# Patient Record
Sex: FEMALE | Race: WHITE | NOT HISPANIC OR LATINO | URBAN - METROPOLITAN AREA
[De-identification: names, ages, dates, MRNs, and addresses within clinical notes are randomized per-mention and may not be internally consistent; named-entity substitution may affect disease eponyms.]

---

## 2018-01-14 ENCOUNTER — INPATIENT HOSPITAL (OUTPATIENT)
Dept: URBAN - METROPOLITAN AREA HOSPITAL 84 | Facility: HOSPITAL | Age: 73
End: 2018-01-14
Payer: MEDICARE

## 2018-01-14 DIAGNOSIS — K44.9 DIAPHRAGMATIC HERNIA WITHOUT OBSTRUCTION OR GANGRENE: ICD-10-CM

## 2018-01-14 DIAGNOSIS — K26.4 CHRONIC OR UNSPECIFIED DUODENAL ULCER WITH HEMORRHAGE: ICD-10-CM

## 2018-01-14 DIAGNOSIS — K92.1 MELENA: ICD-10-CM

## 2018-01-14 PROCEDURE — 43255 EGD CONTROL BLEEDING ANY: CPT | Performed by: INTERNAL MEDICINE

## 2018-01-15 ENCOUNTER — INPATIENT HOSPITAL (OUTPATIENT)
Dept: URBAN - METROPOLITAN AREA HOSPITAL 84 | Facility: HOSPITAL | Age: 73
End: 2018-01-15
Payer: MEDICARE

## 2018-01-15 DIAGNOSIS — R10.11 RIGHT UPPER QUADRANT PAIN: ICD-10-CM

## 2018-01-15 DIAGNOSIS — R63.4 ABNORMAL WEIGHT LOSS: ICD-10-CM

## 2018-01-15 DIAGNOSIS — D50.0 IRON DEFICIENCY ANEMIA SECONDARY TO BLOOD LOSS (CHRONIC): ICD-10-CM

## 2018-01-15 PROCEDURE — 99231 SBSQ HOSP IP/OBS SF/LOW 25: CPT | Performed by: NURSE PRACTITIONER

## 2018-01-18 ENCOUNTER — INPATIENT HOSPITAL (OUTPATIENT)
Dept: URBAN - METROPOLITAN AREA HOSPITAL 84 | Facility: HOSPITAL | Age: 73
End: 2018-01-18
Payer: MEDICARE

## 2018-01-18 DIAGNOSIS — R63.4 ABNORMAL WEIGHT LOSS: ICD-10-CM

## 2018-01-18 DIAGNOSIS — I48.91 UNSPECIFIED ATRIAL FIBRILLATION: ICD-10-CM

## 2018-01-18 DIAGNOSIS — D50.0 IRON DEFICIENCY ANEMIA SECONDARY TO BLOOD LOSS (CHRONIC): ICD-10-CM

## 2018-01-18 PROCEDURE — 99231 SBSQ HOSP IP/OBS SF/LOW 25: CPT | Performed by: NURSE PRACTITIONER

## 2018-02-28 ENCOUNTER — HOSPITAL ENCOUNTER (OUTPATIENT)
Dept: PHYSICAL THERAPY | Facility: HOSPITAL | Age: 73
Setting detail: RECURRING SERIES
Discharge: HOME | End: 2018-04-12
Attending: NURSE PRACTITIONER | Admitting: NURSE PRACTITIONER

## 2018-03-16 ENCOUNTER — HOSPITAL ENCOUNTER (OUTPATIENT)
Dept: HOME HEALTH SERVICES | Facility: HOME HEALTHCARE | Age: 73
Setting detail: SPECIMEN
Discharge: HOME | End: 2018-03-16
Attending: FAMILY MEDICINE | Admitting: FAMILY MEDICINE

## 2018-03-16 LAB
ANION GAP SERPL CALC-SCNC: 10.7 MMOL/L (ref 10–20)
BUN SERPL-MCNC: 24 MG/DL (ref 8–20)
BUN/CREAT SERPL: 16 (ref 5.4–26.2)
CALCIUM SERPL-MCNC: 8.5 MG/DL (ref 8.9–10.3)
CHLORIDE SERPL-SCNC: 114 MMOL/L (ref 101–111)
CONV CO2: 21 MMOL/L (ref 22–32)
CREAT UR-MCNC: 1.5 MG/DL (ref 0.4–1)
GLUCOSE SERPL-MCNC: 77 MG/DL (ref 65–99)
POTASSIUM SERPL-SCNC: 3.7 MMOL/L (ref 3.6–5.1)
SODIUM SERPL-SCNC: 142 MMOL/L (ref 136–144)

## 2018-05-18 ENCOUNTER — HOSPITAL ENCOUNTER (OUTPATIENT)
Dept: FAMILY MEDICINE CLINIC | Facility: CLINIC | Age: 73
Setting detail: SPECIMEN
Discharge: HOME | End: 2018-05-18
Attending: PREVENTIVE MEDICINE | Admitting: PREVENTIVE MEDICINE

## 2018-05-18 LAB
ALBUMIN SERPL-MCNC: 3.2 G/DL (ref 3.5–4.8)
ALBUMIN/GLOB SERPL: 0.9 {RATIO} (ref 1–1.7)
ALP SERPL-CCNC: 111 IU/L (ref 32–91)
ALT SERPL-CCNC: 17 IU/L (ref 14–54)
ANION GAP SERPL CALC-SCNC: 12.3 MMOL/L (ref 10–20)
AST SERPL-CCNC: 31 IU/L (ref 15–41)
BACTERIA SPEC AEROBE CULT: NORMAL
BASOPHILS # BLD AUTO: 0 10*3/UL (ref 0–0.2)
BASOPHILS NFR BLD AUTO: 0 % (ref 0–2)
BILIRUB SERPL-MCNC: 0.9 MG/DL (ref 0.3–1.2)
BUN SERPL-MCNC: 37 MG/DL (ref 8–20)
BUN/CREAT SERPL: 19.5 (ref 5.4–26.2)
CALCIUM SERPL-MCNC: 9.4 MG/DL (ref 8.9–10.3)
CHLORIDE SERPL-SCNC: 110 MMOL/L (ref 101–111)
CHOLEST SERPL-MCNC: 171 MG/DL
CHOLEST/HDLC SERPL: 2.3 {RATIO}
CONV CO2: 23 MMOL/L (ref 22–32)
CONV LDL CHOLESTEROL DIRECT: 73 MG/DL (ref 0–100)
CONV TOTAL PROTEIN: 6.7 G/DL (ref 6.1–7.9)
CREAT UR-MCNC: 1.9 MG/DL (ref 0.4–1)
CRP SERPL-MCNC: 1.16 MG/DL (ref 0–0.7)
DIFFERENTIAL METHOD BLD: (no result)
EOSINOPHIL # BLD AUTO: 0 % (ref 0–3)
EOSINOPHIL # BLD AUTO: 0 10*3/UL (ref 0–0.3)
ERYTHROCYTE [DISTWIDTH] IN BLOOD BY AUTOMATED COUNT: 18.4 % (ref 11.5–14.5)
ERYTHROCYTE [SEDIMENTATION RATE] IN BLOOD BY WESTERGREN METHOD: 36 MM/HR (ref 0–30)
GLOBULIN UR ELPH-MCNC: 3.5 G/DL (ref 2.5–3.8)
GLUCOSE SERPL-MCNC: 110 MG/DL (ref 65–99)
HCT VFR BLD AUTO: 31.1 % (ref 35–49)
HDLC SERPL-MCNC: 76 MG/DL
HGB BLD-MCNC: 9.7 G/DL (ref 12–15)
LDLC/HDLC SERPL: 1 {RATIO}
LIPID INTERPRETATION: ABNORMAL
LYMPHOCYTES # BLD AUTO: 0.5 10*3/UL (ref 0.8–4.8)
LYMPHOCYTES NFR BLD AUTO: 6 % (ref 18–42)
Lab: NORMAL
MCH RBC QN AUTO: 29.3 PG (ref 26–32)
MCHC RBC AUTO-ENTMCNC: 31 G/DL (ref 32–36)
MCV RBC AUTO: 94.3 FL (ref 80–94)
MICRO REPORT STATUS: NORMAL
MONOCYTES # BLD AUTO: 0.3 10*3/UL (ref 0.1–1.3)
MONOCYTES NFR BLD AUTO: 3 % (ref 2–11)
NEUTROPHILS # BLD AUTO: 8.1 10*3/UL (ref 2.3–8.6)
NEUTROPHILS NFR BLD AUTO: 91 % (ref 50–75)
NRBC BLD AUTO-RTO: 0 /100{WBCS}
NRBC/RBC NFR BLD MANUAL: 0 10*3/UL
PLATELET # BLD AUTO: 256 10*3/UL (ref 150–450)
PMV BLD AUTO: 7.8 FL (ref 7.4–10.4)
POTASSIUM SERPL-SCNC: 4.3 MMOL/L (ref 3.6–5.1)
RBC # BLD AUTO: 3.3 10*6/UL (ref 4–5.4)
SODIUM SERPL-SCNC: 141 MMOL/L (ref 136–144)
SPECIMEN SOURCE: NORMAL
TRIGL SERPL-MCNC: 82 MG/DL
URATE SERPL-MCNC: 6.2 MG/DL (ref 2.6–8)
VLDLC SERPL CALC-MCNC: 22 MG/DL
WBC # BLD AUTO: 9 10*3/UL (ref 4.5–11.5)

## 2018-05-24 ENCOUNTER — HOSPITAL ENCOUNTER (OUTPATIENT)
Dept: GENERAL RADIOLOGY | Facility: HOSPITAL | Age: 73
Discharge: HOME | End: 2018-05-24
Attending: PREVENTIVE MEDICINE | Admitting: PREVENTIVE MEDICINE

## 2018-05-31 ENCOUNTER — HOSPITAL ENCOUNTER (OUTPATIENT)
Dept: FAMILY MEDICINE CLINIC | Facility: CLINIC | Age: 73
Setting detail: SPECIMEN
Discharge: HOME | End: 2018-05-31
Attending: PREVENTIVE MEDICINE | Admitting: PREVENTIVE MEDICINE

## 2018-06-04 ENCOUNTER — HOSPITAL ENCOUNTER (OUTPATIENT)
Dept: FAMILY MEDICINE CLINIC | Facility: CLINIC | Age: 73
Setting detail: SPECIMEN
Discharge: HOME | End: 2018-06-04
Attending: PREVENTIVE MEDICINE | Admitting: PREVENTIVE MEDICINE

## 2018-06-04 LAB
AMPICILLIN SUSC ISLT: ABNORMAL
AZTREONAM SUSC ISLT: ABNORMAL
BACTERIA ISLT: ABNORMAL
BACTERIA SPEC AEROBE CULT: ABNORMAL
CEFAZOLIN SUSC ISLT: ABNORMAL
CEFEPIME SUSC ISLT: ABNORMAL
CEFTRIAXONE SUSC ISLT: ABNORMAL
COLONY COUNT: ABNORMAL
LEVOFLOXACIN SUSC ISLT: ABNORMAL
Lab: ABNORMAL
MEROPENEM SUSC ISLT: ABNORMAL
MICRO REPORT STATUS: ABNORMAL
PIP+TAZO SUSC ISLT: ABNORMAL
SPECIMEN SOURCE: ABNORMAL
SUSC METH SPEC: ABNORMAL
TOBRAMYCIN SUSC ISLT: ABNORMAL
TRIMETHOPRIM/SULFA: ABNORMAL

## 2018-06-21 ENCOUNTER — HOSPITAL ENCOUNTER (OUTPATIENT)
Dept: FAMILY MEDICINE CLINIC | Facility: CLINIC | Age: 73
Setting detail: SPECIMEN
Discharge: HOME | End: 2018-06-21
Attending: PREVENTIVE MEDICINE | Admitting: PREVENTIVE MEDICINE

## 2018-06-24 LAB
AMPICILLIN SUSC ISLT: ABNORMAL
AMPICILLIN+SULBAC SUSC ISLT: ABNORMAL
AZTREONAM SUSC ISLT: ABNORMAL
AZTREONAM SUSC ISLT: ABNORMAL
BACTERIA ISLT: ABNORMAL
BACTERIA ISLT: ABNORMAL
BACTERIA SPEC AEROBE CULT: ABNORMAL
CEFAZOLIN SUSC ISLT: ABNORMAL
CEFAZOLIN SUSC ISLT: ABNORMAL
CEFEPIME SUSC ISLT: ABNORMAL
CEFEPIME SUSC ISLT: ABNORMAL
CEFTRIAXONE SUSC ISLT: ABNORMAL
CEFTRIAXONE SUSC ISLT: ABNORMAL
CIPROFLOXACIN SUSC ISLT: ABNORMAL
CIPROFLOXACIN SUSC ISLT: ABNORMAL
COLONY COUNT: ABNORMAL
LEVOFLOXACIN SUSC ISLT: ABNORMAL
LEVOFLOXACIN SUSC ISLT: ABNORMAL
Lab: ABNORMAL
MEROPENEM SUSC ISLT: ABNORMAL
MEROPENEM SUSC ISLT: ABNORMAL
MICRO REPORT STATUS: ABNORMAL
NITROFURANTOIN SUSC ISLT: ABNORMAL
NITROFURANTOIN SUSC ISLT: ABNORMAL
PIP+TAZO SUSC ISLT: ABNORMAL
PIP+TAZO SUSC ISLT: ABNORMAL
SPECIMEN SOURCE: ABNORMAL
SUSC METH SPEC: ABNORMAL
SUSC METH SPEC: ABNORMAL
TETRACYCLINE SUSC ISLT: ABNORMAL
TETRACYCLINE SUSC ISLT: ABNORMAL
TOBRAMYCIN SUSC ISLT: ABNORMAL
TOBRAMYCIN SUSC ISLT: ABNORMAL
TRIMETHOPRIM/SULFA: ABNORMAL
TRIMETHOPRIM/SULFA: ABNORMAL

## 2018-07-05 ENCOUNTER — HOSPITAL ENCOUNTER (OUTPATIENT)
Dept: FAMILY MEDICINE CLINIC | Facility: CLINIC | Age: 73
Setting detail: SPECIMEN
Discharge: HOME | End: 2018-07-05
Attending: PREVENTIVE MEDICINE | Admitting: PREVENTIVE MEDICINE

## 2018-07-06 LAB
AMPICILLIN SUSC ISLT: ABNORMAL
AZTREONAM SUSC ISLT: ABNORMAL
AZTREONAM SUSC ISLT: ABNORMAL
BACTERIA ISLT: ABNORMAL
BACTERIA ISLT: ABNORMAL
BACTERIA SPEC AEROBE CULT: ABNORMAL
CEFAZOLIN SUSC ISLT: ABNORMAL
CEFAZOLIN SUSC ISLT: ABNORMAL
CEFEPIME SUSC ISLT: ABNORMAL
CEFEPIME SUSC ISLT: ABNORMAL
CEFTRIAXONE SUSC ISLT: ABNORMAL
CEFTRIAXONE SUSC ISLT: ABNORMAL
CIPROFLOXACIN SUSC ISLT: ABNORMAL
CIPROFLOXACIN SUSC ISLT: ABNORMAL
COLONY COUNT: ABNORMAL
LEVOFLOXACIN SUSC ISLT: ABNORMAL
LEVOFLOXACIN SUSC ISLT: ABNORMAL
Lab: ABNORMAL
MEROPENEM SUSC ISLT: ABNORMAL
MEROPENEM SUSC ISLT: ABNORMAL
MICRO REPORT STATUS: ABNORMAL
NITROFURANTOIN SUSC ISLT: ABNORMAL
NITROFURANTOIN SUSC ISLT: ABNORMAL
PIP+TAZO SUSC ISLT: ABNORMAL
PIP+TAZO SUSC ISLT: ABNORMAL
SPECIMEN SOURCE: ABNORMAL
SUSC METH SPEC: ABNORMAL
SUSC METH SPEC: ABNORMAL
TETRACYCLINE SUSC ISLT: ABNORMAL
TETRACYCLINE SUSC ISLT: ABNORMAL
TOBRAMYCIN SUSC ISLT: ABNORMAL
TOBRAMYCIN SUSC ISLT: ABNORMAL
TRIMETHOPRIM/SULFA: ABNORMAL
TRIMETHOPRIM/SULFA: ABNORMAL

## 2018-07-30 ENCOUNTER — HOSPITAL ENCOUNTER (OUTPATIENT)
Dept: HOME HEALTH SERVICES | Facility: HOME HEALTHCARE | Age: 73
Setting detail: SPECIMEN
Discharge: HOME | End: 2018-07-30
Attending: PREVENTIVE MEDICINE | Admitting: PREVENTIVE MEDICINE

## 2018-07-30 LAB
AMPICILLIN SUSC ISLT: ABNORMAL
AMPICILLIN SUSC ISLT: ABNORMAL
AZTREONAM SUSC ISLT: ABNORMAL
AZTREONAM SUSC ISLT: ABNORMAL
BACTERIA ISLT: ABNORMAL
BACTERIA ISLT: ABNORMAL
BACTERIA SPEC AEROBE CULT: ABNORMAL
BILIRUB UR QL STRIP: NEGATIVE MG/DL
CASTS URNS QL MICRO: ABNORMAL /[LPF]
CEFAZOLIN SUSC ISLT: ABNORMAL
CEFAZOLIN SUSC ISLT: ABNORMAL
CEFEPIME SUSC ISLT: ABNORMAL
CEFEPIME SUSC ISLT: ABNORMAL
CEFTRIAXONE SUSC ISLT: ABNORMAL
CEFTRIAXONE SUSC ISLT: ABNORMAL
CIPROFLOXACIN SUSC ISLT: ABNORMAL
CIPROFLOXACIN SUSC ISLT: ABNORMAL
COLONY COUNT: ABNORMAL
COLOR UR: YELLOW
CONV BACTERIA IN URINE MICRO: NEGATIVE
CONV CLARITY OF URINE: ABNORMAL
CONV HYALINE CASTS IN URINE MICRO: 1 /[LPF] (ref 0–5)
CONV PROTEIN IN URINE BY AUTOMATED TEST STRIP: NEGATIVE MG/DL
CONV SMALL ROUND CELLS: ABNORMAL /[HPF]
CONV UROBILINOGEN IN URINE BY AUTOMATED TEST STRIP: 0.2 MG/DL
CULTURE INDICATED?: ABNORMAL
GLUCOSE UR QL: NEGATIVE MG/DL
HGB UR QL STRIP: ABNORMAL
KETONES UR QL STRIP: NEGATIVE MG/DL
LEUKOCYTE ESTERASE UR QL STRIP: ABNORMAL
LEVOFLOXACIN SUSC ISLT: ABNORMAL
LEVOFLOXACIN SUSC ISLT: ABNORMAL
Lab: ABNORMAL
MEROPENEM SUSC ISLT: ABNORMAL
MEROPENEM SUSC ISLT: ABNORMAL
MICRO REPORT STATUS: ABNORMAL
NITRITE UR QL STRIP: NEGATIVE
NITROFURANTOIN SUSC ISLT: ABNORMAL
PH UR STRIP.AUTO: 5.5 [PH] (ref 4.5–8)
PIP+TAZO SUSC ISLT: ABNORMAL
PIP+TAZO SUSC ISLT: ABNORMAL
RBC #/AREA URNS HPF: 2 /[HPF] (ref 0–3)
SP GR UR: 1.01 (ref 1–1.03)
SPECIMEN SOURCE: ABNORMAL
SPERM URNS QL MICRO: ABNORMAL /[HPF]
SQUAMOUS SPT QL MICRO: 0 /[HPF] (ref 0–5)
SUSC METH SPEC: ABNORMAL
SUSC METH SPEC: ABNORMAL
TETRACYCLINE SUSC ISLT: ABNORMAL
TOBRAMYCIN SUSC ISLT: ABNORMAL
TOBRAMYCIN SUSC ISLT: ABNORMAL
TRIMETHOPRIM/SULFA: ABNORMAL
TRIMETHOPRIM/SULFA: ABNORMAL
UNIDENT CRYS URNS QL MICRO: ABNORMAL /[HPF]
WBC #/AREA URNS HPF: ABNORMAL /[HPF] (ref 0–5)
YEAST SPEC QL WET PREP: ABNORMAL /[HPF]

## 2018-08-06 ENCOUNTER — HOSPITAL ENCOUNTER (OUTPATIENT)
Dept: HOME HEALTH SERVICES | Facility: HOME HEALTHCARE | Age: 73
Setting detail: SPECIMEN
Discharge: HOME | End: 2018-08-06
Attending: PREVENTIVE MEDICINE | Admitting: PREVENTIVE MEDICINE

## 2018-08-06 LAB
BACTERIA SPEC AEROBE CULT: NORMAL
BILIRUB UR QL STRIP: NEGATIVE MG/DL
CASTS URNS QL MICRO: ABNORMAL /[LPF]
COLOR UR: YELLOW
CONV BACTERIA IN URINE MICRO: NEGATIVE
CONV CLARITY OF URINE: CLEAR
CONV HYALINE CASTS IN URINE MICRO: ABNORMAL /[LPF] (ref 0–5)
CONV PROTEIN IN URINE BY AUTOMATED TEST STRIP: 30 MG/DL
CONV SMALL ROUND CELLS: ABNORMAL /[HPF]
CONV UROBILINOGEN IN URINE BY AUTOMATED TEST STRIP: 0.2 MG/DL
CULTURE INDICATED?: ABNORMAL
GLUCOSE UR QL: NEGATIVE MG/DL
HGB UR QL STRIP: ABNORMAL
KETONES UR QL STRIP: NEGATIVE MG/DL
LEUKOCYTE ESTERASE UR QL STRIP: ABNORMAL
Lab: NORMAL
MICRO REPORT STATUS: NORMAL
NITRITE UR QL STRIP: NEGATIVE
PH UR STRIP.AUTO: 5.5 [PH] (ref 4.5–8)
RBC #/AREA URNS HPF: 4 /[HPF] (ref 0–3)
SP GR UR: 1.02 (ref 1–1.03)
SPECIMEN SOURCE: NORMAL
SPERM URNS QL MICRO: ABNORMAL /[HPF]
SQUAMOUS SPT QL MICRO: 1 /[HPF] (ref 0–5)
UNIDENT CRYS URNS QL MICRO: ABNORMAL /[HPF]
WBC #/AREA URNS HPF: ABNORMAL /[HPF] (ref 0–5)
YEAST SPEC QL WET PREP: ABNORMAL /[HPF]

## 2018-08-10 ENCOUNTER — HOSPITAL ENCOUNTER (OUTPATIENT)
Dept: HOME HEALTH SERVICES | Facility: HOME HEALTHCARE | Age: 73
Setting detail: SPECIMEN
Discharge: HOME | End: 2018-08-10
Attending: PREVENTIVE MEDICINE | Admitting: PREVENTIVE MEDICINE

## 2018-08-10 LAB
BACTERIA SPEC AEROBE CULT: NORMAL
BILIRUB UR QL STRIP: NEGATIVE MG/DL
CASTS URNS QL MICRO: ABNORMAL /[LPF]
COLOR UR: YELLOW
CONV BACTERIA IN URINE MICRO: NEGATIVE
CONV CLARITY OF URINE: ABNORMAL
CONV HYALINE CASTS IN URINE MICRO: ABNORMAL /[LPF] (ref 0–5)
CONV PROTEIN IN URINE BY AUTOMATED TEST STRIP: 30 MG/DL
CONV SMALL ROUND CELLS: ABNORMAL /[HPF]
CONV UROBILINOGEN IN URINE BY AUTOMATED TEST STRIP: 0.2 MG/DL
CULTURE INDICATED?: ABNORMAL
GLUCOSE UR QL: NEGATIVE MG/DL
HGB UR QL STRIP: ABNORMAL
KETONES UR QL STRIP: NEGATIVE MG/DL
LEUKOCYTE ESTERASE UR QL STRIP: ABNORMAL
Lab: NORMAL
MICRO REPORT STATUS: NORMAL
NITRITE UR QL STRIP: NEGATIVE
PH UR STRIP.AUTO: 5.5 [PH] (ref 4.5–8)
RBC #/AREA URNS HPF: 4 /[HPF] (ref 0–3)
SP GR UR: 1.02 (ref 1–1.03)
SPECIMEN SOURCE: ABNORMAL
SPECIMEN SOURCE: NORMAL
SPERM URNS QL MICRO: ABNORMAL /[HPF]
SQUAMOUS SPT QL MICRO: 1 /[HPF] (ref 0–5)
UNIDENT CRYS URNS QL MICRO: ABNORMAL /[HPF]
WBC #/AREA URNS HPF: ABNORMAL /[HPF] (ref 0–5)
YEAST SPEC QL WET PREP: ABNORMAL /[HPF]

## 2018-08-28 ENCOUNTER — HOSPITAL ENCOUNTER (OUTPATIENT)
Dept: HOME HEALTH SERVICES | Facility: HOME HEALTHCARE | Age: 73
Setting detail: SPECIMEN
Discharge: HOME | End: 2018-08-28
Attending: PREVENTIVE MEDICINE | Admitting: PREVENTIVE MEDICINE

## 2018-08-28 LAB
ALBUMIN SERPL-MCNC: 3.1 G/DL (ref 3.5–4.8)
ALBUMIN/GLOB SERPL: 1.1 {RATIO} (ref 1–1.7)
ALP SERPL-CCNC: 104 IU/L (ref 32–91)
ALT SERPL-CCNC: 14 IU/L (ref 14–54)
ANION GAP SERPL CALC-SCNC: 10.6 MMOL/L (ref 10–20)
AST SERPL-CCNC: 29 IU/L (ref 15–41)
BASOPHILS # BLD AUTO: 0.1 10*3/UL (ref 0–0.2)
BASOPHILS NFR BLD AUTO: 1 % (ref 0–2)
BILIRUB SERPL-MCNC: 1.1 MG/DL (ref 0.3–1.2)
BUN SERPL-MCNC: 29 MG/DL (ref 8–20)
BUN/CREAT SERPL: 17.1 (ref 5.4–26.2)
CALCIUM SERPL-MCNC: 8.9 MG/DL (ref 8.9–10.3)
CHLORIDE SERPL-SCNC: 113 MMOL/L (ref 101–111)
CHOLEST SERPL-MCNC: 169 MG/DL
CHOLEST/HDLC SERPL: 2.1 {RATIO}
CONV CO2: 21 MMOL/L (ref 22–32)
CONV LDL CHOLESTEROL DIRECT: 78 MG/DL (ref 0–100)
CONV TOTAL PROTEIN: 5.8 G/DL (ref 6.1–7.9)
CREAT UR-MCNC: 1.7 MG/DL (ref 0.4–1)
DIFFERENTIAL METHOD BLD: (no result)
EOSINOPHIL # BLD AUTO: 0.2 10*3/UL (ref 0–0.3)
EOSINOPHIL # BLD AUTO: 4 % (ref 0–3)
ERYTHROCYTE [DISTWIDTH] IN BLOOD BY AUTOMATED COUNT: 16.5 % (ref 11.5–14.5)
GLOBULIN UR ELPH-MCNC: 2.7 G/DL (ref 2.5–3.8)
GLUCOSE SERPL-MCNC: 83 MG/DL (ref 65–99)
HCT VFR BLD AUTO: 33 % (ref 35–49)
HDLC SERPL-MCNC: 80 MG/DL
HGB BLD-MCNC: 10.6 G/DL (ref 12–15)
LDLC/HDLC SERPL: 1 {RATIO}
LIPID INTERPRETATION: NORMAL
LYMPHOCYTES # BLD AUTO: 1.1 10*3/UL (ref 0.8–4.8)
LYMPHOCYTES NFR BLD AUTO: 17 % (ref 18–42)
MAGNESIUM SERPL-MCNC: 1.7 MG/DL (ref 1.8–2.5)
MCH RBC QN AUTO: 29.4 PG (ref 26–32)
MCHC RBC AUTO-ENTMCNC: 32.2 G/DL (ref 32–36)
MCV RBC AUTO: 91.3 FL (ref 80–94)
MONOCYTES # BLD AUTO: 0.5 10*3/UL (ref 0.1–1.3)
MONOCYTES NFR BLD AUTO: 8 % (ref 2–11)
NEUTROPHILS # BLD AUTO: 4.7 10*3/UL (ref 2.3–8.6)
NEUTROPHILS NFR BLD AUTO: 70 % (ref 50–75)
NRBC BLD AUTO-RTO: 0 /100{WBCS}
NRBC/RBC NFR BLD MANUAL: 0 10*3/UL
PLATELET # BLD AUTO: 192 10*3/UL (ref 150–450)
PMV BLD AUTO: 8.1 FL (ref 7.4–10.4)
POTASSIUM SERPL-SCNC: 4.6 MMOL/L (ref 3.6–5.1)
RBC # BLD AUTO: 3.62 10*6/UL (ref 4–5.4)
SODIUM SERPL-SCNC: 140 MMOL/L (ref 136–144)
TRIGL SERPL-MCNC: 72 MG/DL
URATE SERPL-MCNC: 9.2 MG/DL (ref 2.6–8)
VLDLC SERPL CALC-MCNC: 10.2 MG/DL
WBC # BLD AUTO: 6.7 10*3/UL (ref 4.5–11.5)

## 2018-10-19 ENCOUNTER — HOSPITAL ENCOUNTER (OUTPATIENT)
Dept: HOME HEALTH SERVICES | Facility: HOME HEALTHCARE | Age: 73
Setting detail: SPECIMEN
Discharge: HOME | End: 2018-10-19
Attending: PREVENTIVE MEDICINE | Admitting: PREVENTIVE MEDICINE

## 2018-10-19 LAB
ALBUMIN SERPL-MCNC: 2.3 G/DL (ref 3.5–4.8)
ALBUMIN/GLOB SERPL: 0.7 {RATIO} (ref 1–1.7)
ALP SERPL-CCNC: 112 IU/L (ref 32–91)
ALT SERPL-CCNC: 12 IU/L (ref 14–54)
ANION GAP SERPL CALC-SCNC: 13.9 MMOL/L (ref 10–20)
AST SERPL-CCNC: 21 IU/L (ref 15–41)
BASOPHILS # BLD AUTO: 0.1 10*3/UL (ref 0–0.2)
BASOPHILS NFR BLD AUTO: 1 % (ref 0–2)
BILIRUB SERPL-MCNC: 1.4 MG/DL (ref 0.3–1.2)
BUN SERPL-MCNC: 44 MG/DL (ref 8–20)
BUN/CREAT SERPL: 21 (ref 5.4–26.2)
CALCIUM SERPL-MCNC: 8 MG/DL (ref 8.9–10.3)
CHLORIDE SERPL-SCNC: 105 MMOL/L (ref 101–111)
CONV CO2: 22 MMOL/L (ref 22–32)
CONV TOTAL PROTEIN: 5.6 G/DL (ref 6.1–7.9)
CREAT UR-MCNC: 2.1 MG/DL (ref 0.4–1)
DIFFERENTIAL METHOD BLD: (no result)
EOSINOPHIL # BLD AUTO: 0 % (ref 0–3)
EOSINOPHIL # BLD AUTO: 0 10*3/UL (ref 0–0.3)
ERYTHROCYTE [DISTWIDTH] IN BLOOD BY AUTOMATED COUNT: 17.2 % (ref 11.5–14.5)
GLOBULIN UR ELPH-MCNC: 3.3 G/DL (ref 2.5–3.8)
GLUCOSE SERPL-MCNC: 122 MG/DL (ref 65–99)
HCT VFR BLD AUTO: 30.7 % (ref 35–49)
HGB BLD-MCNC: 9.7 G/DL (ref 12–15)
LYMPHOCYTES # BLD AUTO: 0.9 10*3/UL (ref 0.8–4.8)
LYMPHOCYTES NFR BLD AUTO: 5 % (ref 18–42)
MCH RBC QN AUTO: 28.5 PG (ref 26–32)
MCHC RBC AUTO-ENTMCNC: 31.8 G/DL (ref 32–36)
MCV RBC AUTO: 89.7 FL (ref 80–94)
MONOCYTES # BLD AUTO: 0.8 10*3/UL (ref 0.1–1.3)
MONOCYTES NFR BLD AUTO: 5 % (ref 2–11)
NEUTROPHILS # BLD AUTO: 14.1 10*3/UL (ref 2.3–8.6)
NEUTROPHILS NFR BLD AUTO: 89 % (ref 50–75)
NRBC BLD AUTO-RTO: 0 /100{WBCS}
NRBC/RBC NFR BLD MANUAL: 0 10*3/UL
PLATELET # BLD AUTO: 179 10*3/UL (ref 150–450)
PMV BLD AUTO: 8.1 FL (ref 7.4–10.4)
POTASSIUM SERPL-SCNC: 4.9 MMOL/L (ref 3.6–5.1)
RBC # BLD AUTO: 3.42 10*6/UL (ref 4–5.4)
SODIUM SERPL-SCNC: 136 MMOL/L (ref 136–144)
WBC # BLD AUTO: 15.9 10*3/UL (ref 4.5–11.5)

## 2018-11-20 ENCOUNTER — HOSPITAL ENCOUNTER (OUTPATIENT)
Dept: ONCOLOGY | Facility: CLINIC | Age: 73
Discharge: HOME | End: 2018-11-20
Attending: INTERNAL MEDICINE | Admitting: INTERNAL MEDICINE

## 2018-11-26 ENCOUNTER — HOSPITAL ENCOUNTER (OUTPATIENT)
Dept: FAMILY MEDICINE CLINIC | Facility: CLINIC | Age: 73
Setting detail: SPECIMEN
Discharge: HOME | End: 2018-11-26
Attending: PREVENTIVE MEDICINE | Admitting: PREVENTIVE MEDICINE

## 2018-11-26 LAB
ALBUMIN SERPL-MCNC: 2.5 G/DL (ref 3.5–4.8)
ALBUMIN/GLOB SERPL: 0.6 {RATIO} (ref 1–1.7)
ALP SERPL-CCNC: 117 IU/L (ref 32–91)
ALT SERPL-CCNC: 11 IU/L (ref 14–54)
ANION GAP SERPL CALC-SCNC: 12.3 MMOL/L (ref 10–20)
AST SERPL-CCNC: 23 IU/L (ref 15–41)
BASOPHILS # BLD AUTO: 0.1 10*3/UL (ref 0–0.2)
BASOPHILS NFR BLD AUTO: 1 % (ref 0–2)
BILIRUB SERPL-MCNC: 0.8 MG/DL (ref 0.3–1.2)
BUN SERPL-MCNC: 59 MG/DL (ref 8–20)
BUN/CREAT SERPL: 21.9 (ref 5.4–26.2)
CALCIUM SERPL-MCNC: 8.6 MG/DL (ref 8.9–10.3)
CHLORIDE SERPL-SCNC: 105 MMOL/L (ref 101–111)
CHOLEST SERPL-MCNC: 157 MG/DL
CHOLEST/HDLC SERPL: 2 {RATIO}
CONV CO2: 21 MMOL/L (ref 22–32)
CONV LDL CHOLESTEROL DIRECT: 66 MG/DL (ref 0–100)
CONV TOTAL PROTEIN: 6.5 G/DL (ref 6.1–7.9)
CREAT UR-MCNC: 2.7 MG/DL (ref 0.4–1)
DIFFERENTIAL METHOD BLD: (no result)
EOSINOPHIL # BLD AUTO: 0.2 10*3/UL (ref 0–0.3)
EOSINOPHIL # BLD AUTO: 2 % (ref 0–3)
ERYTHROCYTE [DISTWIDTH] IN BLOOD BY AUTOMATED COUNT: 19.8 % (ref 11.5–14.5)
GLOBULIN UR ELPH-MCNC: 4 G/DL (ref 2.5–3.8)
GLUCOSE SERPL-MCNC: 79 MG/DL (ref 65–99)
HCT VFR BLD AUTO: 32.7 % (ref 35–49)
HDLC SERPL-MCNC: 79 MG/DL
HGB BLD-MCNC: 10.3 G/DL (ref 12–15)
LDLC/HDLC SERPL: 0.8 {RATIO}
LIPID INTERPRETATION: NORMAL
LYMPHOCYTES # BLD AUTO: 1.1 10*3/UL (ref 0.8–4.8)
LYMPHOCYTES NFR BLD AUTO: 13 % (ref 18–42)
MCH RBC QN AUTO: 29.5 PG (ref 26–32)
MCHC RBC AUTO-ENTMCNC: 31.4 G/DL (ref 32–36)
MCV RBC AUTO: 94.1 FL (ref 80–94)
MONOCYTES # BLD AUTO: 0.6 10*3/UL (ref 0.1–1.3)
MONOCYTES NFR BLD AUTO: 8 % (ref 2–11)
NEUTROPHILS # BLD AUTO: 6.4 10*3/UL (ref 2.3–8.6)
NEUTROPHILS NFR BLD AUTO: 76 % (ref 50–75)
NRBC BLD AUTO-RTO: 0 /100{WBCS}
NRBC/RBC NFR BLD MANUAL: 0 10*3/UL
PLATELET # BLD AUTO: 203 10*3/UL (ref 150–450)
PMV BLD AUTO: 7.4 FL (ref 7.4–10.4)
POTASSIUM SERPL-SCNC: 4.3 MMOL/L (ref 3.6–5.1)
RBC # BLD AUTO: 3.48 10*6/UL (ref 4–5.4)
SODIUM SERPL-SCNC: 134 MMOL/L (ref 136–144)
TRIGL SERPL-MCNC: 53 MG/DL
URATE SERPL-MCNC: 4.2 MG/DL (ref 2.6–8)
VLDLC SERPL CALC-MCNC: 12.3 MG/DL
WBC # BLD AUTO: 8.4 10*3/UL (ref 4.5–11.5)

## 2019-01-17 ENCOUNTER — HOSPITAL ENCOUNTER (OUTPATIENT)
Dept: WOUND CARE | Facility: HOSPITAL | Age: 74
Discharge: HOME | End: 2019-01-17
Attending: SURGERY | Admitting: SURGERY

## 2019-01-28 ENCOUNTER — HOSPITAL ENCOUNTER (OUTPATIENT)
Dept: CARDIOLOGY | Facility: HOSPITAL | Age: 74
Discharge: HOME | End: 2019-01-28
Attending: INTERNAL MEDICINE | Admitting: INTERNAL MEDICINE

## 2019-01-31 ENCOUNTER — HOSPITAL ENCOUNTER (OUTPATIENT)
Dept: WOUND CARE | Facility: HOSPITAL | Age: 74
Discharge: HOME | End: 2019-01-31
Attending: INTERNAL MEDICINE | Admitting: INTERNAL MEDICINE

## 2019-02-07 ENCOUNTER — HOSPITAL ENCOUNTER (OUTPATIENT)
Dept: WOUND CARE | Facility: HOSPITAL | Age: 74
Discharge: HOME | End: 2019-02-07
Attending: INTERNAL MEDICINE | Admitting: INTERNAL MEDICINE

## 2019-02-14 ENCOUNTER — HOSPITAL ENCOUNTER (OUTPATIENT)
Dept: WOUND CARE | Facility: HOSPITAL | Age: 74
Discharge: HOME | End: 2019-02-14
Attending: NURSE PRACTITIONER | Admitting: NURSE PRACTITIONER

## 2019-02-28 ENCOUNTER — HOSPITAL ENCOUNTER (OUTPATIENT)
Dept: WOUND CARE | Facility: HOSPITAL | Age: 74
Discharge: HOME | End: 2019-02-28
Attending: INTERNAL MEDICINE | Admitting: INTERNAL MEDICINE

## 2019-03-07 ENCOUNTER — HOSPITAL ENCOUNTER (OUTPATIENT)
Dept: FAMILY MEDICINE CLINIC | Facility: CLINIC | Age: 74
Setting detail: SPECIMEN
Discharge: HOME | End: 2019-03-07
Attending: PREVENTIVE MEDICINE | Admitting: PREVENTIVE MEDICINE

## 2019-03-07 LAB
BASOPHILS # BLD AUTO: 0.1 10*3/UL (ref 0–0.2)
BASOPHILS NFR BLD AUTO: 1 % (ref 0–2)
DIFFERENTIAL METHOD BLD: (no result)
EOSINOPHIL # BLD AUTO: 0.2 10*3/UL (ref 0–0.3)
EOSINOPHIL # BLD AUTO: 2 % (ref 0–3)
ERYTHROCYTE [DISTWIDTH] IN BLOOD BY AUTOMATED COUNT: 16.4 % (ref 11.5–14.5)
HCT VFR BLD AUTO: 35.1 % (ref 35–49)
HGB BLD-MCNC: 11.3 G/DL (ref 12–15)
LYMPHOCYTES # BLD AUTO: 0.9 10*3/UL (ref 0.8–4.8)
LYMPHOCYTES NFR BLD AUTO: 10 % (ref 18–42)
MAGNESIUM SERPL-MCNC: 1.7 MG/DL (ref 1.8–2.5)
MCH RBC QN AUTO: 29.9 PG (ref 26–32)
MCHC RBC AUTO-ENTMCNC: 32.2 G/DL (ref 32–36)
MCV RBC AUTO: 93 FL (ref 80–94)
MONOCYTES # BLD AUTO: 0.5 10*3/UL (ref 0.1–1.3)
MONOCYTES NFR BLD AUTO: 5 % (ref 2–11)
NEUTROPHILS # BLD AUTO: 7.4 10*3/UL (ref 2.3–8.6)
NEUTROPHILS NFR BLD AUTO: 82 % (ref 50–75)
NRBC BLD AUTO-RTO: 0 /100{WBCS}
NRBC/RBC NFR BLD MANUAL: 0 10*3/UL
PLATELET # BLD AUTO: 198 10*3/UL (ref 150–450)
PMV BLD AUTO: 7.4 FL (ref 7.4–10.4)
RBC # BLD AUTO: 3.77 10*6/UL (ref 4–5.4)
WBC # BLD AUTO: 9.1 10*3/UL (ref 4.5–11.5)

## 2019-03-14 ENCOUNTER — HOSPITAL ENCOUNTER (OUTPATIENT)
Dept: WOUND CARE | Facility: HOSPITAL | Age: 74
Discharge: HOME | End: 2019-03-14
Attending: INTERNAL MEDICINE | Admitting: INTERNAL MEDICINE

## 2019-03-28 ENCOUNTER — HOSPITAL ENCOUNTER (OUTPATIENT)
Dept: WOUND CARE | Facility: HOSPITAL | Age: 74
Discharge: HOME | End: 2019-03-28
Attending: INTERNAL MEDICINE | Admitting: INTERNAL MEDICINE

## 2019-04-11 ENCOUNTER — HOSPITAL ENCOUNTER (OUTPATIENT)
Dept: WOUND CARE | Facility: HOSPITAL | Age: 74
Discharge: HOME | End: 2019-04-11
Attending: INTERNAL MEDICINE | Admitting: INTERNAL MEDICINE

## 2019-04-29 ENCOUNTER — HOSPITAL ENCOUNTER (OUTPATIENT)
Dept: PHYSICAL THERAPY | Facility: HOSPITAL | Age: 74
Setting detail: RECURRING SERIES
Discharge: ANOTHER HOSPITAL | End: 2019-05-22
Attending: NURSE PRACTITIONER | Admitting: NURSE PRACTITIONER

## 2019-07-26 ENCOUNTER — APPOINTMENT (OUTPATIENT)
Dept: WOUND CARE | Facility: HOSPITAL | Age: 74
End: 2019-07-26

## 2019-07-26 ENCOUNTER — OFFICE VISIT (OUTPATIENT)
Dept: WOUND CARE | Facility: HOSPITAL | Age: 74
End: 2019-07-26

## 2019-07-26 ENCOUNTER — LAB REQUISITION (OUTPATIENT)
Dept: LAB | Facility: HOSPITAL | Age: 74
End: 2019-07-26

## 2019-07-26 DIAGNOSIS — L97.829 NON-PRESSURE CHRONIC ULCER OF OTHER PART OF LEFT LOWER LEG WITH UNSPECIFIED SEVERITY (HCC): ICD-10-CM

## 2019-07-26 PROCEDURE — G0463 HOSPITAL OUTPT CLINIC VISIT: HCPCS

## 2019-07-26 PROCEDURE — 87205 SMEAR GRAM STAIN: CPT | Performed by: NURSE PRACTITIONER

## 2019-07-26 PROCEDURE — 87186 SC STD MICRODIL/AGAR DIL: CPT | Performed by: NURSE PRACTITIONER

## 2019-07-26 PROCEDURE — 87077 CULTURE AEROBIC IDENTIFY: CPT | Performed by: NURSE PRACTITIONER

## 2019-07-26 PROCEDURE — 87070 CULTURE OTHR SPECIMN AEROBIC: CPT | Performed by: NURSE PRACTITIONER

## 2019-07-26 PROCEDURE — 87181 SC STD AGAR DILUTION PER AGT: CPT | Performed by: NURSE PRACTITIONER

## 2019-07-30 LAB
BACTERIA SPEC AEROBE CULT: ABNORMAL
GRAM STN SPEC: ABNORMAL
GRAM STN SPEC: ABNORMAL

## 2019-07-31 ENCOUNTER — TRANSCRIBE ORDERS (OUTPATIENT)
Dept: ADMINISTRATIVE | Facility: HOSPITAL | Age: 74
End: 2019-07-31

## 2019-07-31 ENCOUNTER — OFFICE VISIT (OUTPATIENT)
Dept: WOUND CARE | Facility: HOSPITAL | Age: 74
End: 2019-07-31

## 2019-07-31 DIAGNOSIS — I87.333 IDIOPATHIC CHRONIC VENOUS HYPERTENSION OF BOTH LOWER EXTREMITIES WITH ULCER AND INFLAMMATION (HCC): Primary | ICD-10-CM

## 2019-07-31 DIAGNOSIS — L97.919 IDIOPATHIC CHRONIC VENOUS HYPERTENSION OF BOTH LOWER EXTREMITIES WITH ULCER AND INFLAMMATION (HCC): Primary | ICD-10-CM

## 2019-07-31 DIAGNOSIS — L97.929 IDIOPATHIC CHRONIC VENOUS HYPERTENSION OF BOTH LOWER EXTREMITIES WITH ULCER AND INFLAMMATION (HCC): Primary | ICD-10-CM

## 2019-07-31 DIAGNOSIS — L97.811 ULCER OF RIGHT PRETIBIAL REGION, LIMITED TO BREAKDOWN OF SKIN (HCC): Primary | ICD-10-CM

## 2019-08-07 ENCOUNTER — APPOINTMENT (OUTPATIENT)
Dept: WOUND CARE | Facility: HOSPITAL | Age: 74
End: 2019-08-07

## 2019-08-08 ENCOUNTER — HOSPITAL ENCOUNTER (OUTPATIENT)
Dept: CARDIOLOGY | Facility: HOSPITAL | Age: 74
Discharge: HOME | End: 2019-08-08
Admitting: SURGERY

## 2019-08-08 ENCOUNTER — OFFICE VISIT (OUTPATIENT)
Dept: WOUND CARE | Facility: HOSPITAL | Age: 74
End: 2019-08-08

## 2019-08-08 DIAGNOSIS — L97.929 IDIOPATHIC CHRONIC VENOUS HYPERTENSION OF BOTH LOWER EXTREMITIES WITH ULCER AND INFLAMMATION (HCC): ICD-10-CM

## 2019-08-08 DIAGNOSIS — I87.333 IDIOPATHIC CHRONIC VENOUS HYPERTENSION OF BOTH LOWER EXTREMITIES WITH ULCER AND INFLAMMATION (HCC): ICD-10-CM

## 2019-08-08 DIAGNOSIS — L97.919 IDIOPATHIC CHRONIC VENOUS HYPERTENSION OF BOTH LOWER EXTREMITIES WITH ULCER AND INFLAMMATION (HCC): ICD-10-CM

## 2019-08-08 LAB
BH CV LEFT LOWER VAS COMMON FEMORAL TRANSVERSE DIAMETER: 1.12 CM
BH CV LEFT LOWER VAS GSV KNEE TRANSVERSE DIAMETER: 0.4 CM
BH CV LEFT LOWER VAS GSV MID TRANSVERSE DIAMETER: 0.36 CM
BH CV LEFT LOWER VAS GSV PROX REFLUX TIME: 1023 MSEC
BH CV LEFT LOWER VAS GSV PROX TRANSVERSE DIAMETER: 0.54 CM
BH CV LEFT LOWER VAS GSVBELOW KNEE TRANSVERSE DIAMETER: 0.5 CM
BH CV LEFT LOWER VAS MID FEMORAL TRANSVERSE DIAMETER: 0.71 CM
BH CV LEFT LOWER VAS POPLITEAL TRANSVERSE DIAMETER: 0.69 CM
BH CV LEFT LOWER VAS SSV PROX CALF TRANS DIAMETER: 0.07 CM
BH CV RIGHT LOWER VAS COMMON FEMORAL REFLUX COLOR FLOW TIME: 1947 MSEC
BH CV RIGHT LOWER VAS COMMON FEMORAL TRANSVERSE DIAMETER: 1.07 CM
BH CV RIGHT LOWER VAS GSV KNEE TRANS DIAMETER: 0.34 CM
BH CV RIGHT LOWER VAS GSV MID THIGH TRANS DIAMETER: 0.3 CM
BH CV RIGHT LOWER VAS GSV PROX CALF REFLUX TIME: 7358 MSEC
BH CV RIGHT LOWER VAS GSV PROX CALF TRANS DIAMETER: 0.45 CM
BH CV RIGHT LOWER VAS GSV PROX THIGH TRANS DIAMETER: 0.75 CM
BH CV RIGHT LOWER VAS MID FEMORAL REFLUX TIME: 2887 MSEC
BH CV RIGHT LOWER VAS MID FEMORAL TRANSVERSE DIAMETER: 0.87 CM
BH CV RIGHT LOWER VAS POPLITEAL TRANSVERSE DIAMETER: 0.79 CM
BH CV RIGHT LOWER VAS SSV REFLUX TIME: 4372 MSEC
BH CV RIGHT LOWER VAS SSV TRANSVERSE DIAMETER: 0.27 CM
BH CV VAS LEFT COMMON FEMORAL VEIN HIDDEN LRR COMPRESSIBILTY: NORMAL
BH CV VAS LEFT GSV ABOVE KNEE HIDDEN LRR COMPRESSIBILTY: NORMAL
BH CV VAS LEFT GSV BELOW KNEE HIDDEN LRR COMPRESSIBILTY: NORMAL
BH CV VAS LEFT GSV MID HIDDEN LRR COMPRESSIBILTY: NORMAL
BH CV VAS LEFT GSV PROXIMAL HIDDEN LRR COLOR FLOW REVERSAL: NORMAL
BH CV VAS LEFT MID FEMORAL VEIN HIDDEN LRR COMPRESSIBILTY: NORMAL
BH CV VAS LEFT POPLITEAL VEIN HIDDEN LRR COMPRESSIBILTY: NORMAL
BH CV VAS LEFT SAPHENOFEMORAL JUNCTION HIDDEN LRR COMPRESSIBILTY: NORMAL
BH CV VAS LEFT SSV HIDDEN LRR COMPRESSIBILTY: NORMAL
BH CV VAS RIGHT COMMON FEMORAL VEIN HIDDEN LRR COLOR FLOW REVERSAL: NORMAL
BH CV VAS RIGHT COMMON FEMORAL VEIN HIDDEN LRR COMPRESSIBILTY: NORMAL
BH CV VAS RIGHT GSV ABOVE KNEE HIDDEN LRR COMPRESSIBILTY: NORMAL
BH CV VAS RIGHT GSV BELOW KNEE HIDDEN LRR COLOR FLOW REVERSAL: NORMAL
BH CV VAS RIGHT GSV BELOW KNEE HIDDEN LRR COMPRESSIBILTY: NORMAL
BH CV VAS RIGHT GSV MID HIDDEN LRR COMPRESSIBILTY: NORMAL
BH CV VAS RIGHT GSV PROXIMAL HIDDEN LRR COMPRESSIBILTY: NORMAL
BH CV VAS RIGHT MID FEMORAL VEIN HIDDEN LRR COLOR FLOW REVERSAL: NORMAL
BH CV VAS RIGHT MID FEMORAL VEIN HIDDEN LRR COMPRESSIBILTY: NORMAL
BH CV VAS RIGHT POPLITEAL VEIN HIDDEN LRR COMPRESSIBILTY: NORMAL
BH CV VAS RIGHT SAPHENOFEMORAL JUNCTION HIDDEN LRR COMPRESSIBILTY: NORMAL
BH CV VAS RIGHT SSV HIDDEN LRR COLOR FLOW REVERSAL: NORMAL
BH CV VAS RIGHT SSV HIDDEN LRR COMPRESSIBILTY: NORMAL

## 2019-08-08 PROCEDURE — 93970 EXTREMITY STUDY: CPT

## 2019-08-12 PROBLEM — G43.909 HEADACHE, MIGRAINE: Status: ACTIVE | Noted: 2019-08-12

## 2019-08-12 PROBLEM — L97.509 CHRONIC FOOT ULCER (HCC): Status: ACTIVE | Noted: 2019-01-10

## 2019-08-12 PROBLEM — I89.0 LYMPHEDEMA: Status: ACTIVE | Noted: 2018-05-07

## 2019-08-12 PROBLEM — E78.5 HYPERLIPIDEMIA: Status: ACTIVE | Noted: 2018-11-26

## 2019-08-12 PROBLEM — D64.9 ANEMIA: Status: ACTIVE | Noted: 2018-05-21

## 2019-08-12 PROBLEM — M54.50 CHRONIC LOW BACK PAIN: Status: ACTIVE | Noted: 2018-05-21

## 2019-08-12 PROBLEM — R63.4 WEIGHT LOSS: Status: ACTIVE | Noted: 2018-11-26

## 2019-08-12 PROBLEM — G89.29 CHRONIC LOW BACK PAIN: Status: ACTIVE | Noted: 2018-05-21

## 2019-08-12 PROBLEM — M72.2 PLANTAR FASCIITIS, BILATERAL: Status: ACTIVE | Noted: 2018-05-07

## 2019-08-12 PROBLEM — M19.90 ARTHRITIS: Status: ACTIVE | Noted: 2019-08-12

## 2019-08-12 PROBLEM — L97.228 NON-PRESSURE CHRONIC ULCER OF LEFT CALF WITH OTHER SPECIFIED SEVERITY (HCC): Status: ACTIVE | Noted: 2018-05-30

## 2019-08-12 PROBLEM — Z78.0 POSTMENOPAUSAL STATUS: Status: ACTIVE | Noted: 2018-05-18

## 2019-08-12 PROBLEM — E53.8 B12 DEFICIENCY: Status: ACTIVE | Noted: 2018-11-26

## 2019-08-12 PROBLEM — Z12.39 ENCOUNTER FOR SCREENING FOR MALIGNANT NEOPLASM OF BREAST: Status: ACTIVE | Noted: 2019-02-07

## 2019-08-12 PROBLEM — M81.0 OSTEOPOROSIS: Status: ACTIVE | Noted: 2018-05-31

## 2019-08-12 PROBLEM — L29.9 PRURITUS: Status: ACTIVE | Noted: 2018-11-26

## 2019-08-12 PROBLEM — N39.0 URINARY TRACT INFECTION: Status: ACTIVE | Noted: 2018-05-31

## 2019-08-12 PROBLEM — M25.551 PAIN OF RIGHT HIP JOINT: Status: ACTIVE | Noted: 2018-05-31

## 2019-08-12 PROBLEM — E55.9 VITAMIN D DEFICIENCY: Status: ACTIVE | Noted: 2018-08-27

## 2019-08-12 PROBLEM — L02.619 ABSCESS OF FOOT: Status: ACTIVE | Noted: 2018-12-27

## 2019-08-12 PROBLEM — K21.9 GASTROESOPHAGEAL REFLUX DISEASE: Status: ACTIVE | Noted: 2018-08-27

## 2019-08-12 PROBLEM — R21 RASH AND OTHER NONSPECIFIC SKIN ERUPTION: Status: ACTIVE | Noted: 2019-03-07

## 2019-08-12 PROBLEM — M25.511 PAIN IN JOINT OF RIGHT SHOULDER: Status: ACTIVE | Noted: 2018-05-31

## 2019-08-12 PROBLEM — I87.2 VENOUS STASIS DERMATITIS: Status: ACTIVE | Noted: 2018-12-27

## 2019-08-14 ENCOUNTER — OFFICE VISIT (OUTPATIENT)
Dept: WOUND CARE | Facility: HOSPITAL | Age: 74
End: 2019-08-14

## 2019-09-04 ENCOUNTER — APPOINTMENT (OUTPATIENT)
Dept: WOUND CARE | Facility: HOSPITAL | Age: 74
End: 2019-09-04

## 2019-09-04 ENCOUNTER — OFFICE VISIT (OUTPATIENT)
Dept: WOUND CARE | Facility: HOSPITAL | Age: 74
End: 2019-09-04

## 2019-09-19 ENCOUNTER — OFFICE VISIT (OUTPATIENT)
Dept: WOUND CARE | Facility: HOSPITAL | Age: 74
End: 2019-09-19

## 2019-09-19 ENCOUNTER — APPOINTMENT (OUTPATIENT)
Dept: WOUND CARE | Facility: HOSPITAL | Age: 74
End: 2019-09-19

## 2019-10-31 ENCOUNTER — OFFICE VISIT (OUTPATIENT)
Dept: WOUND CARE | Facility: HOSPITAL | Age: 74
End: 2019-10-31

## 2019-11-14 ENCOUNTER — APPOINTMENT (OUTPATIENT)
Dept: WOUND CARE | Facility: HOSPITAL | Age: 74
End: 2019-11-14

## 2019-12-23 ENCOUNTER — TELEPHONE (OUTPATIENT)
Dept: CARDIOLOGY | Facility: CLINIC | Age: 74
End: 2019-12-23

## 2019-12-23 DIAGNOSIS — I50.9 CONGESTIVE HEART FAILURE, UNSPECIFIED HF CHRONICITY, UNSPECIFIED HEART FAILURE TYPE (HCC): Primary | ICD-10-CM

## 2019-12-23 DIAGNOSIS — Z79.01 LONG TERM (CURRENT) USE OF ANTICOAGULANTS: ICD-10-CM

## 2019-12-23 DIAGNOSIS — I48.91 ATRIAL FIBRILLATION, UNSPECIFIED TYPE (HCC): ICD-10-CM

## 2019-12-23 NOTE — TELEPHONE ENCOUNTER
Spoke with  Pt. She is not on warfarin-intolerant.  had not been on it for over a year. Previous order canceled for pt /inr check.

## 2020-01-28 ENCOUNTER — HOSPITAL ENCOUNTER (INPATIENT)
Facility: HOSPITAL | Age: 75
LOS: 9 days | Discharge: NURSING, REHAB OR LONG-TERM CARE | End: 2020-02-06
Attending: EMERGENCY MEDICINE | Admitting: INTERNAL MEDICINE

## 2020-01-28 ENCOUNTER — APPOINTMENT (OUTPATIENT)
Dept: CT IMAGING | Facility: HOSPITAL | Age: 75
End: 2020-01-28

## 2020-01-28 ENCOUNTER — APPOINTMENT (OUTPATIENT)
Dept: GENERAL RADIOLOGY | Facility: HOSPITAL | Age: 75
End: 2020-01-28

## 2020-01-28 DIAGNOSIS — N39.0 ACUTE UTI: ICD-10-CM

## 2020-01-28 DIAGNOSIS — S09.90XA INJURY OF HEAD, INITIAL ENCOUNTER: ICD-10-CM

## 2020-01-28 DIAGNOSIS — Z95.0 PRESENCE OF CARDIAC PACEMAKER: ICD-10-CM

## 2020-01-28 DIAGNOSIS — R55 SYNCOPE AND COLLAPSE: ICD-10-CM

## 2020-01-28 DIAGNOSIS — L97.311 NON-PRESSURE CHRONIC ULCER RIGHT ANKLE, LIMITED TO BREAKDOWN SKIN (HCC): ICD-10-CM

## 2020-01-28 DIAGNOSIS — R44.1 VISUAL HALLUCINATIONS: ICD-10-CM

## 2020-01-28 DIAGNOSIS — Z95.810 PRESENCE OF BIVENTRICULAR IMPLANTABLE CARDIOVERTER-DEFIBRILLATOR (ICD): Primary | ICD-10-CM

## 2020-01-28 PROBLEM — L29.9 PRURITUS: Status: RESOLVED | Noted: 2018-11-26 | Resolved: 2020-01-28

## 2020-01-28 PROBLEM — R21 RASH AND OTHER NONSPECIFIC SKIN ERUPTION: Status: RESOLVED | Noted: 2019-03-07 | Resolved: 2020-01-28

## 2020-01-28 PROBLEM — M19.90 ARTHRITIS: Status: RESOLVED | Noted: 2019-08-12 | Resolved: 2020-01-28

## 2020-01-28 PROBLEM — L02.619 ABSCESS OF FOOT: Status: RESOLVED | Noted: 2018-12-27 | Resolved: 2020-01-28

## 2020-01-28 PROBLEM — E53.8 B12 DEFICIENCY: Status: RESOLVED | Noted: 2018-11-26 | Resolved: 2020-01-28

## 2020-01-28 PROBLEM — E55.9 VITAMIN D DEFICIENCY: Status: RESOLVED | Noted: 2018-08-27 | Resolved: 2020-01-28

## 2020-01-28 PROBLEM — K21.9 GASTROESOPHAGEAL REFLUX DISEASE: Status: RESOLVED | Noted: 2018-08-27 | Resolved: 2020-01-28

## 2020-01-28 PROBLEM — E78.5 HYPERLIPIDEMIA: Status: RESOLVED | Noted: 2018-11-26 | Resolved: 2020-01-28

## 2020-01-28 PROBLEM — I89.0 LYMPHEDEMA OF BOTH LOWER EXTREMITIES: Chronic | Status: ACTIVE | Noted: 2020-01-28

## 2020-01-28 PROBLEM — Z12.39 ENCOUNTER FOR SCREENING FOR MALIGNANT NEOPLASM OF BREAST: Status: RESOLVED | Noted: 2019-02-07 | Resolved: 2020-01-28

## 2020-01-28 PROBLEM — L97.228 NON-PRESSURE CHRONIC ULCER OF LEFT CALF WITH OTHER SPECIFIED SEVERITY (HCC): Status: RESOLVED | Noted: 2018-05-30 | Resolved: 2020-01-28

## 2020-01-28 PROBLEM — D64.9 ANEMIA: Status: RESOLVED | Noted: 2018-05-21 | Resolved: 2020-01-28

## 2020-01-28 PROBLEM — M72.2 PLANTAR FASCIITIS, BILATERAL: Status: RESOLVED | Noted: 2018-05-07 | Resolved: 2020-01-28

## 2020-01-28 PROBLEM — G43.909 HEADACHE, MIGRAINE: Status: RESOLVED | Noted: 2019-08-12 | Resolved: 2020-01-28

## 2020-01-28 PROBLEM — Z78.0 POSTMENOPAUSAL STATUS: Status: RESOLVED | Noted: 2018-05-18 | Resolved: 2020-01-28

## 2020-01-28 PROBLEM — L97.921 NON-PRESSURE CHRONIC ULCER LEFT LOWER LEG, LIMITED TO BREAKDOWN SKIN (HCC): Status: ACTIVE | Noted: 2020-01-28

## 2020-01-28 PROBLEM — R63.4 WEIGHT LOSS: Status: RESOLVED | Noted: 2018-11-26 | Resolved: 2020-01-28

## 2020-01-28 PROBLEM — M25.511 PAIN IN JOINT OF RIGHT SHOULDER: Status: RESOLVED | Noted: 2018-05-31 | Resolved: 2020-01-28

## 2020-01-28 PROBLEM — I87.2 VENOUS STASIS DERMATITIS: Status: RESOLVED | Noted: 2018-12-27 | Resolved: 2020-01-28

## 2020-01-28 PROBLEM — M81.0 OSTEOPOROSIS: Status: RESOLVED | Noted: 2018-05-31 | Resolved: 2020-01-28

## 2020-01-28 PROBLEM — M25.551 PAIN OF RIGHT HIP JOINT: Status: RESOLVED | Noted: 2018-05-31 | Resolved: 2020-01-28

## 2020-01-28 PROBLEM — R41.0 DELIRIUM: Status: ACTIVE | Noted: 2020-01-28

## 2020-01-28 PROBLEM — I89.0 LYMPHEDEMA: Status: RESOLVED | Noted: 2018-05-07 | Resolved: 2020-01-28

## 2020-01-28 PROBLEM — L97.509 CHRONIC FOOT ULCER (HCC): Status: RESOLVED | Noted: 2019-01-10 | Resolved: 2020-01-28

## 2020-01-28 LAB
ALBUMIN SERPL-MCNC: 3.1 G/DL (ref 3.5–5.2)
ALBUMIN/GLOB SERPL: 0.8 G/DL
ALP SERPL-CCNC: 142 U/L (ref 39–117)
ALT SERPL W P-5'-P-CCNC: 14 U/L (ref 1–33)
AMMONIA BLD-SCNC: 49 UMOL/L (ref 11–51)
ANION GAP SERPL CALCULATED.3IONS-SCNC: 11 MMOL/L (ref 5–15)
ANION GAP SERPL CALCULATED.3IONS-SCNC: 12 MMOL/L (ref 5–15)
ANISOCYTOSIS BLD QL: NORMAL
APTT PPP: 22.3 SECONDS (ref 24–31)
AST SERPL-CCNC: 37 U/L (ref 1–32)
BACTERIA UR QL AUTO: ABNORMAL /HPF
BASOPHILS # BLD AUTO: 0.1 10*3/MM3 (ref 0–0.2)
BASOPHILS NFR BLD AUTO: 1.8 % (ref 0–1.5)
BILIRUB SERPL-MCNC: 0.7 MG/DL (ref 0.2–1.2)
BILIRUB UR QL STRIP: NEGATIVE
BUN BLD-MCNC: 59 MG/DL (ref 8–23)
BUN BLD-MCNC: 67 MG/DL (ref 8–23)
BUN/CREAT SERPL: 25.5 (ref 7–25)
BUN/CREAT SERPL: 30 (ref 7–25)
CALCIUM SPEC-SCNC: 8.3 MG/DL (ref 8.6–10.5)
CALCIUM SPEC-SCNC: 9.1 MG/DL (ref 8.6–10.5)
CHLORIDE SERPL-SCNC: 106 MMOL/L (ref 98–107)
CHLORIDE SERPL-SCNC: 109 MMOL/L (ref 98–107)
CHOLEST SERPL-MCNC: 159 MG/DL (ref 0–200)
CLARITY UR: ABNORMAL
CO2 SERPL-SCNC: 17 MMOL/L (ref 22–29)
CO2 SERPL-SCNC: 18 MMOL/L (ref 22–29)
COLOR UR: YELLOW
CREAT BLD-MCNC: 2.23 MG/DL (ref 0.57–1)
CREAT BLD-MCNC: 2.31 MG/DL (ref 0.57–1)
DEPRECATED RDW RBC AUTO: 58.6 FL (ref 37–54)
DIGOXIN SERPL-MCNC: 0.8 NG/ML (ref 0.6–1.2)
EOSINOPHIL # BLD AUTO: 0.2 10*3/MM3 (ref 0–0.4)
EOSINOPHIL NFR BLD AUTO: 3.2 % (ref 0.3–6.2)
ERYTHROCYTE [DISTWIDTH] IN BLOOD BY AUTOMATED COUNT: 17.3 % (ref 12.3–15.4)
GFR SERPL CREATININE-BSD FRML MDRD: 21 ML/MIN/1.73
GFR SERPL CREATININE-BSD FRML MDRD: 21 ML/MIN/1.73
GLOBULIN UR ELPH-MCNC: 4 GM/DL
GLUCOSE BLD-MCNC: 82 MG/DL (ref 65–99)
GLUCOSE BLD-MCNC: 82 MG/DL (ref 65–99)
GLUCOSE UR STRIP-MCNC: NEGATIVE MG/DL
HBA1C MFR BLD: 4.3 % (ref 3.5–5.6)
HCT VFR BLD AUTO: 31.9 % (ref 34–46.6)
HDLC SERPL-MCNC: 81 MG/DL (ref 40–60)
HGB BLD-MCNC: 10.7 G/DL (ref 12–15.9)
HGB UR QL STRIP.AUTO: NEGATIVE
HYALINE CASTS UR QL AUTO: ABNORMAL /LPF
INR PPP: 1.07 (ref 0.9–1.1)
KETONES UR QL STRIP: NEGATIVE
LDLC SERPL CALC-MCNC: 66 MG/DL (ref 0–100)
LDLC/HDLC SERPL: 0.81 {RATIO}
LEUKOCYTE ESTERASE UR QL STRIP.AUTO: ABNORMAL
LYMPHOCYTES # BLD AUTO: 0.6 10*3/MM3 (ref 0.7–3.1)
LYMPHOCYTES NFR BLD AUTO: 9.7 % (ref 19.6–45.3)
MCH RBC QN AUTO: 31.9 PG (ref 26.6–33)
MCHC RBC AUTO-ENTMCNC: 33.6 G/DL (ref 31.5–35.7)
MCV RBC AUTO: 95.2 FL (ref 79–97)
MONOCYTES # BLD AUTO: 0.6 10*3/MM3 (ref 0.1–0.9)
MONOCYTES NFR BLD AUTO: 8.7 % (ref 5–12)
NEUTROPHILS # BLD AUTO: 5.1 10*3/MM3 (ref 1.7–7)
NEUTROPHILS NFR BLD AUTO: 76.6 % (ref 42.7–76)
NITRITE UR QL STRIP: NEGATIVE
NRBC BLD AUTO-RTO: 0 /100 WBC (ref 0–0.2)
NT-PROBNP SERPL-MCNC: ABNORMAL PG/ML (ref 5–1800)
PH UR STRIP.AUTO: 5.5 [PH] (ref 5–8)
PLAT MORPH BLD: NORMAL
PLATELET # BLD AUTO: 622 10*3/MM3 (ref 140–450)
PMV BLD AUTO: 8.2 FL (ref 6–12)
POTASSIUM BLD-SCNC: 4.7 MMOL/L (ref 3.5–5.2)
POTASSIUM BLD-SCNC: 5 MMOL/L (ref 3.5–5.2)
PROT SERPL-MCNC: 7.1 G/DL (ref 6–8.5)
PROT UR QL STRIP: NEGATIVE
PROTHROMBIN TIME: 11.1 SECONDS (ref 9.6–11.7)
RBC # BLD AUTO: 3.35 10*6/MM3 (ref 3.77–5.28)
RBC # UR: ABNORMAL /HPF
REF LAB TEST METHOD: ABNORMAL
SODIUM BLD-SCNC: 136 MMOL/L (ref 136–145)
SODIUM BLD-SCNC: 137 MMOL/L (ref 136–145)
SP GR UR STRIP: 1.02 (ref 1–1.03)
SQUAMOUS #/AREA URNS HPF: ABNORMAL /HPF
TRANS CELLS #/AREA URNS HPF: ABNORMAL /HPF
TRIGL SERPL-MCNC: 62 MG/DL (ref 0–150)
TROPONIN T SERPL-MCNC: 0.02 NG/ML (ref 0–0.03)
TROPONIN T SERPL-MCNC: 0.02 NG/ML (ref 0–0.03)
TROPONIN T SERPL-MCNC: 0.03 NG/ML (ref 0–0.03)
TSH SERPL DL<=0.05 MIU/L-ACNC: 2.66 UIU/ML (ref 0.27–4.2)
UROBILINOGEN UR QL STRIP: ABNORMAL
VLDLC SERPL-MCNC: 12.4 MG/DL
WBC MORPH BLD: NORMAL
WBC NRBC COR # BLD: 6.7 10*3/MM3 (ref 3.4–10.8)
WBC UR QL AUTO: ABNORMAL /HPF

## 2020-01-28 PROCEDURE — 84484 ASSAY OF TROPONIN QUANT: CPT | Performed by: NURSE PRACTITIONER

## 2020-01-28 PROCEDURE — 80053 COMPREHEN METABOLIC PANEL: CPT | Performed by: EMERGENCY MEDICINE

## 2020-01-28 PROCEDURE — 70450 CT HEAD/BRAIN W/O DYE: CPT

## 2020-01-28 PROCEDURE — 87086 URINE CULTURE/COLONY COUNT: CPT | Performed by: EMERGENCY MEDICINE

## 2020-01-28 PROCEDURE — 85610 PROTHROMBIN TIME: CPT | Performed by: EMERGENCY MEDICINE

## 2020-01-28 PROCEDURE — 83036 HEMOGLOBIN GLYCOSYLATED A1C: CPT | Performed by: NURSE PRACTITIONER

## 2020-01-28 PROCEDURE — 71045 X-RAY EXAM CHEST 1 VIEW: CPT

## 2020-01-28 PROCEDURE — 85007 BL SMEAR W/DIFF WBC COUNT: CPT | Performed by: EMERGENCY MEDICINE

## 2020-01-28 PROCEDURE — 85730 THROMBOPLASTIN TIME PARTIAL: CPT | Performed by: EMERGENCY MEDICINE

## 2020-01-28 PROCEDURE — 99284 EMERGENCY DEPT VISIT MOD MDM: CPT

## 2020-01-28 PROCEDURE — 80061 LIPID PANEL: CPT | Performed by: NURSE PRACTITIONER

## 2020-01-28 PROCEDURE — 25010000002 ENOXAPARIN PER 10 MG: Performed by: NURSE PRACTITIONER

## 2020-01-28 PROCEDURE — 82140 ASSAY OF AMMONIA: CPT | Performed by: NURSE PRACTITIONER

## 2020-01-28 PROCEDURE — 83880 ASSAY OF NATRIURETIC PEPTIDE: CPT | Performed by: EMERGENCY MEDICINE

## 2020-01-28 PROCEDURE — 80162 ASSAY OF DIGOXIN TOTAL: CPT | Performed by: EMERGENCY MEDICINE

## 2020-01-28 PROCEDURE — 85025 COMPLETE CBC W/AUTO DIFF WBC: CPT | Performed by: EMERGENCY MEDICINE

## 2020-01-28 PROCEDURE — 25010000002 ENOXAPARIN PER 10 MG: Performed by: INTERNAL MEDICINE

## 2020-01-28 PROCEDURE — 84443 ASSAY THYROID STIM HORMONE: CPT | Performed by: NURSE PRACTITIONER

## 2020-01-28 PROCEDURE — G0378 HOSPITAL OBSERVATION PER HR: HCPCS

## 2020-01-28 PROCEDURE — 81001 URINALYSIS AUTO W/SCOPE: CPT | Performed by: EMERGENCY MEDICINE

## 2020-01-28 PROCEDURE — 99213 OFFICE O/P EST LOW 20 MIN: CPT | Performed by: NURSE PRACTITIONER

## 2020-01-28 PROCEDURE — P9612 CATHETERIZE FOR URINE SPEC: HCPCS

## 2020-01-28 PROCEDURE — 93005 ELECTROCARDIOGRAM TRACING: CPT | Performed by: EMERGENCY MEDICINE

## 2020-01-28 PROCEDURE — 99223 1ST HOSP IP/OBS HIGH 75: CPT | Performed by: HOSPITALIST

## 2020-01-28 PROCEDURE — 84484 ASSAY OF TROPONIN QUANT: CPT | Performed by: EMERGENCY MEDICINE

## 2020-01-28 PROCEDURE — 99222 1ST HOSP IP/OBS MODERATE 55: CPT | Performed by: INTERNAL MEDICINE

## 2020-01-28 PROCEDURE — 25010000002 CEFTRIAXONE PER 250 MG: Performed by: EMERGENCY MEDICINE

## 2020-01-28 RX ORDER — ONDANSETRON 4 MG/1
4 TABLET, FILM COATED ORAL EVERY 6 HOURS PRN
Status: DISCONTINUED | OUTPATIENT
Start: 2020-01-28 | End: 2020-02-06 | Stop reason: HOSPADM

## 2020-01-28 RX ORDER — ONDANSETRON 2 MG/ML
4 INJECTION INTRAMUSCULAR; INTRAVENOUS EVERY 6 HOURS PRN
Status: DISCONTINUED | OUTPATIENT
Start: 2020-01-28 | End: 2020-02-06 | Stop reason: HOSPADM

## 2020-01-28 RX ORDER — GABAPENTIN 100 MG/1
100 CAPSULE ORAL 3 TIMES DAILY
Status: DISCONTINUED | OUTPATIENT
Start: 2020-01-28 | End: 2020-02-06 | Stop reason: HOSPADM

## 2020-01-28 RX ORDER — ASPIRIN 81 MG/1
81 TABLET, CHEWABLE ORAL DAILY
COMMUNITY

## 2020-01-28 RX ORDER — GABAPENTIN 100 MG/1
100 CAPSULE ORAL 3 TIMES DAILY
COMMUNITY
End: 2021-04-29 | Stop reason: HOSPADM

## 2020-01-28 RX ORDER — BUMETANIDE 1 MG/1
1 TABLET ORAL
Status: DISCONTINUED | OUTPATIENT
Start: 2020-01-28 | End: 2020-01-28

## 2020-01-28 RX ORDER — ACETAMINOPHEN 160 MG/5ML
650 SOLUTION ORAL EVERY 4 HOURS PRN
Status: DISCONTINUED | OUTPATIENT
Start: 2020-01-28 | End: 2020-02-06 | Stop reason: HOSPADM

## 2020-01-28 RX ORDER — TRAMADOL HYDROCHLORIDE 50 MG/1
50 TABLET ORAL EVERY 8 HOURS PRN
Status: DISCONTINUED | OUTPATIENT
Start: 2020-01-28 | End: 2020-01-28

## 2020-01-28 RX ORDER — ACETAMINOPHEN 650 MG/1
650 SUPPOSITORY RECTAL EVERY 4 HOURS PRN
Status: DISCONTINUED | OUTPATIENT
Start: 2020-01-28 | End: 2020-02-06 | Stop reason: HOSPADM

## 2020-01-28 RX ORDER — DIGOXIN 125 MCG
125 TABLET ORAL
Status: DISCONTINUED | OUTPATIENT
Start: 2020-01-28 | End: 2020-02-06 | Stop reason: HOSPADM

## 2020-01-28 RX ORDER — COLCHICINE 0.6 MG/1
0.6 TABLET ORAL DAILY
Status: DISCONTINUED | OUTPATIENT
Start: 2020-01-28 | End: 2020-02-06 | Stop reason: HOSPADM

## 2020-01-28 RX ORDER — CEPHALEXIN 500 MG/1
500 CAPSULE ORAL 4 TIMES DAILY
COMMUNITY
End: 2020-02-06 | Stop reason: HOSPADM

## 2020-01-28 RX ORDER — FEBUXOSTAT 40 MG/1
40 TABLET, FILM COATED ORAL 2 TIMES DAILY
Status: DISCONTINUED | OUTPATIENT
Start: 2020-01-28 | End: 2020-02-06 | Stop reason: HOSPADM

## 2020-01-28 RX ORDER — BISACODYL 5 MG/1
5 TABLET, DELAYED RELEASE ORAL DAILY PRN
Status: DISCONTINUED | OUTPATIENT
Start: 2020-01-28 | End: 2020-02-06 | Stop reason: HOSPADM

## 2020-01-28 RX ORDER — SODIUM CHLORIDE 0.9 % (FLUSH) 0.9 %
10 SYRINGE (ML) INJECTION AS NEEDED
Status: DISCONTINUED | OUTPATIENT
Start: 2020-01-28 | End: 2020-02-06 | Stop reason: HOSPADM

## 2020-01-28 RX ORDER — CHOLECALCIFEROL (VITAMIN D3) 125 MCG
5 CAPSULE ORAL NIGHTLY PRN
Status: DISCONTINUED | OUTPATIENT
Start: 2020-01-28 | End: 2020-02-06 | Stop reason: HOSPADM

## 2020-01-28 RX ORDER — ASPIRIN 81 MG/1
81 TABLET, CHEWABLE ORAL DAILY
Status: DISCONTINUED | OUTPATIENT
Start: 2020-01-28 | End: 2020-02-06 | Stop reason: HOSPADM

## 2020-01-28 RX ORDER — ACETAMINOPHEN 325 MG/1
650 TABLET ORAL EVERY 4 HOURS PRN
Status: DISCONTINUED | OUTPATIENT
Start: 2020-01-28 | End: 2020-02-06 | Stop reason: HOSPADM

## 2020-01-28 RX ORDER — ALUMINA, MAGNESIA, AND SIMETHICONE 2400; 2400; 240 MG/30ML; MG/30ML; MG/30ML
15 SUSPENSION ORAL EVERY 6 HOURS PRN
Status: DISCONTINUED | OUTPATIENT
Start: 2020-01-28 | End: 2020-02-06 | Stop reason: HOSPADM

## 2020-01-28 RX ORDER — SODIUM CHLORIDE 9 MG/ML
75 INJECTION, SOLUTION INTRAVENOUS CONTINUOUS
Status: DISPENSED | OUTPATIENT
Start: 2020-01-28 | End: 2020-01-29

## 2020-01-28 RX ORDER — SODIUM CHLORIDE 0.9 % (FLUSH) 0.9 %
10 SYRINGE (ML) INJECTION EVERY 12 HOURS SCHEDULED
Status: DISCONTINUED | OUTPATIENT
Start: 2020-01-28 | End: 2020-02-06 | Stop reason: HOSPADM

## 2020-01-28 RX ADMIN — GABAPENTIN 100 MG: 100 CAPSULE ORAL at 18:02

## 2020-01-28 RX ADMIN — ENOXAPARIN SODIUM 30 MG: 30 INJECTION SUBCUTANEOUS at 18:02

## 2020-01-28 RX ADMIN — GABAPENTIN 100 MG: 100 CAPSULE ORAL at 20:36

## 2020-01-28 RX ADMIN — GABAPENTIN 100 MG: 100 CAPSULE ORAL at 10:26

## 2020-01-28 RX ADMIN — FEBUXOSTAT 40 MG: 40 TABLET ORAL at 10:25

## 2020-01-28 RX ADMIN — COLCHICINE 0.6 MG: 0.6 TABLET, FILM COATED ORAL at 10:25

## 2020-01-28 RX ADMIN — DIGOXIN 125 MCG: 0.12 TABLET ORAL at 14:14

## 2020-01-28 RX ADMIN — SODIUM CHLORIDE 75 ML/HR: 900 INJECTION, SOLUTION INTRAVENOUS at 14:16

## 2020-01-28 RX ADMIN — CEFTRIAXONE SODIUM 1 G: 10 INJECTION, POWDER, FOR SOLUTION INTRAVENOUS at 03:01

## 2020-01-28 RX ADMIN — FEBUXOSTAT 40 MG: 40 TABLET ORAL at 20:36

## 2020-01-28 RX ADMIN — ASPIRIN 81 MG 81 MG: 81 TABLET ORAL at 10:25

## 2020-01-28 RX ADMIN — Medication 10 ML: at 20:37

## 2020-01-28 RX ADMIN — Medication 10 ML: at 10:26

## 2020-01-29 LAB
ANION GAP SERPL CALCULATED.3IONS-SCNC: 11 MMOL/L (ref 5–15)
BACTERIA SPEC AEROBE CULT: NO GROWTH
BASOPHILS # BLD AUTO: 0.1 10*3/MM3 (ref 0–0.2)
BASOPHILS NFR BLD AUTO: 1.4 % (ref 0–1.5)
BUN BLD-MCNC: 69 MG/DL (ref 8–23)
BUN/CREAT SERPL: 34.7 (ref 7–25)
CALCIUM SPEC-SCNC: 7.9 MG/DL (ref 8.6–10.5)
CHLORIDE SERPL-SCNC: 110 MMOL/L (ref 98–107)
CO2 SERPL-SCNC: 15 MMOL/L (ref 22–29)
CREAT BLD-MCNC: 1.99 MG/DL (ref 0.57–1)
DEPRECATED RDW RBC AUTO: 59.7 FL (ref 37–54)
DIGOXIN SERPL-MCNC: 0.6 NG/ML (ref 0.6–1.2)
EOSINOPHIL # BLD AUTO: 0.1 10*3/MM3 (ref 0–0.4)
EOSINOPHIL NFR BLD AUTO: 2 % (ref 0.3–6.2)
ERYTHROCYTE [DISTWIDTH] IN BLOOD BY AUTOMATED COUNT: 18.1 % (ref 12.3–15.4)
GFR SERPL CREATININE-BSD FRML MDRD: 24 ML/MIN/1.73
GLUCOSE BLD-MCNC: 93 MG/DL (ref 65–99)
HCT VFR BLD AUTO: 28.2 % (ref 34–46.6)
HGB BLD-MCNC: 9.5 G/DL (ref 12–15.9)
LYMPHOCYTES # BLD AUTO: 0.6 10*3/MM3 (ref 0.7–3.1)
LYMPHOCYTES NFR BLD AUTO: 10 % (ref 19.6–45.3)
MCH RBC QN AUTO: 32.6 PG (ref 26.6–33)
MCHC RBC AUTO-ENTMCNC: 33.6 G/DL (ref 31.5–35.7)
MCV RBC AUTO: 97.1 FL (ref 79–97)
MONOCYTES # BLD AUTO: 0.6 10*3/MM3 (ref 0.1–0.9)
MONOCYTES NFR BLD AUTO: 9.4 % (ref 5–12)
NEUTROPHILS # BLD AUTO: 4.8 10*3/MM3 (ref 1.7–7)
NEUTROPHILS NFR BLD AUTO: 77.2 % (ref 42.7–76)
NRBC BLD AUTO-RTO: 0 /100 WBC (ref 0–0.2)
PLATELET # BLD AUTO: 103 10*3/MM3 (ref 140–450)
PMV BLD AUTO: 8.2 FL (ref 6–12)
POTASSIUM BLD-SCNC: 4.8 MMOL/L (ref 3.5–5.2)
RBC # BLD AUTO: 2.91 10*6/MM3 (ref 3.77–5.28)
SODIUM BLD-SCNC: 136 MMOL/L (ref 136–145)
WBC NRBC COR # BLD: 6.2 10*3/MM3 (ref 3.4–10.8)

## 2020-01-29 PROCEDURE — 25010000002 CEFTRIAXONE PER 250 MG: Performed by: NURSE PRACTITIONER

## 2020-01-29 PROCEDURE — 99232 SBSQ HOSP IP/OBS MODERATE 35: CPT | Performed by: HOSPITALIST

## 2020-01-29 PROCEDURE — 85025 COMPLETE CBC W/AUTO DIFF WBC: CPT | Performed by: HOSPITALIST

## 2020-01-29 PROCEDURE — G0378 HOSPITAL OBSERVATION PER HR: HCPCS

## 2020-01-29 PROCEDURE — 99232 SBSQ HOSP IP/OBS MODERATE 35: CPT | Performed by: INTERNAL MEDICINE

## 2020-01-29 PROCEDURE — 80162 ASSAY OF DIGOXIN TOTAL: CPT | Performed by: HOSPITALIST

## 2020-01-29 PROCEDURE — 97162 PT EVAL MOD COMPLEX 30 MIN: CPT

## 2020-01-29 PROCEDURE — 25010000002 ENOXAPARIN PER 10 MG: Performed by: INTERNAL MEDICINE

## 2020-01-29 PROCEDURE — 25010000002 ENOXAPARIN PER 10 MG: Performed by: NURSE PRACTITIONER

## 2020-01-29 PROCEDURE — 97530 THERAPEUTIC ACTIVITIES: CPT

## 2020-01-29 PROCEDURE — 80048 BASIC METABOLIC PNL TOTAL CA: CPT | Performed by: HOSPITALIST

## 2020-01-29 RX ADMIN — FEBUXOSTAT 40 MG: 40 TABLET ORAL at 10:30

## 2020-01-29 RX ADMIN — DIGOXIN 125 MCG: 0.12 TABLET ORAL at 13:42

## 2020-01-29 RX ADMIN — MELATONIN TAB 5 MG 5 MG: 5 TAB at 20:58

## 2020-01-29 RX ADMIN — FEBUXOSTAT 40 MG: 40 TABLET ORAL at 20:58

## 2020-01-29 RX ADMIN — ASPIRIN 81 MG 81 MG: 81 TABLET ORAL at 10:30

## 2020-01-29 RX ADMIN — CEFTRIAXONE SODIUM 1 G: 1 INJECTION, POWDER, FOR SOLUTION INTRAMUSCULAR; INTRAVENOUS at 03:44

## 2020-01-29 RX ADMIN — SODIUM CHLORIDE 75 ML/HR: 900 INJECTION, SOLUTION INTRAVENOUS at 06:45

## 2020-01-29 RX ADMIN — ENOXAPARIN SODIUM 30 MG: 30 INJECTION SUBCUTANEOUS at 17:26

## 2020-01-29 RX ADMIN — GABAPENTIN 100 MG: 100 CAPSULE ORAL at 17:26

## 2020-01-29 RX ADMIN — Medication 10 ML: at 10:30

## 2020-01-29 RX ADMIN — Medication 10 ML: at 20:59

## 2020-01-29 RX ADMIN — COLCHICINE 0.6 MG: 0.6 TABLET, FILM COATED ORAL at 10:29

## 2020-01-29 RX ADMIN — GABAPENTIN 100 MG: 100 CAPSULE ORAL at 20:58

## 2020-01-29 RX ADMIN — GABAPENTIN 100 MG: 100 CAPSULE ORAL at 10:29

## 2020-01-30 PROBLEM — Z95.810 PRESENCE OF BIVENTRICULAR IMPLANTABLE CARDIOVERTER-DEFIBRILLATOR (ICD): Status: ACTIVE | Noted: 2020-01-28

## 2020-01-30 LAB
ANION GAP SERPL CALCULATED.3IONS-SCNC: 9 MMOL/L (ref 5–15)
ANISOCYTOSIS BLD QL: NORMAL
BASOPHILS # BLD AUTO: 0.1 10*3/MM3 (ref 0–0.2)
BASOPHILS NFR BLD AUTO: 0.9 % (ref 0–1.5)
BUN BLD-MCNC: 63 MG/DL (ref 8–23)
BUN/CREAT SERPL: 29.2 (ref 7–25)
CALCIUM SPEC-SCNC: 8.2 MG/DL (ref 8.6–10.5)
CHLORIDE SERPL-SCNC: 110 MMOL/L (ref 98–107)
CLUMPED PLATELETS: PRESENT
CO2 SERPL-SCNC: 18 MMOL/L (ref 22–29)
CREAT BLD-MCNC: 2.16 MG/DL (ref 0.57–1)
DEPRECATED RDW RBC AUTO: 58.2 FL (ref 37–54)
EOSINOPHIL # BLD AUTO: 0.1 10*3/MM3 (ref 0–0.4)
EOSINOPHIL NFR BLD AUTO: 2.1 % (ref 0.3–6.2)
ERYTHROCYTE [DISTWIDTH] IN BLOOD BY AUTOMATED COUNT: 17.2 % (ref 12.3–15.4)
GFR SERPL CREATININE-BSD FRML MDRD: 22 ML/MIN/1.73
GLUCOSE BLD-MCNC: 82 MG/DL (ref 65–99)
HCT VFR BLD AUTO: 26.8 % (ref 34–46.6)
HGB BLD-MCNC: 9 G/DL (ref 12–15.9)
LARGE PLATELETS: NORMAL
LYMPHOCYTES # BLD AUTO: 0.6 10*3/MM3 (ref 0.7–3.1)
LYMPHOCYTES NFR BLD AUTO: 10.8 % (ref 19.6–45.3)
MCH RBC QN AUTO: 31.9 PG (ref 26.6–33)
MCHC RBC AUTO-ENTMCNC: 33.5 G/DL (ref 31.5–35.7)
MCV RBC AUTO: 95.3 FL (ref 79–97)
MONOCYTES # BLD AUTO: 0.6 10*3/MM3 (ref 0.1–0.9)
MONOCYTES NFR BLD AUTO: 10 % (ref 5–12)
NEUTROPHILS # BLD AUTO: 4.3 10*3/MM3 (ref 1.7–7)
NEUTROPHILS NFR BLD AUTO: 76.2 % (ref 42.7–76)
NRBC BLD AUTO-RTO: 0 /100 WBC (ref 0–0.2)
PLATELET # BLD AUTO: 136 10*3/MM3 (ref 140–450)
PMV BLD AUTO: 7.8 FL (ref 6–12)
POTASSIUM BLD-SCNC: 5.4 MMOL/L (ref 3.5–5.2)
RBC # BLD AUTO: 2.81 10*6/MM3 (ref 3.77–5.28)
SODIUM BLD-SCNC: 137 MMOL/L (ref 136–145)
WBC MORPH BLD: NORMAL
WBC NRBC COR # BLD: 5.6 10*3/MM3 (ref 3.4–10.8)

## 2020-01-30 PROCEDURE — 97165 OT EVAL LOW COMPLEX 30 MIN: CPT

## 2020-01-30 PROCEDURE — 25010000002 CEFTRIAXONE PER 250 MG: Performed by: NURSE PRACTITIONER

## 2020-01-30 PROCEDURE — 0JH608Z INSERTION OF DEFIBRILLATOR GENERATOR INTO CHEST SUBCUTANEOUS TISSUE AND FASCIA, OPEN APPROACH: ICD-10-PCS | Performed by: INTERNAL MEDICINE

## 2020-01-30 PROCEDURE — 80048 BASIC METABOLIC PNL TOTAL CA: CPT | Performed by: HOSPITALIST

## 2020-01-30 PROCEDURE — 85025 COMPLETE CBC W/AUTO DIFF WBC: CPT | Performed by: HOSPITALIST

## 2020-01-30 PROCEDURE — 25010000002 ENOXAPARIN PER 10 MG: Performed by: INTERNAL MEDICINE

## 2020-01-30 PROCEDURE — 99232 SBSQ HOSP IP/OBS MODERATE 35: CPT | Performed by: HOSPITALIST

## 2020-01-30 PROCEDURE — 0JPT0PZ REMOVAL OF CARDIAC RHYTHM RELATED DEVICE FROM TRUNK SUBCUTANEOUS TISSUE AND FASCIA, OPEN APPROACH: ICD-10-PCS | Performed by: INTERNAL MEDICINE

## 2020-01-30 PROCEDURE — 99232 SBSQ HOSP IP/OBS MODERATE 35: CPT | Performed by: INTERNAL MEDICINE

## 2020-01-30 PROCEDURE — 85007 BL SMEAR W/DIFF WBC COUNT: CPT | Performed by: HOSPITALIST

## 2020-01-30 PROCEDURE — 25010000002 ENOXAPARIN PER 10 MG: Performed by: NURSE PRACTITIONER

## 2020-01-30 PROCEDURE — G0378 HOSPITAL OBSERVATION PER HR: HCPCS

## 2020-01-30 RX ORDER — TRAMADOL HYDROCHLORIDE 50 MG/1
50 TABLET ORAL ONCE
Status: COMPLETED | OUTPATIENT
Start: 2020-01-31 | End: 2020-01-30

## 2020-01-30 RX ORDER — CEFDINIR 300 MG/1
300 CAPSULE ORAL EVERY 12 HOURS SCHEDULED
Status: DISCONTINUED | OUTPATIENT
Start: 2020-01-31 | End: 2020-01-30

## 2020-01-30 RX ORDER — CEFDINIR 300 MG/1
300 CAPSULE ORAL
Status: COMPLETED | OUTPATIENT
Start: 2020-01-31 | End: 2020-02-03

## 2020-01-30 RX ADMIN — TRAMADOL HYDROCHLORIDE 50 MG: 50 TABLET, FILM COATED ORAL at 23:12

## 2020-01-30 RX ADMIN — ASPIRIN 81 MG 81 MG: 81 TABLET ORAL at 08:43

## 2020-01-30 RX ADMIN — GABAPENTIN 100 MG: 100 CAPSULE ORAL at 20:38

## 2020-01-30 RX ADMIN — ENOXAPARIN SODIUM 30 MG: 30 INJECTION SUBCUTANEOUS at 18:11

## 2020-01-30 RX ADMIN — ACETAMINOPHEN 650 MG: 325 TABLET ORAL at 13:24

## 2020-01-30 RX ADMIN — GABAPENTIN 100 MG: 100 CAPSULE ORAL at 18:11

## 2020-01-30 RX ADMIN — GABAPENTIN 100 MG: 100 CAPSULE ORAL at 08:43

## 2020-01-30 RX ADMIN — CEFTRIAXONE SODIUM 1 G: 1 INJECTION, POWDER, FOR SOLUTION INTRAMUSCULAR; INTRAVENOUS at 04:00

## 2020-01-30 RX ADMIN — COLCHICINE 0.6 MG: 0.6 TABLET, FILM COATED ORAL at 08:43

## 2020-01-30 RX ADMIN — FEBUXOSTAT 40 MG: 40 TABLET ORAL at 08:43

## 2020-01-30 RX ADMIN — Medication 10 ML: at 08:44

## 2020-01-30 RX ADMIN — DIGOXIN 125 MCG: 0.12 TABLET ORAL at 13:18

## 2020-01-30 RX ADMIN — FEBUXOSTAT 40 MG: 40 TABLET ORAL at 20:38

## 2020-01-30 RX ADMIN — ACETAMINOPHEN 650 MG: 325 TABLET ORAL at 20:38

## 2020-01-30 RX ADMIN — Medication 10 ML: at 20:39

## 2020-01-31 LAB
ANION GAP SERPL CALCULATED.3IONS-SCNC: 10 MMOL/L (ref 5–15)
ANISOCYTOSIS BLD QL: NORMAL
BASOPHILS # BLD AUTO: 0.1 10*3/MM3 (ref 0–0.2)
BASOPHILS NFR BLD AUTO: 1.1 % (ref 0–1.5)
BUN BLD-MCNC: 59 MG/DL (ref 8–23)
BUN/CREAT SERPL: 28.8 (ref 7–25)
CALCIUM SPEC-SCNC: 8.1 MG/DL (ref 8.6–10.5)
CHLORIDE SERPL-SCNC: 109 MMOL/L (ref 98–107)
CLUMPED PLATELETS: PRESENT
CO2 SERPL-SCNC: 17 MMOL/L (ref 22–29)
CREAT BLD-MCNC: 2.05 MG/DL (ref 0.57–1)
DEPRECATED RDW RBC AUTO: 56.9 FL (ref 37–54)
EOSINOPHIL # BLD AUTO: 0.2 10*3/MM3 (ref 0–0.4)
EOSINOPHIL NFR BLD AUTO: 3.3 % (ref 0.3–6.2)
ERYTHROCYTE [DISTWIDTH] IN BLOOD BY AUTOMATED COUNT: 17.3 % (ref 12.3–15.4)
GFR SERPL CREATININE-BSD FRML MDRD: 24 ML/MIN/1.73
GLUCOSE BLD-MCNC: 90 MG/DL (ref 65–99)
HCT VFR BLD AUTO: 28.3 % (ref 34–46.6)
HGB BLD-MCNC: 9.3 G/DL (ref 12–15.9)
LYMPHOCYTES # BLD AUTO: 0.6 10*3/MM3 (ref 0.7–3.1)
LYMPHOCYTES NFR BLD AUTO: 9.8 % (ref 19.6–45.3)
MCH RBC QN AUTO: 30.6 PG (ref 26.6–33)
MCHC RBC AUTO-ENTMCNC: 32.7 G/DL (ref 31.5–35.7)
MCV RBC AUTO: 93.6 FL (ref 79–97)
MONOCYTES # BLD AUTO: 0.5 10*3/MM3 (ref 0.1–0.9)
MONOCYTES NFR BLD AUTO: 9.3 % (ref 5–12)
NEUTROPHILS # BLD AUTO: 4.4 10*3/MM3 (ref 1.7–7)
NEUTROPHILS NFR BLD AUTO: 76.5 % (ref 42.7–76)
NRBC BLD AUTO-RTO: 0.2 /100 WBC (ref 0–0.2)
PLATELET # BLD AUTO: 110 10*3/MM3 (ref 140–450)
PMV BLD AUTO: 9.2 FL (ref 6–12)
POIKILOCYTOSIS BLD QL SMEAR: NORMAL
POLYCHROMASIA BLD QL SMEAR: NORMAL
POTASSIUM BLD-SCNC: 5.4 MMOL/L (ref 3.5–5.2)
RBC # BLD AUTO: 3.03 10*6/MM3 (ref 3.77–5.28)
SODIUM BLD-SCNC: 136 MMOL/L (ref 136–145)
WBC MORPH BLD: NORMAL
WBC NRBC COR # BLD: 5.8 10*3/MM3 (ref 3.4–10.8)

## 2020-01-31 PROCEDURE — 80048 BASIC METABOLIC PNL TOTAL CA: CPT | Performed by: HOSPITALIST

## 2020-01-31 PROCEDURE — 99232 SBSQ HOSP IP/OBS MODERATE 35: CPT | Performed by: HOSPITALIST

## 2020-01-31 PROCEDURE — 99024 POSTOP FOLLOW-UP VISIT: CPT | Performed by: INTERNAL MEDICINE

## 2020-01-31 PROCEDURE — 99152 MOD SED SAME PHYS/QHP 5/>YRS: CPT | Performed by: INTERNAL MEDICINE

## 2020-01-31 PROCEDURE — C1722 AICD, SINGLE CHAMBER: HCPCS | Performed by: INTERNAL MEDICINE

## 2020-01-31 PROCEDURE — 99153 MOD SED SAME PHYS/QHP EA: CPT | Performed by: INTERNAL MEDICINE

## 2020-01-31 PROCEDURE — 25010000002 VANCOMYCIN 1 G RECONSTITUTED SOLUTION 1 EACH VIAL: Performed by: INTERNAL MEDICINE

## 2020-01-31 PROCEDURE — C1894 INTRO/SHEATH, NON-LASER: HCPCS | Performed by: INTERNAL MEDICINE

## 2020-01-31 PROCEDURE — 25010000002 MIDAZOLAM PER 1 MG: Performed by: INTERNAL MEDICINE

## 2020-01-31 PROCEDURE — 25010000002 FENTANYL CITRATE (PF) 100 MCG/2ML SOLUTION: Performed by: INTERNAL MEDICINE

## 2020-01-31 PROCEDURE — 33262 RMVL& REPLC PULSE GEN 1 LEAD: CPT | Performed by: INTERNAL MEDICINE

## 2020-01-31 PROCEDURE — 93005 ELECTROCARDIOGRAM TRACING: CPT | Performed by: INTERNAL MEDICINE

## 2020-01-31 PROCEDURE — 85007 BL SMEAR W/DIFF WBC COUNT: CPT | Performed by: HOSPITALIST

## 2020-01-31 PROCEDURE — 85025 COMPLETE CBC W/AUTO DIFF WBC: CPT | Performed by: HOSPITALIST

## 2020-01-31 DEVICE — ICD VISIA AF VR SURESCAN XT US DF4 DVFB1D4: Type: IMPLANTABLE DEVICE | Status: FUNCTIONAL

## 2020-01-31 RX ORDER — LIDOCAINE HYDROCHLORIDE AND EPINEPHRINE BITARTRATE 20; .01 MG/ML; MG/ML
INJECTION, SOLUTION SUBCUTANEOUS AS NEEDED
Status: DISCONTINUED | OUTPATIENT
Start: 2020-01-31 | End: 2020-01-31 | Stop reason: HOSPADM

## 2020-01-31 RX ORDER — SODIUM CHLORIDE 0.9 % (FLUSH) 0.9 %
10 SYRINGE (ML) INJECTION AS NEEDED
Status: DISCONTINUED | OUTPATIENT
Start: 2020-01-31 | End: 2020-02-06 | Stop reason: HOSPADM

## 2020-01-31 RX ORDER — FENTANYL CITRATE 50 UG/ML
INJECTION, SOLUTION INTRAMUSCULAR; INTRAVENOUS AS NEEDED
Status: DISCONTINUED | OUTPATIENT
Start: 2020-01-31 | End: 2020-01-31 | Stop reason: HOSPADM

## 2020-01-31 RX ORDER — SODIUM POLYSTYRENE SULFONATE 15 G/60ML
15 SUSPENSION ORAL; RECTAL ONCE
Status: COMPLETED | OUTPATIENT
Start: 2020-01-31 | End: 2020-01-31

## 2020-01-31 RX ORDER — MIDAZOLAM HYDROCHLORIDE 1 MG/ML
INJECTION INTRAMUSCULAR; INTRAVENOUS AS NEEDED
Status: DISCONTINUED | OUTPATIENT
Start: 2020-01-31 | End: 2020-01-31 | Stop reason: HOSPADM

## 2020-01-31 RX ORDER — SODIUM CHLORIDE 9 MG/ML
30 INJECTION, SOLUTION INTRAVENOUS CONTINUOUS
Status: DISCONTINUED | OUTPATIENT
Start: 2020-01-31 | End: 2020-01-31

## 2020-01-31 RX ORDER — LIDOCAINE HYDROCHLORIDE 20 MG/ML
INJECTION, SOLUTION INFILTRATION; PERINEURAL AS NEEDED
Status: DISCONTINUED | OUTPATIENT
Start: 2020-01-31 | End: 2020-01-31 | Stop reason: HOSPADM

## 2020-01-31 RX ORDER — SODIUM CHLORIDE 0.9 % (FLUSH) 0.9 %
3 SYRINGE (ML) INJECTION EVERY 12 HOURS SCHEDULED
Status: DISCONTINUED | OUTPATIENT
Start: 2020-01-31 | End: 2020-02-06 | Stop reason: HOSPADM

## 2020-01-31 RX ADMIN — Medication 3 ML: at 20:30

## 2020-01-31 RX ADMIN — Medication 10 ML: at 20:30

## 2020-01-31 RX ADMIN — DIGOXIN 125 MCG: 0.12 TABLET ORAL at 12:28

## 2020-01-31 RX ADMIN — ACETAMINOPHEN 650 MG: 325 TABLET ORAL at 03:22

## 2020-01-31 RX ADMIN — SODIUM POLYSTYRENE SULFONATE 15 G: 15 SUSPENSION ORAL; RECTAL at 09:39

## 2020-01-31 RX ADMIN — CEFDINIR 300 MG: 300 CAPSULE ORAL at 08:22

## 2020-01-31 RX ADMIN — FEBUXOSTAT 40 MG: 40 TABLET ORAL at 20:30

## 2020-01-31 RX ADMIN — SODIUM CHLORIDE 1000 MG: 900 INJECTION, SOLUTION INTRAVENOUS at 12:28

## 2020-01-31 RX ADMIN — ASPIRIN 81 MG 81 MG: 81 TABLET ORAL at 08:22

## 2020-01-31 RX ADMIN — Medication 10 ML: at 08:22

## 2020-01-31 RX ADMIN — ACETAMINOPHEN 650 MG: 325 TABLET ORAL at 08:25

## 2020-01-31 RX ADMIN — SODIUM CHLORIDE 30 ML/HR: 900 INJECTION, SOLUTION INTRAVENOUS at 12:30

## 2020-01-31 RX ADMIN — COLCHICINE 0.6 MG: 0.6 TABLET, FILM COATED ORAL at 08:22

## 2020-01-31 RX ADMIN — FEBUXOSTAT 40 MG: 40 TABLET ORAL at 08:22

## 2020-01-31 RX ADMIN — GABAPENTIN 100 MG: 100 CAPSULE ORAL at 17:09

## 2020-01-31 RX ADMIN — ACETAMINOPHEN 650 MG: 325 TABLET ORAL at 18:42

## 2020-01-31 RX ADMIN — GABAPENTIN 100 MG: 100 CAPSULE ORAL at 20:30

## 2020-01-31 RX ADMIN — GABAPENTIN 100 MG: 100 CAPSULE ORAL at 08:22

## 2020-02-01 LAB
ANION GAP SERPL CALCULATED.3IONS-SCNC: 11 MMOL/L (ref 5–15)
BASOPHILS # BLD AUTO: 0.1 10*3/MM3 (ref 0–0.2)
BASOPHILS NFR BLD AUTO: 1.2 % (ref 0–1.5)
BUN BLD-MCNC: 56 MG/DL (ref 8–23)
BUN/CREAT SERPL: 28.9 (ref 7–25)
CALCIUM SPEC-SCNC: 8.3 MG/DL (ref 8.6–10.5)
CHLORIDE SERPL-SCNC: 108 MMOL/L (ref 98–107)
CO2 SERPL-SCNC: 17 MMOL/L (ref 22–29)
CREAT BLD-MCNC: 1.94 MG/DL (ref 0.57–1)
DEPRECATED RDW RBC AUTO: 62.1 FL (ref 37–54)
EOSINOPHIL # BLD AUTO: 0.2 10*3/MM3 (ref 0–0.4)
EOSINOPHIL NFR BLD AUTO: 3.2 % (ref 0.3–6.2)
ERYTHROCYTE [DISTWIDTH] IN BLOOD BY AUTOMATED COUNT: 17.8 % (ref 12.3–15.4)
GFR SERPL CREATININE-BSD FRML MDRD: 25 ML/MIN/1.73
GLUCOSE BLD-MCNC: 86 MG/DL (ref 65–99)
HCT VFR BLD AUTO: 28.9 % (ref 34–46.6)
HGB BLD-MCNC: 9.6 G/DL (ref 12–15.9)
LYMPHOCYTES # BLD AUTO: 0.5 10*3/MM3 (ref 0.7–3.1)
LYMPHOCYTES NFR BLD AUTO: 8.3 % (ref 19.6–45.3)
MCH RBC QN AUTO: 32.3 PG (ref 26.6–33)
MCHC RBC AUTO-ENTMCNC: 33.3 G/DL (ref 31.5–35.7)
MCV RBC AUTO: 96.8 FL (ref 79–97)
MONOCYTES # BLD AUTO: 0.5 10*3/MM3 (ref 0.1–0.9)
MONOCYTES NFR BLD AUTO: 7.9 % (ref 5–12)
NEUTROPHILS # BLD AUTO: 4.6 10*3/MM3 (ref 1.7–7)
NEUTROPHILS NFR BLD AUTO: 79.4 % (ref 42.7–76)
NRBC BLD AUTO-RTO: 0 /100 WBC (ref 0–0.2)
PLATELET # BLD AUTO: 92 10*3/MM3 (ref 140–450)
PMV BLD AUTO: 8.5 FL (ref 6–12)
POTASSIUM BLD-SCNC: 5.1 MMOL/L (ref 3.5–5.2)
RBC # BLD AUTO: 2.99 10*6/MM3 (ref 3.77–5.28)
SODIUM BLD-SCNC: 136 MMOL/L (ref 136–145)
VANCOMYCIN SERPL-MCNC: 8.9 MCG/ML (ref 5–40)
WBC NRBC COR # BLD: 5.8 10*3/MM3 (ref 3.4–10.8)

## 2020-02-01 PROCEDURE — 99232 SBSQ HOSP IP/OBS MODERATE 35: CPT | Performed by: INTERNAL MEDICINE

## 2020-02-01 PROCEDURE — 85025 COMPLETE CBC W/AUTO DIFF WBC: CPT | Performed by: INTERNAL MEDICINE

## 2020-02-01 PROCEDURE — 93010 ELECTROCARDIOGRAM REPORT: CPT | Performed by: INTERNAL MEDICINE

## 2020-02-01 PROCEDURE — 80048 BASIC METABOLIC PNL TOTAL CA: CPT | Performed by: INTERNAL MEDICINE

## 2020-02-01 PROCEDURE — 99232 SBSQ HOSP IP/OBS MODERATE 35: CPT | Performed by: HOSPITALIST

## 2020-02-01 PROCEDURE — 25010000002 ENOXAPARIN PER 10 MG: Performed by: INTERNAL MEDICINE

## 2020-02-01 PROCEDURE — 80202 ASSAY OF VANCOMYCIN: CPT | Performed by: INTERNAL MEDICINE

## 2020-02-01 PROCEDURE — 25010000002 VANCOMYCIN 1 G RECONSTITUTED SOLUTION 1 EACH VIAL: Performed by: INTERNAL MEDICINE

## 2020-02-01 RX ORDER — ZINC OXIDE 20 %
OINTMENT (GRAM) TOPICAL EVERY 12 HOURS SCHEDULED
Status: DISCONTINUED | OUTPATIENT
Start: 2020-02-01 | End: 2020-02-06 | Stop reason: HOSPADM

## 2020-02-01 RX ORDER — ZINC OXIDE 20 %
OINTMENT (GRAM) TOPICAL EVERY 12 HOURS SCHEDULED
Qty: 1 EACH | Refills: 1 | Status: SHIPPED | OUTPATIENT
Start: 2020-02-01 | End: 2020-02-08

## 2020-02-01 RX ORDER — LISINOPRIL 2.5 MG/1
2.5 TABLET ORAL DAILY
Qty: 30 TABLET | Refills: 0 | Status: SHIPPED | OUTPATIENT
Start: 2020-02-01 | End: 2020-09-01

## 2020-02-01 RX ORDER — CEFDINIR 300 MG/1
300 CAPSULE ORAL
Qty: 2 CAPSULE | Refills: 0 | Status: SHIPPED | OUTPATIENT
Start: 2020-02-02 | End: 2020-02-04

## 2020-02-01 RX ADMIN — Medication 10 ML: at 21:51

## 2020-02-01 RX ADMIN — COLCHICINE 0.6 MG: 0.6 TABLET, FILM COATED ORAL at 09:02

## 2020-02-01 RX ADMIN — GABAPENTIN 100 MG: 100 CAPSULE ORAL at 09:02

## 2020-02-01 RX ADMIN — Medication 3 ML: at 20:50

## 2020-02-01 RX ADMIN — ASPIRIN 81 MG 81 MG: 81 TABLET ORAL at 09:02

## 2020-02-01 RX ADMIN — FEBUXOSTAT 40 MG: 40 TABLET ORAL at 09:02

## 2020-02-01 RX ADMIN — Medication 3 ML: at 09:04

## 2020-02-01 RX ADMIN — DIGOXIN 125 MCG: 0.12 TABLET ORAL at 17:32

## 2020-02-01 RX ADMIN — Medication 10 ML: at 09:04

## 2020-02-01 RX ADMIN — FEBUXOSTAT 40 MG: 40 TABLET ORAL at 20:41

## 2020-02-01 RX ADMIN — SODIUM CHLORIDE 1000 MG: 900 INJECTION, SOLUTION INTRAVENOUS at 09:03

## 2020-02-01 RX ADMIN — CEFDINIR 300 MG: 300 CAPSULE ORAL at 09:02

## 2020-02-01 RX ADMIN — ACETAMINOPHEN 650 MG: 325 TABLET ORAL at 18:30

## 2020-02-01 RX ADMIN — ENOXAPARIN SODIUM 30 MG: 30 INJECTION SUBCUTANEOUS at 17:33

## 2020-02-01 RX ADMIN — GABAPENTIN 100 MG: 100 CAPSULE ORAL at 17:33

## 2020-02-01 RX ADMIN — GABAPENTIN 100 MG: 100 CAPSULE ORAL at 20:42

## 2020-02-01 RX ADMIN — ZINC OXIDE: 200 OINTMENT TOPICAL at 17:32

## 2020-02-01 NOTE — PROGRESS NOTES
Case Management/Social Work    Patient Name:  Emperatriz Childs  YOB: 1945  MRN: [id number]  Admit Date:  1/28/2020        MSW verified with Pt's daughter and registration that Pt's insurance on file is accurate. Pt's daughter reported that Pt is having trouble keeping up with bills and her bank account has over $1000 of overdraft fees. She stated she believes Pt is giving money to a scam. MSW advised Pt's daughter to report her concerns to Adult Protective services.     MSW confirmed Pt's demographics.    Electronically signed by:  Sahhana Bateman  02/01/20 6:56 PM

## 2020-02-01 NOTE — PLAN OF CARE
Problem: Patient Care Overview  Goal: Plan of Care Review  Outcome: Ongoing (interventions implemented as appropriate)  Flowsheets  Taken 2/1/2020 0516 by Cami Rivas, RN  Progress: improving  Outcome Summary: Pt rested on and off thoughout the night with some complaints of leg pain. Pt was able to get up to [facility] with 1 to 2 assist. Will continue to monitor  Taken 1/31/2020 0737 by Linnea Henderson, RN  Plan of Care Reviewed With: patient

## 2020-02-01 NOTE — PROGRESS NOTES
Infectious Diseases Progress Note      LOS: 1 day   Patient Care Team:  Rosa M Chavez APRN as PCP - General (Nurse Practitioner)        Subjective       The patient has been afebrile for the last 24 hours.  The patient is on room air, hemodynamically stable, and is tolerating antimicrobial therapy.      Review of Systems:   Review of Systems   Constitutional: Negative.    HENT: Negative.    Respiratory: Negative.    Cardiovascular: Positive for leg swelling.   Gastrointestinal: Negative.    Genitourinary: Positive for dysuria.   Musculoskeletal: Positive for arthralgias.   Skin: Positive for rash and wound.   Neurological: Negative.    Psychiatric/Behavioral: Negative.    All other systems reviewed and are negative.       Objective     Vital Signs  Temp:  [97.9 °F (36.6 °C)-98.3 °F (36.8 °C)] 98.3 °F (36.8 °C)  Heart Rate:  [59-61] 60  Resp:  [16-18] 16  BP: (149-166)/() 153/70    Physical Exam:  Physical Exam   Constitutional: She is oriented to person, place, and time. She appears well-developed and well-nourished.   HENT:   Head: Normocephalic and atraumatic.   Eyes: Pupils are equal, round, and reactive to light. Conjunctivae and EOM are normal.   Neck: Neck supple.   Cardiovascular: Normal rate, regular rhythm and normal heart sounds.   Pulmonary/Chest: Effort normal and breath sounds normal.   Abdominal: Soft. Bowel sounds are normal.   Musculoskeletal: She exhibits edema.   Neurological: She is alert and oriented to person, place, and time.   Skin: Skin is warm and dry.   patient has chronic staining of bilateral lower legs from below the knee to the feet.  Patient has chronic changes of the skin due to chronic swelling.  There are several areas of macerated and denuded skin and the patient states that she has fairly constant weeping of both lower extremities.  Patient has palpable pulses but they are very difficult due to the edema and skin changes.     Psychiatric: She has a normal mood and  affect.   Vitals reviewed.       Results Review:    I have reviewed all clinical data, test, lab, and imaging results.     Radiology  No Radiology Exams Resulted Within Past 24 Hours    Cardiology    Laboratory  Results from last 7 days   Lab Units 02/01/20  0502   WBC 10*3/mm3 5.80   HEMOGLOBIN g/dL 9.6*   HEMATOCRIT % 28.9*   PLATELETS 10*3/mm3 92*     Results from last 7 days   Lab Units 02/01/20  0244   SODIUM mmol/L 136   POTASSIUM mmol/L 5.1   CHLORIDE mmol/L 108*   CO2 mmol/L 17.0*   BUN mg/dL 56*   CREATININE mg/dL 1.94*   GLUCOSE mg/dL 86   CALCIUM mg/dL 8.3*     Results from last 7 days   Lab Units 02/01/20  0244   SODIUM mmol/L 136   POTASSIUM mmol/L 5.1   CHLORIDE mmol/L 108*   CO2 mmol/L 17.0*   BUN mg/dL 56*   CREATININE mg/dL 1.94*   GLUCOSE mg/dL 86   CALCIUM mg/dL 8.3*                 Microbiology   Microbiology Results (last 10 days)     Procedure Component Value - Date/Time    Urine Culture - Urine, Urine, Catheter [[id number]]  (Normal) Collected:  01/28/20 0122    Lab Status:  Final result Specimen:  Urine, Catheter Updated:  01/29/20 1912     Urine Culture No growth          Medication Review:       Schedule Meds    aspirin 81 mg Oral Daily   cefdinir 300 mg Oral Q24H   colchicine 0.6 mg Oral Daily   digoxin 125 mcg Oral Daily   enoxaparin 30 mg Subcutaneous Q24H   febuxostat 40 mg Oral BID   gabapentin 100 mg Oral TID   sodium chloride 10 mL Intravenous Q12H   sodium chloride 3 mL Intravenous Q12H   zinc oxide  Topical Q12H       Infusion Meds       PRN Meds  •  acetaminophen **OR** acetaminophen **OR** acetaminophen  •  aluminum-magnesium hydroxide-simethicone  •  bisacodyl  •  magnesium hydroxide  •  melatonin  •  ondansetron **OR** ondansetron  •  sodium chloride  •  sodium chloride        Assessment/Plan       Antimicrobial Therapy   1.  P.o. Omnicef    Day 2  2.      Day  3.      Day  4.      Day  5.      Day      Assessment     Chronic bilateral lower leg lymphedema with frequent  weeping  -Chronic staining below the knee of both extremities with chronic skin changes due to constant swelling and weeping  -Some areas of macerated skin and denuded skin but no overt signs of infection     Urinary tract infection  -Urinalysis showed +2 bacteria with 21-30 white blood cells but the culture was negative  -However the patient was on p.o. Keflex before admission  -Patient says she has frequent urinary tract infections     History of pacemaker placement  -Pacemaker was interrogated and uses a battery change     Acute kidney injury on chronic kidney disease     Congestive heart failure, A. fib     Plan     Continue p.o. Omnicef 300mg qd for 3 more days-for 7 days total treatment  Wound care is already seen the patient and suggested certain dressing changes and compression dressings  Patient may benefit from going to the lymphedema clinic  Continue supportive care  Okay to discharge from infectious disease point    Kurt Diehl MD  02/01/20  4:11 PM     Note is dictated utilizing voice recognition software/Dragon

## 2020-02-01 NOTE — PLAN OF CARE
Continue care and monitoring pt. Bilateral leg wraps  Problem: Patient Care Overview  Goal: Plan of Care Review  Outcome: Ongoing (interventions implemented as appropriate)  Flowsheets  Taken 2/1/2020 0516 by Cami Rivas, RN  Progress: improving  Outcome Summary: Pt rested on and off thoughout the night with some complaints of leg pain. Pt was able to get up to [facility] with 1 to 2 assist. Will continue to monitor  Taken 2/1/2020 0723 by Nisha Ceron RN  Plan of Care Reviewed With: patient  Goal: Individualization and Mutuality  Outcome: Ongoing (interventions implemented as appropriate)  Goal: Discharge Needs Assessment  Outcome: Ongoing (interventions implemented as appropriate)  Flowsheets (Taken 1/28/2020 1221 by Manisha Calderon RN)  Discharge Coordination/Progress: From Home alone with [facility] - PT pending  Equipment Currently Used at Home: walker, rolling;wheelchair  Anticipated Changes Related to Illness: none  Transportation Anticipated: family or friend will provide  Readmission Within the Last 30 Days: no previous admission in last 30 days  Patient/Family Anticipated Services at Transition: none  Patient's Choice of Community Agency(s): Current with [facility]  Patient/Family Anticipates Transition to: home  Goal: Interprofessional Rounds/Family Conf  Outcome: Ongoing (interventions implemented as appropriate)  Flowsheets (Taken 1/28/2020 1645 by Lina Blood, RN)  Participants: ;nursing;physical therapy;patient;physician     Problem: Fall Risk (Adult)  Goal: Identify Related Risk Factors and Signs and Symptoms  Outcome: Ongoing (interventions implemented as appropriate)  Flowsheets (Taken 1/28/2020 1645 by Lina Blood, RN)  Related Risk Factors (Fall Risk): age-related changes;history of falls  Signs and Symptoms (Fall Risk): presence of risk factors  Goal: Absence of Fall  Outcome: Ongoing (interventions implemented as appropriate)  Flowsheets  (Taken 1/30/2020 1443 by Lina Blood, RN)  Absence of Fall: making progress toward outcome     Problem: Urinary Tract Infection (Adult)  Goal: Signs and Symptoms of Listed Potential Problems Will be Absent, Minimized or Managed (Urinary Tract Infection)  Outcome: Ongoing (interventions implemented as appropriate)  Flowsheets (Taken 1/30/2020 1443 by Lina Blood, RN)  Problems Assessed (Urinary Tract Infection): all  Problems Present (UTI): none     Problem: Skin Injury Risk (Adult)  Goal: Identify Related Risk Factors and Signs and Symptoms  Outcome: Ongoing (interventions implemented as appropriate)  Flowsheets (Taken 1/30/2020 1443 by Lina Blood, RN)  Related Risk Factors (Skin Injury Risk): advanced age;infection;nutritional deficiencies  Goal: Skin Health and Integrity  Outcome: Ongoing (interventions implemented as appropriate)  Flowsheets (Taken 1/30/2020 1443 by Lina Blood, RN)  Skin Health and Integrity: making progress toward outcome     Problem: Syncope (Adult)  Goal: Identify Related Risk Factors and Signs and Symptoms  Outcome: Ongoing (interventions implemented as appropriate)  Flowsheets (Taken 2/1/2020 0923)  Related Risk Factors (Syncope): other (see comments) (pacemaker replacement, UTI)  Goal: Physical Safety/Health Maintenance  Outcome: Ongoing (interventions implemented as appropriate)  Flowsheets (Taken 1/30/2020 1443 by Lina Blood, RN)  Physical Safety/Health Maintenance: making progress toward outcome  Goal: Optimal Emotional/Functional [place]  Outcome: Ongoing (interventions implemented as appropriate)  Flowsheets (Taken 1/30/2020 1443 by Lina Blood, RN)  Optimal Emotional/Functional [place]: making progress toward outcome

## 2020-02-01 NOTE — PROGRESS NOTES
"      [facility] Medicine Services Daily Progress Note      Hospitalist Team  LOS 1 days      Patient Care Team:  Rosa M Chavez APRN as PCP - General (Nurse Practitioner)    Patient Location: 242/1      Subjective   Subjective   Leg covered with dressing, pt in no distress at all. Denies for any new complaint, no nausea or vomiting.     Chief Complaint / Subjective  Chief Complaint   Patient presents with   • Hallucinations         Brief Synopsis of Hospital Course/HPI    Ms. Childs is a 74 y.o. occasion female with a history of CHF, bilateral lymphedema and cellulitis, diabetes type 2, gout, presented to the [facility] ED on 1/28/2020 with a complaint of visual hallucinations.  Patient states she has been seeing people that are dead such as her  and  little children.  Patient states she knows that they are a hallucination.  Patient is alert and oriented x4.  Patient also has bilateral lymphedema and cellulitis to her lower bilateral lower extremities.  Patient was a patient of the [facility] wound care center, but is now current with home health.      In the ED, CT head negative for acute disease.  UA: 2+ leukocytes, WBC 21-30, 2+ bacteria, BUN 59, CR 2.381, alkaline phosphatase 142, WBC 6.7, Hgb 10.7, HCT 31.9, platelets 622.  Patient was given 1 g Rocephin in the ED patient to be admitted for further work-up and evaluation.            Date::          ROS .      Objective   Objective      Vital Signs  Temp:  [97.9 °F (36.6 °C)-98.3 °F (36.8 °C)] 98.3 °F (36.8 °C)  Heart Rate:  [59-61] 60  Resp:  [16-18] 16  BP: (149-166)/(65-70) 153/70  Oxygen Therapy  SpO2: 98 %  Pulse Oximetry Type: Intermittent  Device (Oxygen Therapy): room air  Flowsheet Rows      First Filed Value   Admission Height  157.5 cm (62\") Documented at 01/28/2020 0037   Admission Weight  64.4 kg (142 lb) Documented at 01/28/2020 0037        Intake & Output (last 3 days)       01/29 0701 - 01/30 0700 " 01/30 0701 - 01/31 0700 01/31 0701 - 02/01 0700 02/01 0701 - 02/02 0700    P.O. 830 600 360 360    I.V. (mL/kg)        IV Piggyback 100       Total Intake(mL/kg) 930 (11.1) 600 (7.1) 360 (4.3) 360 (4.3)    Urine (mL/kg/hr) 1650 (0.8) 800 (0.4) 400 (0.2) 600 (0.7)    Stool 0 0 0     Total Output 1650 800 400 600    Net -720 -200 -40 -240            Urine Unmeasured Occurrence  1 x      Stool Unmeasured Occurrence 2 x 1 x          Lines, Drains & Airways    Active LDAs     Name:   Placement date:   Placement time:   Site:   Days:    Peripheral IV 01/28/20 1958 Anterior;Distal;Right Wrist   01/28/20 1958    Wrist   less than 1                  Physical Exam:    Physical Exam   Constitutional: She is oriented to person, place, and time. She appears well-developed and well-nourished. No distress.   HENT:   Head: Normocephalic and atraumatic.   Right Ear: External ear normal.   Left Ear: External ear normal.   Nose: Nose normal.   Mouth/Throat: Oropharynx is clear and moist. No oropharyngeal exudate.   Eyes: Pupils are equal, round, and reactive to light. Conjunctivae and EOM are normal. Right eye exhibits no discharge. Left eye exhibits no discharge. No scleral icterus.   Neck: Normal range of motion. No JVD present. No tracheal deviation present. No thyromegaly present.   Cardiovascular: Normal rate, regular rhythm, normal heart sounds and intact distal pulses. Exam reveals no gallop and no friction rub.   No murmur heard.  Pulmonary/Chest: Effort normal and breath sounds normal. No stridor. No respiratory distress. She has no wheezes. She has no rales. She exhibits no tenderness.   Abdominal: Soft. Bowel sounds are normal. She exhibits no distension and no mass. There is no tenderness. There is no rebound and no guarding. No hernia.   Musculoskeletal: Normal range of motion. She exhibits no edema, tenderness or deformity.   Lymphadenopathy:     She has no cervical adenopathy.   Neurological: She is alert and  oriented to person, place, and time. No cranial nerve deficit or sensory deficit. She exhibits normal muscle tone. Coordination normal.   Skin: Skin is warm and dry. No rash noted. She is not diaphoretic. No erythema.   Psychiatric: She has a normal mood and affect. Her behavior is normal.   Nursing note and vitals reviewed.           Wounds (last 24 hours)      LDA Wound     Row Name 01/29/20 1100 01/29/20 0900 01/29/20 0730       Wound 01/28/20 0055 Left arm Skin Tear    Wound - Properties Group Date first assessed: 01/28/20  -NB Time first assessed: 0055  -NB Side: Left  -NB Location: arm  -NB Primary Wound Type: Skin tear  -NB    Dressing Appearance  dry;intact;no drainage  -HB  dry;intact;no drainage  -HB  dry;intact;no drainage  -HB    Closure  Adhesive bandage;Other (Comment) Mepilex   -HB  Adhesive bandage;Other (Comment) Mepilex   -HB  Adhesive bandage;Other (Comment) Mepilex   -HB    Base  dry;clean;scab  -HB  dry;clean;scab  -HB  dry;clean;scab  -HB    Periwound  dry;intact  -HB  dry;intact  -HB  dry;intact  -HB    Periwound Temperature  warm  -HB  warm  -HB  warm  -HB    Periwound Skin Turgor  soft  -HB  soft  -HB  soft  -HB    Drainage Amount  none  -HB  none  -HB  none  -HB    Care, Wound  --  --  other (see comments) Mepilex applied   -HB    Dressing Care, Wound  --  --  dressing applied  -HB       Wound 01/28/20 0458 Right anterior foot Other (comment)    Wound - Properties Group Date first assessed: 01/28/20  -KK Time first assessed: 0458 -KK Present on Hospital Admission: Y  -KK Side: Right  -KK Orientation: anterior  -KK Location: foot  -KK Primary Wound Type: Other  -KK, Lymphedema      Dressing Appearance  moist drainage  -HB  moist drainage  -HB  moist drainage  -HB    Closure  NELY  -HB  NELY  -HB  NELY  -HB    Base  red;dry;scab  -HB  red;dry;scab  -HB  red;dry;scab  -HB    Periwound  swelling;warm;other (see comments) dry and flaky   -HB  swelling;warm;other (see comments) dry and flaky   -HB   swelling;warm;other (see comments) dry and flaky   -HB    Periwound Temperature  warm  -HB  warm  -HB  warm  -HB    Periwound Skin Turgor  firm  -HB  firm  -HB  firm  -HB    Drainage Characteristics/Odor  serosanguineous  -HB  serosanguineous  -HB  serosanguineous  -HB    Drainage Amount  small  -HB  small  -HB  small  -HB    Care, Wound  --  --  cleansed with;antimicrobial agent applied;soap and water;wound cleanser;other (see comments) skin repair cream   -HB    Dressing Care, Wound  --  --  other (see comments) maxorb/abdomenal pad   -HB    Periwound Care, Wound  --  --  topical treatment applied  -HB       Wound 01/28/20 0500 Left anterior foot Other (comment)    Wound - Properties Group Date first assessed: 01/28/20  -KK Time first assessed: 0500  -KK Present on Hospital Admission: Y  -KK Side: Left  -KK Orientation: anterior  -KK Location: foot  -KK Primary Wound Type: Other  -KK, Lymphedema     Dressing Appearance  dry;moist drainage  -HB  dry;moist drainage  -HB  dry;moist drainage  -HB    Closure  NELY  -HB  NELY  -HB  NELY  -HB    Base  red;moist;dry;scab  -HB  red;moist;dry;scab  -HB  red;moist;dry;scab  -HB    Periwound  redness;dry;edematous  -HB  redness;dry;edematous  -HB  redness;dry;edematous  -HB    Periwound Temperature  warm  -HB  warm  -HB  warm  -HB    Periwound Skin Turgor  firm  -HB  firm  -HB  firm  -HB    Drainage Characteristics/Odor  serosanguineous  -HB  serosanguineous  -HB  serosanguineous  -HB    Drainage Amount  small  -HB  small  -HB  small  -HB    Care, Wound  --  --  cleansed with;antimicrobial agent applied;barrier applied  -HB    Dressing Care, Wound  --  --  dressing applied;abdominal pad;other (see comments) kerlix   -HB    Row Name 01/28/20 2306 01/28/20 1905          Wound 01/28/20 0055 Left arm Skin Tear    Wound - Properties Group Date first assessed: 01/28/20  -NB Time first assessed: 0055  -NB Side: Left  -NB Location: arm  -NB Primary Wound Type: Skin tear  -NB    Dressing  Appearance  dry;intact;no drainage  -AH  dry;intact;no drainage  -AH     Closure  None  -AH  None  -AH     Base  dry;clean;scab;black eschar  -AH  dry;clean;scab;black eschar  -AH     Periwound  dry;intact  -AH  dry;intact  -AH     Periwound Temperature  warm  -AH  warm  -AH     Periwound Skin Turgor  soft  -AH  soft  -AH     Drainage Amount  none  -AH  none  -AH        Wound 01/28/20 0458 Right anterior foot Other (comment)    Wound - Properties Group Date first assessed: 01/28/20 -KK Time first assessed: 0458  -KK Present on Hospital Admission: Y  -KK Side: Right  -KK Orientation: anterior  -KK Location: foot  -KK Primary Wound Type: Other  -KK, Lymphedema      Dressing Appearance  intact;no drainage  -AH  intact;no drainage  -AH     Closure  NELY  -AH  NELY  -AH     Drainage Characteristics/Odor  yellow;clear  -AH  yellow;clear  -AH     Drainage Amount  small  -AH  small  -AH     Dressing Care, Wound  --  dressing reinforced;abdominal pad;gauze, dry maxorb  -AH     Periwound Care, Wound  --  moisturizer applied  -AH        Wound 01/28/20 0500 Left anterior foot Other (comment)    Wound - Properties Group Date first assessed: 01/28/20 -KK Time first assessed: 0500  -KK Present on Hospital Admission: Y  -KK Side: Left  -KK Orientation: anterior  -KK Location: foot  -KK Primary Wound Type: Other  -KK, Lymphedema     Dressing Appearance  intact;no drainage  -AH  intact;no drainage  -AH     Closure  NELY  -AH  NELY  -AH     Drainage Characteristics/Odor  yellow;clear  -AH  yellow;clear  -AH     Drainage Amount  none  -AH  none  -AH     Dressing Care, Wound  --  dressing reinforced;gauze;abdominal pad maxorb  -AH     Periwound Care, Wound  --  moisturizer applied  -AH       User Key  (r) = Recorded By, (t) = Taken By, (c) = Cosigned By    Initials Name Provider Type    Lina Collins, RN Registered Nurse    Adrienne Ash RN Registered Nurse    Vandana Lazaro RN Registered Nurse    Bess Keller LPN  Licensed Nurse          Procedures:              Results Review:     I reviewed the patient's new clinical results.      Lab Results (last 24 hours)     Procedure Component Value Units Date/Time    CBC & Differential [[id number]] Collected:  02/01/20 0502    Specimen:  Blood Updated:  02/01/20 0514    Narrative:       The following orders were created for panel order CBC & Differential.  Procedure                               Abnormality         Status                     ---------                               -----------         ------                     CBC Auto Differential[[id number]]        Abnormal            Final result                 Please view results for these tests on the individual orders.    CBC Auto Differential [[id number]]  (Abnormal) Collected:  02/01/20 0502    Specimen:  Blood Updated:  02/01/20 0514     WBC 5.80 10*3/mm3      RBC 2.99 10*6/mm3      Hemoglobin 9.6 g/dL      Hematocrit 28.9 %      MCV 96.8 fL      MCH 32.3 pg      MCHC 33.3 g/dL      RDW 17.8 %      RDW-SD 62.1 fl      MPV 8.5 fL      Platelets 92 10*3/mm3      Neutrophil % 79.4 %      Lymphocyte % 8.3 %      Monocyte % 7.9 %      Eosinophil % 3.2 %      Basophil % 1.2 %      Neutrophils, Absolute 4.60 10*3/mm3      Lymphocytes, Absolute 0.50 10*3/mm3      Monocytes, Absolute 0.50 10*3/mm3      Eosinophils, Absolute 0.20 10*3/mm3      Basophils, Absolute 0.10 10*3/mm3      nRBC 0.0 /100 WBC     Basic Metabolic Panel [[id number]]  (Abnormal) Collected:  02/01/20 0244    Specimen:  Blood Updated:  02/01/20 0505     Glucose 86 mg/dL      BUN 56 mg/dL      Creatinine 1.94 mg/dL      Sodium 136 mmol/L      Potassium 5.1 mmol/L      Chloride 108 mmol/L      CO2 17.0 mmol/L      Calcium 8.3 mg/dL      eGFR Non African Amer 25 mL/min/1.73      BUN/Creatinine Ratio 28.9     Anion Gap 11.0 mmol/L     Narrative:       GFR Normal >60  Chronic Kidney Disease <60  Kidney Failure <15      Vancomycin, Random [[id number]]  (Normal) Collected:   02/01/20 0244    Specimen:  Blood Updated:  02/01/20 0502     Vancomycin Random 8.90 mcg/mL         No results found for: HGBA1C  Results from last 7 days   Lab Units 01/28/20  0110   INR  1.07           Lab Results   Component Value Date    LIPASE 42 01/13/2018     Lab Results   Component Value Date    CHOL 159 01/28/2020    TRIG 62 01/28/2020    HDL 81 (H) 01/28/2020    LDL 66 01/28/2020       No results found for: INTRAOP, PREDX, FINALDX, COMDX    Microbiology Results (last 10 days)     Procedure Component Value - Date/Time    Urine Culture - Urine, Urine, Catheter [[id number]]  (Normal) Collected:  01/28/20 0122    Lab Status:  Final result Specimen:  Urine, Catheter Updated:  01/29/20 1912     Urine Culture No growth          ECG/EMG Results (most recent)     Procedure Component Value Units Date/Time    ECG 12 Lead [[id number]] Collected:  01/28/20 0257     Updated:  01/28/20 0604    Narrative:       HEART RATE= 61  bpm  RR Interval= 991  ms  NH Interval=   ms  P Horizontal Axis= 183  deg  P Front Axis=   deg  QRSD Interval= 184  ms  QT Interval= 484  ms  QRS Axis= -68  deg  T Wave Axis= 113  deg  - ABNORMAL ECG -  Afib/flutter and ventricular-paced rhythm  When compared with ECG of 16-Apr-2019 22:53:12,  No significant change  Electronically Signed By: Kostas Dominguez (Pop) 28-Jan-2020 06:04:33  Date and Time of Study: 2020-01-28 02:57:18    EP/CRM Study [[id number]] Resulted:  01/31/20 1517     Updated:  01/31/20 1522    Narrative:       Date of procedure 1/31/2020    Procedure performed  Temporary pacemaker through right groin (femoral vein).  ICD pulse generator removal and replacement (single-chamber Medtronic).  Invasive interrogation of single-chamber ICD  Conscious sedation.  Fluoroscopy    Indications  Patient had ICD single-chamber placement in 2012.  Device has reached JOHN.  Patient is pacemaker dependent.      Procedure  Temporary pacemaker insertion.  Under usual sterile precautions and 1% Xylocaine  infiltration right   femoral vein was percutaneously punctured and a 7 Italian vascular sheath   was placed in the right femoral vein.  A 5 Italian balloontipped temporary   pacemaker was advanced and was placed at the right ventricular apex and   satisfactory pacing was established.    Under usual sterile precautions and 1% Xylocaine infiltration and incision   was made over the previous scar over the ICD pulse generator and the   pocket was opened up carefully avoiding any trauma to the leads.  The skin   is extremely thin.  The dilator was removed from the pocket and    from the lead with temporary pacemaker backup.  The pocket was revised   superiorly to place the new pulse generator.  The new pulse generator   Medtronic model number D VF BI D4 serial number [id number] was connected   to the lead and was placed in the previously revised subcutaneous pocket.    Pocket was irrigated with bacitracin solution.  Arixtra was placed in the   pocket.  The skin was closed in a subcuticular fashion using 3-0 and 4-0   Vicryl sutures.  Steri-Strips and pressure bandage were placed.  Please   note the skin is extremely thin and difficult to put it together.  Patient   tolerated the procedure well.  No complications were noted.    ECG 12 Lead [[id number]] Collected:  01/31/20 1600     Updated:  02/01/20 1008    Narrative:       HEART RATE= 60  bpm  RR Interval= 1001  ms  DC Interval=   ms  P Horizontal Axis= 122  deg  P Front Axis=   deg  QRSD Interval= 167  ms  QT Interval= 456  ms  QRS Axis= -70  deg  T Wave Axis= 111  deg  - ABNORMAL ECG -  Afib/flutter and ventricular-paced rhythm  When compared with ECG of 28-Jan-2020 2:57:18,  No significant change  Electronically Signed By: Dionna Laird (SANDEEP) 01-Feb-2020 10:08:35  Date and Time of Study: 2020-01-31 16:00:38          Results for orders placed during the hospital encounter of 08/08/19   Venous w Reflux Lower Extremity - Bilateral CAR    Narrative · The  patient is in a reverse Trendelenburg position.  · There is evidence of significant reflux of the right common femoral   vein. There is evidence of significant reflux of the right mid femoral   vein. There is evidence of severe reflux of the right greater saphenous   vein below the knee. There is evidence of severe reflux of the right small   saphenous vein.  · There is evidence of mild reflux of the left greater saphenous vein at   the distal thigh.               Ct Head Without Contrast    Result Date: 1/28/2020  1. No fracture or intracranial hemorrhage. 2. Mild chronic age-related intracranial findings as above.  This examination was interpreted by Marcus Linares M.D. Electronically signed by:  Marcus Linares M.D.  1/28/2020 12:08 AM    Xr Chest 1 View    Result Date: 1/28/2020   1. Increasing density in the left lung base felt to represent atelectasis and/or early pneumonia, follow-up in a couple days is recommended 2. Left-sided pacemaker defibrillator appears unchanged 3. No evidence of pleural effusion 4. Mild interval worsening from 12/14/2018  Electronically Signed By-Austin Nunez Jr. On:1/28/2020 7:14 AM This report was finalized on [id number] by  Austin Nunez Jr., .          Xrays, labs reviewed personally by physician.    Medication Review:   I have reviewed the patient's current medication list      Scheduled Meds    aspirin 81 mg Oral Daily   cefdinir 300 mg Oral Q24H   colchicine 0.6 mg Oral Daily   digoxin 125 mcg Oral Daily   enoxaparin 30 mg Subcutaneous Q24H   febuxostat 40 mg Oral BID   gabapentin 100 mg Oral TID   sodium chloride 10 mL Intravenous Q12H   sodium chloride 3 mL Intravenous Q12H   zinc oxide  Topical Q12H       Meds Infusions       Meds PRN  •  acetaminophen **OR** acetaminophen **OR** acetaminophen  •  aluminum-magnesium hydroxide-simethicone  •  bisacodyl  •  magnesium hydroxide  •  melatonin  •  ondansetron **OR** ondansetron  •  sodium chloride  •  sodium  chloride      Assessment/Plan   Assessment/Plan     Active Hospital Problems    Diagnosis  POA   • **Urinary tract infection [N39.0]  Yes   • Syncope and collapse [R55]  Yes   • Lymphedema of both lower extremities [I89.0]  Yes   • Presence of cardiac pacemaker [Z95.0]  Yes   • Delirium [R41.0]  Yes   • Non-pressure chronic ulcer right ankle, limited to breakdown skin (CMS/HCC) [L97.311]  Yes   • Non-pressure chronic ulcer left lower leg, limited to breakdown skin (CMS/HCC) [L97.921]  Unknown   • Presence of biventricular implantable cardioverter-defibrillator (ICD) [Z95.810]  Unknown   • Chronic low back pain [M54.5, G89.29]  Yes   • Gout [M10.9]  Yes   • Hypertension, benign [I10]  Yes   • Congestive heart failure (CMS/HCC) [I50.9]  Yes   • Atrial fibrillation (CMS/HCC) [I48.91]  Yes      Resolved Hospital Problems   No resolved problems to display.       MEDICAL DECISION MAKING COMPLEXITY BY PROBLEM:     Urinary tract infection  -Continue Rocephin  - follow urine culture  - ID on board .... Will follow there recommendation for antibiotics.      Altered mental status / metabolic encephalopathy improved now.   -Most likely due to UTI     -Syncope l  -Multiple episodes of waking up on the floor  -To be secondary to UTI and AMS.  -Pacemaker interrogated by ED  -Pacemaker needs battery to be replaced in the next 2 to 3 days  -Cardiology on board, Plan for ICD generator change today.      Congestive heart failure stable.  -BNP 13,384, CXR cardiomegaly.  Likely due to CONG  -Patient is on p.o. Bumex 1 mg twice daily  -Elevated creatinine 2.31     Elevated troponin  -Initial troponin 0.029, most likely due to CNOG  -Serial troponins     CONG on CKD stage 3 improved.   -BUN 59 creatinine 2.31  -Baseline creatinine appears to be 1.8-2.0  - d/c iv fluids in order to avoid fluid overload.      Chronic lymphedema and cellulitis bilateral lower extremities  -Patient has been current with home health. .  -Pt recently finished  Keflex  -Wound nurse to see  - Discussed with Dr. Negrete today, advised to involve the podiatry service for further care.      Atrial fibrillation  -Continue digoxin and Lopressor     Gout  -Continue colchicine and Uloric     Chronic pain  Continue Neurontin (INSPECT reviewed)                 VTE Prophylaxis - Lovenox 30 mg SC daily.         VTE Prophylaxis - SCDs.      Code Status -   Code Status and Medical Interventions:   Ordered at: 01/28/20 0547     Limited Support to NOT Include:    Intubation     Level Of Support Discussed With:    Patient     Code Status:    No CPR     Medical Interventions (Level of Support Prior to Arrest):    Limited       Discharge Planning          Destination      Coordination has not been started for this encounter.      Durable Medical Equipment      Coordination has not been started for this encounter.      Dialysis/Infusion      Coordination has not been started for this encounter.      Home Medical Care - Selection Complete      Service Provider Request Status Selected Services Address Phone Number Fax Number    [facility] Selected Home Health Services [address] [facility] [place] [id number] [phone]       Manisha Calderno RN 1/28/2020 1229    Oservation Admission                 Therapy      Coordination has not been started for this encounter.      Community Resources      Coordination has not been started for this encounter.            Electronically signed by Matias Puri MD, 02/01/20, 4:42 PM.  [facility]ist Team       74

## 2020-02-01 NOTE — PROGRESS NOTES
Cardiology Progress Note      Admiting Physician:  Matias Puri MD   LOS: 1 day       Reason For Followup:  ICD generator change      Subjective:    Interval History:  Seen and examined.  Chart and labs reviewed.  Patient denies chest pain but does report incisional discomfort at the site of her ICD.  Patient is very concerned about bilateral lower extremity wounds.  She reports having heel ulcers.    Review of Systems:  A complete review of systems was undertaken with the pertinent cardiovascular findings listed in history of present illness and all other systems being negative.     Assessment & Plan    Impressions:  Cardiomyopathy  Congestive heart failure chronic systolic  AICD in situ status post pulse generator change this admission  Bilateral heel ulceration/wound  Chronic lymphedema with stasis changes    Recommendations:  Heel ulceration/wounds are concerning given presence of ICD system.  Recommend podiatry evaluation.  Wound care  Antimicrobials as per infectious disease        Objective:    Medication Review:   Scheduled Meds:  aspirin 81 mg Oral Daily   cefdinir 300 mg Oral Q24H   colchicine 0.6 mg Oral Daily   digoxin 125 mcg Oral Daily   enoxaparin 30 mg Subcutaneous Q24H   febuxostat 40 mg Oral BID   gabapentin 100 mg Oral TID   sodium chloride 10 mL Intravenous Q12H   sodium chloride 3 mL Intravenous Q12H   zinc oxide  Topical Q12H     Continuous Infusions:   PRN Meds:.•  acetaminophen **OR** acetaminophen **OR** acetaminophen  •  aluminum-magnesium hydroxide-simethicone  •  bisacodyl  •  magnesium hydroxide  •  melatonin  •  ondansetron **OR** ondansetron  •  sodium chloride  •  sodium chloride    Patient Active Problem List   Diagnosis   • Congestive heart failure (CMS/HCC)   • Chronic low back pain   • Atrial fibrillation (CMS/HCC)   • Hypertension, benign   • Gout   • Urinary tract infection   • Syncope and collapse   • Lymphedema of both lower extremities   • Presence of cardiac  pacemaker   • Delirium   • Non-pressure chronic ulcer right ankle, limited to breakdown skin (CMS/HCC)   • Non-pressure chronic ulcer left lower leg, limited to breakdown skin (CMS/HCC)   • Presence of biventricular implantable cardioverter-defibrillator (ICD)         Physical Exam:    General: Alert, cooperative, no distress, appears stated age  Head:  Normocephalic, atraumatic, mucous membranes moist  Eyes:  Conjunctiva/corneas clear, EOM's intact     Neck:  Supple,  no bruit  Lungs: Clear to auscultation bilaterally, no wheezes rhonchi rales are noted  Chest wall: No tenderness  Heart::  Regular rate and rhythm, S1 and S2 normal, 2/6 systolic ejection murmur.  No rub or gallop  Abdomen: Soft, non-tender, nondistended bowel sounds active  Extremities: No cyanosis, clubbing, 2+ edema  Pulses: Pulses difficult to palpate distally in lower extremities due to bandages  Skin:  Bandages to bilateral lower extremities with reported bilateral heel ulcerations stasis changes to skin noted  Neuro/psych: A&O x3. CN II through XII are grossly intact with appropriate affect    Vital Signs:  Vitals:    01/31/20 1630 01/31/20 1908 02/01/20 0409 02/01/20 1100   BP: 156/56 149/65 166/70 153/70   BP Location:  Right arm Right arm Right arm   Patient Position:  Lying Lying Lying   Pulse: 60 61 59 60   Resp:  17 18 16   Temp:  98.2 °F (36.8 °C) 97.9 °F (36.6 °C) 98.3 °F (36.8 °C)   TempSrc:  Oral Oral Oral   SpO2: 99% 100% 98% 98%   Weight:       Height:         Wt Readings from Last 1 Encounters:   01/30/20 84.1 kg (185 lb 6.5 oz)       Intake/Output Summary (Last 24 hours) at 2/1/2020 1442  Last data filed at 2/1/2020 0910  Gross per 24 hour   Intake 360 ml   Output 500 ml   Net -140 ml         Results Review:     CBC    Results from last 7 days   Lab Units 02/01/20  0502 01/31/20  0251 01/30/20  0544 01/29/20  0817 01/28/20  0110   WBC 10*3/mm3 5.80 5.80 5.60 6.20 6.70   HEMOGLOBIN g/dL 9.6* 9.3* 9.0* 9.5* 10.7*   PLATELETS  10*3/mm3 92* 110* 136* 103* 622*     Cr Clearance Estimated Creatinine Clearance: 25.1 mL/min (A) (by C-G formula based on SCr of 1.94 mg/dL (H)).  Coag   Results from last 7 days   Lab Units 01/28/20  0110   INR  1.07   APTT seconds 22.3*     HbA1C   Lab Results   Component Value Date    HGBA1C 4.3 01/28/2020     Blood Glucose No results found for: POCGLU  Infection Results from last 7 days   Lab Units 01/28/20  0122   URINECX  No growth     CMP Results from last 7 days   Lab Units 02/01/20  0244 01/31/20  0251 01/30/20  0543 01/29/20  0357 01/28/20  0618 01/28/20  0401 01/28/20  0110   SODIUM mmol/L 136 136 137 136  --  137 136   POTASSIUM mmol/L 5.1 5.4* 5.4* 4.8  --  4.7 5.0   CHLORIDE mmol/L 108* 109* 110* 110*  --  109* 106   CO2 mmol/L 17.0* 17.0* 18.0* 15.0*  --  17.0* 18.0*   BUN mg/dL 56* 59* 63* 69*  --  67* 59*   CREATININE mg/dL 1.94* 2.05* 2.16* 1.99*  --  2.23* 2.31*   GLUCOSE mg/dL 86 90 82 93  --  82 82   ALBUMIN g/dL  --   --   --   --   --   --  3.10*   BILIRUBIN mg/dL  --   --   --   --   --   --  0.7   ALK PHOS U/L  --   --   --   --   --   --  142*   AST (SGOT) U/L  --   --   --   --   --   --  37*   ALT (SGPT) U/L  --   --   --   --   --   --  14   AMMONIA umol/L  --   --   --   --  49  --   --      ABG      UA  Results from last 7 days   Lab Units 01/28/20  0122   NITRITE UA  Negative   WBC UA /HPF 21-30*   BACTERIA UA /HPF 2+*   SQUAM EPITHEL UA /HPF 3-6*   URINECX  No growth     AYLEEN  No results found for: POCMETH, POCAMPHET, POCBARBITUR, POCBENZO, POCCOCAINE, POCOPIATES, POCOXYCODO, POCPHENCYC, POCPROPOXY, POCTHC, POCTRICYC  Lysis Labs Results from last 7 days   Lab Units 02/01/20  0502 02/01/20  0244 01/31/20  0251 01/30/20  0544 01/30/20  0543 01/29/20  0817 01/29/20  0357 01/28/20  0401 01/28/20  0110   INR   --   --   --   --   --   --   --   --  1.07   APTT seconds  --   --   --   --   --   --   --   --  22.3*   HEMOGLOBIN g/dL 9.6*  --  9.3* 9.0*  --  9.5*  --   --  10.7*   PLATELETS  10*3/mm3 92*  --  110* 136*  --  103*  --   --  622*   CREATININE mg/dL  --  1.94* 2.05*  --  2.16*  --  1.99* 2.23* 2.31*     Radiology(recent) No radiology results for the last day      Results from last 7 days   Lab Units 01/28/20  0806   TROPONIN T ng/mL 0.022       Imaging Results (Last 24 Hours)     ** No results found for the last 24 hours. **          Cardiac Studies:  Echo-    Stress Myoview-  Cath-        Bert Negrete DO  02/01/20  2:42 PM

## 2020-02-02 LAB
ANION GAP SERPL CALCULATED.3IONS-SCNC: 8 MMOL/L (ref 5–15)
BASOPHILS # BLD AUTO: 0.1 10*3/MM3 (ref 0–0.2)
BASOPHILS NFR BLD AUTO: 0.9 % (ref 0–1.5)
BUN BLD-MCNC: 55 MG/DL (ref 8–23)
BUN/CREAT SERPL: 28.9 (ref 7–25)
CALCIUM SPEC-SCNC: 8.4 MG/DL (ref 8.6–10.5)
CHLORIDE SERPL-SCNC: 111 MMOL/L (ref 98–107)
CO2 SERPL-SCNC: 20 MMOL/L (ref 22–29)
CREAT BLD-MCNC: 1.9 MG/DL (ref 0.57–1)
DEPRECATED RDW RBC AUTO: 59.9 FL (ref 37–54)
EOSINOPHIL # BLD AUTO: 0.2 10*3/MM3 (ref 0–0.4)
EOSINOPHIL NFR BLD AUTO: 3.4 % (ref 0.3–6.2)
ERYTHROCYTE [DISTWIDTH] IN BLOOD BY AUTOMATED COUNT: 17.6 % (ref 12.3–15.4)
GFR SERPL CREATININE-BSD FRML MDRD: 26 ML/MIN/1.73
GLUCOSE BLD-MCNC: 91 MG/DL (ref 65–99)
HCT VFR BLD AUTO: 28.6 % (ref 34–46.6)
HGB BLD-MCNC: 9.2 G/DL (ref 12–15.9)
LYMPHOCYTES # BLD AUTO: 0.6 10*3/MM3 (ref 0.7–3.1)
LYMPHOCYTES NFR BLD AUTO: 11 % (ref 19.6–45.3)
MCH RBC QN AUTO: 30.8 PG (ref 26.6–33)
MCHC RBC AUTO-ENTMCNC: 32.1 G/DL (ref 31.5–35.7)
MCV RBC AUTO: 96.1 FL (ref 79–97)
MONOCYTES # BLD AUTO: 0.7 10*3/MM3 (ref 0.1–0.9)
MONOCYTES NFR BLD AUTO: 12 % (ref 5–12)
NEUTROPHILS # BLD AUTO: 4.2 10*3/MM3 (ref 1.7–7)
NEUTROPHILS NFR BLD AUTO: 72.7 % (ref 42.7–76)
NRBC BLD AUTO-RTO: 0.1 /100 WBC (ref 0–0.2)
PLATELET # BLD AUTO: 61 10*3/MM3 (ref 140–450)
PMV BLD AUTO: 7.4 FL (ref 6–12)
POTASSIUM BLD-SCNC: 5.4 MMOL/L (ref 3.5–5.2)
RBC # BLD AUTO: 2.97 10*6/MM3 (ref 3.77–5.28)
SODIUM BLD-SCNC: 139 MMOL/L (ref 136–145)
WBC NRBC COR # BLD: 5.8 10*3/MM3 (ref 3.4–10.8)

## 2020-02-02 PROCEDURE — 80048 BASIC METABOLIC PNL TOTAL CA: CPT | Performed by: INTERNAL MEDICINE

## 2020-02-02 PROCEDURE — 99232 SBSQ HOSP IP/OBS MODERATE 35: CPT | Performed by: HOSPITALIST

## 2020-02-02 PROCEDURE — 85025 COMPLETE CBC W/AUTO DIFF WBC: CPT | Performed by: INTERNAL MEDICINE

## 2020-02-02 PROCEDURE — 99221 1ST HOSP IP/OBS SF/LOW 40: CPT | Performed by: PODIATRIST

## 2020-02-02 PROCEDURE — 99232 SBSQ HOSP IP/OBS MODERATE 35: CPT | Performed by: INTERNAL MEDICINE

## 2020-02-02 PROCEDURE — 25010000002 ENOXAPARIN PER 10 MG: Performed by: INTERNAL MEDICINE

## 2020-02-02 RX ADMIN — ASPIRIN 81 MG 81 MG: 81 TABLET ORAL at 09:00

## 2020-02-02 RX ADMIN — Medication 10 ML: at 08:58

## 2020-02-02 RX ADMIN — COLCHICINE 0.6 MG: 0.6 TABLET, FILM COATED ORAL at 08:58

## 2020-02-02 RX ADMIN — ZINC OXIDE: 200 OINTMENT TOPICAL at 08:59

## 2020-02-02 RX ADMIN — CEFDINIR 300 MG: 300 CAPSULE ORAL at 08:58

## 2020-02-02 RX ADMIN — DIGOXIN 125 MCG: 0.12 TABLET ORAL at 14:15

## 2020-02-02 RX ADMIN — FEBUXOSTAT 40 MG: 40 TABLET ORAL at 08:58

## 2020-02-02 RX ADMIN — GABAPENTIN 100 MG: 100 CAPSULE ORAL at 17:49

## 2020-02-02 RX ADMIN — ACETAMINOPHEN 650 MG: 325 TABLET ORAL at 22:00

## 2020-02-02 RX ADMIN — FEBUXOSTAT 40 MG: 40 TABLET ORAL at 21:51

## 2020-02-02 RX ADMIN — GABAPENTIN 100 MG: 100 CAPSULE ORAL at 08:58

## 2020-02-02 RX ADMIN — Medication 10 ML: at 21:51

## 2020-02-02 RX ADMIN — GABAPENTIN 100 MG: 100 CAPSULE ORAL at 21:51

## 2020-02-02 RX ADMIN — Medication 3 ML: at 09:01

## 2020-02-02 RX ADMIN — ENOXAPARIN SODIUM 30 MG: 30 INJECTION SUBCUTANEOUS at 17:49

## 2020-02-02 NOTE — PROGRESS NOTES
Discharge Planning Assessment  [facility]     Patient Name: Emperatriz Childs  MRN: [id number]  Today's Date: 2/2/2020    Admit Date: 1/28/2020      Discharge Plan     Row Name 02/02/20 1616       Plan    Plan  [facility] will [facility] a bed Monday. Angely verifying if Pt can flr to Snf. PASRR approved. No precert needed if flr to snf.    Plan Comments  Pt is recently . Her last changed from [place] to [facility]. MSW updated PASRR. PASRR now approved. MSW informed Liaison. [facility]. [facility] is verifying if Pt can transfer Monday (if medically stable) with [place] floor to snf.  MSW left a message for Pt's daughter-Eliana informing of above information.        Destination      Service Provider Request Status Selected Services Address Phone Number Fax Number    [facility] Pending - Request Sent N/A [address] [place] [address] [phone]    [facility] Pending - Request Sent N/A [address] [place] [address] [phone]       Shahana Bateman  [facility]  Weekend   Care Management Dept  Cell [phone]  Weekday Department [phone]

## 2020-02-02 NOTE — PROGRESS NOTES
"      [facility] Medicine Services Daily Progress Note      Hospitalist Team  LOS 2 days      Patient Care Team:  Rosa M Chavez APRN as PCP - General (Nurse Practitioner)    Patient Location: 242/1      Subjective   Subjective   Patient noted to have legs covered with dressing, denies for any other active complaint, no nausea or vomiting.     Chief Complaint / Subjective  Chief Complaint   Patient presents with   • Hallucinations         Brief Synopsis of Hospital Course/HPI    Ms. Childs is a 74 y.o. occasion female with a history of CHF, bilateral lymphedema and cellulitis, diabetes type 2, gout, presented to the [facility] ED on 1/28/2020 with a complaint of visual hallucinations.  Patient states she has been seeing people that are dead such as her  and  little children.  Patient states she knows that they are a hallucination.  Patient is alert and oriented x4.  Patient also has bilateral lymphedema and cellulitis to her lower bilateral lower extremities.  Patient was a patient of the [facility] wound care center, but is now current with home health.      In the ED, CT head negative for acute disease.  UA: 2+ leukocytes, WBC 21-30, 2+ bacteria, BUN 59, CR 2.381, alkaline phosphatase 142, WBC 6.7, Hgb 10.7, HCT 31.9, platelets 622.  Patient was given 1 g Rocephin in the ED patient to be admitted for further work-up and evaluation.            Date::          ROS .      Objective   Objective      Vital Signs  Temp:  [97.7 °F (36.5 °C)-98.1 °F (36.7 °C)] 98.1 °F (36.7 °C)  Heart Rate:  [59-60] 60  Resp:  [16-18] 16  BP: (152-168)/(71-76) 161/76  Oxygen Therapy  SpO2: 100 %  Pulse Oximetry Type: Intermittent  Device (Oxygen Therapy): room air  Flowsheet Rows      First Filed Value   Admission Height  157.5 cm (62\") Documented at 01/28/2020 0037   Admission Weight  64.4 kg (142 lb) Documented at 01/28/2020 0037        Intake & Output (last 3 days)       01/30 0701 - 01/31 " 0700 01/31 0701 - 02/01 0700 02/01 0701 - 02/02 0700 02/02 0701 - 02/03 0700    P.O. 600 360 600 924    IV Piggyback        Total Intake(mL/kg) 600 (7.1) 360 (4.3) 600 (7.2) 924 (11)    Urine (mL/kg/hr) 800 (0.4) 400 (0.2) 900 (0.4) 200 (0.3)    Stool 0 0  1    Total Output 800 400 900 201    Net -200 -40 -300 +723            Urine Unmeasured Occurrence 1 x       Stool Unmeasured Occurrence 1 x  1 x         Lines, Drains & Airways    Active LDAs     Name:   Placement date:   Placement time:   Site:   Days:    Peripheral IV 01/28/20 1958 Anterior;Distal;Right Wrist   01/28/20 1958    Wrist   less than 1                  Physical Exam:    Physical Exam   Constitutional: She is oriented to person, place, and time. She appears well-developed and well-nourished. No distress.   HENT:   Head: Normocephalic and atraumatic.   Right Ear: External ear normal.   Left Ear: External ear normal.   Nose: Nose normal.   Mouth/Throat: Oropharynx is clear and moist. No oropharyngeal exudate.   Eyes: Pupils are equal, round, and reactive to light. Conjunctivae and EOM are normal. Right eye exhibits no discharge. Left eye exhibits no discharge. No scleral icterus.   Neck: Normal range of motion. No JVD present. No tracheal deviation present. No thyromegaly present.   Cardiovascular: Normal rate, regular rhythm, normal heart sounds and intact distal pulses. Exam reveals no gallop and no friction rub.   No murmur heard.  Pulmonary/Chest: Effort normal and breath sounds normal. No stridor. No respiratory distress. She has no wheezes. She has no rales. She exhibits no tenderness.   Abdominal: Soft. Bowel sounds are normal. She exhibits no distension and no mass. There is no tenderness. There is no rebound and no guarding. No hernia.   Musculoskeletal: Normal range of motion. She exhibits no edema, tenderness or deformity.   Lymphadenopathy:     She has no cervical adenopathy.   Neurological: She is alert and oriented to person, place,  and time. No cranial nerve deficit or sensory deficit. She exhibits normal muscle tone. Coordination normal.   Skin: Skin is warm and dry. No rash noted. She is not diaphoretic. No erythema.   Psychiatric: She has a normal mood and affect. Her behavior is normal.   Nursing note and vitals reviewed.           Wounds (last 24 hours)      LDA Wound     Row Name 01/29/20 1100 01/29/20 0900 01/29/20 0730       Wound 01/28/20 0055 Left arm Skin Tear    Wound - Properties Group Date first assessed: 01/28/20  -NB Time first assessed: 0055  -NB Side: Left  -NB Location: arm  -NB Primary Wound Type: Skin tear  -NB    Dressing Appearance  dry;intact;no drainage  -HB  dry;intact;no drainage  -HB  dry;intact;no drainage  -HB    Closure  Adhesive bandage;Other (Comment) Mepilex   -HB  Adhesive bandage;Other (Comment) Mepilex   -HB  Adhesive bandage;Other (Comment) Mepilex   -HB    Base  dry;clean;scab  -HB  dry;clean;scab  -HB  dry;clean;scab  -HB    Periwound  dry;intact  -HB  dry;intact  -HB  dry;intact  -HB    Periwound Temperature  warm  -HB  warm  -HB  warm  -HB    Periwound Skin Turgor  soft  -HB  soft  -HB  soft  -HB    Drainage Amount  none  -HB  none  -HB  none  -HB    Care, Wound  --  --  other (see comments) Mepilex applied   -HB    Dressing Care, Wound  --  --  dressing applied  -HB       Wound 01/28/20 0458 Right anterior foot Other (comment)    Wound - Properties Group Date first assessed: 01/28/20  -KK Time first assessed: 0458 -KK Present on Hospital Admission: Y  -KK Side: Right  -KK Orientation: anterior  -KK Location: foot  -KK Primary Wound Type: Other  -KK, Lymphedema      Dressing Appearance  moist drainage  -HB  moist drainage  -HB  moist drainage  -HB    Closure  NELY  -HB  NELY  -HB  NELY  -HB    Base  red;dry;scab  -HB  red;dry;scab  -HB  red;dry;scab  -HB    Periwound  swelling;warm;other (see comments) dry and flaky   -HB  swelling;warm;other (see comments) dry and flaky   -HB  swelling;warm;other (see  comments) dry and flaky   -HB    Periwound Temperature  warm  -HB  warm  -HB  warm  -HB    Periwound Skin Turgor  firm  -HB  firm  -HB  firm  -HB    Drainage Characteristics/Odor  serosanguineous  -HB  serosanguineous  -HB  serosanguineous  -HB    Drainage Amount  small  -HB  small  -HB  small  -HB    Care, Wound  --  --  cleansed with;antimicrobial agent applied;soap and water;wound cleanser;other (see comments) skin repair cream   -HB    Dressing Care, Wound  --  --  other (see comments) maxorb/abdomenal pad   -HB    Periwound Care, Wound  --  --  topical treatment applied  -HB       Wound 01/28/20 0500 Left anterior foot Other (comment)    Wound - Properties Group Date first assessed: 01/28/20  -KK Time first assessed: 0500  -KK Present on Hospital Admission: Y  -KK Side: Left  -KK Orientation: anterior  -KK Location: foot  -KK Primary Wound Type: Other  -KK, Lymphedema     Dressing Appearance  dry;moist drainage  -HB  dry;moist drainage  -HB  dry;moist drainage  -HB    Closure  NELY  -HB  NELY  -HB  NELY  -HB    Base  red;moist;dry;scab  -HB  red;moist;dry;scab  -HB  red;moist;dry;scab  -HB    Periwound  redness;dry;edematous  -HB  redness;dry;edematous  -HB  redness;dry;edematous  -HB    Periwound Temperature  warm  -HB  warm  -HB  warm  -HB    Periwound Skin Turgor  firm  -HB  firm  -HB  firm  -HB    Drainage Characteristics/Odor  serosanguineous  -HB  serosanguineous  -HB  serosanguineous  -HB    Drainage Amount  small  -HB  small  -HB  small  -HB    Care, Wound  --  --  cleansed with;antimicrobial agent applied;barrier applied  -HB    Dressing Care, Wound  --  --  dressing applied;abdominal pad;other (see comments) kerlix   -HB    Row Name 01/28/20 2306 01/28/20 1905          Wound 01/28/20 0055 Left arm Skin Tear    Wound - Properties Group Date first assessed: 01/28/20  -NB Time first assessed: 0055 -NB Side: Left  -NB Location: arm  -NB Primary Wound Type: Skin tear  -NB    Dressing Appearance   dry;intact;no drainage  -AH  dry;intact;no drainage  -AH     Closure  None  -AH  None  -AH     Base  dry;clean;scab;black eschar  -AH  dry;clean;scab;black eschar  -AH     Periwound  dry;intact  -AH  dry;intact  -AH     Periwound Temperature  warm  -AH  warm  -AH     Periwound Skin Turgor  soft  -AH  soft  -AH     Drainage Amount  none  -AH  none  -AH        Wound 01/28/20 0458 Right anterior foot Other (comment)    Wound - Properties Group Date first assessed: 01/28/20 -KK Time first assessed: 0458  -KK Present on Hospital Admission: Y  -KK Side: Right  -KK Orientation: anterior  -KK Location: foot  -KK Primary Wound Type: Other  -KK, Lymphedema      Dressing Appearance  intact;no drainage  -AH  intact;no drainage  -AH     Closure  NELY  -AH  NELY  -AH     Drainage Characteristics/Odor  yellow;clear  -AH  yellow;clear  -AH     Drainage Amount  small  -AH  small  -AH     Dressing Care, Wound  --  dressing reinforced;abdominal pad;gauze, dry maxorb  -AH     Periwound Care, Wound  --  moisturizer applied  -AH        Wound 01/28/20 0500 Left anterior foot Other (comment)    Wound - Properties Group Date first assessed: 01/28/20 -KK Time first assessed: 0500  -KK Present on Hospital Admission: Y  -KK Side: Left  -KK Orientation: anterior  -KK Location: foot  -KK Primary Wound Type: Other  -KK, Lymphedema     Dressing Appearance  intact;no drainage  -AH  intact;no drainage  -AH     Closure  NELY  -AH  NELY  -AH     Drainage Characteristics/Odor  yellow;clear  -AH  yellow;clear  -AH     Drainage Amount  none  -AH  none  -AH     Dressing Care, Wound  --  dressing reinforced;gauze;abdominal pad maxorb  -AH     Periwound Care, Wound  --  moisturizer applied  -AH       User Key  (r) = Recorded By, (t) = Taken By, (c) = Cosigned By    Initials Name Provider Type    Lina Collins, RN Registered Nurse    Adrienne Ash RN Registered Nurse    Vandana Lazaro RN Registered Nurse    Bess Keller LPN Licensed Nurse           Procedures:              Results Review:     I reviewed the patient's new clinical results.      Lab Results (last 24 hours)     Procedure Component Value Units Date/Time    Basic Metabolic Panel [[id number]]  (Abnormal) Collected:  02/02/20 0343    Specimen:  Blood Updated:  02/02/20 0416     Glucose 91 mg/dL      BUN 55 mg/dL      Creatinine 1.90 mg/dL      Sodium 139 mmol/L      Potassium 5.4 mmol/L      Chloride 111 mmol/L      CO2 20.0 mmol/L      Calcium 8.4 mg/dL      eGFR Non African Amer 26 mL/min/1.73      BUN/Creatinine Ratio 28.9     Anion Gap 8.0 mmol/L     Narrative:       GFR Normal >60  Chronic Kidney Disease <60  Kidney Failure <15      CBC & Differential [[id number]] Collected:  02/02/20 0343    Specimen:  Blood Updated:  02/02/20 0402    Narrative:       The following orders were created for panel order CBC & Differential.  Procedure                               Abnormality         Status                     ---------                               -----------         ------                     CBC Auto Differential[[id number]]        Abnormal            Final result                 Please view results for these tests on the individual orders.    CBC Auto Differential [[id number]]  (Abnormal) Collected:  02/02/20 0343    Specimen:  Blood Updated:  02/02/20 0402     WBC 5.80 10*3/mm3      RBC 2.97 10*6/mm3      Hemoglobin 9.2 g/dL      Hematocrit 28.6 %      MCV 96.1 fL      MCH 30.8 pg      MCHC 32.1 g/dL      RDW 17.6 %      RDW-SD 59.9 fl      MPV 7.4 fL      Platelets 61 10*3/mm3      Neutrophil % 72.7 %      Lymphocyte % 11.0 %      Monocyte % 12.0 %      Eosinophil % 3.4 %      Basophil % 0.9 %      Neutrophils, Absolute 4.20 10*3/mm3      Lymphocytes, Absolute 0.60 10*3/mm3      Monocytes, Absolute 0.70 10*3/mm3      Eosinophils, Absolute 0.20 10*3/mm3      Basophils, Absolute 0.10 10*3/mm3      nRBC 0.1 /100 WBC         No results found for: HGBA1C  Results from last 7 days   Lab Units  01/28/20  0110   INR  1.07           Lab Results   Component Value Date    LIPASE 42 01/13/2018     Lab Results   Component Value Date    CHOL 159 01/28/2020    TRIG 62 01/28/2020    HDL 81 (H) 01/28/2020    LDL 66 01/28/2020       No results found for: INTRAOP, PREDX, FINALDX, COMDX    Microbiology Results (last 10 days)     Procedure Component Value - Date/Time    Urine Culture - Urine, Urine, Catheter [[id number]]  (Normal) Collected:  01/28/20 0122    Lab Status:  Final result Specimen:  Urine, Catheter Updated:  01/29/20 1912     Urine Culture No growth          ECG/EMG Results (most recent)     Procedure Component Value Units Date/Time    ECG 12 Lead [[id number]] Collected:  01/28/20 0257     Updated:  01/28/20 0604    Narrative:       HEART RATE= 61  bpm  RR Interval= 991  ms  MN Interval=   ms  P Horizontal Axis= 183  deg  P Front Axis=   deg  QRSD Interval= 184  ms  QT Interval= 484  ms  QRS Axis= -68  deg  T Wave Axis= 113  deg  - ABNORMAL ECG -  Afib/flutter and ventricular-paced rhythm  When compared with ECG of 16-Apr-2019 22:53:12,  No significant change  Electronically Signed By: Kostas Dominguez (Pop) 28-Jan-2020 06:04:33  Date and Time of Study: 2020-01-28 02:57:18    EP/CRM Study [[id number]] Resulted:  01/31/20 1517     Updated:  01/31/20 1522    Narrative:       Date of procedure 1/31/2020    Procedure performed  Temporary pacemaker through right groin (femoral vein).  ICD pulse generator removal and replacement (single-chamber Medtronic).  Invasive interrogation of single-chamber ICD  Conscious sedation.  Fluoroscopy    Indications  Patient had ICD single-chamber placement in 2012.  Device has reached JOHN.  Patient is pacemaker dependent.      Procedure  Temporary pacemaker insertion.  Under usual sterile precautions and 1% Xylocaine infiltration right   femoral vein was percutaneously punctured and a 7 Ethiopian vascular sheath   was placed in the right femoral vein.  A 5 Ethiopian balloontipped temporary    pacemaker was advanced and was placed at the right ventricular apex and   satisfactory pacing was established.    Under usual sterile precautions and 1% Xylocaine infiltration and incision   was made over the previous scar over the ICD pulse generator and the   pocket was opened up carefully avoiding any trauma to the leads.  The skin   is extremely thin.  The dilator was removed from the pocket and    from the lead with temporary pacemaker backup.  The pocket was revised   superiorly to place the new pulse generator.  The new pulse generator   Medtronic model number D VF BI D4 serial number [id number] was connected   to the lead and was placed in the previously revised subcutaneous pocket.    Pocket was irrigated with bacitracin solution.  Arixtra was placed in the   pocket.  The skin was closed in a subcuticular fashion using 3-0 and 4-0   Vicryl sutures.  Steri-Strips and pressure bandage were placed.  Please   note the skin is extremely thin and difficult to put it together.  Patient   tolerated the procedure well.  No complications were noted.    ECG 12 Lead [[id number]] Collected:  01/31/20 1600     Updated:  02/01/20 1008    Narrative:       HEART RATE= 60  bpm  RR Interval= 1001  ms  MD Interval=   ms  P Horizontal Axis= 122  deg  P Front Axis=   deg  QRSD Interval= 167  ms  QT Interval= 456  ms  QRS Axis= -70  deg  T Wave Axis= 111  deg  - ABNORMAL ECG -  Afib/flutter and ventricular-paced rhythm  When compared with ECG of 28-Jan-2020 2:57:18,  No significant change  Electronically Signed By: Dionna Laird (SANDEEP) 01-Feb-2020 10:08:35  Date and Time of Study: 2020-01-31 16:00:38          Results for orders placed during the hospital encounter of 08/08/19   Venous w Reflux Lower Extremity - Bilateral CAR    Narrative · The patient is in a reverse Trendelenburg position.  · There is evidence of significant reflux of the right common femoral   vein. There is evidence of significant reflux of the  right mid femoral   vein. There is evidence of severe reflux of the right greater saphenous   vein below the knee. There is evidence of severe reflux of the right small   saphenous vein.  · There is evidence of mild reflux of the left greater saphenous vein at   the distal thigh.               Ct Head Without Contrast    Result Date: 1/28/2020  1. No fracture or intracranial hemorrhage. 2. Mild chronic age-related intracranial findings as above.  This examination was interpreted by Marcus Linares M.D. Electronically signed by:  Marcus Linares M.D.  1/28/2020 12:08 AM    Xr Chest 1 View    Result Date: 1/28/2020   1. Increasing density in the left lung base felt to represent atelectasis and/or early pneumonia, follow-up in a couple days is recommended 2. Left-sided pacemaker defibrillator appears unchanged 3. No evidence of pleural effusion 4. Mild interval worsening from 12/14/2018  Electronically Signed By-Austin Nunez Jr. On:1/28/2020 7:14 AM This report was finalized on [id number] by  Austin Nunez Jr., .          Xrays, labs reviewed personally by physician.    Medication Review:   I have reviewed the patient's current medication list      Scheduled Meds    aspirin 81 mg Oral Daily   cefdinir 300 mg Oral Q24H   colchicine 0.6 mg Oral Daily   digoxin 125 mcg Oral Daily   enoxaparin 30 mg Subcutaneous Q24H   febuxostat 40 mg Oral BID   gabapentin 100 mg Oral TID   sodium chloride 10 mL Intravenous Q12H   sodium chloride 3 mL Intravenous Q12H   zinc oxide  Topical Q12H       Meds Infusions       Meds PRN  •  acetaminophen **OR** acetaminophen **OR** acetaminophen  •  aluminum-magnesium hydroxide-simethicone  •  bisacodyl  •  magnesium hydroxide  •  melatonin  •  ondansetron **OR** ondansetron  •  sodium chloride  •  sodium chloride      Assessment/Plan   Assessment/Plan     Active Hospital Problems    Diagnosis  POA   • **Urinary tract infection [N39.0]  Yes   • Syncope and collapse [R55]  Yes   • Lymphedema  of both lower extremities [I89.0]  Yes   • Presence of cardiac pacemaker [Z95.0]  Yes   • Delirium [R41.0]  Yes   • Non-pressure chronic ulcer right ankle, limited to breakdown skin (CMS/HCC) [L97.311]  Yes   • Non-pressure chronic ulcer left lower leg, limited to breakdown skin (CMS/HCC) [L97.921]  Unknown   • Presence of biventricular implantable cardioverter-defibrillator (ICD) [Z95.810]  Unknown   • Chronic low back pain [M54.5, G89.29]  Yes   • Gout [M10.9]  Yes   • Hypertension, benign [I10]  Yes   • Congestive heart failure (CMS/HCC) [I50.9]  Yes   • Atrial fibrillation (CMS/HCC) [I48.91]  Yes      Resolved Hospital Problems   No resolved problems to display.       MEDICAL DECISION MAKING COMPLEXITY BY PROBLEM:     Urinary tract infection  - off rocephin, on oral omnicef now  - ID on board .... Will follow there recommendation for antibiotics.      Altered mental status / metabolic encephalopathy improved now.   -Most likely due to UTI     -Syncope l  -Multiple episodes of waking up on the floor  -To be secondary to UTI and AMS.  -Pacemaker interrogated by ED  - battery changed by cardiology service on this admission  - Discharge after cleared by cardiology service, cardiology service advised podiatry evaluation.    Congestive heart failure stable.  -BNP 13,384, CXR cardiomegaly.  Likely due to CONG  -Patient is on p.o. Bumex 1 mg twice daily  -Elevated creatinine 2.31     Elevated troponin  -Initial troponin 0.029, most likely due to CONG  -Serial troponins     CONG on CKD stage 3 improved.   -BUN 59 creatinine 2.31  -Baseline creatinine appears to be 1.8-2.0  - d/c iv fluids in order to avoid fluid overload.      Chronic lymphedema and cellulitis bilateral lower extremities  -Patient has been current with home health. .  -Pt recently finished Keflex  -Wound nurse to see  -  Dr. Negrete yesterday advised podiatry evaluation ,   Atrial fibrillation  -Continue digoxin and Lopressor     Gout  -Continue colchicine  and Uloric     Chronic pain  Continue Neurontin (INSPECT reviewed)                 VTE Prophylaxis - Lovenox 30 mg SC daily.         VTE Prophylaxis - SCDs.      Code Status -   Code Status and Medical Interventions:   Ordered at: 01/28/20 0547     Limited Support to NOT Include:    Intubation     Level Of Support Discussed With:    Patient     Code Status:    No CPR     Medical Interventions (Level of Support Prior to Arrest):    Limited       Discharge Planning          Destination      Coordination has not been started for this encounter.      Durable Medical Equipment      Coordination has not been started for this encounter.      Dialysis/Infusion      Coordination has not been started for this encounter.      Home Medical Care - Selection Complete      Service Provider Request Status Selected Services Address Phone Number Fax Number    [facility] [address] [facility] [place] [id number] [phone]       Manisha Calderon RN 1/28/2020 1229    Oservation Admission                 Therapy      Coordination has not been started for this encounter.      Community Resources      Coordination has not been started for this encounter.            Electronically signed by Matias Puri MD, 02/02/20, 3:07 PM.  [facility] Hospitalist Team

## 2020-02-02 NOTE — PROGRESS NOTES
Infectious Diseases Progress Note      LOS: 2 days   Patient Care Team:  Rosa M Chavez APRN as PCP - General (Nurse Practitioner)        Subjective       The patient has been afebrile for the last 24 hours.  The patient is on room air, hemodynamically stable, and is tolerating antimicrobial therapy.      Review of Systems:   Review of Systems   Constitutional: Negative.    HENT: Negative.    Respiratory: Negative.    Cardiovascular: Positive for leg swelling.   Gastrointestinal: Negative.    Genitourinary: Positive for dysuria.   Musculoskeletal: Positive for arthralgias.   Skin: Positive for rash and wound.   Neurological: Negative.    Psychiatric/Behavioral: Negative.    All other systems reviewed and are negative.       Objective     Vital Signs  Temp:  [97.7 °F (36.5 °C)-98.1 °F (36.7 °C)] 98.1 °F (36.7 °C)  Heart Rate:  [59-60] 60  Resp:  [16-18] 16  BP: (152-168)/(71-76) 161/76    Physical Exam:  Physical Exam   Constitutional: She is oriented to person, place, and time. She appears well-developed and well-nourished.   HENT:   Head: Normocephalic and atraumatic.   Eyes: Pupils are equal, round, and reactive to light. Conjunctivae and EOM are normal.   Neck: Neck supple.   Cardiovascular: Normal rate, regular rhythm and normal heart sounds.   Pulmonary/Chest: Effort normal and breath sounds normal.   Abdominal: Soft. Bowel sounds are normal.   Musculoskeletal: She exhibits edema.   Neurological: She is alert and oriented to person, place, and time.   Skin: Skin is warm and dry.   patient has chronic staining of bilateral lower legs from below the knee to the feet.  Patient has chronic changes of the skin due to chronic swelling.  There are several areas of macerated and denuded skin and the patient states that she has fairly constant weeping of both lower extremities.  Patient has palpable pulses but they are very difficult due to the edema and skin changes.     Psychiatric: She has a normal mood and  affect.   Vitals reviewed.       Results Review:    I have reviewed all clinical data, test, lab, and imaging results.     Radiology  No Radiology Exams Resulted Within Past 24 Hours    Cardiology    Laboratory  Results from last 7 days   Lab Units 02/02/20  0343   WBC 10*3/mm3 5.80   HEMOGLOBIN g/dL 9.2*   HEMATOCRIT % 28.6*   PLATELETS 10*3/mm3 61*     Results from last 7 days   Lab Units 02/02/20  0343   SODIUM mmol/L 139   POTASSIUM mmol/L 5.4*   CHLORIDE mmol/L 111*   CO2 mmol/L 20.0*   BUN mg/dL 55*   CREATININE mg/dL 1.90*   GLUCOSE mg/dL 91   CALCIUM mg/dL 8.4*     Results from last 7 days   Lab Units 02/02/20  0343   SODIUM mmol/L 139   POTASSIUM mmol/L 5.4*   CHLORIDE mmol/L 111*   CO2 mmol/L 20.0*   BUN mg/dL 55*   CREATININE mg/dL 1.90*   GLUCOSE mg/dL 91   CALCIUM mg/dL 8.4*                 Microbiology   Microbiology Results (last 10 days)     Procedure Component Value - Date/Time    Urine Culture - Urine, Urine, Catheter [[id number]]  (Normal) Collected:  01/28/20 0122    Lab Status:  Final result Specimen:  Urine, Catheter Updated:  01/29/20 1912     Urine Culture No growth          Medication Review:       Schedule Meds    aspirin 81 mg Oral Daily   cefdinir 300 mg Oral Q24H   colchicine 0.6 mg Oral Daily   digoxin 125 mcg Oral Daily   enoxaparin 30 mg Subcutaneous Q24H   febuxostat 40 mg Oral BID   gabapentin 100 mg Oral TID   sodium chloride 10 mL Intravenous Q12H   sodium chloride 3 mL Intravenous Q12H   zinc oxide  Topical Q12H       Infusion Meds       PRN Meds  •  acetaminophen **OR** acetaminophen **OR** acetaminophen  •  aluminum-magnesium hydroxide-simethicone  •  bisacodyl  •  magnesium hydroxide  •  melatonin  •  ondansetron **OR** ondansetron  •  sodium chloride  •  sodium chloride        Assessment/Plan       Antimicrobial Therapy   1.  P.o. Omnicef    Day 2  2.      Day  3.      Day  4.      Day  5.      Day      Assessment     Chronic bilateral lower leg lymphedema with frequent  weeping  -Chronic staining below the knee of both extremities with chronic skin changes due to constant swelling and weeping  -Some areas of macerated skin and denuded skin but no overt signs of infection     Urinary tract infection  -Urinalysis showed +2 bacteria with 21-30 white blood cells but the culture was negative  -However the patient was on p.o. Keflex before admission  -Patient says she has frequent urinary tract infections     History of pacemaker placement  -Pacemaker was interrogated and uses a battery change     Acute kidney injury on chronic kidney disease     Congestive heart failure, A. fib     Plan     Continue p.o. Omnicef 300mg qd for 2 more days-for 7 days total treatment  Wound care is already seen the patient and suggested certain dressing changes and compression dressings  Patient may benefit from going to the lymphedema clinic  Continue supportive care  Okay to discharge from infectious disease point    Kurt Diehl MD  02/02/20  4:08 PM     Note is dictated utilizing voice recognition software/Dragon

## 2020-02-02 NOTE — PROGRESS NOTES
Cardiology Progress Note      Admiting Physician:  Matias Puri MD   LOS: 2 days       Reason For Followup:  ICD generator change      Subjective:    Interval History:  Seen and examined.  Chart and labs reviewed.  Patient denies chest pain but does report incisional discomfort at the site of her ICD.  Patient is very concerned about bilateral lower extremity wounds.  Patient is tearful today in speaking with counselor.  Case discussed with infectious disease.  Podiatry to evaluate patient.    Review of Systems:  A complete review of systems was undertaken with the pertinent cardiovascular findings listed in history of present illness and all other systems being negative.     Assessment & Plan    Impressions:  Cardiomyopathy  Congestive heart failure chronic systolic  AICD in situ status post pulse generator change this admission  Bilateral heel ulceration/wound  Chronic lymphedema with stasis changes    Recommendations:  Heel ulceration/wounds are concerning given presence of ICD system.  Recommend podiatry evaluation.  Wound care  Antimicrobials as per infectious disease  Patient to go to rehab at discharge        Objective:    Medication Review:   Scheduled Meds:    aspirin 81 mg Oral Daily   cefdinir 300 mg Oral Q24H   colchicine 0.6 mg Oral Daily   digoxin 125 mcg Oral Daily   enoxaparin 30 mg Subcutaneous Q24H   febuxostat 40 mg Oral BID   gabapentin 100 mg Oral TID   sodium chloride 10 mL Intravenous Q12H   sodium chloride 3 mL Intravenous Q12H   zinc oxide  Topical Q12H     Continuous Infusions:   PRN Meds:.•  acetaminophen **OR** acetaminophen **OR** acetaminophen  •  aluminum-magnesium hydroxide-simethicone  •  bisacodyl  •  magnesium hydroxide  •  melatonin  •  ondansetron **OR** ondansetron  •  sodium chloride  •  sodium chloride    Patient Active Problem List   Diagnosis   • Congestive heart failure (CMS/HCC)   • Chronic low back pain   • Atrial fibrillation (CMS/HCC)   • Hypertension, benign    • Gout   • Urinary tract infection   • Syncope and collapse   • Lymphedema of both lower extremities   • Presence of cardiac pacemaker   • Delirium   • Non-pressure chronic ulcer right ankle, limited to breakdown skin (CMS/HCC)   • Non-pressure chronic ulcer left lower leg, limited to breakdown skin (CMS/HCC)   • Presence of biventricular implantable cardioverter-defibrillator (ICD)         Physical Exam:    General: Alert, cooperative, no distress, appears stated age  Head:  Normocephalic, atraumatic, mucous membranes moist  Eyes:  Conjunctiva/corneas clear, EOM's intact     Neck:  Supple,  no bruit  Lungs: Clear to auscultation bilaterally, no wheezes rhonchi rales are noted  Chest wall: No tenderness  Heart::  Regular rate and rhythm, S1 and S2 normal, 2/6 systolic ejection murmur.  No rub or gallop  Abdomen: Soft, non-tender, nondistended bowel sounds active  Extremities: No cyanosis, clubbing, 2+ edema  Pulses: Pulses difficult to palpate distally in lower extremities due to bandages  Skin:  Bandages to bilateral lower extremities with reported bilateral heel ulcerations stasis changes to skin noted  Neuro/psych: A&O x3. CN II through XII are grossly intact with appropriate affect    Vital Signs:  Vitals:    02/01/20 1100 02/01/20 1937 02/02/20 0407 02/02/20 0905   BP: 153/70 152/72 168/71 167/73   BP Location: Right arm Right arm Right arm Right arm   Patient Position: Lying Lying Lying Lying   Pulse: 60 59 59 60   Resp: 16 18 18    Temp: 98.3 °F (36.8 °C) 98 °F (36.7 °C) 97.7 °F (36.5 °C)    TempSrc: Oral Oral Oral    SpO2: 98% 96% 98% 99%   Weight:   83.7 kg (184 lb 8.4 oz)    Height:         Wt Readings from Last 1 Encounters:   02/02/20 83.7 kg (184 lb 8.4 oz)       Intake/Output Summary (Last 24 hours) at 2/2/2020 1224  Last data filed at 2/2/2020 0946  Gross per 24 hour   Intake 804 ml   Output 501 ml   Net 303 ml         Results Review:     CBC    Results from last 7 days   Lab Units 02/02/20  9977  02/01/20  0502 01/31/20  0251 01/30/20  0544 01/29/20  0817 01/28/20  0110   WBC 10*3/mm3 5.80 5.80 5.80 5.60 6.20 6.70   HEMOGLOBIN g/dL 9.2* 9.6* 9.3* 9.0* 9.5* 10.7*   PLATELETS 10*3/mm3 61* 92* 110* 136* 103* 622*     Cr Clearance Estimated Creatinine Clearance: 25.6 mL/min (A) (by C-G formula based on SCr of 1.9 mg/dL (H)).  Coag   Results from last 7 days   Lab Units 01/28/20  0110   INR  1.07   APTT seconds 22.3*     HbA1C   Lab Results   Component Value Date    HGBA1C 4.3 01/28/2020     Blood Glucose No results found for: POCGLU  Infection   Results from last 7 days   Lab Units 01/28/20  0122   URINECX  No growth     CMP   Results from last 7 days   Lab Units 02/02/20  0343 02/01/20  0244 01/31/20  0251 01/30/20  0543 01/29/20  0357 01/28/20  0618 01/28/20  0401 01/28/20  0110   SODIUM mmol/L 139 136 136 137 136  --  137 136   POTASSIUM mmol/L 5.4* 5.1 5.4* 5.4* 4.8  --  4.7 5.0   CHLORIDE mmol/L 111* 108* 109* 110* 110*  --  109* 106   CO2 mmol/L 20.0* 17.0* 17.0* 18.0* 15.0*  --  17.0* 18.0*   BUN mg/dL 55* 56* 59* 63* 69*  --  67* 59*   CREATININE mg/dL 1.90* 1.94* 2.05* 2.16* 1.99*  --  2.23* 2.31*   GLUCOSE mg/dL 91 86 90 82 93  --  82 82   ALBUMIN g/dL  --   --   --   --   --   --   --  3.10*   BILIRUBIN mg/dL  --   --   --   --   --   --   --  0.7   ALK PHOS U/L  --   --   --   --   --   --   --  142*   AST (SGOT) U/L  --   --   --   --   --   --   --  37*   ALT (SGPT) U/L  --   --   --   --   --   --   --  14   AMMONIA umol/L  --   --   --   --   --  49  --   --      ABG      UA    Results from last 7 days   Lab Units 01/28/20  0122   NITRITE UA  Negative   WBC UA /HPF 21-30*   BACTERIA UA /HPF 2+*   SQUAM EPITHEL UA /HPF 3-6*   URINECX  No growth     AYLEEN  No results found for: POCMETH, POCAMPHET, POCBARBITUR, POCBENZO, POCCOCAINE, POCOPIATES, POCOXYCODO, POCPHENCYC, POCPROPOXY, POCTHC, POCTRICYC  Lysis Labs   Results from last 7 days   Lab Units 02/02/20  0343 02/01/20  0502 02/01/20  0244  01/31/20  0251 01/30/20  0544 01/30/20  0543 01/29/20  0817 01/29/20  0357 01/28/20  0401 01/28/20  0110   INR   --   --   --   --   --   --   --   --   --  1.07   APTT seconds  --   --   --   --   --   --   --   --   --  22.3*   HEMOGLOBIN g/dL 9.2* 9.6*  --  9.3* 9.0*  --  9.5*  --   --  10.7*   PLATELETS 10*3/mm3 61* 92*  --  110* 136*  --  103*  --   --  622*   CREATININE mg/dL 1.90*  --  1.94* 2.05*  --  2.16*  --  1.99* 2.23* 2.31*     Radiology(recent) No radiology results for the last day      Results from last 7 days   Lab Units 01/28/20  0806   TROPONIN T ng/mL 0.022       Imaging Results (Last 24 Hours)     ** No results found for the last 24 hours. **          Cardiac Studies:  Echo-    Stress Myoview-  Cath-        Bert Negrete DO  02/02/20  12:24 PM

## 2020-02-02 NOTE — SIGNIFICANT NOTE
02/02/20 1500   Coping/Psychosocial   Observed Emotional State cooperative;tearful/crying;sad   Verbalized Emotional State sadness;loneliness   Plan of Care Reviewed With patient   Additional Documentation Pastoral/Spiritual Care (Group)   Psychosocial Support   Trust Relationship/Rapport care explained;thoughts/feelings acknowledged;empathic listening provided;emotional support provided;choices provided   Involvement in Care   Family/Support System, Persons family   Involvement in Care not present at bedside   Pastoral/Spiritual Care   Pastoral Care Visit Type initial   Pastoral Care Source patient request (describe)  (On-call Page)   Receptivity to Spiritual Care visit welcomed   Pastoral Care Request spiritual/moral support;scripture support;mood/anxiety support;decision-making support   Pastoral Care Interventions theological discussion provided;supportive conversation provided;scripture assistance provided;decision-making facilitated   Pastoral Care Response visit helpful;thanks expressed;receptive of support;engaged in conversation;emotion expressed   Use of Spiritual Resources spirituality for coping, indicated strong use of;non-Voodoo use of spiritual care   Pastoral Care Follow-Up follow-up planned regularly for general support   Coping/Psychosocial Interventions   Supportive Measures active listening utilized;counseling provided;self-reflection promoted     PT tearful due to conflict with a personal relationship. PT life reviewed about the loss of her first  and the affect it had on her life. PT is concerned about having to leave her home due to health conditions. PT does not want to leave due to the home being where she built her life with her first . She is reminded of him when she is there. PT believes that if she is not able to live in her current home desire/motivation to live would be difficult. PT reflected on the importance of commitment and companionship. PT is lonely and asked  questions about a current relationship that has been emotional hurtful. PT is Latter day discussed various teachings and scripture in the Bible. Will attempt to follow up with PT tomorrow.

## 2020-02-02 NOTE — PLAN OF CARE
Continue to monitor patient and apply med to bilateral legs  Problem: Patient Care Overview  Goal: Plan of Care Review  Outcome: Ongoing (interventions implemented as appropriate)  Flowsheets  Taken 2/1/2020 0516 by Cami Rivas RN  Progress: improving  Taken 2/2/2020 0748 by Nisha Ceron RN  Plan of Care Reviewed With: patient  Taken 2/2/2020 0411 by Cami Rivas RN  Outcome Summary: Pt rested well through the night with some complaints of leg/foot pain. Pt up to [facility] with 1 person assist. Will continue to monitor.  Goal: Individualization and Mutuality  Outcome: Ongoing (interventions implemented as appropriate)  Goal: Discharge Needs Assessment  Outcome: Ongoing (interventions implemented as appropriate)  Flowsheets (Taken 1/28/2020 1221 by Manisha Calderon RN)  Discharge Coordination/Progress: From Home alone with [facility] - PT pending  Equipment Currently Used at Home: walker, rolling;wheelchair  Anticipated Changes Related to Illness: none  Transportation Anticipated: family or friend will provide  Readmission Within the Last 30 Days: no previous admission in last 30 days  Patient/Family Anticipated Services at Transition: none  Patient's Choice of Community Agency(s): Current with [facility]  Patient/Family Anticipates Transition to: home  Goal: Interprofessional Rounds/Family Conf  Outcome: Ongoing (interventions implemented as appropriate)  Flowsheets (Taken 1/28/2020 1645 by Lina Blood, RN)  Participants: ;nursing;physical therapy;patient;physician     Problem: Fall Risk (Adult)  Goal: Identify Related Risk Factors and Signs and Symptoms  Outcome: Ongoing (interventions implemented as appropriate)  Flowsheets (Taken 1/28/2020 1645 by Lina Blood RN)  Related Risk Factors (Fall Risk): age-related changes;history of falls  Signs and Symptoms (Fall Risk): presence of risk factors  Goal: Absence of Fall  Outcome: Ongoing (interventions  implemented as appropriate)  Flowsheets (Taken 1/30/2020 1443 by Lina Blood, RN)  Absence of Fall: making progress toward outcome     Problem: Urinary Tract Infection (Adult)  Goal: Signs and Symptoms of Listed Potential Problems Will be Absent, Minimized or Managed (Urinary Tract Infection)  Outcome: Ongoing (interventions implemented as appropriate)  Flowsheets (Taken 1/30/2020 1443 by Lina Blood, RN)  Problems Assessed (Urinary Tract Infection): all  Problems Present (UTI): none     Problem: Skin Injury Risk (Adult)  Goal: Identify Related Risk Factors and Signs and Symptoms  Outcome: Ongoing (interventions implemented as appropriate)  Flowsheets (Taken 1/30/2020 1443 by Lina Blood, RN)  Related Risk Factors (Skin Injury Risk): advanced age;infection;nutritional deficiencies  Goal: Skin Health and Integrity  Outcome: Ongoing (interventions implemented as appropriate)  Flowsheets (Taken 1/30/2020 1443 by Lina Blood, RN)  Skin Health and Integrity: making progress toward outcome     Problem: Syncope (Adult)  Goal: Identify Related Risk Factors and Signs and Symptoms  Outcome: Ongoing (interventions implemented as appropriate)  Flowsheets (Taken 2/1/2020 0923)  Related Risk Factors (Syncope): other (see comments) (pacemaker replacement, UTI)  Goal: Physical Safety/Health Maintenance  Outcome: Ongoing (interventions implemented as appropriate)  Flowsheets (Taken 1/30/2020 1443 by Lina Blood, RN)  Physical Safety/Health Maintenance: making progress toward outcome  Goal: Optimal Emotional/Functional [place]  Outcome: Ongoing (interventions implemented as appropriate)  Flowsheets (Taken 1/30/2020 1443 by Lina Blood, RN)  Optimal Emotional/Functional [place]: making progress toward outcome

## 2020-02-02 NOTE — CONSULTS
01/28/2020  Foot and Ankle Surgery - Consult  Provider: VERÓNICA SeniorM  Location: [facility]    Subjective:  Emperatriz Childs is a 74 y.o. female.     Chief Complaint   Patient presents with   • Hallucinations       Consulting Physician: Primary service    Reason for Consult: Bilateral lower leg edema and open wounds    HPI: Patient is a 74-year-old female with CHF and longstanding lymphedema to bilateral lower extremities with recent open wounds.  She has been admitted over the last few days for concerns of visual hallucinations.  Today, she appears appropriate.  She states that she has been treated at home with home health for continued compression therapy.  She was seen in the wound care center but has since discontinued treatment there as she did not feel that she was receiving any true care.  I have seen her in the past.  Does have a very difficult situation given that she is unable to adequately manage the swelling on her own.  She does continue to have bilateral lower extremity pain.  Patient has already been evaluated by infectious disease as well as wound care nurse practitioner.    Allergies   Allergen Reactions   • Allopurinol Unknown - High Severity   • Clindamycin Hcl Unknown - High Severity   • Codeine Unknown - High Severity   • Furosemide Unknown - High Severity   • Hydrochlorothiazide Unknown - High Severity   • Naproxen Unknown - High Severity   • Sulfa Antibiotics Unknown - High Severity       Past Medical History:   Diagnosis Date   • CHF (congestive heart failure) (CMS/HCC)    • History of transfusion    • Hypertension    • Lymphedema of leg    • Myocardial infarction (CMS/HCC)        Past Surgical History:   Procedure Laterality Date   • EYE SURGERY     • PACEMAKER IMPLANTATION         Family History   Family history unknown: Yes       Social History     Socioeconomic History   • Marital status:      Spouse name: Not on file   • Number of children: Not on file   • Years  of education: Not on file   • Highest education level: Not on file   Tobacco Use   • Smoking status: Never Smoker   • Smokeless tobacco: Never Used   Substance and Sexual Activity   • Alcohol use: Never     Frequency: Never   • Drug use: Never   • Sexual activity: Defer          Current Facility-Administered Medications:   •  acetaminophen (TYLENOL) tablet 650 mg, 650 mg, Oral, Q4H PRN, 650 mg at 02/01/20 1830 **OR** acetaminophen (TYLENOL) 160 MG/5ML solution 650 mg, 650 mg, Oral, Q4H PRN **OR** acetaminophen (TYLENOL) suppository 650 mg, 650 mg, Rectal, Q4H PRN, Dionna Laird MD  •  aluminum-magnesium hydroxide-simethicone (MAALOX MAX) 400-400-40 MG/5ML suspension 15 mL, 15 mL, Oral, Q6H PRN, Dionna Laird MD  •  aspirin chewable tablet 81 mg, 81 mg, Oral, Daily, Dionna Laird MD, 81 mg at 02/02/20 0900  •  bisacodyl (DULCOLAX) EC tablet 5 mg, 5 mg, Oral, Daily PRN, Dionna Laird MD  •  cefdinir (OMNICEF) capsule 300 mg, 300 mg, Oral, Q24H, Dionna Laird MD, 300 mg at 02/02/20 0858  •  colchicine tablet 0.6 mg, 0.6 mg, Oral, Daily, Dionna Laird MD, 0.6 mg at 02/02/20 0858  •  digoxin (LANOXIN) tablet 125 mcg, 125 mcg, Oral, Daily, Dionna Laird MD, 125 mcg at 02/02/20 1415  •  enoxaparin (LOVENOX) syringe 30 mg, 30 mg, Subcutaneous, Q24H, Dionna Laird MD, 30 mg at 02/01/20 1733  •  febuxostat (ULORIC) tablet 40 mg, 40 mg, Oral, BID, Dionna Laird MD, 40 mg at 02/02/20 0858  •  gabapentin (NEURONTIN) capsule 100 mg, 100 mg, Oral, TID, Dionna Laird MD, 100 mg at 02/02/20 0858  •  magnesium hydroxide (MILK OF MAGNESIA) suspension 2400 mg/10mL 10 mL, 10 mL, Oral, Daily PRN, Dionna Laird MD  •  melatonin tablet 5 mg, 5 mg, Oral, Nightly PRN, Dionna Laird MD, 5 mg at 01/29/20 2058  •  ondansetron (ZOFRAN) tablet 4 mg, 4 mg, Oral, Q6H PRN **OR** ondansetron (ZOFRAN) injection 4 mg, 4 mg, Intravenous, Q6H PRN, Dionna Laird MD  •  sodium chloride 0.9 % flush 10 mL,  "10 mL, Intravenous, Q12H, Dionna Laird MD, 10 mL at 02/02/20 0858  •  sodium chloride 0.9 % flush 10 mL, 10 mL, Intravenous, PRN, Dionna Laird MD  •  sodium chloride 0.9 % flush 10 mL, 10 mL, Intravenous, PRN, Dionna Laird MD  •  sodium chloride 0.9 % flush 3 mL, 3 mL, Intravenous, Q12H, Dionna Laird MD, 3 mL at 02/02/20 0901  •  zinc oxide 20 % ointment, , Topical, Q12H, Matias Puri MD    Review of Systems:  General: Denies fever, chills, fatigue, and weakness.  Eyes: Denies vision loss, blurry vision, and excessive redness.  ENT: Denies hearing issues and difficulty swallowing.  Cardiovascular: Denies palpitations, chest pain, or syncopal episodes.  Respiratory: Denies shortness of breath, wheezing, and coughing.  GI: Denies abdominal pain, nausea, and vomiting.   : Denies frequency, hematuria, and urgency.  Musculoskeletal: Denies muscle cramps, joint pains, and stiffness.  Derm: + Bilateral lower leg wounds secondary to lymphedema  Neuro: Denies headaches, numbness, loss of coordination, and tremors.  Psych: Denies anxiety and depression.  Endocrine: Denies temperature intolerance and changes in appetite.  Heme: Denies bleeding disorders or abnormal bruising.     Objective   /76   Pulse 60   Temp 98.1 °F (36.7 °C) (Oral)   Resp 16   Ht 157 cm (61.81\")   Wt 83.7 kg (184 lb 8.4 oz)   SpO2 100%   BMI 33.96 kg/m²     Foot/Ankle Exam:       General:   Appearance: elderly    Orientation: AAOx3    Affect: appropriate      VASCULAR      Right Foot Vascularity   Dorsalis pedis:  2+  Posterior tibial:  2+  Skin Temperature: warm    Edema Grading:  3+ and pitting  CFT:  < 3 seconds  Pedal Hair Growth:  Absent     Left Foot Vascularity   Dorsalis pedis:  2+  Posterior tibial:  2+  Skin Temperature: warm    Edema Grading:  3+ and pitting  CFT:  < 3 seconds  Pedal Hair Growth:  Absent      NEUROLOGIC     Right Foot Neurologic   Light touch sensation:  Normal  Hot/Cold sensation: " normal    Protective Sensation using [place]-Victorino Monofilament:  Diminished     Left Foot Neurologic   Light touch sensation:  Normal  Hot/cold sensation: normal    Protective Sensation using Burton-Victorino Monofilament:  Diminished     MUSCULOSKELETAL      Right Foot Musculoskeletal    Amputation   Right toes amputated: No    Ecchymosis:  None  Tenderness comment:  Diffuse discomfort to the lower extremity     Left Foot Musculoskeletal    Amputation   Left toes amputated: No    Ecchymosis:  None  Tenderness comment:  Diffuse discomfort to the lower extremity     MUSCLE STRENGTH     Right Foot Muscle Strength   Normal strength    Foot dorsiflexion:  5  Foot plantar flexion:  5  Foot inversion:  5  Foot eversion:  5     Left Foot Muscle Strength   Normal strength    Foot dorsiflexion:  5  Foot plantar flexion:  5  Foot inversion:  5  Foot eversion:  5     DERMATOLOGIC     Right Foot Dermatologic   Skin: dry skin, erythema, fissure, skin changes and atrophic       Left Foot Dermatologic   Skin: dry skin, erythema, fissure, skin changes and atrophic       Image:             Results from last 7 days   Lab Units 02/02/20  0343   WBC 10*3/mm3 5.80   HEMOGLOBIN g/dL 9.2*   HEMATOCRIT % 28.6*   PLATELETS 10*3/mm3 61*       Assessment/Plan   Patient Active Problem List   Diagnosis   • Congestive heart failure (CMS/HCC)   • Chronic low back pain   • Atrial fibrillation (CMS/HCC)   • Hypertension, benign   • Gout   • Urinary tract infection   • Syncope and collapse   • Lymphedema of both lower extremities   • Presence of cardiac pacemaker   • Delirium   • Non-pressure chronic ulcer right ankle, limited to breakdown skin (CMS/HCC)   • Non-pressure chronic ulcer left lower leg, limited to breakdown skin (CMS/HCC)   • Presence of biventricular implantable cardioverter-defibrillator (ICD)     Patient was evaluated today.  She does continue to have issues with lymphedema and swelling.  She has small fissures and superficial  wounds, but no obvious signs of deep infection.  I do not feel that any operative intervention is appropriate.  I do agree with the wound care nurse that continued compression therapy would be the most appropriate option; however, high-level compression should be started after CHF is properly compensated.  I have recommended that we proceed with light compression therapy with Ace wrap at this time.  I do feel that she would benefit from follow-up with the wound care center and/or lymphedema clinic for lymphedema pumps.  No further plans from my standpoint.  Will sign off at this time.    Thank you for the consultation and allowing me to participate in this patient's care. Please call with any additional questions or concerns.     Note is dictated utilizing voice recognition software. Unfortunately this leads to occasional typographical errors. I apologize in advance if the situation occurs. If questions occur please do not hesitate to call our office.

## 2020-02-02 NOTE — PLAN OF CARE
Problem: Patient Care Overview  Goal: Plan of Care Review  Outcome: Ongoing (interventions implemented as appropriate)  Flowsheets  Taken 2/1/2020 0516  Progress: improving  Taken 2/2/2020 0411  Plan of Care Reviewed With: patient  Outcome Summary: Pt rested well through the night with some complaints of leg/foot pain. Pt up to BSC with 1 person assist. Will continue to monitor.

## 2020-02-03 LAB
ANION GAP SERPL CALCULATED.3IONS-SCNC: 7 MMOL/L (ref 5–15)
ANISOCYTOSIS BLD QL: NORMAL
BASOPHILS # BLD AUTO: 0 10*3/MM3 (ref 0–0.2)
BASOPHILS NFR BLD AUTO: 1 % (ref 0–1.5)
BUN BLD-MCNC: 57 MG/DL (ref 8–23)
BUN/CREAT SERPL: 27.9 (ref 7–25)
BURR CELLS BLD QL SMEAR: NORMAL
CALCIUM SPEC-SCNC: 8.2 MG/DL (ref 8.6–10.5)
CHLORIDE SERPL-SCNC: 110 MMOL/L (ref 98–107)
CLUMPED PLATELETS: PRESENT
CO2 SERPL-SCNC: 20 MMOL/L (ref 22–29)
CREAT BLD-MCNC: 2.04 MG/DL (ref 0.57–1)
DEPRECATED RDW RBC AUTO: 59.5 FL (ref 37–54)
EOSINOPHIL # BLD AUTO: 0.2 10*3/MM3 (ref 0–0.4)
EOSINOPHIL NFR BLD AUTO: 4.7 % (ref 0.3–6.2)
ERYTHROCYTE [DISTWIDTH] IN BLOOD BY AUTOMATED COUNT: 17.4 % (ref 12.3–15.4)
GFR SERPL CREATININE-BSD FRML MDRD: 24 ML/MIN/1.73
GLUCOSE BLD-MCNC: 92 MG/DL (ref 65–99)
HCT VFR BLD AUTO: 26.3 % (ref 34–46.6)
HGB BLD-MCNC: 8.3 G/DL (ref 12–15.9)
LYMPHOCYTES # BLD AUTO: 0.7 10*3/MM3 (ref 0.7–3.1)
LYMPHOCYTES NFR BLD AUTO: 13.7 % (ref 19.6–45.3)
MCH RBC QN AUTO: 30.5 PG (ref 26.6–33)
MCHC RBC AUTO-ENTMCNC: 31.6 G/DL (ref 31.5–35.7)
MCV RBC AUTO: 96.7 FL (ref 79–97)
MONOCYTES # BLD AUTO: 0.5 10*3/MM3 (ref 0.1–0.9)
MONOCYTES NFR BLD AUTO: 10.1 % (ref 5–12)
NEUTROPHILS # BLD AUTO: 3.4 10*3/MM3 (ref 1.7–7)
NEUTROPHILS NFR BLD AUTO: 70.5 % (ref 42.7–76)
NRBC BLD AUTO-RTO: 0.1 /100 WBC (ref 0–0.2)
PLATELET # BLD AUTO: 53 10*3/MM3 (ref 140–450)
PMV BLD AUTO: 7.2 FL (ref 6–12)
POIKILOCYTOSIS BLD QL SMEAR: NORMAL
POTASSIUM BLD-SCNC: 5 MMOL/L (ref 3.5–5.2)
RBC # BLD AUTO: 2.72 10*6/MM3 (ref 3.77–5.28)
SODIUM BLD-SCNC: 137 MMOL/L (ref 136–145)
WBC MORPH BLD: NORMAL
WBC NRBC COR # BLD: 4.9 10*3/MM3 (ref 3.4–10.8)

## 2020-02-03 PROCEDURE — 97112 NEUROMUSCULAR REEDUCATION: CPT

## 2020-02-03 PROCEDURE — 99232 SBSQ HOSP IP/OBS MODERATE 35: CPT | Performed by: INTERNAL MEDICINE

## 2020-02-03 PROCEDURE — 99024 POSTOP FOLLOW-UP VISIT: CPT | Performed by: INTERNAL MEDICINE

## 2020-02-03 PROCEDURE — 85025 COMPLETE CBC W/AUTO DIFF WBC: CPT | Performed by: INTERNAL MEDICINE

## 2020-02-03 PROCEDURE — 80048 BASIC METABOLIC PNL TOTAL CA: CPT | Performed by: INTERNAL MEDICINE

## 2020-02-03 PROCEDURE — 97110 THERAPEUTIC EXERCISES: CPT

## 2020-02-03 PROCEDURE — 85007 BL SMEAR W/DIFF WBC COUNT: CPT | Performed by: INTERNAL MEDICINE

## 2020-02-03 PROCEDURE — 25010000002 ENOXAPARIN PER 10 MG: Performed by: INTERNAL MEDICINE

## 2020-02-03 PROCEDURE — 97116 GAIT TRAINING THERAPY: CPT

## 2020-02-03 RX ORDER — HYDROXYZINE HYDROCHLORIDE 25 MG/1
25 TABLET, FILM COATED ORAL 3 TIMES DAILY PRN
Status: DISCONTINUED | OUTPATIENT
Start: 2020-02-03 | End: 2020-02-06 | Stop reason: HOSPADM

## 2020-02-03 RX ADMIN — Medication 3 ML: at 20:39

## 2020-02-03 RX ADMIN — ZINC OXIDE: 200 OINTMENT TOPICAL at 08:41

## 2020-02-03 RX ADMIN — FEBUXOSTAT 40 MG: 40 TABLET ORAL at 08:40

## 2020-02-03 RX ADMIN — GABAPENTIN 100 MG: 100 CAPSULE ORAL at 16:03

## 2020-02-03 RX ADMIN — GABAPENTIN 100 MG: 100 CAPSULE ORAL at 08:40

## 2020-02-03 RX ADMIN — ENOXAPARIN SODIUM 30 MG: 30 INJECTION SUBCUTANEOUS at 16:03

## 2020-02-03 RX ADMIN — CEFDINIR 300 MG: 300 CAPSULE ORAL at 08:40

## 2020-02-03 RX ADMIN — Medication 10 ML: at 08:40

## 2020-02-03 RX ADMIN — ACETAMINOPHEN 650 MG: 325 TABLET ORAL at 22:18

## 2020-02-03 RX ADMIN — HYDROXYZINE HYDROCHLORIDE 25 MG: 25 TABLET, FILM COATED ORAL at 23:57

## 2020-02-03 RX ADMIN — ASPIRIN 81 MG 81 MG: 81 TABLET ORAL at 08:40

## 2020-02-03 RX ADMIN — COLCHICINE 0.6 MG: 0.6 TABLET, FILM COATED ORAL at 08:40

## 2020-02-03 RX ADMIN — FEBUXOSTAT 40 MG: 40 TABLET ORAL at 20:38

## 2020-02-03 RX ADMIN — Medication 3 ML: at 08:42

## 2020-02-03 RX ADMIN — GABAPENTIN 100 MG: 100 CAPSULE ORAL at 20:38

## 2020-02-03 NOTE — PROGRESS NOTES
Continued Stay Note  [facility]     Patient Name: Emperatriz Childs  MRN: [id number]  Today's Date: 2/3/2020    Admit Date: 1/28/2020    Discharge Plan     Row Name 02/03/20 1525       Plan    Plan  DC Plan: [facility] accepted-pending precert. PASRR approved. Precert started 2/3.     Plan Comments  Pt is not eligible for [facility]. Score is 19-too high.     Row Name 02/03/20 1355       Plan         Discharge Codes    No documentation.       Expected Discharge Date and Time     Expected Discharge Date Expected Discharge Time    Feb 3, 2020           LUKE Carrasquillo    Phone # [phone]  Cell #[phone]  Fax#[phone]  [email or web address]    LUKE Carrasquillo

## 2020-02-03 NOTE — PROGRESS NOTES
"Referring Provider: Hospitalist    Reason for follow-up: Follow-up pacemaker     Patient Care Team:  Rosa M Chavez APRN as PCP - General (Nurse Practitioner)    Subjective .  No chest pain or shortness of breath    Objective  Lying in bed comfortably     Review of Systems   Constitution: Negative for fever and malaise/fatigue.   Cardiovascular: Positive for leg swelling. Negative for chest pain, dyspnea on exertion and palpitations.   Respiratory: Negative for cough and shortness of breath.    Skin: Negative for rash.   Gastrointestinal: Negative for abdominal pain, nausea and vomiting.   Neurological: Negative for focal weakness and headaches.   All other systems reviewed and are negative.      Allopurinol; Clindamycin hcl; Codeine; Furosemide; Hydrochlorothiazide; Naproxen; and Sulfa antibiotics    Scheduled Meds:    aspirin 81 mg Oral Daily   colchicine 0.6 mg Oral Daily   digoxin 125 mcg Oral Daily   enoxaparin 30 mg Subcutaneous Q24H   febuxostat 40 mg Oral BID   gabapentin 100 mg Oral TID   sodium chloride 10 mL Intravenous Q12H   sodium chloride 3 mL Intravenous Q12H   zinc oxide  Topical Q12H     Continuous Infusions:   PRN Meds:.•  acetaminophen **OR** acetaminophen **OR** acetaminophen  •  aluminum-magnesium hydroxide-simethicone  •  bisacodyl  •  magnesium hydroxide  •  melatonin  •  ondansetron **OR** ondansetron  •  sodium chloride  •  sodium chloride        VITAL SIGNS  Vitals:    02/02/20 1414 02/02/20 1415 02/02/20 2016 02/03/20 0358   BP: 161/76 161/76 158/58 134/78   BP Location: Left arm  Right arm Right arm   Patient Position: Sitting  Sitting Lying   Pulse: 60 60 60 69   Resp:   16 18   Temp:   98.4 °F (36.9 °C) 98 °F (36.7 °C)   TempSrc:   Oral Oral   SpO2: 100%  100% 99%   Weight:    87.2 kg (192 lb 3.9 oz)   Height:           Flowsheet Rows      First Filed Value   Admission Height  157.5 cm (62\") Documented at 01/28/2020 0037   Admission Weight  64.4 kg (142 lb) Documented at " 01/28/2020 0037           TELEMETRY: Ventricular pacemaker rhythm    Physical Exam:  Physical Exam   Constitutional: She appears well-developed and well-nourished.   HENT:   Head: Normocephalic and atraumatic.   Eyes: Conjunctivae are normal. No scleral icterus.   Neck: Normal range of motion. Neck supple. No JVD present. Carotid bruit is not present.   Cardiovascular: Normal rate, regular rhythm, S1 normal, S2 normal and intact distal pulses. PMI is not displaced.   Murmur heard.  Pulmonary/Chest: Effort normal and breath sounds normal. She has no wheezes. She has no rales.   Abdominal: Soft. Bowel sounds are normal.   Neurological: She is alert. She has normal strength.   Skin: Skin is warm and dry. No rash noted.        Results Review:   I reviewed the patient's new clinical results.  Lab Results (last 24 hours)     Procedure Component Value Units Date/Time    CBC & Differential [[id number]] Collected:  02/03/20 0326    Specimen:  Blood Updated:  02/03/20 0605    Narrative:       The following orders were created for panel order CBC & Differential.  Procedure                               Abnormality         Status                     ---------                               -----------         ------                     CBC Auto Differential[[id number]]        Abnormal            Final result                 Please view results for these tests on the individual orders.    CBC Auto Differential [[id number]]  (Abnormal) Collected:  02/03/20 0326    Specimen:  Blood Updated:  02/03/20 0605     WBC 4.90 10*3/mm3      RBC 2.72 10*6/mm3      Hemoglobin 8.3 g/dL      Hematocrit 26.3 %      MCV 96.7 fL      MCH 30.5 pg      MCHC 31.6 g/dL      RDW 17.4 %      RDW-SD 59.5 fl      MPV 7.2 fL      Platelets 53 10*3/mm3      Neutrophil % 70.5 %      Lymphocyte % 13.7 %      Monocyte % 10.1 %      Eosinophil % 4.7 %      Basophil % 1.0 %      Neutrophils, Absolute 3.40 10*3/mm3      Lymphocytes, Absolute 0.70 10*3/mm3       Monocytes, Absolute 0.50 10*3/mm3      Eosinophils, Absolute 0.20 10*3/mm3      Basophils, Absolute 0.00 10*3/mm3      nRBC 0.1 /100 WBC     Scan Slide [[id number]] Collected:  02/03/20 0326    Specimen:  Blood Updated:  02/03/20 0605     Anisocytosis Slight/1+     Crenated RBC's Slight/1+     Poikilocytes Slight/1+     WBC Morphology Normal     Clumped Platelets Present    Narrative:       Slide Reviewed      Basic Metabolic Panel [[id number]]  (Abnormal) Collected:  02/03/20 0326    Specimen:  Blood Updated:  02/03/20 0436     Glucose 92 mg/dL      BUN 57 mg/dL      Creatinine 2.04 mg/dL      Sodium 137 mmol/L      Potassium 5.0 mmol/L      Chloride 110 mmol/L      CO2 20.0 mmol/L      Calcium 8.2 mg/dL      eGFR Non African Amer 24 mL/min/1.73      BUN/Creatinine Ratio 27.9     Anion Gap 7.0 mmol/L     Narrative:       GFR Normal >60  Chronic Kidney Disease <60  Kidney Failure <15            Imaging Results (Last 24 Hours)     ** No results found for the last 24 hours. **          EKG      I personally viewed and interpreted the patient's EKG/Telemetry data:    ECHOCARDIOGRAM:    STRESS MYOVIEW:    CARDIAC CATHETERIZATION:    OTHER:         Assessment/Plan     1 urinary tract infection  2.Altered mental status changes  3.  Syncope  4.  History of atrial fibrillation  5.  Status post pacemaker placement  6.  Congestive heart failure  7.  Hypertension  8.  Nonhealing pressure ulcers  9. acute renal failure with history of chronic renal sufficiency  10.  Chronic lymphedema and cellulitis of the lower extremities     Presented with multiple episodes of falling down which could be syncopal episodes  Patient's pacemaker was checked and is at [facility] and hence will do the battery change  Patient has renal insufficiency and has acute renal failure now and is followed by nephrologist  Patient's troponin was borderline elevated which could be secondary to acute renal failure  Patient also has history of congestive heart failure  we will get an echocardiogram for LV function well abnormalities  Patient had mild mental status changes and is probably secondary to urinary tract infection is being treated with antibiotics  Patient had the generator replacement done without any complications.  Patient is currently stable and is ready to be transferred to the rehab placement  I discussed the patients findings and my recommendations with patient and nurse    Vinod Root MD  02/03/20  11:02 AM

## 2020-02-03 NOTE — PROGRESS NOTES
Continued Stay Note  [facility]     Patient Name: Emperatriz Childs  MRN: [id number]  Today's Date: 2/3/2020    Admit Date: 1/28/2020    Discharge Plan     Row Name 02/03/20 1355       Plan    Plan  Anticipate inpatient rehab - [facility] pending. [facility] accepted, PASRR approved. Angely, liaison, verifying if patient can go [id number] (is no, no Precert needed).     Patient/Family in Agreement with Plan  yes    Plan Comments  Spoke with patient, updated her that [facility] acceptance is pending, [facility] has accepted but we are awaiting insurance approval.        Expected Discharge Date and Time     Expected Discharge Date Expected Discharge Time    Feb 4, 2020           Yee Link  [phone]

## 2020-02-03 NOTE — THERAPY TREATMENT NOTE
Patient Name: Emperatriz Childs  : 1945    MRN: [id number]                              Today's Date: 2/3/2020       Admit Date: 2020    Visit Dx:     ICD-10-CM ICD-9-CM   1. Presence of biventricular implantable cardioverter-defibrillator (ICD) Z95.810 V45.02   2. Syncope and collapse R55 780.2   3. Injury of head, initial encounter S09.90XA 959.01   4. Acute UTI N39.0 599.0   5. Visual hallucinations R44.1 368.16   6. 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)   • Non-pressure chronic ulcer left lower leg, limited to breakdown skin (CMS/HCC)   • Presence of biventricular implantable cardioverter-defibrillator (ICD)     Past Medical History:   Diagnosis Date   • CHF (congestive heart failure) (CMS/HCC)    • History of transfusion    • Hypertension    • Lymphedema of leg    • Myocardial infarction (CMS/HCC)      Past Surgical History:   Procedure Laterality Date   • CARDIAC ELECTROPHYSIOLOGY PROCEDURE N/A 2020    Procedure: ICD battery change;  Surgeon: Dionna Laird MD;  Location: [facility] CATH INVASIVE LOCATION;  Service: Cardiovascular   • CARDIAC ELECTROPHYSIOLOGY PROCEDURE N/A 2020    Procedure: Temporary Pacemaker;  Surgeon: Dionna Laird MD;  Location: [facility] CATH INVASIVE LOCATION;  Service: Cardiovascular   • CARDIAC ELECTROPHYSIOLOGY PROCEDURE N/A 2020    Procedure: Pocket Revision;  Surgeon: Dionna Laird MD;  Location: [facility] CATH INVASIVE LOCATION;  Service: Cardiovascular   • EYE SURGERY     • PACEMAKER IMPLANTATION       General Information     Row Name 20 1426           PT Evaluation Time/Intention    Document Type  therapy note (daily note)  -     Mode of Treatment  individual therapy  -     Row Name 02/03/20 1423          General Information    Patient Profile Reviewed?  yes  -       User Key  (r) = Recorded By, (t) = Taken By, (c) = Cosigned By    Initials Name Provider Type    [facility] Winifred Conway, ANSON Physical Therapist        Mobility     Row Name 02/03/20 1444          Bed Mobility Assessment/Treatment    Bed Mobility Assessment/Treatment  bed mobility (all) activities;rolling left;rolling right;scooting/bridging;supine-sit-supine  -     [place] Level (Bed Mobility)  minimum assist (75% patient effort)  -     Rolling Left [place] (Bed Mobility)  conditional independence  -     Rolling Right [place] (Bed Mobility)  conditional independence  -WC     Scooting/Bridging [place] (Bed Mobility)  set up  -     Supine-Sit [place] (Bed Mobility)  minimum assist (75% patient effort)  -     Sit-Supine [place] (Bed Mobility)  minimum assist (75% patient effort)  -     Supine-Sit-Supine [place] (Bed Mobility)  minimum assist (75% patient effort)  -     Assistive Device (Bed Mobility)  bed rails  -     Row Name 02/03/20 1444          Bed-Chair Transfer    Bed-Chair [place] (Transfers)  stand by assist  -     Row Name 02/03/20 1444          Sit-Stand Transfer    Sit-Stand [place] (Transfers)  stand by assist  -     Assistive Device (Sit-Stand Transfers)  walker, front-wheeled  -     Row Name 02/03/20 1444          Gait/Stairs Assessment/Training    Gait/Stairs Assessment/Training  gait/ambulation assistive device  -     [place] Level (Gait)  contact guard  -     Assistive Device (Gait)  walker, front-wheeled  -     Distance in Feet (Gait)  40 feet CGA. Pt reports at times she falls and does not know why.  -WC     Pattern (Gait)  step-to  -WC     Deviations/Abnormal Patterns (Gait)  base of support, wide;gait  speed decreased  -     Stairs, Safety Issues  balance decreased during turns  -       User Key  (r) = Recorded By, (t) = Taken By, (c) = Cosigned By    Initials Name Provider Type    Winifred Lund PT Physical Therapist        Obj/Interventions     Row Name 02/03/20 1454          General ROM    GENERAL ROM COMMENTS  WFL's  -[facility] Name 02/03/20 1454          MMT (Manual Muscle Testing)    General MMT Comments  4-/5  -     Row Name 02/03/20 1454          Therapeutic Exercise    Lower Extremity Range of Motion (Therapeutic Exercise)  hip flexion/extension, left;knee flexion/extension, right;ankle dorsiflexion/plantar flexion, right  -     Exercise Type (Therapeutic Exercise)  AROM (active range of motion)  -     Position (Therapeutic Exercise)  seated  -     Sets/Reps (Therapeutic Exercise)  2 sets 10.   -[facility] Name 02/03/20 1454          Static Sitting Balance    Level of [place] (Unsupported Sitting, Static Balance)  conditional independence  -[facility] Name 02/03/20 1454          Dynamic Sitting Balance    Level of [place], Reaches Outside Midline (Sitting, Dynamic Balance)  supervision  -[facility] Name 02/03/20 1454          Static Standing Balance    Level of [place] (Supported Standing, Static Balance)  standby assist  -     Time Able to Maintain Position (Supported Standing, Static Balance)  2 to 3 minutes  -     Assistive Device Utilized (Supported Standing, Static Balance)  walker, rolling  -[facility] Name 02/03/20 1454          Dynamic Standing Balance    Assistive Device Utilized (Supported Standing, Dynamic Balance)  walker, rolling  -       User Key  (r) = Recorded By, (t) = Taken By, (c) = Cosigned By    Initials Name Provider Type    Winifred Lund PT Physical Therapist        Goals/Plan    No documentation.       Clinical Impression     Row Name 02/03/20 1456          Pain Scale: Numbers Pre/Post-Treatment    Pain Scale: Numbers, Pretreatment  5/10   -WC     Pain Scale: Numbers, Post-Treatment  8/10  -WC     Pain Location - Side  Bilateral  -WC     Pain Location - Orientation  lower  -WC     Pain Location  foot  -     Pain Intervention(s)  Relaxation technique  -     Row Name 02/03/20 1456          Plan of Care Review    Plan of Care Reviewed With  patient  -WC       User Key  (r) = Recorded By, (t) = Taken By, (c) = Cosigned By    Initials Name Provider Type     Winifred Conway PT Physical Therapist        Outcome Measures     Row Name 02/03/20 1441          How much help from another person do you currently need...    Turning from your back to your side while in flat bed without using bedrails?  4  -WC     Moving from lying on back to sitting on the side of a flat bed without bedrails?  3  -WC     Moving to and from a bed to a chair (including a wheelchair)?  3  -WC     Standing up from a chair using your arms (e.g., wheelchair, bedside chair)?  4  -WC     Climbing 3-5 steps with a railing?  2  -WC     To walk in hospital room?  3  -WC     AM-PAC 6 Clicks Score (PT)  19  -WC       User Key  (r) = Recorded By, (t) = Taken By, (c) = Cosigned By    Initials Name Provider Type     Winifred Conway PT Physical Therapist          PT Recommendation and Plan     Plan of Care Reviewed With: patient     Time Calculation:   PT Charges     Row Name 02/03/20 1504             Time Calculation    Start Time  1425  -WC      Stop Time  1449  -WC      Time Calculation (min)  24 min  -WC      PT Received On  02/03/20  -WC      PT - Next Appointment  02/05/20  -WC         Time Calculation- PT    TCU Minutes- PT  24 min  -WC        User Key  (r) = Recorded By, (t) = Taken By, (c) = Cosigned By    Initials Name Provider Type     Winifred Conway PT Physical Therapist        Therapy Charges for Today     Code Description Service Date Service Provider Modifiers Qty    40791538164  PT NEUROMUSC RE EDUCATION EA 15 MIN 2/3/2020 Winifred Conway PT GP 1    [id number]  PT THER  PROC EA 15 MIN 2/3/2020 Winifred Conway, PT GP 1    61875990708 HC GAIT TRAINING EA 15 MIN 2/3/2020 Winifred Conway, PT GP 1          PT G-Codes  Outcome Measure Options: AM-PAC 6 Clicks Basic Mobility (PT)  AM-PAC 6 Clicks Score (PT): 19    Winifred Conway, PT  2/3/2020

## 2020-02-03 NOTE — PLAN OF CARE
Problem: Patient Care Overview  Goal: Plan of Care Review  Outcome: Ongoing (interventions implemented as appropriate)  Flowsheets  Taken 2/3/2020 7722  Progress: improving  Outcome Summary: Alyce slept well through the night. Has had complaints of BLE pain as well as right shoulder pain. Tylenol administered per patient request. No other concerns at this time.  Taken 2/2/2020 1901  Plan of Care Reviewed With: patient

## 2020-02-03 NOTE — PROGRESS NOTES
"      [facility] Medicine Services Daily Progress Note      Hospitalist Team  LOS 3 days      Patient Care Team:  Rosa M Chavez APRN as PCP - General (Nurse Practitioner)    Patient Location: 242/1      Subjective   Subjective   Patient noted to have legs covered with dressing, denies for any other active complaint, no nausea or vomiting.     Chief Complaint / Subjective  Chief Complaint   Patient presents with   • Hallucinations         Brief Synopsis of Hospital Course/HPI    Ms. Childs is a 74 y.o. occasion female with a history of CHF, bilateral lymphedema and cellulitis, diabetes type 2, gout, presented to the [facility] ED on 1/28/2020 with a complaint of visual hallucinations.  Patient states she has been seeing people that are dead such as her  and  little children.  Patient states she knows that they are a hallucination.  Patient is alert and oriented x4.  Patient also has bilateral lymphedema and cellulitis to her lower bilateral lower extremities.  Patient was a patient of the [facility] wound care center, but is now current with home health.      In the ED, CT head negative for acute disease.  UA: 2+ leukocytes, WBC 21-30, 2+ bacteria, BUN 59, CR 2.381, alkaline phosphatase 142, WBC 6.7, Hgb 10.7, HCT 31.9, platelets 622.  Patient was given 1 g Rocephin in the ED patient to be admitted for further work-up and evaluation.            Date:: 2/3/2020 patient seen and examined and patient seemed to doing fairly well in clinic complaints.        Review of Systems   All other systems reviewed and are negative.   .      Objective   Objective      Vital Signs  Temp:  [97.7 °F (36.5 °C)-98.4 °F (36.9 °C)] 97.7 °F (36.5 °C)  Heart Rate:  [57-69] 57  Resp:  [16-18] 17  BP: (134-159)/(58-78) 159/72  Oxygen Therapy  SpO2: 100 %  Pulse Oximetry Type: Intermittent  Device (Oxygen Therapy): room air  Flowsheet Rows      First Filed Value   Admission Height  157.5 cm (62\") " Documented at 01/28/2020 0037   Admission Weight  64.4 kg (142 lb) Documented at 01/28/2020 0037        Intake & Output (last 3 days)       01/31 0701 - 02/01 0700 02/01 0701 - 02/02 0700 02/02 0701 - 02/03 0700 02/03 0701 - 02/04 0700    P.O.  420    Total Intake(mL/kg) 360 (4.3) 600 (7.2) 1764 (20.2) 420 (4.8)    Urine (mL/kg/hr) 400 (0.2) 900 (0.4) 900 (0.4) 600 (0.7)    Stool 0  1 0    Total Output 400 900 901 600    Net -40 -300 +863 -180            Urine Unmeasured Occurrence    1 x    Stool Unmeasured Occurrence  1 x 1 x 2 x        Lines, Drains & Airways    Active LDAs     Name:   Placement date:   Placement time:   Site:   Days:    Peripheral IV 01/28/20 1958 Anterior;Distal;Right Wrist   01/28/20 1958    Wrist   less than 1                  Physical Exam:    Physical Exam   Constitutional: She is oriented to person, place, and time. She appears well-developed and well-nourished. No distress.   HENT:   Head: Normocephalic and atraumatic.   Right Ear: External ear normal.   Left Ear: External ear normal.   Nose: Nose normal.   Mouth/Throat: Oropharynx is clear and moist. No oropharyngeal exudate.   Eyes: Pupils are equal, round, and reactive to light. Conjunctivae and EOM are normal. Right eye exhibits no discharge. Left eye exhibits no discharge. No scleral icterus.   Neck: Normal range of motion. No JVD present. No tracheal deviation present. No thyromegaly present.   Cardiovascular: Normal rate, regular rhythm, normal heart sounds and intact distal pulses. Exam reveals no gallop and no friction rub.   No murmur heard.  Pulmonary/Chest: Effort normal and breath sounds normal. No stridor. No respiratory distress. She has no wheezes. She has no rales. She exhibits no tenderness.   Abdominal: Soft. Bowel sounds are normal. She exhibits no distension and no mass. There is no tenderness. There is no rebound and no guarding. No hernia.   Musculoskeletal: Normal range of motion. She exhibits no  edema, tenderness or deformity.   Lymphadenopathy:     She has no cervical adenopathy.   Neurological: She is alert and oriented to person, place, and time. No cranial nerve deficit or sensory deficit. She exhibits normal muscle tone. Coordination normal.   Skin: Skin is warm and dry. No rash noted. She is not diaphoretic. No erythema.   Psychiatric: She has a normal mood and affect. Her behavior is normal.   Nursing note and vitals reviewed.           Wounds (last 24 hours)      LDA Wound     Row Name 01/29/20 1100 01/29/20 0900 01/29/20 0730       Wound 01/28/20 0055 Left arm Skin Tear    Wound - Properties Group Date first assessed: 01/28/20  -NB Time first assessed: 0055  -NB Side: Left  -NB Location: arm  -NB Primary Wound Type: Skin tear  -NB    Dressing Appearance  dry;intact;no drainage  -HB  dry;intact;no drainage  -HB  dry;intact;no drainage  -HB    Closure  Adhesive bandage;Other (Comment) Mepilex   -HB  Adhesive bandage;Other (Comment) Mepilex   -HB  Adhesive bandage;Other (Comment) Mepilex   -HB    Base  dry;clean;scab  -HB  dry;clean;scab  -HB  dry;clean;scab  -HB    Periwound  dry;intact  -HB  dry;intact  -HB  dry;intact  -HB    Periwound Temperature  warm  -HB  warm  -HB  warm  -HB    Periwound Skin Turgor  soft  -HB  soft  -HB  soft  -HB    Drainage Amount  none  -HB  none  -HB  none  -HB    Care, Wound  --  --  other (see comments) Mepilex applied   -HB    Dressing Care, Wound  --  --  dressing applied  -HB       Wound 01/28/20 0458 Right anterior foot Other (comment)    Wound - Properties Group Date first assessed: 01/28/20  -KK Time first assessed: 0458 -KK Present on Hospital Admission: Y  -KK Side: Right  -KK Orientation: anterior  -KK Location: foot  -KK Primary Wound Type: Other  -KK, Lymphedema      Dressing Appearance  moist drainage  -HB  moist drainage  -HB  moist drainage  -HB    Closure  NELY  -HB  NELY  -HB  NELY  -HB    Base  red;dry;scab  -HB  red;dry;scab  -HB  red;dry;scab  -HB     Periwound  swelling;warm;other (see comments) dry and flaky   -HB  swelling;warm;other (see comments) dry and flaky   -HB  swelling;warm;other (see comments) dry and flaky   -HB    Periwound Temperature  warm  -HB  warm  -HB  warm  -HB    Periwound Skin Turgor  firm  -HB  firm  -HB  firm  -HB    Drainage Characteristics/Odor  serosanguineous  -HB  serosanguineous  -HB  serosanguineous  -HB    Drainage Amount  small  -HB  small  -HB  small  -HB    Care, Wound  --  --  cleansed with;antimicrobial agent applied;soap and water;wound cleanser;other (see comments) skin repair cream   -HB    Dressing Care, Wound  --  --  other (see comments) maxorb/abdomenal pad   -HB    Periwound Care, Wound  --  --  topical treatment applied  -HB       Wound 01/28/20 0500 Left anterior foot Other (comment)    Wound - Properties Group Date first assessed: 01/28/20  -KK Time first assessed: 0500  -KK Present on Hospital Admission: Y  -KK Side: Left  -KK Orientation: anterior  -KK Location: foot  -KK Primary Wound Type: Other  -KK, Lymphedema     Dressing Appearance  dry;moist drainage  -HB  dry;moist drainage  -HB  dry;moist drainage  -HB    Closure  NELY  -HB  NELY  -HB  NEYL  -HB    Base  red;moist;dry;scab  -HB  red;moist;dry;scab  -HB  red;moist;dry;scab  -HB    Periwound  redness;dry;edematous  -HB  redness;dry;edematous  -HB  redness;dry;edematous  -HB    Periwound Temperature  warm  -HB  warm  -HB  warm  -HB    Periwound Skin Turgor  firm  -HB  firm  -HB  firm  -HB    Drainage Characteristics/Odor  serosanguineous  -HB  serosanguineous  -HB  serosanguineous  -HB    Drainage Amount  small  -HB  small  -HB  small  -HB    Care, Wound  --  --  cleansed with;antimicrobial agent applied;barrier applied  -HB    Dressing Care, Wound  --  --  dressing applied;abdominal pad;other (see comments) kerlix   -HB    Row Name 01/28/20 2306 01/28/20 1905          Wound 01/28/20 0055 Left arm Skin Tear    Wound - Properties Group Date first assessed:  01/28/20  -NB Time first assessed: 0055  -NB Side: Left  -NB Location: arm  -NB Primary Wound Type: Skin tear  -NB    Dressing Appearance  dry;intact;no drainage  -AH  dry;intact;no drainage  -AH     Closure  None  -AH  None  -AH     Base  dry;clean;scab;black eschar  -AH  dry;clean;scab;black eschar  -AH     Periwound  dry;intact  -AH  dry;intact  -AH     Periwound Temperature  warm  -AH  warm  -AH     Periwound Skin Turgor  soft  -AH  soft  -AH     Drainage Amount  none  -AH  none  -AH        Wound 01/28/20 0458 Right anterior foot Other (comment)    Wound - Properties Group Date first assessed: 01/28/20 -KK Time first assessed: 0458  -KK Present on Hospital Admission: Y  -KK Side: Right  -KK Orientation: anterior  -KK Location: foot  -KK Primary Wound Type: Other  -KK, Lymphedema      Dressing Appearance  intact;no drainage  -AH  intact;no drainage  -AH     Closure  NELY  -AH  NELY  -AH     Drainage Characteristics/Odor  yellow;clear  -AH  yellow;clear  -AH     Drainage Amount  small  -AH  small  -AH     Dressing Care, Wound  --  dressing reinforced;abdominal pad;gauze, dry maxorb  -AH     Periwound Care, Wound  --  moisturizer applied  -AH        Wound 01/28/20 0500 Left anterior foot Other (comment)    Wound - Properties Group Date first assessed: 01/28/20 -KK Time first assessed: 0500  -KK Present on Hospital Admission: Y  -KK Side: Left  -KK Orientation: anterior  -KK Location: foot  -KK Primary Wound Type: Other  -KK, Lymphedema     Dressing Appearance  intact;no drainage  -AH  intact;no drainage  -AH     Closure  NELY  -AH  NELY  -AH     Drainage Characteristics/Odor  yellow;clear  -AH  yellow;clear  -AH     Drainage Amount  none  -AH  none  -AH     Dressing Care, Wound  --  dressing reinforced;gauze;abdominal pad maxorb  -AH     Periwound Care, Wound  --  moisturizer applied  -AH       User Key  (r) = Recorded By, (t) = Taken By, (c) = Cosigned By    Initials Name Provider Type    Lina Collins RN  Registered Nurse    Adrienne Ash, RN Registered Nurse    Vandana Lazaro RN Registered Nurse    Bess Keller LPN Licensed Nurse          Procedures:              Results Review:     I reviewed the patient's new clinical results.      Lab Results (last 24 hours)     Procedure Component Value Units Date/Time    CBC & Differential [[id number]] Collected:  02/03/20 0326    Specimen:  Blood Updated:  02/03/20 0605    Narrative:       The following orders were created for panel order CBC & Differential.  Procedure                               Abnormality         Status                     ---------                               -----------         ------                     CBC Auto Differential[[id number]]        Abnormal            Final result                 Please view results for these tests on the individual orders.    CBC Auto Differential [[id number]]  (Abnormal) Collected:  02/03/20 0326    Specimen:  Blood Updated:  02/03/20 0605     WBC 4.90 10*3/mm3      RBC 2.72 10*6/mm3      Hemoglobin 8.3 g/dL      Hematocrit 26.3 %      MCV 96.7 fL      MCH 30.5 pg      MCHC 31.6 g/dL      RDW 17.4 %      RDW-SD 59.5 fl      MPV 7.2 fL      Platelets 53 10*3/mm3      Neutrophil % 70.5 %      Lymphocyte % 13.7 %      Monocyte % 10.1 %      Eosinophil % 4.7 %      Basophil % 1.0 %      Neutrophils, Absolute 3.40 10*3/mm3      Lymphocytes, Absolute 0.70 10*3/mm3      Monocytes, Absolute 0.50 10*3/mm3      Eosinophils, Absolute 0.20 10*3/mm3      Basophils, Absolute 0.00 10*3/mm3      nRBC 0.1 /100 WBC     Scan Slide [[id number]] Collected:  02/03/20 0326    Specimen:  Blood Updated:  02/03/20 0605     Anisocytosis Slight/1+     Crenated RBC's Slight/1+     Poikilocytes Slight/1+     WBC Morphology Normal     Clumped Platelets Present    Narrative:       Slide Reviewed      Basic Metabolic Panel [[id number]]  (Abnormal) Collected:  02/03/20 0326    Specimen:  Blood Updated:  02/03/20 0436     Glucose 92 mg/dL       BUN 57 mg/dL      Creatinine 2.04 mg/dL      Sodium 137 mmol/L      Potassium 5.0 mmol/L      Chloride 110 mmol/L      CO2 20.0 mmol/L      Calcium 8.2 mg/dL      eGFR Non African Amer 24 mL/min/1.73      BUN/Creatinine Ratio 27.9     Anion Gap 7.0 mmol/L     Narrative:       GFR Normal >60  Chronic Kidney Disease <60  Kidney Failure <15          No results found for: HGBA1C  Results from last 7 days   Lab Units 01/28/20  0110   INR  1.07           Lab Results   Component Value Date    LIPASE 42 01/13/2018     Lab Results   Component Value Date    CHOL 159 01/28/2020    TRIG 62 01/28/2020    HDL 81 (H) 01/28/2020    LDL 66 01/28/2020       No results found for: INTRAOP, PREDX, FINALDX, COMDX    Microbiology Results (last 10 days)     Procedure Component Value - Date/Time    Urine Culture - Urine, Urine, Catheter [[id number]]  (Normal) Collected:  01/28/20 0122    Lab Status:  Final result Specimen:  Urine, Catheter Updated:  01/29/20 1912     Urine Culture No growth          ECG/EMG Results (most recent)     Procedure Component Value Units Date/Time    ECG 12 Lead [[id number]] Collected:  01/28/20 0257     Updated:  01/28/20 0604    Narrative:       HEART RATE= 61  bpm  RR Interval= 991  ms  CO Interval=   ms  P Horizontal Axis= 183  deg  P Front Axis=   deg  QRSD Interval= 184  ms  QT Interval= 484  ms  QRS Axis= -68  deg  T Wave Axis= 113  deg  - ABNORMAL ECG -  Afib/flutter and ventricular-paced rhythm  When compared with ECG of 16-Apr-2019 22:53:12,  No significant change  Electronically Signed By: Kostas Dominguez (Pop) 28-Jan-2020 06:04:33  Date and Time of Study: 2020-01-28 02:57:18    EP/CRM Study [[id number]] Resulted:  01/31/20 1517     Updated:  01/31/20 1522    Narrative:       Date of procedure 1/31/2020    Procedure performed  Temporary pacemaker through right groin (femoral vein).  ICD pulse generator removal and replacement (single-chamber Medtronic).  Invasive interrogation of single-chamber  ICD  Conscious sedation.  Fluoroscopy    Indications  Patient had ICD single-chamber placement in 2012.  Device has reached JOHN.  Patient is pacemaker dependent.      Procedure  Temporary pacemaker insertion.  Under usual sterile precautions and 1% Xylocaine infiltration right   femoral vein was percutaneously punctured and a 7 Sami vascular sheath   was placed in the right femoral vein.  A 5 Sami balloontipped temporary   pacemaker was advanced and was placed at the right ventricular apex and   satisfactory pacing was established.    Under usual sterile precautions and 1% Xylocaine infiltration and incision   was made over the previous scar over the ICD pulse generator and the   pocket was opened up carefully avoiding any trauma to the leads.  The skin   is extremely thin.  The dilator was removed from the pocket and    from the lead with temporary pacemaker backup.  The pocket was revised   superiorly to place the new pulse generator.  The new pulse generator   [employer] model number D VF BI D4 serial number [id number] was connected   to the lead and was placed in the previously revised subcutaneous pocket.    Pocket was irrigated with bacitracin solution.  Arixtra was placed in the   pocket.  The skin was closed in a subcuticular fashion using 3-0 and 4-0   Vicryl sutures.  Steri-Strips and pressure bandage were placed.  Please   note the skin is extremely thin and difficult to put it together.  Patient   tolerated the procedure well.  No complications were noted.    ECG 12 Lead [[id number]] Collected:  01/31/20 1600     Updated:  02/01/20 1008    Narrative:       HEART RATE= 60  bpm  RR Interval= 1001  ms  SD Interval=   ms  P Horizontal Axis= 122  deg  P Front Axis=   deg  QRSD Interval= 167  ms  QT Interval= 456  ms  QRS Axis= -70  deg  T Wave Axis= 111  deg  - ABNORMAL ECG -  Afib/flutter and ventricular-paced rhythm  When compared with ECG of 28-Jan-2020 2:57:18,  No significant  change  Electronically Signed By: Dionna Laird (SANDEEP) 01-Feb-2020 10:08:35  Date and Time of Study: 2020-01-31 16:00:38          Results for orders placed during the hospital encounter of 08/08/19   Venous w Reflux Lower Extremity - Bilateral CAR    Narrative · The patient is in a reverse Trendelenburg position.  · There is evidence of significant reflux of the right common femoral   vein. There is evidence of significant reflux of the right mid femoral   vein. There is evidence of severe reflux of the right greater saphenous   vein below the knee. There is evidence of severe reflux of the right small   saphenous vein.  · There is evidence of mild reflux of the left greater saphenous vein at   the distal thigh.               Ct Head Without Contrast    Result Date: 1/28/2020  1. No fracture or intracranial hemorrhage. 2. Mild chronic age-related intracranial findings as above.  This examination was interpreted by Marcus Linares M.D. Electronically signed by:  Marcus Linares M.D.  1/28/2020 12:08 AM    Xr Chest 1 View    Result Date: 1/28/2020   1. Increasing density in the left lung base felt to represent atelectasis and/or early pneumonia, follow-up in a couple days is recommended 2. Left-sided pacemaker defibrillator appears unchanged 3. No evidence of pleural effusion 4. Mild interval worsening from 12/14/2018  Electronically Signed By-Austin Nunez Jr. On:1/28/2020 7:14 AM This report was finalized on [id number] by  Austin Nunez Jr., .          Xrays, labs reviewed personally by physician.    Medication Review:   I have reviewed the patient's current medication list      Scheduled Meds    aspirin 81 mg Oral Daily   colchicine 0.6 mg Oral Daily   digoxin 125 mcg Oral Daily   enoxaparin 30 mg Subcutaneous Q24H   febuxostat 40 mg Oral BID   gabapentin 100 mg Oral TID   sodium chloride 10 mL Intravenous Q12H   sodium chloride 3 mL Intravenous Q12H   zinc oxide  Topical Q12H       Meds Infusions       Meds  PRN  •  acetaminophen **OR** acetaminophen **OR** acetaminophen  •  aluminum-magnesium hydroxide-simethicone  •  bisacodyl  •  magnesium hydroxide  •  melatonin  •  ondansetron **OR** ondansetron  •  sodium chloride  •  sodium chloride      Assessment/Plan   Assessment/Plan     Active Hospital Problems    Diagnosis  POA   • **Urinary tract infection [N39.0]  Yes   • Syncope and collapse [R55]  Yes   • Lymphedema of both lower extremities [I89.0]  Yes   • Presence of cardiac pacemaker [Z95.0]  Yes   • Delirium [R41.0]  Yes   • Non-pressure chronic ulcer right ankle, limited to breakdown skin (CMS/HCC) [L97.311]  Yes   • Non-pressure chronic ulcer left lower leg, limited to breakdown skin (CMS/HCC) [L97.921]  Unknown   • Presence of biventricular implantable cardioverter-defibrillator (ICD) [Z95.810]  Unknown   • Chronic low back pain [M54.5, G89.29]  Yes   • Gout [M10.9]  Yes   • Hypertension, benign [I10]  Yes   • Congestive heart failure (CMS/HCC) [I50.9]  Yes   • Atrial fibrillation (CMS/HCC) [I48.91]  Yes      Resolved Hospital Problems   No resolved problems to display.       MEDICAL DECISION MAKING COMPLEXITY BY PROBLEM:     Urinary tract infection  - off rocephin, on oral omnicef now  - ID on board .... Will follow there recommendation for antibiotics.      Altered mental status / metabolic encephalopathy improved now.   -Most likely due to UTI     -Syncope l  -Multiple episodes of waking up on the floor  -To be secondary to UTI and AMS.  -Pacemaker interrogated by ED  - battery changed by cardiology service on this admission  - Discharge after cleared by cardiology service, cardiology service advised podiatry evaluation.    Congestive heart failure stable.  -BNP 13,384, CXR cardiomegaly.  Likely due to CONG  -Patient is on p.o. Bumex 1 mg twice daily  -Elevated creatinine 2.31     Elevated troponin  -Initial troponin 0.029, most likely due to CONG  -Serial troponins     CONG on CKD stage 3 improved.   -BUN 59  creatinine 2.31  -Baseline creatinine appears to be 1.8-2.0  - d/c iv fluids in order to avoid fluid overload.      Chronic lymphedema and cellulitis bilateral lower extremities  -Patient has been current with home health. .  -Pt recently finished Keflex  -Wound nurse to see  -  Dr. Negrete yesterday advised podiatry evaluation ,   Atrial fibrillation  -Continue digoxin and Lopressor     Gout  -Continue colchicine and Uloric     Chronic pain  Continue Neurontin (INSPECT reviewed)        Patient clinically improving waiting for placement.        VTE Prophylaxis - Lovenox 30 mg SC daily.         VTE Prophylaxis - SCDs.      Code Status -   Code Status and Medical Interventions:   Ordered at: 01/28/20 0547     Limited Support to NOT Include:    Intubation     Level Of Support Discussed With:    Patient     Code Status:    No CPR     Medical Interventions (Level of Support Prior to Arrest):    Limited       Discharge Planning          Destination      Coordination has not been started for this encounter.      Durable Medical Equipment      Coordination has not been started for this encounter.      Dialysis/Infusion      Coordination has not been started for this encounter.      Home Medical Care - Selection Complete      Service Provider Request Status Selected Services Address Phone Number Fax Number    [facility] [address] [facility] [place] [id number] [phone]       Manisha Calderon RN 1/28/2020 1229    Oservation Admission                 Therapy      Coordination has not been started for this encounter.      Community Resources      Coordination has not been started for this encounter.            Electronically signed by Apolinar Alvarez MD, 02/03/20, 5:05 PM.  [facility] Hospitalist Team

## 2020-02-03 NOTE — PLAN OF CARE
Problem: Patient Care Overview  Goal: Plan of Care Review  2/3/2020 1502 by Winifred Conway, PT  Outcome: Ongoing (interventions implemented as appropriate)  Flowsheets (Taken 2/3/2020 1502)  Plan of Care Reviewed With: patient  Note:   Pt alert  and oriented x 3, Pt trained in bed mobility rolling conditional independent, sit to supine to sit MIN A, sit to stand SBA (per pt report she falls a lot and does not know why), CGA gait with FWW 40 feet before fatigue CGA. Pt trained in seated LE exercises.   2/3/2020 1458 by Winifred Conway, PT  Outcome: Ongoing (interventions implemented as appropriate)  Flowsheets (Taken 2/3/2020 1450)  Plan of Care Reviewed With: patient  Note:   Pt trained bed mobility rolling conditional independence, supine to sit to supine MIN A. Sit to stand SBA and UE support due to hx falls per pt report, gait 40 feet CGA and FWW. Pt trained in seated LE exercise with VC, hip flex, LAQ.

## 2020-02-03 NOTE — PROGRESS NOTES
Referring Provider: Apolinar Alvarez MD    Reason for follow-up:  Status post ICD.  ICD has reached JOHN status.  Status post ICD pulse generator replacement.     Patient Care Team:  Rosa M Chavez APRN as PCP - General (Nurse Practitioner)    Subjective .  Feeling okay     ROS    Since I have last seen her yesterday, the patient has been without any chest discomfort ,shortness of breath, palpitations, dizziness or syncope.  Denies having any headache ,abdominal pain ,nausea, vomiting , diarrhea constipation, loss of weight or loss of appetite.  Denies having any excessive bruising ,hematuria or blood in the stool.    Review of all systems negative except as indicated    History  Past Medical History:   Diagnosis Date   • CHF (congestive heart failure) (CMS/HCC)    • History of transfusion    • Hypertension    • Lymphedema of leg    • Myocardial infarction (CMS/HCC)        Past Surgical History:   Procedure Laterality Date   • CARDIAC ELECTROPHYSIOLOGY PROCEDURE N/A 1/31/2020    Procedure: ICD battery change;  Surgeon: Dionna Laird MD;  Location: [facility] CATH INVASIVE LOCATION;  Service: Cardiovascular   • CARDIAC ELECTROPHYSIOLOGY PROCEDURE N/A 1/31/2020    Procedure: Temporary Pacemaker;  Surgeon: Dionna Laird MD;  Location: [facility] CATH INVASIVE LOCATION;  Service: Cardiovascular   • CARDIAC ELECTROPHYSIOLOGY PROCEDURE N/A 1/31/2020    Procedure: Pocket Revision;  Surgeon: Dionna Laird MD;  Location: [facility] CATH INVASIVE LOCATION;  Service: Cardiovascular   • EYE SURGERY     • PACEMAKER IMPLANTATION         Family History   Family history unknown: Yes       Social History     Tobacco Use   • Smoking status: Never Smoker   • Smokeless tobacco: Never Used   Substance Use Topics   • Alcohol use: Never     Frequency: Never   • Drug use: Never        Medications Prior to Admission   Medication Sig Dispense Refill Last Dose   • aspirin 81 MG chewable tablet Chew 81 mg Daily.      • bumetanide (BUMEX) 1  "MG tablet Take 1 mg by mouth 2 (Two) Times a Day. Morning and noon  1    • cephalexin (KEFLEX) 500 MG capsule Take 500 mg by mouth 4 (Four) Times a Day.      • colchicine 0.6 MG tablet Take 0.6 mg by mouth Daily.      • digoxin (LANOXIN) 125 MCG tablet Take 125 mcg by mouth Daily.      • ezetimibe (ZETIA) 10 MG tablet Take 10 mg by mouth Daily.      • febuxostat (ULORIC) 40 MG tablet Take 40 mg by mouth 2 (Two) Times a Day.      • gabapentin (NEURONTIN) 100 MG capsule Take 100 mg by mouth 3 (Three) Times a Day.      • metoprolol tartrate (LOPRESSOR) 50 MG tablet Take 50 mg by mouth 2 (Two) Times a Day.      • traMADol (ULTRAM) 50 MG tablet Take 50 mg by mouth Every 6 (Six) Hours As Needed.          Allergies  Allopurinol; Clindamycin hcl; Codeine; Furosemide; Hydrochlorothiazide; Naproxen; and Sulfa antibiotics    Scheduled Meds:    aspirin 81 mg Oral Daily   cefdinir 300 mg Oral Q24H   colchicine 0.6 mg Oral Daily   digoxin 125 mcg Oral Daily   enoxaparin 30 mg Subcutaneous Q24H   febuxostat 40 mg Oral BID   gabapentin 100 mg Oral TID   sodium chloride 10 mL Intravenous Q12H   sodium chloride 3 mL Intravenous Q12H   zinc oxide  Topical Q12H     Continuous Infusions:   PRN Meds:.•  acetaminophen **OR** acetaminophen **OR** acetaminophen  •  aluminum-magnesium hydroxide-simethicone  •  bisacodyl  •  magnesium hydroxide  •  melatonin  •  ondansetron **OR** ondansetron  •  sodium chloride  •  sodium chloride    Objective     VITAL SIGNS  Vitals:    02/02/20 1414 02/02/20 1415 02/02/20 2016 02/03/20 0358   BP: 161/76 161/76 158/58 134/78   BP Location: Left arm  Right arm Right arm   Patient Position: Sitting  Sitting Lying   Pulse: 60 60 60 69   Resp:   16 18   Temp:   98.4 °F (36.9 °C) 98 °F (36.7 °C)   TempSrc:   Oral Oral   SpO2: 100%  100% 99%   Weight:    87.2 kg (192 lb 3.9 oz)   Height:           Flowsheet Rows      First Filed Value   Admission Height  157.5 cm (62\") Documented at 01/28/2020 0037   Admission " Weight  64.4 kg (142 lb) Documented at 01/28/2020 0037            Intake/Output Summary (Last 24 hours) at 2/3/2020 0865  Last data filed at 2/3/2020 0738  Gross per 24 hour   Intake 1320 ml   Output 1301 ml   Net 19 ml        TELEMETRY: Pacemaker rhythm.    Physical Exam:  The patient is alert, oriented and in no distress.  Vital signs as noted above.  Head and neck revealed no carotid bruits or jugular venous distention.  No thyromegaly or lymphadenopathy is present  Lungs clear.  No wheezing.  Breath sounds are normal bilaterally.  Heart normal first and second heart sounds.  No murmur. No precordial rub is present.  No gallop is present.  Abdomen soft and nontender.  No organomegaly is present.  Extremities with good peripheral pulses.  Significant lower extremity lymphedema.  Skin warm and dry.  ICD site looks normal.  Musculoskeletal system is grossly normal  CNS grossly normal      Results Review:   I reviewed the patient's new clinical results.  Lab Results (last 24 hours)     Procedure Component Value Units Date/Time    CBC & Differential [[id number]] Collected:  02/03/20 0326    Specimen:  Blood Updated:  02/03/20 0605    Narrative:       The following orders were created for panel order CBC & Differential.  Procedure                               Abnormality         Status                     ---------                               -----------         ------                     CBC Auto Differential[[id number]]        Abnormal            Final result                 Please view results for these tests on the individual orders.    CBC Auto Differential [[id number]]  (Abnormal) Collected:  02/03/20 0326    Specimen:  Blood Updated:  02/03/20 0605     WBC 4.90 10*3/mm3      RBC 2.72 10*6/mm3      Hemoglobin 8.3 g/dL      Hematocrit 26.3 %      MCV 96.7 fL      MCH 30.5 pg      MCHC 31.6 g/dL      RDW 17.4 %      RDW-SD 59.5 fl      MPV 7.2 fL      Platelets 53 10*3/mm3      Neutrophil % 70.5 %      Lymphocyte %  13.7 %      Monocyte % 10.1 %      Eosinophil % 4.7 %      Basophil % 1.0 %      Neutrophils, Absolute 3.40 10*3/mm3      Lymphocytes, Absolute 0.70 10*3/mm3      Monocytes, Absolute 0.50 10*3/mm3      Eosinophils, Absolute 0.20 10*3/mm3      Basophils, Absolute 0.00 10*3/mm3      nRBC 0.1 /100 WBC     Scan Slide [[id number]] Collected:  02/03/20 0326    Specimen:  Blood Updated:  02/03/20 0605     Anisocytosis Slight/1+     Crenated RBC's Slight/1+     Poikilocytes Slight/1+     WBC Morphology Normal     Clumped Platelets Present    Narrative:       Slide Reviewed      Basic Metabolic Panel [[id number]]  (Abnormal) Collected:  02/03/20 0326    Specimen:  Blood Updated:  02/03/20 0436     Glucose 92 mg/dL      BUN 57 mg/dL      Creatinine 2.04 mg/dL      Sodium 137 mmol/L      Potassium 5.0 mmol/L      Chloride 110 mmol/L      CO2 20.0 mmol/L      Calcium 8.2 mg/dL      eGFR Non African Amer 24 mL/min/1.73      BUN/Creatinine Ratio 27.9     Anion Gap 7.0 mmol/L     Narrative:       GFR Normal >60  Chronic Kidney Disease <60  Kidney Failure <15            Imaging Results (Last 24 Hours)     ** No results found for the last 24 hours. **      LAB RESULTS (LAST 7 DAYS)    CBC  Results from last 7 days   Lab Units 02/03/20  0326 02/02/20  0343 02/01/20  0502 01/31/20  0251 01/30/20  0544 01/29/20  0817 01/28/20  0110   WBC 10*3/mm3 4.90 5.80 5.80 5.80 5.60 6.20 6.70   RBC 10*6/mm3 2.72* 2.97* 2.99* 3.03* 2.81* 2.91* 3.35*   HEMOGLOBIN g/dL 8.3* 9.2* 9.6* 9.3* 9.0* 9.5* 10.7*   HEMATOCRIT % 26.3* 28.6* 28.9* 28.3* 26.8* 28.2* 31.9*   MCV fL 96.7 96.1 96.8 93.6 95.3 97.1* 95.2   PLATELETS 10*3/mm3 53* 61* 92* 110* 136* 103* 622*       BMP  Results from last 7 days   Lab Units 02/03/20  0326 02/02/20  0343 02/01/20  0244 01/31/20  0251 01/30/20  0543 01/29/20  0357 01/28/20  0401   SODIUM mmol/L 137 139 136 136 137 136 137   POTASSIUM mmol/L 5.0 5.4* 5.1 5.4* 5.4* 4.8 4.7   CHLORIDE mmol/L 110* 111* 108* 109* 110* 110* 109*      CO2 mmol/L 20.0* 20.0* 17.0* 17.0* 18.0* 15.0* 17.0*   BUN mg/dL 57* 55* 56* 59* 63* 69* 67*   CREATININE mg/dL 2.04* 1.90* 1.94* 2.05* 2.16* 1.99* 2.23*   GLUCOSE mg/dL 92 91 86 90 82 93 82       CMP   Results from last 7 days   Lab Units 02/03/20  0326 02/02/20  0343 02/01/20  0244 01/31/20  0251 01/30/20  0543 01/29/20  0357 01/28/20  0618 01/28/20  0401 01/28/20  0110   SODIUM mmol/L 137 139 136 136 137 136  --  137 136   POTASSIUM mmol/L 5.0 5.4* 5.1 5.4* 5.4* 4.8  --  4.7 5.0   CHLORIDE mmol/L 110* 111* 108* 109* 110* 110*  --  109* 106   CO2 mmol/L 20.0* 20.0* 17.0* 17.0* 18.0* 15.0*  --  17.0* 18.0*   BUN mg/dL 57* 55* 56* 59* 63* 69*  --  67* 59*   CREATININE mg/dL 2.04* 1.90* 1.94* 2.05* 2.16* 1.99*  --  2.23* 2.31*   GLUCOSE mg/dL 92 91 86 90 82 93  --  82 82   ALBUMIN g/dL  --   --   --   --   --   --   --   --  3.10*   BILIRUBIN mg/dL  --   --   --   --   --   --   --   --  0.7   ALK PHOS U/L  --   --   --   --   --   --   --   --  142*   AST (SGOT) U/L  --   --   --   --   --   --   --   --  37*   ALT (SGPT) U/L  --   --   --   --   --   --   --   --  14   AMMONIA umol/L  --   --   --   --   --   --  49  --   --          BNP        TROPONIN  Results from last 7 days   Lab Units 01/28/20  0806   TROPONIN T ng/mL 0.022       CoAg  Results from last 7 days   Lab Units 01/28/20  0110   INR  1.07   APTT seconds 22.3*       Creatinine Clearance  Estimated Creatinine Clearance: 24.3 mL/min (A) (by C-G formula based on SCr of 2.04 mg/dL (H)).    ABG        Radiology  No radiology results for the last day            EKG          I personally viewed and interpreted the patient's EKG/Telemetry data:    ECHOCARDIOGRAM:             STRESS MYOVIEW:    Cardiolite (Tc-99m Sestamibi) stress test    CARDIAC CATHETERIZATION:            OTHER:         Assessment/Plan     Principal Problem:    Urinary tract infection  Active Problems:    Congestive heart failure (CMS/HCC)    Chronic low back pain    Atrial fibrillation  (CMS/HCC)    Hypertension, benign    Gout    Syncope and collapse    Lymphedema of both lower extremities    Presence of cardiac pacemaker    Delirium    Non-pressure chronic ulcer right ankle, limited to breakdown skin (CMS/HCC)    Non-pressure chronic ulcer left lower leg, limited to breakdown skin (CMS/HCC)          1 urinary tract infection  2.Altered mental status changes  3.  Syncope  4.  History of atrial fibrillation  5.  Status post pacemaker placement  6.  Congestive heart failure  7.  Hypertension  8.  Nonhealing pressure ulcers  9. acute renal failure with history of chronic renal sufficiency  10.  Chronic lymphedema and cellulitis of the lower extremities    1/31/2020  Temporary pacemaker in the right groin  Removal and replacement of single-chamber ICD pulse generator.  ([employer])  Revision of pocket.  Patient tolerated the procedure well.  No complications were noted.    Plan  Patient has Medtronic single-chamber ICD that was placed in 2012.  Pulse generator has reached JOHN.  Status post single-chamber ICD pulse generator replacement 1/31/2020.  Patient had temporary pacemaker because patient was pacemaker dependent.  ICD site and function is normal.  Okay with discharge plans from ICD standpoint  Infection in the near future is a concern since patient has multiple sources of infection.  Patient was cleared for generator replacement by infectious diseases.  Further plan will depend on patient's progress.  ]]]]]]]]]]]]]]]]             Dionna Laird MD  02/03/20  8:25 AM

## 2020-02-03 NOTE — PROGRESS NOTES
Infectious Diseases Progress Note      LOS: 3 days   Patient Care Team:  Rosa M Chavez APRN as PCP - General (Nurse Practitioner)        Subjective       The patient has been afebrile for the last 24 hours.  The patient is on room air, hemodynamically stable, and is tolerating antimicrobial therapy.  She has no new complaints.       Review of Systems:   Review of Systems   Constitutional: Negative.    HENT: Negative.    Respiratory: Negative.    Cardiovascular: Positive for leg swelling.   Gastrointestinal: Negative.    Genitourinary: Negative.    Musculoskeletal: Positive for arthralgias.   Skin: Positive for rash and wound.   Neurological: Negative.    Psychiatric/Behavioral: Negative.    All other systems reviewed and are negative.       Objective     Vital Signs  Temp:  [97.7 °F (36.5 °C)-98.4 °F (36.9 °C)] 97.7 °F (36.5 °C)  Heart Rate:  [57-69] 57  Resp:  [16-18] 17  BP: (134-159)/(58-78) 159/72    Physical Exam:  Physical Exam   Constitutional: She is oriented to person, place, and time. She appears well-developed and well-nourished.   HENT:   Head: Normocephalic and atraumatic.   Eyes: Pupils are equal, round, and reactive to light. Conjunctivae and EOM are normal.   Neck: Neck supple.   Cardiovascular: Normal rate, regular rhythm and normal heart sounds.   Pulmonary/Chest: Effort normal and breath sounds normal.   Abdominal: Soft. Bowel sounds are normal.   Musculoskeletal: She exhibits edema.   Neurological: She is alert and oriented to person, place, and time.   Skin: Skin is warm and dry.   patient has chronic staining of bilateral lower legs from below the knee to the feet.  Patient has chronic changes of the skin due to chronic swelling.  There are several areas of macerated and denuded skin and the patient states that she has fairly constant weeping of both lower extremities.  Patient has palpable pulses but they are very difficult due to the edema and skin changes.     Psychiatric: She has a  normal mood and affect.   Vitals reviewed.       Results Review:    I have reviewed all clinical data, test, lab, and imaging results.     Radiology  No Radiology Exams Resulted Within Past 24 Hours    Cardiology    Laboratory  Results from last 7 days   Lab Units 02/03/20  0326   WBC 10*3/mm3 4.90   HEMOGLOBIN g/dL 8.3*   HEMATOCRIT % 26.3*   PLATELETS 10*3/mm3 53*     Results from last 7 days   Lab Units 02/03/20  0326   SODIUM mmol/L 137   POTASSIUM mmol/L 5.0   CHLORIDE mmol/L 110*   CO2 mmol/L 20.0*   BUN mg/dL 57*   CREATININE mg/dL 2.04*   GLUCOSE mg/dL 92   CALCIUM mg/dL 8.2*     Results from last 7 days   Lab Units 02/03/20  0326   SODIUM mmol/L 137   POTASSIUM mmol/L 5.0   CHLORIDE mmol/L 110*   CO2 mmol/L 20.0*   BUN mg/dL 57*   CREATININE mg/dL 2.04*   GLUCOSE mg/dL 92   CALCIUM mg/dL 8.2*                 Microbiology   Microbiology Results (last 10 days)     Procedure Component Value - Date/Time    Urine Culture - Urine, Urine, Catheter [[id number]]  (Normal) Collected:  01/28/20 0122    Lab Status:  Final result Specimen:  Urine, Catheter Updated:  01/29/20 1912     Urine Culture No growth          Medication Review:       Schedule Meds    aspirin 81 mg Oral Daily   colchicine 0.6 mg Oral Daily   digoxin 125 mcg Oral Daily   enoxaparin 30 mg Subcutaneous Q24H   febuxostat 40 mg Oral BID   gabapentin 100 mg Oral TID   sodium chloride 10 mL Intravenous Q12H   sodium chloride 3 mL Intravenous Q12H   zinc oxide  Topical Q12H       Infusion Meds       PRN Meds  •  acetaminophen **OR** acetaminophen **OR** acetaminophen  •  aluminum-magnesium hydroxide-simethicone  •  bisacodyl  •  magnesium hydroxide  •  melatonin  •  ondansetron **OR** ondansetron  •  sodium chloride  •  sodium chloride        Assessment/Plan       Antimicrobial Therapy   1.  P.o. Omnicef    Day 5  2.      Day  3.      Day  4.      Day  5.      Day      Assessment     Chronic bilateral lower leg lymphedema with frequent weeping  -Chronic  staining below the knee of both extremities with chronic skin changes due to constant swelling and weeping  -Some areas of macerated skin and denuded skin but no overt signs of infection     Urinary tract infection  -Urinalysis showed +2 bacteria with 21-30 white blood cells but the culture was negative  -However the patient was on p.o. Keflex before admission  -Patient says she has frequent urinary tract infections     History of pacemaker placement  -Pacemaker was interrogated and uses a battery change     Acute kidney injury on chronic kidney disease     Congestive heart failure, A. fib     Plan     Continue p.o. Omnicef 300mg qd for 2 more days-for 7 days total treatment  Wound care is already seen the patient and suggested certain dressing changes and compression dressings  Patient may benefit from going to the lymphedema clinic  Continue supportive care  Okay to discharge from infectious disease point    Harper Darling, CHRISTIANO  02/03/20  3:06 PM     Note is dictated utilizing voice recognition software/Dragon

## 2020-02-04 PROBLEM — R41.0 DELIRIUM: Status: RESOLVED | Noted: 2020-01-28 | Resolved: 2020-02-04

## 2020-02-04 LAB
ANION GAP SERPL CALCULATED.3IONS-SCNC: 11 MMOL/L (ref 5–15)
ANISOCYTOSIS BLD QL: NORMAL
BASOPHILS # BLD AUTO: 0.1 10*3/MM3 (ref 0–0.2)
BASOPHILS NFR BLD AUTO: 0.9 % (ref 0–1.5)
BUN BLD-MCNC: 56 MG/DL (ref 8–23)
BUN/CREAT SERPL: 31.6 (ref 7–25)
BURR CELLS BLD QL SMEAR: NORMAL
CALCIUM SPEC-SCNC: 8.1 MG/DL (ref 8.6–10.5)
CHLORIDE SERPL-SCNC: 106 MMOL/L (ref 98–107)
CLUMPED PLATELETS: PRESENT
CO2 SERPL-SCNC: 16 MMOL/L (ref 22–29)
CREAT BLD-MCNC: 1.77 MG/DL (ref 0.57–1)
DEPRECATED RDW RBC AUTO: 66.5 FL (ref 37–54)
EOSINOPHIL # BLD AUTO: 0.2 10*3/MM3 (ref 0–0.4)
EOSINOPHIL NFR BLD AUTO: 3.7 % (ref 0.3–6.2)
ERYTHROCYTE [DISTWIDTH] IN BLOOD BY AUTOMATED COUNT: 18.3 % (ref 12.3–15.4)
GFR SERPL CREATININE-BSD FRML MDRD: 28 ML/MIN/1.73
GLUCOSE BLD-MCNC: 91 MG/DL (ref 65–99)
HCT VFR BLD AUTO: 26.4 % (ref 34–46.6)
HGB BLD-MCNC: 8.5 G/DL (ref 12–15.9)
LYMPHOCYTES # BLD AUTO: 0.6 10*3/MM3 (ref 0.7–3.1)
LYMPHOCYTES NFR BLD AUTO: 8.5 % (ref 19.6–45.3)
MCH RBC QN AUTO: 32 PG (ref 26.6–33)
MCHC RBC AUTO-ENTMCNC: 32.1 G/DL (ref 31.5–35.7)
MCV RBC AUTO: 99.6 FL (ref 79–97)
MONOCYTES # BLD AUTO: 0.7 10*3/MM3 (ref 0.1–0.9)
MONOCYTES NFR BLD AUTO: 11 % (ref 5–12)
NEUTROPHILS # BLD AUTO: 5 10*3/MM3 (ref 1.7–7)
NEUTROPHILS NFR BLD AUTO: 75.9 % (ref 42.7–76)
NRBC BLD AUTO-RTO: 0.1 /100 WBC (ref 0–0.2)
PLATELET # BLD AUTO: 63 10*3/MM3 (ref 140–450)
PMV BLD AUTO: 8.7 FL (ref 6–12)
POIKILOCYTOSIS BLD QL SMEAR: NORMAL
POTASSIUM BLD-SCNC: 4.8 MMOL/L (ref 3.5–5.2)
RBC # BLD AUTO: 2.65 10*6/MM3 (ref 3.77–5.28)
SODIUM BLD-SCNC: 133 MMOL/L (ref 136–145)
WBC MORPH BLD: NORMAL
WBC NRBC COR # BLD: 6.6 10*3/MM3 (ref 3.4–10.8)

## 2020-02-04 PROCEDURE — 99024 POSTOP FOLLOW-UP VISIT: CPT | Performed by: INTERNAL MEDICINE

## 2020-02-04 PROCEDURE — 85025 COMPLETE CBC W/AUTO DIFF WBC: CPT | Performed by: INTERNAL MEDICINE

## 2020-02-04 PROCEDURE — 80048 BASIC METABOLIC PNL TOTAL CA: CPT | Performed by: INTERNAL MEDICINE

## 2020-02-04 PROCEDURE — 99232 SBSQ HOSP IP/OBS MODERATE 35: CPT | Performed by: INTERNAL MEDICINE

## 2020-02-04 PROCEDURE — 85007 BL SMEAR W/DIFF WBC COUNT: CPT | Performed by: INTERNAL MEDICINE

## 2020-02-04 PROCEDURE — 25010000002 ENOXAPARIN PER 10 MG: Performed by: INTERNAL MEDICINE

## 2020-02-04 RX ADMIN — Medication 3 ML: at 09:33

## 2020-02-04 RX ADMIN — GABAPENTIN 100 MG: 100 CAPSULE ORAL at 09:32

## 2020-02-04 RX ADMIN — FEBUXOSTAT 40 MG: 40 TABLET ORAL at 09:33

## 2020-02-04 RX ADMIN — DIGOXIN 125 MCG: 0.12 TABLET ORAL at 12:33

## 2020-02-04 RX ADMIN — GABAPENTIN 100 MG: 100 CAPSULE ORAL at 16:11

## 2020-02-04 RX ADMIN — COLCHICINE 0.6 MG: 0.6 TABLET, FILM COATED ORAL at 09:33

## 2020-02-04 RX ADMIN — HYDROXYZINE HYDROCHLORIDE 25 MG: 25 TABLET, FILM COATED ORAL at 16:18

## 2020-02-04 RX ADMIN — GABAPENTIN 100 MG: 100 CAPSULE ORAL at 21:54

## 2020-02-04 RX ADMIN — ZINC OXIDE: 200 OINTMENT TOPICAL at 09:34

## 2020-02-04 RX ADMIN — FEBUXOSTAT 40 MG: 40 TABLET ORAL at 21:54

## 2020-02-04 RX ADMIN — ACETAMINOPHEN 650 MG: 325 TABLET ORAL at 16:17

## 2020-02-04 RX ADMIN — ASPIRIN 81 MG 81 MG: 81 TABLET ORAL at 09:33

## 2020-02-04 RX ADMIN — ENOXAPARIN SODIUM 30 MG: 30 INJECTION SUBCUTANEOUS at 16:11

## 2020-02-04 RX ADMIN — Medication 3 ML: at 21:53

## 2020-02-04 RX ADMIN — Medication 10 ML: at 21:54

## 2020-02-04 RX ADMIN — HYDROXYZINE HYDROCHLORIDE 25 MG: 25 TABLET, FILM COATED ORAL at 09:33

## 2020-02-04 NOTE — PLAN OF CARE
Patient had some pain in the feet tonight.  Patient stated that they itched a lot.  We got a anti itch med.  Will continue to monitor

## 2020-02-04 NOTE — PROGRESS NOTES
"      [facility] Medicine Services Daily Progress Note      Hospitalist Team  LOS 4 days      Patient Care Team:  Rosa M Chavez APRN as PCP - General (Nurse Practitioner)    Patient Location: 242/1      Subjective   Subjective   Patient noted to have legs covered with dressing, denies for any other active complaint, no nausea or vomiting.     Chief Complaint / Subjective  Chief Complaint   Patient presents with   • Hallucinations         Brief Synopsis of Hospital Course/HPI    Ms. Childs is a 74 y.o. occasion female with a history of CHF, bilateral lymphedema and cellulitis, diabetes type 2, gout, presented to the [facility] ED on 1/28/2020 with a complaint of visual hallucinations.  Patient states she has been seeing people that are dead such as her  and  little children.  Patient states she knows that they are a hallucination.  Patient is alert and oriented x4.  Patient also has bilateral lymphedema and cellulitis to her lower bilateral lower extremities.  Patient was a patient of the [facility] wound care center, but is now current with home health.      In the ED, CT head negative for acute disease.  UA: 2+ leukocytes, WBC 21-30, 2+ bacteria, BUN 59, CR 2.381, alkaline phosphatase 142, WBC 6.7, Hgb 10.7, HCT 31.9, platelets 622.  Patient was given 1 g Rocephin in the ED patient to be admitted for further work-up and evaluation.            Date:: 2/3/2020 patient seen and examined and patient seemed to doing fairly well in clinic complaints.        Review of Systems   All other systems reviewed and are negative.   .      Objective   Objective      Vital Signs  Temp:  [97.8 °F (36.6 °C)-98.8 °F (37.1 °C)] 97.8 °F (36.6 °C)  Heart Rate:  [59-60] 59  Resp:  [17-20] 20  BP: (157-171)/(67-79) 157/67  Oxygen Therapy  SpO2: 99 %  Pulse Oximetry Type: Intermittent  Device (Oxygen Therapy): room air  Flowsheet Rows      First Filed Value   Admission Height  157.5 cm (62\") " Documented at 01/28/2020 0037   Admission Weight  64.4 kg (142 lb) Documented at 01/28/2020 0037        Intake & Output (last 3 days)       02/01 0701 - 02/02 0700 02/02 0701 - 02/03 0700 02/03 0701 - 02/04 0700 02/04 0701 - 02/05 0700    P.O. 600 1764 1160 462    Total Intake(mL/kg) 600 (7.2) 1764 (20.2) 1160 (13.3) 462 (5.3)    Urine (mL/kg/hr) 900 (0.4) 900 (0.4) 1625 (0.8) 400 (0.4)    Stool  1 0 0    Total Output  400    Net -300 +863 -465 +62            Urine Unmeasured Occurrence   1 x     Stool Unmeasured Occurrence 1 x 1 x 2 x 2 x        Lines, Drains & Airways    Active LDAs     Name:   Placement date:   Placement time:   Site:   Days:    Peripheral IV 01/28/20 1958 Anterior;Distal;Right Wrist   01/28/20 1958    Wrist   less than 1                  Physical Exam:    Physical Exam   Constitutional: She is oriented to person, place, and time. She appears well-developed and well-nourished. No distress.   HENT:   Head: Normocephalic and atraumatic.   Right Ear: External ear normal.   Left Ear: External ear normal.   Nose: Nose normal.   Mouth/Throat: Oropharynx is clear and moist. No oropharyngeal exudate.   Eyes: Pupils are equal, round, and reactive to light. Conjunctivae and EOM are normal. Right eye exhibits no discharge. Left eye exhibits no discharge. No scleral icterus.   Neck: Normal range of motion. No JVD present. No tracheal deviation present. No thyromegaly present.   Cardiovascular: Normal rate, regular rhythm, normal heart sounds and intact distal pulses. Exam reveals no gallop and no friction rub.   No murmur heard.  Pulmonary/Chest: Effort normal and breath sounds normal. No stridor. No respiratory distress. She has no wheezes. She has no rales. She exhibits no tenderness.   Abdominal: Soft. Bowel sounds are normal. She exhibits no distension and no mass. There is no tenderness. There is no rebound and no guarding. No hernia.   Musculoskeletal: Normal range of motion. She  exhibits no edema, tenderness or deformity.   Lymphadenopathy:     She has no cervical adenopathy.   Neurological: She is alert and oriented to person, place, and time. No cranial nerve deficit or sensory deficit. She exhibits normal muscle tone. Coordination normal.   Skin: Skin is warm and dry. No rash noted. She is not diaphoretic. No erythema.   Psychiatric: She has a normal mood and affect. Her behavior is normal.   Nursing note and vitals reviewed.           Wounds (last 24 hours)      LDA Wound     Row Name 01/29/20 1100 01/29/20 0900 01/29/20 0730       Wound 01/28/20 0055 Left arm Skin Tear    Wound - Properties Group Date first assessed: 01/28/20  -NB Time first assessed: 0055  -NB Side: Left  -NB Location: arm  -NB Primary Wound Type: Skin tear  -NB    Dressing Appearance  dry;intact;no drainage  -HB  dry;intact;no drainage  -HB  dry;intact;no drainage  -HB    Closure  Adhesive bandage;Other (Comment) Mepilex   -HB  Adhesive bandage;Other (Comment) Mepilex   -HB  Adhesive bandage;Other (Comment) Mepilex   -HB    Base  dry;clean;scab  -HB  dry;clean;scab  -HB  dry;clean;scab  -HB    Periwound  dry;intact  -HB  dry;intact  -HB  dry;intact  -HB    Periwound Temperature  warm  -HB  warm  -HB  warm  -HB    Periwound Skin Turgor  soft  -HB  soft  -HB  soft  -HB    Drainage Amount  none  -HB  none  -HB  none  -HB    Care, Wound  --  --  other (see comments) Mepilex applied   -HB    Dressing Care, Wound  --  --  dressing applied  -HB       Wound 01/28/20 0458 Right anterior foot Other (comment)    Wound - Properties Group Date first assessed: 01/28/20  -KK Time first assessed: 0458 -KK Present on Hospital Admission: Y  -KK Side: Right  -KK Orientation: anterior  -KK Location: foot  -KK Primary Wound Type: Other  -KK, Lymphedema      Dressing Appearance  moist drainage  -HB  moist drainage  -HB  moist drainage  -HB    Closure  NELY  -HB  NELY  -HB  NELY  -HB    Base  red;dry;scab  -HB  red;dry;scab  -HB   red;dry;scab  -HB    Periwound  swelling;warm;other (see comments) dry and flaky   -HB  swelling;warm;other (see comments) dry and flaky   -HB  swelling;warm;other (see comments) dry and flaky   -HB    Periwound Temperature  warm  -HB  warm  -HB  warm  -HB    Periwound Skin Turgor  firm  -HB  firm  -HB  firm  -HB    Drainage Characteristics/Odor  serosanguineous  -HB  serosanguineous  -HB  serosanguineous  -HB    Drainage Amount  small  -HB  small  -HB  small  -HB    Care, Wound  --  --  cleansed with;antimicrobial agent applied;soap and water;wound cleanser;other (see comments) skin repair cream   -HB    Dressing Care, Wound  --  --  other (see comments) maxorb/abdomenal pad   -HB    Periwound Care, Wound  --  --  topical treatment applied  -HB       Wound 01/28/20 0500 Left anterior foot Other (comment)    Wound - Properties Group Date first assessed: 01/28/20 -KK Time first assessed: 0500  -KK Present on Hospital Admission: Y  -KK Side: Left  -KK Orientation: anterior  -KK Location: foot  -KK Primary Wound Type: Other  -KK, Lymphedema     Dressing Appearance  dry;moist drainage  -HB  dry;moist drainage  -HB  dry;moist drainage  -HB    Closure  NELY  -HB  NELY  -HB  NELY  -HB    Base  red;moist;dry;scab  -HB  red;moist;dry;scab  -HB  red;moist;dry;scab  -HB    Periwound  redness;dry;edematous  -HB  redness;dry;edematous  -HB  redness;dry;edematous  -HB    Periwound Temperature  warm  -HB  warm  -HB  warm  -HB    Periwound Skin Turgor  firm  -HB  firm  -HB  firm  -HB    Drainage Characteristics/Odor  serosanguineous  -HB  serosanguineous  -HB  serosanguineous  -HB    Drainage Amount  small  -HB  small  -HB  small  -HB    Care, Wound  --  --  cleansed with;antimicrobial agent applied;barrier applied  -HB    Dressing Care, Wound  --  --  dressing applied;abdominal pad;other (see comments) kerlix   -HB    Row Name 01/28/20 2306 01/28/20 1905          Wound 01/28/20 0055 Left arm Skin Tear    Wound - Properties Group  Date first assessed: 01/28/20  -NB Time first assessed: 0055  -NB Side: Left  -NB Location: arm  -NB Primary Wound Type: Skin tear  -NB    Dressing Appearance  dry;intact;no drainage  -AH  dry;intact;no drainage  -AH     Closure  None  -AH  None  -AH     Base  dry;clean;scab;black eschar  -AH  dry;clean;scab;black eschar  -AH     Periwound  dry;intact  -AH  dry;intact  -AH     Periwound Temperature  warm  -AH  warm  -AH     Periwound Skin Turgor  soft  -AH  soft  -AH     Drainage Amount  none  -AH  none  -AH        Wound 01/28/20 0458 Right anterior foot Other (comment)    Wound - Properties Group Date first assessed: 01/28/20  -KK Time first assessed: 0458  -KK Present on Hospital Admission: Y  -KK Side: Right  -KK Orientation: anterior  -KK Location: foot  -KK Primary Wound Type: Other  -KK, Lymphedema      Dressing Appearance  intact;no drainage  -AH  intact;no drainage  -AH     Closure  NELY  -AH  NELY  -AH     Drainage Characteristics/Odor  yellow;clear  -AH  yellow;clear  -AH     Drainage Amount  small  -AH  small  -AH     Dressing Care, Wound  --  dressing reinforced;abdominal pad;gauze, dry maxorb  -AH     Periwound Care, Wound  --  moisturizer applied  -AH        Wound 01/28/20 0500 Left anterior foot Other (comment)    Wound - Properties Group Date first assessed: 01/28/20  -KK Time first assessed: 0500  -KK Present on Hospital Admission: Y  -KK Side: Left  -KK Orientation: anterior  -KK Location: foot  -KK Primary Wound Type: Other  -KK, Lymphedema     Dressing Appearance  intact;no drainage  -AH  intact;no drainage  -AH     Closure  NELY  -AH  NELY  -AH     Drainage Characteristics/Odor  yellow;clear  -AH  yellow;clear  -AH     Drainage Amount  none  -AH  none  -AH     Dressing Care, Wound  --  dressing reinforced;gauze;abdominal pad maxorb  -AH     Periwound Care, Wound  --  moisturizer applied  -AH       User Key  (r) = Recorded By, (t) = Taken By, (c) = Cosigned By    Initials Name Provider Type      Lina Blood, RN Registered Nurse    Adrienne Ash RN Registered Nurse    Vandana Lazaro RN Registered Nurse    Bess Keller LPN Licensed Nurse          Procedures:              Results Review:     I reviewed the patient's new clinical results.      Lab Results (last 24 hours)     Procedure Component Value Units Date/Time    CBC & Differential [[id number]] Collected:  02/04/20 0525    Specimen:  Blood Updated:  02/04/20 0715    Narrative:       The following orders were created for panel order CBC & Differential.  Procedure                               Abnormality         Status                     ---------                               -----------         ------                     CBC Auto Differential[[id number]]        Abnormal            Final result                 Please view results for these tests on the individual orders.    CBC Auto Differential [[id number]]  (Abnormal) Collected:  02/04/20 0525    Specimen:  Blood Updated:  02/04/20 0715     WBC 6.60 10*3/mm3      RBC 2.65 10*6/mm3      Hemoglobin 8.5 g/dL      Hematocrit 26.4 %      MCV 99.6 fL      MCH 32.0 pg      MCHC 32.1 g/dL      RDW 18.3 %      RDW-SD 66.5 fl      MPV 8.7 fL      Platelets 63 10*3/mm3      Neutrophil % 75.9 %      Lymphocyte % 8.5 %      Monocyte % 11.0 %      Eosinophil % 3.7 %      Basophil % 0.9 %      Neutrophils, Absolute 5.00 10*3/mm3      Lymphocytes, Absolute 0.60 10*3/mm3      Monocytes, Absolute 0.70 10*3/mm3      Eosinophils, Absolute 0.20 10*3/mm3      Basophils, Absolute 0.10 10*3/mm3      nRBC 0.1 /100 WBC     Narrative:       Appended report. These results have been appended to a previously verified report.    Scan Slide [[id number]] Collected:  02/04/20 0525    Specimen:  Blood Updated:  02/04/20 0715     Anisocytosis Slight/1+     Crenated RBC's Slight/1+     Poikilocytes Slight/1+     WBC Morphology Normal     Clumped Platelets Present    Narrative:       Slide Reviewed      Basic Metabolic  Panel [[id number]]  (Abnormal) Collected:  02/04/20 0526    Specimen:  Blood Updated:  02/04/20 0645     Glucose 91 mg/dL      BUN 56 mg/dL      Creatinine 1.77 mg/dL      Sodium 133 mmol/L      Potassium 4.8 mmol/L      Chloride 106 mmol/L      CO2 16.0 mmol/L      Comment: Result checked         Calcium 8.1 mg/dL      eGFR Non African Amer 28 mL/min/1.73      BUN/Creatinine Ratio 31.6     Anion Gap 11.0 mmol/L     Narrative:       GFR Normal >60  Chronic Kidney Disease <60  Kidney Failure <15          No results found for: HGBA1C            Lab Results   Component Value Date    LIPASE 42 01/13/2018     Lab Results   Component Value Date    CHOL 159 01/28/2020    TRIG 62 01/28/2020    HDL 81 (H) 01/28/2020    LDL 66 01/28/2020       No results found for: INTRAOP, PREDX, FINALDX, COMDX    Microbiology Results (last 10 days)     Procedure Component Value - Date/Time    Urine Culture - Urine, Urine, Catheter [[id number]]  (Normal) Collected:  01/28/20 0122    Lab Status:  Final result Specimen:  Urine, Catheter Updated:  01/29/20 1912     Urine Culture No growth          ECG/EMG Results (most recent)     Procedure Component Value Units Date/Time    ECG 12 Lead [[id number]] Collected:  01/28/20 0257     Updated:  01/28/20 0604    Narrative:       HEART RATE= 61  bpm  RR Interval= 991  ms  VT Interval=   ms  P Horizontal Axis= 183  deg  P Front Axis=   deg  QRSD Interval= 184  ms  QT Interval= 484  ms  QRS Axis= -68  deg  T Wave Axis= 113  deg  - ABNORMAL ECG -  Afib/flutter and ventricular-paced rhythm  When compared with ECG of 16-Apr-2019 22:53:12,  No significant change  Electronically Signed By: Kostas Dominguez (Pop) 28-Jan-2020 06:04:33  Date and Time of Study: 2020-01-28 02:57:18    EP/CRM Study [[id number]] Resulted:  01/31/20 1517     Updated:  01/31/20 1522    Narrative:       Date of procedure 1/31/2020    Procedure performed  Temporary pacemaker through right groin (femoral vein).  ICD pulse generator removal and  replacement (single-chamber Medtronic).  Invasive interrogation of single-chamber ICD  Conscious sedation.  Fluoroscopy    Indications  Patient had ICD single-chamber placement in 2012.  Device has reached JOHN.  Patient is pacemaker dependent.      Procedure  Temporary pacemaker insertion.  Under usual sterile precautions and 1% Xylocaine infiltration right   femoral vein was percutaneously punctured and a 7 Spanish vascular sheath   was placed in the right femoral vein.  A 5 Spanish balloontipped temporary   pacemaker was advanced and was placed at the right ventricular apex and   satisfactory pacing was established.    Under usual sterile precautions and 1% Xylocaine infiltration and incision   was made over the previous scar over the ICD pulse generator and the   pocket was opened up carefully avoiding any trauma to the leads.  The skin   is extremely thin.  The dilator was removed from the pocket and    from the lead with temporary pacemaker backup.  The pocket was revised   superiorly to place the new pulse generator.  The new pulse generator   Medtronic model number D VF BI D4 serial number [id number] was connected   to the lead and was placed in the previously revised subcutaneous pocket.    Pocket was irrigated with bacitracin solution.  Arixtra was placed in the   pocket.  The skin was closed in a subcuticular fashion using 3-0 and 4-0   Vicryl sutures.  Steri-Strips and pressure bandage were placed.  Please   note the skin is extremely thin and difficult to put it together.  Patient   tolerated the procedure well.  No complications were noted.    ECG 12 Lead [[id number]] Collected:  01/31/20 1600     Updated:  02/01/20 1008    Narrative:       HEART RATE= 60  bpm  RR Interval= 1001  ms  MT Interval=   ms  P Horizontal Axis= 122  deg  P Front Axis=   deg  QRSD Interval= 167  ms  QT Interval= 456  ms  QRS Axis= -70  deg  T Wave Axis= 111  deg  - ABNORMAL ECG -  Afib/flutter and ventricular-paced  rhythm  When compared with ECG of 28-Jan-2020 2:57:18,  No significant change  Electronically Signed By: Dionna Laird (SANDEEP) 01-Feb-2020 10:08:35  Date and Time of Study: 2020-01-31 16:00:38          Results for orders placed during the hospital encounter of 08/08/19   Venous w Reflux Lower Extremity - Bilateral CAR    Narrative · The patient is in a reverse Trendelenburg position.  · There is evidence of significant reflux of the right common femoral   vein. There is evidence of significant reflux of the right mid femoral   vein. There is evidence of severe reflux of the right greater saphenous   vein below the knee. There is evidence of severe reflux of the right small   saphenous vein.  · There is evidence of mild reflux of the left greater saphenous vein at   the distal thigh.               Ct Head Without Contrast    Result Date: 1/28/2020  1. No fracture or intracranial hemorrhage. 2. Mild chronic age-related intracranial findings as above.  This examination was interpreted by Marcus Linares M.D. Electronically signed by:  Marcus Linares M.D.  1/28/2020 12:08 AM    Xr Chest 1 View    Result Date: 1/28/2020   1. Increasing density in the left lung base felt to represent atelectasis and/or early pneumonia, follow-up in a couple days is recommended 2. Left-sided pacemaker defibrillator appears unchanged 3. No evidence of pleural effusion 4. Mild interval worsening from 12/14/2018  Electronically Signed By-Austin Nunez Jr. On:1/28/2020 7:14 AM This report was finalized on [id number] by  Austin Nunez Jr., .          Xrays, labs reviewed personally by physician.    Medication Review:   I have reviewed the patient's current medication list      Scheduled Meds    aspirin 81 mg Oral Daily   colchicine 0.6 mg Oral Daily   digoxin 125 mcg Oral Daily   enoxaparin 30 mg Subcutaneous Q24H   febuxostat 40 mg Oral BID   gabapentin 100 mg Oral TID   sodium chloride 10 mL Intravenous Q12H   sodium chloride 3 mL  Intravenous Q12H   zinc oxide  Topical Q12H       Meds Infusions       Meds PRN  •  acetaminophen **OR** acetaminophen **OR** acetaminophen  •  aluminum-magnesium hydroxide-simethicone  •  bisacodyl  •  hydrOXYzine  •  magnesium hydroxide  •  melatonin  •  ondansetron **OR** ondansetron  •  sodium chloride  •  sodium chloride      Assessment/Plan   Assessment/Plan     Active Hospital Problems    Diagnosis  POA   • **Urinary tract infection [N39.0]  Yes     Priority: High   • Syncope and collapse [R55]  Yes     Priority: Medium   • Lymphedema of both lower extremities [I89.0]  Yes     Priority: Medium   • Presence of cardiac pacemaker [Z95.0]  Yes     Priority: Medium   • Non-pressure chronic ulcer left lower leg, limited to breakdown skin (CMS/HCC) [L97.921]  Yes     Priority: Medium   • Chronic low back pain [M54.5, G89.29]  Yes     Priority: Medium   • Hypertension, benign [I10]  Yes     Priority: Medium   • Congestive heart failure (CMS/HCC) [I50.9]  Yes     Priority: Medium   • Atrial fibrillation (CMS/HCC) [I48.91]  Yes     Priority: Medium   • Presence of biventricular implantable cardioverter-defibrillator (ICD) [Z95.810]  Yes     Priority: Low   • Gout [M10.9]  Yes     Priority: Low   • Non-pressure chronic ulcer right ankle, limited to breakdown skin (CMS/HCC) [L97.311]  Yes      Resolved Hospital Problems    Diagnosis Date Resolved POA   • Delirium [R41.0] 02/04/2020 Yes     Priority: Medium       MEDICAL DECISION MAKING COMPLEXITY BY PROBLEM:     Urinary tract infection  -Finished antibiotics     Altered mental status / metabolic encephalopathy improved now.   -Most likely due to UTI     -Syncope l  -Multiple episodes of waking up on the floor  -To be secondary to UTI and AMS.  -Pacemaker interrogated by ED  - battery changed by cardiology service on this admission      Chronic systolic congestive heart failure stable.  -BNP 13,384, CXR cardiomegaly.  Likely due to CONG  -Patient is on p.o. Bumex 1 mg  twice daily  -Elevated creatinine 2.31     Elevated troponin  -Initial troponin 0.029, most likely due to CONG  -Stable     CONG on CKD stage 3 improved.   -BUN 59 creatinine 2.31  -Baseline creatinine appears to be 1.8-2.0  - d/c iv fluids in order to avoid fluid overload.     Anemia of chronic kidney disease microcytic and monitor hemoglobin     Chronic lymphedema and cellulitis bilateral lower extremities  -Patient has been current with home health. .  -Pt recently finished Keflex  -Wound nurse to see    Atrial fibrillation  -Continue digoxin and Lopressor     Gout  -Continue colchicine and Uloric     Chronic pain  Continue Neurontin (INSPECT reviewed)        Patient clinically improving waiting for placement.        Mechanical Order History:      Ordered        01/28/20 0337  Place Sequential Compression Device  Once         01/28/20 0337  Maintain Sequential Compression Device  Continuous                 Pharmalogical Order History:     Ordered     Dose Route Frequency Stop    01/28/20 0438  enoxaparin (LOVENOX) syringe 30 mg      30 mg SC Every 24 Hours --                   Code Status -   Code Status and Medical Interventions:   Ordered at: 01/28/20 0561     Limited Support to NOT Include:    Intubation     Level Of Support Discussed With:    Patient     Code Status:    No CPR     Medical Interventions (Level of Support Prior to Arrest):    Limited       Discharge Planning          Destination      Coordination has not been started for this encounter.      Durable Medical Equipment      Coordination has not been started for this encounter.      Dialysis/Infusion      Coordination has not been started for this encounter.      Home Medical Care - Selection Complete      Service Provider Request Status Selected Services Address Phone Number Fax Number    [facility] Selected Home Health Services [address] [facility] [place] [address] [phone]       Manisha Calderon RN 1/28/2020 5526     Oservation Admission                 Therapy      Coordination has not been started for this encounter.      Community Resources      Coordination has not been started for this encounter.            Electronically signed by Apolinar Alvarez MD, 02/04/20, 5:45 PM.  [facility] Hospitalist Team

## 2020-02-04 NOTE — PLAN OF CARE
Problem: Patient Care Overview  Goal: Plan of Care Review  Outcome: Ongoing (interventions implemented as appropriate)  Flowsheets (Taken 2/4/2020 1831)  Progress: improving  Plan of Care Reviewed With: patient  Outcome Summary: Patient had dressings changed today. Gave tylenol for pain. Patient did rest some today and atarax as well as lotion did seem to help with the itching. Patient still waiting for precert to go through to be accepted to [facility].  Goal: Individualization and Mutuality  Outcome: Ongoing (interventions implemented as appropriate)  Goal: Discharge Needs Assessment  Outcome: Ongoing (interventions implemented as appropriate)  Goal: Interprofessional Rounds/Family Conf  Outcome: Ongoing (interventions implemented as appropriate)     Problem: Fall Risk (Adult)  Goal: Identify Related Risk Factors and Signs and Symptoms  Outcome: Ongoing (interventions implemented as appropriate)  Goal: Absence of Fall  Outcome: Ongoing (interventions implemented as appropriate)     Problem: Urinary Tract Infection (Adult)  Goal: Signs and Symptoms of Listed Potential Problems Will be Absent, Minimized or Managed (Urinary Tract Infection)  Outcome: Ongoing (interventions implemented as appropriate)     Problem: Skin Injury Risk (Adult)  Goal: Identify Related Risk Factors and Signs and Symptoms  Outcome: Ongoing (interventions implemented as appropriate)  Goal: Skin Health and Integrity  Outcome: Ongoing (interventions implemented as appropriate)     Problem: Syncope (Adult)  Goal: Identify Related Risk Factors and Signs and Symptoms  Outcome: Ongoing (interventions implemented as appropriate)  Goal: Physical Safety/Health Maintenance  Outcome: Ongoing (interventions implemented as appropriate)  Goal: Optimal Emotional/Functional [place]  Outcome: Ongoing (interventions implemented as appropriate)

## 2020-02-04 NOTE — PROGRESS NOTES
Infectious Diseases Progress Note      LOS: 4 days   Patient Care Team:  Rosa M Chavez APRN as PCP - General (Nurse Practitioner)        Subjective       The patient has been afebrile for the last 24 hours.  The patient is on room air, hemodynamically stable, and is tolerating antimicrobial therapy.  She has no new complaints.       Review of Systems:   Review of Systems   Constitutional: Negative.    HENT: Negative.    Respiratory: Negative.    Cardiovascular: Positive for leg swelling.   Gastrointestinal: Negative.    Genitourinary: Negative.    Musculoskeletal: Positive for arthralgias.   Skin: Positive for rash and wound.   Neurological: Negative.    Psychiatric/Behavioral: Negative.    All other systems reviewed and are negative.       Objective     Vital Signs  Temp:  [97.8 °F (36.6 °C)-98.8 °F (37.1 °C)] 97.8 °F (36.6 °C)  Heart Rate:  [59-60] 59  Resp:  [17-20] 20  BP: (157-171)/(67-79) 157/67    Physical Exam:  Physical Exam   Constitutional: She is oriented to person, place, and time. She appears well-developed and well-nourished.   HENT:   Head: Normocephalic and atraumatic.   Eyes: Pupils are equal, round, and reactive to light. Conjunctivae and EOM are normal.   Neck: Neck supple.   Cardiovascular: Normal rate, regular rhythm and normal heart sounds.   Pulmonary/Chest: Effort normal and breath sounds normal.   Abdominal: Soft. Bowel sounds are normal.   Musculoskeletal: She exhibits edema.   Neurological: She is alert and oriented to person, place, and time.   Skin: Skin is warm and dry.   Bilateral legs have dressings and compression wraps on   Psychiatric: She has a normal mood and affect.   Vitals reviewed.       Results Review:    I have reviewed all clinical data, test, lab, and imaging results.     Radiology  No Radiology Exams Resulted Within Past 24 Hours    Cardiology    Laboratory  Results from last 7 days   Lab Units 02/04/20  0525   WBC 10*3/mm3 6.60   HEMOGLOBIN g/dL 8.5*    HEMATOCRIT % 26.4*   PLATELETS 10*3/mm3 63*     Results from last 7 days   Lab Units 02/04/20  0526   SODIUM mmol/L 133*   POTASSIUM mmol/L 4.8   CHLORIDE mmol/L 106   CO2 mmol/L 16.0*   BUN mg/dL 56*   CREATININE mg/dL 1.77*   GLUCOSE mg/dL 91   CALCIUM mg/dL 8.1*     Results from last 7 days   Lab Units 02/04/20  0526   SODIUM mmol/L 133*   POTASSIUM mmol/L 4.8   CHLORIDE mmol/L 106   CO2 mmol/L 16.0*   BUN mg/dL 56*   CREATININE mg/dL 1.77*   GLUCOSE mg/dL 91   CALCIUM mg/dL 8.1*                 Microbiology   Microbiology Results (last 10 days)     Procedure Component Value - Date/Time    Urine Culture - Urine, Urine, Catheter [[id number]]  (Normal) Collected:  01/28/20 0122    Lab Status:  Final result Specimen:  Urine, Catheter Updated:  01/29/20 1912     Urine Culture No growth          Medication Review:       Schedule Meds    aspirin 81 mg Oral Daily   colchicine 0.6 mg Oral Daily   digoxin 125 mcg Oral Daily   enoxaparin 30 mg Subcutaneous Q24H   febuxostat 40 mg Oral BID   gabapentin 100 mg Oral TID   sodium chloride 10 mL Intravenous Q12H   sodium chloride 3 mL Intravenous Q12H   zinc oxide  Topical Q12H       Infusion Meds       PRN Meds  •  acetaminophen **OR** acetaminophen **OR** acetaminophen  •  aluminum-magnesium hydroxide-simethicone  •  bisacodyl  •  hydrOXYzine  •  magnesium hydroxide  •  melatonin  •  ondansetron **OR** ondansetron  •  sodium chloride  •  sodium chloride        Assessment/Plan       Antimicrobial Therapy   1.  P.o. Omnicef    Day 6  2.      Day  3.      Day  4.      Day  5.      Day      Assessment     Chronic bilateral lower leg lymphedema with frequent weeping  -Chronic staining below the knee of both extremities with chronic skin changes due to constant swelling and weeping  -Some areas of macerated skin and denuded skin but no overt signs of infection     Urinary tract infection  -Urinalysis showed +2 bacteria with 21-30 white blood cells but the culture was  negative  -However the patient was on p.o. Keflex before admission  -Patient says she has frequent urinary tract infections     History of pacemaker placement  -Pacemaker was interrogated and uses a battery change     Acute kidney injury on chronic kidney disease     Congestive heart failure, A. fib     Plan     Continue p.o. Omnicef 300mg qd for 1 more day-for 7 days total treatment  Wound care is already seen the patient and suggested certain dressing changes and compression dressings  Patient may benefit from going to the lymphedema clinic  Continue supportive care  Not much more to add from infectious disease standpoint-we will sign off at this time-please call with any questions    Harper Darling, APRN  02/04/20  3:46 PM     Note is dictated utilizing voice recognition software/Dragon

## 2020-02-04 NOTE — PROGRESS NOTES
"Referring Provider: Hospitalist    Reason for follow-up: Follow-up pacemaker     Patient Care Team:  Rosa M Chavez APRN as PCP - General (Nurse Practitioner)    Subjective .  No chest pain or shortness of breath    Objective  Lying in bed comfortably     Review of Systems   Constitution: Negative for fever and malaise/fatigue.   Cardiovascular: Positive for leg swelling. Negative for chest pain, dyspnea on exertion and palpitations.   Respiratory: Negative for cough and shortness of breath.    Skin: Negative for rash.   Gastrointestinal: Negative for abdominal pain, nausea and vomiting.   Neurological: Negative for focal weakness and headaches.   All other systems reviewed and are negative.      Allopurinol; Clindamycin hcl; Codeine; Furosemide; Hydrochlorothiazide; Naproxen; and Sulfa antibiotics    Scheduled Meds:    aspirin 81 mg Oral Daily   colchicine 0.6 mg Oral Daily   digoxin 125 mcg Oral Daily   enoxaparin 30 mg Subcutaneous Q24H   febuxostat 40 mg Oral BID   gabapentin 100 mg Oral TID   sodium chloride 10 mL Intravenous Q12H   sodium chloride 3 mL Intravenous Q12H   zinc oxide  Topical Q12H     Continuous Infusions:   PRN Meds:.•  acetaminophen **OR** acetaminophen **OR** acetaminophen  •  aluminum-magnesium hydroxide-simethicone  •  bisacodyl  •  hydrOXYzine  •  magnesium hydroxide  •  melatonin  •  ondansetron **OR** ondansetron  •  sodium chloride  •  sodium chloride        VITAL SIGNS  Vitals:    02/03/20 1214 02/03/20 1928 02/04/20 0344 02/04/20 1233   BP:  164/79 171/68 157/67   BP Location:  Left arm Left arm Left arm   Patient Position:  Sitting Lying Lying   Pulse: 57 60 59 59   Resp:  17 19 20   Temp:  98.8 °F (37.1 °C) 98.6 °F (37 °C) 97.8 °F (36.6 °C)   TempSrc:  Oral Oral Oral   SpO2:  100% 97% 99%   Weight:   87.1 kg (192 lb 0.3 oz)    Height:           Flowsheet Rows      First Filed Value   Admission Height  157.5 cm (62\") Documented at 01/28/2020 0037   Admission Weight  64.4 kg " (142 lb) Documented at 01/28/2020 0037           TELEMETRY: Ventricular pacemaker rhythm    Physical Exam:  Physical Exam   Constitutional: She appears well-developed and well-nourished.   HENT:   Head: Normocephalic and atraumatic.   Eyes: Conjunctivae are normal. No scleral icterus.   Neck: Normal range of motion. Neck supple. No JVD present. Carotid bruit is not present.   Cardiovascular: Normal rate, regular rhythm, S1 normal, S2 normal and intact distal pulses. PMI is not displaced.   Murmur heard.  Pulmonary/Chest: Effort normal and breath sounds normal. She has no wheezes. She has no rales.   Abdominal: Soft. Bowel sounds are normal.   Neurological: She is alert. She has normal strength.   Skin: Skin is warm and dry. No rash noted.        Results Review:   I reviewed the patient's new clinical results.  Lab Results (last 24 hours)     Procedure Component Value Units Date/Time    CBC & Differential [[id number]] Collected:  02/04/20 0525    Specimen:  Blood Updated:  02/04/20 0715    Narrative:       The following orders were created for panel order CBC & Differential.  Procedure                               Abnormality         Status                     ---------                               -----------         ------                     CBC Auto Differential[[id number]]        Abnormal            Final result                 Please view results for these tests on the individual orders.    CBC Auto Differential [[id number]]  (Abnormal) Collected:  02/04/20 0525    Specimen:  Blood Updated:  02/04/20 0715     WBC 6.60 10*3/mm3      RBC 2.65 10*6/mm3      Hemoglobin 8.5 g/dL      Hematocrit 26.4 %      MCV 99.6 fL      MCH 32.0 pg      MCHC 32.1 g/dL      RDW 18.3 %      RDW-SD 66.5 fl      MPV 8.7 fL      Platelets 63 10*3/mm3      Neutrophil % 75.9 %      Lymphocyte % 8.5 %      Monocyte % 11.0 %      Eosinophil % 3.7 %      Basophil % 0.9 %      Neutrophils, Absolute 5.00 10*3/mm3      Lymphocytes, Absolute  0.60 10*3/mm3      Monocytes, Absolute 0.70 10*3/mm3      Eosinophils, Absolute 0.20 10*3/mm3      Basophils, Absolute 0.10 10*3/mm3      nRBC 0.1 /100 WBC     Narrative:       Appended report. These results have been appended to a previously verified report.    Scan Slide [[id number]] Collected:  02/04/20 0525    Specimen:  Blood Updated:  02/04/20 0715     Anisocytosis Slight/1+     Crenated RBC's Slight/1+     Poikilocytes Slight/1+     WBC Morphology Normal     Clumped Platelets Present    Narrative:       Slide Reviewed      Basic Metabolic Panel [[id number]]  (Abnormal) Collected:  02/04/20 0526    Specimen:  Blood Updated:  02/04/20 0645     Glucose 91 mg/dL      BUN 56 mg/dL      Creatinine 1.77 mg/dL      Sodium 133 mmol/L      Potassium 4.8 mmol/L      Chloride 106 mmol/L      CO2 16.0 mmol/L      Comment: Result checked         Calcium 8.1 mg/dL      eGFR Non African Amer 28 mL/min/1.73      BUN/Creatinine Ratio 31.6     Anion Gap 11.0 mmol/L     Narrative:       GFR Normal >60  Chronic Kidney Disease <60  Kidney Failure <15            Imaging Results (Last 24 Hours)     ** No results found for the last 24 hours. **          EKG      I personally viewed and interpreted the patient's EKG/Telemetry data:    ECHOCARDIOGRAM:    STRESS MYOVIEW:    CARDIAC CATHETERIZATION:    OTHER:         Assessment/Plan     1 urinary tract infection  2.Altered mental status changes  3.  Syncope  4.  History of atrial fibrillation  5.  Status post pacemaker placement  6.  Congestive heart failure  7.  Hypertension  8.  Nonhealing pressure ulcers  9. acute renal failure with history of chronic renal sufficiency  10.  Chronic lymphedema and cellulitis of the lower extremities     Presented with multiple episodes of falling down which could be syncopal episodes  Patient's pacemaker was checked and is at [facility] and hence will do the battery change  Patient has renal insufficiency and has acute renal failure now and is followed by  nephrologist  Patient's troponin was borderline elevated which could be secondary to acute renal failure  Patient also has history of congestive heart failure we will get an echocardiogram for LV function well abnormalities  Patient had mild mental status changes and is probably secondary to urinary tract infection is being treated with antibiotics  Patient had the generator replacement done without any complications.  Patient is currently stable and is ready to be transferred to the rehab placement  I discussed the patients findings and my recommendations with patient and nurse    Vinod Root MD  02/04/20  12:41 PM

## 2020-02-05 ENCOUNTER — APPOINTMENT (OUTPATIENT)
Dept: ULTRASOUND IMAGING | Facility: HOSPITAL | Age: 75
End: 2020-02-05

## 2020-02-05 VITALS
RESPIRATION RATE: 16 BRPM | WEIGHT: 192.02 LBS | HEIGHT: 62 IN | SYSTOLIC BLOOD PRESSURE: 156 MMHG | HEART RATE: 59 BPM | BODY MASS INDEX: 35.34 KG/M2 | TEMPERATURE: 98.3 F | DIASTOLIC BLOOD PRESSURE: 72 MMHG | OXYGEN SATURATION: 99 %

## 2020-02-05 LAB
ALBUMIN SERPL-MCNC: 2.4 G/DL (ref 3.5–5.2)
ALBUMIN/GLOB SERPL: 0.7 G/DL
ALP SERPL-CCNC: 134 U/L (ref 39–117)
ALT SERPL W P-5'-P-CCNC: 13 U/L (ref 1–33)
ANION GAP SERPL CALCULATED.3IONS-SCNC: 10 MMOL/L (ref 5–15)
ANISOCYTOSIS BLD QL: ABNORMAL
AST SERPL-CCNC: 24 U/L (ref 1–32)
BACTERIA UR QL AUTO: ABNORMAL /HPF
BASOPHILS # BLD MANUAL: 0.06 10*3/MM3 (ref 0–0.2)
BASOPHILS NFR BLD AUTO: 1 % (ref 0–1.5)
BILIRUB SERPL-MCNC: 0.4 MG/DL (ref 0.2–1.2)
BILIRUB UR QL STRIP: NEGATIVE
BUN BLD-MCNC: 59 MG/DL (ref 8–23)
BUN/CREAT SERPL: 26.5 (ref 7–25)
BURR CELLS BLD QL SMEAR: ABNORMAL
CALCIUM SPEC-SCNC: 8.4 MG/DL (ref 8.6–10.5)
CHLORIDE SERPL-SCNC: 107 MMOL/L (ref 98–107)
CHOLEST SERPL-MCNC: 126 MG/DL (ref 0–200)
CK SERPL-CCNC: 26 U/L (ref 20–180)
CLARITY UR: ABNORMAL
CLUMPED PLATELETS: ABNORMAL
CO2 SERPL-SCNC: 19 MMOL/L (ref 22–29)
COLOR UR: YELLOW
CREAT BLD-MCNC: 2.23 MG/DL (ref 0.57–1)
CREAT UR-MCNC: 43.7 MG/DL
CRP SERPL-MCNC: 6.47 MG/DL (ref 0–0.5)
DEPRECATED RDW RBC AUTO: 59.5 FL (ref 37–54)
DEPRECATED RDW RBC AUTO: 62.1 FL (ref 37–54)
EOSINOPHIL # BLD MANUAL: 0.06 10*3/MM3 (ref 0–0.4)
EOSINOPHIL NFR BLD MANUAL: 1 % (ref 0.3–6.2)
ERYTHROCYTE [DISTWIDTH] IN BLOOD BY AUTOMATED COUNT: 17.4 % (ref 12.3–15.4)
ERYTHROCYTE [DISTWIDTH] IN BLOOD BY AUTOMATED COUNT: 18.1 % (ref 12.3–15.4)
ERYTHROCYTE [SEDIMENTATION RATE] IN BLOOD: 74 MM/HR (ref 0–30)
FERRITIN SERPL-MCNC: 237.4 NG/ML (ref 13–150)
GFR SERPL CREATININE-BSD FRML MDRD: 21 ML/MIN/1.73
GLOBULIN UR ELPH-MCNC: 3.5 GM/DL
GLUCOSE BLD-MCNC: 85 MG/DL (ref 65–99)
GLUCOSE UR STRIP-MCNC: NEGATIVE MG/DL
HBA1C MFR BLD: 4.3 % (ref 3.5–5.6)
HCT VFR BLD AUTO: 26.3 % (ref 34–46.6)
HCT VFR BLD AUTO: 27.3 % (ref 34–46.6)
HDLC SERPL-MCNC: 66 MG/DL (ref 40–60)
HGB BLD-MCNC: 8.4 G/DL (ref 12–15.9)
HGB BLD-MCNC: 9.1 G/DL (ref 12–15.9)
HGB UR QL STRIP.AUTO: NEGATIVE
HYALINE CASTS UR QL AUTO: ABNORMAL /LPF
KETONES UR QL STRIP: NEGATIVE
LDLC SERPL CALC-MCNC: 50 MG/DL (ref 0–100)
LDLC/HDLC SERPL: 0.75 {RATIO}
LEUKOCYTE ESTERASE UR QL STRIP.AUTO: ABNORMAL
LYMPHOCYTES # BLD MANUAL: 0.9 10*3/MM3 (ref 0.7–3.1)
LYMPHOCYTES NFR BLD MANUAL: 15 % (ref 19.6–45.3)
LYMPHOCYTES NFR BLD MANUAL: 7 % (ref 5–12)
MAGNESIUM SERPL-MCNC: 1.7 MG/DL (ref 1.6–2.4)
MCH RBC QN AUTO: 30.9 PG (ref 26.6–33)
MCH RBC QN AUTO: 32 PG (ref 26.6–33)
MCHC RBC AUTO-ENTMCNC: 31.9 G/DL (ref 31.5–35.7)
MCHC RBC AUTO-ENTMCNC: 33.4 G/DL (ref 31.5–35.7)
MCV RBC AUTO: 95.7 FL (ref 79–97)
MCV RBC AUTO: 96.9 FL (ref 79–97)
METAMYELOCYTES NFR BLD MANUAL: 6 % (ref 0–0)
MONOCYTES # BLD AUTO: 0.42 10*3/MM3 (ref 0.1–0.9)
MYELOCYTES NFR BLD MANUAL: 1 % (ref 0–0)
NEUTROPHILS # BLD AUTO: 4.14 10*3/MM3 (ref 1.7–7)
NEUTROPHILS NFR BLD MANUAL: 60 % (ref 42.7–76)
NEUTS BAND NFR BLD MANUAL: 9 % (ref 0–5)
NEUTS VAC BLD QL SMEAR: ABNORMAL
NITRITE UR QL STRIP: NEGATIVE
PH UR STRIP.AUTO: 5.5 [PH] (ref 5–8)
PHOSPHATE SERPL-MCNC: 3.4 MG/DL (ref 2.5–4.5)
PLATELET # BLD AUTO: 120 10*3/MM3 (ref 140–450)
PLATELET # BLD AUTO: 82 10*3/MM3 (ref 140–450)
PMV BLD AUTO: 6.9 FL (ref 6–12)
PMV BLD AUTO: 8.8 FL (ref 6–12)
POTASSIUM BLD-SCNC: 5.6 MMOL/L (ref 3.5–5.2)
PROT SERPL-MCNC: 5.9 G/DL (ref 6–8.5)
PROT UR QL STRIP: NEGATIVE
PROT UR-MCNC: 10 MG/DL
RBC # BLD AUTO: 2.71 10*6/MM3 (ref 3.77–5.28)
RBC # BLD AUTO: 2.86 10*6/MM3 (ref 3.77–5.28)
RBC # UR: ABNORMAL /HPF
REF LAB TEST METHOD: ABNORMAL
SCAN SLIDE: NORMAL
SMALL PLATELETS BLD QL SMEAR: ADEQUATE
SODIUM BLD-SCNC: 136 MMOL/L (ref 136–145)
SODIUM UR-SCNC: 42 MMOL/L
SP GR UR STRIP: 1.01 (ref 1–1.03)
SQUAMOUS #/AREA URNS HPF: ABNORMAL /HPF
TRIGL SERPL-MCNC: 51 MG/DL (ref 0–150)
TROPONIN T SERPL-MCNC: 0.02 NG/ML (ref 0–0.03)
TSH SERPL DL<=0.05 MIU/L-ACNC: 2.8 UIU/ML (ref 0.27–4.2)
URATE SERPL-MCNC: 2.1 MG/DL (ref 2.4–5.7)
UROBILINOGEN UR QL STRIP: ABNORMAL
VIT B12 BLD-MCNC: 456 PG/ML (ref 211–946)
VLDLC SERPL-MCNC: 10.2 MG/DL
WBC NRBC COR # BLD: 5.9 10*3/MM3 (ref 3.4–10.8)
WBC NRBC COR # BLD: 6 10*3/MM3 (ref 3.4–10.8)
WBC UR QL AUTO: ABNORMAL /HPF

## 2020-02-05 PROCEDURE — 83735 ASSAY OF MAGNESIUM: CPT | Performed by: INTERNAL MEDICINE

## 2020-02-05 PROCEDURE — 80053 COMPREHEN METABOLIC PANEL: CPT | Performed by: INTERNAL MEDICINE

## 2020-02-05 PROCEDURE — 82088 ASSAY OF ALDOSTERONE: CPT | Performed by: INTERNAL MEDICINE

## 2020-02-05 PROCEDURE — 82570 ASSAY OF URINE CREATININE: CPT | Performed by: INTERNAL MEDICINE

## 2020-02-05 PROCEDURE — 84156 ASSAY OF PROTEIN URINE: CPT | Performed by: INTERNAL MEDICINE

## 2020-02-05 PROCEDURE — 97530 THERAPEUTIC ACTIVITIES: CPT

## 2020-02-05 PROCEDURE — 82550 ASSAY OF CK (CPK): CPT | Performed by: INTERNAL MEDICINE

## 2020-02-05 PROCEDURE — 84484 ASSAY OF TROPONIN QUANT: CPT | Performed by: INTERNAL MEDICINE

## 2020-02-05 PROCEDURE — 85027 COMPLETE CBC AUTOMATED: CPT | Performed by: INTERNAL MEDICINE

## 2020-02-05 PROCEDURE — 84100 ASSAY OF PHOSPHORUS: CPT | Performed by: INTERNAL MEDICINE

## 2020-02-05 PROCEDURE — 82728 ASSAY OF FERRITIN: CPT | Performed by: INTERNAL MEDICINE

## 2020-02-05 PROCEDURE — 84300 ASSAY OF URINE SODIUM: CPT | Performed by: INTERNAL MEDICINE

## 2020-02-05 PROCEDURE — 80061 LIPID PANEL: CPT | Performed by: INTERNAL MEDICINE

## 2020-02-05 PROCEDURE — 84443 ASSAY THYROID STIM HORMONE: CPT | Performed by: INTERNAL MEDICINE

## 2020-02-05 PROCEDURE — 82607 VITAMIN B-12: CPT | Performed by: INTERNAL MEDICINE

## 2020-02-05 PROCEDURE — 85025 COMPLETE CBC W/AUTO DIFF WBC: CPT | Performed by: INTERNAL MEDICINE

## 2020-02-05 PROCEDURE — 81001 URINALYSIS AUTO W/SCOPE: CPT | Performed by: INTERNAL MEDICINE

## 2020-02-05 PROCEDURE — 83036 HEMOGLOBIN GLYCOSYLATED A1C: CPT | Performed by: INTERNAL MEDICINE

## 2020-02-05 PROCEDURE — 99239 HOSP IP/OBS DSCHRG MGMT >30: CPT | Performed by: INTERNAL MEDICINE

## 2020-02-05 PROCEDURE — 25010000002 ENOXAPARIN PER 10 MG: Performed by: INTERNAL MEDICINE

## 2020-02-05 PROCEDURE — 86140 C-REACTIVE PROTEIN: CPT | Performed by: INTERNAL MEDICINE

## 2020-02-05 PROCEDURE — 76775 US EXAM ABDO BACK WALL LIM: CPT

## 2020-02-05 PROCEDURE — 85652 RBC SED RATE AUTOMATED: CPT | Performed by: INTERNAL MEDICINE

## 2020-02-05 PROCEDURE — 84550 ASSAY OF BLOOD/URIC ACID: CPT | Performed by: INTERNAL MEDICINE

## 2020-02-05 PROCEDURE — 84244 ASSAY OF RENIN: CPT | Performed by: INTERNAL MEDICINE

## 2020-02-05 PROCEDURE — 99024 POSTOP FOLLOW-UP VISIT: CPT | Performed by: INTERNAL MEDICINE

## 2020-02-05 PROCEDURE — 85007 BL SMEAR W/DIFF WBC COUNT: CPT | Performed by: INTERNAL MEDICINE

## 2020-02-05 RX ORDER — SODIUM BICARBONATE 650 MG/1
650 TABLET ORAL 2 TIMES DAILY
Start: 2020-02-06 | End: 2020-09-01

## 2020-02-05 RX ORDER — SODIUM BICARBONATE 650 MG/1
650 TABLET ORAL 2 TIMES DAILY
Status: DISCONTINUED | OUTPATIENT
Start: 2020-02-05 | End: 2020-02-06 | Stop reason: HOSPADM

## 2020-02-05 RX ADMIN — GABAPENTIN 100 MG: 100 CAPSULE ORAL at 17:31

## 2020-02-05 RX ADMIN — Medication 10 ML: at 09:11

## 2020-02-05 RX ADMIN — FEBUXOSTAT 40 MG: 40 TABLET ORAL at 09:11

## 2020-02-05 RX ADMIN — COLCHICINE 0.6 MG: 0.6 TABLET, FILM COATED ORAL at 09:11

## 2020-02-05 RX ADMIN — GABAPENTIN 100 MG: 100 CAPSULE ORAL at 09:11

## 2020-02-05 RX ADMIN — Medication 3 ML: at 20:09

## 2020-02-05 RX ADMIN — DIGOXIN 125 MCG: 0.12 TABLET ORAL at 13:26

## 2020-02-05 RX ADMIN — FEBUXOSTAT 40 MG: 40 TABLET ORAL at 20:09

## 2020-02-05 RX ADMIN — ENOXAPARIN SODIUM 30 MG: 30 INJECTION SUBCUTANEOUS at 17:31

## 2020-02-05 RX ADMIN — ACETAMINOPHEN 650 MG: 325 TABLET ORAL at 23:57

## 2020-02-05 RX ADMIN — GABAPENTIN 100 MG: 100 CAPSULE ORAL at 20:09

## 2020-02-05 RX ADMIN — SODIUM BICARBONATE 650 MG TABLET 650 MG: at 20:09

## 2020-02-05 RX ADMIN — ASPIRIN 81 MG 81 MG: 81 TABLET ORAL at 09:11

## 2020-02-05 RX ADMIN — ZINC OXIDE: 200 OINTMENT TOPICAL at 09:11

## 2020-02-05 NOTE — PLAN OF CARE
Problem: Patient Care Overview  Goal: Plan of Care Review  Outcome: Ongoing (interventions implemented as appropriate)  Flowsheets  Taken 2/5/2020 1528  Progress: improving  Outcome Summary: Pt. demonstrates improved functional mobility this date w/ ADL transfer CGA utilizing rolling walker support for bathroom distances. Pt. progress limited secondary to decreased dynamic standing balance w/ increased risk for falls. Recommend IP rehab to address aforementioned deficits.  Taken 2/5/2020 1500  Plan of Care Reviewed With: patient

## 2020-02-05 NOTE — PROGRESS NOTES
"Referring Provider: Hospitalist    Reason for follow-up: Follow-up pacemaker     Patient Care Team:  Rosa M Chavez APRN as PCP - General (Nurse Practitioner)    Subjective .  No chest pain or shortness of breath    Objective  Lying in bed comfortably     Review of Systems   Constitution: Negative for fever and malaise/fatigue.   Cardiovascular: Positive for leg swelling. Negative for chest pain, dyspnea on exertion and palpitations.   Respiratory: Negative for cough and shortness of breath.    Skin: Negative for rash.   Gastrointestinal: Negative for abdominal pain, nausea and vomiting.   Neurological: Negative for focal weakness and headaches.   All other systems reviewed and are negative.      Allopurinol; Clindamycin hcl; Codeine; Furosemide; Hydrochlorothiazide; Naproxen; and Sulfa antibiotics    Scheduled Meds:    aspirin 81 mg Oral Daily   colchicine 0.6 mg Oral Daily   digoxin 125 mcg Oral Daily   enoxaparin 30 mg Subcutaneous Q24H   febuxostat 40 mg Oral BID   gabapentin 100 mg Oral TID   sodium chloride 10 mL Intravenous Q12H   sodium chloride 3 mL Intravenous Q12H   zinc oxide  Topical Q12H     Continuous Infusions:   PRN Meds:.•  acetaminophen **OR** acetaminophen **OR** acetaminophen  •  aluminum-magnesium hydroxide-simethicone  •  bisacodyl  •  hydrOXYzine  •  magnesium hydroxide  •  melatonin  •  ondansetron **OR** ondansetron  •  sodium chloride  •  sodium chloride        VITAL SIGNS  Vitals:    02/04/20 1913 02/05/20 0321 02/05/20 0558 02/05/20 1248   BP: 139/68 166/73 157/71 168/69   BP Location: Left arm Left arm Right arm    Patient Position: Lying Lying Lying    Pulse: 60 60 60 62   Resp: 18 18  15   Temp: 98.1 °F (36.7 °C) 97.9 °F (36.6 °C)     TempSrc: Oral Oral     SpO2: 97% 99%  99%   Weight:       Height:           Flowsheet Rows      First Filed Value   Admission Height  157.5 cm (62\") Documented at 01/28/2020 0037   Admission Weight  64.4 kg (142 lb) Documented at 01/28/2020 0037    "        TELEMETRY: Ventricular pacemaker rhythm    Physical Exam:  Physical Exam   Constitutional: She appears well-developed and well-nourished.   HENT:   Head: Normocephalic and atraumatic.   Eyes: Conjunctivae are normal. No scleral icterus.   Neck: Normal range of motion. Neck supple. No JVD present. Carotid bruit is not present.   Cardiovascular: Normal rate, regular rhythm, S1 normal, S2 normal and intact distal pulses. PMI is not displaced.   Murmur heard.  Pulmonary/Chest: Effort normal and breath sounds normal. She has no wheezes. She has no rales.   Abdominal: Soft. Bowel sounds are normal.   Neurological: She is alert. She has normal strength.   Skin: Skin is warm and dry. No rash noted.        Results Review:   I reviewed the patient's new clinical results.  Lab Results (last 24 hours)     Procedure Component Value Units Date/Time    Aldosterone / Renin Ratio [[id number]] Collected:  02/05/20 1139    Specimen:  Blood Updated:  02/05/20 1239    Hemoglobin A1c [[id number]]  (Normal) Collected:  02/05/20 0258    Specimen:  Blood Updated:  02/05/20 1157     Hemoglobin A1C 4.3 %     Narrative:       Hemoglobin A1C Reference Range:    <5.7 %        Normal  5.7-6.4 %     Increased risk for diabetes  > 6.4 %        Diabetes       These guidelines have been recommended by the American Diabetic Association for Hgb A1c.      The following 2010 guidelines have been recommended by the American Diabetes Association for Hemoglobin A1c.    HBA1c 5.7-6.4% Increased risk for future diabetes (pre-diabetes)  HBA1c     >6.4% Diabetes      Vitamin B12 [[id number]]  (Normal) Collected:  02/05/20 0258    Specimen:  Blood Updated:  02/05/20 1139     Vitamin B-12 456 pg/mL     Narrative:       Results may be falsely increased if patient taking Biotin.      Sedimentation Rate [[id number]]  (Abnormal) Collected:  02/05/20 0258    Specimen:  Blood Updated:  02/05/20 0729     Sed Rate 74 mm/hr      Comment: Corrected result. Previous  result was 76 mm/hr on 2/5/2020 at 0649 EST.       CBC & Differential [[id number]] Collected:  02/05/20 0258    Specimen:  Blood Updated:  02/05/20 0649    Narrative:       The following orders were created for panel order CBC & Differential.  Procedure                               Abnormality         Status                     ---------                               -----------         ------                     CBC Auto Differential[[id number]]        Abnormal            Final result                 Please view results for these tests on the individual orders.    CBC Auto Differential [[id number]]  (Abnormal) Collected:  02/05/20 0258    Specimen:  Blood Updated:  02/05/20 0649     WBC 6.00 10*3/mm3      RBC 2.71 10*6/mm3      Hemoglobin 8.4 g/dL      Hematocrit 26.3 %      MCV 96.9 fL      MCH 30.9 pg      MCHC 31.9 g/dL      RDW 18.1 %      RDW-SD 62.1 fl      MPV 6.9 fL      Platelets 120 10*3/mm3      Comment: Platelet count performed on sodium citrate tube due to EDTA clumping.   Modified report. Previous result was 24 10*3/mm3 on 2/5/2020 at 0453 EST.       Narrative:       Platelet count performed on sodium citrate tube due to EDTA clumping.      Scan Slide [[id number]] Collected:  02/05/20 0258    Specimen:  Blood Updated:  02/05/20 0649     Scan Slide --     Comment: See Manual Differential Results       Manual Differential [[id number]]  (Abnormal) Collected:  02/05/20 0258    Specimen:  Blood Updated:  02/05/20 0649     Neutrophil % 60.0 %      Lymphocyte % 15.0 %      Monocyte % 7.0 %      Eosinophil % 1.0 %      Basophil % 1.0 %      Bands %  9.0 %      Metamyelocyte % 6.0 %      Myelocyte % 1.0 %      Neutrophils Absolute 4.14 10*3/mm3      Lymphocytes Absolute 0.90 10*3/mm3      Monocytes Absolute 0.42 10*3/mm3      Eosinophils Absolute 0.06 10*3/mm3      Basophils Absolute 0.06 10*3/mm3      Anisocytosis Slight/1+     Crenated RBC's Slight/1+     Vacuolated Neutrophils Slight/1+     Platelet Estimate  Adequate     Clumped Platelets Present and due to EDTA    Narrative:       Platelet count performed on sodium citrate tube due to EDTA clumping.      CBC (No Diff) [[id number]]  (Abnormal) Collected:  02/05/20 0517    Specimen:  Blood Updated:  02/05/20 0533     WBC 5.90 10*3/mm3      RBC 2.86 10*6/mm3      Hemoglobin 9.1 g/dL      Hematocrit 27.3 %      MCV 95.7 fL      MCH 32.0 pg      MCHC 33.4 g/dL      RDW 17.4 %      RDW-SD 59.5 fl      MPV 8.8 fL      Platelets 82 10*3/mm3     Ferritin [[id number]]  (Abnormal) Collected:  02/05/20 0259    Specimen:  Blood Updated:  02/05/20 0455     Ferritin 237.40 ng/mL     Narrative:       Results may be falsely decreased if patient taking Biotin.      TSH [[id number]]  (Normal) Collected:  02/05/20 0259    Specimen:  Blood Updated:  02/05/20 0455     TSH 2.800 uIU/mL     Troponin [[id number]]  (Normal) Collected:  02/05/20 0259    Specimen:  Blood Updated:  02/05/20 0455     Troponin T 0.019 ng/mL     Narrative:       Troponin T Reference Range:  <= 0.03 ng/mL-   Negative for AMI  >0.03 ng/mL-     Abnormal for myocardial necrosis.  Clinicians would have to utilize clinical acumen, EKG, Troponin and serial changes to determine if it is an Acute Myocardial Infarction or myocardial injury due to an underlying chronic condition.       Results may be falsely decreased if patient taking Biotin.      Comprehensive Metabolic Panel [[id number]]  (Abnormal) Collected:  02/05/20 0259    Specimen:  Blood Updated:  02/05/20 0455     Glucose 85 mg/dL      BUN 59 mg/dL      Creatinine 2.23 mg/dL      Sodium 136 mmol/L      Potassium 5.6 mmol/L      Chloride 107 mmol/L      CO2 19.0 mmol/L      Calcium 8.4 mg/dL      Total Protein 5.9 g/dL      Albumin 2.40 g/dL      ALT (SGPT) 13 U/L      AST (SGOT) 24 U/L      Alkaline Phosphatase 134 U/L      Total Bilirubin 0.4 mg/dL      eGFR Non African Amer 21 mL/min/1.73      Globulin 3.5 gm/dL      A/G Ratio 0.7 g/dL      BUN/Creatinine Ratio 26.5      Anion Gap 10.0 mmol/L     Narrative:       GFR Normal >60  Chronic Kidney Disease <60  Kidney Failure <15      CK [[id number]]  (Normal) Collected:  02/05/20 0259    Specimen:  Blood Updated:  02/05/20 0454     Creatine Kinase 26 U/L     Uric Acid [[id number]]  (Abnormal) Collected:  02/05/20 0259    Specimen:  Blood Updated:  02/05/20 0454     Uric Acid 2.1 mg/dL     Lipid Panel [[id number]]  (Abnormal) Collected:  02/05/20 0259    Specimen:  Blood Updated:  02/05/20 0454     Total Cholesterol 126 mg/dL      Triglycerides 51 mg/dL      HDL Cholesterol 66 mg/dL      LDL Cholesterol  50 mg/dL      VLDL Cholesterol 10.2 mg/dL      LDL/HDL Ratio 0.75    Narrative:       Cholesterol Reference Ranges  (U.S. Department of Health and Human Services ATP III Classifications)    Desirable          <200 mg/dL  Borderline High    200-239 mg/dL  High Risk          >240 mg/dL      Triglyceride Reference Ranges  (U.S. Department of Health and Human Services ATP III Classifications)    Normal           <150 mg/dL  Borderline High  150-199 mg/dL  High             200-499 mg/dL  Very High        >500 mg/dL    HDL Reference Ranges  (U.S. Department of Health and Human Services ATP III Classifcations)    Low     <40 mg/dl (major risk factor for CHD)  High    >60 mg/dl ('negative' risk factor for CHD)        LDL Reference Ranges  (U.S. Department of Health and Human Services ATP III Classifcations)    Optimal          <100 mg/dL  Near Optimal     100-129 mg/dL  Borderline High  130-159 mg/dL  High             160-189 mg/dL  Very High        >189 mg/dL    C-reactive Protein [[id number]]  (Abnormal) Collected:  02/05/20 0259    Specimen:  Blood Updated:  02/05/20 0454     C-Reactive Protein 6.47 mg/dL     Phosphorus [[id number]]  (Normal) Collected:  02/05/20 0259    Specimen:  Blood Updated:  02/05/20 0454     Phosphorus 3.4 mg/dL     Magnesium [[id number]]  (Normal) Collected:  02/05/20 0259    Specimen:  Blood Updated:  02/05/20 0454      Magnesium 1.7 mg/dL           Imaging Results (Last 24 Hours)     ** No results found for the last 24 hours. **          EKG      I personally viewed and interpreted the patient's EKG/Telemetry data:    ECHOCARDIOGRAM:    STRESS MYOVIEW:    CARDIAC CATHETERIZATION:    OTHER:         Assessment/Plan     1 urinary tract infection  2.Altered mental status changes  3.  Syncope  4.  History of atrial fibrillation  5.  Status post pacemaker placement  6.  Congestive heart failure  7.  Hypertension  8.  Nonhealing pressure ulcers  9. acute renal failure with history of chronic renal sufficiency  10.  Chronic lymphedema and cellulitis of the lower extremities     Presented with multiple episodes of falling down which could be syncopal episodes  Patient's pacemaker was checked and is at [facility] and hence will do the battery change  Patient has renal insufficiency and has acute renal failure now and is followed by nephrologist  Patient's troponin was borderline elevated which could be secondary to acute renal failure  Patient also has history of congestive heart failure we will get an echocardiogram for LV function well abnormalities  Patient had mild mental status changes and is probably secondary to urinary tract infection is being treated with antibiotics  Patient had the generator replacement done without any complications.  Patient is currently stable and is ready to be transferred to the rehab placement  I discussed the patients findings and my recommendations with patient and nurse    Vinod Root MD  02/05/20  3:23 PM

## 2020-02-05 NOTE — CONSULTS
Nutrition Services    Patient Name:  Emperatriz Childs  YOB: 1945  MRN: [id number]  Admit Date:  1/28/2020    Progress note:    LOS review (8 days): BMI 35.34, no weight loss trend. Eating 100% of meals. Wound care note reviewed-no pressure injuries noted. + BM.     Will follow prn or rescreen at LOS.     Electronically signed by:  Lakia Silverio RD  02/05/20 3:38 PM

## 2020-02-05 NOTE — NURSING NOTE
Patient seen today for follow up of Le wounds.  bilat LE with venous stasis/ lymphedema.  Left dorsal foot and ankle completely denuded / open measuring approximately 8x14x0.1 cm circumfertially around ankle    RLE dorsal foot with scattered small open areas. First , second, third dorsal toes denuded open also weeping over posterior ankles. Open approxmately 3x4x0.1cm.      The patients feet are extremely edematous.  Discussed with bedside RN that patient needs a recliner to elevate feet.     Bilat LE cleaned, dried ( calazime  Placed to intact skin over LE, hydrofiber with silver applied to open areas over feet/ankles. Dry fluffed gauze weaved between toes. Kerlix/ ace wrap from base of toes to below knee.     Recommend dressing be changed daily. And LE elevated at all times ( as patient can tolerate)    Recommend pat follow up with outpatient wound center.

## 2020-02-05 NOTE — THERAPY TREATMENT NOTE
Acute Care - Occupational Therapy Treatment Note  [facility]     Patient Name: Emperatriz Childs  : 1945  MRN: [id number]  Today's Date: 2020             Admit Date: 2020       ICD-10-CM ICD-9-CM   1. Presence of biventricular implantable cardioverter-defibrillator (ICD) Z95.810 V45.02   2. Syncope and collapse R55 780.2   3. Injury of head, initial encounter S09.90XA 959.01   4. Acute UTI N39.0 599.0   5. Visual hallucinations R44.1 368.16   6. Presence of cardiac pacemaker Z95.0 V45.01     Patient Active Problem List   Diagnosis   • Congestive heart failure (CMS/HCC)   • Chronic low back pain   • Atrial fibrillation (CMS/HCC)   • Hypertension, benign   • Gout   • Urinary tract infection   • Syncope and collapse   • Lymphedema of both lower extremities   • Presence of cardiac pacemaker   • Non-pressure chronic ulcer right ankle, limited to breakdown skin (CMS/HCC)   • Non-pressure chronic ulcer left lower leg, limited to breakdown skin (CMS/HCC)   • Presence of biventricular implantable cardioverter-defibrillator (ICD)     Past Medical History:   Diagnosis Date   • CHF (congestive heart failure) (CMS/HCC)    • History of transfusion    • Hypertension    • Lymphedema of leg    • Myocardial infarction (CMS/HCC)      Past Surgical History:   Procedure Laterality Date   • CARDIAC ELECTROPHYSIOLOGY PROCEDURE N/A 2020    Procedure: ICD battery change;  Surgeon: Dionna Laird MD;  Location: [facility] CATH INVASIVE LOCATION;  Service: Cardiovascular   • CARDIAC ELECTROPHYSIOLOGY PROCEDURE N/A 2020    Procedure: Temporary Pacemaker;  Surgeon: Dionna Laird MD;  Location: [facility] CATH INVASIVE LOCATION;  Service: Cardiovascular   • CARDIAC ELECTROPHYSIOLOGY PROCEDURE N/A 2020    Procedure: Pocket Revision;  Surgeon: Dionna Laird MD;  Location: [facility] CATH INVASIVE LOCATION;  Service: Cardiovascular   • EYE SURGERY     • PACEMAKER IMPLANTATION         Therapy Treatment    Rehabilitation  Treatment Summary     Row Name 02/05/20 1500             Treatment Time/Intention    Discipline  occupational therapist  -MP      Patient/Family Observations  Pt. seated up in armchair  -MP      Recorded by [MP] Tyrel Duffy OT 02/05/20 1528      Row Name 02/05/20 1500             Cognitive Assessment/Intervention- PT/OT    Orientation Status (Cognition)  oriented x 3  -MP      Follows Commands (Cognition)  WNL  -MP      Recorded by [MP] Tyrel Duffy OT 02/05/20 1528      Row Name 02/05/20 1500             Functional Mobility    Functional Mobility- Ind. Level  contact guard assist;1 person  -MP      Functional Mobility- Device  rolling walker  -MP      Recorded by [MP] Tyrel Duffy OT 02/05/20 1528      Row Name 02/05/20 1500             Sit-Stand Transfer    Sit-Stand [place] (Transfers)  contact guard;1 person assist  -MP      Assistive Device (Sit-Stand Transfers)  walker, front-wheeled  -MP      Recorded by [MP] Tyrel Duffy OT 02/05/20 1528      Row Name 02/05/20 1500             Positioning and Restraints    Pre-Treatment Position  sitting in chair/recliner  -MP      Post Treatment Position  chair  -MP      In Chair  encouraged to call for assist;call light within reach;exit alarm on  -MP      Recorded by [MP] Tyrel Duffy, LUIS 02/05/20 1528      Row Name 02/05/20 1500             Pain Assessment    Additional Documentation  Pain Scale: Word Pre/Post-Treatment (Group)  -MP      Recorded by [MP] Tyrel Duffy OT 02/05/20 1528      Row Name 02/05/20 1500             Pain Scale: Word Pre/Post-Treatment    Pain: Word Scale, Pretreatment  4 - moderate pain  -MP      Pain: Word Scale, Post-Treatment  4 - moderate pain  -MP      Pre/Post Treatment Pain Comment  BLE  -MP      Recorded by [MP] Tyrel Duffy OT 02/05/20 1528      Row Name                Wound 01/28/20 0055 Left arm Skin Tear    Wound - Properties Group Date first assessed: 01/28/20 [NB] Time first  assessed: 0055 [NB] Side: Left [NB] Location: arm [NB] Primary Wound Type: Skin tear [NB] Recorded by:  [NB] Bess Madden LPN 01/28/20 0056    Row Name                Wound 01/28/20 0458 Right anterior foot Other (comment)    Wound - Properties Group Date first assessed: 01/28/20 [KK] Time first assessed: 0458 [KK] Present on Hospital Admission: Y [KK] Side: Right [KK] Orientation: anterior [KK] Location: foot [KK] Primary Wound Type: Other [KK], Lymphedema   Recorded by:  [KK] Adrienne Zhou RN 01/28/20 0500    Row Name                Wound 01/28/20 0500 Left anterior foot Other (comment)    Wound - Properties Group Date first assessed: 01/28/20 [KK] Time first assessed: 0500 [KK] Present on Hospital Admission: Y [KK] Side: Left [KK] Orientation: anterior [KK] Location: foot [KK] Primary Wound Type: Other [KK], Lymphedema  Recorded by:  [KK] Adrienne Zhou RN 01/28/20 0500    Row Name                Wound 01/31/20 1433 Left upper chest Incision    Wound - Properties Group Date first assessed: 01/31/20 [LUCI] Time first assessed: 1433 [LUCI] Side: Left [LUCI] Orientation: upper [LUCI] Location: chest [LUCI] Primary Wound Type: Incision [LUCI] Recorded by:  [LUCI] Salvador Canela 01/31/20 1434    Row Name 02/05/20 1500             Plan of Care Review    Plan of Care Reviewed With  patient  -MP      Progress  improving  -MP      Recorded by [MP] Tyrel Duffy OT 02/05/20 1528      Row Name 02/05/20 1500             Outcome Summary/Treatment Plan (OT)    Anticipated Discharge Disposition (OT)  inpatient rehabilitation facility  -MP      Recorded by [MP] Tyrel Duffy OT 02/05/20 1528        User Key  (r) = Recorded By, (t) = Taken By, (c) = Cosigned By    Initials Name Effective Dates Discipline    Tyrel Cervantes OT 03/01/19 -  OT    Salvador Lancaster 03/04/19 -  --    Adrienne Ash RN 03/01/19 -  Nurse    NB Bess Madden LPN 11/07/19 -  Nurse        Wound 01/28/20 0055 Left arm Skin Tear (Active)    Dressing Appearance dry;intact;no drainage 2/5/2020  7:25 AM   Closure Adhesive bandage 2/5/2020  7:25 AM   Base red;scab 2/5/2020  7:25 AM   Periwound dry;intact 2/5/2020  7:25 AM   Periwound Temperature warm 2/5/2020  7:25 AM   Periwound Skin Turgor soft 2/5/2020  7:25 AM   Drainage Amount none 2/4/2020  7:01 PM       Wound 01/28/20 0458 Right anterior foot Other (comment) (Active)   Dressing Appearance intact;moist drainage 2/5/2020  7:25 AM   Closure NELY 2/5/2020  7:25 AM   Base moist;red;slough 2/5/2020  7:25 AM   Periwound dry;swelling;other (see comments) 2/5/2020  7:25 AM   Periwound Temperature warm 2/5/2020  7:25 AM   Periwound Skin Turgor firm 2/5/2020  7:25 AM       Wound 01/28/20 0500 Left anterior foot Other (comment) (Active)   Dressing Appearance intact 2/5/2020  7:25 AM   Closure NELY 2/5/2020  7:25 AM       Wound 01/31/20 1433 Left upper chest Incision (Active)   Dressing Appearance dry;intact 2/5/2020  7:25 AM   Closure Adhesive bandage;NELY 2/5/2020  7:25 AM   Base dressing in place, unable to visualize 2/5/2020  7:25 AM   Periwound intact;dry 2/4/2020  7:01 PM           OT Recommendation and Plan  Outcome Summary/Treatment Plan (OT)  Anticipated Discharge Disposition (OT): inpatient rehabilitation facility  Therapy Frequency (OT Eval): 3 times/wk  Plan of Care Review  Plan of Care Reviewed With: patient  Plan of Care Reviewed With: patient  Outcome Summary: Pt. demonstrates improved functional mobility this date w/ ADL transfer CGA utilizing rolling walker support for bathroom distances. Pt. progress limited secondary to decreased dynamic standing balance w/ increased risk for falls. Recommend IP rehab to address aforementioned deficits.       Time Calculation:   Time Calculation- OT     Row Name 02/05/20 1530             Time Calculation- OT    OT Start Time  1115  -MP      OT Stop Time  1130  -MP      OT Time Calculation (min)  15 min  -MP      Total Timed Code Minutes- OT  15 minute(s)  -MP       OT Received On  02/05/20  -NOREEN      OT - Next Appointment  02/07/20  -        User Key  (r) = Recorded By, (t) = Taken By, (c) = Cosigned By    Initials Name Provider Type    Tyrel Cervantes OT Occupational Therapist        Therapy Charges for Today     Code Description Service Date Service Provider Modifiers Qty    [id number]  OT THERAPEUTIC ACT EA 15 MIN 2/5/2020 Tyrel Duffy OT GO 1               Tyrel Duffy OT  2/5/2020

## 2020-02-05 NOTE — CONSULTS
INITIAL CONSULT NOTE      Name: Emperatriz Childs ADMIT: 2020   : 1945  PCP: Rosa M Chavez APRN    MRN: [id number] LOS: 5 days   AGE/SEX: 74 y.o. female  ROOM: [facility]/       Reason for Consult:       Renal failure    Subjective .     History of present illness:  Emperatriz Childs is a 74 y.o. female who presents with hypertension also history of congestive heart failure with history of AICD placement mainly came to the hospital because of frequent falls at assisted living and also having some visual hallucination.  Do have significant edema and bilateral lower extremity lymphedema.  She is getting treatment for that.  Patient creatinine found to be 1.7 yesterday now increased to 2.2.  With potassium of 5.6, bicarb of 19.  Patient BNP level was 13,000 in the past.  No other complaint.  Not confused.  Patient is alert oriented.  But very frustrated because of her frequent falls.    Review of Systems  All other review of system unremarkable  Constitutional: No fever, no chills, no lethargy, no weakness.  HEENT:  No headache, otalgia, itchy eyes, nasal discharge or sore throat.  Cardiac:  No chest pain, dyspnea, orthopnea or PND.  Chest:  No cough, phlegm or wheezing.  Abdomen:  No abdominal pain, nausea or vomiting.  Neuro:  No focal weakness, abnormal movements orseizure like activity.  :   No hematuria, no pyuria, no dysuria, no flank pain.  ROS was otherwise negative except as mentioned in the [place].     History  Past Medical History:   Diagnosis Date   • CHF (congestive heart failure) (CMS/HCC)    • History of transfusion    • Hypertension    • Lymphedema of leg    • Myocardial infarction (CMS/HCC)    ,   Past Surgical History:   Procedure Laterality Date   • CARDIAC ELECTROPHYSIOLOGY PROCEDURE N/A 2020    Procedure: ICD battery change;  Surgeon: Dionna Laird MD;  Location: [facility] INVASIVE LOCATION;  Service: Cardiovascular   • CARDIAC ELECTROPHYSIOLOGY PROCEDURE N/A 2020     Procedure: Temporary Pacemaker;  Surgeon: Dionna Laird MD;  Location:  [facility] CATH INVASIVE LOCATION;  Service: Cardiovascular   • CARDIAC ELECTROPHYSIOLOGY PROCEDURE N/A 1/31/2020    Procedure: Pocket Revision;  Surgeon: Dionna Laird MD;  Location: [facility] CATH INVASIVE LOCATION;  Service: Cardiovascular   • EYE SURGERY     • PACEMAKER IMPLANTATION     ,   Family History   Family history unknown: Yes   ,   Social History     Tobacco Use   • Smoking status: Never Smoker   • Smokeless tobacco: Never Used   Substance Use Topics   • Alcohol use: Never     Frequency: Never   • Drug use: Never    and Allergies:  Allopurinol; Clindamycin hcl; Codeine; Furosemide; Hydrochlorothiazide; Naproxen; and Sulfa antibiotics    Objective     Vital Signs   Temp:  [97.9 °F (36.6 °C)-98.1 °F (36.7 °C)] 97.9 °F (36.6 °C)  Heart Rate:  [60-62] 62  Resp:  [15-18] 15  BP: (139-168)/(68-73) 168/69    Physical Exam:   General:      Thin elderly white female not any acute distress.    Head:      normocephalic and atraumatic.    Eyes:      PERRL/EOM intact, conjunctiva and sclera clear with out nystagmus.    Neck:      no masses, thyromegaly,  trachea central with normal respiratory effort and PMI displaced laterally  Lungs:      clear bilaterally to auscultation.    Heart:       Regular rate and rhythm, no murmur no gallop  Abdomen:       Soft, nontender, not distended, bowel sounds positive, no shifting dullness.  Msk:      no deformity or scoliosis noted of thoracic or lumbar spine.    Pulses:      pulses normal in all 4 extremities.    Extremities:       no cyanosis or clubbing--+2 edema.    Neurologic:      no focal deficits.   alert oriented x3  Skin:      intact without lesions or rashes.    Psych:      alert and cooperative; normal mood and affect; normal attention span and concentration.      LABS /Xray       CBC    Results from last 7 days   Lab Units 02/05/20  0517 02/05/20  0258 02/04/20  0525 02/03/20  0326 02/02/20  0343  02/01/20  0502 01/31/20  0251   WBC 10*3/mm3 5.90 6.00 6.60 4.90 5.80 5.80 5.80   HEMOGLOBIN g/dL 9.1* 8.4* 8.5* 8.3* 9.2* 9.6* 9.3*   PLATELETS 10*3/mm3 82* 120* 63* 53* 61* 92* 110*     BMP   Results from last 7 days   Lab Units 02/05/20  0259 02/04/20  0526 02/03/20  0326 02/02/20  0343 02/01/20  0244 01/31/20  0251 01/30/20  0543   SODIUM mmol/L 136 133* 137 139 136 136 137   POTASSIUM mmol/L 5.6* 4.8 5.0 5.4* 5.1 5.4* 5.4*   CHLORIDE mmol/L 107 106 110* 111* 108* 109* 110*   CO2 mmol/L 19.0* 16.0* 20.0* 20.0* 17.0* 17.0* 18.0*   BUN mg/dL 59* 56* 57* 55* 56* 59* 63*   CREATININE mg/dL 2.23* 1.77* 2.04* 1.90* 1.94* 2.05* 2.16*   GLUCOSE mg/dL 85 91 92 91 86 90 82   MAGNESIUM mg/dL 1.7  --   --   --   --   --   --    PHOSPHORUS mg/dL 3.4  --   --   --   --   --   --      CMP   Results from last 7 days   Lab Units 02/05/20  0259 02/04/20  0526 02/03/20  0326 02/02/20  0343 02/01/20  0244 01/31/20  0251 01/30/20  0543   SODIUM mmol/L 136 133* 137 139 136 136 137   POTASSIUM mmol/L 5.6* 4.8 5.0 5.4* 5.1 5.4* 5.4*   CHLORIDE mmol/L 107 106 110* 111* 108* 109* 110*   CO2 mmol/L 19.0* 16.0* 20.0* 20.0* 17.0* 17.0* 18.0*   BUN mg/dL 59* 56* 57* 55* 56* 59* 63*   CREATININE mg/dL 2.23* 1.77* 2.04* 1.90* 1.94* 2.05* 2.16*   GLUCOSE mg/dL 85 91 92 91 86 90 82   ALBUMIN g/dL 2.40*  --   --   --   --   --   --    BILIRUBIN mg/dL 0.4  --   --   --   --   --   --    ALK PHOS U/L 134*  --   --   --   --   --   --    AST (SGOT) U/L 24  --   --   --   --   --   --    ALT (SGPT) U/L 13  --   --   --   --   --   --      ABG      Imaging Results (Last 24 Hours)     ** No results found for the last 24 hours. **            @Assessment  Assessment:        Urinary tract infection    Congestive heart failure (CMS/HCC)    Chronic low back pain    Atrial fibrillation (CMS/HCC)    Hypertension, benign    Gout    Syncope and collapse    Lymphedema of both lower extremities    Presence of cardiac pacemaker    Non-pressure chronic ulcer right  ankle, limited to breakdown skin (CMS/HCC)    Non-pressure chronic ulcer left lower leg, limited to breakdown skin (CMS/HCC)    Presence of biventricular implantable cardioverter-defibrillator (ICD)    · Acute kidney injury in a patient with CKD stage III-IV likely  · Hyperkalemia with some metabolic acidosis the patient was not diabetic  · History of hypertension  · Congestive heart failure with last ejection fraction around 40%  · History of atrial fibrillation  · Significant metabolic acidosis  · Significant anemia  · Chronic lymphedema    Plan:     · At this time etiology of acute increase in creatinine with hyperkalemia and acidosis might be related to CKD  · I will recheck renal ultrasound  · Check urine studies  · Follow-up with repeat labs tomorrow morning  · We will consider starting some loop diuretics tomorrow depending upon patient BNP level volume status to be reassessed again  · Syncopal episode may be because of AICD issue battery was changed  · As patient ejection fraction was 40% repeat echo will be needed and will benefit from low-dose diuretics

## 2020-02-05 NOTE — PLAN OF CARE
Problem: Patient Care Overview  Goal: Plan of Care Review  Flowsheets  Taken 2/5/2020 8253  Progress: improving  Taken 2/4/2020 1901  Plan of Care Reviewed With: patient  Note:   Patient rested comfortably in bed throughout the night without complaints of pain.  No new concerns at this time, will continue to monitor.

## 2020-02-06 NOTE — SIGNIFICANT NOTE
02/06/20 0117   Discharge of Care   Discharge Mode stretcher   Discharge Destination inpatient rehabilitation facility   Nurse Report Given To April at 0045   Discharged Accompanied by self only   Discharge Contact Information if Applicable Left message on Eliana (daughter) home phone.     No answer with Eliana cell phone, Grandson (Hector) cell phone and Stacey (daughter) cell phone   Discharge Teaching Done  Yes   Learning Method Explanation;Written Materials

## 2020-02-06 NOTE — NURSING NOTE
Patient has left with the ambulance for [place].  Patients was sent with all their belongings packed up.  Patient was alert and oriented and ready to go.      Patient's family was called. I called several family members on the contact list. A message was left on the home numbers phone to Eliana (daughter).   Stacey and Hector were called but no contact or message was available.      Report was called into April at [place].

## 2020-02-06 NOTE — DISCHARGE SUMMARY
[facility] Medicine Services  DISCHARGE SUMMARY        Prepared For PCP:  Rosa M Chavez APRN    Patient Name: Emperatriz Childs  : 1945  MRN: [id number]      Date of Admission:   2020    Date of Discharge:  2020    Length of stay:  LOS: 5 days     Hospital Course     Presenting Problem:   Syncope and collapse [R55]  Visual hallucinations [R44.1]  Acute UTI [N39.0]  Injury of head, initial encounter [S09.90XA]  Syncope and collapse [R55]      Active Hospital Problems    Diagnosis  POA   • **Urinary tract infection [N39.0]  Yes     Priority: High   • Syncope and collapse [R55]  Yes     Priority: Medium   • Lymphedema of both lower extremities [I89.0]  Yes     Priority: Medium   • Presence of cardiac pacemaker [Z95.0]  Yes     Priority: Medium   • Non-pressure chronic ulcer left lower leg, limited to breakdown skin (CMS/HCC) [L97.921]  Yes     Priority: Medium   • Chronic low back pain [M54.5, G89.29]  Yes     Priority: Medium   • Hypertension, benign [I10]  Yes     Priority: Medium   • Congestive heart failure (CMS/HCC) [I50.9]  Yes     Priority: Medium   • Atrial fibrillation (CMS/HCC) [I48.91]  Yes     Priority: Medium   • Presence of biventricular implantable cardioverter-defibrillator (ICD) [Z95.810]  Yes     Priority: Low   • Gout [M10.9]  Yes     Priority: Low   • Non-pressure chronic ulcer right ankle, limited to breakdown skin (CMS/HCC) [L97.311]  Yes      Resolved Hospital Problems    Diagnosis Date Resolved POA   • Delirium [R41.0] 2020 Yes     Priority: Medium           Hospital Course:  Ms. Childs is a 74 y.o. occasion female with a history of CHF, bilateral lymphedema and cellulitis, diabetes type 2, gout, presented to the [facility] ED on 2020 with a complaint of visual hallucinations.  Patient states she has been seeing people that are dead such as her  and  little children.  Patient states she knows that they are a hallucination.   Patient is alert and oriented x4.  Patient also has bilateral lymphedema and cellulitis to her lower bilateral lower extremities.  Patient was a patient of the [facility] wound care center, but is now current with home health.      In the ED, CT head negative for acute disease.  UA: 2+ leukocytes, WBC 21-30, 2+ bacteria, BUN 59, CR 2.381, alkaline phosphatase 142, WBC 6.7, Hgb 10.7, HCT 31.9, platelets 622.  Patient was given 1 g Rocephin in the ED patient to be admitted for further work-up and evaluation.    Urinary tract infection  -Finished antibiotics     Altered mental status / metabolic encephalopathy improved now.   -Most likely due to UTI     -Syncope l  -Multiple episodes of waking up on the floor  -To be secondary to UTI and AMS.  -Pacemaker interrogated by ED  - battery changed by cardiology service on this admission        Chronic systolic congestive heart failure stable.  -BNP 13,384, CXR cardiomegaly.  Likely due to CONG  -Patient is on p.o. Bumex 1 mg twice daily  -Elevated creatinine 2.31     Elevated troponin  -Initial troponin 0.029, most likely due to CONG  -Stable     CONG on CKD stage 3 improved.   -BUN 59 creatinine 2.31  -Baseline creatinine appears to be 1.8-2.0  - d/c iv fluids in order to avoid fluid overload.      Anemia of chronic kidney disease microcytic and monitor hemoglobin     Chronic lymphedema and cellulitis bilateral lower extremities  -Patient has been current with home health. .  -Pt recently finished Keflex  -Wound nurse to see     Atrial fibrillation  -Continue digoxin and Lopressor     Gout  -Continue colchicine and Uloric     Chronic pain  Continue Neurontin (INSPECT reviewed)    Recommendation for Outpatient Providers:             Reasons For Change In Medications and Indications for New Medications:        Day of Discharge     HPI:       Vital Signs:   Temp:  [97.9 °F (36.6 °C)-98.3 °F (36.8 °C)] 98.3 °F (36.8 °C)  Heart Rate:  [59-62] 59  Resp:  [15-18] 16  BP:  (156-168)/(69-73) 156/72     Physical Exam:  Physical Exam   Cardiovascular: Normal rate and regular rhythm.   Pulmonary/Chest: Effort normal and breath sounds normal.   Abdominal: Soft. Bowel sounds are normal.   Nursing note and vitals reviewed.      Pertinent  and/or Most Recent Results     Results from last 7 days   Lab Units 02/05/20  0517 02/05/20  0259 02/05/20  0258 02/04/20  0526 02/04/20  0525 02/03/20  0326 02/02/20  0343 02/01/20  0502 02/01/20  0244 01/31/20  0251  01/30/20  0543   WBC 10*3/mm3 5.90  --  6.00  --  6.60 4.90 5.80 5.80  --  5.80   < >  --    HEMOGLOBIN g/dL 9.1*  --  8.4*  --  8.5* 8.3* 9.2* 9.6*  --  9.3*   < >  --    HEMATOCRIT % 27.3*  --  26.3*  --  26.4* 26.3* 28.6* 28.9*  --  28.3*   < >  --    PLATELETS 10*3/mm3 82*  --  120*  --  63* 53* 61* 92*  --  110*   < >  --    SODIUM mmol/L  --  136  --  133*  --  137 139  --  136 136  --  137   POTASSIUM mmol/L  --  5.6*  --  4.8  --  5.0 5.4*  --  5.1 5.4*  --  5.4*   CHLORIDE mmol/L  --  107  --  106  --  110* 111*  --  108* 109*  --  110*   CO2 mmol/L  --  19.0*  --  16.0*  --  20.0* 20.0*  --  17.0* 17.0*  --  18.0*   BUN mg/dL  --  59*  --  56*  --  57* 55*  --  56* 59*  --  63*   CREATININE mg/dL  --  2.23*  --  1.77*  --  2.04* 1.90*  --  1.94* 2.05*  --  2.16*   GLUCOSE mg/dL  --  85  --  91  --  92 91  --  86 90  --  82   CALCIUM mg/dL  --  8.4*  --  8.1*  --  8.2* 8.4*  --  8.3* 8.1*  --  8.2*    < > = values in this interval not displayed.     Results from last 7 days   Lab Units 02/05/20  0259   BILIRUBIN mg/dL 0.4   ALK PHOS U/L 134*   ALT (SGPT) U/L 13   AST (SGOT) U/L 24     Results from last 7 days   Lab Units 02/05/20  0259   CHOLESTEROL mg/dL 126   TRIGLYCERIDES mg/dL 51   HDL CHOL mg/dL 66*     Results from last 7 days   Lab Units 02/05/20  0259 02/05/20  0258   TSH uIU/mL 2.800  --    HEMOGLOBIN A1C %  --  4.3   TROPONIN T ng/mL 0.019  --        Brief Urine Lab Results  (Last result in the past 365 days)      Color    Clarity   Blood   Leuk Est   Nitrite   Protein   CREAT   Urine HCG        02/05/20 1637             43.7       02/05/20 1637 Yellow Cloudy  Comment:  Result checked  Negative Moderate (2+) Negative Negative               Microbiology Results Abnormal     Procedure Component Value - Date/Time    Urine Culture - Urine, Urine, Catheter [[id number]]  (Normal) Collected:  01/28/20 0122    Lab Status:  Final result Specimen:  Urine, Catheter Updated:  01/29/20 1912     Urine Culture No growth          Ct Head Without Contrast    Result Date: 1/28/2020  Impression: 1. No fracture or intracranial hemorrhage. 2. Mild chronic age-related intracranial findings as above.  This examination was interpreted by Marcus Linares M.D. Electronically signed by:  Marcus Linares M.D.  1/28/2020 12:08 AM    Xr Chest 1 View    Result Date: 1/28/2020  Impression:  1. Increasing density in the left lung base felt to represent atelectasis and/or early pneumonia, follow-up in a couple days is recommended 2. Left-sided pacemaker defibrillator appears unchanged 3. No evidence of pleural effusion 4. Mild interval worsening from 12/14/2018  Electronically Signed By-Austin Nunez Jr. On:1/28/2020 7:14 AM This report was finalized on [id number] by  Austin Nunez Jr., .    Us Renal Bilateral    Result Date: 2/5/2020  Impression: 1. Mildly small kidneys with renal cortical thinning. 2. No hydronephrosis. 3. Simple appearing 1.5 cm left renal cyst.  Electronically Signed By-Dae Auguste On:2/5/2020 7:21 PM This report was finalized on [id number] by  Dae Auguste, .      Results for orders placed during the hospital encounter of 08/08/19   Venous w Reflux Lower Extremity - Bilateral CAR    Narrative · The patient is in a reverse Trendelenburg position.  · There is evidence of significant reflux of the right common femoral   vein. There is evidence of significant reflux of the right mid femoral   vein. There is evidence of severe reflux of  the right greater saphenous   vein below the knee. There is evidence of severe reflux of the right small   saphenous vein.  · There is evidence of mild reflux of the left greater saphenous vein at   the distal thigh.          Results for orders placed during the hospital encounter of 08/08/19   Venous w Reflux Lower Extremity - Bilateral CAR    Narrative · The patient is in a reverse Trendelenburg position.  · There is evidence of significant reflux of the right common femoral   vein. There is evidence of significant reflux of the right mid femoral   vein. There is evidence of severe reflux of the right greater saphenous   vein below the knee. There is evidence of severe reflux of the right small   saphenous vein.  · There is evidence of mild reflux of the left greater saphenous vein at   the distal thigh.                       Test Results Pending at Discharge   Order Current Status    Aldosterone / Renin Ratio In process            Procedures Performed  Procedure(s):  ICD battery change  Temporary Pacemaker  Pocket Revision         Consults:   Consults     Date and Time Order Name Status Description    2/1/2020 1642 Inpatient Podiatry Consult Completed     1/29/2020 1405 Inpatient Infectious Diseases Consult Completed     1/28/2020 0423 Inpatient Cardiology Consult      1/28/2020 0257 Inpatient Hospitalist Consult Completed             Discharge Details        Discharge Medications      New Medications      Instructions Start Date   lisinopril 2.5 MG tablet  Commonly known as:  ZESTRIL   2.5 mg, Oral, Daily      sodium bicarbonate 650 MG tablet   650 mg, Oral, 2 Times Daily   Start Date:  February 6, 2020     zinc oxide 20 % ointment   Topical, Every 12 Hours Scheduled         Continue These Medications      Instructions Start Date   aspirin 81 MG chewable tablet   81 mg, Oral, Daily      bumetanide 1 MG tablet  Commonly known as:  BUMEX   1 mg, Oral, 2 Times Daily, Morning and noon      colchicine 0.6 MG tablet    0.6 mg, Oral, Daily      gabapentin 100 MG capsule  Commonly known as:  NEURONTIN   100 mg, Oral, 3 Times Daily      metoprolol tartrate 50 MG tablet  Commonly known as:  LOPRESSOR   50 mg, Oral, 2 Times Daily      traMADol 50 MG tablet  Commonly known as:  ULTRAM   50 mg, Oral, Every 6 Hours PRN      ULORIC 40 MG tablet  Generic drug:  febuxostat   40 mg, Oral, 2 Times Daily      ZETIA 10 MG tablet  Generic drug:  ezetimibe   10 mg, Oral, Daily         Stop These Medications    cephalexin 500 MG capsule  Commonly known as:  KEFLEX     digoxin 125 MCG tablet  Commonly known as:  LANOXIN        ASK your doctor about these medications      Instructions Start Date   cefdinir 300 MG capsule  Commonly known as:  OMNICEF  Ask about: Should I take this medication?   300 mg, Oral, Every 24 Hours Scheduled             Allergies   Allergen Reactions   • Allopurinol Unknown - High Severity   • Clindamycin Hcl Unknown - High Severity   • Codeine Unknown - High Severity   • Furosemide Unknown - High Severity   • Hydrochlorothiazide Unknown - High Severity   • Naproxen Unknown - High Severity   • Sulfa Antibiotics Unknown - High Severity         Discharge Disposition:  Skilled Nursing Facility (DC - External)    Diet:  Hospital:  Diet Order   Procedures   • Diet Cardiac; Healthy Heart         Discharge Activity:   Activity Instructions     As tolerated                 CODE STATUS:    Code Status and Medical Interventions:   Ordered at: 01/28/20 0547     Limited Support to NOT Include:    Intubation     Level Of Support Discussed With:    Patient     Code Status:    No CPR     Medical Interventions (Level of Support Prior to Arrest):    Limited         Follow-up Appointments  No future appointments.    Additional Instructions for the Follow-ups that You Need to Schedule     Ambulatory Referral to Home Health   As directed      Face to Face Visit Date:  1/28/2020    Follow-up provider for Plan of Care?:  I treated the patient in an  acute care facility and will not continue treatment after discharge.    Follow-up provider:  KATERINE BECKETT [[id number]]    Reason/Clinical Findings:  UTI    Describe mobility limitations that make leaving home difficult:  weakness    Nursing/Therapeutic Services Requested:  Other (resume services)    Frequency:  1 Week 1         Ambulatory Referral to Wound Clinic   As directed      Discharge Follow-up with PCP   As directed       Currently Documented PCP:    Katerine Beckett APRN    PCP Phone Number:    [phone]     Follow Up Details:  one week time.         Discharge Follow-up with Specified Provider: Cardiology service in two week time.; 2 Weeks   As directed      To:  Cardiology service in two week time.    Follow Up:  2 Weeks                 Condition on Discharge:      Stable          Electronically signed by Apolinar Alvarez MD, 02/05/20, 10:29 PM.    Time: I spent  34  minutes on this discharge activity which included face-to-face encounter with the patient/reviewing the data in the system/coordination of the care with the nursing staff as well as consultants/documentation/entering orders.

## 2020-02-06 NOTE — PROGRESS NOTES
Case Management Discharge Note      Final Note: [facility].     Provided post acute provider list?: Refused  Post Acute Provider List: Inpatient Rehab  Post Acute Provider Quality & Resource List: Refused  Delivered To: Patient  Method of Delivery: In person       Home Medical Care - Selection Complete      Service Provider Request Status Selected Services Address Phone Number Fax Number    [facility] Selected Home Health Services [address] [facility] [place] [address] [phone]       Manisha Calderon RN 1/28/2020 1229    Oservation Admission                 Final Discharge Disposition Code: 03 - skilled nursing facility (SNF)

## 2020-02-11 LAB
ALDOST SERPL-MCNC: 5.2 NG/DL (ref 0–30)
ALDOST/RENIN PLAS-RTO: 10.2 {RATIO} (ref 0–30)
RENIN PLAS-CCNC: 0.51 NG/ML/HR (ref 0.17–5.38)

## 2020-02-21 ENCOUNTER — APPOINTMENT (OUTPATIENT)
Dept: WOUND CARE | Facility: HOSPITAL | Age: 75
End: 2020-02-21

## 2020-02-25 ENCOUNTER — OFFICE VISIT (OUTPATIENT)
Dept: CARDIOLOGY | Facility: CLINIC | Age: 75
End: 2020-02-25

## 2020-02-25 VITALS
BODY MASS INDEX: 35.87 KG/M2 | HEART RATE: 61 BPM | SYSTOLIC BLOOD PRESSURE: 126 MMHG | DIASTOLIC BLOOD PRESSURE: 73 MMHG | WEIGHT: 190 LBS | HEIGHT: 61 IN | OXYGEN SATURATION: 100 %

## 2020-02-25 DIAGNOSIS — Z95.810 PRESENCE OF BIVENTRICULAR IMPLANTABLE CARDIOVERTER-DEFIBRILLATOR (ICD): ICD-10-CM

## 2020-02-25 DIAGNOSIS — I10 HYPERTENSION, BENIGN: ICD-10-CM

## 2020-02-25 DIAGNOSIS — I50.22 CHRONIC SYSTOLIC CONGESTIVE HEART FAILURE (HCC): Primary | ICD-10-CM

## 2020-02-25 DIAGNOSIS — I48.19 OTHER PERSISTENT ATRIAL FIBRILLATION (HCC): ICD-10-CM

## 2020-02-25 PROCEDURE — 99024 POSTOP FOLLOW-UP VISIT: CPT | Performed by: INTERNAL MEDICINE

## 2020-02-25 RX ORDER — DIGOXIN 125 MCG
125 TABLET ORAL
Status: ON HOLD | COMMUNITY
Start: 2020-02-20 | End: 2021-04-29 | Stop reason: SDUPTHER

## 2020-02-25 NOTE — PROGRESS NOTES
Subjective:     Encounter Date:02/25/2020      Patient ID: Emperatriz Childs is a 74 y.o. female.    Chief Complaint:  History of Present Illness 74-year-old white female with history of congestive heart failure with LV systolic dysfunction cardiomyopathy history of ICD placement hypertension atrial fibrillation presents to my office for follow-up.  Patient is currently stable without any symptoms of chest pain or shortness of breath at rest but has some shortness of breath with exertion.  No complaints of any PND orthopnea.  No palpitation dizziness syncope.  She has some swelling of the feet.  She was recently in the hospital with swelling of the feet and had lymphedema.  She also had battery generator replaced and is doing very well at this time.  She is taking her medicines regularly.    The following portions of the patient's history were reviewed and updated as appropriate: allergies, current medications, past family history, past medical history, past social history, past surgical history and problem list.  Past Medical History:   Diagnosis Date   • CHF (congestive heart failure) (CMS/[facility])    • History of transfusion    • Hypertension    • Lymphedema of leg    • Myocardial infarction (CMS/[facility])      Past Surgical History:   Procedure Laterality Date   • CARDIAC ELECTROPHYSIOLOGY PROCEDURE N/A 1/31/2020    Procedure: ICD battery change;  Surgeon: Dionna Laird MD;  Location: [facility] CATH INVASIVE LOCATION;  Service: Cardiovascular   • CARDIAC ELECTROPHYSIOLOGY PROCEDURE N/A 1/31/2020    Procedure: Temporary Pacemaker;  Surgeon: Dionna Laird MD;  Location: [facility] CATH INVASIVE LOCATION;  Service: Cardiovascular   • CARDIAC ELECTROPHYSIOLOGY PROCEDURE N/A 1/31/2020    Procedure: Pocket Revision;  Surgeon: Dionna Laird MD;  Location: [facility] CATH INVASIVE LOCATION;  Service: Cardiovascular   • EYE SURGERY     • PACEMAKER IMPLANTATION       /73 (BP Location: Left arm, Patient Position: Sitting,  "Cuff Size: Adult)   Pulse 61   Ht 154.9 cm (61\")   Wt 86.2 kg (190 lb)   SpO2 100%   BMI 35.90 kg/m²   Family History   Family history unknown: Yes       Current Outpatient Medications:   •  aspirin 81 MG chewable tablet, Chew 81 mg Daily., Disp: , Rfl:   •  bumetanide (BUMEX) 1 MG tablet, Take 1 mg by mouth 2 (Two) Times a Day. Morning and noon, Disp: , Rfl: 1  •  colchicine 0.6 MG tablet, Take 0.6 mg by mouth Daily., Disp: , Rfl:   •  digoxin (LANOXIN) 125 MCG tablet, , Disp: , Rfl:   •  ezetimibe (ZETIA) 10 MG tablet, Take 10 mg by mouth Daily., Disp: , Rfl:   •  febuxostat (ULORIC) 40 MG tablet, Take 40 mg by mouth 2 (Two) Times a Day., Disp: , Rfl:   •  gabapentin (NEURONTIN) 100 MG capsule, Take 100 mg by mouth 3 (Three) Times a Day., Disp: , Rfl:   •  lisinopril (ZESTRIL) 2.5 MG tablet, Take 1 tablet by mouth Daily., Disp: 30 tablet, Rfl: 0  •  metoprolol tartrate (LOPRESSOR) 50 MG tablet, Take 50 mg by mouth 2 (Two) Times a Day., Disp: , Rfl:   •  sodium bicarbonate 650 MG tablet, Take 1 tablet by mouth 2 (Two) Times a Day., Disp: , Rfl:   •  traMADol (ULTRAM) 50 MG tablet, Take 50 mg by mouth Every 6 (Six) Hours As Needed., Disp: , Rfl:   Allergies   Allergen Reactions   • Allopurinol Unknown - High Severity   • Clindamycin Hcl Unknown - High Severity   • Codeine Unknown - High Severity   • Furosemide Unknown - High Severity   • Hydrochlorothiazide Unknown - High Severity   • Naproxen Unknown - High Severity   • Sulfa Antibiotics Unknown - High Severity     Social History     Socioeconomic History   • Marital status:      Spouse name: Not on file   • Number of children: Not on file   • Years of education: Not on file   • Highest education level: Not on file   Tobacco Use   • Smoking status: Never Smoker   • Smokeless tobacco: Never Used   Substance and Sexual Activity   • Alcohol use: Never     Frequency: Never   • Drug use: Never   • Sexual activity: Defer     Review of Systems   Constitution: " Positive for malaise/fatigue. Negative for fever.   HENT: Negative for congestion and hearing loss.    Eyes: Negative for double vision and visual disturbance.   Cardiovascular: Positive for leg swelling. Negative for chest pain, claudication, dyspnea on exertion and syncope.   Respiratory: Positive for shortness of breath. Negative for cough.    Endocrine: Negative for cold intolerance.   Skin: Negative for color change and rash.   Musculoskeletal: Negative for arthritis and joint pain.   Gastrointestinal: Negative for abdominal pain and heartburn.   Genitourinary: Negative for hematuria.   Neurological: Negative for excessive daytime sleepiness and dizziness.   Psychiatric/Behavioral: Negative for depression. The patient is not nervous/anxious.    All other systems reviewed and are negative.             Objective:     Physical Exam   Constitutional: She appears well-developed and well-nourished.   HENT:   Head: Normocephalic and atraumatic.   Eyes: Conjunctivae are normal. No scleral icterus.   Neck: Normal range of motion. Neck supple. No JVD present. Carotid bruit is not present.   Cardiovascular: Normal rate, regular rhythm, S1 normal, S2 normal, normal heart sounds and intact distal pulses. PMI is not displaced.   Pulmonary/Chest: Effort normal and breath sounds normal. She has no wheezes. She has no rales.   Abdominal: Soft. Bowel sounds are normal.   Neurological: She is alert. She has normal strength.   Skin: Skin is warm and dry. No rash noted.     Procedures    Lab Review:       Assessment:          Diagnosis Plan   1. Chronic systolic congestive heart failure (CMS/HCC)     2. Other persistent atrial fibrillation     3. Hypertension, benign     4. Presence of biventricular implantable cardioverter-defibrillator (ICD)            Plan:       Patient has history of congestive heart failure with LV systolic dysfunction and is currently stable on medical therapy  Patient has ICD placement which is working very  well  Patient recently had ICD generator replaced  Patient's blood pressure and heart rate stable  Patient has history of chronic atrial fibrillation but is not on anticoagulation because of life-threatening bleeding from the GI tract in the past  She understands the risk of stroke versus the risk of bleeding.

## 2020-03-06 ENCOUNTER — CLINICAL SUPPORT NO REQUIREMENTS (OUTPATIENT)
Dept: CARDIOLOGY | Facility: CLINIC | Age: 75
End: 2020-03-06

## 2020-03-06 DIAGNOSIS — I50.22 CHRONIC SYSTOLIC CONGESTIVE HEART FAILURE (HCC): Primary | ICD-10-CM

## 2020-03-06 DIAGNOSIS — Z95.810 AUTOMATIC IMPLANTABLE CARDIAC DEFIBRILLATOR IN SITU: ICD-10-CM

## 2020-03-06 PROCEDURE — 93282 PRGRMG EVAL IMPLANTABLE DFB: CPT | Performed by: INTERNAL MEDICINE

## 2020-09-01 ENCOUNTER — OFFICE VISIT (OUTPATIENT)
Dept: CARDIOLOGY | Facility: CLINIC | Age: 75
End: 2020-09-01

## 2020-09-01 VITALS
HEIGHT: 63 IN | WEIGHT: 155 LBS | DIASTOLIC BLOOD PRESSURE: 68 MMHG | OXYGEN SATURATION: 99 % | BODY MASS INDEX: 27.46 KG/M2 | HEART RATE: 62 BPM | SYSTOLIC BLOOD PRESSURE: 149 MMHG

## 2020-09-01 DIAGNOSIS — I10 HYPERTENSION, BENIGN: ICD-10-CM

## 2020-09-01 DIAGNOSIS — I50.22 CHRONIC SYSTOLIC CONGESTIVE HEART FAILURE (HCC): Primary | ICD-10-CM

## 2020-09-01 DIAGNOSIS — I42.0 DILATED CARDIOMYOPATHY (HCC): ICD-10-CM

## 2020-09-01 DIAGNOSIS — I48.19 OTHER PERSISTENT ATRIAL FIBRILLATION (HCC): ICD-10-CM

## 2020-09-01 DIAGNOSIS — Z95.810 AUTOMATIC IMPLANTABLE CARDIAC DEFIBRILLATOR IN SITU: ICD-10-CM

## 2020-09-01 PROCEDURE — 99214 OFFICE O/P EST MOD 30 MIN: CPT | Performed by: INTERNAL MEDICINE

## 2020-09-01 NOTE — PROGRESS NOTES
"    Subjective:     Encounter Date:09/01/2020      Patient ID: Emperatriz Chlids is a 75 y.o. female.    Chief Complaint:  History of Present Illness 75-year-old white female with history of congestive heart failure with dilated cardiomyopathy status post ICD placement history of atrial fibrillation history of hypertension hyperlipidemia presents to my office for follow-up.  Patient is currently stable without incidence of chest pain or shortness of breath at rest on exertion.  No complains of any PND orthopnea.  No palpitation but has some dizziness when she gets up suddenly.  No syncope.  She has significant swelling of the feet and has lymphedema.  She is taking her medicines regularly she does not smoke.  She does not ambulate very well.    The following portions of the patient's history were reviewed and updated as appropriate: allergies, current medications, past family history, past medical history, past social history, past surgical history and problem list.  Past Medical History:   Diagnosis Date   • CHF (congestive heart failure) (CMS/[facility])    • History of transfusion    • Hypertension    • Lymphedema of leg    • Myocardial infarction (CMS/[facility])      Past Surgical History:   Procedure Laterality Date   • CARDIAC ELECTROPHYSIOLOGY PROCEDURE N/A 1/31/2020    Procedure: ICD battery change;  Surgeon: Dionna Laird MD;  Location: [facility] CATH INVASIVE LOCATION;  Service: Cardiovascular   • CARDIAC ELECTROPHYSIOLOGY PROCEDURE N/A 1/31/2020    Procedure: Temporary Pacemaker;  Surgeon: Dionna Laird MD;  Location: [facility] CATH INVASIVE LOCATION;  Service: Cardiovascular   • CARDIAC ELECTROPHYSIOLOGY PROCEDURE N/A 1/31/2020    Procedure: Pocket Revision;  Surgeon: Dionna Laird MD;  Location: [facility] CATH INVASIVE LOCATION;  Service: Cardiovascular   • EYE SURGERY     • PACEMAKER IMPLANTATION       /68 (BP Location: Left arm, Patient Position: Sitting)   Pulse 62   Ht 160 cm (63\")   Wt 70.3 kg (155 lb) "   SpO2 99%   BMI 27.46 kg/m²   Family History   Family history unknown: Yes       Current Outpatient Medications:   •  aspirin 81 MG chewable tablet, Chew 81 mg Daily., Disp: , Rfl:   •  bumetanide (BUMEX) 1 MG tablet, Take 1 mg by mouth 2 (Two) Times a Day. Morning and noon, Disp: , Rfl: 1  •  colchicine 0.6 MG tablet, Take 0.6 mg by mouth Daily., Disp: , Rfl:   •  digoxin (LANOXIN) 125 MCG tablet, , Disp: , Rfl:   •  ezetimibe (ZETIA) 10 MG tablet, Take 10 mg by mouth Daily., Disp: , Rfl:   •  febuxostat (ULORIC) 40 MG tablet, Take 40 mg by mouth 2 (Two) Times a Day., Disp: , Rfl:   •  gabapentin (NEURONTIN) 100 MG capsule, Take 100 mg by mouth 3 (Three) Times a Day., Disp: , Rfl:   •  metoprolol tartrate (LOPRESSOR) 50 MG tablet, Take 50 mg by mouth 2 (Two) Times a Day., Disp: , Rfl:   •  traMADol (ULTRAM) 50 MG tablet, Take 50 mg by mouth Every 6 (Six) Hours As Needed., Disp: , Rfl:   Allergies   Allergen Reactions   • Allopurinol Unknown - High Severity   • Clindamycin Hcl Unknown - High Severity   • Codeine Unknown - High Severity   • Furosemide Unknown - High Severity   • Hydrochlorothiazide Unknown - High Severity   • Naproxen Unknown - High Severity   • Sulfa Antibiotics Unknown - High Severity     Social History     Socioeconomic History   • Marital status:      Spouse name: Not on file   • Number of children: Not on file   • Years of education: Not on file   • Highest education level: Not on file   Tobacco Use   • Smoking status: Never Smoker   • Smokeless tobacco: Never Used   Substance and Sexual Activity   • Alcohol use: Never     Frequency: Never   • Drug use: Never   • Sexual activity: Defer     Review of Systems   Constitution: Negative for fever and malaise/fatigue.   HENT: Negative for ear pain and nosebleeds.    Eyes: Negative for blurred vision and double vision.   Cardiovascular: Positive for leg swelling. Negative for chest pain, dyspnea on exertion and palpitations.   Respiratory:  Negative for cough and shortness of breath.    Skin: Negative for rash.   Musculoskeletal: Negative for joint pain.   Gastrointestinal: Negative for abdominal pain, nausea and vomiting.   Neurological: Positive for dizziness and light-headedness. Negative for focal weakness and headaches.   Psychiatric/Behavioral: Negative for depression. The patient is not nervous/anxious.    All other systems reviewed and are negative.             Objective:     Physical Exam   Constitutional: She appears well-developed and well-nourished.   HENT:   Head: Normocephalic and atraumatic.   Eyes: Pupils are equal, round, and reactive to light. Conjunctivae and EOM are normal. No scleral icterus.   Neck: Normal range of motion. Neck supple. No JVD present. Carotid bruit is not present.   Cardiovascular: Normal rate, regular rhythm, S1 normal, S2 normal and intact distal pulses. PMI is not displaced.   Murmur heard.  Pulmonary/Chest: Effort normal and breath sounds normal. She has no wheezes. She has no rales.   Abdominal: Soft. Bowel sounds are normal.   Musculoskeletal: Normal range of motion.   Neurological: She is alert. She has normal strength.   No focal deficits   Skin: Skin is warm and dry. No rash noted.   Psychiatric: She has a normal mood and affect.     Procedures    Lab Review:       Assessment:          Diagnosis Plan   1. Chronic systolic congestive heart failure (CMS/HCC)     2. Other persistent atrial fibrillation (CMS/HCC)     3. Hypertension, benign     4. Automatic implantable cardiac defibrillator in situ     5. Dilated cardiomyopathy (CMS/HCC)            Plan:       Has history of congestive heart with LV systolic dysfunction and is currently stable on medical therapy  Patient has nonischemic cardiomyopathy with dilated cardiomyopathy and status post ICD placement  Patient's ICD is working very well  Patient has history of atrial fibrillation and is currently on digoxin and metoprolol but she is not on  anticoagulation because of life-threatening GI bleed in the past and does not want to take anticoagulation.  She understands the risk of stroke  Patient's blood pressure and heart rate are stable  Patient's lipid levels are followed by the primary care doctor.  Continue current medicines and follow her in 6 months.

## 2020-09-04 ENCOUNTER — CLINICAL SUPPORT NO REQUIREMENTS (OUTPATIENT)
Dept: CARDIOLOGY | Facility: CLINIC | Age: 75
End: 2020-09-04

## 2020-09-04 DIAGNOSIS — I50.22 CHRONIC SYSTOLIC CONGESTIVE HEART FAILURE (HCC): Primary | ICD-10-CM

## 2020-09-04 DIAGNOSIS — Z95.810 AUTOMATIC IMPLANTABLE CARDIAC DEFIBRILLATOR IN SITU: ICD-10-CM

## 2020-09-04 PROCEDURE — 93282 PRGRMG EVAL IMPLANTABLE DFB: CPT | Performed by: INTERNAL MEDICINE

## 2021-02-05 ENCOUNTER — HOSPITAL ENCOUNTER (INPATIENT)
Facility: HOSPITAL | Age: 76
LOS: 12 days | Discharge: NURSING, REHAB OR LONG-TERM CARE | End: 2021-02-18
Attending: INTERNAL MEDICINE | Admitting: FAMILY MEDICINE

## 2021-02-05 PROBLEM — N39.0 ACUTE UTI (URINARY TRACT INFECTION): Status: ACTIVE | Noted: 2021-02-05

## 2021-02-05 PROCEDURE — 99219 PR INITIAL OBSERVATION CARE/DAY 50 MINUTES: CPT | Performed by: PHYSICIAN ASSISTANT

## 2021-02-06 ENCOUNTER — APPOINTMENT (OUTPATIENT)
Dept: CARDIOLOGY | Facility: HOSPITAL | Age: 76
End: 2021-02-06

## 2021-02-06 ENCOUNTER — APPOINTMENT (OUTPATIENT)
Dept: GENERAL RADIOLOGY | Facility: HOSPITAL | Age: 76
End: 2021-02-06

## 2021-02-06 PROBLEM — L03.119 LOWER EXTREMITY CELLULITIS: Status: ACTIVE | Noted: 2021-02-06

## 2021-02-06 PROBLEM — I25.10 CAD (CORONARY ARTERY DISEASE): Chronic | Status: ACTIVE | Noted: 2021-02-06

## 2021-02-06 LAB
ALBUMIN SERPL-MCNC: 2.4 G/DL (ref 3.5–5.2)
ALBUMIN SERPL-MCNC: 2.4 G/DL (ref 3.5–5.2)
ALBUMIN/GLOB SERPL: 0.7 G/DL
ALBUMIN/GLOB SERPL: 0.8 G/DL
ALP SERPL-CCNC: 100 U/L (ref 39–117)
ALP SERPL-CCNC: 109 U/L (ref 39–117)
ALT SERPL W P-5'-P-CCNC: 10 U/L (ref 1–33)
ALT SERPL W P-5'-P-CCNC: 9 U/L (ref 1–33)
ANION GAP SERPL CALCULATED.3IONS-SCNC: 11 MMOL/L (ref 5–15)
ANION GAP SERPL CALCULATED.3IONS-SCNC: 11 MMOL/L (ref 5–15)
ANION GAP SERPL CALCULATED.3IONS-SCNC: 13 MMOL/L (ref 5–15)
ANION GAP SERPL CALCULATED.3IONS-SCNC: 13 MMOL/L (ref 5–15)
ANION GAP SERPL CALCULATED.3IONS-SCNC: 16 MMOL/L (ref 5–15)
ANION GAP SERPL CALCULATED.3IONS-SCNC: 17 MMOL/L (ref 5–15)
ANISOCYTOSIS BLD QL: ABNORMAL
ARTERIAL PATENCY WRIST A: POSITIVE
AST SERPL-CCNC: 27 U/L (ref 1–32)
AST SERPL-CCNC: 32 U/L (ref 1–32)
ATMOSPHERIC PRESS: ABNORMAL MM[HG]
BACTERIA UR QL AUTO: ABNORMAL /HPF
BASE EXCESS BLDA CALC-SCNC: -10.4 MMOL/L (ref 0–3)
BASOPHILS # BLD AUTO: 0 10*3/MM3 (ref 0–0.2)
BASOPHILS NFR BLD AUTO: 0.3 % (ref 0–1.5)
BDY SITE: ABNORMAL
BH CV ECHO MEAS - ACS: 1.6 CM
BH CV ECHO MEAS - AI DEC SLOPE: 171.9 CM/SEC^2
BH CV ECHO MEAS - AI DEC TIME: 1.5 SEC
BH CV ECHO MEAS - AI MAX PG: 28 MMHG
BH CV ECHO MEAS - AI MAX VEL: 264.5 CM/SEC
BH CV ECHO MEAS - AI P1/2T: 450.8 MSEC
BH CV ECHO MEAS - AO MAX PG (FULL): 8.5 MMHG
BH CV ECHO MEAS - AO MAX PG: 13.3 MMHG
BH CV ECHO MEAS - AO MEAN PG (FULL): 5.5 MMHG
BH CV ECHO MEAS - AO MEAN PG: 8.7 MMHG
BH CV ECHO MEAS - AO ROOT AREA (BSA CORRECTED): 1.4
BH CV ECHO MEAS - AO ROOT AREA: 5.1 CM^2
BH CV ECHO MEAS - AO ROOT DIAM: 2.5 CM
BH CV ECHO MEAS - AO V2 MAX: 182.4 CM/SEC
BH CV ECHO MEAS - AO V2 MEAN: 142.3 CM/SEC
BH CV ECHO MEAS - AO V2 VTI: 37.9 CM
BH CV ECHO MEAS - AORTIC HR: 59 BPM
BH CV ECHO MEAS - AORTIC R-R: 1 SEC
BH CV ECHO MEAS - ASC AORTA: 3.2 CM
BH CV ECHO MEAS - AVA(I,A): 1.5 CM^2
BH CV ECHO MEAS - AVA(I,D): 1.5 CM^2
BH CV ECHO MEAS - AVA(V,A): 1.5 CM^2
BH CV ECHO MEAS - AVA(V,D): 1.5 CM^2
BH CV ECHO MEAS - BSA(HAYCOCK): 1.9 M^2
BH CV ECHO MEAS - BSA: 1.8 M^2
BH CV ECHO MEAS - BZI_BMI: 33.1 KILOGRAMS/M^2
BH CV ECHO MEAS - BZI_METRIC_HEIGHT: 157.5 CM
BH CV ECHO MEAS - BZI_METRIC_WEIGHT: 82.1 KG
BH CV ECHO MEAS - CI(AO): 6.2 L/MIN/M^2
BH CV ECHO MEAS - CI(LVOT): 1.8 L/MIN/M^2
BH CV ECHO MEAS - CO(AO): 11.4 L/MIN
BH CV ECHO MEAS - CO(LVOT): 3.3 L/MIN
BH CV ECHO MEAS - EDV(CUBED): 60.5 ML
BH CV ECHO MEAS - EDV(MOD-SP2): 53.3 ML
BH CV ECHO MEAS - EDV(MOD-SP4): 59.5 ML
BH CV ECHO MEAS - EDV(TEICH): 67 ML
BH CV ECHO MEAS - EF(CUBED): 63.9 %
BH CV ECHO MEAS - EF(MOD-BP): 60 %
BH CV ECHO MEAS - EF(MOD-SP2): 62.4 %
BH CV ECHO MEAS - EF(MOD-SP4): 55.3 %
BH CV ECHO MEAS - EF(TEICH): 56 %
BH CV ECHO MEAS - ESV(CUBED): 21.9 ML
BH CV ECHO MEAS - ESV(MOD-SP2): 20 ML
BH CV ECHO MEAS - ESV(MOD-SP4): 26.6 ML
BH CV ECHO MEAS - ESV(TEICH): 29.4 ML
BH CV ECHO MEAS - FS: 28.8 %
BH CV ECHO MEAS - IVS/LVPW: 1
BH CV ECHO MEAS - IVSD: 1.3 CM
BH CV ECHO MEAS - LA DIMENSION(2D): 4.2 CM
BH CV ECHO MEAS - LV DIASTOLIC VOL/BSA (35-75): 32.5 ML/M^2
BH CV ECHO MEAS - LV MASS(C)D: 170 GRAMS
BH CV ECHO MEAS - LV MASS(C)DI: 92.8 GRAMS/M^2
BH CV ECHO MEAS - LV MAX PG: 4.8 MMHG
BH CV ECHO MEAS - LV MEAN PG: 3.2 MMHG
BH CV ECHO MEAS - LV SYSTOLIC VOL/BSA (12-30): 14.5 ML/M^2
BH CV ECHO MEAS - LV V1 MAX: 110 CM/SEC
BH CV ECHO MEAS - LV V1 MEAN: 85.9 CM/SEC
BH CV ECHO MEAS - LV V1 VTI: 21.6 CM
BH CV ECHO MEAS - LVIDD: 3.9 CM
BH CV ECHO MEAS - LVIDS: 2.8 CM
BH CV ECHO MEAS - LVOT AREA: 2.6 CM^2
BH CV ECHO MEAS - LVOT DIAM: 1.8 CM
BH CV ECHO MEAS - LVPWD: 1.2 CM
BH CV ECHO MEAS - MV MAX PG: 7.2 MMHG
BH CV ECHO MEAS - MV MEAN PG: 1.8 MMHG
BH CV ECHO MEAS - MV V2 MAX: 134.6 CM/SEC
BH CV ECHO MEAS - MV V2 MEAN: 51.6 CM/SEC
BH CV ECHO MEAS - MV V2 VTI: 24.3 CM
BH CV ECHO MEAS - MVA(VTI): 2.3 CM^2
BH CV ECHO MEAS - PA ACC TIME: 0.07 SEC
BH CV ECHO MEAS - PA MAX PG (FULL): 1.9 MMHG
BH CV ECHO MEAS - PA MAX PG: 3.2 MMHG
BH CV ECHO MEAS - PA MEAN PG (FULL): 1 MMHG
BH CV ECHO MEAS - PA MEAN PG: 1.8 MMHG
BH CV ECHO MEAS - PA PR(ACCEL): 48.2 MMHG
BH CV ECHO MEAS - PA V2 MAX: 88.8 CM/SEC
BH CV ECHO MEAS - PA V2 MEAN: 63.8 CM/SEC
BH CV ECHO MEAS - PA V2 VTI: 17.2 CM
BH CV ECHO MEAS - PULM DIAS VEL: 37.5 CM/SEC
BH CV ECHO MEAS - PULM S/D: 1.1
BH CV ECHO MEAS - PULM SYS VEL: 41 CM/SEC
BH CV ECHO MEAS - PVA(I,A): 3.4 CM^2
BH CV ECHO MEAS - PVA(I,D): 3.4 CM^2
BH CV ECHO MEAS - PVA(V,A): 3.4 CM^2
BH CV ECHO MEAS - PVA(V,D): 3.4 CM^2
BH CV ECHO MEAS - QP/QS: 1.1
BH CV ECHO MEAS - RAP SYSTOLE: 3 MMHG
BH CV ECHO MEAS - RV MAX PG: 1.2 MMHG
BH CV ECHO MEAS - RV MEAN PG: 0.77 MMHG
BH CV ECHO MEAS - RV V1 MAX: 55.1 CM/SEC
BH CV ECHO MEAS - RV V1 MEAN: 41.7 CM/SEC
BH CV ECHO MEAS - RV V1 VTI: 10.8 CM
BH CV ECHO MEAS - RVDD: 2.7 CM
BH CV ECHO MEAS - RVOT AREA: 5.5 CM^2
BH CV ECHO MEAS - RVOT DIAM: 2.6 CM
BH CV ECHO MEAS - RVSP: 25.6 MMHG
BH CV ECHO MEAS - SI(AO): 105.3 ML/M^2
BH CV ECHO MEAS - SI(CUBED): 21.1 ML/M^2
BH CV ECHO MEAS - SI(LVOT): 30.1 ML/M^2
BH CV ECHO MEAS - SI(MOD-SP2): 18.1 ML/M^2
BH CV ECHO MEAS - SI(MOD-SP4): 18 ML/M^2
BH CV ECHO MEAS - SI(TEICH): 20.5 ML/M^2
BH CV ECHO MEAS - SV(AO): 192.9 ML
BH CV ECHO MEAS - SV(CUBED): 38.7 ML
BH CV ECHO MEAS - SV(LVOT): 55.2 ML
BH CV ECHO MEAS - SV(MOD-SP2): 33.2 ML
BH CV ECHO MEAS - SV(MOD-SP4): 32.9 ML
BH CV ECHO MEAS - SV(RVOT): 59.1 ML
BH CV ECHO MEAS - SV(TEICH): 37.5 ML
BH CV ECHO MEAS - TR MAX VEL: 237.7 CM/SEC
BILIRUB SERPL-MCNC: 0.4 MG/DL (ref 0–1.2)
BILIRUB SERPL-MCNC: 0.4 MG/DL (ref 0–1.2)
BILIRUB UR QL STRIP: ABNORMAL
BUN SERPL-MCNC: 68 MG/DL (ref 8–23)
BUN SERPL-MCNC: 69 MG/DL (ref 8–23)
BUN SERPL-MCNC: 73 MG/DL (ref 8–23)
BUN SERPL-MCNC: 76 MG/DL (ref 8–23)
BUN SERPL-MCNC: 77 MG/DL (ref 8–23)
BUN SERPL-MCNC: 80 MG/DL (ref 8–23)
BUN/CREAT SERPL: 23.2 (ref 7–25)
BUN/CREAT SERPL: 23.8 (ref 7–25)
BUN/CREAT SERPL: 23.9 (ref 7–25)
BUN/CREAT SERPL: 24.8 (ref 7–25)
BUN/CREAT SERPL: 25 (ref 7–25)
BUN/CREAT SERPL: 25.5 (ref 7–25)
CA-I BLDA-SCNC: 0.94 MMOL/L (ref 1.15–1.33)
CA-I SERPL ISE-MCNC: 1.13 MMOL/L (ref 1.2–1.3)
CALCIUM SPEC-SCNC: 7.3 MG/DL (ref 8.6–10.5)
CALCIUM SPEC-SCNC: 7.3 MG/DL (ref 8.6–10.5)
CALCIUM SPEC-SCNC: 7.5 MG/DL (ref 8.6–10.5)
CALCIUM SPEC-SCNC: 7.7 MG/DL (ref 8.6–10.5)
CALCIUM SPEC-SCNC: 7.7 MG/DL (ref 8.6–10.5)
CALCIUM SPEC-SCNC: 7.9 MG/DL (ref 8.6–10.5)
CHLORIDE SERPL-SCNC: 111 MMOL/L (ref 98–107)
CHLORIDE SERPL-SCNC: 112 MMOL/L (ref 98–107)
CHLORIDE SERPL-SCNC: 114 MMOL/L (ref 98–107)
CHLORIDE SERPL-SCNC: 115 MMOL/L (ref 98–107)
CHLORIDE SERPL-SCNC: 116 MMOL/L (ref 98–107)
CHLORIDE SERPL-SCNC: 117 MMOL/L (ref 98–107)
CK SERPL-CCNC: 225 U/L (ref 20–180)
CLARITY UR: ABNORMAL
CLUMPED PLATELETS: PRESENT
CO2 BLDA-SCNC: 15 MMOL/L (ref 22–29)
CO2 SERPL-SCNC: 12 MMOL/L (ref 22–29)
CO2 SERPL-SCNC: 13 MMOL/L (ref 22–29)
CO2 SERPL-SCNC: 13 MMOL/L (ref 22–29)
CO2 SERPL-SCNC: 14 MMOL/L (ref 22–29)
CO2 SERPL-SCNC: 8 MMOL/L (ref 22–29)
CO2 SERPL-SCNC: 9 MMOL/L (ref 22–29)
COLOR UR: ABNORMAL
CREAT SERPL-MCNC: 2.72 MG/DL (ref 0.57–1)
CREAT SERPL-MCNC: 2.78 MG/DL (ref 0.57–1)
CREAT SERPL-MCNC: 2.86 MG/DL (ref 0.57–1)
CREAT SERPL-MCNC: 3.23 MG/DL (ref 0.57–1)
CREAT SERPL-MCNC: 3.28 MG/DL (ref 0.57–1)
CREAT SERPL-MCNC: 3.35 MG/DL (ref 0.57–1)
D-LACTATE SERPL-SCNC: 3.1 MMOL/L (ref 0.5–2)
D-LACTATE SERPL-SCNC: 3.2 MMOL/L (ref 0.5–2)
D-LACTATE SERPL-SCNC: 3.4 MMOL/L (ref 0.5–2)
DEPRECATED RDW RBC AUTO: 57.8 FL (ref 37–54)
DEPRECATED RDW RBC AUTO: 58.2 FL (ref 37–54)
DIGOXIN SERPL-MCNC: 1 NG/ML (ref 0.6–1.2)
EOSINOPHIL # BLD AUTO: 0 10*3/MM3 (ref 0–0.4)
EOSINOPHIL # BLD MANUAL: 0.11 10*3/MM3 (ref 0–0.4)
EOSINOPHIL NFR BLD AUTO: 0.1 % (ref 0.3–6.2)
EOSINOPHIL NFR BLD MANUAL: 1 % (ref 0.3–6.2)
ERYTHROCYTE [DISTWIDTH] IN BLOOD BY AUTOMATED COUNT: 16.5 % (ref 12.3–15.4)
ERYTHROCYTE [DISTWIDTH] IN BLOOD BY AUTOMATED COUNT: 16.5 % (ref 12.3–15.4)
GFR SERPL CREATININE-BSD FRML MDRD: 13 ML/MIN/1.73
GFR SERPL CREATININE-BSD FRML MDRD: 14 ML/MIN/1.73
GFR SERPL CREATININE-BSD FRML MDRD: 14 ML/MIN/1.73
GFR SERPL CREATININE-BSD FRML MDRD: 16 ML/MIN/1.73
GFR SERPL CREATININE-BSD FRML MDRD: 17 ML/MIN/1.73
GFR SERPL CREATININE-BSD FRML MDRD: 17 ML/MIN/1.73
GFR SERPL CREATININE-BSD FRML MDRD: ABNORMAL ML/MIN/{1.73_M2}
GLOBULIN UR ELPH-MCNC: 3.2 GM/DL
GLOBULIN UR ELPH-MCNC: 3.3 GM/DL
GLUCOSE BLDC GLUCOMTR-MCNC: 103 MG/DL (ref 70–105)
GLUCOSE BLDC GLUCOMTR-MCNC: 69 MG/DL (ref 70–105)
GLUCOSE BLDC GLUCOMTR-MCNC: 73 MG/DL (ref 70–105)
GLUCOSE BLDC GLUCOMTR-MCNC: 92 MG/DL (ref 74–100)
GLUCOSE BLDC GLUCOMTR-MCNC: 92 MG/DL (ref 74–100)
GLUCOSE SERPL-MCNC: 100 MG/DL (ref 65–99)
GLUCOSE SERPL-MCNC: 101 MG/DL (ref 65–99)
GLUCOSE SERPL-MCNC: 58 MG/DL (ref 65–99)
GLUCOSE SERPL-MCNC: 87 MG/DL (ref 65–99)
GLUCOSE SERPL-MCNC: 91 MG/DL (ref 65–99)
GLUCOSE SERPL-MCNC: 95 MG/DL (ref 65–99)
GLUCOSE UR STRIP-MCNC: NEGATIVE MG/DL
HBV SURFACE AG SERPL QL IA: NORMAL
HCO3 BLDA-SCNC: 14.2 MMOL/L (ref 21–28)
HCT VFR BLD AUTO: 31.7 % (ref 34–46.6)
HCT VFR BLD AUTO: 32.5 % (ref 34–46.6)
HCT VFR BLDA CALC: 29 % (ref 38–51)
HCV AB SER DONR QL: NORMAL
HEMODILUTION: NO
HGB BLD-MCNC: 10.1 G/DL (ref 12–15.9)
HGB BLD-MCNC: 10.5 G/DL (ref 12–15.9)
HGB BLDA-MCNC: 9.9 G/DL (ref 12–17)
HGB UR QL STRIP.AUTO: ABNORMAL
HYALINE CASTS UR QL AUTO: ABNORMAL /LPF
INHALED O2 CONCENTRATION: 21 %
KETONES UR QL STRIP: ABNORMAL
LACTATE HOLD SPECIMEN: NORMAL
LDH SERPL-CCNC: 360 U/L (ref 135–214)
LEUKOCYTE ESTERASE UR QL STRIP.AUTO: ABNORMAL
LYMPHOCYTES # BLD AUTO: 0.1 10*3/MM3 (ref 0.7–3.1)
LYMPHOCYTES # BLD MANUAL: 0.11 10*3/MM3 (ref 0.7–3.1)
LYMPHOCYTES NFR BLD AUTO: 1.8 % (ref 19.6–45.3)
LYMPHOCYTES NFR BLD MANUAL: 1 % (ref 19.6–45.3)
LYMPHOCYTES NFR BLD MANUAL: 1 % (ref 5–12)
MAGNESIUM SERPL-MCNC: 1.3 MG/DL (ref 1.6–2.4)
MAGNESIUM SERPL-MCNC: 1.4 MG/DL (ref 1.6–2.4)
MCH RBC QN AUTO: 31.8 PG (ref 26.6–33)
MCH RBC QN AUTO: 32 PG (ref 26.6–33)
MCHC RBC AUTO-ENTMCNC: 32 G/DL (ref 31.5–35.7)
MCHC RBC AUTO-ENTMCNC: 32.4 G/DL (ref 31.5–35.7)
MCV RBC AUTO: 98.8 FL (ref 79–97)
MCV RBC AUTO: 99.3 FL (ref 79–97)
METAMYELOCYTES NFR BLD MANUAL: 2 % (ref 0–0)
MODALITY: ABNORMAL
MONOCYTES # BLD AUTO: 0.11 10*3/MM3 (ref 0.1–0.9)
MONOCYTES # BLD AUTO: 0.3 10*3/MM3 (ref 0.1–0.9)
MONOCYTES NFR BLD AUTO: 4 % (ref 5–12)
MRSA DNA SPEC QL NAA+PROBE: ABNORMAL
NEUTROPHILS # BLD AUTO: 10.17 10*3/MM3 (ref 1.7–7)
NEUTROPHILS NFR BLD AUTO: 6.7 10*3/MM3 (ref 1.7–7)
NEUTROPHILS NFR BLD AUTO: 93.8 % (ref 42.7–76)
NEUTROPHILS NFR BLD MANUAL: 60 % (ref 42.7–76)
NEUTS BAND NFR BLD MANUAL: 35 % (ref 0–5)
NEUTS VAC BLD QL SMEAR: ABNORMAL
NITRITE UR QL STRIP: POSITIVE
NRBC BLD AUTO-RTO: 0.1 /100 WBC (ref 0–0.2)
NT-PROBNP SERPL-MCNC: ABNORMAL PG/ML (ref 0–1800)
PCO2 BLDA: 26.4 MM HG (ref 35–48)
PH BLDA: 7.34 PH UNITS (ref 7.35–7.45)
PH UR STRIP.AUTO: 7 [PH] (ref 5–8)
PHOSPHATE SERPL-MCNC: 4.2 MG/DL (ref 2.5–4.5)
PLATELET # BLD AUTO: 72 10*3/MM3 (ref 140–450)
PLATELET # BLD AUTO: 86 10*3/MM3 (ref 140–450)
PMV BLD AUTO: 8.1 FL (ref 6–12)
PMV BLD AUTO: 8.8 FL (ref 6–12)
PO2 BLDA: 79.5 MM HG (ref 83–108)
POIKILOCYTOSIS BLD QL SMEAR: ABNORMAL
POTASSIUM BLDA-SCNC: 4.3 MMOL/L (ref 3.5–4.5)
POTASSIUM SERPL-SCNC: 4.4 MMOL/L (ref 3.5–5.2)
POTASSIUM SERPL-SCNC: 4.5 MMOL/L (ref 3.5–5.2)
POTASSIUM SERPL-SCNC: 4.5 MMOL/L (ref 3.5–5.2)
POTASSIUM SERPL-SCNC: 4.8 MMOL/L (ref 3.5–5.2)
POTASSIUM SERPL-SCNC: 5.1 MMOL/L (ref 3.5–5.2)
POTASSIUM SERPL-SCNC: 5.9 MMOL/L (ref 3.5–5.2)
PROCALCITONIN SERPL-MCNC: 171.12 NG/ML (ref 0–0.25)
PROT SERPL-MCNC: 5.6 G/DL (ref 6–8.5)
PROT SERPL-MCNC: 5.7 G/DL (ref 6–8.5)
PROT UR QL STRIP: ABNORMAL
PROT UR-MCNC: 136.9 MG/DL
RBC # BLD AUTO: 3.19 10*6/MM3 (ref 3.77–5.28)
RBC # BLD AUTO: 3.3 10*6/MM3 (ref 3.77–5.28)
RBC # UR: ABNORMAL /HPF
REF LAB TEST METHOD: ABNORMAL
SAO2 % BLDCOA: 95.2 % (ref 94–98)
SARS-COV-2 ORF1AB RESP QL NAA+PROBE: NOT DETECTED
SCAN SLIDE: NORMAL
SODIUM BLD-SCNC: 139 MMOL/L (ref 138–146)
SODIUM SERPL-SCNC: 137 MMOL/L (ref 136–145)
SODIUM SERPL-SCNC: 139 MMOL/L (ref 136–145)
SODIUM SERPL-SCNC: 140 MMOL/L (ref 136–145)
SODIUM SERPL-SCNC: 141 MMOL/L (ref 136–145)
SODIUM UR-SCNC: 54 MMOL/L
SP GR UR STRIP: 1.02 (ref 1–1.03)
SQUAMOUS #/AREA URNS HPF: ABNORMAL /HPF
TROPONIN T SERPL-MCNC: 0.03 NG/ML (ref 0–0.03)
TROPONIN T SERPL-MCNC: 0.03 NG/ML (ref 0–0.03)
URATE SERPL-MCNC: 3.2 MG/DL (ref 2.4–5.7)
UROBILINOGEN UR QL STRIP: ABNORMAL
VANCOMYCIN SERPL-MCNC: 11.9 MCG/ML (ref 5–40)
WBC # BLD AUTO: 10.7 10*3/MM3 (ref 3.4–10.8)
WBC # BLD AUTO: 7.1 10*3/MM3 (ref 3.4–10.8)
WBC CLUMPS # UR AUTO: ABNORMAL /HPF
WBC UR QL AUTO: ABNORMAL /HPF

## 2021-02-06 PROCEDURE — 87324 CLOSTRIDIUM AG IA: CPT | Performed by: HOSPITALIST

## 2021-02-06 PROCEDURE — 82550 ASSAY OF CK (CPK): CPT | Performed by: INTERNAL MEDICINE

## 2021-02-06 PROCEDURE — 93306 TTE W/DOPPLER COMPLETE: CPT

## 2021-02-06 PROCEDURE — G0378 HOSPITAL OBSERVATION PER HR: HCPCS

## 2021-02-06 PROCEDURE — 25010000002 VANCOMYCIN 10 G RECONSTITUTED SOLUTION: Performed by: PHYSICIAN ASSISTANT

## 2021-02-06 PROCEDURE — 80162 ASSAY OF DIGOXIN TOTAL: CPT | Performed by: PHYSICIAN ASSISTANT

## 2021-02-06 PROCEDURE — 87040 BLOOD CULTURE FOR BACTERIA: CPT | Performed by: HOSPITALIST

## 2021-02-06 PROCEDURE — 71045 X-RAY EXAM CHEST 1 VIEW: CPT

## 2021-02-06 PROCEDURE — 82570 ASSAY OF URINE CREATININE: CPT | Performed by: INTERNAL MEDICINE

## 2021-02-06 PROCEDURE — 83880 ASSAY OF NATRIURETIC PEPTIDE: CPT | Performed by: PHYSICIAN ASSISTANT

## 2021-02-06 PROCEDURE — 83605 ASSAY OF LACTIC ACID: CPT | Performed by: PHYSICIAN ASSISTANT

## 2021-02-06 PROCEDURE — 82330 ASSAY OF CALCIUM: CPT

## 2021-02-06 PROCEDURE — 82330 ASSAY OF CALCIUM: CPT | Performed by: PHYSICIAN ASSISTANT

## 2021-02-06 PROCEDURE — 25010000002 ALBUMIN HUMAN 5% PER 50 ML: Performed by: INTERNAL MEDICINE

## 2021-02-06 PROCEDURE — 84300 ASSAY OF URINE SODIUM: CPT | Performed by: PHYSICIAN ASSISTANT

## 2021-02-06 PROCEDURE — 84145 PROCALCITONIN (PCT): CPT | Performed by: PHYSICIAN ASSISTANT

## 2021-02-06 PROCEDURE — 36600 WITHDRAWAL OF ARTERIAL BLOOD: CPT

## 2021-02-06 PROCEDURE — 83605 ASSAY OF LACTIC ACID: CPT | Performed by: INTERNAL MEDICINE

## 2021-02-06 PROCEDURE — 87340 HEPATITIS B SURFACE AG IA: CPT | Performed by: INTERNAL MEDICINE

## 2021-02-06 PROCEDURE — 25010000002 CEFEPIME PER 500 MG: Performed by: PHYSICIAN ASSISTANT

## 2021-02-06 PROCEDURE — 83735 ASSAY OF MAGNESIUM: CPT | Performed by: PHYSICIAN ASSISTANT

## 2021-02-06 PROCEDURE — 84156 ASSAY OF PROTEIN URINE: CPT | Performed by: INTERNAL MEDICINE

## 2021-02-06 PROCEDURE — 87449 NOS EACH ORGANISM AG IA: CPT | Performed by: HOSPITALIST

## 2021-02-06 PROCEDURE — 82962 GLUCOSE BLOOD TEST: CPT

## 2021-02-06 PROCEDURE — 85007 BL SMEAR W/DIFF WBC COUNT: CPT | Performed by: PHYSICIAN ASSISTANT

## 2021-02-06 PROCEDURE — 80051 ELECTROLYTE PANEL: CPT

## 2021-02-06 PROCEDURE — 99225 PR SBSQ OBSERVATION CARE/DAY 25 MINUTES: CPT | Performed by: HOSPITALIST

## 2021-02-06 PROCEDURE — 84550 ASSAY OF BLOOD/URIC ACID: CPT | Performed by: INTERNAL MEDICINE

## 2021-02-06 PROCEDURE — 85018 HEMOGLOBIN: CPT

## 2021-02-06 PROCEDURE — 82803 BLOOD GASES ANY COMBINATION: CPT

## 2021-02-06 PROCEDURE — 87641 MR-STAPH DNA AMP PROBE: CPT | Performed by: HOSPITALIST

## 2021-02-06 PROCEDURE — 84484 ASSAY OF TROPONIN QUANT: CPT | Performed by: PHYSICIAN ASSISTANT

## 2021-02-06 PROCEDURE — P9041 ALBUMIN (HUMAN),5%, 50ML: HCPCS | Performed by: INTERNAL MEDICINE

## 2021-02-06 PROCEDURE — 85025 COMPLETE CBC W/AUTO DIFF WBC: CPT | Performed by: PHYSICIAN ASSISTANT

## 2021-02-06 PROCEDURE — 93306 TTE W/DOPPLER COMPLETE: CPT | Performed by: INTERNAL MEDICINE

## 2021-02-06 PROCEDURE — 83605 ASSAY OF LACTIC ACID: CPT

## 2021-02-06 PROCEDURE — U0004 COV-19 TEST NON-CDC HGH THRU: HCPCS | Performed by: PHYSICIAN ASSISTANT

## 2021-02-06 PROCEDURE — 80202 ASSAY OF VANCOMYCIN: CPT | Performed by: PHYSICIAN ASSISTANT

## 2021-02-06 PROCEDURE — 84100 ASSAY OF PHOSPHORUS: CPT | Performed by: INTERNAL MEDICINE

## 2021-02-06 PROCEDURE — 83010 ASSAY OF HAPTOGLOBIN QUANT: CPT | Performed by: INTERNAL MEDICINE

## 2021-02-06 PROCEDURE — 81001 URINALYSIS AUTO W/SCOPE: CPT | Performed by: INTERNAL MEDICINE

## 2021-02-06 PROCEDURE — 86803 HEPATITIS C AB TEST: CPT | Performed by: INTERNAL MEDICINE

## 2021-02-06 PROCEDURE — 80053 COMPREHEN METABOLIC PANEL: CPT | Performed by: INTERNAL MEDICINE

## 2021-02-06 PROCEDURE — 83615 LACTATE (LD) (LDH) ENZYME: CPT | Performed by: INTERNAL MEDICINE

## 2021-02-06 RX ORDER — TRAMADOL HYDROCHLORIDE 50 MG/1
50 TABLET ORAL EVERY 12 HOURS PRN
Status: DISCONTINUED | OUTPATIENT
Start: 2021-02-06 | End: 2021-02-15

## 2021-02-06 RX ORDER — ACETAMINOPHEN 325 MG/1
650 TABLET ORAL EVERY 4 HOURS PRN
Status: DISCONTINUED | OUTPATIENT
Start: 2021-02-06 | End: 2021-02-18 | Stop reason: HOSPADM

## 2021-02-06 RX ORDER — HEPARIN SODIUM 5000 [USP'U]/ML
5000 INJECTION, SOLUTION INTRAVENOUS; SUBCUTANEOUS EVERY 12 HOURS SCHEDULED
Status: DISCONTINUED | OUTPATIENT
Start: 2021-02-06 | End: 2021-02-06

## 2021-02-06 RX ORDER — MAGNESIUM SULFATE HEPTAHYDRATE 40 MG/ML
4 INJECTION, SOLUTION INTRAVENOUS AS NEEDED
Status: DISCONTINUED | OUTPATIENT
Start: 2021-02-06 | End: 2021-02-18 | Stop reason: HOSPADM

## 2021-02-06 RX ORDER — ONDANSETRON 4 MG/1
4 TABLET, FILM COATED ORAL EVERY 6 HOURS PRN
Status: DISCONTINUED | OUTPATIENT
Start: 2021-02-06 | End: 2021-02-18 | Stop reason: HOSPADM

## 2021-02-06 RX ORDER — ONDANSETRON 2 MG/ML
4 INJECTION INTRAMUSCULAR; INTRAVENOUS EVERY 6 HOURS PRN
Status: DISCONTINUED | OUTPATIENT
Start: 2021-02-06 | End: 2021-02-18 | Stop reason: HOSPADM

## 2021-02-06 RX ORDER — DIGOXIN 125 MCG
125 TABLET ORAL
Status: DISCONTINUED | OUTPATIENT
Start: 2021-02-06 | End: 2021-02-08

## 2021-02-06 RX ORDER — DOPAMINE HYDROCHLORIDE 160 MG/100ML
2-20 INJECTION, SOLUTION INTRAVENOUS
Status: DISCONTINUED | OUTPATIENT
Start: 2021-02-06 | End: 2021-02-12

## 2021-02-06 RX ORDER — MIDODRINE HYDROCHLORIDE 5 MG/1
10 TABLET ORAL
Status: DISCONTINUED | OUTPATIENT
Start: 2021-02-06 | End: 2021-02-15

## 2021-02-06 RX ORDER — GABAPENTIN 100 MG/1
100 CAPSULE ORAL 3 TIMES DAILY
Status: DISCONTINUED | OUTPATIENT
Start: 2021-02-06 | End: 2021-02-18 | Stop reason: HOSPADM

## 2021-02-06 RX ORDER — FEBUXOSTAT 40 MG/1
40 TABLET, FILM COATED ORAL 2 TIMES DAILY
Status: DISCONTINUED | OUTPATIENT
Start: 2021-02-06 | End: 2021-02-18 | Stop reason: HOSPADM

## 2021-02-06 RX ORDER — NITROGLYCERIN 0.4 MG/1
0.4 TABLET SUBLINGUAL
Status: DISCONTINUED | OUTPATIENT
Start: 2021-02-06 | End: 2021-02-18 | Stop reason: HOSPADM

## 2021-02-06 RX ORDER — MAGNESIUM SULFATE 1 G/100ML
1 INJECTION INTRAVENOUS AS NEEDED
Status: DISCONTINUED | OUTPATIENT
Start: 2021-02-06 | End: 2021-02-18 | Stop reason: HOSPADM

## 2021-02-06 RX ORDER — SODIUM CHLORIDE, SODIUM LACTATE, POTASSIUM CHLORIDE, CALCIUM CHLORIDE 600; 310; 30; 20 MG/100ML; MG/100ML; MG/100ML; MG/100ML
50 INJECTION, SOLUTION INTRAVENOUS CONTINUOUS
Status: DISCONTINUED | OUTPATIENT
Start: 2021-02-06 | End: 2021-02-06

## 2021-02-06 RX ORDER — POTASSIUM CHLORIDE 1.5 G/1.77G
40 POWDER, FOR SOLUTION ORAL AS NEEDED
Status: DISCONTINUED | OUTPATIENT
Start: 2021-02-06 | End: 2021-02-18 | Stop reason: HOSPADM

## 2021-02-06 RX ORDER — ALBUMIN, HUMAN INJ 5% 5 %
25 SOLUTION INTRAVENOUS ONCE
Status: COMPLETED | OUTPATIENT
Start: 2021-02-06 | End: 2021-02-06

## 2021-02-06 RX ORDER — MAGNESIUM SULFATE HEPTAHYDRATE 40 MG/ML
2 INJECTION, SOLUTION INTRAVENOUS AS NEEDED
Status: DISCONTINUED | OUTPATIENT
Start: 2021-02-06 | End: 2021-02-06

## 2021-02-06 RX ORDER — ASPIRIN 81 MG/1
81 TABLET, CHEWABLE ORAL DAILY
Status: DISCONTINUED | OUTPATIENT
Start: 2021-02-06 | End: 2021-02-18 | Stop reason: HOSPADM

## 2021-02-06 RX ORDER — POTASSIUM CHLORIDE 20 MEQ/1
40 TABLET, EXTENDED RELEASE ORAL AS NEEDED
Status: DISCONTINUED | OUTPATIENT
Start: 2021-02-06 | End: 2021-02-18 | Stop reason: HOSPADM

## 2021-02-06 RX ORDER — ACETAMINOPHEN 650 MG/1
650 SUPPOSITORY RECTAL EVERY 4 HOURS PRN
Status: DISCONTINUED | OUTPATIENT
Start: 2021-02-06 | End: 2021-02-18 | Stop reason: HOSPADM

## 2021-02-06 RX ORDER — CHOLECALCIFEROL (VITAMIN D3) 125 MCG
5 CAPSULE ORAL NIGHTLY PRN
Status: DISCONTINUED | OUTPATIENT
Start: 2021-02-06 | End: 2021-02-18 | Stop reason: HOSPADM

## 2021-02-06 RX ORDER — BUMETANIDE 1 MG/1
1 TABLET ORAL 2 TIMES DAILY
Status: DISCONTINUED | OUTPATIENT
Start: 2021-02-06 | End: 2021-02-06

## 2021-02-06 RX ORDER — SODIUM CHLORIDE 0.9 % (FLUSH) 0.9 %
10 SYRINGE (ML) INJECTION EVERY 12 HOURS SCHEDULED
Status: DISCONTINUED | OUTPATIENT
Start: 2021-02-06 | End: 2021-02-18 | Stop reason: HOSPADM

## 2021-02-06 RX ORDER — ACETAMINOPHEN 160 MG/5ML
650 SOLUTION ORAL EVERY 4 HOURS PRN
Status: DISCONTINUED | OUTPATIENT
Start: 2021-02-06 | End: 2021-02-18 | Stop reason: HOSPADM

## 2021-02-06 RX ORDER — MAGNESIUM SULFATE HEPTAHYDRATE 40 MG/ML
4 INJECTION, SOLUTION INTRAVENOUS AS NEEDED
Status: DISCONTINUED | OUTPATIENT
Start: 2021-02-06 | End: 2021-02-06

## 2021-02-06 RX ORDER — SODIUM CHLORIDE 0.9 % (FLUSH) 0.9 %
10 SYRINGE (ML) INJECTION AS NEEDED
Status: DISCONTINUED | OUTPATIENT
Start: 2021-02-06 | End: 2021-02-18 | Stop reason: HOSPADM

## 2021-02-06 RX ORDER — MAGNESIUM SULFATE HEPTAHYDRATE 40 MG/ML
2 INJECTION, SOLUTION INTRAVENOUS AS NEEDED
Status: DISCONTINUED | OUTPATIENT
Start: 2021-02-06 | End: 2021-02-18 | Stop reason: HOSPADM

## 2021-02-06 RX ADMIN — CEFEPIME HYDROCHLORIDE 2 G: 2 INJECTION, POWDER, FOR SOLUTION INTRAVENOUS at 02:00

## 2021-02-06 RX ADMIN — Medication: at 12:00

## 2021-02-06 RX ADMIN — RIVAROXABAN 10 MG: 10 TABLET, FILM COATED ORAL at 17:11

## 2021-02-06 RX ADMIN — MIDODRINE HYDROCHLORIDE 10 MG: 5 TABLET ORAL at 17:10

## 2021-02-06 RX ADMIN — ACETAMINOPHEN 650 MG: 325 TABLET, FILM COATED ORAL at 03:41

## 2021-02-06 RX ADMIN — GABAPENTIN 100 MG: 100 CAPSULE ORAL at 17:11

## 2021-02-06 RX ADMIN — ASPIRIN 81 MG CHEWABLE TABLET 81 MG: 81 TABLET CHEWABLE at 10:26

## 2021-02-06 RX ADMIN — SODIUM CHLORIDE, POTASSIUM CHLORIDE, SODIUM LACTATE AND CALCIUM CHLORIDE 50 ML/HR: 600; 310; 30; 20 INJECTION, SOLUTION INTRAVENOUS at 06:15

## 2021-02-06 RX ADMIN — SODIUM CHLORIDE 250 ML: 9 INJECTION, SOLUTION INTRAVENOUS at 18:34

## 2021-02-06 RX ADMIN — GABAPENTIN 100 MG: 100 CAPSULE ORAL at 10:26

## 2021-02-06 RX ADMIN — Medication 10 ML: at 10:27

## 2021-02-06 RX ADMIN — FEBUXOSTAT 40 MG: 40 TABLET, FILM COATED ORAL at 10:26

## 2021-02-06 RX ADMIN — DIGOXIN 125 MCG: 125 TABLET ORAL at 17:10

## 2021-02-06 RX ADMIN — ZINC OXIDE: 200 OINTMENT TOPICAL at 12:00

## 2021-02-06 RX ADMIN — SODIUM BICARBONATE 100 MEQ: 84 INJECTION, SOLUTION INTRAVENOUS at 15:16

## 2021-02-06 RX ADMIN — ALBUMIN HUMAN 12.5 G: 0.05 INJECTION, SOLUTION INTRAVENOUS at 22:54

## 2021-02-06 RX ADMIN — Medication 10 ML: at 02:00

## 2021-02-06 RX ADMIN — Medication: at 17:12

## 2021-02-06 RX ADMIN — SODIUM CHLORIDE 1250 MG: 9 INJECTION, SOLUTION INTRAVENOUS at 02:49

## 2021-02-06 RX ADMIN — Medication 10 ML: at 23:02

## 2021-02-06 RX ADMIN — TRAMADOL HYDROCHLORIDE 50 MG: 50 TABLET, FILM COATED ORAL at 06:15

## 2021-02-06 RX ADMIN — SODIUM BICARBONATE 150 MEQ: 84 INJECTION, SOLUTION INTRAVENOUS at 19:54

## 2021-02-06 RX ADMIN — SODIUM CHLORIDE, POTASSIUM CHLORIDE, SODIUM LACTATE AND CALCIUM CHLORIDE 1000 ML: 600; 310; 30; 20 INJECTION, SOLUTION INTRAVENOUS at 10:27

## 2021-02-06 RX ADMIN — MIDODRINE HYDROCHLORIDE 10 MG: 5 TABLET ORAL at 13:14

## 2021-02-06 NOTE — CONSULTS
Consult Note       Patient Identification:  Name: Emperatriz Childs  Age: 75 y.o.  Sex: female  :  1945  MRN: [id number]    I would like to thank you for the opportunity to participate in care of this patient.    Date of Service: 2021                        Reason for Consult:         CKD, severe metabolic acidosis, hypotension.     History of Present Illness:         Patient is a 75-year-old gentleman with a past medical history significant for CHF, hyperlipidemia, hypertension, coronary artery disease, chronic lymphedema, chronic kidney disease, proteinuria with a complaint of leg swelling and pain, we are consulted for chronic kidney disease with significant metabolic acidosis, and hypotension.  Patient not a very good historian, [facility] of [facility] swelling and pain, she does have a chronic skin changes with chronic lymphedema,  She denies any fever or chills,  She has been started on Bumex, [facility], potassium 4.3, creatinine 2.4, BUN 63, potassium 4.3, lactate was 2.2, hemoglobin 11.3, UA with significant WBC,  Patient is currently lying flat with oxygen sat 100% on room air,  Does have a history of CHF with cardiomyopathy, status post ICD placement,        Review of Systems:         Constitutional: weakness.   HEENT:  No headache, otalgia, itchy eyes, nasal discharge or sore throat.  Cardiac:  No chest pain, dyspnea, orthopnea or PND.  Chest:  No cough, phlegm or wheezing.  Abdomen:  No tenderness.   Neuro:  No focal weakness, abnormal movements orseizure like activity.  :   diminished urine output.   ROS was otherwise negative except as mentioned in the [place].       Past medical history, surgical history, social history, family history, allergies and all medications reviewed and agreed.      Past History/Allergies?Social History:     Past Medical History:   Diagnosis Date   • CHF (congestive heart failure)  (CMS/HCC)    • History of transfusion    • Hypertension    • Lymphedema of leg    • Myocardial infarction (CMS/HCC)          Past Surgical History :     Past Surgical History:   Procedure Laterality Date   • CARDIAC ELECTROPHYSIOLOGY PROCEDURE N/A 1/31/2020    Procedure: ICD battery change;  Surgeon: Dionna Laird MD;  Location: [facility] CATH INVASIVE LOCATION;  Service: Cardiovascular   • CARDIAC ELECTROPHYSIOLOGY PROCEDURE N/A 1/31/2020    Procedure: Temporary Pacemaker;  Surgeon: Dionna Laird MD;  Location: [facility] CATH INVASIVE LOCATION;  Service: Cardiovascular   • CARDIAC ELECTROPHYSIOLOGY PROCEDURE N/A 1/31/2020    Procedure: Pocket Revision;  Surgeon: Dionna Laird MD;  Location: [facility] CATH INVASIVE LOCATION;  Service: Cardiovascular   • EYE SURGERY     • PACEMAKER IMPLANTATION            Family History:     Family History   Family history unknown: Yes          Social History:     Social History     Tobacco Use   • Smoking status: Never Smoker   • Smokeless tobacco: Never Used   Substance Use Topics   • Alcohol use: Never     Frequency: Never          Allergies:     Allergies   Allergen Reactions   • Allopurinol Unknown - High Severity   • Clindamycin Hcl Unknown - High Severity   • Codeine Unknown - High Severity   • Furosemide Unknown - High Severity   • Hydrochlorothiazide Unknown - High Severity   • Naproxen Unknown - High Severity   • Sulfa Antibiotics Unknown - High Severity         Home meds:     Medications Prior to Admission   Medication Sig Dispense Refill Last Dose   • aspirin 81 MG chewable tablet Chew 81 mg Daily.      • bumetanide (BUMEX) 1 MG tablet Take 1 mg by mouth 2 (Two) Times a Day. Morning and noon  1    • colchicine 0.6 MG tablet Take 0.6 mg by mouth Daily.      • digoxin (LANOXIN) 125 MCG tablet       • ezetimibe (ZETIA) 10 MG tablet Take 10 mg by mouth Daily.      • febuxostat (ULORIC) 40 MG tablet Take 40 mg by mouth 2 (Two) Times a Day.      • gabapentin (NEURONTIN) 100  "MG capsule Take 100 mg by mouth 3 (Three) Times a Day.      • metoprolol tartrate (LOPRESSOR) 50 MG tablet Take 50 mg by mouth 2 (Two) Times a Day.      • traMADol (ULTRAM) 50 MG tablet Take 50 mg by mouth Every 6 (Six) Hours As Needed.            Scheduled meds:   aspirin, 81 mg, Oral, Daily  bumetanide, 1 mg, Oral, BID  [START ON 2021] cefepime, 2 g, Intravenous, Q24H  digoxin, 125 mcg, Oral, Q24H  febuxostat, 40 mg, Oral, BID  gabapentin, 100 mg, Oral, TID  magic butt paste, , Topical, Daily  midodrine, 10 mg, Oral, TID AC  rivaroxaban, 10 mg, Oral, Daily With Dinner  sodium chloride, 10 mL, Intravenous, Q12H  Zinc Oxide, , Apply externally, BID          Objectives:     tMax 24 hrs: Temp (24hrs), Av.1 °F (37.3 °C), Min:98.6 °F (37 °C), Max:99.6 °F (37.6 °C)      Vitals Ranges:   Temp:  [98.6 °F (37 °C)-99.6 °F (37.6 °C)] 98.6 °F (37 °C)  Heart Rate:  [60-98] 68  Resp:  [17-20] 17  BP: (84-99)/(42-58) 90/51    Intake and Output Last 3 Shifts:   I/O last 3 completed shifts:  In: 240 [P.O.:240]  Out: 250 [Urine:250]      Exam:     BP 90/51 (Patient Position: Lying)   Pulse 68   Temp 98.6 °F (37 °C) (Oral)   Resp 17   Ht 157.5 cm (62\")   Wt 82.1 kg (181 lb)   SpO2 100%   BMI 33.11 kg/m²     General Appearance:    Chronically ill looking, not overt distress.    Head:    Normocephalic, atraumatic   Eyes:    No icterus.      Mouth:    Dry,    Ears:    TMs not observed   Nose:   Patent without discharge   Neck:   Supple, No JVD   Lungs:     Clear to auscultation bilaterally, anteriorly   Chest wall:    No tenderness   Heart:    Regular rate and rhythm, S1 and S2 normal, no   rub    or gallop   Extremities:   Chronic skin changes , redness,    Abdomen:   Soft, nontender, nondistended,  no masses, no organomegaly   Neurologic:     Weakness.                Data Review:       Lab Results (last 24 hours)     Procedure Component Value Units Date/Time    Basic Metabolic Panel [[id number]]  (Abnormal) Collected: " 02/06/21 1310    Specimen: Blood Updated: 02/06/21 1654     Glucose 87 mg/dL      BUN 73 mg/dL      Creatinine 2.86 mg/dL      Sodium 137 mmol/L      Potassium 4.4 mmol/L      Chloride 111 mmol/L      CO2 9.0 mmol/L      Calcium 7.9 mg/dL      eGFR Non African Amer 16 mL/min/1.73      BUN/Creatinine Ratio 25.5     Anion Gap 17.0 mmol/L     Narrative:      GFR Normal >60  Chronic Kidney Disease <60  Kidney Failure <15      COVID PRE-OP / PRE-PROCEDURE SCREENING ORDER (NO ISOLATION) - Swab, Nasopharynx [[id number]]  (Normal) Collected: 02/06/21 0153    Specimen: Swab from Nasopharynx Updated: 02/06/21 1534    Narrative:      The following orders were created for panel order COVID PRE-OP / PRE-PROCEDURE SCREENING ORDER (NO ISOLATION) - Swab, Nasopharynx.  Procedure                               Abnormality         Status                     ---------                               -----------         ------                     COVID-19,APTIMA PANTHER,...[[id number]]  Normal              Final result                 Please view results for these tests on the individual orders.    COVID-19,APTIMA PANTHER,[facility] IN-HOUSE, NP/OP SWAB IN UTM/VTM/SALINE TRANSPORT MEDIA,24 HR TAT - Swab, Nasopharynx [[id number]]  (Normal) Collected: 02/06/21 0153    Specimen: Swab from Nasopharynx Updated: 02/06/21 1534     COVID19 Not Detected    Narrative:      Fact sheet for providers: https://www.fda.gov/media/563225/download     Fact sheet for patients: https://www.fda.gov/media/194256/download    Test performed by RT PCR.    Digoxin Level [[id number]]  (Normal) Collected: 02/06/21 1310    Specimen: Blood Updated: 02/06/21 1443     Digoxin 1.00 ng/mL     Calcium, Ionized [[id number]]  (Abnormal) Collected: 02/06/21 1309    Specimen: Blood Updated: 02/06/21 1428     Ionized Calcium 1.13 mmol/L     Blood Culture - Blood, Arm, Right [[id number]] Collected: 02/06/21 1307    Specimen: Blood from Arm, Right Updated: 02/06/21 1417    Blood Culture -  Blood, Arm, Left [[id number]] Collected: 02/06/21 1309    Specimen: Blood from Arm, Left Updated: 02/06/21 1417    MRSA Screen, PCR (Inpatient) - Swab, Nares [[id number]]  (Abnormal) Collected: 02/06/21 1114    Specimen: Swab from Nares Updated: 02/06/21 1311     MRSA PCR MRSA Detected    Troponin [[id number]]  (Abnormal) Collected: 02/06/21 0311    Specimen: Blood Updated: 02/06/21 0513     Troponin T 0.031 ng/mL     Narrative:      Troponin T Reference Range:  <= 0.03 ng/mL-   Negative for AMI  >0.03 ng/mL-     Abnormal for myocardial necrosis.  Clinicians would have to utilize clinical acumen, EKG, Troponin and serial changes to determine if it is an Acute Myocardial Infarction or myocardial injury due to an underlying chronic condition.       Results may be falsely decreased if patient taking Biotin.      Lactic Acid, Reflex [[id number]]  (Abnormal) Collected: 02/06/21 0440    Specimen: Blood Updated: 02/06/21 0512     Lactate 3.1 mmol/L     CBC & Differential [[id number]]  (Abnormal) Collected: 02/06/21 0311    Specimen: Blood Updated: 02/06/21 0500    Narrative:      The following orders were created for panel order CBC & Differential.  Procedure                               Abnormality         Status                     ---------                               -----------         ------                     Scan Slide[[id number]]                                       Final result               CBC Auto Differential[[id number]]        Abnormal            Final result                 Please view results for these tests on the individual orders.    CBC Auto Differential [[id number]]  (Abnormal) Collected: 02/06/21 0311    Specimen: Blood Updated: 02/06/21 0500     WBC 10.70 10*3/mm3      RBC 3.19 10*6/mm3      Hemoglobin 10.1 g/dL      Hematocrit 31.7 %      MCV 99.3 fL      MCH 31.8 pg      MCHC 32.0 g/dL      RDW 16.5 %      RDW-SD 58.2 fl      MPV 8.1 fL      Platelets 72 10*3/mm3      Comment: Platelet estimate  performed due to platelet clumping.        Scan Slide [[id number]] Collected: 02/06/21 0311    Specimen: Blood Updated: 02/06/21 0500     Scan Slide --     Comment: See Manual Differential Results       Manual Differential [[id number]]  (Abnormal) Collected: 02/06/21 0311    Specimen: Blood Updated: 02/06/21 0500     Neutrophil % 60.0 %      Lymphocyte % 1.0 %      Monocyte % 1.0 %      Eosinophil % 1.0 %      Bands %  35.0 %      Metamyelocyte % 2.0 %      Neutrophils Absolute 10.17 10*3/mm3      Lymphocytes Absolute 0.11 10*3/mm3      Monocytes Absolute 0.11 10*3/mm3      Eosinophils Absolute 0.11 10*3/mm3      Anisocytosis Slight/1+     Poikilocytes Slight/1+     Vacuolated Neutrophils Slight/1+     Clumped Platelets Present    Basic Metabolic Panel [[id number]]  (Abnormal) Collected: 02/06/21 0311    Specimen: Blood Updated: 02/06/21 0456     Glucose 95 mg/dL      BUN 69 mg/dL      Creatinine 2.78 mg/dL      Sodium 139 mmol/L      Potassium 4.5 mmol/L      Chloride 115 mmol/L      CO2 13.0 mmol/L      Calcium 7.7 mg/dL      eGFR Non African Amer 17 mL/min/1.73      BUN/Creatinine Ratio 24.8     Anion Gap 11.0 mmol/L     Narrative:      GFR Normal >60  Chronic Kidney Disease <60  Kidney Failure <15      Magnesium [[id number]]  (Abnormal) Collected: 02/06/21 0311    Specimen: Blood Updated: 02/06/21 0456     Magnesium 1.4 mg/dL     Lactic Acid, Reflex Timer (This will reflex a repeat order 3-3:15 hours after ordered.) [[id number]] Collected: 02/06/21 0045    Specimen: Blood Updated: 02/06/21 0431     Hold Tube Hold for add-ons.     Comment: Auto resulted.       Sodium, Urine, Random - Urine, Clean Catch [[id number]] Collected: 02/06/21 0148    Specimen: Urine, Clean Catch Updated: 02/06/21 0253     Sodium, Urine 54 mmol/L     Narrative:      Reference intervals for random urine have not been established.  Clinical usage is dependent upon physician's interpretation in combination with other laboratory tests.        "Procalcitonin [[id number]]  (Abnormal) Collected: 02/06/21 0045    Specimen: Blood Updated: 02/06/21 0250     Procalcitonin 171.12 ng/mL      Comment: Linear range verified.         Narrative:      As a Marker for Sepsis (Non-Neonates):   1. <0.5 ng/mL represents a low risk of severe sepsis and/or septic shock.  1. >2 ng/mL represents a high risk of severe sepsis and/or septic shock.    As a Marker for Lower Respiratory Tract Infections that require antibiotic therapy:  PCT on Admission     Antibiotic Therapy             6-12 Hrs later  > 0.5                Strongly Recommended            >0.25 - <0.5         Recommended  0.1 - 0.25           Discouraged                   Remeasure/reassess PCT  <0.1                 Strongly Discouraged          Remeasure/reassess PCT      As 28 day mortality risk marker: \"Change in Procalcitonin Result\" (> 80 % or <=80 %) if Day 0 (or Day 1) and Day 4 values are available. Refer to http://www.[facility] [place] [facility]-pct-calculator.com/   Change in PCT <=80 %   A decrease of PCT levels below or equal to 80 % defines a positive change in PCT test result representing a higher risk for 28-day all-cause mortality of patients diagnosed with severe sepsis or septic shock.  Change in PCT > 80 %   A decrease of PCT levels of more than 80 % defines a negative change in PCT result representing a lower risk for 28-day all-cause mortality of patients diagnosed with severe sepsis or septic shock.                Results may be falsely decreased if patient taking Biotin.     BNP [[id number]]  (Abnormal) Collected: 02/06/21 0045    Specimen: Blood Updated: 02/06/21 0136     proBNP 33,290.0 pg/mL     Narrative:      Among patients with dyspnea, NT-proBNP is highly sensitive for the detection of acute congestive heart failure. In addition NT-proBNP of <300 pg/ml effectively rules out acute congestive heart failure with 99% negative predictive value.    Results may be falsely decreased if patient taking Biotin.      " Troponin [[id number]]  (Abnormal) Collected: 02/06/21 0045    Specimen: Blood Updated: 02/06/21 0129     Troponin T 0.032 ng/mL     Narrative:      Troponin T Reference Range:  <= 0.03 ng/mL-   Negative for AMI  >0.03 ng/mL-     Abnormal for myocardial necrosis.  Clinicians would have to utilize clinical acumen, EKG, Troponin and serial changes to determine if it is an Acute Myocardial Infarction or myocardial injury due to an underlying chronic condition.       Results may be falsely decreased if patient taking Biotin.      Basic Metabolic Panel [[id number]]  (Abnormal) Collected: 02/06/21 0045    Specimen: Blood Updated: 02/06/21 0126     Glucose 101 mg/dL      BUN 68 mg/dL      Creatinine 2.72 mg/dL      Sodium 140 mmol/L      Potassium 4.8 mmol/L      Chloride 116 mmol/L      CO2 13.0 mmol/L      Calcium 7.7 mg/dL      eGFR Non African Amer 17 mL/min/1.73      BUN/Creatinine Ratio 25.0     Anion Gap 11.0 mmol/L     Narrative:      GFR Normal >60  Chronic Kidney Disease <60  Kidney Failure <15      Magnesium [[id number]]  (Abnormal) Collected: 02/06/21 0045    Specimen: Blood Updated: 02/06/21 0126     Magnesium 1.3 mg/dL     Lactic Acid, Plasma [[id number]]  (Abnormal) Collected: 02/06/21 0045    Specimen: Blood Updated: 02/06/21 0117     Lactate 3.4 mmol/L     CBC & Differential [[id number]]  (Abnormal) Collected: 02/06/21 0045    Specimen: Blood Updated: 02/06/21 0058    Narrative:      The following orders were created for panel order CBC & Differential.  Procedure                               Abnormality         Status                     ---------                               -----------         ------                     CBC Auto Differential[[id number]]        Abnormal            Final result                 Please view results for these tests on the individual orders.    CBC Auto Differential [[id number]]  (Abnormal) Collected: 02/06/21 0045    Specimen: Blood Updated: 02/06/21 0058     WBC 7.10 10*3/mm3       RBC 3.30 10*6/mm3      Hemoglobin 10.5 g/dL      Hematocrit 32.5 %      MCV 98.8 fL      MCH 32.0 pg      MCHC 32.4 g/dL      RDW 16.5 %      RDW-SD 57.8 fl      MPV 8.8 fL      Platelets 86 10*3/mm3      Neutrophil % 93.8 %      Lymphocyte % 1.8 %      Monocyte % 4.0 %      Eosinophil % 0.1 %      Basophil % 0.3 %      Neutrophils, Absolute 6.70 10*3/mm3      Lymphocytes, Absolute 0.10 10*3/mm3      Monocytes, Absolute 0.30 10*3/mm3      Eosinophils, Absolute 0.00 10*3/mm3      Basophils, Absolute 0.00 10*3/mm3      nRBC 0.1 /100 WBC                  Imaging:      Imaging Results (Last 24 Hours)     Procedure Component Value Units Date/Time    XR Chest 1 View [[id number]] Collected: 02/06/21 0800     Updated: 02/06/21 0803    Narrative:      DATE OF EXAM:  2/6/2021 7:41 AM     PROCEDURE:  XR CHEST 1 VW-     INDICATIONS:  ? pneumonia     COMPARISON:  01/28/2020     TECHNIQUE:   Single radiographic AP view of the chest was obtained.     FINDINGS:  An AICD device is present. The heart is enlarged. There are no definite  infiltrates. There are no pleural effusions.        Impression:      1.Cardiomegaly     Electronically Signed By-Azar Alarcon MD On:2/6/2021 8:01 AM  This report was finalized on [id number] by  Azar Alarcon MD.          Assessment:        Congestive heart failure (CMS/HCC)    Atrial fibrillation (CMS/HCC)    Hypertension, benign    Lymphedema    Gout    Non-pressure chronic ulcer right ankle, limited to breakdown skin (CMS/HCC)    Automatic implantable cardiac defibrillator in situ    Acute UTI (urinary tract infection)    Lower extremity cellulitis    CAD (coronary artery disease)    · Acute kidney injury, oliguric, stage 2-3, evolving, ongoing hypotension, ? Sepsis, and prerenal azotemia component with ongoing diarrhea, diuretics use,   ·  might have some degree of progression of CKD too.   · CKD 4, with baseline creatinine around 1.7- 2.2, till last year,02/2020, never followed up in clinic  Work  up then  ua negative RBC,WBC,no protein. UPC, was unremarkable in 02/2020  USG, right 8.9 cm, andleft 8.3 cm, medial renal disease.    · Metabolic acidosis, gap and non gap,   · Hypotension, concern hypovolemia,   · Congestive heart failure with last ejection fraction around 40% from 2018,   · History of atrial fibrillation  · Significant metabolic acidosis  · Significant anemia  · Chronic lymphedema  · Cellulitis of lower extremities.   · Thrombocytopenia, also on 02/2020  · Anemia.           Plan:     · Patient states she was seen by nephrologist before, last time on 02/2020, by Dr Silva,  noted had underlying chronic kidney disease with a baseline creatinine around 1.8- 2, even then, worsened 2.7,   ongoing hypotension,and prerenal azotemia component with ongoing diarrhea, diuretics use, and sepsis. Very likely might have some degree of progression of CKD too.  · Abelardo complicated with acidosis, gap and non gap, more due to GI bicarb loss, CKD and persistent lactic acidosis. patient apparently had large bowel movement, significant metabolic acidosis, had earlier given 2 amp of sodium bicarb,    repeat labs after bicarb, bicarbonate 9, stat ABG lactic acid, ordered,   · Remains anuric, place bliss.   · Currently started on antibiotic for possible cellulitis and edema of the lower extremities,  · Hard to  her volume, she does have underlying cardiomyopathy, she is currently lying flat in bed, oxygenating well,  she does have a chronic lymphedema, with likely cellulitis, chronic skin changes,Chest x-ray also looks stable without any overt pulmonary congestion, with a persistent lactic acidosis, hypotension, and worsening ABELARDO, I will start gentle bicarb fluids, I feel might be intravascularly depleted, primarily 3rd spacing, hyun with severe hypoalbuminemia  · have requested Bliss catheter, for strict input output monitoring, since, very fragile, also request for a continuous pulse ox, if worsening shortness of  air, will definitely have to cut down fluids,  immediately especially if patient continues to be anuric.  · She also interestingly noted has a thrombocytopenia, which was seen on February 2020, last year when evaluated here, which is persistent, I wonder if she has some degree of liver disease too, I will send for hepatitis profile, haptoglobin, LDH, smear for hemolysis if able,   · Repeat CMP to evaluate liver enzymes.   · Strict input output monitoring,  · Discussed with DR Winslow.   · I discussed extensively with nursing staff, with instruction to call back.   · We will closely follow.   · I offered to call her family, which she declines.   · Comprehensive urine studies.   · Will need USG of liver or abdomen,   · Check phos ck uric acid,   ·     Wilfrid Silveira MD  2/6/2021  18:14 EST

## 2021-02-06 NOTE — PROGRESS NOTES
"      [facility] Medicine Services Daily Progress Note      Hospitalist Team  LOS 0 days      Patient Care Team:  Rosa M Chavez APRN as PCP - General (Nurse Practitioner)    Patient Location: 242/1      Subjective   Subjective     Chief Complaint / Subjective  Fevers, leg pain      Brief Synopsis of Hospital Course/HPI    The patient is a 75-year-old female with history atrial fibrillation s/p pacemaker placement, hyperlipidemia, hypertension, CAD, CKD stage III and  chronic lower extremity lymphedema.    Apparently the patient has been having several weeks of worsening lower extremity edema thus went to outside facility.    Laboratory work was significant for , potassium 5.3, BUN 63, creatinine 2.41, WBC 4.5, lactic acid 3.2 and UA with 25-50 WBCs.  The patient was transferred to [facility] for further care      Date::    2/6/21: Poor historian.  Low BP thus gave bolus.       Review of Systems   All other systems reviewed and are negative.        Objective   Objective      Vital Signs  Temp:  [99.1 °F (37.3 °C)-99.6 °F (37.6 °C)] 99.6 °F (37.6 °C)  Heart Rate:  [60-98] 60  Resp:  [18-20] 20  BP: (84-99)/(42-58) 86/42  Oxygen Therapy  SpO2: 95 %  Pulse Oximetry Type: Intermittent  Device (Oxygen Therapy): room air  Flowsheet Rows      First Filed Value   Admission Height  157.5 cm (62\") Documented at 02/05/2021 2359   Admission Weight  78 kg (172 lb) Documented at 02/05/2021 2359        Intake & Output (last 3 days)       02/03 0701 - 02/04 0700 02/04 0701 - 02/05 0700 02/05 0701 - 02/06 0700 02/06 0701 - 02/07 0700    P.O.   240 222    IV Piggyback    1000    Total Intake(mL/kg)   240 (2.9) 1222 (14.9)    Urine (mL/kg/hr)   250     Stool   0     Total Output   250     Net   -10 +1222            Stool Unmeasured Occurrence   1 x         Lines, Drains & Airways    Active LDAs     Name:   Placement date:   Placement time:   Site:   Days:    Peripheral IV 02/06/21 0403 Anterior;Right " Forearm   02/06/21    0403    Forearm   less than 1                  Physical Exam:    Physical Exam  HENT:      Head: Normocephalic.      Nose: Nose normal.   Eyes:      General: No scleral icterus.     Extraocular Movements: Extraocular movements intact.      Pupils: Pupils are equal, round, and reactive to light.   Neck:      Musculoskeletal: Normal range of motion.   Cardiovascular:      Rate and Rhythm: Normal rate and regular rhythm.   Pulmonary:      Effort: Pulmonary effort is normal.      Breath sounds: Normal breath sounds.   Abdominal:      General: Bowel sounds are normal.      Palpations: Abdomen is soft.   Musculoskeletal:      Comments: Bilateral shin with erythema and edema.  Lymphedema.   Lymphadenopathy:      Cervical: No cervical adenopathy.   Skin:     General: Skin is warm.   Neurological:      Mental Status: She is alert.   Psychiatric:         Attention and Perception: Attention normal.              Wounds (last 24 hours)      LDA Wound     Row Name 02/06/21 0715 02/06/21 0509 02/06/21 0200       Wound 02/06/21 0031 Right lower leg Other (comment)    Wound - Properties Group Placement Date: 02/06/21  -CS Placement Time: 0031  -CS Present on Hospital Admission: Y  -CS Side: Right  -CS Orientation: lower  -CS Location: leg  -CS Primary Wound Type: Other  -CS, chronic redness and swelling     Dressing Appearance  dry;intact  -LS  --  --    Periwound  blistered;excoriated;edematous  -LS  --  --    Periwound Temperature  hot;warm  -LS  --  --    Drainage Amount  small  -LS  --  --    Retired Wound - Properties Group Date first assessed: 02/06/21  -CS Time first assessed: 0031  -CS Present on Hospital Admission: Y  -CS Side: Right  -CS Location: leg  -CS Primary Wound Type: Other  -CS, chronic redness and swelling        Wound 02/06/21 0032 Right anterior foot Other (comment)    Wound - Properties Group Placement Date: 02/06/21  -CS Placement Time: 0032  -CS Present on Hospital Admission: Y  -CS  Side: Right  -CS Orientation: anterior  -CS Location: foot  -CS Primary Wound Type: Other  -CS Additional Comments: chronic redness and swelling  -CS    Dressing Appearance  dry;intact  -LS  --  --    Base  red  -LS  --  --    Periwound  edematous;excoriated;redness  -LS  --  --    Periwound Temperature  warm;hot  -LS  --  --    Periwound Skin Turgor  firm  -LS  --  --    Retired Wound - Properties Group Date first assessed: 02/06/21  -CS Time first assessed: 0032  -CS Present on Hospital Admission: Y  -CS Side: Right  -CS Location: foot  -CS Primary Wound Type: Other  -CS Additional Comments: chronic redness and swelling  -CS       Wound 02/06/21 0033 Left lower leg Other (comment)    Wound - Properties Group Placement Date: 02/06/21  -CS Placement Time: 0033  -CS Present on Hospital Admission: N  -CS Side: Left  -CS Orientation: lower  -CS Location: leg  -CS Primary Wound Type: Other  -CS Additional Comments: chronic redness and swelling  -CS    Dressing Appearance  dry;intact  -LS  --  --    Base  red  -LS  --  --    Periwound  edematous;excoriated;redness  -LS  --  --    Periwound Temperature  warm;hot  -LS  --  --    Retired Wound - Properties Group Date first assessed: 02/06/21  -CS Time first assessed: 0033  -CS Present on Hospital Admission: N  -CS Side: Left  -CS Location: leg  -CS Primary Wound Type: Other  -CS Additional Comments: chronic redness and swelling  -CS       Wound 02/06/21 0035 Left anterior ankle Other (comment)    Wound - Properties Group Placement Date: 02/06/21  -CS Placement Time: 0035  -CS Present on Hospital Admission: Y  -CS Side: Left  -CS Orientation: anterior  -CS Location: ankle  -CS Primary Wound Type: Other  -CS    Dressing Appearance  dry;intact  -LS  --  --    Periwound  redness;edematous;excoriated  -LS  --  --    Periwound Temperature  warm  -LS  --  --    Periwound Skin Turgor  firm  -LS  --  --    Retired Wound - Properties Group Date first assessed: 02/06/21  -CS Time  first assessed: 0035  -CS Present on Hospital Admission: Y  -CS Side: Left  -CS Location: ankle  -CS Primary Wound Type: Other  -CS       Wound 02/06/21 0047 Right heel Other (comment)    Wound - Properties Group Placement Date: 02/06/21  -CS Placement Time: 0047  -CS Present on Hospital Admission: Y  -CS Side: Right  -CS Location: heel  -CS Primary Wound Type: Other  -CS, Chronic redness and swelling      Dressing Appearance  dry;intact  -LS  --  --    Base  red  -LS  --  --    Periwound  edematous;excoriated;redness  -LS  --  --    Retired Wound - Properties Group Date first assessed: 02/06/21  -CS Time first assessed: 0047  -CS Present on Hospital Admission: Y  -CS Side: Right  -CS Location: heel  -CS Primary Wound Type: Other  -CS, Chronic redness and swelling         Wound 02/06/21 0050 Left posterior thigh Other (comment)    Wound - Properties Group Placement Date: 02/06/21  -CS Placement Time: 0050  -CS Present on Hospital Admission: Y  -CS Side: Left  -CS Orientation: posterior  -CS Location: thigh  -CS Primary Wound Type: Other  -CS, Chronic redness and swelling      Dressing Appearance  dry;intact  -LS  --  --    Base  red;scab  -LS  --  --    Periwound  edematous;excoriated;redness  -LS  --  --    Retired Wound - Properties Group Date first assessed: 02/06/21  -CS Time first assessed: 0050  -CS Present on Hospital Admission: Y  -CS Side: Left  -CS Location: thigh  -CS Primary Wound Type: Other  -CS, Chronic redness and swelling         Wound 02/06/21 0051 coccyx Pressure Injury    Wound - Properties Group Placement Date: 02/06/21  -CS Placement Time: 0051  -CS Present on Hospital Admission: Y  -CS Location: coccyx  -CS Primary Wound Type: Pressure inj  -CS Stage, Pressure Injury : Stage 2  -CS    Wound Image  --  Images linked: 1  -CS  --    Dressing Appearance  dry;intact  -LS  --  --    Base  non-blanchable;scab  -LS  --  non-blanchable;scab  -CS    Periwound  non-blanchable;redness  -LS  --   non-blanchable;redness  -CS    Dressing Care  --  --  dressing applied  -CS    Retired Wound - Properties Group Date first assessed: 02/06/21  -CS Time first assessed: 0051  -CS Present on Hospital Admission: Y  -CS Location: coccyx  -CS Primary Wound Type: Pressure inj  -CS    Row Name 02/06/21 0049 02/06/21 0048 02/06/21 0047       [REMOVED] Wound 01/28/20 0500 Left foot Other (comment)    Wound - Properties Group Placement Date: 01/28/20  -KK Placement Time: 0500  -KK Present on Hospital Admission: Y  -KK Side: Left  -KK Location: foot  -KK Primary Wound Type: Other  -KK, Lymphedema  Removal Date: 02/06/21  -CS Removal Time: 0017  -CS    Retired Wound - Properties Group Date first assessed: 01/28/20  -KK Time first assessed: 0500  -KK Present on Hospital Admission: Y  -KK Side: Left  -KK Retired Orientation: anterior  -KK Location: foot  -KK Primary Wound Type: Other  -KK, Lymphedema  Resolution Date: 02/06/21  -CS Resolution Time: 0017  -CS       [REMOVED] Wound 01/28/20 0458 Right foot Other (comment)    Wound - Properties Group Placement Date: 01/28/20  -KK Placement Time: 0458  -KK Present on Hospital Admission: Y  -KK Side: Right  -KK Location: foot  -KK Primary Wound Type: Other  -KK, Lymphedema   Removal Date: 02/06/21  -CS Removal Time: 0017  -CS    Retired Wound - Properties Group Date first assessed: 01/28/20  -KK Time first assessed: 0458  -KK Present on Hospital Admission: Y  -KK Side: Right  -KK Retired Orientation: anterior  -KK Location: foot  -KK Primary Wound Type: Other  -KK, Lymphedema   Resolution Date: 02/06/21  -CS Resolution Time: 0017  -CS       [REMOVED] Wound 01/31/20 1433 Left chest Incision    Wound - Properties Group Placement Date: 01/31/20  -LUCI Placement Time: 1433  -LUCI Side: Left  -LUCI Location: chest  -LUCI Primary Wound Type: Incision  -LUCI Removal Date: 02/06/21  -CS Removal Time: 0018  -CS    Retired Wound - Properties Group Date first assessed: 01/31/20  -LUCI Time first assessed:  1433  -LUCI Side: Left  -LCUI Retired Orientation: upper  -LUCI Location: chest  -LUCI Primary Wound Type: Incision  -LUCI Resolution Date: 02/06/21  -CS Resolution Time: 0018  -CS       [REMOVED] Wound 01/28/20 0055 Left arm Skin Tear    Wound - Properties Group Placement Date: 01/28/20  -NB Placement Time: 0055  -NB Side: Left  -NB Location: arm  -NB Primary Wound Type: Skin tear  -NB Removal Date: 02/06/21  -CS Removal Time: 0018  -CS    Retired Wound - Properties Group Date first assessed: 01/28/20  -NB Time first assessed: 0055  -NB Side: Left  -NB Location: arm  -NB Primary Wound Type: Skin tear  -NB Resolution Date: 02/06/21  -CS Resolution Time: 0018  -CS       Wound 02/06/21 0031 Right lower leg Other (comment)    Wound - Properties Group Placement Date: 02/06/21  -CS Placement Time: 0031  -CS Present on Hospital Admission: Y  -CS Side: Right  -CS Orientation: lower  -CS Location: leg  -CS Primary Wound Type: Other  -CS, chronic redness and swelling     Wound Image  --  --  Images linked: 1  -CS    Retired Wound - Properties Group Date first assessed: 02/06/21  -CS Time first assessed: 0031  -CS Present on Hospital Admission: Y  -CS Side: Right  -CS Location: leg  -CS Primary Wound Type: Other  -CS, chronic redness and swelling        Wound 02/06/21 0032 Right anterior foot Other (comment)    Wound - Properties Group Placement Date: 02/06/21  -CS Placement Time: 0032  -CS Present on Hospital Admission: Y  -CS Side: Right  -CS Orientation: anterior  -CS Location: foot  -CS Primary Wound Type: Other  -CS Additional Comments: chronic redness and swelling  -CS    Wound Image  --  Images linked: 1  -CS  --    Retired Wound - Properties Group Date first assessed: 02/06/21  -CS Time first assessed: 0032  -CS Present on Hospital Admission: Y  -CS Side: Right  -CS Location: foot  -CS Primary Wound Type: Other  -CS Additional Comments: chronic redness and swelling  -CS       Wound 02/06/21 0033 Left lower leg Other (comment)     Wound - Properties Group Placement Date: 02/06/21  -CS Placement Time: 0033  -CS Present on Hospital Admission: N  -CS Side: Left  -CS Orientation: lower  -CS Location: leg  -CS Primary Wound Type: Other  -CS Additional Comments: chronic redness and swelling  -CS    Wound Image  --  Images linked: 1  -CS  --    Retired Wound - Properties Group Date first assessed: 02/06/21  -CS Time first assessed: 0033  -CS Present on Hospital Admission: N  -CS Side: Left  -CS Location: leg  -CS Primary Wound Type: Other  -CS Additional Comments: chronic redness and swelling  -CS       Wound 02/06/21 0035 Left anterior ankle Other (comment)    Wound - Properties Group Placement Date: 02/06/21  -CS Placement Time: 0035  -CS Present on Hospital Admission: Y  -CS Side: Left  -CS Orientation: anterior  -CS Location: ankle  -CS Primary Wound Type: Other  -CS    Wound Image  Images linked: 1  -CS  --  --    Retired Wound - Properties Group Date first assessed: 02/06/21  -CS Time first assessed: 0035  -CS Present on Hospital Admission: Y  -CS Side: Left  -CS Location: ankle  -CS Primary Wound Type: Other  -CS       Wound 02/06/21 0047 Right heel Other (comment)    Wound - Properties Group Placement Date: 02/06/21  -CS Placement Time: 0047  -CS Present on Hospital Admission: Y  -CS Side: Right  -CS Location: heel  -CS Primary Wound Type: Other  -CS, Chronic redness and swelling      Retired Wound - Properties Group Date first assessed: 02/06/21  -CS Time first assessed: 0047  -CS Present on Hospital Admission: Y  -CS Side: Right  -CS Location: heel  -CS Primary Wound Type: Other  -CS, Chronic redness and swelling         Wound 02/06/21 0050 Left posterior thigh Other (comment)    Wound - Properties Group Placement Date: 02/06/21  -CS Placement Time: 0050  -CS Present on Hospital Admission: Y  -CS Side: Left  -CS Orientation: posterior  -CS Location: thigh  -CS Primary Wound Type: Other  -CS, Chronic redness and swelling      Retired  Wound - Properties Group Date first assessed: 02/06/21  -CS Time first assessed: 0050  -CS Present on Hospital Admission: Y  -CS Side: Left  -CS Location: thigh  -CS Primary Wound Type: Other  -CS, Chronic redness and swelling         Wound 02/06/21 0051 coccyx Pressure Injury    Wound - Properties Group Placement Date: 02/06/21  -CS Placement Time: 0051  -CS Present on Hospital Admission: Y  -CS Location: coccyx  -CS Primary Wound Type: Pressure inj  -CS Stage, Pressure Injury : Stage 2  -CS    Retired Wound - Properties Group Date first assessed: 02/06/21  -CS Time first assessed: 0051  -CS Present on Hospital Admission: Y  -CS Location: coccyx  -CS Primary Wound Type: Pressure inj  -CS    Row Name 02/06/21 0000             [REMOVED] Wound 01/28/20 0500 Left foot Other (comment)    Wound - Properties Group Placement Date: 01/28/20  -KK Placement Time: 0500  -KK Present on Hospital Admission: Y  -KK Side: Left  -KK Location: foot  -KK Primary Wound Type: Other  -KK, Lymphedema  Removal Date: 02/06/21  -CS Removal Time: 0017  -CS    Retired Wound - Properties Group Date first assessed: 01/28/20  -KK Time first assessed: 0500  -KK Present on Hospital Admission: Y  -KK Side: Left  -KK Retired Orientation: anterior  -KK Location: foot  -KK Primary Wound Type: Other  -KK, Lymphedema  Resolution Date: 02/06/21  -CS Resolution Time: 0017  -CS       [REMOVED] Wound 01/28/20 0458 Right foot Other (comment)    Wound - Properties Group Placement Date: 01/28/20  -KK Placement Time: 0458  -KK Present on Hospital Admission: Y  -KK Side: Right  -KK Location: foot  -KK Primary Wound Type: Other  -KK, Lymphedema   Removal Date: 02/06/21  -CS Removal Time: 0017  -CS    Retired Wound - Properties Group Date first assessed: 01/28/20  -KK Time first assessed: 0458  -KK Present on Hospital Admission: Y  -KK Side: Right  -KK Retired Orientation: anterior  -KK Location: foot  -KK Primary Wound Type: Other  -KK, Lymphedema   Resolution  Date: 02/06/21  -CS Resolution Time: 0017  -CS       [REMOVED] Wound 01/31/20 1433 Left chest Incision    Wound - Properties Group Placement Date: 01/31/20  -LUCI Placement Time: 1433  -LUCI Side: Left  -LUCI Location: chest  -LUCI Primary Wound Type: Incision  -LUCI Removal Date: 02/06/21  -CS Removal Time: 0018  -CS    Retired Wound - Properties Group Date first assessed: 01/31/20  -LUCI Time first assessed: 1433  -LUCI Side: Left  -LUCI Retired Orientation: upper  -LUCI Location: chest  -LUCI Primary Wound Type: Incision  -LUCI Resolution Date: 02/06/21  -CS Resolution Time: 0018  -CS       [REMOVED] Wound 01/28/20 0055 Left arm Skin Tear    Wound - Properties Group Placement Date: 01/28/20  -NB Placement Time: 0055  -NB Side: Left  -NB Location: arm  -NB Primary Wound Type: Skin tear  -NB Removal Date: 02/06/21  -CS Removal Time: 0018  -CS    Retired Wound - Properties Group Date first assessed: 01/28/20  -NB Time first assessed: 0055  -NB Side: Left  -NB Location: arm  -NB Primary Wound Type: Skin tear  -NB Resolution Date: 02/06/21  -CS Resolution Time: 0018  -CS       Wound 02/06/21 0031 Right lower leg Other (comment)    Wound - Properties Group Placement Date: 02/06/21  -CS Placement Time: 0031  -CS Present on Hospital Admission: Y  -CS Side: Right  -CS Orientation: lower  -CS Location: leg  -CS Primary Wound Type: Other  -CS, chronic redness and swelling     Base  red  -CS      Periwound  blistered;excoriated;edematous  -CS      Periwound Temperature  hot;warm  -CS      Drainage Amount  small  -CS      Dressing Care  dressing changed  -CS      Retired Wound - Properties Group Date first assessed: 02/06/21  -CS Time first assessed: 0031  -CS Present on Hospital Admission: Y  -CS Side: Right  -CS Location: leg  -CS Primary Wound Type: Other  -CS, chronic redness and swelling        Wound 02/06/21 0032 Right anterior foot Other (comment)    Wound - Properties Group Placement Date: 02/06/21  -CS Placement Time: 0032  -CS Present on  Hospital Admission: Y  -CS Side: Right  -CS Orientation: anterior  -CS Location: foot  -CS Primary Wound Type: Other  -CS Additional Comments: chronic redness and swelling  -CS    Base  red  -CS      Periwound  edematous;excoriated;redness  -CS      Periwound Temperature  warm;hot  -CS      Periwound Skin Turgor  firm  -CS      Drainage Amount  scant  -CS      Dressing Care  dressing changed  -CS      Retired Wound - Properties Group Date first assessed: 02/06/21  -CS Time first assessed: 0032  -CS Present on Hospital Admission: Y  -CS Side: Right  -CS Location: foot  -CS Primary Wound Type: Other  -CS Additional Comments: chronic redness and swelling  -CS       Wound 02/06/21 0033 Left lower leg Other (comment)    Wound - Properties Group Placement Date: 02/06/21  -CS Placement Time: 0033  -CS Present on Hospital Admission: N  -CS Side: Left  -CS Orientation: lower  -CS Location: leg  -CS Primary Wound Type: Other  -CS Additional Comments: chronic redness and swelling  -CS    Base  red  -CS      Periwound  edematous;excoriated;redness  -CS      Periwound Temperature  warm;hot  -CS      Dressing Care  dressing changed  -CS      Retired Wound - Properties Group Date first assessed: 02/06/21  -CS Time first assessed: 0033  -CS Present on Hospital Admission: N  -CS Side: Left  -CS Location: leg  -CS Primary Wound Type: Other  -CS Additional Comments: chronic redness and swelling  -CS       Wound 02/06/21 0035 Left anterior ankle Other (comment)    Wound - Properties Group Placement Date: 02/06/21  -CS Placement Time: 0035  -CS Present on Hospital Admission: Y  -CS Side: Left  -CS Orientation: anterior  -CS Location: ankle  -CS Primary Wound Type: Other  -CS    Base  red  -CS      Periwound  redness;edematous;excoriated  -CS      Periwound Temperature  warm  -CS      Periwound Skin Turgor  firm  -CS      Dressing Care  dressing changed  -CS      Retired Wound - Properties Group Date first assessed: 02/06/21  -CS Time  first assessed: 0035  -CS Present on Hospital Admission: Y  -CS Side: Left  -CS Location: ankle  -CS Primary Wound Type: Other  -CS       Wound 02/06/21 0047 Right heel Other (comment)    Wound - Properties Group Placement Date: 02/06/21  -CS Placement Time: 0047  -CS Present on Hospital Admission: Y  -CS Side: Right  -CS Location: heel  -CS Primary Wound Type: Other  -CS, Chronic redness and swelling      Base  red  -CS      Periwound  edematous;excoriated;redness  -CS      Dressing Care  dressing changed  -CS      Retired Wound - Properties Group Date first assessed: 02/06/21  -CS Time first assessed: 0047  -CS Present on Hospital Admission: Y  -CS Side: Right  -CS Location: heel  -CS Primary Wound Type: Other  -CS, Chronic redness and swelling         Wound 02/06/21 0050 Left posterior thigh Other (comment)    Wound - Properties Group Placement Date: 02/06/21  -CS Placement Time: 0050  -CS Present on Hospital Admission: Y  -CS Side: Left  -CS Orientation: posterior  -CS Location: thigh  -CS Primary Wound Type: Other  -CS, Chronic redness and swelling      Base  red;scab  -CS      Periwound  edematous;excoriated;redness  -CS      Dressing Care  dressing applied  -CS      Retired Wound - Properties Group Date first assessed: 02/06/21  -CS Time first assessed: 0050  -CS Present on Hospital Admission: Y  -CS Side: Left  -CS Location: thigh  -CS Primary Wound Type: Other  -CS, Chronic redness and swelling        User Key  (r) = Recorded By, (t) = Taken By, (c) = Cosigned By    Initials Name Provider Type    Naeem Moser Technician    Naeem Moser RN Registered Nurse    Salvador Lancaster Technologist    Jyoti Grant RN Registered Nurse    Adrienne Ash RN Registered Nurse    Bess Keller LPN Licensed Nurse          Procedures:              Results Review:     I reviewed the patient's new clinical results.      Lab Results (last 24 hours)     Procedure Component Value Units Date/Time     Troponin [[id number]]  (Abnormal) Collected: 02/06/21 0311    Specimen: Blood Updated: 02/06/21 0513     Troponin T 0.031 ng/mL     Narrative:      Troponin T Reference Range:  <= 0.03 ng/mL-   Negative for AMI  >0.03 ng/mL-     Abnormal for myocardial necrosis.  Clinicians would have to utilize clinical acumen, EKG, Troponin and serial changes to determine if it is an Acute Myocardial Infarction or myocardial injury due to an underlying chronic condition.       Results may be falsely decreased if patient taking Biotin.      Lactic Acid, Reflex [[id number]]  (Abnormal) Collected: 02/06/21 0440    Specimen: Blood Updated: 02/06/21 0512     Lactate 3.1 mmol/L     CBC & Differential [[id number]]  (Abnormal) Collected: 02/06/21 0311    Specimen: Blood Updated: 02/06/21 0500    Narrative:      The following orders were created for panel order CBC & Differential.  Procedure                               Abnormality         Status                     ---------                               -----------         ------                     Scan Slide[[id number]]                                       Final result               CBC Auto Differential[[id number]]        Abnormal            Final result                 Please view results for these tests on the individual orders.    CBC Auto Differential [[id number]]  (Abnormal) Collected: 02/06/21 0311    Specimen: Blood Updated: 02/06/21 0500     WBC 10.70 10*3/mm3      RBC 3.19 10*6/mm3      Hemoglobin 10.1 g/dL      Hematocrit 31.7 %      MCV 99.3 fL      MCH 31.8 pg      MCHC 32.0 g/dL      RDW 16.5 %      RDW-SD 58.2 fl      MPV 8.1 fL      Platelets 72 10*3/mm3      Comment: Platelet estimate performed due to platelet clumping.        Scan Slide [[id number]] Collected: 02/06/21 0311    Specimen: Blood Updated: 02/06/21 0500     Scan Slide --     Comment: See Manual Differential Results       Manual Differential [[id number]]  (Abnormal) Collected: 02/06/21 0311    Specimen: Blood  Updated: 02/06/21 0500     Neutrophil % 60.0 %      Lymphocyte % 1.0 %      Monocyte % 1.0 %      Eosinophil % 1.0 %      Bands %  35.0 %      Metamyelocyte % 2.0 %      Neutrophils Absolute 10.17 10*3/mm3      Lymphocytes Absolute 0.11 10*3/mm3      Monocytes Absolute 0.11 10*3/mm3      Eosinophils Absolute 0.11 10*3/mm3      Anisocytosis Slight/1+     Poikilocytes Slight/1+     Vacuolated Neutrophils Slight/1+     Clumped Platelets Present    Basic Metabolic Panel [[id number]]  (Abnormal) Collected: 02/06/21 0311    Specimen: Blood Updated: 02/06/21 0456     Glucose 95 mg/dL      BUN 69 mg/dL      Creatinine 2.78 mg/dL      Sodium 139 mmol/L      Potassium 4.5 mmol/L      Chloride 115 mmol/L      CO2 13.0 mmol/L      Calcium 7.7 mg/dL      eGFR Non African Amer 17 mL/min/1.73      BUN/Creatinine Ratio 24.8     Anion Gap 11.0 mmol/L     Narrative:      GFR Normal >60  Chronic Kidney Disease <60  Kidney Failure <15      Magnesium [[id number]]  (Abnormal) Collected: 02/06/21 0311    Specimen: Blood Updated: 02/06/21 0456     Magnesium 1.4 mg/dL     Lactic Acid, Reflex Timer (This will reflex a repeat order 3-3:15 hours after ordered.) [[id number]] Collected: 02/06/21 0045    Specimen: Blood Updated: 02/06/21 0431     Hold Tube Hold for add-ons.     Comment: Auto resulted.       Sodium, Urine, Random - Urine, Clean Catch [[id number]] Collected: 02/06/21 0148    Specimen: Urine, Clean Catch Updated: 02/06/21 0253     Sodium, Urine 54 mmol/L     Narrative:      Reference intervals for random urine have not been established.  Clinical usage is dependent upon physician's interpretation in combination with other laboratory tests.       Procalcitonin [[id number]]  (Abnormal) Collected: 02/06/21 0045    Specimen: Blood Updated: 02/06/21 0250     Procalcitonin 171.12 ng/mL      Comment: Linear range verified.         Narrative:      As a Marker for Sepsis (Non-Neonates):   1. <0.5 ng/mL represents a low risk of severe sepsis  "and/or septic shock.  1. >2 ng/mL represents a high risk of severe sepsis and/or septic shock.    As a Marker for Lower Respiratory Tract Infections that require antibiotic therapy:  PCT on Admission     Antibiotic Therapy             6-12 Hrs later  > 0.5                Strongly Recommended            >0.25 - <0.5         Recommended  0.1 - 0.25           Discouraged                   Remeasure/reassess PCT  <0.1                 Strongly Discouraged          Remeasure/reassess PCT      As 28 day mortality risk marker: \"Change in Procalcitonin Result\" (> 80 % or <=80 %) if Day 0 (or Day 1) and Day 4 values are available. Refer to http://[email or web address]-pct-[email or web address]/   Change in PCT <=80 %   A decrease of PCT levels below or equal to 80 % defines a positive change in PCT test result representing a higher risk for 28-day all-cause mortality of patients diagnosed with severe sepsis or septic shock.  Change in PCT > 80 %   A decrease of PCT levels of more than 80 % defines a negative change in PCT result representing a lower risk for 28-day all-cause mortality of patients diagnosed with severe sepsis or septic shock.                Results may be falsely decreased if patient taking Biotin.     COVID PRE-OP / PRE-PROCEDURE SCREENING ORDER (NO ISOLATION) - Swab, Nasopharynx [[id number]] Collected: 02/06/21 0153    Specimen: Swab from Nasopharynx Updated: 02/06/21 0242    Narrative:      The following orders were created for panel order COVID PRE-OP / PRE-PROCEDURE SCREENING ORDER (NO ISOLATION) - Swab, Nasopharynx.  Procedure                               Abnormality         Status                     ---------                               -----------         ------                     COVID-19,APTIMA PANTHER,...[[id number]]                      In process                   Please view results for these tests on the individual orders.    COVID-19,APTIMA CHILO ROSS IN-HOUSE, NP/OP SWAB IN UTM/VTM/SALINE TRANSPORT MEDIA,24 " HR TAT - Swab, Nasopharynx [[id number]] Collected: 02/06/21 0153    Specimen: Swab from Nasopharynx Updated: 02/06/21 0242    BNP [[id number]]  (Abnormal) Collected: 02/06/21 0045    Specimen: Blood Updated: 02/06/21 0136     proBNP 33,290.0 pg/mL     Narrative:      Among patients with dyspnea, NT-proBNP is highly sensitive for the detection of acute congestive heart failure. In addition NT-proBNP of <300 pg/ml effectively rules out acute congestive heart failure with 99% negative predictive value.    Results may be falsely decreased if patient taking Biotin.      Troponin [[id number]]  (Abnormal) Collected: 02/06/21 0045    Specimen: Blood Updated: 02/06/21 0129     Troponin T 0.032 ng/mL     Narrative:      Troponin T Reference Range:  <= 0.03 ng/mL-   Negative for AMI  >0.03 ng/mL-     Abnormal for myocardial necrosis.  Clinicians would have to utilize clinical acumen, EKG, Troponin and serial changes to determine if it is an Acute Myocardial Infarction or myocardial injury due to an underlying chronic condition.       Results may be falsely decreased if patient taking Biotin.      Basic Metabolic Panel [[id number]]  (Abnormal) Collected: 02/06/21 0045    Specimen: Blood Updated: 02/06/21 0126     Glucose 101 mg/dL      BUN 68 mg/dL      Creatinine 2.72 mg/dL      Sodium 140 mmol/L      Potassium 4.8 mmol/L      Chloride 116 mmol/L      CO2 13.0 mmol/L      Calcium 7.7 mg/dL      eGFR Non African Amer 17 mL/min/1.73      BUN/Creatinine Ratio 25.0     Anion Gap 11.0 mmol/L     Narrative:      GFR Normal >60  Chronic Kidney Disease <60  Kidney Failure <15      Magnesium [[id number]]  (Abnormal) Collected: 02/06/21 0045    Specimen: Blood Updated: 02/06/21 0126     Magnesium 1.3 mg/dL     Lactic Acid, Plasma [[id number]]  (Abnormal) Collected: 02/06/21 0045    Specimen: Blood Updated: 02/06/21 0117     Lactate 3.4 mmol/L     CBC & Differential [[id number]]  (Abnormal) Collected: 02/06/21 0045    Specimen: Blood Updated:  02/06/21 0058    Narrative:      The following orders were created for panel order CBC & Differential.  Procedure                               Abnormality         Status                     ---------                               -----------         ------                     CBC Auto Differential[[id number]]        Abnormal            Final result                 Please view results for these tests on the individual orders.    CBC Auto Differential [[id number]]  (Abnormal) Collected: 02/06/21 0045    Specimen: Blood Updated: 02/06/21 0058     WBC 7.10 10*3/mm3      RBC 3.30 10*6/mm3      Hemoglobin 10.5 g/dL      Hematocrit 32.5 %      MCV 98.8 fL      MCH 32.0 pg      MCHC 32.4 g/dL      RDW 16.5 %      RDW-SD 57.8 fl      MPV 8.8 fL      Platelets 86 10*3/mm3      Neutrophil % 93.8 %      Lymphocyte % 1.8 %      Monocyte % 4.0 %      Eosinophil % 0.1 %      Basophil % 0.3 %      Neutrophils, Absolute 6.70 10*3/mm3      Lymphocytes, Absolute 0.10 10*3/mm3      Monocytes, Absolute 0.30 10*3/mm3      Eosinophils, Absolute 0.00 10*3/mm3      Basophils, Absolute 0.00 10*3/mm3      nRBC 0.1 /100 WBC         No results found for: HGBA1C            Lab Results   Component Value Date    LIPASE 42 01/13/2018     Lab Results   Component Value Date    CHOL 126 02/05/2020    TRIG 51 02/05/2020    HDL 66 (H) 02/05/2020    LDL 50 02/05/2020       No results found for: INTRAOP, PREDX, FINALDX, COMDX    Microbiology Results (last 10 days)     ** No results found for the last 240 hours. **          ECG/EMG Results (most recent)     Procedure Component Value Units Date/Time    Adult Transthoracic Echo Complete W/ Cont if Necessary Per Protocol [[id number]] Collected: 02/06/21 0850     Updated: 02/06/21 0935     BSA 1.8 m^2      RVIDd 2.7 cm      IVSd 1.3 cm      LVIDd 3.9 cm      LVIDs 2.8 cm      LVPWd 1.2 cm      IVS/LVPW 1.0     FS 28.8 %      EDV(Teich) 67.0 ml      ESV(Teich) 29.4 ml      EF(Teich) 56.0 %      EDV(cubed) 60.5 ml       ESV(cubed) 21.9 ml      EF(cubed) 63.9 %      LV mass(C)d 170.0 grams      LV mass(C)dI 92.8 grams/m^2      SV(Teich) 37.5 ml      SI(Teich) 20.5 ml/m^2      SV(cubed) 38.7 ml      SI(cubed) 21.1 ml/m^2      Ao root diam 2.5 cm      Ao root area 5.1 cm^2      ACS 1.6 cm      asc Aorta Diam 3.2 cm      LVOT diam 1.8 cm      LVOT area 2.6 cm^2      RVOT diam 2.6 cm      RVOT area 5.5 cm^2      EDV(MOD-sp4) 59.5 ml      ESV(MOD-sp4) 26.6 ml      EF(MOD-sp4) 55.3 %      EDV(MOD-sp2) 53.3 ml      ESV(MOD-sp2) 20.0 ml      EF(MOD-sp2) 62.4 %      SV(MOD-sp4) 32.9 ml      SI(MOD-sp4) 18.0 ml/m^2      SV(MOD-sp2) 33.2 ml      SI(MOD-sp2) 18.1 ml/m^2      Ao root area (BSA corrected) 1.4     LV Olivier Vol (BSA corrected) 32.5 ml/m^2      LV Sys Vol (BSA corrected) 14.5 ml/m^2      Aortic R-R 1.0 sec      Aortic HR 59.0 BPM      MV V2 max 134.6 cm/sec      MV max PG 7.2 mmHg      MV V2 mean 51.6 cm/sec      MV mean PG 1.8 mmHg      MV V2 VTI 24.3 cm      MVA(VTI) 2.3 cm^2      Ao pk chastity 182.4 cm/sec      Ao max PG 13.3 mmHg      Ao max PG (full) 8.5 mmHg      Ao V2 mean 142.3 cm/sec      Ao mean PG 8.7 mmHg      Ao mean PG (full) 5.5 mmHg      Ao V2 VTI 37.9 cm      WILLIS(I,A) 1.5 cm^2      WILLIS(I,D) 1.5 cm^2      WILLIS(V,A) 1.5 cm^2      WILLIS(V,D) 1.5 cm^2      AI max chastity 264.5 cm/sec      AI max PG 28.0 mmHg      AI dec slope 171.9 cm/sec^2      AI dec time 1.5 sec      AI P1/2t 450.8 msec      LV V1 max PG 4.8 mmHg      LV V1 mean PG 3.2 mmHg      LV V1 max 110.0 cm/sec      LV V1 mean 85.9 cm/sec      LV V1 VTI 21.6 cm      CO(Ao) 11.4 l/min      CI(Ao) 6.2 l/min/m^2      SV(Ao) 192.9 ml      SI(Ao) 105.3 ml/m^2      CO(LVOT) 3.3 l/min      CI(LVOT) 1.8 l/min/m^2      SV(LVOT) 55.2 ml      SV(RVOT) 59.1 ml      SI(LVOT) 30.1 ml/m^2      PA V2 max 88.8 cm/sec      PA max PG 3.2 mmHg      PA max PG (full) 1.9 mmHg      PA V2 mean 63.8 cm/sec      PA mean PG 1.8 mmHg      PA mean PG (full) 1.0 mmHg      PA V2 VTI 17.2 cm       PVA(I,A) 3.4 cm^2       CV ECHO SAHIL - PVA(I,D) 3.4 cm^2       CV ECHO SAHIL - PVA(V,A) 3.4 cm^2       CV ECHO SAHIL - PVA(V,D) 3.4 cm^2      PA acc time 0.07 sec      RV V1 max PG 1.2 mmHg      RV V1 mean PG 0.77 mmHg      RV V1 max 55.1 cm/sec      RV V1 mean 41.7 cm/sec      RV V1 VTI 10.8 cm      TR max devante 237.7 cm/sec      RVSP(TR) 25.6 mmHg      RAP systole 3.0 mmHg      PA pr(Accel) 48.2 mmHg      Pulm Sys Devante 41.0 cm/sec      Pulm Olivier Devante 37.5 cm/sec      Pulm S/D 1.1     Qp/Qs 1.1      CV ECHO SAHIL - BZI_BMI 33.1 kilograms/m^2       CV ECHO SAHIL - BSA(HAYCOCK) 1.9 m^2       CV ECHO SAHIL - BZI_METRIC_WEIGHT 82.1 kg       CV ECHO SAHIL - BZI_METRIC_HEIGHT 157.5 cm      EF(MOD-bp) 60.0 %      LA dimension(2D) 4.2 cm           Results for orders placed during the hospital encounter of 08/08/19   Venous w Reflux Lower Extremity - Bilateral CAR    Narrative · The patient is in a reverse Trendelenburg position.  · There is evidence of significant reflux of the right common femoral   vein. There is evidence of significant reflux of the right mid femoral   vein. There is evidence of severe reflux of the right greater saphenous   vein below the knee. There is evidence of severe reflux of the right small   saphenous vein.  · There is evidence of mild reflux of the left greater saphenous vein at   the distal thigh.               Xr Chest 1 View    Result Date: 2/6/2021  1.Cardiomegaly  Electronically Signed By-Azar Alarcon MD On:2/6/2021 8:01 AM This report was finalized on [id number] by  Azar Alarcon MD.          Xrays, labs reviewed personally by physician.    Medication Review:   I have reviewed the patient's current medication list      Scheduled Meds  !Vancomycin Level Draw Needed, , Does not apply, Once  aspirin, 81 mg, Oral, Daily  bumetanide, 1 mg, Oral, BID  [START ON 2/7/2021] cefepime, 2 g, Intravenous, Q24H  digoxin, 125 mcg, Oral, Daily  febuxostat, 40 mg, Oral, BID  gabapentin, 100 mg,  Oral, TID  magic butt paste, , Topical, Daily  rivaroxaban, 10 mg, Oral, Daily With Dinner  sodium chloride, 10 mL, Intravenous, Q12H  Zinc Oxide, , Apply externally, BID        Meds Infusions  Pharmacy to dose vancomycin,         Meds PRN  acetaminophen **OR** acetaminophen **OR** acetaminophen  •  magnesium sulfate **OR** magnesium sulfate in D5W 1g/100mL (PREMIX) **OR** magnesium sulfate  •  melatonin  •  nitroglycerin  •  ondansetron **OR** ondansetron  •  Pharmacy to dose vancomycin  •  potassium chloride  •  potassium chloride  •  sodium chloride  •  traMADol  •  vancomycin        Assessment/Plan   Assessment/Plan     Active Hospital Problems    Diagnosis  POA   • Lower extremity cellulitis [L03.119]  Yes   • CAD (coronary artery disease) [I25.10]  Yes   • Acute UTI (urinary tract infection) [N39.0]  Yes   • Non-pressure chronic ulcer right ankle, limited to breakdown skin (CMS/HCC) [L97.311]  Yes   • Automatic implantable cardiac defibrillator in situ [Z95.810]  Yes   • Lymphedema [I89.0]  Unknown   • Gout [M10.9]  Yes   • Hypertension, benign [I10]  Yes   • Congestive heart failure (CMS/HCC) [I50.9]  Yes   • Atrial fibrillation (CMS/HCC) [I48.91]  Yes      Resolved Hospital Problems   No resolved problems to display.       MEDICAL DECISION MAKING COMPLEXITY BY PROBLEM:       Lateral LE Lymphedema with cellulitis  -Rocephin given x1 at sending facility, start vancomycin and cefepime  -Check TTE,  procalcitonin   -Falls precautions  -Wound care consulted     Urinary tract infection  -Rocephin given at sending facility  -cefepime   -Urine culture pending     CONG on CKD III:  -Hold colchicine  -Concern for fluid loss from LE cellulitis  -Consulted nephrology  -s/p renal ultrasound 2/5/2020    CAD  -Denies chest pain  -Continue aspirin, beta-blocker     Atrial fibrillation s/p pacemaker  -On digoxin and metoprolol at home  -Repeat TTE 2/13/2018--> LVEF 40% with mild aortic insufficiency  -TTE  ordered     Hypertension  -Hold metoprolol temporarily because of low BP     Hyperlipidemia  -Check lipid panel  -on  home Zetia at home     Chronic pain  -Continue gabapentin and tramadol     Gout  -Hold colchicine because ok CONG     Pressure ulcer on right ankle  -Wound care consulted             VTE Prophylaxis -   Mechanical Order History:     None      Pharmalogical Order History:      Ordered     Dose Route Frequency Stop    02/06/21 0500  rivaroxaban (XARELTO) tablet 10 mg     Question Answer Comment   Are you ordering rivaroxaban for the prevention of blood clots in an acutely ill medical patient? Yes    Select any exclusion criteria that may apply to patient Exclusion Criteria Does Not Apply to This Patient Alternative to Lovenox/heparin inpatient with thrombocytopenia unable to receive mechanical prophylaxis       10 mg PO Daily With Dinner --    02/06/21 0020  heparin (porcine) 5000 UNIT/ML injection 5,000 Units  Status:  Discontinued      5,000 Units SC Every 12 Hours Scheduled 02/06/21 0147                  Code Status -   Code Status and Medical Interventions:   Ordered at: 02/06/21 0258     Level Of Support Discussed With:    Patient     Code Status:    CPR     Medical Interventions (Level of Support Prior to Arrest):    Full       This patient has been examined wearing appropriate Personal Protective Equipment and discussed with nursing. 02/06/21        Discharge Planning  defer        Electronically signed by Jake Marx DO, 02/06/21, 12:01 EST.  [facility] Team

## 2021-02-06 NOTE — PROGRESS NOTES
"Pharmacy Antimicrobial Dosing Service    Subjective:  Emperatriz Childs is a 75 y.o.female admitted with cellulitis. Pharmacy has been consulted to dose Vancomycin for possible SSTI.    PMH: CONG on CKD      Assessment/Plan    1. Day #1 Vancomycin: Pulse dosing d/t renal dysfxn. Vanc 1250 mg (~20 mg/kg AdjBW) ordered. Will initiate pulse dosing. Will obtain random level today at 1500. Plan to re-dose when vancomycin level <20 mcg/mL.    2. Day #1 Cefepime: 2g IV q24h for estCrCl < 30 mL/min.    Will continue to monitor drug levels, renal function, culture and sensitivities, and patient clinical status.       Objective:  Relevant clinical data and objective history reviewed:  157.5 cm (62\")   78 kg (172 lb)   Ideal body weight: 50.1 kg (110 lb 7.2 oz)  Adjusted ideal body weight: 61.3 kg (135 lb 1.1 oz)  Body mass index is 31.46 kg/m².        Results from last 7 days   Lab Units 02/06/21  0045   CREATININE mg/dL 2.72*     Estimated Creatinine Clearance: 17 mL/min (A) (by C-G formula based on SCr of 2.72 mg/dL (H)).  No intake/output data recorded.    Results from last 7 days   Lab Units 02/06/21  0045   WBC 10*3/mm3 7.10     Temperature    02/05/21 2359   Temp: 99.2 °F (37.3 °C)     Baseline culture/source/susceptibility:  Microbiology Results (last 10 days)       ** No results found for the last 240 hours. **             Anti-Infectives (From admission, onward)      Ordered     Dose/Rate Route Frequency Start Stop    02/06/21 0040  cefepime 2 gm IVPB in 100 ml NS (MBP)     Ordering Provider: Bert Bateman PA-C    2 g  over 4 Hours Intravenous Every 24 Hours 02/07/21 0300 02/13/21 0259    02/06/21 0237  !Vancomycin Level Draw Needed     Ordering Provider: Bert Bateman PA-C     Does not apply Once 02/06/21 1500      02/06/21 0145  vancomycin (VANCOCIN) 1,000 mg in sodium chloride 0.9 % 250 mL IVPB     Ordering Provider: Bert Bateman PA-C    15 mg/kg × 61.3 kg (Adjusted) Intravenous As Needed " 02/06/21 0145 02/13/21 0144    02/06/21 0058  vancomycin 1250 mg/250 mL 0.9% NS IVPB ([facility])     Ordering Provider: Bert Bateman PA-C    20 mg/kg × 61.3 kg (Adjusted)  over 75 Minutes Intravenous Once 02/06/21 0145      02/06/21 0040  cefepime 2 gm IVPB in 100 ml NS (MBP)     Ordering Provider: Bert Bateman PA-C    2 g  over 30 Minutes Intravenous Once 02/06/21 0130 02/06/21 0230    02/06/21 0040  Pharmacy to dose vancomycin     Ordering Provider: Bert Bateman PA-C     Does not apply Continuous PRN 02/06/21 0038 02/13/21 0037            Erika Cha [facility]  02/06/21 02:38 EST

## 2021-02-06 NOTE — PLAN OF CARE
Goal Outcome Evaluation:  Plan of Care Reviewed With: patient  Progress: no change  Outcome Summary: Patient has rested in bed since admission. Patient very weak compared to baseline. Patient receiving IV antibiotics. Will continue to monitor.

## 2021-02-06 NOTE — H&P
[facility] Medicine Services      Patient Name: Emperatriz Childs  : 1945  MRN: [id number]  Primary Care Physician: Rosa M Chavez APRN  Date of admission: 2021    Patient Care Team:  Rosa M Chavez APRN as PCP - General (Nurse Practitioner)          Subjective   History Present Illness     Chief Complaint: Lymphedema      Ms. Childs is a 75 y.o. female past medical history of CHF, hyperlipidemia, hypertension, CAD and chronic lymphedema who presents to [facility] from an outlying facility complaining of increasing leg swelling and pain.  Patient reports she has had chronic lymphedema with some erythema that has continuously gotten worse over the past several weeks and her [facility] been[facility]  She notes some pain associated with her condition especially if she is weightbearing for an extended period of time as well as a recent subjective fever and chills.  Patient denies any other pain including chest pain, dyspnea, cough, sensation of tachycardia or palpitations, syncope or near syncope.  Bowel and bladder function are reported as normal.  Patient does not smoke or drink alcohol and confirms she has been compliant with all of her outpatient medical therapies.    At sending facility labs were notable for BNP: 777, potassium: 5.3, chloride: 113, creatinine: 2.41 with a BUN of 63 and a BUN/creatinine ratio of 26.14, GFR: 19, WBCs: 4.5, lactate: 3.2, hemoglobin: 11.3 with a normal MCV and MCHC.  UA shows marked bacteria with 3-10 RBCs, 25-50 WBCs, moderate leukocyte esterase, positive nitrites, 30 mg/deciliter protein, trace blood and 0-5 squamous epithelial cells.  Chest x-ray reported with clear lungs and stable cardiomegaly with an ICD in place as well as some atherosclerosis.  EKG shows a ventricular paced rhythm at 67.  Vital signs at sending facility: Temperature: 99.4, heart rate: 70, respirations: 16, blood  pressure 109/63 with an SPO2 of 100% on room air.     History of Present Illness    Review of Systems   Constitution: Positive for chills and fever.   HENT: Negative.    Eyes: Negative.    Cardiovascular: Positive for leg swelling. Negative for chest pain, dyspnea on exertion, near-syncope and syncope.   Respiratory: Negative.    Endocrine: Negative.    Skin: Negative.    Musculoskeletal: Negative.    Gastrointestinal: Negative.    Genitourinary: Negative.    Neurological: Negative.    Psychiatric/Behavioral: Negative.            Personal History     Past Medical History:   Past Medical History:   Diagnosis Date   • CHF (congestive heart failure) (CMS/[facility])    • History of transfusion    • Hypertension    • Lymphedema of leg    • Myocardial infarction (CMS/[facility])        Surgical History:      Past Surgical History:   Procedure Laterality Date   • CARDIAC ELECTROPHYSIOLOGY PROCEDURE N/A 1/31/2020    Procedure: ICD battery change;  Surgeon: Dionna Laird MD;  Location: [facility] CATH INVASIVE LOCATION;  Service: Cardiovascular   • CARDIAC ELECTROPHYSIOLOGY PROCEDURE N/A 1/31/2020    Procedure: Temporary Pacemaker;  Surgeon: Dionna Laird MD;  Location: [facility] CATH INVASIVE LOCATION;  Service: Cardiovascular   • CARDIAC ELECTROPHYSIOLOGY PROCEDURE N/A 1/31/2020    Procedure: Pocket Revision;  Surgeon: Dionna Laird MD;  Location: [facility] CATH INVASIVE LOCATION;  Service: Cardiovascular   • EYE SURGERY     • PACEMAKER IMPLANTATION             Family History: Family history is unknown by patient. Otherwise pertinent FHx was reviewed and unremarkable.     Social History:  reports that she has never smoked. She has never used smokeless tobacco. She reports that she does not drink alcohol or use drugs.      Medications:  Prior to Admission medications    Medication Sig Start Date End Date Taking? Authorizing Provider   aspirin 81 MG chewable tablet Chew 81 mg Daily.    Provider, MD Elisha   bumetanide (BUMEX) 1 MG  tablet Take 1 mg by mouth 2 (Two) Times a Day. Morning and noon 7/19/19   Elisha Borges MD   colchicine 0.6 MG tablet Take 0.6 mg by mouth Daily. 3/8/16   Elisha Borges MD   digoxin (LANOXIN) 125 MCG tablet  2/20/20   Elisha Borges MD   ezetimibe (ZETIA) 10 MG tablet Take 10 mg by mouth Daily. 3/8/16   Elisha Borges MD   febuxostat (ULORIC) 40 MG tablet Take 40 mg by mouth 2 (Two) Times a Day. 11/26/18   Elisha Borges MD   gabapentin (NEURONTIN) 100 MG capsule Take 100 mg by mouth 3 (Three) Times a Day.    Elisha Borges MD   metoprolol tartrate (LOPRESSOR) 50 MG tablet Take 50 mg by mouth 2 (Two) Times a Day. 10/23/13   Elisha Borges MD   traMADol (ULTRAM) 50 MG tablet Take 50 mg by mouth Every 6 (Six) Hours As Needed. 7/29/19   Elisha Borges MD       Allergies:    Allergies   Allergen Reactions   • Allopurinol Unknown - High Severity   • Clindamycin Hcl Unknown - High Severity   • Codeine Unknown - High Severity   • Furosemide Unknown - High Severity   • Hydrochlorothiazide Unknown - High Severity   • Naproxen Unknown - High Severity   • Sulfa Antibiotics Unknown - High Severity       Objective   Objective     Vital Signs  Temp:  [99.2 °F (37.3 °C)] 99.2 °F (37.3 °C)  Heart Rate:  [64] 64  Resp:  [18] 18  BP: (99)/(53) 99/53  SpO2:  [99 %] 99 %  on   ;   Device (Oxygen Therapy): room air  Body mass index is 31.46 kg/m².    Physical Exam  Vitals signs reviewed.   Constitutional:       General: She is not in acute distress.     Appearance: Normal appearance. She is obese. She is not ill-appearing or toxic-appearing.   HENT:      Head: Normocephalic and atraumatic.      Right Ear: External ear normal.      Left Ear: External ear normal.      Nose: Nose normal.      Mouth/Throat:      Mouth: Mucous membranes are moist.   Eyes:      Extraocular Movements: Extraocular movements intact.   Neck:      Comments: JVD noted  Cardiovascular:      Rate and Rhythm:  Normal rate and regular rhythm.      Pulses: Normal pulses.      Heart sounds: Normal heart sounds.   Pulmonary:      Effort: Pulmonary effort is normal.      Breath sounds: Normal breath sounds.   Abdominal:      General: Bowel sounds are normal. There is no distension.      Tenderness: There is no abdominal tenderness.   Musculoskeletal: Normal range of motion.      Right lower leg: Edema present.      Left lower leg: Edema present.   Skin:     Capillary Refill: Capillary refill takes less than 2 seconds.      Findings: Erythema present.      Comments: Bilateral lower extremity erythema to   Neurological:      General: No focal deficit present.      Mental Status: She is alert and oriented to person, place, and time.   Psychiatric:         Mood and Affect: Mood normal.         Behavior: Behavior normal.         Thought Content: Thought content normal.         Judgment: Judgment normal.           Results Review:  I have personally reviewed most recent cardiac tracings, lab results and radiology images and interpretations and agree with findings, most notably: CBC, CMP, troponin, BNP, chest x-ray and EKG.              Invalid input(s):  ALKPHOS  CrCl cannot be calculated (Patient's most recent lab result is older than the maximum 30 days allowed.).  Brief Urine Lab Results     None          Microbiology Results (last 10 days)     ** No results found for the last 240 hours. **          ECG/EMG Results (most recent)     None          Results for orders placed during the hospital encounter of 08/08/19   Venous w Reflux Lower Extremity - Bilateral CAR    Narrative · The patient is in a reverse Trendelenburg position.  · There is evidence of significant reflux of the right common femoral   vein. There is evidence of significant reflux of the right mid femoral   vein. There is evidence of severe reflux of the right greater saphenous   vein below the knee. There is evidence of severe reflux of the right small   saphenous  vein.  · There is evidence of mild reflux of the left greater saphenous vein at   the distal thigh.               No radiology results for the last 7 days      CrCl cannot be calculated (Patient's most recent lab result is older than the maximum 30 days allowed.).    Assessment/Plan   Assessment/Plan       Active Hospital Problems    Diagnosis  POA   • Lower extremity cellulitis [L03.119]  Yes     Priority: High   • Acute UTI (urinary tract infection) [N39.0]  Yes     Priority: High   • Lymphedema [I89.0]  Unknown     Priority: High   • CAD (coronary artery disease) [I25.10]  Yes     Priority: Medium   • Non-pressure chronic ulcer right ankle, limited to breakdown skin (CMS/[facility]) [L97.311]  Yes   • Automatic implantable cardiac defibrillator in situ [Z95.810]  Yes   • Gout [M10.9]  Yes   • Hypertension, benign [I10]  Yes   • Congestive heart failure (CMS/[facility]) [I50.9]  Yes   • Atrial fibrillation (CMS/[facility]) [I48.91]  Yes      Resolved Hospital Problems   No resolved problems to display.     Lymphedema with cellulitis  -Patient presents with several month history of worsening lower extremity edema and redness with some pain  -BNP: 777 with some JVD noted on exam  -Rocephin given x1 at sending facility, start vancomycin and cefepime  -Check echocardiogram, procalcitonin and repeat lactic acid  -Falls precautions  -Wound care consulted    Urinary tract infection  -Patient denies any symptoms of dysuria or increased frequency  -UA shows marked bacteria with 3-10 RBCs, 25-50 WBCs, moderate leukocyte esterase, positive nitrites, 30 mg/deciliter protein, trace blood and 0-5 squamous epithelial cells.  -Rocephin given at sending facility, switch to vancomycin and cefepime as above    CKD  - Creatinine: 2.41, BUN: 63  -Hold colchicine  - Avoid nephrotoxic medication and IV dye unless urgently needed  - Monitor BMP and I's and O's    Heart failure with reduced ejection fraction  - Echocardiogram from December 2018 shows an EF of  40% with moderate tricuspid valve regurgitation along with a calcified aortic valve and mild aortic insufficiency and a severely dilated left atrium.  -Chest x-ray reported with clear lungs and stable cardiomegaly with an ICD in place as well as some atherosclerosis.  -BNP at sending facility: 777  -Continue Bumex and digoxin  -2 g sodium diet  -Monitor I's and O's and daily weights while admitted    CAD  -Continue aspirin, beta-blocker and cardiac monitoring    Hypertension  -Patient presents slightly hypotensive with a blood pressure of 99/53  -Hold metoprolol temporarily  - Monitor while admitted    Hyperlipidemia  -Check lipid panel  -Patient may take home Zetia if available    Chronic pain  -Continue gabapentin and tramadol    Gout  -Hold colchicine temporarily secondary to increase in kidney function noted above    Pressure ulcer on right ankle  -Wound care consulted            VTE Prophylaxis -Xarelto  Mechanical Order History:     None      Pharmalogical Order History:      Ordered     Dose Route Frequency Stop    02/06/21 0020  heparin (porcine) 5000 UNIT/ML injection 5,000 Units      5,000 Units SC Every 12 Hours Scheduled --                CODE STATUS: Full  Code Status and Medical Interventions:   Ordered at: 02/06/21 0020     Limited Support to NOT Include:    Intubation    Cardioversion/Defibrillation     Code Status:    No CPR     Medical Interventions (Level of Support Prior to Arrest):    Limited         I discussed the patient's findings and my recommendations with patient and nursing staff.      Signature:Electronically signed by Bert Bateman PA-C, 02/06/21, 4:58 AM EST.    [facility] Hospitalist Team

## 2021-02-06 NOTE — NURSING NOTE
Pt seen today for BLE edema, venous stasis dermatitis.  She has a hx noted of lymphedema and states that she has been seen at the outpatient lymphedema clinic and was told they couldn't do any thing for her. She states she is seen by home healthcare who wraps her legs 3 times a week.  She reports increased weeping and edema to her legs. Reports her legs are burning currently.  She is not able to tell me with certainty who her home health care agency is and tells me the provider following her legs lives in [place].  Her proBNP today is elevated at 33,290 and CHF is documented in H and P.     Per chart she was last seen in the outpatient wound center 10/31/2019 and at that time had been referred to the lymphedema clinic.  Treatment included compression wraps and lymphedema pumps.     On assessment her legs are wrapped in kerlix/abd pads and ace wrap.  Dressing are dry and intact. Those were removed and legs appear red/inflammed/warm.  There is macular papular rash to the dorsal feet (consistant with fungal rash). Edema noted from toes to thighs. Scattered open areas (indistinct edges) maceration noted to the right ankle. Scant drainage noted at this time. The skin is thickened over the feet/toes. Not able to palpate pedal pulse through edema.      BLE were rinsed with ns, dried, zinc and antifungal cream mixed 50/50 and applied to BLE and dorsal feet. Maxsorb/abd pad to right dorsal foot, left shin, right ankle.  Followed by kerlix. ( given elevated BNP did not apply compression at this time. Heels floated with pillow under calves.    Recommend magic butt paste to BLE and maxsorb to left shin, right dorsal foot and right ankle, abd pad, kerlix change daily. Keep LE elevated.      Pt should follow up in the outpatient wound center at time of dc.     Also noted masd/stage 2 pressure injury to right posterior thigh.  Area measures approx 5x3x0.1cm and is macerated. No drainage at this time. Recommend treat with zinc  moisture barrier bid.     Pt is also noted to have stage 2 on sacral area per nursing.  Pt is incontinent of urine ( purewick in place) recommend zinc moisture barrier to buttocks/sacral area bid and prn.     continue skin at risk pressure injury prevention strategies including immersion bed.

## 2021-02-07 ENCOUNTER — APPOINTMENT (OUTPATIENT)
Dept: GENERAL RADIOLOGY | Facility: HOSPITAL | Age: 76
End: 2021-02-07

## 2021-02-07 PROBLEM — A41.9 SEPSIS: Status: ACTIVE | Noted: 2021-02-07

## 2021-02-07 PROBLEM — R41.0 DELIRIUM, ACUTE: Status: ACTIVE | Noted: 2021-02-07

## 2021-02-07 PROBLEM — E87.20 METABOLIC ACIDOSIS: Status: ACTIVE | Noted: 2021-02-07

## 2021-02-07 PROBLEM — E87.20 LACTIC ACIDOSIS: Status: ACTIVE | Noted: 2021-02-07

## 2021-02-07 PROBLEM — N17.9 AKI (ACUTE KIDNEY INJURY) (HCC): Status: ACTIVE | Noted: 2021-02-07

## 2021-02-07 LAB
ALBUMIN SERPL-MCNC: 2.5 G/DL (ref 3.5–5.2)
ALBUMIN/GLOB SERPL: 0.7 G/DL
ALP SERPL-CCNC: 135 U/L (ref 39–117)
ALT SERPL W P-5'-P-CCNC: 12 U/L (ref 1–33)
ANION GAP SERPL CALCULATED.3IONS-SCNC: 13 MMOL/L (ref 5–15)
ANISOCYTOSIS BLD QL: ABNORMAL
AST SERPL-CCNC: 30 U/L (ref 1–32)
BILIRUB SERPL-MCNC: 0.6 MG/DL (ref 0–1.2)
BUN SERPL-MCNC: 87 MG/DL (ref 8–23)
BUN/CREAT SERPL: 24.9 (ref 7–25)
C DIFF GDH STL QL: NEGATIVE
CALCIUM SPEC-SCNC: 7.1 MG/DL (ref 8.6–10.5)
CHLORIDE SERPL-SCNC: 109 MMOL/L (ref 98–107)
CLUMPED PLATELETS: ABNORMAL
CO2 SERPL-SCNC: 18 MMOL/L (ref 22–29)
CREAT SERPL-MCNC: 3.5 MG/DL (ref 0.57–1)
CREAT UR-MCNC: 205.7 MG/DL
CRP SERPL-MCNC: 31.97 MG/DL (ref 0–0.5)
DEPRECATED RDW RBC AUTO: 55.1 FL (ref 37–54)
DIGOXIN SERPL-MCNC: 1.2 NG/ML (ref 0.6–1.2)
ERYTHROCYTE [DISTWIDTH] IN BLOOD BY AUTOMATED COUNT: 16.2 % (ref 12.3–15.4)
GFR SERPL CREATININE-BSD FRML MDRD: 13 ML/MIN/1.73
GFR SERPL CREATININE-BSD FRML MDRD: ABNORMAL ML/MIN/{1.73_M2}
GLOBULIN UR ELPH-MCNC: 3.4 GM/DL
GLUCOSE SERPL-MCNC: 87 MG/DL (ref 65–99)
HAPTOGLOB SERPL-MCNC: 100 MG/DL (ref 30–200)
HCT VFR BLD AUTO: 31.3 % (ref 34–46.6)
HGB BLD-MCNC: 10.1 G/DL (ref 12–15.9)
LYMPHOCYTES # BLD MANUAL: 0.25 10*3/MM3 (ref 0.7–3.1)
LYMPHOCYTES NFR BLD MANUAL: 1 % (ref 19.6–45.3)
LYMPHOCYTES NFR BLD MANUAL: 6 % (ref 5–12)
MACROCYTES BLD QL SMEAR: ABNORMAL
MAGNESIUM SERPL-MCNC: 1.4 MG/DL (ref 1.6–2.4)
MCH RBC QN AUTO: 31.2 PG (ref 26.6–33)
MCHC RBC AUTO-ENTMCNC: 32.1 G/DL (ref 31.5–35.7)
MCV RBC AUTO: 97.2 FL (ref 79–97)
METAMYELOCYTES NFR BLD MANUAL: 2 % (ref 0–0)
MONOCYTES # BLD AUTO: 1.5 10*3/MM3 (ref 0.1–0.9)
NEUTROPHILS # BLD AUTO: 22.75 10*3/MM3 (ref 1.7–7)
NEUTROPHILS NFR BLD MANUAL: 45 % (ref 42.7–76)
NEUTS BAND NFR BLD MANUAL: 46 % (ref 0–5)
PLATELET # BLD AUTO: 82 10*3/MM3 (ref 140–450)
PMV BLD AUTO: 9.6 FL (ref 6–12)
POIKILOCYTOSIS BLD QL SMEAR: ABNORMAL
POTASSIUM SERPL-SCNC: 4.5 MMOL/L (ref 3.5–5.2)
PROT SERPL-MCNC: 5.9 G/DL (ref 6–8.5)
RBC # BLD AUTO: 3.22 10*6/MM3 (ref 3.77–5.28)
SCAN SLIDE: NORMAL
SODIUM SERPL-SCNC: 140 MMOL/L (ref 136–145)
WBC # BLD AUTO: 25 10*3/MM3 (ref 3.4–10.8)
WBC MORPH BLD: NORMAL

## 2021-02-07 PROCEDURE — C1751 CATH, INF, PER/CENT/MIDLINE: HCPCS

## 2021-02-07 PROCEDURE — 25010000002 DOPAMINE PER 40 MG: Performed by: HOSPITALIST

## 2021-02-07 PROCEDURE — 02HV33Z INSERTION OF INFUSION DEVICE INTO SUPERIOR VENA CAVA, PERCUTANEOUS APPROACH: ICD-10-PCS | Performed by: INTERNAL MEDICINE

## 2021-02-07 PROCEDURE — 25010000002 VANCOMYCIN 1 G RECONSTITUTED SOLUTION 1 EACH VIAL: Performed by: HOSPITALIST

## 2021-02-07 PROCEDURE — G0378 HOSPITAL OBSERVATION PER HR: HCPCS

## 2021-02-07 PROCEDURE — 71045 X-RAY EXAM CHEST 1 VIEW: CPT

## 2021-02-07 PROCEDURE — 25010000002 CEFEPIME PER 500 MG: Performed by: HOSPITALIST

## 2021-02-07 PROCEDURE — 83735 ASSAY OF MAGNESIUM: CPT | Performed by: PHYSICIAN ASSISTANT

## 2021-02-07 PROCEDURE — 85025 COMPLETE CBC W/AUTO DIFF WBC: CPT | Performed by: PHYSICIAN ASSISTANT

## 2021-02-07 PROCEDURE — 25010000002 LORAZEPAM PER 2 MG: Performed by: HOSPITALIST

## 2021-02-07 PROCEDURE — 85007 BL SMEAR W/DIFF WBC COUNT: CPT | Performed by: PHYSICIAN ASSISTANT

## 2021-02-07 PROCEDURE — 86140 C-REACTIVE PROTEIN: CPT | Performed by: HOSPITALIST

## 2021-02-07 PROCEDURE — 99225 PR SBSQ OBSERVATION CARE/DAY 25 MINUTES: CPT | Performed by: HOSPITALIST

## 2021-02-07 PROCEDURE — 80053 COMPREHEN METABOLIC PANEL: CPT | Performed by: INTERNAL MEDICINE

## 2021-02-07 PROCEDURE — 80162 ASSAY OF DIGOXIN TOTAL: CPT | Performed by: HOSPITALIST

## 2021-02-07 RX ORDER — HALOPERIDOL 5 MG/ML
5 INJECTION INTRAMUSCULAR ONCE
Status: DISCONTINUED | OUTPATIENT
Start: 2021-02-07 | End: 2021-02-16

## 2021-02-07 RX ORDER — OLANZAPINE 10 MG/1
2.5 INJECTION, POWDER, LYOPHILIZED, FOR SOLUTION INTRAMUSCULAR EVERY 8 HOURS PRN
Status: DISCONTINUED | OUTPATIENT
Start: 2021-02-07 | End: 2021-02-18 | Stop reason: HOSPADM

## 2021-02-07 RX ORDER — LORAZEPAM 2 MG/ML
1 INJECTION INTRAMUSCULAR ONCE
Status: COMPLETED | OUTPATIENT
Start: 2021-02-07 | End: 2021-02-07

## 2021-02-07 RX ORDER — CETIRIZINE HYDROCHLORIDE 10 MG/1
10 TABLET ORAL DAILY PRN
COMMUNITY

## 2021-02-07 RX ADMIN — GABAPENTIN 100 MG: 100 CAPSULE ORAL at 08:50

## 2021-02-07 RX ADMIN — FEBUXOSTAT 40 MG: 40 TABLET, FILM COATED ORAL at 21:47

## 2021-02-07 RX ADMIN — METRONIDAZOLE 500 MG: 500 INJECTION, SOLUTION INTRAVENOUS at 18:26

## 2021-02-07 RX ADMIN — ZINC OXIDE: 200 OINTMENT TOPICAL at 12:38

## 2021-02-07 RX ADMIN — DOPAMINE HYDROCHLORIDE 10 MCG/KG/MIN: 160 INJECTION, SOLUTION INTRAVENOUS at 20:56

## 2021-02-07 RX ADMIN — FEBUXOSTAT 40 MG: 40 TABLET, FILM COATED ORAL at 08:50

## 2021-02-07 RX ADMIN — SODIUM BICARBONATE 150 MEQ: 84 INJECTION, SOLUTION INTRAVENOUS at 11:29

## 2021-02-07 RX ADMIN — ASPIRIN 81 MG CHEWABLE TABLET 81 MG: 81 TABLET CHEWABLE at 08:50

## 2021-02-07 RX ADMIN — LORAZEPAM 1 MG: 2 INJECTION INTRAMUSCULAR; INTRAVENOUS at 09:14

## 2021-02-07 RX ADMIN — SODIUM BICARBONATE 150 MEQ: 84 INJECTION, SOLUTION INTRAVENOUS at 11:44

## 2021-02-07 RX ADMIN — GABAPENTIN 100 MG: 100 CAPSULE ORAL at 21:47

## 2021-02-07 RX ADMIN — Medication 10 ML: at 08:50

## 2021-02-07 RX ADMIN — DOPAMINE HYDROCHLORIDE 2 MCG/KG/MIN: 160 INJECTION, SOLUTION INTRAVENOUS at 00:06

## 2021-02-07 RX ADMIN — SODIUM CHLORIDE 1000 MG: 900 INJECTION, SOLUTION INTRAVENOUS at 14:17

## 2021-02-07 RX ADMIN — DOPAMINE HYDROCHLORIDE 10 MCG/KG/MIN: 160 INJECTION, SOLUTION INTRAVENOUS at 11:37

## 2021-02-07 RX ADMIN — Medication: at 21:48

## 2021-02-07 RX ADMIN — Medication 10 ML: at 21:48

## 2021-02-07 RX ADMIN — MIDODRINE HYDROCHLORIDE 10 MG: 5 TABLET ORAL at 08:50

## 2021-02-07 RX ADMIN — CEFEPIME HYDROCHLORIDE 2 G: 2 INJECTION, POWDER, FOR SOLUTION INTRAVENOUS at 15:30

## 2021-02-07 NOTE — PROGRESS NOTES
"Pharmacy Antimicrobial Dosing Service    Subjective:  Emperatriz Childs is a 75 y.o.female admitted with cellulitis. Pharmacy has been consulted to dose Vancomycin for possible SSTI.    PMH: CONG on CKD      Assessment/Plan    1. Day #1 Vancomycin: Pulse dosing d/t renal dysfxn. Vanc 1250 mg (~20 mg/kg AdjBW) IV x1  given this AM.  Random level today was 11.9 mcg/mL (~ 14 hrs post dose).   Will give vancomycin 1000 mg (~ 15 mg/kg AdjBW) IV x1 and obtain random tomorrow at 0900. Plan to re-dose when vancomycin level <20 mcg/mL.    2. Day #1 Cefepime: 2g IV q24h for estCrCl < 30 mL/min.    Will continue to monitor drug levels, renal function, culture and sensitivities, and patient clinical status.       Objective:  Relevant clinical data and objective history reviewed:  157.5 cm (62\")   82.1 kg (181 lb)   Ideal body weight: 50.1 kg (110 lb 7.2 oz)  Adjusted ideal body weight: 62.9 kg (138 lb 10.7 oz)  Body mass index is 33.11 kg/m².    Results from last 7 days   Lab Units 02/06/21  1708   VANCOMYCIN RM mcg/mL 11.90     Results from last 7 days   Lab Units 02/06/21  1708 02/06/21  1310 02/06/21  0311   CREATININE mg/dL 3.23* 2.86* 2.78*     Estimated Creatinine Clearance: 14.7 mL/min (A) (by C-G formula based on SCr of 3.23 mg/dL (H)).  I/O last 3 completed shifts:  In: 2156 [P.O.:906; IV Piggyback:1250]  Out: 250 [Urine:250]    Results from last 7 days   Lab Units 02/06/21  0311 02/06/21  0045   WBC 10*3/mm3 10.70 7.10     Temperature    02/06/21 0338 02/06/21 0607 02/06/21 1232   Temp: 99.1 °F (37.3 °C) 99.6 °F (37.6 °C) 98.6 °F (37 °C)     Baseline culture/source/susceptibility:  Microbiology Results (last 10 days)       Procedure Component Value - Date/Time    MRSA Screen, PCR (Inpatient) - Swab, Nares [[id number]]  (Abnormal) Collected: 02/06/21 1114    Lab Status: Final result Specimen: Swab from Nares Updated: 02/06/21 1311     MRSA PCR MRSA Detected    COVID PRE-OP / PRE-PROCEDURE SCREENING ORDER (NO ISOLATION) - " Swab, Nasopharynx [[id number]]  (Normal) Collected: 02/06/21 0153    Lab Status: Final result Specimen: Swab from Nasopharynx Updated: 02/06/21 1534    Narrative:      The following orders were created for panel order COVID PRE-OP / PRE-PROCEDURE SCREENING ORDER (NO ISOLATION) - Swab, Nasopharynx.  Procedure                               Abnormality         Status                     ---------                               -----------         ------                     COVID-19,APTIMA PANTHER,...[[id number]]  Normal              Final result                 Please view results for these tests on the individual orders.    COVID-19,APTIMA PANTHER,[facility] IN-HOUSE, NP/OP SWAB IN UTM/VTM/[facility]ALINE TRANSPORT MEDIA,24 HR TAT - Swab, Nasopharynx [[id number]]  (Normal) Collected: 02/06/21 0153    Lab Status: Final result Specimen: Swab from Nasopharynx Updated: 02/06/21 1534     COVID19 Not Detected    Narrative:      Fact sheet for providers: https://www.fda.gov/media/704147/download     Fact sheet for patients: https://www.fda.gov/media/953327/download    Test performed by RT PCR.             Anti-Infectives (From admission, onward)      Ordered     Dose/Rate Route Frequency Start Stop    02/06/21 0040  cefepime 2 gm IVPB in 100 ml NS (MBP)     Ordering Provider: Betr Bateman PA-C    2 g  over 4 Hours Intravenous Every 24 Hours 02/07/21 0300 02/13/21 0259    02/06/21 1919  vancomycin (VANCOCIN) 1,000 mg in sodium chloride 0.9 % 250 mL IVPB     Ordering Provider: Bert Bateman PA-C    15 mg/kg × 61.3 kg (Adjusted)  over 60 Minutes Intravenous Once 02/06/21 2100      02/06/21 0237  !Vancomycin Level Draw Needed     Ordering Provider: Bert Bateman PA-C     Does not apply Once 02/06/21 1500 02/06/21 1712    02/06/21 0145  vancomycin (VANCOCIN) 1,000 mg in sodium chloride 0.9 % 250 mL IVPB     Ordering Provider: Bert Bateman PA-C    15 mg/kg × 61.3 kg (Adjusted) Intravenous As Needed 02/06/21 0145 02/13/21  0144    02/06/21 0058  vancomycin 1250 mg/250 mL 0.9% NS IVPB ([facility])     Ordering Provider: Bert Bateman PA-C    20 mg/kg × 61.3 kg (Adjusted)  over 75 Minutes Intravenous Once 02/06/21 0145 02/06/21 0404    02/06/21 0040  cefepime 2 gm IVPB in 100 ml NS (MBP)     Ordering Provider: Bert Bateman PA-C    2 g  over 30 Minutes Intravenous Once 02/06/21 0130 02/06/21 0230    02/06/21 0040  Pharmacy to dose vancomycin     Ordering Provider: Bert Bateman PA-C     Does not apply Continuous PRN 02/06/21 0038 02/13/21 0037            Sofia Irwin [facility]  02/06/21 19:24 EST

## 2021-02-07 NOTE — NURSING NOTE
Transfer she is confused not wanting staff to touch her. Yells she want up out of bed. Non compliant. Has a biarb gtt, bliss catheter clothes purse with glasses and med sent to pharmacy.  Small purse in larger purse.

## 2021-02-07 NOTE — PLAN OF CARE
Replacing sodium bicarb. Ayon catheter placed for strict I/O. Minimal urine output. Loose stools noted. Patient very drowsy. Order noted for transfer to PCU. Nephrology consulted.   Problem: Adult Inpatient Plan of Care  Goal: Plan of Care Review  Outcome: Ongoing, Not Progressing  Goal: Patient-Specific Goal (Individualized)  Outcome: Ongoing, Not Progressing  Goal: Absence of Hospital-Acquired Illness or Injury  Outcome: Ongoing, Not Progressing  Intervention: Identify and Manage Fall Risk  Recent Flowsheet Documentation  Taken 2/6/2021 1700 by Jyoti Cleveland RN  Safety Promotion/Fall Prevention:   assistive device/personal items within reach   clutter free environment maintained   fall prevention program maintained   nonskid shoes/slippers when out of bed   room organization consistent   safety round/check completed  Taken 2/6/2021 1500 by Jyoti Cleveland RN  Safety Promotion/Fall Prevention:   assistive device/personal items within reach   clutter free environment maintained   fall prevention program maintained   nonskid shoes/slippers when out of bed   room organization consistent   safety round/check completed  Taken 2/6/2021 1300 by Jyoti Cleveland RN  Safety Promotion/Fall Prevention:   assistive device/personal items within reach   clutter free environment maintained   fall prevention program maintained   muscle strengthening facilitated   nonskid shoes/slippers when out of bed   room organization consistent  Taken 2/6/2021 1100 by Jyoti Cleveland, RN  Safety Promotion/Fall Prevention:   assistive device/personal items within reach   clutter free environment maintained   fall prevention program maintained   nonskid shoes/slippers when out of bed   room organization consistent   safety round/check completed  Taken 2/6/2021 0905 by Jyoti Cleveland, RN  Safety Promotion/Fall Prevention:   assistive device/personal items within reach   clutter free environment maintained   fall prevention program  maintained   nonskid shoes/slippers when out of bed   safety round/check completed   room organization consistent  Taken 2/6/2021 0715 by Jyoti Cleveland RN  Safety Promotion/Fall Prevention:   assistive device/personal items within reach   clutter free environment maintained   fall prevention program maintained   nonskid shoes/slippers when out of bed   room organization consistent   safety round/check completed  Intervention: Prevent Infection  Recent Flowsheet Documentation  Taken 2/6/2021 1700 by Jyoti Cleveland RN  Infection Prevention:   environmental surveillance performed   equipment surfaces disinfected   hand hygiene promoted   personal protective equipment utilized   rest/sleep promoted   single patient room provided   visitors restricted/screened  Taken 2/6/2021 1500 by Jyoti Cleveland RN  Infection Prevention:   environmental surveillance performed   equipment surfaces disinfected   hand hygiene promoted   personal protective equipment utilized   rest/sleep promoted   single patient room provided   visitors restricted/screened  Taken 2/6/2021 1300 by Jyoti Cleveland RN  Infection Prevention:   environmental surveillance performed   equipment surfaces disinfected   hand hygiene promoted   personal protective equipment utilized   rest/sleep promoted   single patient room provided   visitors restricted/screened  Taken 2/6/2021 1100 by Jyoti Cleveland RN  Infection Prevention:   environmental surveillance performed   equipment surfaces disinfected   hand hygiene promoted   personal protective equipment utilized   rest/sleep promoted   single patient room provided   visitors restricted/screened  Taken 2/6/2021 0905 by Jyoti Cleveland RN  Infection Prevention:   environmental surveillance performed   equipment surfaces disinfected   hand hygiene promoted   personal protective equipment utilized   single patient room provided   rest/sleep promoted   visitors restricted/screened  Taken 2/6/2021 0715  by Jyoti Cleveland, RN  Infection Prevention:   environmental surveillance performed   equipment surfaces disinfected   hand hygiene promoted   personal protective equipment utilized   rest/sleep promoted   single patient room provided   visitors restricted/screened  Goal: Optimal Comfort and Wellbeing  Outcome: Ongoing, Not Progressing  Intervention: Provide Person-Centered Care  Recent Flowsheet Documentation  Taken 2/6/2021 0715 by Jyoti Cleveland, RN  Trust Relationship/Rapport:   care explained   thoughts/feelings acknowledged  Goal: Readiness for Transition of Care  Outcome: Ongoing, Not Progressing   Goal Outcome Evaluation:

## 2021-02-07 NOTE — NURSING NOTE
Daughter Stacey called wanted update. Explained why her mom was sent here. Did give consent for picc, line,and consent for blood if need  Two nurses verified. She is aware that her mom is confused.  Daughter will be in today.

## 2021-02-07 NOTE — PLAN OF CARE
Problem: Adult Inpatient Plan of Care  Goal: Absence of Hospital-Acquired Illness or Injury  Intervention: Identify and Manage Fall Risk  Recent Flowsheet Documentation  Taken 2/7/2021 0600 by Cayla Ewing RN  Safety Promotion/Fall Prevention: safety round/check completed  Taken 2/7/2021 0400 by Cayla Ewing RN  Safety Promotion/Fall Prevention: safety round/check completed  Taken 2/7/2021 0200 by Cayla Ewing RN  Safety Promotion/Fall Prevention: safety round/check completed  Taken 2/7/2021 0015 by Cayla Ewing RN  Safety Promotion/Fall Prevention: safety round/check completed  Taken 2/6/2021 2200 by Cayla Ewing RN  Safety Promotion/Fall Prevention:   safety round/check completed   room organization consistent   clutter free environment maintained  Intervention: Prevent Skin Injury  Recent Flowsheet Documentation  Taken 2/7/2021 0015 by Cayla Ewing RN  Body Position: tilted, right  Taken 2/6/2021 2000 by Cayla Ewing RN  Body Position: supine  Intervention: Prevent Infection  Recent Flowsheet Documentation  Taken 2/7/2021 0400 by Cayla Ewing RN  Infection Prevention: hand hygiene promoted  Taken 2/7/2021 0015 by Cayla Ewing RN  Infection Prevention: hand hygiene promoted  Taken 2/6/2021 2000 by Cayla Ewing RN  Infection Prevention: environmental surveillance performed  Goal: Optimal Comfort and Wellbeing  Intervention: Provide Person-Centered Care  Recent Flowsheet Documentation  Taken 2/7/2021 0400 by Cayla Ewing RN  Trust Relationship/Rapport: reassurance provided  Taken 2/6/2021 2000 by Cayla Ewing RN  Trust Relationship/Rapport:   reassurance provided   care explained   Goal Outcome Evaluation:

## 2021-02-07 NOTE — NURSING NOTE
Called Dr. Farusto about iv inviltrated. He will look into that to see what he wants to do. Updated Md.Patient very confused asked for restraints no sitter this am.  He wants to try some ativan and haldol to see if that will help. She needs  a picc line but iv team says she has to be still when placing picc. Her right for arm inltrated this at  0614 pharmacy contacted. cold compress applied.

## 2021-02-07 NOTE — PROGRESS NOTES
"Pharmacy Antimicrobial Dosing Service    Subjective:  Emperatriz Childs is a 75 y.o.female admitted with cellulitis. Pharmacy has been consulted to dose Vancomycin for possible SSTI.    PMH: CONG on CKD      Assessment/Plan    1. Day #2 Vancomycin: Pulse dosing d/t renal dysfxn. Vanc 1250 mg (~20 mg/kg AdjBW) IV x1  given 2/6 0249AM.  Random level was 11.9 mcg/mL 2/6 at 1708 (~ 14 hrs post dose). Vanc 1g ordered to be given last night however lost IV line per nurse charting and  no dose given. Random level not drawn this morning due to patient not having IV access. Random level not needed this AM anyway due to dose not being given last night. Re-ordered vanc 1g to be given this afternoon now that patient has IV placed. Follow up random ordered with AM labs tomorrow.    2. Day #2 Cefepime: 2g IV q24h for estCrCl < 30 mL/min.    Will continue to monitor drug levels, renal function, culture and sensitivities, and patient clinical status.       Objective:  Relevant clinical data and objective history reviewed:  157.5 cm (62\")   81.5 kg (179 lb 10.8 oz)   Ideal body weight: 50.1 kg (110 lb 7.2 oz)  Adjusted ideal body weight: 62.7 kg (138 lb 2.2 oz)  Body mass index is 32.86 kg/m².    Results from last 7 days   Lab Units 02/06/21  1708   VANCOMYCIN RM mcg/mL 11.90     Results from last 7 days   Lab Units 02/06/21  2247 02/06/21  1905 02/06/21  1708   CREATININE mg/dL 3.35* 3.28* 3.23*     Estimated Creatinine Clearance: 14.1 mL/min (A) (by C-G formula based on SCr of 3.35 mg/dL (H)).  I/O last 3 completed shifts:  In: 2156 [P.O.:906; IV Piggyback:1250]  Out: 290 [Urine:290]    Results from last 7 days   Lab Units 02/06/21  0311 02/06/21  0045   WBC 10*3/mm3 10.70 7.10     Temperature    02/07/21 0222 02/07/21 0600 02/07/21 1145   Temp: 98.5 °F (36.9 °C) 98.5 °F (36.9 °C) 99 °F (37.2 °C)     Baseline culture/source/susceptibility:  Microbiology Results (last 10 days)       Procedure Component Value - Date/Time    Clostridium " Difficile Toxin - Stool, Per Rectum [[id number]]  (Normal) Collected: 02/06/21 1952    Lab Status: Final result Specimen: Stool from Per Rectum Updated: 02/07/21 0725    Narrative:      The following orders were created for panel order Clostridium Difficile Toxin - Stool, Per Rectum.  Procedure                               Abnormality         Status                     ---------                               -----------         ------                     Clostridium Difficile EI...[[id number]]  Normal              Final result                 Please view results for these tests on the individual orders.    Clostridium Difficile EIA - Stool, Per Rectum [[id number]]  (Normal) Collected: 02/06/21 1952    Lab Status: Final result Specimen: Stool from Per Rectum Updated: 02/07/21 0725     C Diff GDH / Toxin Negative    MRSA Screen, PCR (Inpatient) - Swab, Nares [[id number]]  (Abnormal) Collected: 02/06/21 1114    Lab Status: Final result Specimen: Swab from Nares Updated: 02/06/21 1311     MRSA PCR MRSA Detected    COVID PRE-OP / PRE-PROCEDURE SCREENING ORDER (NO ISOLATION) - Swab, Nasopharynx [[id number]]  (Normal) Collected: 02/06/21 0153    Lab Status: Final result Specimen: Swab from Nasopharynx Updated: 02/06/21 1534    Narrative:      The following orders were created for panel order COVID PRE-OP / PRE-PROCEDURE SCREENING ORDER (NO ISOLATION) - Swab, Nasopharynx.  Procedure                               Abnormality         Status                     ---------                               -----------         ------                     COVID-19,APTIMA PANTHER,...[[id number]]  Normal              Final result                 Please view results for these tests on the individual orders.    COVID-19,APTIMA PANTHER,[facility] IN-HOUSE, NP/OP SWAB IN UTM/VTM/[facility]ALINE TRANSPORT MEDIA,24 HR TAT - Swab, Nasopharynx [[id number]]  (Normal) Collected: 02/06/21 0153    Lab Status: Final result Specimen: Swab from Nasopharynx Updated: 02/06/21  1534     COVID19 Not Detected    Narrative:      Fact sheet for providers: https://www.fda.gov/media/186292/download     Fact sheet for patients: https://www.fda.gov/media/846212/download    Test performed by RT PCR.             Anti-Infectives (From admission, onward)      Ordered     Dose/Rate Route Frequency Start Stop    02/07/21 1257  vancomycin (VANCOCIN) 1,000 mg in sodium chloride 0.9 % 250 mL IVPB     Ordering Provider: Jake Marx DO    15 mg/kg × 61.3 kg (Adjusted)  over 60 Minutes Intravenous Once 02/07/21 1345      02/06/21 0040  cefepime 2 gm IVPB in 100 ml NS (MBP)     Ordering Provider: Bert Bateman PA-C    2 g  over 4 Hours Intravenous Every 24 Hours 02/07/21 0300 02/13/21 0259    02/06/21 0237  !Vancomycin Level Draw Needed     Ordering Provider: Bert Bateman PA-C     Does not apply Once 02/06/21 1500 02/06/21 1712    02/06/21 0145  vancomycin (VANCOCIN) 1,000 mg in sodium chloride 0.9 % 250 mL IVPB     Ordering Provider: Bert Bateman PA-C    15 mg/kg × 61.3 kg (Adjusted) Intravenous As Needed 02/06/21 0145 02/13/21 0144    02/06/21 0058  vancomycin 1250 mg/250 mL 0.9% NS IVPB ([facility])     Ordering Provider: Bert Bateman PA-C    20 mg/kg × 61.3 kg (Adjusted)  over 75 Minutes Intravenous Once 02/06/21 0145 02/06/21 0404    02/06/21 0040  cefepime 2 gm IVPB in 100 ml NS (MBP)     Ordering Provider: Bert Bateman PA-C    2 g  over 30 Minutes Intravenous Once 02/06/21 0130 02/06/21 0230    02/06/21 0040  Pharmacy to dose vancomycin     Ordering Provider: Bert Bateman PA-C     Does not apply Continuous PRN 02/06/21 0038 02/13/21 0037            Sy Avendano, Pharmacy Intern  02/07/21 12:58 EST

## 2021-02-07 NOTE — PLAN OF CARE
Spoke with RN, hold PT eval today.  Pt confused and combative overnight, had Ativan.  Therapist did not enter room.

## 2021-02-07 NOTE — PROGRESS NOTES
"      [facility] Medicine Services Daily Progress Note      Hospitalist Team  LOS 0 days      Patient Care Team:  Rosa M Chavez APRN as PCP - General (Nurse Practitioner)    Patient Location: 2120/1      Subjective   Subjective     Chief Complaint / Subjective  Fevers, leg pain      Brief Synopsis of Hospital Course/HPI    The patient is a 75-year-old female with history atrial fibrillation s/p pacemaker placement, hyperlipidemia, hypertension, CAD, CKD stage III and  chronic lower extremity lymphedema.    Apparently the patient has been having several weeks of worsening lower extremity edema thus went to outside facility.    Laboratory work was significant for , potassium 5.3, BUN 63, creatinine 2.41, WBC 4.5, lactic acid 3.2 and UA with 25-50 WBCs.  The patient was transferred to [facility], [place], operated by [facility] for further care      Date::    2/6/21: Poor historian.  Low BP thus gave bolus.  Started on dopamine and transferred to PCU.  Started on bicarb drip.  Confused in the evening.  2/7/21: PICC line placed.  Daughter at the bedside discussed care.  Daughter claims no history of dementia or sundowning.  Leukocytosis.  Added Flagyl.  Consulted ID.  Daughter claims patient off Xarelto.  C. difficile ruled out.      Review of Systems   All other systems reviewed and are negative.        Objective   Objective      Vital Signs  Temp:  [97.9 °F (36.6 °C)-99 °F (37.2 °C)] 99 °F (37.2 °C)  Heart Rate:  [60-73] 60  Resp:  [12-22] 12  BP: (102-133)/(38-61) 109/39  Oxygen Therapy  SpO2: 97 %  Pulse Oximetry Type: Intermittent  Device (Oxygen Therapy): room air  Flowsheet Rows      First Filed Value   Admission Height  157.5 cm (62\") Documented at 02/05/2021 2359   Admission Weight  78 kg (172 lb) Documented at 02/05/2021 2359        Intake & Output (last 3 days)       02/04 0701 - 02/05 0700 02/05 0701 - 02/06 0700 02/06 0701 - 02/07 0700 02/07 0701 - 02/08 0700    P.O.  240 666     IV Piggyback   1250 350 "    Total Intake(mL/kg)  240 (2.9) 1916 (23.5) 350 (4.3)    Urine (mL/kg/hr)  250 40 (0) 200 (0.2)    Stool  0 0     Total Output  250 40 200    Net  -10 +1876 +150            Stool Unmeasured Occurrence  1 x 3 x         Lines, Drains & Airways    Active LDAs     Name:   Placement date:   Placement time:   Site:   Days:    Peripheral IV 02/06/21 0403 Anterior;Right Forearm   02/06/21 0403    Forearm   less than 1                  Physical Exam:    Physical Exam  HENT:      Head: Normocephalic.      Nose: Nose normal.   Eyes:      General: No scleral icterus.     Extraocular Movements: Extraocular movements intact.      Pupils: Pupils are equal, round, and reactive to light.   Neck:      Musculoskeletal: Normal range of motion.   Cardiovascular:      Rate and Rhythm: Normal rate and regular rhythm.   Pulmonary:      Effort: Pulmonary effort is normal.      Breath sounds: Normal breath sounds.   Abdominal:      General: Bowel sounds are normal.      Palpations: Abdomen is soft.   Musculoskeletal:      Comments: Bilateral shin with erythema and edema.  Lymphedema.   Lymphadenopathy:      Cervical: No cervical adenopathy.   Skin:     General: Skin is warm.   Neurological:      Mental Status: She is alert.   Psychiatric:         Attention and Perception: Attention normal.              Wounds (last 24 hours)      LDA Wound     Row Name 02/07/21 0400 02/07/21 0015 02/06/21 2000       Wound 02/06/21 0031 Right lower leg Other (comment)    Wound - Properties Group Placement Date: 02/06/21  -CS Placement Time: 0031  -CS Present on Hospital Admission: Y  -CS Side: Right  -CS Orientation: lower  -CS Location: leg  -CS Primary Wound Type: Other  -CS, chronic redness and swelling     Dressing Appearance  --  --  dry;intact  -DH    Base  red  -DH  red  -DH  red  -DH    Periwound  blistered;excoriated;edematous  -DH  blistered;excoriated;edematous  -DH  blistered;excoriated;edematous  -DH    Periwound Temperature  hot;warm  -DH   hot;warm  -DH  hot;warm  -DH    Drainage Amount  small  -DH  small  -DH  small  -DH    Retired Wound - Properties Group Date first assessed: 02/06/21  -CS Time first assessed: 0031  -CS Present on Hospital Admission: Y  -CS Side: Right  -CS Location: leg  -CS Primary Wound Type: Other  -CS, chronic redness and swelling        Wound 02/06/21 0032 Right anterior foot Other (comment)    Wound - Properties Group Placement Date: 02/06/21  -CS Placement Time: 0032  -CS Present on Hospital Admission: Y  -CS Side: Right  -CS Orientation: anterior  -CS Location: foot  -CS Primary Wound Type: Other  -CS Additional Comments: chronic redness and swelling  -CS    Base  red  -DH  red  -DH  red  -DH    Periwound  edematous;excoriated;redness  -DH  edematous;excoriated;redness  -DH  edematous;excoriated;redness  -DH    Periwound Temperature  warm;hot  -DH  warm;hot  -DH  warm;hot  -DH    Periwound Skin Turgor  firm  -DH  firm  -DH  firm  -DH    Drainage Amount  scant  -DH  scant  -DH  scant  -DH    Retired Wound - Properties Group Date first assessed: 02/06/21  -CS Time first assessed: 0032  -CS Present on Hospital Admission: Y  -CS Side: Right  -CS Location: foot  -CS Primary Wound Type: Other  -CS Additional Comments: chronic redness and swelling  -CS       Wound 02/06/21 0033 Left lower leg Other (comment)    Wound - Properties Group Placement Date: 02/06/21  -CS Placement Time: 0033  -CS Present on Hospital Admission: N  -CS Side: Left  -CS Orientation: lower  -CS Location: leg  -CS Primary Wound Type: Other  -CS Additional Comments: chronic redness and swelling  -CS    Base  red  -DH  red  -DH  red  -DH    Periwound  edematous;excoriated;redness  -DH  edematous;excoriated;redness  -DH  edematous;excoriated;redness  -DH    Periwound Temperature  warm;hot  -DH  warm;hot  -DH  warm;hot  -DH    Retired Wound - Properties Group Date first assessed: 02/06/21  -CS Time first assessed: 0033  -CS Present on Hospital Admission: N  -CS  Side: Left  -CS Location: leg  -CS Primary Wound Type: Other  -CS Additional Comments: chronic redness and swelling  -CS       Wound 02/06/21 0035 Left anterior ankle Other (comment)    Wound - Properties Group Placement Date: 02/06/21  -CS Placement Time: 0035  -CS Present on Hospital Admission: Y  -CS Side: Left  -CS Orientation: anterior  -CS Location: ankle  -CS Primary Wound Type: Other  -CS    Base  red  -DH  red  -DH  red  -DH    Periwound  redness;edematous;excoriated  -DH  redness;edematous;excoriated  -DH  redness;edematous;excoriated  -DH    Periwound Temperature  warm  -DH  warm  -DH  warm  -DH    Periwound Skin Turgor  firm  -DH  firm  -DH  firm  -DH    Retired Wound - Properties Group Date first assessed: 02/06/21  -CS Time first assessed: 0035  -CS Present on Hospital Admission: Y  -CS Side: Left  -CS Location: ankle  -CS Primary Wound Type: Other  -CS       Wound 02/06/21 0047 Right heel Other (comment)    Wound - Properties Group Placement Date: 02/06/21  -CS Placement Time: 0047  -CS Present on Hospital Admission: Y  -CS Side: Right  -CS Location: heel  -CS Primary Wound Type: Other  -CS, Chronic redness and swelling      Dressing Appearance  --  --  dry  -DH    Base  red  -DH  red  -DH  red  -DH    Periwound  edematous;excoriated;redness  -DH  edematous;excoriated;redness  -DH  edematous;excoriated;redness  -DH    Retired Wound - Properties Group Date first assessed: 02/06/21  -CS Time first assessed: 0047  -CS Present on Hospital Admission: Y  -CS Side: Right  -CS Location: heel  -CS Primary Wound Type: Other  -CS, Chronic redness and swelling         Wound 02/06/21 0050 Left posterior thigh Other (comment)    Wound - Properties Group Placement Date: 02/06/21  -CS Placement Time: 0050  -CS Present on Hospital Admission: Y  -CS Side: Left  -CS Orientation: posterior  -CS Location: thigh  -CS Primary Wound Type: Other  -CS, Chronic redness and swelling      Dressing Appearance  --  --  dry;intact   -DH    Base  red;scab  -DH  red;scab  -DH  red;scab  -DH    Periwound  edematous;excoriated;redness  -DH  edematous;excoriated;redness  -DH  edematous;excoriated;redness  -DH    Retired Wound - Properties Group Date first assessed: 02/06/21  -CS Time first assessed: 0050  -CS Present on Hospital Admission: Y  -CS Side: Left  -CS Location: thigh  -CS Primary Wound Type: Other  -CS, Chronic redness and swelling         Wound 02/06/21 0051 coccyx Pressure Injury    Wound - Properties Group Placement Date: 02/06/21  -CS Placement Time: 0051  -CS Present on Hospital Admission: Y  -CS Location: coccyx  -CS Primary Wound Type: Pressure inj  -CS Stage, Pressure Injury : Stage 2  -CS    Dressing Appearance  --  dry;intact  -DH  dry;intact  -DH    Base  non-blanchable;scab  -DH  non-blanchable;scab  -DH  non-blanchable;scab  -DH    Periwound  non-blanchable;redness  -DH  non-blanchable;redness  -DH  non-blanchable;redness  -DH    Retired Wound - Properties Group Date first assessed: 02/06/21  -CS Time first assessed: 0051  -CS Present on Hospital Admission: Y  -CS Location: coccyx  -CS Primary Wound Type: Pressure inj  -CS      User Key  (r) = Recorded By, (t) = Taken By, (c) = Cosigned By    Initials Name Provider Type    CS Naeem Jiang, RN Registered Nurse     Cayla Ewing RN Registered Nurse          Procedures:              Results Review:     I reviewed the patient's new clinical results.      Lab Results (last 24 hours)     Procedure Component Value Units Date/Time    Blood Culture - Blood, Arm, Right [[id number]] Collected: 02/06/21 1307    Specimen: Blood from Arm, Right Updated: 02/07/21 1431     Blood Culture No growth at 24 hours    Blood Culture - Blood, Arm, Left [[id number]] Collected: 02/06/21 1309    Specimen: Blood from Arm, Left Updated: 02/07/21 1431     Blood Culture No growth at 24 hours    Manual Differential [[id number]]  (Abnormal) Collected: 02/07/21 1305    Specimen: Blood Updated:  02/07/21 1356     Neutrophil % 45.0 %      Lymphocyte % 1.0 %      Monocyte % 6.0 %      Bands %  46.0 %      Metamyelocyte % 2.0 %      Neutrophils Absolute 22.75 10*3/mm3      Lymphocytes Absolute 0.25 10*3/mm3      Monocytes Absolute 1.50 10*3/mm3      Anisocytosis Slight/1+     Macrocytes Slight/1+     Poikilocytes Slight/1+     WBC Morphology Normal     Clumped Platelets Present and due to EDTA    CBC & Differential [[id number]]  (Abnormal) Collected: 02/07/21 1305    Specimen: Blood Updated: 02/07/21 1356    Narrative:      The following orders were created for panel order CBC & Differential.  Procedure                               Abnormality         Status                     ---------                               -----------         ------                     Scan Slide[[id number]]                                       Final result               CBC Auto Differential[[id number]]        Abnormal            Final result                 Please view results for these tests on the individual orders.    CBC Auto Differential [[id number]]  (Abnormal) Collected: 02/07/21 1305    Specimen: Blood Updated: 02/07/21 1356     WBC 25.00 10*3/mm3      RBC 3.22 10*6/mm3      Hemoglobin 10.1 g/dL      Hematocrit 31.3 %      MCV 97.2 fL      MCH 31.2 pg      MCHC 32.1 g/dL      RDW 16.2 %      RDW-SD 55.1 fl      MPV 9.6 fL      Platelets 82 10*3/mm3      Comment: Platelet estimate performed due to platelet clumping.   Modified report. Previous result was 87 10*3/mm3 on 2/7/2021 at 1325 EST.       Narrative:      The previously reported component NRBC is no longer being reported. Previous result was 0.1 /100 WBC (Reference Range: 0.0-0.2 /100 WBC) on 2/7/2021 at 1325 EST.    Scan Slide [[id number]] Collected: 02/07/21 1305    Specimen: Blood Updated: 02/07/21 1356     Scan Slide --     Comment: See Manual Differential Results       Magnesium [[id number]]  (Abnormal) Collected: 02/07/21 1305    Specimen: Blood Updated: 02/07/21  1348     Magnesium 1.4 mg/dL     C-reactive Protein [[id number]]  (Abnormal) Collected: 02/07/21 1305    Specimen: Blood Updated: 02/07/21 1348     C-Reactive Protein 31.97 mg/dL     Digoxin Level [[id number]]  (Normal) Collected: 02/07/21 1305    Specimen: Blood Updated: 02/07/21 1348     Digoxin 1.20 ng/mL     Comprehensive Metabolic Panel [[id number]]  (Abnormal) Collected: 02/07/21 1305    Specimen: Blood Updated: 02/07/21 1348     Glucose 87 mg/dL      BUN 87 mg/dL      Creatinine 3.50 mg/dL      Sodium 140 mmol/L      Potassium 4.5 mmol/L      Chloride 109 mmol/L      CO2 18.0 mmol/L      Calcium 7.1 mg/dL      Total Protein 5.9 g/dL      Albumin 2.50 g/dL      ALT (SGPT) 12 U/L      AST (SGOT) 30 U/L      Alkaline Phosphatase 135 U/L      Total Bilirubin 0.6 mg/dL      eGFR Non African Amer 13 mL/min/1.73      Comment: <15 Indicative of kidney failure.        eGFR   Amer --     Comment: <15 Indicative of kidney failure.        Globulin 3.4 gm/dL      A/G Ratio 0.7 g/dL      BUN/Creatinine Ratio 24.9     Anion Gap 13.0 mmol/L     Narrative:      GFR Normal >60  Chronic Kidney Disease <60  Kidney Failure <15      Creatinine, Urine, Random - Urine, Clean Catch [[id number]] Collected: 02/06/21 1850    Specimen: Urine, Clean Catch Updated: 02/07/21 1213     Creatinine, Urine 205.7 mg/dL     Narrative:      Reference intervals for random urine have not been established.  Clinical usage is dependent upon physician's interpretation in combination with other laboratory tests.       Haptoglobin [[id number]]  (Normal) Collected: 02/06/21 2247    Specimen: Blood Updated: 02/07/21 1155     Haptoglobin 100 mg/dL      Comment: Specimen hemolyzed. Results may be affected.       Clostridium Difficile Toxin - Stool, Per Rectum [[id number]]  (Normal) Collected: 02/06/21 1952    Specimen: Stool from Per Rectum Updated: 02/07/21 0790    Narrative:      The following orders were created for panel order Clostridium Difficile Toxin  - Stool, Per Rectum.  Procedure                               Abnormality         Status                     ---------                               -----------         ------                     Clostridium Difficile EI...[[id number]]  Normal              Final result                 Please view results for these tests on the individual orders.    Clostridium Difficile EIA - Stool, Per Rectum [[id number]]  (Normal) Collected: 02/06/21 1952    Specimen: Stool from Per Rectum Updated: 02/07/21 0725     C Diff GDH / Toxin Negative    Comprehensive Metabolic Panel [[id number]]  (Abnormal) Collected: 02/06/21 2247    Specimen: Blood Updated: 02/06/21 2337     Glucose 100 mg/dL      BUN 80 mg/dL      Creatinine 3.35 mg/dL      Sodium 139 mmol/L      Potassium 5.1 mmol/L      Comment: Slight hemolysis detected by analyzer. Results may be affected.        Chloride 112 mmol/L      CO2 14.0 mmol/L      Calcium 7.3 mg/dL      Total Protein 5.7 g/dL      Albumin 2.40 g/dL      ALT (SGPT) 10 U/L      AST (SGOT) 32 U/L      Comment: Slight hemolysis detected by analyzer. Results may be affected.        Alkaline Phosphatase 109 U/L      Total Bilirubin 0.4 mg/dL      eGFR Non African Amer 13 mL/min/1.73      Comment: <15 Indicative of kidney failure.        eGFR   Amer --     Comment: <15 Indicative of kidney failure.        Globulin 3.3 gm/dL      A/G Ratio 0.7 g/dL      BUN/Creatinine Ratio 23.9     Anion Gap 13.0 mmol/L     Narrative:      GFR Normal >60  Chronic Kidney Disease <60  Kidney Failure <15      POC Glucose Once [[id number]]  (Normal) Collected: 02/06/21 2012    Specimen: Blood Updated: 02/06/21 2014     Glucose 103 mg/dL      Comment: Serial Number: [id number]:  [id number]       Hepatitis C Antibody [[id number]]  (Normal) Collected: 02/06/21 1309    Specimen: Blood Updated: 02/06/21 1957     Hepatitis C Ab Non-Reactive    Narrative:      Results may be falsely decreased if patient taking Biotin.       Hepatitis B Surface Antigen [[id number]]  (Normal) Collected: 02/06/21 1309    Specimen: Blood Updated: 02/06/21 1957     Hepatitis B Surface Ag Non-Reactive    Narrative:      Results may be falsely decreased if patient taking Biotin.      Comprehensive Metabolic Panel [[id number]]  (Abnormal) Collected: 02/06/21 1905    Specimen: Blood Updated: 02/06/21 1936     Glucose 91 mg/dL      BUN 76 mg/dL      Creatinine 3.28 mg/dL      Sodium 139 mmol/L      Potassium 4.5 mmol/L      Chloride 114 mmol/L      CO2 12.0 mmol/L      Calcium 7.3 mg/dL      Total Protein 5.6 g/dL      Albumin 2.40 g/dL      ALT (SGPT) 9 U/L      AST (SGOT) 27 U/L      Alkaline Phosphatase 100 U/L      Total Bilirubin 0.4 mg/dL      eGFR Non African Amer 14 mL/min/1.73      Comment: <15 Indicative of kidney failure.        eGFR   Amer --     Comment: <15 Indicative of kidney failure.        Globulin 3.2 gm/dL      A/G Ratio 0.8 g/dL      BUN/Creatinine Ratio 23.2     Anion Gap 13.0 mmol/L     Narrative:      GFR Normal >60  Chronic Kidney Disease <60  Kidney Failure <15      Lactic Acid, Plasma [[id number]]  (Abnormal) Collected: 02/06/21 1901    Specimen: Blood Updated: 02/06/21 1932     Lactate 3.2 mmol/L     POC Glucose Once [[id number]]  (Normal) Collected: 02/06/21 1930    Specimen: Blood Updated: 02/06/21 1931     Glucose 73 mg/dL      Comment: Serial Number: [id number]:  [id number]       Urinalysis, Microscopic Only - Urine, Clean Catch [[id number]]  (Abnormal) Collected: 02/06/21 1850    Specimen: Urine, Clean Catch Updated: 02/06/21 1926     RBC, UA 6-12 /HPF      WBC, UA 31-50 /HPF      Bacteria, UA None Seen /HPF      Squamous Epithelial Cells, UA 3-6 /HPF      Hyaline Casts, UA None Seen /LPF      WBC Clumps, UA Small/1+ /HPF      Methodology Manual Light Microscopy    Protein, Urine, Random - Urine, Clean Catch [[id number]] Collected: 02/06/21 1850    Specimen: Urine, Clean Catch Updated: 02/06/21 1922     Total Protein,  Urine 136.9 mg/dL     Narrative:      Reference intervals for random urine have not been established.  Clinical usage is dependent upon physician's interpretation in combination with other laboratory tests.       Urinalysis With Microscopic If Indicated (No Culture) - Urine, Clean Catch [[id number]]  (Abnormal) Collected: 02/06/21 1850    Specimen: Urine, Clean Catch Updated: 02/06/21 1914     Color, UA Dark Yellow     Comment: Result checked         Appearance, UA Cloudy     Comment: Result checked         pH, UA 7.0     Specific Gravity, UA 1.022     Glucose, UA Negative     Ketones, UA Trace     Bilirubin, UA Small (1+)     Comment: Confirmation testing is unavailable.  A serum bilirubin is recommended for further assessment.        Blood, UA Small (1+)     Protein,  mg/dL (2+)     Leuk Esterase, UA Moderate (2+)     Nitrite, UA Positive     Urobilinogen, UA 0.2 E.U./dL    POC Glucose Once [[id number]]  (Abnormal) Collected: 02/06/21 1910    Specimen: Blood Updated: 02/06/21 1911     Glucose 69 mg/dL      Comment: Serial Number: [id number]:  [id number]       Blood Gas, Arterial - [[id number]]  (Abnormal) Collected: 02/06/21 1904    Specimen: Arterial Blood Updated: 02/06/21 1909     Site Left Radial     Bright's Test Positive     pH, Arterial 7.337 pH units      pCO2, Arterial 26.4 mm Hg      pO2, Arterial 79.5 mm Hg      HCO3, Arterial 14.2 mmol/L      Base Excess, Arterial -10.4 mmol/L      Comment: Serial Number: [id number]:  [id number]        O2 Saturation, Arterial 95.2 %      CO2 Content 15.0 mmol/L      Barometric Pressure for Blood Gas --     Comment: N/A        Modality Room Air     FIO2 21 %      Hemodilution No    POCT Electrolytes +HGB +HCT [[id number]]  (Abnormal) Collected: 02/06/21 1904    Specimen: Blood Updated: 02/06/21 1909     Sodium 139 mmol/L      POC Potassium 4.3 mmol/L      Ionized Calcium 0.94 mmol/L      Comment: Serial Number: [id number]:  [id number]        Glucose 92 mg/dL       Hematocrit 29 %      Hemoglobin 9.9 g/dL     POC Glucose Once [[id number]]  (Normal) Collected: 02/06/21 1904    Specimen: Blood Updated: 02/06/21 1909     Glucose 92 mg/dL      Comment: Serial Number: [id number]:  [id number]       POC Lactate [[id number]] Collected: 02/06/21 1904    Specimen: Blood Updated: 02/06/21 1909    Basic Metabolic Panel [[id number]]  (Abnormal) Collected: 02/06/21 1708    Specimen: Blood Updated: 02/06/21 1908     Glucose 58 mg/dL      Comment: Results called to, read back and acknowledged by Naeem at 02/06/21 19:06 EST          BUN 77 mg/dL      Creatinine 3.23 mg/dL      Sodium 141 mmol/L      Potassium 5.9 mmol/L      Comment: Slight hemolysis detected by analyzer. Results may be affected.        Chloride 117 mmol/L      CO2 8.0 mmol/L      Calcium 7.5 mg/dL      eGFR   Amer --     Comment: <15 Indicative of kidney failure.        eGFR Non African Amer 14 mL/min/1.73      Comment: <15 Indicative of kidney failure.        BUN/Creatinine Ratio 23.8     Anion Gap 16.0 mmol/L     Narrative:      GFR Normal >60  Chronic Kidney Disease <60  Kidney Failure <15      CK [[id number]]  (Abnormal) Collected: 02/06/21 1708    Specimen: Blood Updated: 02/06/21 1906     Creatine Kinase 225 U/L     Lactate Dehydrogenase [[id number]]  (Abnormal) Collected: 02/06/21 1708    Specimen: Blood Updated: 02/06/21 1906      U/L      Comment: Specimen hemolyzed.  Results may be affected.       Uric Acid [[id number]]  (Normal) Collected: 02/06/21 1708    Specimen: Blood Updated: 02/06/21 1906     Uric Acid 3.2 mg/dL     Vancomycin, Random [[id number]]  (Normal) Collected: 02/06/21 1708    Specimen: Blood Updated: 02/06/21 1905     Vancomycin Random 11.90 mcg/mL     Narrative:      Therapeutic Ranges for Vancomycin    Vancomycin Random   5.0-40.0 mcg/mL  Vancomycin Trough   5.0-20.0 mcg.mL  Vancomycin Peak     20.0-40.0 mcg/mL    Phosphorus [[id number]]  (Normal) Collected: 02/06/21 1708    Specimen: Blood  Updated: 02/06/21 1904     Phosphorus 4.2 mg/dL         No results found for: HGBA1C        Results from last 7 days   Lab Units 02/06/21  1904   PH, ARTERIAL pH units 7.337*   PO2 ART mm Hg 79.5*   PCO2, ARTERIAL mm Hg 26.4*   HCO3 ART mmol/L 14.2*     Lab Results   Component Value Date    LIPASE 42 01/13/2018     Lab Results   Component Value Date    CHOL 126 02/05/2020    TRIG 51 02/05/2020    HDL 66 (H) 02/05/2020    LDL 50 02/05/2020       No results found for: INTRAOP, PREDX, FINALDX, COMDX    Microbiology Results (last 10 days)     Procedure Component Value - Date/Time    Clostridium Difficile Toxin - Stool, Per Rectum [[id number]]  (Normal) Collected: 02/06/21 1952    Lab Status: Final result Specimen: Stool from Per Rectum Updated: 02/07/21 0725    Narrative:      The following orders were created for panel order Clostridium Difficile Toxin - Stool, Per Rectum.  Procedure                               Abnormality         Status                     ---------                               -----------         ------                     Clostridium Difficile EI...[[id number]]  Normal              Final result                 Please view results for these tests on the individual orders.    Clostridium Difficile EIA - Stool, Per Rectum [[id number]]  (Normal) Collected: 02/06/21 1952    Lab Status: Final result Specimen: Stool from Per Rectum Updated: 02/07/21 0725     C Diff GDH / Toxin Negative    Blood Culture - Blood, Arm, Left [[id number]] Collected: 02/06/21 1309    Lab Status: Preliminary result Specimen: Blood from Arm, Left Updated: 02/07/21 1431     Blood Culture No growth at 24 hours    Blood Culture - Blood, Arm, Right [[id number]] Collected: 02/06/21 1307    Lab Status: Preliminary result Specimen: Blood from Arm, Right Updated: 02/07/21 1431     Blood Culture No growth at 24 hours    MRSA Screen, PCR (Inpatient) - Swab, Nares [[id number]]  (Abnormal) Collected: 02/06/21 1114    Lab Status: Final result  Specimen: Swab from Nares Updated: 02/06/21 1311     MRSA PCR MRSA Detected    COVID PRE-OP / PRE-PROCEDURE SCREENING ORDER (NO ISOLATION) - Swab, Nasopharynx [[id number]]  (Normal) Collected: 02/06/21 0153    Lab Status: Final result Specimen: Swab from Nasopharynx Updated: 02/06/21 1534    Narrative:      The following orders were created for panel order COVID PRE-OP / PRE-PROCEDURE SCREENING ORDER (NO ISOLATION) - Swab, Nasopharynx.  Procedure                               Abnormality         Status                     ---------                               -----------         ------                     COVID-19,APTIMA PANTHER,...[[id number]]  Normal              Final result                 Please view results for these tests on the individual orders.    COVID-19,APTIMA PANTHER,[facility] IN-HOUSE, NP/OP SWAB IN UTM/VTM/[facility] TRANSPORT MEDIA,24 HR TAT - Swab, Nasopharynx [[id number]]  (Normal) Collected: 02/06/21 0153    Lab Status: Final result Specimen: Swab from Nasopharynx Updated: 02/06/21 1534     COVID19 Not Detected    Narrative:      Fact sheet for providers: https://www.fda.gov/media/062230/download     Fact sheet for patients: https://www.fda.gov/media/684511/download    Test performed by RT PCR.          ECG/EMG Results (most recent)     Procedure Component Value Units Date/Time    Adult Transthoracic Echo Complete W/ Cont if Necessary Per Protocol [[id number]] Collected: 02/06/21 0850     Updated: 02/06/21 1609     BSA 1.8 m^2      RVIDd 2.7 cm      IVSd 1.3 cm      LVIDd 3.9 cm      LVIDs 2.8 cm      LVPWd 1.2 cm      IVS/LVPW 1.0     FS 28.8 %      EDV(Teich) 67.0 ml      ESV(Teich) 29.4 ml      EF(Teich) 56.0 %      EDV(cubed) 60.5 ml      ESV(cubed) 21.9 ml      EF(cubed) 63.9 %      LV mass(C)d 170.0 grams      LV mass(C)dI 92.8 grams/m^2      SV(Teich) 37.5 ml      SI(Teich) 20.5 ml/m^2      SV(cubed) 38.7 ml      SI(cubed) 21.1 ml/m^2      Ao root diam 2.5 cm      Ao root area 5.1 cm^2      ACS 1.6 cm       asc Aorta Diam 3.2 cm      LVOT diam 1.8 cm      LVOT area 2.6 cm^2      RVOT diam 2.6 cm      RVOT area 5.5 cm^2      EDV(MOD-sp4) 59.5 ml      ESV(MOD-sp4) 26.6 ml      EF(MOD-sp4) 55.3 %      EDV(MOD-sp2) 53.3 ml      ESV(MOD-sp2) 20.0 ml      EF(MOD-sp2) 62.4 %      SV(MOD-sp4) 32.9 ml      SI(MOD-sp4) 18.0 ml/m^2      SV(MOD-sp2) 33.2 ml      SI(MOD-sp2) 18.1 ml/m^2      Ao root area (BSA corrected) 1.4     LV Olivier Vol (BSA corrected) 32.5 ml/m^2      LV Sys Vol (BSA corrected) 14.5 ml/m^2      Aortic R-R 1.0 sec      Aortic HR 59.0 BPM      MV V2 max 134.6 cm/sec      MV max PG 7.2 mmHg      MV V2 mean 51.6 cm/sec      MV mean PG 1.8 mmHg      MV V2 VTI 24.3 cm      MVA(VTI) 2.3 cm^2      Ao pk chastity 182.4 cm/sec      Ao max PG 13.3 mmHg      Ao max PG (full) 8.5 mmHg      Ao V2 mean 142.3 cm/sec      Ao mean PG 8.7 mmHg      Ao mean PG (full) 5.5 mmHg      Ao V2 VTI 37.9 cm      WILLIS(I,A) 1.5 cm^2      WILLIS(I,D) 1.5 cm^2      WILLIS(V,A) 1.5 cm^2      WILLIS(V,D) 1.5 cm^2      AI max chastity 264.5 cm/sec      AI max PG 28.0 mmHg      AI dec slope 171.9 cm/sec^2      AI dec time 1.5 sec      AI P1/2t 450.8 msec      LV V1 max PG 4.8 mmHg      LV V1 mean PG 3.2 mmHg      LV V1 max 110.0 cm/sec      LV V1 mean 85.9 cm/sec      LV V1 VTI 21.6 cm      CO(Ao) 11.4 l/min      CI(Ao) 6.2 l/min/m^2      SV(Ao) 192.9 ml      SI(Ao) 105.3 ml/m^2      CO(LVOT) 3.3 l/min      CI(LVOT) 1.8 l/min/m^2      SV(LVOT) 55.2 ml      SV(RVOT) 59.1 ml      SI(LVOT) 30.1 ml/m^2      PA V2 max 88.8 cm/sec      PA max PG 3.2 mmHg      PA max PG (full) 1.9 mmHg      PA V2 mean 63.8 cm/sec      PA mean PG 1.8 mmHg      PA mean PG (full) 1.0 mmHg      PA V2 VTI 17.2 cm      PVA(I,A) 3.4 cm^2      BH CV ECHO SAHIL - PVA(I,D) 3.4 cm^2      BH CV ECHO SAHIL - PVA(V,A) 3.4 cm^2       CV ECHO SAHIL - PVA(V,D) 3.4 cm^2      PA acc time 0.07 sec      RV V1 max PG 1.2 mmHg      RV V1 mean PG 0.77 mmHg      RV V1 max 55.1 cm/sec      RV V1 mean 41.7 cm/sec       RV V1 VTI 10.8 cm      TR max devante 237.7 cm/sec      RVSP(TR) 25.6 mmHg      RAP systole 3.0 mmHg      PA pr(Accel) 48.2 mmHg      Pulm Sys Devante 41.0 cm/sec      Pulm Olivier Devante 37.5 cm/sec      Pulm S/D 1.1     Qp/Qs 1.1      CV ECHO SAHIL - BZI_BMI 33.1 kilograms/m^2       CV ECHO SAHIL - BSA(HAYCOCK) 1.9 m^2       CV ECHO SAHIL - BZI_METRIC_WEIGHT 82.1 kg       CV ECHO SAHIL - BZI_METRIC_HEIGHT 157.5 cm      EF(MOD-bp) 60.0 %      LA dimension(2D) 4.2 cm     Narrative:      Normal LV size and contractility EF of 55%  Moderate right ventricular enlargement with severe right atrial   enlargement, catheter probably pacemaker lead seen.  Severe left atrial enlargement seen.  Aortic valve, mitral valve, tricuspid valve appears structurally normal,   mild MR, AR, seen.  There is moderate  tricuspid regurgitation seen.    Calculated RV systolic pressure is 24mmHg.  No pericardial effusion seen.  Proximal aorta appears normal in size.          Results for orders placed during the hospital encounter of 08/08/19   Venous w Reflux Lower Extremity - Bilateral CAR    Narrative · The patient is in a reverse Trendelenburg position.  · There is evidence of significant reflux of the right common femoral   vein. There is evidence of significant reflux of the right mid femoral   vein. There is evidence of severe reflux of the right greater saphenous   vein below the knee. There is evidence of severe reflux of the right small   saphenous vein.  · There is evidence of mild reflux of the left greater saphenous vein at   the distal thigh.          Results for orders placed during the hospital encounter of 02/05/21   Adult Transthoracic Echo Complete W/ Cont if Necessary Per Protocol    Narrative Normal LV size and contractility EF of 55%  Moderate right ventricular enlargement with severe right atrial   enlargement, catheter probably pacemaker lead seen.  Severe left atrial enlargement seen.  Aortic valve, mitral valve, tricuspid  valve appears structurally normal,   mild MR, AR, seen.  There is moderate  tricuspid regurgitation seen.    Calculated RV systolic pressure is 24mmHg.  No pericardial effusion seen.  Proximal aorta appears normal in size.       Xr Chest 1 View    Result Date: 2/7/2021  1. New right-sided PICC line with tip terminating over the low SVC. 2. Stable cardiomegaly.  Electronically Signed By-Dae Auguste MD On:2/7/2021 10:45 AM This report was finalized on [id number] by  Dae Auguste MD.    Xr Chest 1 View    Result Date: 2/6/2021  1.Cardiomegaly  Electronically Signed By-Azar Alarcon MD On:2/6/2021 8:01 AM This report was finalized on [id number] by  Azar Alarcon MD.          Xrays, labs reviewed personally by physician.    Medication Review:   I have reviewed the patient's current medication list      Scheduled Meds  aspirin, 81 mg, Oral, Daily  cefepime, 2 g, Intravenous, Q24H  digoxin, 125 mcg, Oral, Q24H  febuxostat, 40 mg, Oral, BID  gabapentin, 100 mg, Oral, TID  haloperidol lactate, 5 mg, Intramuscular, Once  magic butt paste, , Topical, Daily  metroNIDAZOLE, 500 mg, Intravenous, Q8H  midodrine, 10 mg, Oral, TID AC  sodium chloride, 10 mL, Intravenous, Q12H  Zinc Oxide, , Apply externally, BID        Meds Infusions  DOPamine, 2-20 mcg/kg/min, Last Rate: 10.004 mcg/kg/min (02/07/21 1137)  Pharmacy to dose vancomycin,   sodium bicarbonate, 150 mEq, Last Rate: 150 mEq (02/07/21 1144)        Meds PRN  •  acetaminophen **OR** acetaminophen **OR** acetaminophen  •  magnesium sulfate **OR** magnesium sulfate in D5W 1g/100mL (PREMIX) **OR** magnesium sulfate  •  melatonin  •  nitroglycerin  •  OLANZapine  •  ondansetron **OR** ondansetron  •  Pharmacy to dose vancomycin  •  potassium chloride  •  potassium chloride  •  sodium chloride  •  traMADol  •  vancomycin        Assessment/Plan   Assessment/Plan     Active Hospital Problems    Diagnosis  POA   • Sepsis (CMS/[facility]) [A41.9]  Yes     Priority: High   •  CONG (acute kidney injury) (CMS/[facility]) [N17.9]  Yes     Priority: High   • Lactic acidosis [E87.2]  Yes     Priority: High   • Metabolic acidosis [E87.2]  Yes     Priority: High   • Delirium, acute [R41.0]  Yes     Priority: High   • Lower extremity cellulitis [L03.119]  Yes     Priority: High   • CAD (coronary artery disease) [I25.10]  Yes     Priority: High   • Acute UTI (urinary tract infection) [N39.0]  Yes     Priority: High   • Non-pressure chronic ulcer right ankle, limited to breakdown skin (CMS/[facility]) [L97.311]  Yes     Priority: High   • Automatic implantable cardiac defibrillator in situ [Z95.810]  Yes     Priority: Medium   • Lymphedema [I89.0]  Unknown     Priority: Medium   • Gout [M10.9]  Yes     Priority: Medium   • Hypertension, benign [I10]  Yes     Priority: Medium   • Congestive heart failure (CMS/[facility]) [I50.9]  Yes     Priority: Medium   • Atrial fibrillation (CMS/[facility]) [I48.91]  Yes     Priority: Medium      Resolved Hospital Problems   No resolved problems to display.       MEDICAL DECISION MAKING COMPLEXITY BY PROBLEM:       Septic shock from LE cellulitis and UTI   - bilateral LE Lymphedema   -Rocephin given x1 at sending facility,   -started vancomycin and cefepime 2/6/2021.  Added Flagyl 2/7/21 because of leukocytosis  -Started on dopamine 2/6/2021  - procalcitonin 171.12  -MRSA screen positive  -C. difficile negative  -Falls precautions  -Wound care consulted  -ID consulted 2/7/2021       Urinary tract infection  -Rocephin given at sending facility  -cefepime   -Urine culture pending     CONG on CKD III:  -Hold colchicine  -Concern for fluid loss from LE cellulitis  -Consulted nephrology  -s/p renal ultrasound 2/5/2020      Normal gap metabolic acidosis:  -Given bicarb drip overnight    Delirium:  -No history of dementia per daughter  -Avoid sedating medications    Right arm IV infiltration:  -Continue ice to arm and monitor    CAD  -Denies chest pain  -Continue aspirin, beta-blocker     Atrial  fibrillation s/p pacemaker  -On digoxin and metoprolol at home  -Repeat TTE 2/13/2018--> LVEF 40% with mild aortic insufficiency  -TTE 2/6/2021--> LVEF 55%, severe right atrial enlargement, severe left atrial enlargement, moderate TR  -Daughter claims patient off Xarelto     Hypertension  -Hold metoprolol temporarily because of low BP     Hyperlipidemia  -Check lipid panel  -on  home Zetia at home     Chronic pain  -Continue gabapentin and tramadol     Gout  -Hold colchicine because ok CONG     Pressure ulcer on right ankle  -Wound care consulted             VTE Prophylaxis -   Mechanical Order History:     None      Pharmalogical Order History:      Ordered     Dose Route Frequency Stop    02/06/21 0500  rivaroxaban (XARELTO) tablet 10 mg     Question Answer Comment   Are you ordering rivaroxaban for the prevention of blood clots in an acutely ill medical patient? Yes    Select any exclusion criteria that may apply to patient Exclusion Criteria Does Not Apply to This Patient Alternative to Lovenox/heparin inpatient with thrombocytopenia unable to receive mechanical prophylaxis       10 mg PO Daily With Dinner --    02/06/21 0020  heparin (porcine) 5000 UNIT/ML injection 5,000 Units  Status:  Discontinued      5,000 Units SC Every 12 Hours Scheduled 02/06/21 0147                  Code Status -   Code Status and Medical Interventions:   Ordered at: 02/06/21 0258     Level Of Support Discussed With:    Patient     Code Status:    CPR     Medical Interventions (Level of Support Prior to Arrest):    Full       This patient has been examined wearing appropriate Personal Protective Equipment and discussed with nursing. 02/07/21        Discharge Planning    Patient does have episodes of confusion in the evenings Zyprexa PRN ordered.  Elevated procalcitonin and worsening WBC thus added Flagyl to Vanco and cefepime.  ID consulted 2/7/2021.      Electronically signed by Jake Marx DO, 02/07/21, 17:41 EST.  [facility]

## 2021-02-07 NOTE — CONSULTS
PICC TEAM CONSULT:  TL picc line palced for vesicant medication and access needs.  Pt was medicated prior to procedure and tolerated well.  Informed consent placed on chart.  Stat cxr order to confirm placement.  Flushes freely with venous blood return noted.  RN aware

## 2021-02-08 ENCOUNTER — APPOINTMENT (OUTPATIENT)
Dept: CARDIOLOGY | Facility: HOSPITAL | Age: 76
End: 2021-02-08

## 2021-02-08 LAB
ANION GAP SERPL CALCULATED.3IONS-SCNC: 14 MMOL/L (ref 5–15)
ANISOCYTOSIS BLD QL: ABNORMAL
BH CV LOWER VASCULAR LEFT COMMON FEMORAL AUGMENT: NORMAL
BH CV LOWER VASCULAR LEFT COMMON FEMORAL COMPETENT: NORMAL
BH CV LOWER VASCULAR LEFT COMMON FEMORAL COMPRESS: NORMAL
BH CV LOWER VASCULAR LEFT COMMON FEMORAL PHASIC: NORMAL
BH CV LOWER VASCULAR LEFT COMMON FEMORAL SPONT: NORMAL
BH CV LOWER VASCULAR LEFT DISTAL FEMORAL AUGMENT: NORMAL
BH CV LOWER VASCULAR LEFT DISTAL FEMORAL COMPETENT: NORMAL
BH CV LOWER VASCULAR LEFT GASTRONEMIUS COMPRESS: NORMAL
BH CV LOWER VASCULAR LEFT GREATER SAPH AK COMPRESS: NORMAL
BH CV LOWER VASCULAR LEFT GREATER SAPH BK COMPRESS: NORMAL
BH CV LOWER VASCULAR LEFT LESSER SAPH COMPRESS: NORMAL
BH CV LOWER VASCULAR LEFT MID FEMORAL AUGMENT: NORMAL
BH CV LOWER VASCULAR LEFT MID FEMORAL COMPETENT: NORMAL
BH CV LOWER VASCULAR LEFT MID FEMORAL COMPRESS: NORMAL
BH CV LOWER VASCULAR LEFT MID FEMORAL PHASIC: NORMAL
BH CV LOWER VASCULAR LEFT MID FEMORAL SPONT: NORMAL
BH CV LOWER VASCULAR LEFT POPLITEAL AUGMENT: NORMAL
BH CV LOWER VASCULAR LEFT POPLITEAL COMPETENT: NORMAL
BH CV LOWER VASCULAR LEFT POPLITEAL COMPRESS: NORMAL
BH CV LOWER VASCULAR LEFT POPLITEAL PHASIC: NORMAL
BH CV LOWER VASCULAR LEFT POPLITEAL SPONT: NORMAL
BH CV LOWER VASCULAR LEFT POSTERIOR TIBIAL COMPRESS: NORMAL
BH CV LOWER VASCULAR LEFT PROFUNDA FEMORAL COMPRESS: NORMAL
BH CV LOWER VASCULAR LEFT PROXIMAL FEMORAL COMPRESS: NORMAL
BH CV LOWER VASCULAR LEFT SAPHENOFEMORAL JUNCTION COMPRESS: NORMAL
BH CV LOWER VASCULAR RIGHT COMMON FEMORAL AUGMENT: NORMAL
BH CV LOWER VASCULAR RIGHT COMMON FEMORAL COMPETENT: NORMAL
BH CV LOWER VASCULAR RIGHT COMMON FEMORAL COMPRESS: NORMAL
BH CV LOWER VASCULAR RIGHT COMMON FEMORAL PHASIC: NORMAL
BH CV LOWER VASCULAR RIGHT COMMON FEMORAL SPONT: NORMAL
BH CV LOWER VASCULAR RIGHT DISTAL FEMORAL AUGMENT: NORMAL
BH CV LOWER VASCULAR RIGHT DISTAL FEMORAL COMPETENT: NORMAL
BH CV LOWER VASCULAR RIGHT GASTRONEMIUS COMPRESS: NORMAL
BH CV LOWER VASCULAR RIGHT GREATER SAPH AK COMPRESS: NORMAL
BH CV LOWER VASCULAR RIGHT GREATER SAPH BK COMPRESS: NORMAL
BH CV LOWER VASCULAR RIGHT LESSER SAPH COMPRESS: NORMAL
BH CV LOWER VASCULAR RIGHT MID FEMORAL AUGMENT: NORMAL
BH CV LOWER VASCULAR RIGHT MID FEMORAL COMPETENT: NORMAL
BH CV LOWER VASCULAR RIGHT MID FEMORAL COMPRESS: NORMAL
BH CV LOWER VASCULAR RIGHT MID FEMORAL PHASIC: NORMAL
BH CV LOWER VASCULAR RIGHT MID FEMORAL SPONT: NORMAL
BH CV LOWER VASCULAR RIGHT POPLITEAL AUGMENT: NORMAL
BH CV LOWER VASCULAR RIGHT POPLITEAL COMPETENT: NORMAL
BH CV LOWER VASCULAR RIGHT POPLITEAL COMPRESS: NORMAL
BH CV LOWER VASCULAR RIGHT POPLITEAL PHASIC: NORMAL
BH CV LOWER VASCULAR RIGHT POPLITEAL SPONT: NORMAL
BH CV LOWER VASCULAR RIGHT POSTERIOR TIBIAL COMPRESS: NORMAL
BH CV LOWER VASCULAR RIGHT PROFUNDA FEMORAL COMPRESS: NORMAL
BH CV LOWER VASCULAR RIGHT PROXIMAL FEMORAL COMPRESS: NORMAL
BH CV LOWER VASCULAR RIGHT SAPHENOFEMORAL JUNCTION COMPRESS: NORMAL
BUN SERPL-MCNC: 98 MG/DL (ref 8–23)
BUN/CREAT SERPL: 28.9 (ref 7–25)
CALCIUM SPEC-SCNC: 6.9 MG/DL (ref 8.6–10.5)
CHLORIDE SERPL-SCNC: 104 MMOL/L (ref 98–107)
CO2 SERPL-SCNC: 22 MMOL/L (ref 22–29)
CREAT SERPL-MCNC: 3.39 MG/DL (ref 0.57–1)
CRP SERPL-MCNC: 38.07 MG/DL (ref 0–0.5)
D-LACTATE SERPL-SCNC: 2.9 MMOL/L (ref 0.5–2)
DEPRECATED RDW RBC AUTO: 52.9 FL (ref 37–54)
DIGOXIN SERPL-MCNC: 1.1 NG/ML (ref 0.6–1.2)
ERYTHROCYTE [DISTWIDTH] IN BLOOD BY AUTOMATED COUNT: 16 % (ref 12.3–15.4)
GFR SERPL CREATININE-BSD FRML MDRD: 13 ML/MIN/1.73
GFR SERPL CREATININE-BSD FRML MDRD: ABNORMAL ML/MIN/{1.73_M2}
GLUCOSE SERPL-MCNC: 109 MG/DL (ref 65–99)
HCT VFR BLD AUTO: 29.1 % (ref 34–46.6)
HGB BLD-MCNC: 9.6 G/DL (ref 12–15.9)
LYMPHOCYTES # BLD MANUAL: 0.19 10*3/MM3 (ref 0.7–3.1)
LYMPHOCYTES NFR BLD MANUAL: 1 % (ref 19.6–45.3)
LYMPHOCYTES NFR BLD MANUAL: 1 % (ref 5–12)
MAGNESIUM SERPL-MCNC: 1.5 MG/DL (ref 1.6–2.4)
MCH RBC QN AUTO: 31.5 PG (ref 26.6–33)
MCHC RBC AUTO-ENTMCNC: 33.1 G/DL (ref 31.5–35.7)
MCV RBC AUTO: 95.4 FL (ref 79–97)
METAMYELOCYTES NFR BLD MANUAL: 2 % (ref 0–0)
MONOCYTES # BLD AUTO: 0.19 10*3/MM3 (ref 0.1–0.9)
NEUTROPHILS # BLD AUTO: 17.86 10*3/MM3 (ref 1.7–7)
NEUTROPHILS NFR BLD MANUAL: 62 % (ref 42.7–76)
NEUTS BAND NFR BLD MANUAL: 34 % (ref 0–5)
NT-PROBNP SERPL-MCNC: ABNORMAL PG/ML (ref 0–1800)
PLATELET # BLD AUTO: 34 10*3/MM3 (ref 140–450)
PMV BLD AUTO: 8.7 FL (ref 6–12)
POIKILOCYTOSIS BLD QL SMEAR: ABNORMAL
POTASSIUM SERPL-SCNC: 4.3 MMOL/L (ref 3.5–5.2)
PROCALCITONIN SERPL-MCNC: 276.8 NG/ML (ref 0–0.25)
RBC # BLD AUTO: 3.05 10*6/MM3 (ref 3.77–5.28)
SCAN SLIDE: NORMAL
SMALL PLATELETS BLD QL SMEAR: ABNORMAL
SODIUM SERPL-SCNC: 140 MMOL/L (ref 136–145)
TOXIC GRANULATION: ABNORMAL
VANCOMYCIN SERPL-MCNC: 19.9 MCG/ML (ref 5–40)
WBC # BLD AUTO: 18.6 10*3/MM3 (ref 3.4–10.8)

## 2021-02-08 PROCEDURE — 83735 ASSAY OF MAGNESIUM: CPT | Performed by: PHYSICIAN ASSISTANT

## 2021-02-08 PROCEDURE — 80202 ASSAY OF VANCOMYCIN: CPT | Performed by: HOSPITALIST

## 2021-02-08 PROCEDURE — 99233 SBSQ HOSP IP/OBS HIGH 50: CPT | Performed by: INTERNAL MEDICINE

## 2021-02-08 PROCEDURE — 25010000002 DOPAMINE PER 40 MG: Performed by: HOSPITALIST

## 2021-02-08 PROCEDURE — 93970 EXTREMITY STUDY: CPT

## 2021-02-08 PROCEDURE — 80048 BASIC METABOLIC PNL TOTAL CA: CPT | Performed by: HOSPITALIST

## 2021-02-08 PROCEDURE — 80162 ASSAY OF DIGOXIN TOTAL: CPT | Performed by: HOSPITALIST

## 2021-02-08 PROCEDURE — 97162 PT EVAL MOD COMPLEX 30 MIN: CPT

## 2021-02-08 PROCEDURE — 85025 COMPLETE CBC W/AUTO DIFF WBC: CPT | Performed by: PHYSICIAN ASSISTANT

## 2021-02-08 PROCEDURE — 25010000002 VANCOMYCIN 1 G RECONSTITUTED SOLUTION 1 EACH VIAL: Performed by: HOSPITALIST

## 2021-02-08 PROCEDURE — 85007 BL SMEAR W/DIFF WBC COUNT: CPT | Performed by: PHYSICIAN ASSISTANT

## 2021-02-08 PROCEDURE — 86140 C-REACTIVE PROTEIN: CPT | Performed by: HOSPITALIST

## 2021-02-08 PROCEDURE — 25010000002 CEFTAROLINE FOSAMIL PER 10 MG: Performed by: NURSE PRACTITIONER

## 2021-02-08 PROCEDURE — 84145 PROCALCITONIN (PCT): CPT | Performed by: HOSPITALIST

## 2021-02-08 PROCEDURE — 83880 ASSAY OF NATRIURETIC PEPTIDE: CPT | Performed by: INTERNAL MEDICINE

## 2021-02-08 RX ORDER — BUMETANIDE 0.25 MG/ML
2 INJECTION INTRAMUSCULAR; INTRAVENOUS ONCE
Status: COMPLETED | OUTPATIENT
Start: 2021-02-08 | End: 2021-02-08

## 2021-02-08 RX ADMIN — GABAPENTIN 100 MG: 100 CAPSULE ORAL at 10:15

## 2021-02-08 RX ADMIN — METRONIDAZOLE 500 MG: 500 INJECTION, SOLUTION INTRAVENOUS at 01:32

## 2021-02-08 RX ADMIN — FEBUXOSTAT 40 MG: 40 TABLET, FILM COATED ORAL at 20:00

## 2021-02-08 RX ADMIN — MIDODRINE HYDROCHLORIDE 10 MG: 5 TABLET ORAL at 10:31

## 2021-02-08 RX ADMIN — Medication 5 MG: at 19:55

## 2021-02-08 RX ADMIN — DOPAMINE HYDROCHLORIDE 10 MCG/KG/MIN: 160 INJECTION, SOLUTION INTRAVENOUS at 16:51

## 2021-02-08 RX ADMIN — SODIUM CHLORIDE 1000 MG: 900 INJECTION, SOLUTION INTRAVENOUS at 10:15

## 2021-02-08 RX ADMIN — Medication 10 ML: at 20:02

## 2021-02-08 RX ADMIN — GABAPENTIN 100 MG: 100 CAPSULE ORAL at 16:37

## 2021-02-08 RX ADMIN — BUMETANIDE 2 MG: 0.25 INJECTION, SOLUTION INTRAMUSCULAR; INTRAVENOUS at 11:35

## 2021-02-08 RX ADMIN — ASPIRIN 81 MG CHEWABLE TABLET 81 MG: 81 TABLET CHEWABLE at 10:15

## 2021-02-08 RX ADMIN — DOPAMINE HYDROCHLORIDE 10 MCG/KG/MIN: 160 INJECTION, SOLUTION INTRAVENOUS at 05:16

## 2021-02-08 RX ADMIN — Medication: at 10:16

## 2021-02-08 RX ADMIN — FEBUXOSTAT 40 MG: 40 TABLET, FILM COATED ORAL at 10:15

## 2021-02-08 RX ADMIN — GABAPENTIN 100 MG: 100 CAPSULE ORAL at 20:01

## 2021-02-08 RX ADMIN — ACETAMINOPHEN 650 MG: 325 TABLET, FILM COATED ORAL at 19:55

## 2021-02-08 RX ADMIN — MIDODRINE HYDROCHLORIDE 10 MG: 5 TABLET ORAL at 16:37

## 2021-02-08 RX ADMIN — SODIUM CHLORIDE 200 MG: 9 INJECTION, SOLUTION INTRAVENOUS at 16:37

## 2021-02-08 RX ADMIN — METRONIDAZOLE 500 MG: 500 INJECTION, SOLUTION INTRAVENOUS at 11:34

## 2021-02-08 RX ADMIN — ZINC OXIDE: 200 OINTMENT TOPICAL at 10:16

## 2021-02-08 RX ADMIN — Medication: at 20:02

## 2021-02-08 NOTE — PROGRESS NOTES
"                                                                                          [facility] Follow up Note        PATIENT IDENTIFICATION     Name: Emperatriz Childs  Age: 75 y.o.  Sex: female  :  1945  MRN: [id number]       CHIEF COMPLIANTS / REASON FOR FOLLOW UP            CKD, severe metabolic acidosis, hypotension.     Subjective:      Patient seen and examined  Much more awake and alert.   Getting iv fluids.   Blood pressure stable.   On dopamine too.   Didn't get fluids all night.   Got midline in am.        Review of Systems:       Constitutional: weakness.   HEENT:           No headache, otalgia, itchy eyes, nasal discharge or sore throat.  Cardiac:           No chest pain, dyspnea, orthopnea or PND.  Chest:  No cough, phlegm or wheezing.  Abdomen:        No tenderness.   Neuro:             No focal weakness, abnormal movements orseizure like activity.  :                  diminished urine output.   ROS was otherwise negative except as mentioned in the [place].        OBJECTIVE                                                                        Exam:  BP (!) 118/37   Pulse 60   Temp 98.5 °F (36.9 °C)   Resp 24   Ht 157.5 cm (62\")   Wt 81.5 kg (179 lb 10.8 oz)   SpO2 91%   BMI 32.86 kg/m²   Intake/Output last 3 shifts:  I/O last 3 completed shifts:  In: 2366 [P.O.:666; IV Piggyback:1700]  Out: 240 [Urine:240]  Intake/Output this shift:  No intake/output data recorded.    General Appearance:    Chronically ill looking, not overt distress.    Head:    Normocephalic, atraumatic   Eyes:    No icterus.      Mouth:    Dry,    Ears:    TMs not observed   Nose:   Patent without discharge   Neck:   Supple, No JVD   Lungs:     Clear to auscultation bilaterally, anteriorly   Chest wall:    No tenderness   Heart:    Regular rate and rhythm, S1 and S2 normal, no   rub    or gallop   Extremities:   Chronic skin changes , redness,    Abdomen:   Soft, nontender, nondistended,  no " masses, no organomegaly   Neurologic:     Weakness.          Scheduled Meds:aspirin, 81 mg, Oral, Daily  cefepime, 2 g, Intravenous, Q24H  digoxin, 125 mcg, Oral, Q24H  febuxostat, 40 mg, Oral, BID  gabapentin, 100 mg, Oral, TID  haloperidol lactate, 5 mg, Intramuscular, Once  magic butt paste, , Topical, Daily  metroNIDAZOLE, 500 mg, Intravenous, Q8H  midodrine, 10 mg, Oral, TID AC  sodium chloride, 10 mL, Intravenous, Q12H  Zinc Oxide, , Apply externally, BID      Continuous Infusions:DOPamine, 2-20 mcg/kg/min, Last Rate: 10.004 mcg/kg/min (02/07/21 1137)  Pharmacy to dose vancomycin,   sodium bicarbonate, 150 mEq, Last Rate: 150 mEq (02/07/21 1144)      PRN Meds:•  acetaminophen **OR** acetaminophen **OR** acetaminophen  •  magnesium sulfate **OR** magnesium sulfate in D5W 1g/100mL (PREMIX) **OR** magnesium sulfate  •  melatonin  •  nitroglycerin  •  OLANZapine  •  ondansetron **OR** ondansetron  •  Pharmacy to dose vancomycin  •  potassium chloride  •  potassium chloride  •  sodium chloride  •  traMADol  •  vancomycin         Data Review:                                                                           Lab Results (last 24 hours)     Procedure Component Value Units Date/Time    Blood Culture - Blood, Arm, Right [[id number]] Collected: 02/06/21 1307    Specimen: Blood from Arm, Right Updated: 02/07/21 1431     Blood Culture No growth at 24 hours    Blood Culture - Blood, Arm, Left [[id number]] Collected: 02/06/21 1309    Specimen: Blood from Arm, Left Updated: 02/07/21 1431     Blood Culture No growth at 24 hours    Manual Differential [[id number]]  (Abnormal) Collected: 02/07/21 1305    Specimen: Blood Updated: 02/07/21 1356     Neutrophil % 45.0 %      Lymphocyte % 1.0 %      Monocyte % 6.0 %      Bands %  46.0 %      Metamyelocyte % 2.0 %      Neutrophils Absolute 22.75 10*3/mm3      Lymphocytes Absolute 0.25 10*3/mm3      Monocytes Absolute 1.50 10*3/mm3      Anisocytosis Slight/1+     Macrocytes  Slight/1+     Poikilocytes Slight/1+     WBC Morphology Normal     Clumped Platelets Present and due to EDTA    CBC & Differential [[id number]]  (Abnormal) Collected: 02/07/21 1305    Specimen: Blood Updated: 02/07/21 1356    Narrative:      The following orders were created for panel order CBC & Differential.  Procedure                               Abnormality         Status                     ---------                               -----------         ------                     Scan Slide[[id number]]                                       Final result               CBC Auto Differential[[id number]]        Abnormal            Final result                 Please view results for these tests on the individual orders.    CBC Auto Differential [[id number]]  (Abnormal) Collected: 02/07/21 1305    Specimen: Blood Updated: 02/07/21 1356     WBC 25.00 10*3/mm3      RBC 3.22 10*6/mm3      Hemoglobin 10.1 g/dL      Hematocrit 31.3 %      MCV 97.2 fL      MCH 31.2 pg      MCHC 32.1 g/dL      RDW 16.2 %      RDW-SD 55.1 fl      MPV 9.6 fL      Platelets 82 10*3/mm3      Comment: Platelet estimate performed due to platelet clumping.   Modified report. Previous result was 87 10*3/mm3 on 2/7/2021 at 1325 EST.       Narrative:      The previously reported component NRBC is no longer being reported. Previous result was 0.1 /100 WBC (Reference Range: 0.0-0.2 /100 WBC) on 2/7/2021 at 1325 EST.    Scan Slide [[id number]] Collected: 02/07/21 1305    Specimen: Blood Updated: 02/07/21 1356     Scan Slide --     Comment: See Manual Differential Results       Magnesium [[id number]]  (Abnormal) Collected: 02/07/21 1305    Specimen: Blood Updated: 02/07/21 1348     Magnesium 1.4 mg/dL     C-reactive Protein [[id number]]  (Abnormal) Collected: 02/07/21 1305    Specimen: Blood Updated: 02/07/21 1348     C-Reactive Protein 31.97 mg/dL     Digoxin Level [[id number]]  (Normal) Collected: 02/07/21 1305    Specimen: Blood Updated: 02/07/21 1348      Digoxin 1.20 ng/mL     Comprehensive Metabolic Panel [[id number]]  (Abnormal) Collected: 02/07/21 1305    Specimen: Blood Updated: 02/07/21 1348     Glucose 87 mg/dL      BUN 87 mg/dL      Creatinine 3.50 mg/dL      Sodium 140 mmol/L      Potassium 4.5 mmol/L      Chloride 109 mmol/L      CO2 18.0 mmol/L      Calcium 7.1 mg/dL      Total Protein 5.9 g/dL      Albumin 2.50 g/dL      ALT (SGPT) 12 U/L      AST (SGOT) 30 U/L      Alkaline Phosphatase 135 U/L      Total Bilirubin 0.6 mg/dL      eGFR Non African Amer 13 mL/min/1.73      Comment: <15 Indicative of kidney failure.        eGFR   Amer --     Comment: <15 Indicative of kidney failure.        Globulin 3.4 gm/dL      A/G Ratio 0.7 g/dL      BUN/Creatinine Ratio 24.9     Anion Gap 13.0 mmol/L     Narrative:      GFR Normal >60  Chronic Kidney Disease <60  Kidney Failure <15      Creatinine, Urine, Random - Urine, Clean Catch [[id number]] Collected: 02/06/21 1850    Specimen: Urine, Clean Catch Updated: 02/07/21 1213     Creatinine, Urine 205.7 mg/dL     Narrative:      Reference intervals for random urine have not been established.  Clinical usage is dependent upon physician's interpretation in combination with other laboratory tests.       Haptoglobin [[id number]]  (Normal) Collected: 02/06/21 2247    Specimen: Blood Updated: 02/07/21 1155     Haptoglobin 100 mg/dL      Comment: Specimen hemolyzed. Results may be affected.       Clostridium Difficile Toxin - Stool, Per Rectum [[id number]]  (Normal) Collected: 02/06/21 1952    Specimen: Stool from Per Rectum Updated: 02/07/21 0740    Narrative:      The following orders were created for panel order Clostridium Difficile Toxin - Stool, Per Rectum.  Procedure                               Abnormality         Status                     ---------                               -----------         ------                     Clostridium Difficile EI...[[id number]]  Normal              Final result                  Please view results for these tests on the individual orders.    Clostridium Difficile EIA - Stool, Per Rectum [[id number]]  (Normal) Collected: 02/06/21 1952    Specimen: Stool from Per Rectum Updated: 02/07/21 0725     C Diff GDH / Toxin Negative    Comprehensive Metabolic Panel [[id number]]  (Abnormal) Collected: 02/06/21 2247    Specimen: Blood Updated: 02/06/21 2337     Glucose 100 mg/dL      BUN 80 mg/dL      Creatinine 3.35 mg/dL      Sodium 139 mmol/L      Potassium 5.1 mmol/L      Comment: Slight hemolysis detected by analyzer. Results may be affected.        Chloride 112 mmol/L      CO2 14.0 mmol/L      Calcium 7.3 mg/dL      Total Protein 5.7 g/dL      Albumin 2.40 g/dL      ALT (SGPT) 10 U/L      AST (SGOT) 32 U/L      Comment: Slight hemolysis detected by analyzer. Results may be affected.        Alkaline Phosphatase 109 U/L      Total Bilirubin 0.4 mg/dL      eGFR Non African Amer 13 mL/min/1.73      Comment: <15 Indicative of kidney failure.        eGFR   Amer --     Comment: <15 Indicative of kidney failure.        Globulin 3.3 gm/dL      A/G Ratio 0.7 g/dL      BUN/Creatinine Ratio 23.9     Anion Gap 13.0 mmol/L     Narrative:      GFR Normal >60  Chronic Kidney Disease <60  Kidney Failure <15      POC Glucose Once [[id number]]  (Normal) Collected: 02/06/21 2012    Specimen: Blood Updated: 02/06/21 2014     Glucose 103 mg/dL      Comment: Serial Number: [id number]:  [id number]       Hepatitis C Antibody [[id number]]  (Normal) Collected: 02/06/21 1309    Specimen: Blood Updated: 02/06/21 1957     Hepatitis C Ab Non-Reactive    Narrative:      Results may be falsely decreased if patient taking Biotin.      Hepatitis B Surface Antigen [[id number]]  (Normal) Collected: 02/06/21 1309    Specimen: Blood Updated: 02/06/21 1957     Hepatitis B Surface Ag Non-Reactive    Narrative:      Results may be falsely decreased if patient taking Biotin.               Imaging:                                                                              Imaging Results (Last 24 Hours)     Procedure Component Value Units Date/Time    XR Chest 1 View [[id number]] Collected: 02/07/21 1043     Updated: 02/07/21 1047    Narrative:      EXAMINATION: XR CHEST 1 VW-     DATE OF EXAM: 2/7/2021 10:20 AM     INDICATION: picc line placement.     COMPARISON: Chest radiograph dated 02/06/2021     TECHNIQUE: Portable AP view of the chest was obtained.     FINDINGS:  There is a right-sided PICC line with tip terminating over the low SVC.  There is a left chest wall ICD with lead in unchanged position.  Pulmonary vascularity appears within normal limits. There is no acute  infectious consolidation, pleural effusion, or pneumothorax. There are  degenerative changes of the thoracic spine.       Impression:      1. New right-sided PICC line with tip terminating over the low SVC.  2. Stable cardiomegaly.     Electronically Signed By-Dae Auguste MD On:2/7/2021 10:45 AM  This report was finalized on [id number] by  Dae Auguste MD.                 ASSESSMENT:                                                                                Congestive heart failure (CMS/HCC)    Atrial fibrillation (CMS/HCC)    Hypertension, benign    Lymphedema    Gout    Non-pressure chronic ulcer right ankle, limited to breakdown skin (CMS/HCC)    Automatic implantable cardiac defibrillator in situ    Acute UTI (urinary tract infection)    Lower extremity cellulitis    CAD (coronary artery disease)    Sepsis (CMS/HCC)    CONG (acute kidney injury) (CMS/HCC)    Lactic acidosis    Metabolic acidosis    Delirium, acute    · Acute kidney injury, oliguric, stage 2-3, evolving, ongoing hypotension, ? Sepsis, and prerenal azotemia component with ongoing diarrhea, diuretics use,   ·  might have some degree of progression of CKD too.   · CKD 4, with baseline creatinine around 1.7- 2.2, till last year,02/2020, never followed up in clinic  Work up then  ua  negative RBC,WBC,no protein. UPC, was unremarkable in 02/2020  USG, right 8.9 cm, andleft 8.3 cm, medial renal disease.     · Metabolic acidosis, gap and non gap, more due to GI bicarb loss, CKD and persistent lactic acidosis. patient apparently had large bowel movement, significant metabolic acidosis  · Hypotension, concern hypovolemia,   · Congestive heart failure with last ejection fraction around 40% from 2018,   · History of atrial fibrillation  · Significant metabolic acidosis  · Significant anemia  · Chronic lymphedema  · Cellulitis of lower extremities.   · Thrombocytopenia, also on 02/2020  · Anemia.   ·       PLAN:                                                                              · Patient states she was seen by nephrologist before, last time on 02/2020, by Dr Silva,  noted had underlying chronic kidney disease with a baseline creatinine around 1.8- 2, even then, worsened 2.7,   ongoing hypotension,and prerenal azotemia component with ongoing diarrhea, diuretics use, and sepsis. Very likely might have some degree of progression of CKD too.  · Abelardo complicated with acidosis, gap and non gap, more due to GI bicarb loss, CKD and persistent lactic acidosis. patient apparently had large bowel movement, significant metabolic acidosis, had earlier given 2 amp of sodium bicarb,     · Patient kidney function continues to worsen, still anuric in am, unfortunately, beside, bolus we gave yesterday, couldn't get fluids all night, due to lack of iv access,discussed with nursing staff in am, needs to be on iv fluids, started on dopamine, I think more important is to replete volume.   As discussed, Hard to  her volume, she does have underlying cardiomyopathy, she is currently lying flat in bed, oxygenating well,  she does have a chronic lymphedema, with likely cellulitis, chronic skin changes,Chest x-ray also looks stable without any overt pulmonary congestion, with a persistent lactic acidosis,  hypotension, and worsening CONG, I will start gentle bicarb fluids, I feel might be intravascularly depleted, primarily 3rd spacing, hyun with severe hypoalbuminemia    · Repeated labs, in afternoon, on bicarb fluids now, improving acidosis,   · I see some urine output in bliss,   · D/w patient and daughter, discussed undelrying CKD, now CONG risk of RRT , but expect improvement with ongoing resuscitaion and antibiotics, and ,improving hemodynamic status.   · Continue bliss.   · Fu sepsis work up   · D/w Dr Winslow yesterday.   · Repeat labs,   · Decrease fluids in evening.   · We will follow.              Wilfrid Silveira MD  [facility]  2/7/2021  19:37 EST

## 2021-02-08 NOTE — PROGRESS NOTES
"Pharmacy Antimicrobial Dosing Service    Subjective:  Emperatriz Childs is a 75 y.o.female admitted with cellulitis. Pharmacy has been consulted to dose Vancomycin for possible SSTI.    PMH: CONG on CKD      Assessment/Plan    1. Day #3 Vancomycin: Pulse dosing d/t renal dysfxn. Last dose of vanc was 1000mg given 2/7 at 1417. Random=19.9 mcg/ml on 2/8 at 0529. Will redose vanc 1000mg this AM. Random level to follow with AM labs on 2/9.    2. Day #3 Cefepime: 2g IV q24h for estCrCl < 30 mL/min.    Will continue to monitor drug levels, renal function, culture and sensitivities, and patient clinical status.       Objective:  Relevant clinical data and objective history reviewed:  157.5 cm (62\")   79.8 kg (175 lb 14.8 oz)   Ideal body weight: 50.1 kg (110 lb 7.2 oz)  Adjusted ideal body weight: 62 kg (136 lb 10.3 oz)  Body mass index is 32.18 kg/m².    Results from last 7 days   Lab Units 02/08/21  0529 02/06/21  1708   VANCOMYCIN RM mcg/mL 19.90 11.90     Results from last 7 days   Lab Units 02/08/21  0529 02/07/21  1305 02/06/21  2247   CREATININE mg/dL 3.39* 3.50* 3.35*     Estimated Creatinine Clearance: 13.8 mL/min (A) (by C-G formula based on SCr of 3.39 mg/dL (H)).  I/O last 3 completed shifts:  In: 2528 [I.V.:1978; IV Piggyback:550]  Out: 390 [Urine:390]    Results from last 7 days   Lab Units 02/08/21  0529 02/07/21  1305 02/06/21  0311   WBC 10*3/mm3 18.60* 25.00* 10.70     Temperature    02/07/21 2309 02/08/21 0217 02/08/21 0557   Temp: 97.9 °F (36.6 °C) 98.2 °F (36.8 °C) 97.9 °F (36.6 °C)     Baseline culture/source/susceptibility:  Microbiology Results (last 10 days)       Procedure Component Value - Date/Time    Clostridium Difficile Toxin - Stool, Per Rectum [[id number]]  (Normal) Collected: 02/06/21 1952    Lab Status: Final result Specimen: Stool from Per Rectum Updated: 02/07/21 0725    Narrative:      The following orders were created for panel order Clostridium Difficile Toxin - Stool, Per " Rectum.  Procedure                               Abnormality         Status                     ---------                               -----------         ------                     Clostridium Difficile EI...[[id number]]  Normal              Final result                 Please view results for these tests on the individual orders.    Clostridium Difficile EIA - Stool, Per Rectum [[id number]]  (Normal) Collected: 02/06/21 1952    Lab Status: Final result Specimen: Stool from Per Rectum Updated: 02/07/21 0725     C Diff GDH / Toxin Negative    Blood Culture - Blood, Arm, Left [[id number]] Collected: 02/06/21 1309    Lab Status: Preliminary result Specimen: Blood from Arm, Left Updated: 02/07/21 1431     Blood Culture No growth at 24 hours    Blood Culture - Blood, Arm, Right [[id number]] Collected: 02/06/21 1307    Lab Status: Preliminary result Specimen: Blood from Arm, Right Updated: 02/07/21 1431     Blood Culture No growth at 24 hours    MRSA Screen, PCR (Inpatient) - Swab, Nares [[id number]]  (Abnormal) Collected: 02/06/21 1114    Lab Status: Final result Specimen: Swab from Nares Updated: 02/06/21 1311     MRSA PCR MRSA Detected    COVID PRE-OP / PRE-PROCEDURE SCREENING ORDER (NO ISOLATION) - Swab, Nasopharynx [[id number]]  (Normal) Collected: 02/06/21 0153    Lab Status: Final result Specimen: Swab from Nasopharynx Updated: 02/06/21 1534    Narrative:      The following orders were created for panel order COVID PRE-OP / PRE-PROCEDURE SCREENING ORDER (NO ISOLATION) - Swab, Nasopharynx.  Procedure                               Abnormality         Status                     ---------                               -----------         ------                     COVID-19,APTIMA PANTHER,...[[id number]]  Normal              Final result                 Please view results for these tests on the individual orders.    COVID-19,APTIMA SCARHERCHILO IN-HOUSE, NP/OP SWAB IN UTM/VTM/[facility] TRANSPORT MEDIA,24 HR TAT - Swab,  Nasopharynx [[id number]]  (Normal) Collected: 02/06/21 0153    Lab Status: Final result Specimen: Swab from Nasopharynx Updated: 02/06/21 1534     COVID19 Not Detected    Narrative:      Fact sheet for providers: https://www.fda.gov/media/420856/download     Fact sheet for patients: https://www.fda.gov/media/542763/download    Test performed by RT PCR.             Anti-Infectives (From admission, onward)      Ordered     Dose/Rate Route Frequency Start Stop    02/08/21 0713  vancomycin (VANCOCIN) 1,000 mg in sodium chloride 0.9 % 250 mL IVPB     Ordering Provider: Jake Marx I DO    15 mg/kg × 61.3 kg (Adjusted)  over 60 Minutes Intravenous Once 02/08/21 0800      02/07/21 1653  metroNIDAZOLE (FLAGYL) 500 mg/100mL IVPB     Ordering Provider: Jake Marx DO    500 mg Intravenous Every 8 Hours 02/07/21 1745 02/12/21 1744    02/07/21 1302  cefepime 2 gm IVPB in 100 ml NS (MBP)     Ordering Provider: Jake Marx DO    2 g  over 4 Hours Intravenous Every 24 Hours 02/07/21 1500 02/13/21 1459    02/07/21 1257  vancomycin (VANCOCIN) 1,000 mg in sodium chloride 0.9 % 250 mL IVPB     Ordering Provider: Jake Marx I DO    15 mg/kg × 61.3 kg (Adjusted)  over 60 Minutes Intravenous Once 02/07/21 1345 02/07/21 1517    02/06/21 0237  !Vancomycin Level Draw Needed     Ordering Provider: Bert Bateman PA-C     Does not apply Once 02/06/21 1500 02/06/21 1712    02/06/21 0145  vancomycin (VANCOCIN) 1,000 mg in sodium chloride 0.9 % 250 mL IVPB     Ordering Provider: Bert Bateman PA-C    15 mg/kg × 61.3 kg (Adjusted) Intravenous As Needed 02/06/21 0145 02/13/21 0144    02/06/21 0058  vancomycin 1250 mg/250 mL 0.9% NS IVPB ([facility])     Ordering Provider: Bert Bateman PA-C    20 mg/kg × 61.3 kg (Adjusted)  over 75 Minutes Intravenous Once 02/06/21 0145 02/06/21 0404    02/06/21 0040  cefepime 2 gm IVPB in 100 ml NS (MBP)     Ordering Provider: Bert Bateman PA-C     2 g  over 30 Minutes Intravenous Once 02/06/21 0130 02/06/21 0230    02/06/21 0040  Pharmacy to dose vancomycin     Ordering Provider: Bert Bateman PA-C     Does not apply Continuous PRN 02/06/21 0038 02/13/21 0037            Sy Avendano, Pharmacy Intern  02/08/21 07:14 EST

## 2021-02-08 NOTE — PROGRESS NOTES
Continued Stay Note  [facility]     Patient Name: Emperatriz Childs  MRN: [id number]  Today's Date: 2/8/2021    Admit Date: 2/5/2021    Discharge Plan     Row Name 02/08/21 1545       Plan    Plan  DC Plan: PT/OT laci pending. Wants [facility] if rehab recommended. Would require PASRR and precert. Current with [facility] services.    Plan Comments  Received call from [facility] that patient is current with their services. She requested for DC summary to be faxed when available at [place] to [facility] fx: [phone]     Phone communication or documentation only - no physical contact with patient or family.    Agatha Dailey RN     Office Phone: [phone]  Office Cell: [phone]

## 2021-02-08 NOTE — PLAN OF CARE
Goal Outcome Evaluation:  Plan of Care Reviewed With: patient     Outcome Summary: Pt is a 74 YO F admitted with acute UTI. Pt reports living alone, typically independent with ADLs, ambulating with RWx and reports no recent falls. Pt this date requires Mod/MAX A to come to sitting EOB and MIN A to maintain seated balance. Pt sitting EOB, and R foot began bleeding through dressing. Pt returned to supine and nursing notified immediately. Pt not appropriate to come to standing or transfer at this time. PT will continue to follow 3x/week and pt will require IP rehab following d/c. PPE worn includes gloves and mask with goggles.

## 2021-02-08 NOTE — PROGRESS NOTES
"      [facility] Medicine Services Daily Progress Note      Hospitalist Team  LOS 0 days      Patient Care Team:  Rosa M Chavez APRN as PCP - General (Nurse Practitioner)    Patient Location: 2120/1      Subjective   Subjective     Chief Complaint / Subjective  Fevers, leg pain      Brief Synopsis of Hospital Course/HPI    The patient is a 75-year-old female with history atrial fibrillation s/p pacemaker placement, hyperlipidemia, hypertension, CAD, CKD stage III and  chronic lower extremity lymphedema.    Apparently the patient has been having several weeks of worsening lower extremity edema thus went to outside facility.    Laboratory work was significant for , potassium 5.3, BUN 63, creatinine 2.41, WBC 4.5, lactic acid 3.2 and UA with 25-50 WBCs.  The patient was transferred to [facility] for further care      Date::    2/6/21: Poor historian.  Low BP thus gave bolus.  Started on dopamine and transferred to PCU.  Started on bicarb drip.  Confused in the evening.  2/7/21: PICC line placed.  Daughter at the bedside discussed care.  Daughter claims no history of dementia or sundowning.  Leukocytosis.  Added Flagyl.  Consulted ID.  Daughter claims patient off Xarelto.  C. difficile ruled out.      2/8  Has third spacing and bruising.  Patient denies any chest pain  On dopamine drip and being tapered off.  Started on midodrine.      Review of Systems   All other systems reviewed and are negative.        Objective   Objective      Vital Signs  Temp:  [97.5 °F (36.4 °C)-98.5 °F (36.9 °C)] 97.5 °F (36.4 °C)  Heart Rate:  [60] 60  Resp:  [18-24] 20  BP: (118-154)/(37-88) 129/50  Oxygen Therapy  SpO2: 94 %  Pulse Oximetry Type: Continuous  Device (Oxygen Therapy): room air  Flowsheet Rows      First Filed Value   Admission Height  157.5 cm (62\") Documented at 02/05/2021 2359   Admission Weight  78 kg (172 lb) Documented at 02/05/2021 2359        Intake & Output (last 3 days)       02/05 " 0701 - 02/06 0700 02/06 0701 - 02/07 0700 02/07 0701 - 02/08 0700 02/08 0701 - 02/09 0700    P.O. 240 666      I.V. (mL/kg)   1978 (24.8) 200 (2.5)    IV Piggyback  1250 550 350    Total Intake(mL/kg) 240 (2.9) 1916 (23.5) 2528 (31.7) 550 (6.9)    Urine (mL/kg/hr) 250 40 (0) 450 (0.2) 875 (1.2)    Stool 0 0 0     Total Output 250 40 450 875    Net -10 +1876 +2078 -325            Stool Unmeasured Occurrence 1 x 3 x 1 x         Lines, Drains & Airways    Active LDAs     Name:   Placement date:   Placement time:   Site:   Days:    Peripheral IV 02/06/21 0403 Anterior;Right Forearm   02/06/21 0403    Forearm   less than 1                  Physical Exam:    Physical Exam  HENT:      Head: Normocephalic.      Nose: Nose normal.   Eyes:      General: No scleral icterus.     Extraocular Movements: Extraocular movements intact.      Pupils: Pupils are equal, round, and reactive to light.   Neck:      Musculoskeletal: Normal range of motion.   Cardiovascular:      Rate and Rhythm: Normal rate and regular rhythm.   Pulmonary:      Effort: Pulmonary effort is normal.      Breath sounds: Normal breath sounds.   Abdominal:      General: Bowel sounds are normal.      Palpations: Abdomen is soft.   Musculoskeletal:      Comments: Bilateral shin with erythema and edema.  Lymphedema.   Lymphadenopathy:      Cervical: No cervical adenopathy.   Skin:     General: Skin is warm.   Neurological:      Mental Status: She is alert.   Psychiatric:         Attention and Perception: Attention normal.              Wounds (last 24 hours)      LDA Wound     Row Name 02/08/21 1200 02/08/21 0800 02/08/21 0430       Wound 02/06/21 0031 Right lower leg Other (comment)    Wound - Properties Group Placement Date: 02/06/21  -CS Placement Time: 0031  -CS Present on Hospital Admission: Y  -CS Side: Right  -CS Orientation: lower  -CS Location: leg  -CS Primary Wound Type: Other  -CS, chronic redness and swelling     Closure  NELY  -KS  NELY  -KS  NELY  -DH     Base  red  -KS  red  -KS  red  -DH    Periwound  blistered;excoriated;edematous  -KS  blistered;excoriated;edematous  -KS  blistered;excoriated;edematous  -DH    Periwound Temperature  hot;warm  -KS  hot;warm  -KS  hot;warm  -DH    Drainage Amount  small  -KS  small  -KS  small  -DH    Retired Wound - Properties Group Date first assessed: 02/06/21  -CS Time first assessed: 0031  -CS Present on Hospital Admission: Y  -CS Side: Right  -CS Location: leg  -CS Primary Wound Type: Other  -CS, chronic redness and swelling        Wound 02/06/21 0032 Right anterior foot Other (comment)    Wound - Properties Group Placement Date: 02/06/21  -CS Placement Time: 0032  -CS Present on Hospital Admission: Y  -CS Side: Right  -CS Orientation: anterior  -CS Location: foot  -CS Primary Wound Type: Other  -CS Additional Comments: chronic redness and swelling  -CS    Closure  NELY  -KS  NELY  -KS  NELY  -DH    Base  red  -KS  red  -KS  red  -DH    Periwound  edematous;excoriated;redness  -KS  edematous;excoriated;redness  -KS  edematous;excoriated;redness  -DH    Periwound Temperature  warm;hot  -KS  warm;hot  -KS  warm;hot  -DH    Periwound Skin Turgor  firm  -KS  firm  -KS  firm  -DH    Drainage Amount  scant  -KS  scant  -KS  scant  -DH    Retired Wound - Properties Group Date first assessed: 02/06/21  -CS Time first assessed: 0032  -CS Present on Hospital Admission: Y  -CS Side: Right  -CS Location: foot  -CS Primary Wound Type: Other  -CS Additional Comments: chronic redness and swelling  -CS       Wound 02/06/21 0033 Left lower leg Other (comment)    Wound - Properties Group Placement Date: 02/06/21  -CS Placement Time: 0033  -CS Present on Hospital Admission: N  -CS Side: Left  -CS Orientation: lower  -CS Location: leg  -CS Primary Wound Type: Other  -CS Additional Comments: chronic redness and swelling  -CS    Closure  NELY  -KS  NELY  -KS  NELY  -DH    Base  red  -KS  red  -KS  red  -DH    Periwound  edematous;excoriated;redness   -KS  edematous;excoriated;redness  -KS  edematous;excoriated;redness  -DH    Periwound Temperature  warm;hot  -KS  warm;hot  -KS  warm;hot  -DH    Retired Wound - Properties Group Date first assessed: 02/06/21  -CS Time first assessed: 0033  -CS Present on Hospital Admission: N  -CS Side: Left  -CS Location: leg  -CS Primary Wound Type: Other  -CS Additional Comments: chronic redness and swelling  -CS       Wound 02/06/21 0035 Left anterior ankle Other (comment)    Wound - Properties Group Placement Date: 02/06/21  -CS Placement Time: 0035  -CS Present on Hospital Admission: Y  -CS Side: Left  -CS Orientation: anterior  -CS Location: ankle  -CS Primary Wound Type: Other  -CS    Closure  NELY  -KS  NELY  -KS  NELY  -DH    Base  red  -KS  red  -KS  red  -DH    Periwound  redness;edematous;excoriated  -KS  redness;edematous;excoriated  -KS  redness;edematous;excoriated  -DH    Periwound Temperature  warm  -KS  warm  -KS  warm  -DH    Periwound Skin Turgor  firm  -KS  firm  -KS  firm  -DH    Retired Wound - Properties Group Date first assessed: 02/06/21  -CS Time first assessed: 0035  -CS Present on Hospital Admission: Y  -CS Side: Left  -CS Location: ankle  -CS Primary Wound Type: Other  -CS       Wound 02/06/21 0047 Right heel Other (comment)    Wound - Properties Group Placement Date: 02/06/21  -CS Placement Time: 0047  -CS Present on Hospital Admission: Y  -CS Side: Right  -CS Location: heel  -CS Primary Wound Type: Other  -CS, Chronic redness and swelling      Closure  NELY  -KS  NELY  -KS  NELY  -DH    Base  red  -KS  red  -KS  red  -DH    Periwound  edematous;excoriated;redness  -KS  edematous;excoriated;redness  -KS  edematous;excoriated;redness  -DH    Retired Wound - Properties Group Date first assessed: 02/06/21  -CS Time first assessed: 0047  -CS Present on Hospital Admission: Y  -CS Side: Right  -CS Location: heel  -CS Primary Wound Type: Other  -CS, Chronic redness and swelling         Wound 02/06/21 0050 Left  posterior thigh Other (comment)    Wound - Properties Group Placement Date: 02/06/21  -CS Placement Time: 0050  -CS Present on Hospital Admission: Y  -CS Side: Left  -CS Orientation: posterior  -CS Location: thigh  -CS Primary Wound Type: Other  -CS, Chronic redness and swelling      Base  red;scab  -KS  red;scab  -KS  red;scab  -DH    Periwound  edematous;excoriated;redness  -KS  edematous;excoriated;redness  -KS  edematous;excoriated;redness  -DH    Retired Wound - Properties Group Date first assessed: 02/06/21  -CS Time first assessed: 0050  -CS Present on Hospital Admission: Y  -CS Side: Left  -CS Location: thigh  -CS Primary Wound Type: Other  -CS, Chronic redness and swelling         Wound 02/06/21 0051 coccyx Pressure Injury    Wound - Properties Group Placement Date: 02/06/21  -CS Placement Time: 0051  -CS Present on Hospital Admission: Y  -CS Location: coccyx  -CS Primary Wound Type: Pressure inj  -CS Stage, Pressure Injury : Stage 2  -CS    Base  non-blanchable;scab  -KS  non-blanchable;scab  -KS  non-blanchable;scab  -DH    Periwound  non-blanchable;redness  -KS  non-blanchable;redness  -KS  non-blanchable;redness  -DH    Retired Wound - Properties Group Date first assessed: 02/06/21  -CS Time first assessed: 0051  -CS Present on Hospital Admission: Y  -CS Location: coccyx  -CS Primary Wound Type: Pressure inj  -CS    Row Name 02/08/21 0045 02/07/21 2040          Wound 02/06/21 0031 Right lower leg Other (comment)    Wound - Properties Group Placement Date: 02/06/21  -CS Placement Time: 0031  -CS Present on Hospital Admission: Y  -CS Side: Right  -CS Orientation: lower  -CS Location: leg  -CS Primary Wound Type: Other  -CS, chronic redness and swelling     Closure  NELY  -DH  NELY  -DH     Base  red  -DH  red  -DH     Periwound  blistered;excoriated;edematous  -DH  blistered;excoriated;edematous  -DH     Periwound Temperature  hot;warm  -DH  hot;warm  -DH     Drainage Amount  small  -DH  small  -DH      Retired Wound - Properties Group Date first assessed: 02/06/21  -CS Time first assessed: 0031  -CS Present on Hospital Admission: Y  -CS Side: Right  -CS Location: leg  -CS Primary Wound Type: Other  -CS, chronic redness and swelling        Wound 02/06/21 0032 Right anterior foot Other (comment)    Wound - Properties Group Placement Date: 02/06/21  -CS Placement Time: 0032  -CS Present on Hospital Admission: Y  -CS Side: Right  -CS Orientation: anterior  -CS Location: foot  -CS Primary Wound Type: Other  -CS Additional Comments: chronic redness and swelling  -CS    Closure  NELY  -DH  NELY  -DH     Base  red  -DH  red  -DH     Periwound  edematous;excoriated;redness  -DH  edematous;excoriated;redness  -DH     Periwound Temperature  warm;hot  -DH  warm;hot  -DH     Periwound Skin Turgor  firm  -DH  firm  -DH     Drainage Amount  scant  -DH  scant  -DH     Retired Wound - Properties Group Date first assessed: 02/06/21  -CS Time first assessed: 0032  -CS Present on Hospital Admission: Y  -CS Side: Right  -CS Location: foot  -CS Primary Wound Type: Other  -CS Additional Comments: chronic redness and swelling  -CS       Wound 02/06/21 0033 Left lower leg Other (comment)    Wound - Properties Group Placement Date: 02/06/21  -CS Placement Time: 0033  -CS Present on Hospital Admission: N  -CS Side: Left  -CS Orientation: lower  -CS Location: leg  -CS Primary Wound Type: Other  -CS Additional Comments: chronic redness and swelling  -CS    Closure  NELY  -DH  NELY  -DH     Base  red  -DH  red  -DH     Periwound  edematous;excoriated;redness  -DH  edematous;excoriated;redness  -DH     Periwound Temperature  warm;hot  -DH  warm;hot  -DH     Retired Wound - Properties Group Date first assessed: 02/06/21  -CS Time first assessed: 0033  -CS Present on Hospital Admission: N  -CS Side: Left  -CS Location: leg  -CS Primary Wound Type: Other  -CS Additional Comments: chronic redness and swelling  -CS       Wound 02/06/21 0035 Left  anterior ankle Other (comment)    Wound - Properties Group Placement Date: 02/06/21  -CS Placement Time: 0035  -CS Present on Hospital Admission: Y  -CS Side: Left  -CS Orientation: anterior  -CS Location: ankle  -CS Primary Wound Type: Other  -CS    Closure  NELY  -DH  NELY  -DH     Base  red  -DH  red  -DH     Periwound  redness;edematous;excoriated  -DH  redness;edematous;excoriated  -DH     Periwound Temperature  warm  -DH  warm  -DH     Periwound Skin Turgor  firm  -DH  firm  -DH     Retired Wound - Properties Group Date first assessed: 02/06/21  -CS Time first assessed: 0035  -CS Present on Hospital Admission: Y  -CS Side: Left  -CS Location: ankle  -CS Primary Wound Type: Other  -CS       Wound 02/06/21 0047 Right heel Other (comment)    Wound - Properties Group Placement Date: 02/06/21  -CS Placement Time: 0047  -CS Present on Hospital Admission: Y  -CS Side: Right  -CS Location: heel  -CS Primary Wound Type: Other  -CS, Chronic redness and swelling      Closure  NELY  -DH  NELY  -DH     Base  red  -DH  red  -DH     Periwound  edematous;excoriated;redness  -DH  edematous;excoriated;redness  -DH     Retired Wound - Properties Group Date first assessed: 02/06/21  -CS Time first assessed: 0047  -CS Present on Hospital Admission: Y  -CS Side: Right  -CS Location: heel  -CS Primary Wound Type: Other  -CS, Chronic redness and swelling         Wound 02/06/21 0050 Left posterior thigh Other (comment)    Wound - Properties Group Placement Date: 02/06/21  -CS Placement Time: 0050  -CS Present on Hospital Admission: Y  -CS Side: Left  -CS Orientation: posterior  -CS Location: thigh  -CS Primary Wound Type: Other  -CS, Chronic redness and swelling      Base  red;scab  -DH  red;scab  -DH     Periwound  edematous;excoriated;redness  -DH  edematous;excoriated;redness  -DH     Retired Wound - Properties Group Date first assessed: 02/06/21  -CS Time first assessed: 0050  -CS Present on Hospital Admission: Y  -CS Side: Left  -CS  Location: thigh  -CS Primary Wound Type: Other  -CS, Chronic redness and swelling         Wound 02/06/21 0051 coccyx Pressure Injury    Wound - Properties Group Placement Date: 02/06/21  -CS Placement Time: 0051  -CS Present on Hospital Admission: Y  -CS Location: coccyx  -CS Primary Wound Type: Pressure inj  -CS Stage, Pressure Injury : Stage 2  -CS    Base  non-blanchable;scab  -DH  non-blanchable;scab  -DH     Periwound  non-blanchable;redness  -DH  non-blanchable;redness  -DH     Retired Wound - Properties Group Date first assessed: 02/06/21  -CS Time first assessed: 0051  -CS Present on Hospital Admission: Y  -CS Location: coccyx  -CS Primary Wound Type: Pressure inj  -CS      User Key  (r) = Recorded By, (t) = Taken By, (c) = Cosigned By    Initials Name Provider Type    CS Naeem Jiang RN Registered Nurse    Elin Benz RN Registered Nurse    Cayla Calvillo RN Registered Nurse          Procedures:              Results Review:     I reviewed the patient's new clinical results.      Lab Results (last 24 hours)     Procedure Component Value Units Date/Time    Blood Culture - Blood, Arm, Right [[id number]] Collected: 02/06/21 1307    Specimen: Blood from Arm, Right Updated: 02/08/21 1431     Blood Culture No growth at 2 days    Blood Culture - Blood, Arm, Left [[id number]] Collected: 02/06/21 1309    Specimen: Blood from Arm, Left Updated: 02/08/21 1430     Blood Culture No growth at 2 days    Procalcitonin [[id number]]  (Abnormal) Collected: 02/08/21 0529    Specimen: Blood Updated: 02/08/21 1054     Procalcitonin 276.80 ng/mL     Narrative:      As a Marker for Sepsis (Non-Neonates):   1. <0.5 ng/mL represents a low risk of severe sepsis and/or septic shock.  1. >2 ng/mL represents a high risk of severe sepsis and/or septic shock.    As a Marker for Lower Respiratory Tract Infections that require antibiotic therapy:  PCT on Admission     Antibiotic Therapy             6-12 Hrs later  > 0.5    "             Strongly Recommended            >0.25 - <0.5         Recommended  0.1 - 0.25           Discouraged                   Remeasure/reassess PCT  <0.1                 Strongly Discouraged          Remeasure/reassess PCT      As 28 day mortality risk marker: \"Change in Procalcitonin Result\" (> 80 % or <=80 %) if Day 0 (or Day 1) and Day 4 values are available. Refer to [email or web address] [email or web address] [email or web address] – [email or web address]/   Change in PCT <=80 %   A decrease of PCT levels below or equal to 80 % defines a positive change in PCT test result representing a higher risk for 28-day all-cause mortality of patients diagnosed with severe sepsis or septic shock.  Change in PCT > 80 %   A decrease of PCT levels of more than 80 % defines a negative change in PCT result representing a lower risk for 28-day all-cause mortality of patients diagnosed with severe sepsis or septic shock.                Results may be falsely decreased if patient taking Biotin.     CBC & Differential [[id number]]  (Abnormal) Collected: 02/08/21 0529    Specimen: Blood Updated: 02/08/21 0651    Narrative:      The following orders were created for panel order CBC & Differential.  Procedure                               Abnormality         Status                     ---------                               -----------         ------                     Scan Slide[[id number]]                                       Final result               CBC Auto Differential[[id number]]        Abnormal            Final result                 Please view results for these tests on the individual orders.    CBC Auto Differential [[id number]]  (Abnormal) Collected: 02/08/21 0529    Specimen: Blood Updated: 02/08/21 0651     WBC 18.60 10*3/mm3      RBC 3.05 10*6/mm3      Hemoglobin 9.6 g/dL      Hematocrit 29.1 %      MCV 95.4 fL      MCH 31.5 pg      MCHC 33.1 g/dL      RDW 16.0 %      RDW-SD 52.9 fl      MPV 8.7 fL      Platelets 34 10*3/mm3     Scan Slide [[id number]] Collected: " 02/08/21 0529    Specimen: Blood Updated: 02/08/21 0651     Scan Slide --     Comment: See Manual Differential Results       Manual Differential [[id number]]  (Abnormal) Collected: 02/08/21 0529    Specimen: Blood Updated: 02/08/21 0651     Neutrophil % 62.0 %      Lymphocyte % 1.0 %      Monocyte % 1.0 %      Bands %  34.0 %      Metamyelocyte % 2.0 %      Neutrophils Absolute 17.86 10*3/mm3      Lymphocytes Absolute 0.19 10*3/mm3      Monocytes Absolute 0.19 10*3/mm3      Anisocytosis Slight/1+     Poikilocytes Slight/1+     Toxic Granulation Slight/1+     Platelet Estimate Decreased    C-reactive Protein [[id number]]  (Abnormal) Collected: 02/08/21 0529    Specimen: Blood Updated: 02/08/21 0624     C-Reactive Protein 38.07 mg/dL     Magnesium [[id number]]  (Abnormal) Collected: 02/08/21 0529    Specimen: Blood Updated: 02/08/21 0612     Magnesium 1.5 mg/dL     Digoxin Level [[id number]]  (Normal) Collected: 02/08/21 0529    Specimen: Blood Updated: 02/08/21 0612     Digoxin 1.10 ng/mL     Basic Metabolic Panel [[id number]]  (Abnormal) Collected: 02/08/21 0529    Specimen: Blood Updated: 02/08/21 0612     Glucose 109 mg/dL      BUN 98 mg/dL      Creatinine 3.39 mg/dL      Sodium 140 mmol/L      Potassium 4.3 mmol/L      Comment: Slight hemolysis detected by analyzer. Results may be affected.        Chloride 104 mmol/L      CO2 22.0 mmol/L      Calcium 6.9 mg/dL      eGFR   Amer --     Comment: <15 Indicative of kidney failure.        eGFR Non African Amer 13 mL/min/1.73      Comment: <15 Indicative of kidney failure.        BUN/Creatinine Ratio 28.9     Anion Gap 14.0 mmol/L     Narrative:      GFR Normal >60  Chronic Kidney Disease <60  Kidney Failure <15      Vancomycin, Random [[id number]]  (Normal) Collected: 02/08/21 0529    Specimen: Blood Updated: 02/08/21 0600     Vancomycin Random 19.90 mcg/mL     Narrative:      Therapeutic Ranges for Vancomycin    Vancomycin Random   5.0-40.0 mcg/mL  Vancomycin Trough    5.0-20.0 mcg.mL  Vancomycin Peak     20.0-40.0 mcg/mL    POC Lactate [[id number]]  (Abnormal) Collected: 02/06/21 1904    Specimen: Blood Updated: 02/08/21 0537     Lactate 2.9 mmol/L      Comment: Serial Number: [id number]:  [id number]           No results found for: HGBA1C        Results from last 7 days   Lab Units 02/06/21 1904   PH, ARTERIAL pH units 7.337*   PO2 ART mm Hg 79.5*   PCO2, ARTERIAL mm Hg 26.4*   HCO3 ART mmol/L 14.2*     Lab Results   Component Value Date    LIPASE 42 01/13/2018     Lab Results   Component Value Date    CHOL 126 02/05/2020    TRIG 51 02/05/2020    HDL 66 (H) 02/05/2020    LDL 50 02/05/2020       No results found for: INTRAOP, PREDX, FINALDX, COMDX    Microbiology Results (last 10 days)     Procedure Component Value - Date/Time    Clostridium Difficile Toxin - Stool, Per Rectum [[id number]]  (Normal) Collected: 02/06/21 1952    Lab Status: Final result Specimen: Stool from Per Rectum Updated: 02/07/21 0725    Narrative:      The following orders were created for panel order Clostridium Difficile Toxin - Stool, Per Rectum.  Procedure                               Abnormality         Status                     ---------                               -----------         ------                     Clostridium Difficile EI...[[id number]]  Normal              Final result                 Please view results for these tests on the individual orders.    Clostridium Difficile EIA - Stool, Per Rectum [[id number]]  (Normal) Collected: 02/06/21 1952    Lab Status: Final result Specimen: Stool from Per Rectum Updated: 02/07/21 0725     C Diff GDH / Toxin Negative    Blood Culture - Blood, Arm, Left [[id number]] Collected: 02/06/21 1309    Lab Status: Preliminary result Specimen: Blood from Arm, Left Updated: 02/08/21 1430     Blood Culture No growth at 2 days    Blood Culture - Blood, Arm, Right [[id number]] Collected: 02/06/21 1307    Lab Status: Preliminary result Specimen: Blood from Arm, Right  Updated: 02/08/21 1431     Blood Culture No growth at 2 days    MRSA Screen, PCR (Inpatient) - Swab, Nares [[id number]]  (Abnormal) Collected: 02/06/21 1114    Lab Status: Final result Specimen: Swab from Nares Updated: 02/06/21 1311     MRSA PCR MRSA Detected    COVID PRE-OP / PRE-PROCEDURE SCREENING ORDER (NO ISOLATION) - Swab, Nasopharynx [[id number]]  (Normal) Collected: 02/06/21 0153    Lab Status: Final result Specimen: Swab from Nasopharynx Updated: 02/06/21 1534    Narrative:      The following orders were created for panel order COVID PRE-OP / PRE-PROCEDURE SCREENING ORDER (NO ISOLATION) - Swab, Nasopharynx.  Procedure                               Abnormality         Status                     ---------                               -----------         ------                     COVID-19,APTIMA PANTHER,...[[id number]]  Normal              Final result                 Please view results for these tests on the individual orders.    COVID-19,APTIMA PANTHER,[facility] IN-HOUSE, NP/OP SWAB IN UTM/VTM/SALINE TRANSPORT MEDIA,24 HR TAT - Swab, Nasopharynx [[id number]]  (Normal) Collected: 02/06/21 0153    Lab Status: Final result Specimen: Swab from Nasopharynx Updated: 02/06/21 1534     COVID19 Not Detected    Narrative:      Fact sheet for providers: https://www.fda.gov/media/480819/download     Fact sheet for patients: https://www.fda.gov/media/449446/download    Test performed by RT PCR.          ECG/EMG Results (most recent)     Procedure Component Value Units Date/Time    Adult Transthoracic Echo Complete W/ Cont if Necessary Per Protocol [[id number]] Collected: 02/06/21 0850     Updated: 02/06/21 1609     BSA 1.8 m^2      RVIDd 2.7 cm      IVSd 1.3 cm      LVIDd 3.9 cm      LVIDs 2.8 cm      LVPWd 1.2 cm      IVS/LVPW 1.0     FS 28.8 %      EDV(Teich) 67.0 ml      ESV(Teich) 29.4 ml      EF(Teich) 56.0 %      EDV(cubed) 60.5 ml      ESV(cubed) 21.9 ml      EF(cubed) 63.9 %      LV mass(C)d 170.0 grams      LV  mass(C)dI 92.8 grams/m^2      SV(Teich) 37.5 ml      SI(Teich) 20.5 ml/m^2      SV(cubed) 38.7 ml      SI(cubed) 21.1 ml/m^2      Ao root diam 2.5 cm      Ao root area 5.1 cm^2      ACS 1.6 cm      asc Aorta Diam 3.2 cm      LVOT diam 1.8 cm      LVOT area 2.6 cm^2      RVOT diam 2.6 cm      RVOT area 5.5 cm^2      EDV(MOD-sp4) 59.5 ml      ESV(MOD-sp4) 26.6 ml      EF(MOD-sp4) 55.3 %      EDV(MOD-sp2) 53.3 ml      ESV(MOD-sp2) 20.0 ml      EF(MOD-sp2) 62.4 %      SV(MOD-sp4) 32.9 ml      SI(MOD-sp4) 18.0 ml/m^2      SV(MOD-sp2) 33.2 ml      SI(MOD-sp2) 18.1 ml/m^2      Ao root area (BSA corrected) 1.4     LV Olivier Vol (BSA corrected) 32.5 ml/m^2      LV Sys Vol (BSA corrected) 14.5 ml/m^2      Aortic R-R 1.0 sec      Aortic HR 59.0 BPM      MV V2 max 134.6 cm/sec      MV max PG 7.2 mmHg      MV V2 mean 51.6 cm/sec      MV mean PG 1.8 mmHg      MV V2 VTI 24.3 cm      MVA(VTI) 2.3 cm^2      Ao pk chastity 182.4 cm/sec      Ao max PG 13.3 mmHg      Ao max PG (full) 8.5 mmHg      Ao V2 mean 142.3 cm/sec      Ao mean PG 8.7 mmHg      Ao mean PG (full) 5.5 mmHg      Ao V2 VTI 37.9 cm      WILLIS(I,A) 1.5 cm^2      WILLIS(I,D) 1.5 cm^2      WILLIS(V,A) 1.5 cm^2      WILLIS(V,D) 1.5 cm^2      AI max chastity 264.5 cm/sec      AI max PG 28.0 mmHg      AI dec slope 171.9 cm/sec^2      AI dec time 1.5 sec      AI P1/2t 450.8 msec      LV V1 max PG 4.8 mmHg      LV V1 mean PG 3.2 mmHg      LV V1 max 110.0 cm/sec      LV V1 mean 85.9 cm/sec      LV V1 VTI 21.6 cm      CO(Ao) 11.4 l/min      CI(Ao) 6.2 l/min/m^2      SV(Ao) 192.9 ml      SI(Ao) 105.3 ml/m^2      CO(LVOT) 3.3 l/min      CI(LVOT) 1.8 l/min/m^2      SV(LVOT) 55.2 ml      SV(RVOT) 59.1 ml      SI(LVOT) 30.1 ml/m^2      PA V2 max 88.8 cm/sec      PA max PG 3.2 mmHg      PA max PG (full) 1.9 mmHg      PA V2 mean 63.8 cm/sec      PA mean PG 1.8 mmHg      PA mean PG (full) 1.0 mmHg      PA V2 VTI 17.2 cm      PVA(I,A) 3.4 cm^2       CV ECHO SAHIL - PVA(I,D) 3.4 cm^2       CV ECHO SAHIL -  PVA(V,A) 3.4 cm^2       CV ECHO SAHIL - PVA(V,D) 3.4 cm^2      PA acc time 0.07 sec      RV V1 max PG 1.2 mmHg      RV V1 mean PG 0.77 mmHg      RV V1 max 55.1 cm/sec      RV V1 mean 41.7 cm/sec      RV V1 VTI 10.8 cm      TR max devante 237.7 cm/sec      RVSP(TR) 25.6 mmHg      RAP systole 3.0 mmHg      PA pr(Accel) 48.2 mmHg      Pulm Sys Devante 41.0 cm/sec      Pulm Olivier Devante 37.5 cm/sec      Pulm S/D 1.1     Qp/Qs 1.1      CV ECHO SAHIL - BZI_BMI 33.1 kilograms/m^2       CV ECHO SAHIL - BSA(HAYCOCK) 1.9 m^2       CV ECHO SAHIL - BZI_METRIC_WEIGHT 82.1 kg       CV ECHO SAHIL - BZI_METRIC_HEIGHT 157.5 cm      EF(MOD-bp) 60.0 %      LA dimension(2D) 4.2 cm     Narrative:      Normal LV size and contractility EF of 55%  Moderate right ventricular enlargement with severe right atrial   enlargement, catheter probably pacemaker lead seen.  Severe left atrial enlargement seen.  Aortic valve, mitral valve, tricuspid valve appears structurally normal,   mild MR, AR, seen.  There is moderate  tricuspid regurgitation seen.    Calculated RV systolic pressure is 24mmHg.  No pericardial effusion seen.  Proximal aorta appears normal in size.          Results for orders placed during the hospital encounter of 02/05/21   Duplex Venous Lower Extremity - Bilateral CAR    Narrative · Exam limited by patient intolerance to compression and body habitus.  · The distal left femoral and the right and left peroneal veins are not   imaged.  · All other veins appeared normal bilaterally.          Results for orders placed during the hospital encounter of 02/05/21   Adult Transthoracic Echo Complete W/ Cont if Necessary Per Protocol    Narrative Normal LV size and contractility EF of 55%  Moderate right ventricular enlargement with severe right atrial   enlargement, catheter probably pacemaker lead seen.  Severe left atrial enlargement seen.  Aortic valve, mitral valve, tricuspid valve appears structurally normal,   mild MR, AR, seen.  There is  moderate  tricuspid regurgitation seen.    Calculated RV systolic pressure is 24mmHg.  No pericardial effusion seen.  Proximal aorta appears normal in size.       Xr Chest 1 View    Result Date: 2/7/2021  1. New right-sided PICC line with tip terminating over the low SVC. 2. Stable cardiomegaly.  Electronically Signed By-Dae Auguste MD On:2/7/2021 10:45 AM This report was finalized on [id number] by  Dae Auguste MD.    Xr Chest 1 View    Result Date: 2/6/2021  1.Cardiomegaly  Electronically Signed By-Azar Alarcon MD On:2/6/2021 8:01 AM This report was finalized on [id number] by  Azar Alarcon MD.          Xrays, labs reviewed personally by physician.    Medication Review:   I have reviewed the patient's current medication list      Scheduled Meds  aspirin, 81 mg, Oral, Daily  ceftaroline, 200 mg, Intravenous, Q12H  febuxostat, 40 mg, Oral, BID  gabapentin, 100 mg, Oral, TID  haloperidol lactate, 5 mg, Intramuscular, Once  magic butt paste, , Topical, Daily  midodrine, 10 mg, Oral, TID AC  sodium chloride, 10 mL, Intravenous, Q12H  Zinc Oxide, , Apply externally, BID        Meds Infusions  DOPamine, 2-20 mcg/kg/min, Last Rate: 7.5 mcg/kg/min (02/08/21 1300)        Meds PRN  •  acetaminophen **OR** acetaminophen **OR** acetaminophen  •  magnesium sulfate **OR** magnesium sulfate in D5W 1g/100mL (PREMIX) **OR** magnesium sulfate  •  melatonin  •  nitroglycerin  •  OLANZapine  •  ondansetron **OR** ondansetron  •  potassium chloride  •  potassium chloride  •  sodium chloride  •  traMADol        Assessment/Plan   Assessment/Plan     Active Hospital Problems    Diagnosis  POA   • Sepsis (CMS/HCC) [A41.9]  Yes   • CONG (acute kidney injury) (CMS/HCC) [N17.9]  Yes   • Lactic acidosis [E87.2]  Yes   • Metabolic acidosis [E87.2]  Yes   • Delirium, acute [R41.0]  Yes   • Lower extremity cellulitis [L03.119]  Yes   • CAD (coronary artery disease) [I25.10]  Yes   • Acute UTI (urinary tract infection) [N39.0]  Yes    • Non-pressure chronic ulcer right ankle, limited to breakdown skin (CMS/[facility]) [L97.311]  Yes   • Automatic implantable cardiac defibrillator in situ [Z95.810]  Yes   • Lymphedema [I89.0]  Unknown   • Gout [M10.9]  Yes   • Hypertension, benign [I10]  Yes   • Congestive heart failure (CMS/[facility]) [I50.9]  Yes   • Atrial fibrillation (CMS/[facility]) [I48.91]  Yes      Resolved Hospital Problems   No resolved problems to display.       MEDICAL DECISION MAKING COMPLEXITY BY PROBLEM:       Septic shock from LE cellulitis and UTI   - bilateral LE Lymphedema   -Rocephin given x1 at sending facility,   -started vancomycin and cefepime 2/6/2021.  Added Flagyl 2/7/21 because of leukocytosis  -Started on dopamine 2/6/2021  - procalcitonin 171.12  -MRSA screen positive  -C. difficile negative  -Falls precautions  -Wound care consulted  -ID consulted 2/7/2021       Urinary tract infection  -Rocephin given at sending facility  -cefepime   -Urine culture pending     CONG on CKD III:  -Hold colchicine  -Concern for fluid loss from LE cellulitis  -Consulted nephrology  -s/p renal ultrasound 2/5/2020      Normal gap metabolic acidosis:  -Given bicarb drip overnight    Delirium:  -No history of dementia per daughter  -Avoid sedating medications    Right arm IV infiltration:  -Continue ice to arm and monitor    CAD  -Denies chest pain  -Continue aspirin, beta-blocker     Atrial fibrillation s/p pacemaker  -On digoxin and metoprolol at home  -Repeat TTE 2/13/2018--> LVEF 40% with mild aortic insufficiency  -TTE 2/6/2021--> LVEF 55%, severe right atrial enlargement, severe left atrial enlargement, moderate TR  -Daughter claims patient off Xarelto     Hypertension  -Hold metoprolol temporarily because of low BP     Hyperlipidemia  -Check lipid panel  -on  home Zetia at home     Chronic pain  -Continue gabapentin and tramadol     Gout  -Hold colchicine because ok CONG     Pressure ulcer on right ankle  -Wound care consulted             VTE  Prophylaxis -   Mechanical Order History:     None      Pharmalogical Order History:      Ordered     Dose Route Frequency Stop    02/06/21 0500  rivaroxaban (XARELTO) tablet 10 mg     Question Answer Comment   Are you ordering rivaroxaban for the prevention of blood clots in an acutely ill medical patient? Yes    Select any exclusion criteria that may apply to patient Exclusion Criteria Does Not Apply to This Patient Alternative to Lovenox/heparin inpatient with thrombocytopenia unable to receive mechanical prophylaxis       10 mg PO Daily With Dinner --    02/06/21 0020  heparin (porcine) 5000 UNIT/ML injection 5,000 Units  Status:  Discontinued      5,000 Units SC Every 12 Hours Scheduled 02/06/21 0147                  Code Status -   Code Status and Medical Interventions:   Ordered at: 02/06/21 0258     Level Of Support Discussed With:    Patient     Code Status:    CPR     Medical Interventions (Level of Support Prior to Arrest):    Full       This patient has been examined wearing appropriate Personal Protective Equipment and discussed with nursing. 02/08/21        Discharge Planning    Patient does have episodes of confusion in the evenings Zyprexa PRN ordered.  Elevated procalcitonin and worsening WBC thus added Flagyl to Vanco and cefepime.  ID consulted 2/7/2021.      Electronically signed by Alexandro Huitron MD, 02/08/21, 16:18 EST.  [facility]ist [facility]

## 2021-02-08 NOTE — THERAPY EVALUATION
Patient Name: Emperatriz Childs  : 1945    MRN: [id number]                              Today's Date: 2021       Admit Date: 2021    Visit Dx: No diagnosis found.  Patient Active Problem List   Diagnosis   • Congestive heart failure (CMS/HCC)   • Chronic low back pain   • Atrial fibrillation (CMS/HCC)   • Hypertension, benign   • Lymphedema   • Gout   • Urinary tract infection   • Syncope and collapse   • Lymphedema of both lower extremities   • Non-pressure chronic ulcer right ankle, limited to breakdown skin (CMS/HCC)   • Non-pressure chronic ulcer left lower leg, limited to breakdown skin (CMS/HCC)   • Automatic implantable cardiac defibrillator in situ   • Acute UTI (urinary tract infection)   • Lower extremity cellulitis   • CAD (coronary artery disease)   • Sepsis (CMS/HCC)   • CONG (acute kidney injury) (CMS/HCC)   • Lactic acidosis   • Metabolic acidosis   • Delirium, acute     Past Medical History:   Diagnosis Date   • CHF (congestive heart failure) (CMS/HCC)    • History of transfusion    • Hypertension    • Lymphedema of leg    • Myocardial infarction (CMS/HCC)      Past Surgical History:   Procedure Laterality Date   • CARDIAC ELECTROPHYSIOLOGY PROCEDURE N/A 2020    Procedure: ICD battery change;  Surgeon: Dionna Laird MD;  Location: [facility] CATH INVASIVE LOCATION;  Service: Cardiovascular   • CARDIAC ELECTROPHYSIOLOGY PROCEDURE N/A 2020    Procedure: Temporary Pacemaker;  Surgeon: Dionna Laird MD;  Location: [facility] CATH INVASIVE LOCATION;  Service: Cardiovascular   • CARDIAC ELECTROPHYSIOLOGY PROCEDURE N/A 2020    Procedure: Pocket Revision;  Surgeon: Dionna Laird MD;  Location: [facility] CATH INVASIVE LOCATION;  Service: Cardiovascular   • EYE SURGERY     • PACEMAKER IMPLANTATION       General Information     Row Name 21 1428          Physical Therapy Time and Intention    Document Type  evaluation  -EL     Mode of Treatment  individual therapy;physical  therapy  -     Row Name 02/08/21 1428          General Information    Patient Profile Reviewed  yes  -EL     Prior Level of Function  independent:;ADL's;all household mobility  -     Row Name 02/08/21 1428          Living Environment    Lives With  alone  -[facility] Name 02/08/21 1428          Home Main Entrance    Number of Stairs, Main Entrance  none  -EL     Row Name 02/08/21 1428          Stairs Within Home, Primary    Number of Stairs, Within Home, Primary  none  -EL     Row Name 02/08/21 1428          Cognition    Orientation Status (Cognition)  oriented to;person;place;time;disoriented to;situation Confusion with conversation  -     Row Name 02/08/21 1428          Safety Issues, Functional Mobility    Impairments Affecting Function (Mobility)  balance;cognition;endurance/activity tolerance;shortness of breath;strength;pain;range of motion (ROM);sensation/sensory awareness  -EL     Cognitive Impairments, Mobility Safety/Performance  impulsivity;insight into deficits/self-awareness;problem-solving/reasoning  -EL       User Key  (r) = Recorded By, (t) = Taken By, (c) = Cosigned By    Initials Name Provider Type    Mika Montes PT Physical Therapist        Mobility     Row Name 02/08/21 1432          Bed Mobility    Bed Mobility  supine-sit  -EL     Supine-Sit [place] (Bed Mobility)  moderate assist (50% patient effort);maximum assist (25% patient effort)  -EL     Assistive Device (Bed Mobility)  bed rails;draw sheet;head of bed elevated  -EL     Comment (Bed Mobility)  Sitting EOB, pt feet began bleeding, not appropriate to stand this date  -     Row Name 02/08/21 1432          Bed-Chair Transfer    Bed-Chair [place] (Transfers)  unable to assess  -EL       User Key  (r) = Recorded By, (t) = Taken By, (c) = Cosigned By    Initials Name Provider Type    Mika Montes PT Physical Therapist        Obj/Interventions     Row Name 02/08/21 1433          Range of Motion Comprehensive    General  Range of Motion  lower extremity range of motion deficits identified  -EL     Comment, General Range of Motion  Lake ankle ROM limited by swelling  -EL     Row Name 02/08/21 1433          Strength Comprehensive (MMT)    General Manual Muscle Testing (MMT) Assessment  lower extremity strength deficits identified  -EL     Comment, General Manual Muscle Testing (MMT) Assessment  BLE 3+/5 gross  -EL     Row Name 02/08/21 1433          Balance    Balance Assessment  sitting static balance  -EL     Static Sitting Balance  mild impairment  -EL       User Key  (r) = Recorded By, (t) = Taken By, (c) = Cosigned By    Initials Name Provider Type    Mika Montes PT Physical Therapist        Goals/Plan     Row Name 02/08/21 1439          Bed Mobility Goal 1 (PT)    Activity/Assistive Device (Bed Mobility Goal 1, PT)  bed mobility activities, all  -EL     Paige Level/Cues Needed (Bed Mobility Goal 1, PT)  minimum assist (75% or more patient effort)  -EL     Time Frame (Bed Mobility Goal 1, PT)  long term goal (LTG);2 weeks  -EL     Row Name 02/08/21 1439          Transfer Goal 1 (PT)    Activity/Assistive Device (Transfer Goal 1, PT)  transfers, all;walker, rolling  -EL     Paige Level/Cues Needed (Transfer Goal 1, PT)  minimum assist (75% or more patient effort)  -EL     Time Frame (Transfer Goal 1, PT)  long term goal (LTG);2 weeks  -     Row Name 02/08/21 1439          Gait Training Goal 1 (PT)    Activity/Assistive Device (Gait Training Goal 1, PT)  gait (walking locomotion);walker, rolling  -EL     Paige Level (Gait Training Goal 1, PT)  minimum assist (75% or more patient effort)  -EL     Distance (Gait Training Goal 1, PT)  50  -EL     Time Frame (Gait Training Goal 1, PT)  long term goal (LTG);2 weeks  -EL       User Key  (r) = Recorded By, (t) = Taken By, (c) = Cosigned By    Initials Name Provider Type    Mika Montes PT Physical Therapist        Clinical Impression     Row Name 02/08/21 1434           Pain    Additional Documentation  Pain Scale: FACES Pre/Post-Treatment (Group)  -EL     Row Name 02/08/21 1434          Pain Scale: FACES Pre/Post-Treatment    Pain: FACES Scale, Pretreatment  0-->no hurt  -EL     Posttreatment Pain Rating  0-->no hurt  -EL     Row Name 02/08/21 1434          Plan of Care Review    Plan of Care Reviewed With  patient  -EL     Outcome Summary  Pt is a 74 YO F admitted with acute UTI. Pt reports living alone, typically independent with ADLs, ambulating with RWx and reports no recent falls. Pt this date requires Mod/MAX A to come to sitting EOB and MIN A to maintain seated balance. Pt sitting EOB, and R foot began bleeding through dressing. Pt returned to supine and nursing notified immediately. Pt not appropriate to come to standing or transfer at this time. PT will continue to follow 3x/week and pt will require IP rehab following d/c. PPE worn includes gloves and mask with goggles.  -     Row Name 02/08/21 1434          Therapy Assessment/Plan (PT)    Rehab Potential (PT)  fair, will monitor progress closely  -EL     Criteria for Skilled Interventions Met (PT)  yes  -EL     Row Name 02/08/21 1434          Vital Signs    O2 Delivery Pre Treatment  room air  -EL     O2 Delivery Intra Treatment  room air  -EL     O2 Delivery Post Treatment  room air  -EL     Pre Patient Position  Supine  -EL     Intra Patient Position  Sitting  -EL     Post Patient Position  Supine  -EL     Row Name 02/08/21 1434          Positioning and Restraints    Pre-Treatment Position  in bed  -EL     Post Treatment Position  bed  -EL     In Bed  notified nsg;supine;call light within reach;exit alarm on;encouraged to call for assist  -EL       User Key  (r) = Recorded By, (t) = Taken By, (c) = Cosigned By    Initials Name Provider Type    Mika Montes, PT Physical Therapist        Outcome Measures     Row Name 02/08/21 1442          How much help from another person do you currently need...    Turning  from your back to your side while in flat bed without using bedrails?  2  -EL     Moving from lying on back to sitting on the side of a flat bed without bedrails?  2  -EL     Moving to and from a bed to a chair (including a wheelchair)?  1  -EL     Standing up from a chair using your arms (e.g., wheelchair, bedside chair)?  1  -EL     Climbing 3-5 steps with a railing?  1  -EL     To walk in hospital room?  1  -EL     AM-PAC 6 Clicks Score (PT)  8  -EL     Row Name 02/08/21 1442          Functional Assessment    Outcome Measure Options  AM-PAC 6 Clicks Basic Mobility (PT)  -EL       User Key  (r) = Recorded By, (t) = Taken By, (c) = Cosigned By    Initials Name Provider Type    EL Mika Correa PT Physical Therapist        Physical Therapy Education                 Title: PT OT SLP Therapies (Done)     Topic: Physical Therapy (Done)     Point: Mobility training (Done)     Learning Progress Summary           Patient Acceptance, E,TB, VU by  at 2/8/2021 1444                   Point: Precautions (Done)     Learning Progress Summary           Patient Acceptance, E,TB, VU by  at 2/8/2021 1444                               User Key     Initials Effective Dates Name Provider Type Discipline     06/23/20 -  Mika Correa, PT Physical Therapist PT              PT Recommendation and Plan  Planned Therapy Interventions (PT): balance training, neuromuscular re-education, bed mobility training, transfer training, gait training, patient/family education, strengthening  Plan of Care Reviewed With: patient  Outcome Summary: Pt is a 76 YO F admitted with acute UTI. Pt reports living alone, typically independent with ADLs, ambulating with RWx and reports no recent falls. Pt this date requires Mod/MAX A to come to sitting EOB and MIN A to maintain seated balance. Pt sitting EOB, and R foot began bleeding through dressing. Pt returned to supine and nursing notified immediately. Pt not appropriate to come to standing or transfer at  this time. PT will continue to follow 3x/week and pt will require IP rehab following d/c. PPE worn includes gloves and mask with goggles.     Time Calculation:   PT Charges     Row Name 02/08/21 1445             Time Calculation    Start Time  1150  -EL      Stop Time  1213  -EL      Time Calculation (min)  23 min  -EL      PT Received On  02/08/21  -EL      PT - Next Appointment  02/10/21  -EL      PT Goal Re-Cert Due Date  02/22/21  -EL        User Key  (r) = Recorded By, (t) = Taken By, (c) = Cosigned By    Initials Name Provider Type    Mika Montes PT Physical Therapist        Therapy Charges for Today     Code Description Service Date Service Provider Modifiers Qty    00682387398 HC PT EVAL MOD COMPLEXITY 4 2/8/2021 Mika Correa PT GP 1          PT G-Codes  Outcome Measure Options: AM-PAC 6 Clicks Basic Mobility (PT)  AM-PAC 6 Clicks Score (PT): 8    Mika Correa PT  2/8/2021

## 2021-02-08 NOTE — PLAN OF CARE
Problem: Adult Inpatient Plan of Care  Goal: Plan of Care Review  Outcome: Ongoing, Progressing  Flowsheets (Taken 2/7/2021 2033)  Outcome Summary: Pt cooperative this am with changeing bed.  pt in need od picc and ativan given to keep pt calm during procedure.  Picc placed and later bleeding noted at insertion site.  Picc teamnotified. Pt on xeralto.  Informed by daughter pt nbot to take blood thinners.  MD notified new orders recieved.  Pt daughter Eliana suppose to fax POA paper to pod 2 fax.  states she is  poa.  Pt very sleepy this afternoon after ativan.  Unable to get pt to take po meds at present.  Argelia Ibarra at bedside will monitor.   Goal Outcome Evaluation:        Outcome Summary: Pt cooperative this am with changeing bed.  pt in need od picc and ativan given to keep pt calm during procedure.  Picc placed and later bleeding noted at insertion site.  Picc teamnotified. Pt on xeralto.  Informed by daughter pt nbot to take blood thinners.  MD notified new orders recieved.  Pt daughter Eliana suppose to fax POA paper to pod 2 fax.  states she is  poa.  Pt very sleepy this afternoon after ativan.  Unable to get pt to take po meds at present.  Argelia Ibarra at bedside will monitor.

## 2021-02-08 NOTE — PLAN OF CARE
Problem: Adult Inpatient Plan of Care  Goal: Plan of Care Review  Outcome: Ongoing, Progressing  Goal: Patient-Specific Goal (Individualized)  Outcome: Ongoing, Progressing  Goal: Absence of Hospital-Acquired Illness or Injury  Outcome: Ongoing, Progressing  Intervention: Identify and Manage Fall Risk  Recent Flowsheet Documentation  Taken 2/8/2021 0600 by Cayla Ewing RN  Safety Promotion/Fall Prevention: safety round/check completed  Taken 2/8/2021 0430 by Cayla Ewing RN  Safety Promotion/Fall Prevention: safety round/check completed  Taken 2/8/2021 0200 by Cayla Ewing RN  Safety Promotion/Fall Prevention: safety round/check completed  Taken 2/8/2021 0045 by Cayla Ewing RN  Safety Promotion/Fall Prevention: safety round/check completed  Taken 2/7/2021 2200 by Cayla Ewing RN  Safety Promotion/Fall Prevention: safety round/check completed  Taken 2/7/2021 2040 by Cayla Ewing RN  Safety Promotion/Fall Prevention: safety round/check completed  Intervention: Prevent Infection  Recent Flowsheet Documentation  Taken 2/8/2021 0430 by Cayla Ewing RN  Infection Prevention: hand hygiene promoted  Taken 2/8/2021 0045 by Cayla Ewing RN  Infection Prevention: hand hygiene promoted  Taken 2/7/2021 2040 by Cayla Ewing RN  Infection Prevention:   equipment surfaces disinfected   hand hygiene promoted   single patient room provided  Goal: Optimal Comfort and Wellbeing  Outcome: Ongoing, Progressing  Intervention: Provide Person-Centered Care  Recent Flowsheet Documentation  Taken 2/8/2021 0045 by Cayla Ewing RN  Trust Relationship/Rapport: reassurance provided  Taken 2/7/2021 2040 by Cayla Ewing RN  Trust Relationship/Rapport: reassurance provided  Goal: Readiness for Transition of Care  Outcome: Ongoing, Progressing   Goal Outcome Evaluation:

## 2021-02-08 NOTE — CONSULTS
Infectious Diseases Consult Note    Referring Provider: Alexandro Huitron *    Reason for Consultation: Bilateral lower leg cellulitis with lymphedema/sepsis    Patient Care Team:  Rosa M Chavez APRN as PCP - General (Nurse Practitioner)    Chief complaint worsening redness and swelling to bilateral lower legs    Subjective     The patient has been afebrile for the last 24 hours.  The patient is on room air, hemodynamically stable, and is tolerating antimicrobial therapy.    History of present illness:      This is a 75-year-old female who presents the hospital on 2/5/2020 with complaints of worsening lower leg swelling, redness, and pain.  Patient states she has been dealing with lymphedema for many years.  Patient denies fever, chills, diaphoresis, shortness of breath, cough, GI symptoms, urinary symptoms.    Patient is currently on room air and does not appear to be in acute distress.  Patient is creatinine 3.39, white blood cell count of 18.6, hemoglobin 9.6, platelets 34.  COVID-19 screen was negative, MRSA of the nares screen was positive, blood cultures are negative, and C. difficile screen is negative.  Patient's chest x-ray was negative for any acute findings.  Patient is currently on IV cefepime, IV vancomycin, IV Flagyl.  Patient has antibiotic allergies to clindamycin and sulfa antibiotics.    Our service saw the patient last February and 2020 for wounds associated with lymphedema and a UTI.  With the lymphedema the patient has a past medical history significant for MI, hypertension, congestive heart failure, pacemaker implantation.  He denies tobacco, alcohol or illicit drug abuse.            Review of Systems   Review of Systems   Constitutional: Negative.    HENT: Negative.    Eyes: Negative.    Respiratory: Negative.    Cardiovascular: Positive for leg swelling.   Gastrointestinal: Negative.    Endocrine: Negative.    Genitourinary: Negative.    Musculoskeletal: Negative.    Skin:  Positive for color change.   Neurological: Negative.    Psychiatric/Behavioral: Negative.    All other systems reviewed and are negative.      Medications  Medications Prior to Admission   Medication Sig Dispense Refill Last Dose   • cetirizine (zyrTEC) 10 MG tablet Take 10 mg by mouth Daily As Needed for Allergies (at bedtime for itching).      • aspirin 81 MG chewable tablet Chew 81 mg Daily.      • bumetanide (BUMEX) 1 MG tablet Take 1 mg by mouth 2 (Two) Times a Day. Morning and noon  1    • colchicine 0.6 MG tablet Take 0.6 mg by mouth Daily.      • digoxin (LANOXIN) 125 MCG tablet Take 125 mcg by mouth Daily.      • ezetimibe (ZETIA) 10 MG tablet Take 10 mg by mouth Daily.      • febuxostat (ULORIC) 40 MG tablet Take 40 mg by mouth 2 (Two) Times a Day.      • gabapentin (NEURONTIN) 100 MG capsule Take 100 mg by mouth 3 (Three) Times a Day.      • metoprolol tartrate (LOPRESSOR) 50 MG tablet Take 50 mg by mouth 2 (Two) Times a Day.      • traMADol (ULTRAM) 50 MG tablet Take 50 mg by mouth Every 6 (Six) Hours As Needed.          History  Past Medical History:   Diagnosis Date   • CHF (congestive heart failure) (CMS/HCC)    • History of transfusion    • Hypertension    • Lymphedema of leg    • Myocardial infarction (CMS/HCC)      Past Surgical History:   Procedure Laterality Date   • CARDIAC ELECTROPHYSIOLOGY PROCEDURE N/A 1/31/2020    Procedure: ICD battery change;  Surgeon: Dionna Laird MD;  Location: [facility] CATH INVASIVE LOCATION;  Service: Cardiovascular   • CARDIAC ELECTROPHYSIOLOGY PROCEDURE N/A 1/31/2020    Procedure: Temporary Pacemaker;  Surgeon: Dionna Laird MD;  Location: [facility] CATH INVASIVE LOCATION;  Service: Cardiovascular   • CARDIAC ELECTROPHYSIOLOGY PROCEDURE N/A 1/31/2020    Procedure: Pocket Revision;  Surgeon: Dionna Laird MD;  Location: [facility] CATH INVASIVE LOCATION;  Service: Cardiovascular   • EYE SURGERY     • PACEMAKER IMPLANTATION         Family History  Family History    Family history unknown: Yes       Social History   reports that she has never smoked. She has never used smokeless tobacco. She reports that she does not drink alcohol or use drugs.    Allergies  Allopurinol, Clindamycin hcl, Codeine, Furosemide, Hydrochlorothiazide, Naproxen, and Sulfa antibiotics    Objective     Vital Signs   Vital Signs (last 24 hours)       02/07 0700  -  02/08 0659 02/08 0700  -  02/08 1536   Most Recent    Temp (°F) 97.9 -  99    97.5 -  98.1     97.5 (36.4)    Heart Rate   60       60    Resp 12 -  24      20     20    /39 -  154/55    121/88 -  129/50     129/50    SpO2 (%) 91 -  97      94     94          Physical Exam:  Physical Exam  Vitals signs and nursing note reviewed.   Constitutional:       General: She is not in acute distress.     Appearance: Normal appearance. She is well-developed and normal weight. She is not diaphoretic.   HENT:      Head: Normocephalic and atraumatic.   Eyes:      General: No scleral icterus.     Extraocular Movements: Extraocular movements intact.      Conjunctiva/sclera: Conjunctivae normal.      Pupils: Pupils are equal, round, and reactive to light.   Neck:      Musculoskeletal: Neck supple.   Cardiovascular:      Rate and Rhythm: Normal rate and regular rhythm.      Heart sounds: Normal heart sounds, S1 normal and S2 normal. No murmur.   Pulmonary:      Effort: Pulmonary effort is normal. No respiratory distress.      Breath sounds: Normal breath sounds. No stridor. No wheezing or rales.   Chest:      Chest wall: No tenderness.   Abdominal:      General: Bowel sounds are normal. There is no distension.      Palpations: Abdomen is soft. There is no mass.      Tenderness: There is no abdominal tenderness. There is no guarding.   Genitourinary:     Comments: Ayon catheter  Musculoskeletal: Normal range of motion.         General: No swelling, tenderness or deformity.      Comments: Patient has severe and chronic changes of the bilateral lower legs  to lymphedema with overlying erythema and warmth.  This does stretch up into the inner thighs.  The dorsal aspect of the right foot is especially swollen   Skin:     General: Skin is warm and dry.      Coloration: Skin is not pale.      Findings: No bruising, erythema or rash.   Neurological:      General: No focal deficit present.      Mental Status: She is alert and oriented to person, place, and time. Mental status is at baseline.      Cranial Nerves: No cranial nerve deficit.   Psychiatric:         Mood and Affect: Mood normal.       Right        Left            Microbiology  Microbiology Results (last 10 days)     Procedure Component Value - Date/Time    Clostridium Difficile Toxin - Stool, Per Rectum [[id number]]  (Normal) Collected: 02/06/21 1952    Lab Status: Final result Specimen: Stool from Per Rectum Updated: 02/07/21 0725    Narrative:      The following orders were created for panel order Clostridium Difficile Toxin - Stool, Per Rectum.  Procedure                               Abnormality         Status                     ---------                               -----------         ------                     Clostridium Difficile EI...[[id number]]  Normal              Final result                 Please view results for these tests on the individual orders.    Clostridium Difficile EIA - Stool, Per Rectum [[id number]]  (Normal) Collected: 02/06/21 1952    Lab Status: Final result Specimen: Stool from Per Rectum Updated: 02/07/21 0725     C Diff GDH / Toxin Negative    Blood Culture - Blood, Arm, Left [[id number]] Collected: 02/06/21 1309    Lab Status: Preliminary result Specimen: Blood from Arm, Left Updated: 02/08/21 1430     Blood Culture No growth at 2 days    Blood Culture - Blood, Arm, Right [[id number]] Collected: 02/06/21 1307    Lab Status: Preliminary result Specimen: Blood from Arm, Right Updated: 02/08/21 1431     Blood Culture No growth at 2 days    MRSA Screen, PCR (Inpatient) - Swab, Nares  [[id number]]  (Abnormal) Collected: 02/06/21 1114    Lab Status: Final result Specimen: Swab from Nares Updated: 02/06/21 1311     MRSA PCR MRSA Detected    COVID PRE-OP / PRE-PROCEDURE SCREENING ORDER (NO ISOLATION) - Swab, Nasopharynx [[id number]]  (Normal) Collected: 02/06/21 0153    Lab Status: Final result Specimen: Swab from Nasopharynx Updated: 02/06/21 1534    Narrative:      The following orders were created for panel order COVID PRE-OP / PRE-PROCEDURE SCREENING ORDER (NO ISOLATION) - Swab, Nasopharynx.  Procedure                               Abnormality         Status                     ---------                               -----------         ------                     COVID-19,APTIMA PANTHER,...[[id number]]  Normal              Final result                 Please view results for these tests on the individual orders.    COVID-19,APTIMA PANTHER,[facility] IN-HOUSE, NP/OP SWAB IN UTM/VTM/SALINE TRANSPORT MEDIA,24 HR TAT - Swab, Nasopharynx [[id number]]  (Normal) Collected: 02/06/21 0153    Lab Status: Final result Specimen: Swab from Nasopharynx Updated: 02/06/21 1534     COVID19 Not Detected    Narrative:      Fact sheet for providers: https://www.fda.gov/media/728348/download     Fact sheet for patients: https://www.fda.gov/media/686961/download    Test performed by RT PCR.          Laboratory  Results from last 7 days   Lab Units 02/08/21  0529   WBC 10*3/mm3 18.60*   HEMOGLOBIN g/dL 9.6*   HEMATOCRIT % 29.1*   PLATELETS 10*3/mm3 34*     Results from last 7 days   Lab Units 02/08/21  0529   SODIUM mmol/L 140   POTASSIUM mmol/L 4.3   CHLORIDE mmol/L 104   CO2 mmol/L 22.0   BUN mg/dL 98*   CREATININE mg/dL 3.39*   GLUCOSE mg/dL 109*   CALCIUM mg/dL 6.9*     Results from last 7 days   Lab Units 02/08/21  0529   SODIUM mmol/L 140   POTASSIUM mmol/L 4.3   CHLORIDE mmol/L 104   CO2 mmol/L 22.0   BUN mg/dL 98*   CREATININE mg/dL 3.39*   GLUCOSE mg/dL 109*   CALCIUM mg/dL 6.9*     Results from last 7 days   Lab Units  02/06/21  1708   CK TOTAL U/L 225*               Radiology  Imaging Results (Last 72 Hours)     Procedure Component Value Units Date/Time    XR Chest 1 View [[id number]] Collected: 02/07/21 1043     Updated: 02/07/21 1047    Narrative:      EXAMINATION: XR CHEST 1 VW-     DATE OF EXAM: 2/7/2021 10:20 AM     INDICATION: picc line placement.     COMPARISON: Chest radiograph dated 02/06/2021     TECHNIQUE: Portable AP view of the chest was obtained.     FINDINGS:  There is a right-sided PICC line with tip terminating over the low SVC.  There is a left chest wall ICD with lead in unchanged position.  Pulmonary vascularity appears within normal limits. There is no acute  infectious consolidation, pleural effusion, or pneumothorax. There are  degenerative changes of the thoracic spine.       Impression:      1. New right-sided PICC line with tip terminating over the low SVC.  2. Stable cardiomegaly.     Electronically Signed By-Dae Auguste MD On:2/7/2021 10:45 AM  This report was finalized on [id number] by  Dae Auguste MD.    XR Chest 1 View [[id number]] Collected: 02/06/21 0800     Updated: 02/06/21 0803    Narrative:      DATE OF EXAM:  2/6/2021 7:41 AM     PROCEDURE:  XR CHEST 1 VW-     INDICATIONS:  ? pneumonia     COMPARISON:  01/28/2020     TECHNIQUE:   Single radiographic AP view of the chest was obtained.     FINDINGS:  An AICD device is present. The heart is enlarged. There are no definite  infiltrates. There are no pleural effusions.        Impression:      1.Cardiomegaly     Electronically Signed By-Azar Alarcon MD On:2/6/2021 8:01 AM  This report was finalized on [id number] by  Azar Alarcon MD.          Cardiology      Results Review:  I have reviewed all clinical data, test, lab, and imaging results.       Schedule Meds  aspirin, 81 mg, Oral, Daily  cefepime, 2 g, Intravenous, Q24H  febuxostat, 40 mg, Oral, BID  gabapentin, 100 mg, Oral, TID  haloperidol lactate, 5 mg, Intramuscular,  Once  magic butt paste, , Topical, Daily  metroNIDAZOLE, 500 mg, Intravenous, Q8H  midodrine, 10 mg, Oral, TID AC  sodium chloride, 10 mL, Intravenous, Q12H  Zinc Oxide, , Apply externally, BID        Infusion Meds  DOPamine, 2-20 mcg/kg/min, Last Rate: 7.5 mcg/kg/min (02/08/21 1300)  Pharmacy to dose vancomycin,         PRN Meds  •  acetaminophen **OR** acetaminophen **OR** acetaminophen  •  magnesium sulfate **OR** magnesium sulfate in D5W 1g/100mL (PREMIX) **OR** magnesium sulfate  •  melatonin  •  nitroglycerin  •  OLANZapine  •  ondansetron **OR** ondansetron  •  Pharmacy to dose vancomycin  •  potassium chloride  •  potassium chloride  •  sodium chloride  •  traMADol  •  vancomycin      Assessment/Plan       Assessment    Severe bilateral lower leg cellulitis with significant erythema and warmth that extends up both bilateral inner thighs  -Patient has chronic lymphedema in both lower legs  -Came in with increased pain, redness, and swelling  -Patient does not appear to be septic at this time  -Blood cultures are negative    Acute kidney injury    Leukocytosis-likely related to severe cellulitis    COVID-19 screen was negative    Congestive heart failure    History of pacemaker implantation    Plan    Discontinue IV cefepime, IV vancomycin, and IV Flagyl  Start IV Teflaro 200 mg every 12 hours  Doppler of lower extremities  Continue supportive care  Rhea Darling, APRN  02/08/21  15:36 EST

## 2021-02-08 NOTE — PROGRESS NOTES
PROGRESS NOTE      Patient Name: Emperatriz Childs  : 1945  MRN: [id number]  Primary Care Physician: Rosa M Chavez APRN  Date of admission: 2021    Patient Care Team:  Rosa M Chavez APRN as PCP - General (Nurse Practitioner)        Subjective   Subjective:     Acute kidney injury, patient is still swollen not feeling good, no significant shortness of breath  Review of systems:  All other review of system unremarkable      Allergies:    Allergies   Allergen Reactions   • Allopurinol Unknown - High Severity   • Clindamycin Hcl Unknown - High Severity   • Codeine Unknown - High Severity   • Furosemide Unknown - High Severity   • Hydrochlorothiazide Unknown - High Severity   • Naproxen Unknown - High Severity   • Sulfa Antibiotics Unknown - High Severity       Objective   Exam:     Vital Signs  Temp:  [97.9 °F (36.6 °C)-99 °F (37.2 °C)] 98.1 °F (36.7 °C)  Heart Rate:  [60] 60  Resp:  [12-24] 20  BP: (109-154)/(37-88) 121/88  SpO2:  [91 %-97 %] 95 %  on   ;   Device (Oxygen Therapy): room air  Body mass index is 32.18 kg/m².    General: Elderly  female in no acute distress.    Head:      Normocephalic and atraumatic.    Eyes:      PERRL/EOM intact, conjunctiva and sclera clear with out nystagmus.    Neck:      No masses, thyromegaly,  trachea central with normal respiratory effort   Lungs:    Clear bilaterally to auscultation.    Heart:      Regular rate and rhythm, no murmur no gallop  Abd:        Soft, nontender, not distended, bowel sounds positive, no shifting dullness   Pulses:   Pulses palpable  Extr:        No cyanosis or clubbing--significant bilateral edema.    Neuro:    No focal deficits.   alert oriented x3  Skin:       Intact without lesions or rashes.    Psych:    Alert and cooperative; normal mood and affect; .      Results Review:  I have personally reviewed most recent Data :  CBC    Results from last 7 days   Lab Units 21  0529 21  1305 21  1904 21  0311  02/06/21  0045   WBC 10*3/mm3 18.60* 25.00*  --  10.70 7.10   HEMOGLOBIN g/dL 9.6* 10.1*  --  10.1* 10.5*   HEMOGLOBIN, POC g/dL  --   --  9.9*  --   --    PLATELETS 10*3/mm3 34* 82*  --  72* 86*     CMP   Results from last 7 days   Lab Units 02/08/21  0529 02/07/21  1305 02/06/21  2247 02/06/21  1905 02/06/21  1708 02/06/21  1310 02/06/21  0311   SODIUM mmol/L 140 140 139 139 141 137 139   POTASSIUM mmol/L 4.3 4.5 5.1 4.5 5.9* 4.4 4.5   CHLORIDE mmol/L 104 109* 112* 114* 117* 111* 115*   CO2 mmol/L 22.0 18.0* 14.0* 12.0* 8.0* 9.0* 13.0*   BUN mg/dL 98* 87* 80* 76* 77* 73* 69*   CREATININE mg/dL 3.39* 3.50* 3.35* 3.28* 3.23* 2.86* 2.78*   GLUCOSE mg/dL 109* 87 100* 91 58* 87 95   ALBUMIN g/dL  --  2.50* 2.40* 2.40*  --   --   --    BILIRUBIN mg/dL  --  0.6 0.4 0.4  --   --   --    ALK PHOS U/L  --  135* 109 100  --   --   --    AST (SGOT) U/L  --  30 32 27  --   --   --    ALT (SGPT) U/L  --  12 10 9  --   --   --      ABG    Results from last 7 days   Lab Units 02/06/21  1904   PH, ARTERIAL pH units 7.337*   PCO2, ARTERIAL mm Hg 26.4*   PO2 ART mm Hg 79.5*   O2 SATURATION ART % 95.2   BASE EXCESS ART mmol/L -10.4*     Xr Chest 1 View    Result Date: 2/7/2021  1. New right-sided PICC line with tip terminating over the low SVC. 2. Stable cardiomegaly.  Electronically Signed By-Dae Auguste MD On:2/7/2021 10:45 AM This report was finalized on [id number] by  Dae Auguste MD.    Xr Chest 1 View    Result Date: 2/6/2021  1.Cardiomegaly  Electronically Signed By-Azar Alarcon MD On:2/6/2021 8:01 AM This report was finalized on [id number] by  Azar Alarcon MD.      Results for orders placed during the hospital encounter of 02/05/21   Adult Transthoracic Echo Complete W/ Cont if Necessary Per Protocol    Narrative Normal LV size and contractility EF of 55%  Moderate right ventricular enlargement with severe right atrial   enlargement, catheter probably pacemaker lead seen.  Severe left atrial enlargement  seen.  Aortic valve, mitral valve, tricuspid valve appears structurally normal,   mild MR, AR, seen.  There is moderate  tricuspid regurgitation seen.    Calculated RV systolic pressure is 24mmHg.  No pericardial effusion seen.  Proximal aorta appears normal in size.     Scheduled Meds:aspirin, 81 mg, Oral, Daily  bumetanide, 2 mg, Intravenous, Once  cefepime, 2 g, Intravenous, Q24H  febuxostat, 40 mg, Oral, BID  gabapentin, 100 mg, Oral, TID  haloperidol lactate, 5 mg, Intramuscular, Once  magic butt paste, , Topical, Daily  metroNIDAZOLE, 500 mg, Intravenous, Q8H  midodrine, 10 mg, Oral, TID AC  sodium chloride, 10 mL, Intravenous, Q12H  vancomycin, 15 mg/kg (Adjusted), Intravenous, Once  Zinc Oxide, , Apply externally, BID      Continuous Infusions:DOPamine, 2-20 mcg/kg/min, Last Rate: 10 mcg/kg/min (02/08/21 0516)  Pharmacy to dose vancomycin,   sodium bicarbonate, 150 mEq, Last Rate: 150 mEq (02/07/21 5581)      PRN Meds:•  acetaminophen **OR** acetaminophen **OR** acetaminophen  •  magnesium sulfate **OR** magnesium sulfate in D5W 1g/100mL (PREMIX) **OR** magnesium sulfate  •  melatonin  •  nitroglycerin  •  OLANZapine  •  ondansetron **OR** ondansetron  •  Pharmacy to dose vancomycin  •  potassium chloride  •  potassium chloride  •  sodium chloride  •  traMADol  •  vancomycin    Assessment/Plan   Assessment and Plan:         Congestive heart failure (CMS/HCC)    Atrial fibrillation (CMS/[facility])    Hypertension, benign    Lymphedema    Gout    Non-pressure chronic ulcer right ankle, limited to breakdown skin (CMS/[facility])    Automatic implantable cardiac defibrillator in situ    Acute UTI (urinary tract infection)    Lower extremity cellulitis    CAD (coronary artery disease)    Sepsis (CMS/HCC)    CONG (acute kidney injury) (CMS/HCC)    Lactic acidosis    Metabolic acidosis    Delirium, acute    ASSESSMENT:  · Acute kidney injury, oliguric, stage 2-3, evolving, ongoing hypotension, ? Sepsis, and prerenal azotemia  component with ongoing diarrhea, diuretics use,   ·  might have some degree of progression of CKD too.   · CKD 4, with baseline creatinine around 1.7- 2.2, till last year,02/2020, never followed up in clinic. Work up then  ua negative RBC,WBC,no protein. UPC, was unremarkable in 02/2020 USG, right 8.9 cm, andleft 8.3 cm, medial renal disease.  · Metabolic acidosis, gap and non gap, more due to GI bicarb loss, CKD and persistent lactic acidosis. patient apparently had large bowel movement, significant metabolic acidosis  · Hypotension, concern hypovolemia,   · Congestive heart failure with last ejection fraction around 40% from 2018,   · History of atrial fibrillation  · Significant metabolic acidosis  · Significant anemia  · Chronic lymphedema  · Cellulitis of lower extremities.   · Thrombocytopenia, also on 02/2020  · Anemia.      Plan:        · Patient states she was seen by nephrologist before, last time on 02/2020, by Dr Silva,  noted had underlying chronic kidney disease with a baseline creatinine around 1.8- 2, even then, worsened 2.7,   ongoing hypotension,and prerenal azotemia component with ongoing diarrhea, diuretics use, and sepsis. Very likely might have some degree of progression of CKD too.  · Abelardo complicated with acidosis, gap and non gap, more due to GI bicarb loss, CKD and persistent lactic acidosis. patient apparently had large bowel movement, significant metabolic acidosis, had earlier given 2 amp of sodium bicarb,   · Metabolic acidosis is better than before  · Acute increase in creatinine stable but still urine output is on the low side  · To me patient is definitely volume expanded.  Going to give some loop diuretics  · Will check labs later today and tomorrow morning  · Renal function will improve slowly I think partly patient to have some cardiorenal syndrome   · No respiratory congestion at this time   · Discontinue IV fluid completely  · Continue dopamine drip  · D/w patient and daughter,  discussed undelrying CKD, now CONG risk of RRT , but expect improvement with ongoing resuscitaion and antibiotics, and ,improving hemodynamic status.   · Continue bliss.   · Fu sepsis work up   · Repeat labs,   · Decrease fluids in evening.        Note started  by Feliz Silva MD,   [facility] kidney consultant
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
## 2021-02-09 LAB
ANION GAP SERPL CALCULATED.3IONS-SCNC: 13 MMOL/L (ref 5–15)
ANION GAP SERPL CALCULATED.3IONS-SCNC: 14 MMOL/L (ref 5–15)
ANISOCYTOSIS BLD QL: ABNORMAL
BUN SERPL-MCNC: 92 MG/DL (ref 8–23)
BUN SERPL-MCNC: 98 MG/DL (ref 8–23)
BUN/CREAT SERPL: 29 (ref 7–25)
BUN/CREAT SERPL: 29.8 (ref 7–25)
CALCIUM SPEC-SCNC: 6.8 MG/DL (ref 8.6–10.5)
CALCIUM SPEC-SCNC: 6.9 MG/DL (ref 8.6–10.5)
CHLORIDE SERPL-SCNC: 101 MMOL/L (ref 98–107)
CHLORIDE SERPL-SCNC: 105 MMOL/L (ref 98–107)
CO2 SERPL-SCNC: 20 MMOL/L (ref 22–29)
CO2 SERPL-SCNC: 23 MMOL/L (ref 22–29)
CREAT SERPL-MCNC: 3.09 MG/DL (ref 0.57–1)
CREAT SERPL-MCNC: 3.38 MG/DL (ref 0.57–1)
DEPRECATED RDW RBC AUTO: 52.5 FL (ref 37–54)
EOSINOPHIL # BLD MANUAL: 0.29 10*3/MM3 (ref 0–0.4)
EOSINOPHIL NFR BLD MANUAL: 2 % (ref 0.3–6.2)
ERYTHROCYTE [DISTWIDTH] IN BLOOD BY AUTOMATED COUNT: 15.7 % (ref 12.3–15.4)
FOLATE SERPL-MCNC: 10.9 NG/ML (ref 4.78–24.2)
GFR SERPL CREATININE-BSD FRML MDRD: 13 ML/MIN/1.73
GFR SERPL CREATININE-BSD FRML MDRD: 15 ML/MIN/1.73
GFR SERPL CREATININE-BSD FRML MDRD: ABNORMAL ML/MIN/{1.73_M2}
GLUCOSE BLDC GLUCOMTR-MCNC: 147 MG/DL (ref 70–105)
GLUCOSE BLDC GLUCOMTR-MCNC: 69 MG/DL (ref 70–105)
GLUCOSE SERPL-MCNC: 119 MG/DL (ref 65–99)
GLUCOSE SERPL-MCNC: 68 MG/DL (ref 65–99)
HCT VFR BLD AUTO: 29.6 % (ref 34–46.6)
HGB BLD-MCNC: 9.5 G/DL (ref 12–15.9)
LYMPHOCYTES # BLD MANUAL: 0.29 10*3/MM3 (ref 0.7–3.1)
LYMPHOCYTES NFR BLD MANUAL: 1 % (ref 5–12)
LYMPHOCYTES NFR BLD MANUAL: 2 % (ref 19.6–45.3)
MAGNESIUM SERPL-MCNC: 1.8 MG/DL (ref 1.6–2.4)
MCH RBC QN AUTO: 30.7 PG (ref 26.6–33)
MCHC RBC AUTO-ENTMCNC: 32.2 G/DL (ref 31.5–35.7)
MCV RBC AUTO: 95.1 FL (ref 79–97)
MONOCYTES # BLD AUTO: 0.14 10*3/MM3 (ref 0.1–0.9)
MYELOCYTES NFR BLD MANUAL: 1 % (ref 0–0)
NEUTROPHILS # BLD AUTO: 13.54 10*3/MM3 (ref 1.7–7)
NEUTROPHILS NFR BLD MANUAL: 68 % (ref 42.7–76)
NEUTS BAND NFR BLD MANUAL: 26 % (ref 0–5)
PLAT MORPH BLD: NORMAL
PLATELET # BLD AUTO: 54 10*3/MM3 (ref 140–450)
PMV BLD AUTO: 8.8 FL (ref 6–12)
POTASSIUM SERPL-SCNC: 3.3 MMOL/L (ref 3.5–5.2)
POTASSIUM SERPL-SCNC: 3.9 MMOL/L (ref 3.5–5.2)
RBC # BLD AUTO: 3.11 10*6/MM3 (ref 3.77–5.28)
SCAN SLIDE: NORMAL
SODIUM SERPL-SCNC: 138 MMOL/L (ref 136–145)
SODIUM SERPL-SCNC: 138 MMOL/L (ref 136–145)
TOXIC GRANULATION: ABNORMAL
VIT B12 BLD-MCNC: >2000 PG/ML (ref 211–946)
WBC # BLD AUTO: 14.4 10*3/MM3 (ref 3.4–10.8)

## 2021-02-09 PROCEDURE — 85007 BL SMEAR W/DIFF WBC COUNT: CPT | Performed by: PHYSICIAN ASSISTANT

## 2021-02-09 PROCEDURE — 80048 BASIC METABOLIC PNL TOTAL CA: CPT | Performed by: INTERNAL MEDICINE

## 2021-02-09 PROCEDURE — 99233 SBSQ HOSP IP/OBS HIGH 50: CPT | Performed by: INTERNAL MEDICINE

## 2021-02-09 PROCEDURE — 80048 BASIC METABOLIC PNL TOTAL CA: CPT | Performed by: NURSE PRACTITIONER

## 2021-02-09 PROCEDURE — 25010000002 DOPAMINE PER 40 MG: Performed by: HOSPITALIST

## 2021-02-09 PROCEDURE — 82962 GLUCOSE BLOOD TEST: CPT

## 2021-02-09 PROCEDURE — 25010000002 MAGNESIUM SULFATE IN D5W 1G/100ML (PREMIX) 1-5 GM/100ML-% SOLUTION: Performed by: HOSPITALIST

## 2021-02-09 PROCEDURE — 85025 COMPLETE CBC W/AUTO DIFF WBC: CPT | Performed by: PHYSICIAN ASSISTANT

## 2021-02-09 PROCEDURE — 97530 THERAPEUTIC ACTIVITIES: CPT

## 2021-02-09 PROCEDURE — 25010000002 CEFTAROLINE FOSAMIL PER 10 MG: Performed by: NURSE PRACTITIONER

## 2021-02-09 PROCEDURE — 83735 ASSAY OF MAGNESIUM: CPT | Performed by: PHYSICIAN ASSISTANT

## 2021-02-09 PROCEDURE — 82746 ASSAY OF FOLIC ACID SERUM: CPT | Performed by: INTERNAL MEDICINE

## 2021-02-09 PROCEDURE — 97535 SELF CARE MNGMENT TRAINING: CPT

## 2021-02-09 PROCEDURE — 97166 OT EVAL MOD COMPLEX 45 MIN: CPT

## 2021-02-09 PROCEDURE — 82607 VITAMIN B-12: CPT | Performed by: INTERNAL MEDICINE

## 2021-02-09 PROCEDURE — 86022 PLATELET ANTIBODIES: CPT | Performed by: INTERNAL MEDICINE

## 2021-02-09 RX ORDER — NICOTINE POLACRILEX 4 MG
15 LOZENGE BUCCAL
Status: DISCONTINUED | OUTPATIENT
Start: 2021-02-09 | End: 2021-02-18 | Stop reason: HOSPADM

## 2021-02-09 RX ORDER — BUMETANIDE 0.25 MG/ML
2 INJECTION INTRAMUSCULAR; INTRAVENOUS EVERY 6 HOURS
Status: DISCONTINUED | OUTPATIENT
Start: 2021-02-09 | End: 2021-02-11

## 2021-02-09 RX ORDER — DEXTROSE MONOHYDRATE 25 G/50ML
INJECTION, SOLUTION INTRAVENOUS
Status: COMPLETED
Start: 2021-02-09 | End: 2021-02-09

## 2021-02-09 RX ORDER — FOLIC ACID 1 MG/1
1 TABLET ORAL DAILY
Status: DISCONTINUED | OUTPATIENT
Start: 2021-02-09 | End: 2021-02-18 | Stop reason: HOSPADM

## 2021-02-09 RX ORDER — DEXTROSE MONOHYDRATE 100 MG/ML
20 INJECTION, SOLUTION INTRAVENOUS CONTINUOUS
Status: DISCONTINUED | OUTPATIENT
Start: 2021-02-09 | End: 2021-02-16

## 2021-02-09 RX ORDER — DEXTROSE MONOHYDRATE 25 G/50ML
25 INJECTION, SOLUTION INTRAVENOUS
Status: DISCONTINUED | OUTPATIENT
Start: 2021-02-09 | End: 2021-02-18 | Stop reason: HOSPADM

## 2021-02-09 RX ADMIN — BUMETANIDE 2 MG: 0.25 INJECTION, SOLUTION INTRAMUSCULAR; INTRAVENOUS at 10:21

## 2021-02-09 RX ADMIN — MAGNESIUM SULFATE HEPTAHYDRATE 1 G: 1 INJECTION, SOLUTION INTRAVENOUS at 03:15

## 2021-02-09 RX ADMIN — MIDODRINE HYDROCHLORIDE 10 MG: 5 TABLET ORAL at 22:21

## 2021-02-09 RX ADMIN — GABAPENTIN 100 MG: 100 CAPSULE ORAL at 10:20

## 2021-02-09 RX ADMIN — Medication 10 ML: at 10:22

## 2021-02-09 RX ADMIN — FEBUXOSTAT 40 MG: 40 TABLET, FILM COATED ORAL at 10:17

## 2021-02-09 RX ADMIN — Medication 10 ML: at 21:05

## 2021-02-09 RX ADMIN — BUMETANIDE 2 MG: 0.25 INJECTION, SOLUTION INTRAMUSCULAR; INTRAVENOUS at 21:04

## 2021-02-09 RX ADMIN — MAGNESIUM SULFATE HEPTAHYDRATE 1 G: 1 INJECTION, SOLUTION INTRAVENOUS at 00:59

## 2021-02-09 RX ADMIN — DEXTROSE MONOHYDRATE 50 ML: 500 INJECTION PARENTERAL at 17:11

## 2021-02-09 RX ADMIN — ACETAMINOPHEN 650 MG: 325 TABLET, FILM COATED ORAL at 21:04

## 2021-02-09 RX ADMIN — FOLIC ACID 1 MG: 1 TABLET ORAL at 18:27

## 2021-02-09 RX ADMIN — GABAPENTIN 100 MG: 100 CAPSULE ORAL at 15:39

## 2021-02-09 RX ADMIN — Medication: at 21:04

## 2021-02-09 RX ADMIN — POTASSIUM CHLORIDE 40 MEQ: 1500 TABLET, EXTENDED RELEASE ORAL at 10:18

## 2021-02-09 RX ADMIN — DOPAMINE HYDROCHLORIDE 4 MCG/KG/MIN: 160 INJECTION, SOLUTION INTRAVENOUS at 20:30

## 2021-02-09 RX ADMIN — MIDODRINE HYDROCHLORIDE 10 MG: 5 TABLET ORAL at 10:19

## 2021-02-09 RX ADMIN — MIDODRINE HYDROCHLORIDE 10 MG: 5 TABLET ORAL at 15:39

## 2021-02-09 RX ADMIN — MAGNESIUM SULFATE HEPTAHYDRATE 1 G: 1 INJECTION, SOLUTION INTRAVENOUS at 02:17

## 2021-02-09 RX ADMIN — ASPIRIN 81 MG CHEWABLE TABLET 81 MG: 81 TABLET CHEWABLE at 10:18

## 2021-02-09 RX ADMIN — FEBUXOSTAT 40 MG: 40 TABLET, FILM COATED ORAL at 21:04

## 2021-02-09 RX ADMIN — NYSTATIN 500000 UNITS: 100000 SUSPENSION ORAL at 21:04

## 2021-02-09 RX ADMIN — Medication: at 10:21

## 2021-02-09 RX ADMIN — DEXTROSE MONOHYDRATE 20 ML/HR: 100 INJECTION, SOLUTION INTRAVENOUS at 18:27

## 2021-02-09 RX ADMIN — ZINC OXIDE: 200 OINTMENT TOPICAL at 10:22

## 2021-02-09 RX ADMIN — SODIUM CHLORIDE 200 MG: 9 INJECTION, SOLUTION INTRAVENOUS at 06:16

## 2021-02-09 RX ADMIN — CYANOCOBALAMIN TAB 250 MCG 500 MCG: 250 TAB at 18:26

## 2021-02-09 RX ADMIN — POTASSIUM CHLORIDE 40 MEQ: 1500 TABLET, EXTENDED RELEASE ORAL at 15:39

## 2021-02-09 RX ADMIN — TRAMADOL HYDROCHLORIDE 50 MG: 50 TABLET, FILM COATED ORAL at 10:22

## 2021-02-09 RX ADMIN — DOPAMINE HYDROCHLORIDE 7.5 MCG/KG/MIN: 160 INJECTION, SOLUTION INTRAVENOUS at 03:46

## 2021-02-09 RX ADMIN — GABAPENTIN 100 MG: 100 CAPSULE ORAL at 21:04

## 2021-02-09 RX ADMIN — BUMETANIDE 2 MG: 0.25 INJECTION, SOLUTION INTRAMUSCULAR; INTRAVENOUS at 15:39

## 2021-02-09 RX ADMIN — SODIUM CHLORIDE 200 MG: 9 INJECTION, SOLUTION INTRAVENOUS at 18:27

## 2021-02-09 NOTE — PROGRESS NOTES
Continued Stay Note  [facility]     Patient Name: Emperatriz Childs  MRN: [id number]  Today's Date: 2/9/2021    Admit Date: 2/5/2021    Discharge Plan     Row Name 02/09/21 1509       Plan    Patient/Family in Agreement with Plan  yes    Plan Comments  CM spoke with patient’s daughter Stacey over the phone to discuss that [facility] can accept at dc. She inquired how her mom felt about it. CM contacted patient via room telephone to discuss and she was agreeable to go if that is what was recommended. Pharmacy updated to [place] [facility]: [facility].        Phone communication or documentation only - no physical contact with patient or family.    Agatha Dailey RN     Office Phone: [phone]  Office Cell: [phone]

## 2021-02-09 NOTE — PROGRESS NOTES
Continued Stay Note  [facility]     Patient Name: Emperatriz Childs  MRN: [id number]  Today's Date: 2/9/2021    Admit Date: 2/5/2021    Discharge Plan     Row Name 02/09/21 1133       Plan    Plan  DC Plan: Accepted to [facility]. PASRR per MSW. Eligible for floor to SNF with 6 click score (see notes). Current with [facility] .    Patient/Family in Agreement with Plan  unable to assess    Plan Comments  Per [facility] liaison, they are familiar with patient since she has been there before. Patient qualifies for floor to SNF with a score of 8 today if ready. On day of discharge, patient would require a new 6 click score to do floor to SNF as long as she stays between 8-15. No precert would be required for floor to SNF. CM attempted to notify patient at bedside that she was accepted, however patient asleep and did not awake to CM's voice. CM contacted patient’s daughter Stacey ([phone]), no answer, voicemail left notifying that [facility] can accpet. RN notified also. DC Barriers: IV bumex q 6, IV dopamine gtt. Updated  liaison of barriers.        Agatha Dailey RN     Office Phone: [phone]  Office Cell: [phone]

## 2021-02-09 NOTE — PROGRESS NOTES
Infectious Diseases Progress Note      LOS: 1 day   Patient Care Team:  Rosa M Chavez APRN as PCP - General (Nurse Practitioner)    Chief Complaint: Lower extremities edema    Subjective     Patient had no fever during the last 24 hours.  The patient remained hemodynamically stable.  The patient having new complaints today.    Review of Systems:   Review of Systems   Constitutional: Negative.    HENT: Negative.    Eyes: Negative.    Respiratory: Negative.    Cardiovascular: Positive for leg swelling.   Gastrointestinal: Negative.    Genitourinary: Negative.    Musculoskeletal: Negative.    Skin: Negative.    Neurological: Negative.    Hematological: Negative.    Psychiatric/Behavioral: Negative.         Objective     Vital Signs  Temp:  [97.6 °F (36.4 °C)-97.9 °F (36.6 °C)] 97.8 °F (36.6 °C)  Heart Rate:  [60-68] 60  Resp:  [18-20] 20  BP: ()/(33-71) 127/51    Physical Exam:  Physical Exam  Vitals signs and nursing note reviewed.   Constitutional:       Appearance: She is well-developed.   HENT:      Head: Normocephalic and atraumatic.   Eyes:      Pupils: Pupils are equal, round, and reactive to light.   Neck:      Musculoskeletal: Normal range of motion and neck supple.   Cardiovascular:      Rate and Rhythm: Normal rate and regular rhythm.      Heart sounds: Normal heart sounds.   Pulmonary:      Effort: Pulmonary effort is normal. No respiratory distress.      Breath sounds: Normal breath sounds. No wheezing or rales.   Abdominal:      General: Bowel sounds are normal. There is no distension.      Palpations: Abdomen is soft. There is no mass.      Tenderness: There is no abdominal tenderness. There is no guarding or rebound.   Musculoskeletal: Normal range of motion.         General: No deformity.      Right lower leg: Edema present.      Left lower leg: Edema present.      Comments: Superimposed erythema of both lower extremities more on the left than the right   Skin:     General: Skin is warm.       Findings: No erythema or rash.   Neurological:      Mental Status: She is alert and oriented to person, place, and time.      Cranial Nerves: No cranial nerve deficit.          Results Review:    I have reviewed all clinical data, test, lab, and imaging results.     Radiology  No Radiology Exams Resulted Within Past 24 Hours    Cardiology    Laboratory    Results from last 7 days   Lab Units 02/09/21  0524 02/08/21  0529 02/07/21  1305 02/06/21  1904 02/06/21  0311 02/06/21  0045   WBC 10*3/mm3 14.40* 18.60* 25.00*  --  10.70 7.10   HEMOGLOBIN g/dL 9.5* 9.6* 10.1*  --  10.1* 10.5*   HEMOGLOBIN, POC g/dL  --   --   --  9.9*  --   --    HEMATOCRIT % 29.6* 29.1* 31.3*  --  31.7* 32.5*   HEMATOCRIT POC %  --   --   --  29*  --   --    PLATELETS 10*3/mm3 54* 34* 82*  --  72* 86*     Results from last 7 days   Lab Units 02/09/21  1540 02/09/21  0524 02/08/21  0529 02/07/21  1305 02/06/21  2247 02/06/21  1905 02/06/21  1708   SODIUM mmol/L 138 138 140 140 139 139 141   POTASSIUM mmol/L 3.9 3.3* 4.3 4.5 5.1 4.5 5.9*   CHLORIDE mmol/L 105 101 104 109* 112* 114* 117*   CO2 mmol/L 20.0* 23.0 22.0 18.0* 14.0* 12.0* 8.0*   BUN mg/dL 92* 98* 98* 87* 80* 76* 77*   CREATININE mg/dL 3.09* 3.38* 3.39* 3.50* 3.35* 3.28* 3.23*   GLUCOSE mg/dL 68 119* 109* 87 100* 91 58*   ALBUMIN g/dL  --   --   --  2.50* 2.40* 2.40*  --    BILIRUBIN mg/dL  --   --   --  0.6 0.4 0.4  --    ALK PHOS U/L  --   --   --  135* 109 100  --    AST (SGOT) U/L  --   --   --  30 32 27  --    ALT (SGPT) U/L  --   --   --  12 10 9  --    CALCIUM mg/dL 6.9* 6.8* 6.9* 7.1* 7.3* 7.3* 7.5*     Results from last 7 days   Lab Units 02/06/21  1708   CK TOTAL U/L 225*             Microbiology   Microbiology Results (last 10 days)     Procedure Component Value - Date/Time    Clostridium Difficile Toxin - Stool, Per Rectum [[id number]]  (Normal) Collected: 02/06/21 1952    Lab Status: Final result Specimen: Stool from Per Rectum Updated: 02/07/21 8527    Narrative:       The following orders were created for panel order Clostridium Difficile Toxin - Stool, Per Rectum.  Procedure                               Abnormality         Status                     ---------                               -----------         ------                     Clostridium Difficile EI...[[id number]]  Normal              Final result                 Please view results for these tests on the individual orders.    Clostridium Difficile EIA - Stool, Per Rectum [[id number]]  (Normal) Collected: 02/06/21 1952    Lab Status: Final result Specimen: Stool from Per Rectum Updated: 02/07/21 0725     C Diff GDH / Toxin Negative    Blood Culture - Blood, Arm, Left [[id number]] Collected: 02/06/21 1309    Lab Status: Preliminary result Specimen: Blood from Arm, Left Updated: 02/09/21 1431     Blood Culture No growth at 3 days    Blood Culture - Blood, Arm, Right [[id number]] Collected: 02/06/21 1307    Lab Status: Preliminary result Specimen: Blood from Arm, Right Updated: 02/09/21 1431     Blood Culture No growth at 3 days    MRSA Screen, PCR (Inpatient) - Swab, Nares [[id number]]  (Abnormal) Collected: 02/06/21 1114    Lab Status: Final result Specimen: Swab from Nares Updated: 02/06/21 1311     MRSA PCR MRSA Detected    COVID PRE-OP / PRE-PROCEDURE SCREENING ORDER (NO ISOLATION) - Swab, Nasopharynx [[id number]]  (Normal) Collected: 02/06/21 0153    Lab Status: Final result Specimen: Swab from Nasopharynx Updated: 02/06/21 1534    Narrative:      The following orders were created for panel order COVID PRE-OP / PRE-PROCEDURE SCREENING ORDER (NO ISOLATION) - Swab, Nasopharynx.  Procedure                               Abnormality         Status                     ---------                               -----------         ------                     COVID-19,APTIMA PANTHER,...[[id number]]  Normal              Final result                 Please view results for these tests on the individual orders.    COVID-19,APTIMA  [facility] IN-HOUSE, NP/OP SWAB IN UTM/VTM/SALINE TRANSPORT MEDIA,24 HR TAT - Swab, Nasopharynx [[id number]]  (Normal) Collected: 02/06/21 0153    Lab Status: Final result Specimen: Swab from Nasopharynx Updated: 02/06/21 1534     COVID19 Not Detected    Narrative:      Fact sheet for providers: https://www.fda.gov/media/611392/download     Fact sheet for patients: https://www.fda.gov/media/248222/download    Test performed by RT PCR.          Medication Review:       Schedule Meds  aspirin, 81 mg, Oral, Daily  bumetanide, 2 mg, Intravenous, Q6H  ceftaroline, 200 mg, Intravenous, Q12H  febuxostat, 40 mg, Oral, BID  folic acid, 1 mg, Oral, Daily  gabapentin, 100 mg, Oral, TID  haloperidol lactate, 5 mg, Intramuscular, Once  magic butt paste, , Topical, Daily  midodrine, 10 mg, Oral, TID AC  nystatin, 5 mL, Swish & Swallow, 4x Daily  sodium chloride, 10 mL, Intravenous, Q12H  vitamin B-12, 500 mcg, Oral, Daily  Zinc Oxide, , Apply externally, BID        Infusion Meds  DOPamine, 2-20 mcg/kg/min, Last Rate: 2.5 mcg/kg/min (02/09/21 1642)        PRN Meds  •  acetaminophen **OR** acetaminophen **OR** acetaminophen  •  magnesium sulfate **OR** magnesium sulfate in D5W 1g/100mL (PREMIX) **OR** magnesium sulfate  •  melatonin  •  nitroglycerin  •  OLANZapine  •  ondansetron **OR** ondansetron  •  potassium chloride  •  potassium chloride  •  sodium chloride  •  traMADol        Assessment/Plan       Antimicrobial Therapy   1.  IV Teflaro      day  2.      Day  3.      Day  4.      Day  5.      Day    Assessment     Severe bilateral lower leg cellulitis with significant erythema and warmth that extends up both bilateral inner thighs  -Patient has chronic lymphedema in both lower legs  -Came in with increased pain, redness, and swelling  -Patient does not appear to be septic at this time  -Blood cultures are negative   Erythema start to improve     Acute kidney injury     Leukocytosis-likely related to severe cellulitis.   Significantly improved       Congestive heart failure     History of pacemaker implantation     Plan     Continue IV Teflaro 200 mg every 12 hours  Continue supportive care  A.m. labs        Kurt Diehl MD  02/09/21  17:23 EST      Note is dictated utilizing voice recognition software/Dragon

## 2021-02-09 NOTE — PROGRESS NOTES
[facility] Services Daily Progress Note      Hospitalist Team  LOS 1 days      Patient Care Team:  Rosa M Chavez APRN as PCP - General (Nurse Practitioner)    Patient Location: 2120/1      Subjective   Subjective     Chief Complaint / Subjective  Fevers, leg pain      Brief Synopsis of Hospital Course/HPI    The patient is a 75-year-old female with history atrial fibrillation s/p pacemaker placement, hyperlipidemia, hypertension, CAD, CKD stage III and  chronic lower extremity lymphedema.    Apparently the patient has been having several weeks of worsening lower extremity edema thus went to outside facility.    Laboratory work was significant for , potassium 5.3, BUN 63, creatinine 2.41, WBC 4.5, lactic acid 3.2 and UA with 25-50 WBCs.  The patient was transferred to [facility] for further care      Date::    2/6/21: Poor historian.  Low BP thus gave bolus.  Started on dopamine and transferred to PCU.  Started on bicarb drip.  Confused in the evening.  2/7/21: PICC line placed.  Daughter at the bedside discussed care.  Daughter claims no history of dementia or sundowning.  Leukocytosis.  Added Flagyl.  Consulted ID.  Daughter claims patient off Xarelto.  C. difficile ruled out.      2/8  Has third spacing and bruising.  Patient denies any chest pain  On dopamine drip and being tapered off.  Started on midodrine.    2/9/2021  Outpatient recliner  She has Ace wrap in place  She had been complaining of some tight wraps on the right side and will need to be addressed.  White count better  Her platelet count is 54 slightly better than yesterday  Still on a small amount of dopamine drip.  Renal function still elevated but probably stabilized.        Review of Systems   All other systems reviewed and are negative.        Objective   Objective      Vital Signs  Temp:  [97.6 °F (36.4 °C)-97.9 °F (36.6 °C)] 97.8 °F (36.6 °C)  Heart Rate:  [60-68] 60  Resp:  [18-20] 20  BP:  "()/(33-71) 125/52  Oxygen Therapy  SpO2: 94 %  Pulse Oximetry Type: Intermittent  Device (Oxygen Therapy): (P) room air  Flowsheet Rows      First Filed Value   Admission Height  157.5 cm (62\") Documented at 02/05/2021 2359   Admission Weight  78 kg (172 lb) Documented at 02/05/2021 2359        Intake & Output (last 3 days)       02/06 0701 - 02/07 0700 02/07 0701 - 02/08 0700 02/08 0701 - 02/09 0700 02/09 0701 - 02/10 0700    P.O. 666   480    I.V. (mL/kg)  1978 (24.8) 200 (2.5)     IV Piggyback 1250 550 350     Total Intake(mL/kg) 1916 (23.5) 2528 (31.7) 550 (6.8) 480 (6)    Urine (mL/kg/hr) 40 (0) 450 (0.2) 2175 (1.1) 1360 (2.1)    Stool 0 0      Total Output 40 450 2175 1360    Net +1876 +2078 -1625 -880            Stool Unmeasured Occurrence 3 x 1 x          Lines, Drains & Airways    Active LDAs     Name:   Placement date:   Placement time:   Site:   Days:    Peripheral IV 02/06/21 0403 Anterior;Right Forearm   02/06/21    0403    Forearm   less than 1                  Physical Exam:    Physical Exam  HENT:      Head: Normocephalic.      Nose: Nose normal.   Eyes:      General: No scleral icterus.     Extraocular Movements: Extraocular movements intact.      Pupils: Pupils are equal, round, and reactive to light.   Neck:      Musculoskeletal: Normal range of motion.   Cardiovascular:      Rate and Rhythm: Normal rate and regular rhythm.   Pulmonary:      Effort: Pulmonary effort is normal.      Breath sounds: Normal breath sounds.   Abdominal:      General: Bowel sounds are normal.      Palpations: Abdomen is soft.   Musculoskeletal:      Comments: Bilateral shin with erythema and edema.  Lymphedema.   Lymphadenopathy:      Cervical: No cervical adenopathy.   Skin:     General: Skin is warm.   Neurological:      Mental Status: She is alert.   Psychiatric:         Attention and Perception: Attention normal.              Wounds (last 24 hours)      LDA Wound     Row Name 02/09/21 1200 02/09/21 0800 " 02/09/21 0402       Wound 02/06/21 0031 Right lower leg Other (comment)    Wound - Properties Group Placement Date: 02/06/21  -CS Placement Time: 0031  -CS Present on Hospital Admission: Y  -CS Side: Right  -CS Orientation: lower  -CS Location: leg  -CS Primary Wound Type: Other  -CS, chronic redness and swelling     Closure  NELY  (Pended)   -LS  NELY  -RH  NELY  -JT    Base  red  (Pended)   -LS  red  -RH  red  -JT    Periwound  blistered;excoriated;edematous  (Pended)   -LS  blistered;excoriated;edematous  -RH  blistered;excoriated;edematous  -JT    Periwound Temperature  hot;warm  (Pended)   -LS  hot;warm  -RH  hot;warm  -JT    Drainage Amount  small  (Pended)   -LS  small  -RH  small  -JT    Retired Wound - Properties Group Date first assessed: 02/06/21  -CS Time first assessed: 0031  -CS Present on Hospital Admission: Y  -CS Side: Right  -CS Location: leg  -CS Primary Wound Type: Other  -CS, chronic redness and swelling        Wound 02/06/21 0032 Right anterior foot Other (comment)    Wound - Properties Group Placement Date: 02/06/21  -CS Placement Time: 0032  -CS Present on Hospital Admission: Y  -CS Side: Right  -CS Orientation: anterior  -CS Location: foot  -CS Primary Wound Type: Other  -CS Additional Comments: chronic redness and swelling  -CS    Closure  NELY  (Pended)   -LS  NELY  -RH  NELY  -JT    Base  red  (Pended)   -LS  red  -RH  red  -JT    Periwound  edematous;excoriated;redness  (Pended)   -LS  edematous;excoriated;redness  -RH  edematous;excoriated;redness  -JT    Periwound Temperature  warm;hot  (Pended)   -LS  warm;hot  -RH  warm;hot  -JT    Periwound Skin Turgor  firm  (Pended)   -LS  firm  -RH  firm  -JT    Drainage Amount  scant  (Pended)   -LS  scant  -RH  scant  -JT    Retired Wound - Properties Group Date first assessed: 02/06/21  -CS Time first assessed: 0032  -CS Present on Hospital Admission: Y  -CS Side: Right  -CS Location: foot  -CS Primary Wound Type: Other  -CS Additional Comments:  chronic redness and swelling  -CS       Wound 02/06/21 0033 Left lower leg Other (comment)    Wound - Properties Group Placement Date: 02/06/21  -CS Placement Time: 0033  -CS Present on Hospital Admission: N  -CS Side: Left  -CS Orientation: lower  -CS Location: leg  -CS Primary Wound Type: Other  -CS Additional Comments: chronic redness and swelling  -CS    Closure  NELY  (Pended)   -LS  NELY  -RH  NELY  -JT    Base  red  (Pended)   -LS  red  -RH  red  -JT    Periwound  edematous;excoriated;redness  (Pended)   -LS  edematous;excoriated;redness  -RH  edematous;excoriated;redness  -JT    Periwound Temperature  warm;hot  (Pended)   -LS  warm;hot  -RH  warm;hot  -JT    Retired Wound - Properties Group Date first assessed: 02/06/21  -CS Time first assessed: 0033  -CS Present on Hospital Admission: N  -CS Side: Left  -CS Location: leg  -CS Primary Wound Type: Other  -CS Additional Comments: chronic redness and swelling  -CS       Wound 02/06/21 0035 Left anterior ankle Other (comment)    Wound - Properties Group Placement Date: 02/06/21  -CS Placement Time: 0035  -CS Present on Hospital Admission: Y  -CS Side: Left  -CS Orientation: anterior  -CS Location: ankle  -CS Primary Wound Type: Other  -CS    Closure  NELY  (Pended)   -LS  NELY  -RH  NELY  -JT    Base  red  (Pended)   -LS  red  -RH  red  -JT    Periwound  redness;edematous;excoriated  (Pended)   -LS  redness;edematous;excoriated  -RH  redness;edematous;excoriated  -JT    Periwound Temperature  warm  (Pended)   -LS  warm  -RH  warm  -JT    Periwound Skin Turgor  firm  (Pended)   -LS  firm  -RH  firm  -JT    Retired Wound - Properties Group Date first assessed: 02/06/21  -CS Time first assessed: 0035  -CS Present on Hospital Admission: Y  -CS Side: Left  -CS Location: ankle  -CS Primary Wound Type: Other  -CS       Wound 02/06/21 0047 Right heel Other (comment)    Wound - Properties Group Placement Date: 02/06/21  -CS Placement Time: 0047  -CS Present on Hospital  Admission: Y  -CS Side: Right  -CS Location: heel  -CS Primary Wound Type: Other  -CS, Chronic redness and swelling      Closure  NELY  (Pended)   -LS  NELY  -RH  NELY  -JT    Base  red  (Pended)   -LS  red  -RH  red  -JT    Periwound  edematous;excoriated;redness  (Pended)   -LS  edematous;excoriated;redness  -RH  edematous;excoriated;redness  -JT    Retired Wound - Properties Group Date first assessed: 02/06/21  -CS Time first assessed: 0047  -CS Present on Hospital Admission: Y  -CS Side: Right  -CS Location: heel  -CS Primary Wound Type: Other  -CS, Chronic redness and swelling         Wound 02/06/21 0050 Left posterior thigh Other (comment)    Wound - Properties Group Placement Date: 02/06/21  -CS Placement Time: 0050  -CS Present on Hospital Admission: Y  -CS Side: Left  -CS Orientation: posterior  -CS Location: thigh  -CS Primary Wound Type: Other  -CS, Chronic redness and swelling      Base  red;scab  (Pended)   -LS  red;scab  -RH  red;scab  -JT    Periwound  edematous;excoriated;redness  (Pended)   -LS  edematous;excoriated;redness  -RH  edematous;excoriated;redness  -JT    Retired Wound - Properties Group Date first assessed: 02/06/21  -CS Time first assessed: 0050  -CS Present on Hospital Admission: Y  -CS Side: Left  -CS Location: thigh  -CS Primary Wound Type: Other  -CS, Chronic redness and swelling         Wound 02/06/21 0051 coccyx Pressure Injury    Wound - Properties Group Placement Date: 02/06/21  -CS Placement Time: 0051  -CS Present on Hospital Admission: Y  -CS Location: coccyx  -CS Primary Wound Type: Pressure inj  -CS Stage, Pressure Injury : Stage 2  -CS    Base  non-blanchable;scab  (Pended)   -LS  non-blanchable;scab  -RH  non-blanchable;scab  -JT    Periwound  non-blanchable;redness  (Pended)   -LS  non-blanchable;redness  -RH  non-blanchable;redness  -JT    Retired Wound - Properties Group Date first assessed: 02/06/21  -CS Time first assessed: 0051  -CS Present on Hospital Admission: Y   -CS Location: coccyx  -CS Primary Wound Type: Pressure inj  -CS    Row Name 02/09/21 0000 02/08/21 2006 02/08/21 1600       Wound 02/06/21 0031 Right lower leg Other (comment)    Wound - Properties Group Placement Date: 02/06/21  -CS Placement Time: 0031 -CS Present on Hospital Admission: Y  -CS Side: Right  -CS Orientation: lower  -CS Location: leg  -CS Primary Wound Type: Other  -CS, chronic redness and swelling     Closure  NELY  -JT  NELY  -JT  NELY  -KS    Base  red  -JT  red  -JT  red  -KS    Periwound  blistered;excoriated;edematous  -JT  blistered;excoriated;edematous  -JT  blistered;excoriated;edematous  -KS    Periwound Temperature  hot;warm  -JT  hot;warm  -JT  hot;warm  -KS    Drainage Amount  small  -JT  small  -JT  small  -KS    Retired Wound - Properties Group Date first assessed: 02/06/21  -CS Time first assessed: 0031  -CS Present on Hospital Admission: Y  -CS Side: Right  -CS Location: leg  -CS Primary Wound Type: Other  -CS, chronic redness and swelling        Wound 02/06/21 0032 Right anterior foot Other (comment)    Wound - Properties Group Placement Date: 02/06/21  -CS Placement Time: 0032  -CS Present on Hospital Admission: Y  -CS Side: Right  -CS Orientation: anterior  -CS Location: foot  -CS Primary Wound Type: Other  -CS Additional Comments: chronic redness and swelling  -CS    Closure  NELY  -JT  NELY  -JT  NELY  -KS    Base  red  -JT  red  -JT  red  -KS    Periwound  edematous;excoriated;redness  -JT  edematous;excoriated;redness  -JT  edematous;excoriated;redness  -KS    Periwound Temperature  warm;hot  -JT  warm;hot  -JT  warm;hot  -KS    Periwound Skin Turgor  firm  -JT  firm  -JT  firm  -KS    Drainage Amount  scant  -JT  scant  -JT  scant  -KS    Retired Wound - Properties Group Date first assessed: 02/06/21  -CS Time first assessed: 0032  -CS Present on Hospital Admission: Y  -CS Side: Right  -CS Location: foot  -CS Primary Wound Type: Other  -CS Additional Comments: chronic redness and  swelling  -CS       Wound 02/06/21 0033 Left lower leg Other (comment)    Wound - Properties Group Placement Date: 02/06/21  -CS Placement Time: 0033  -CS Present on Hospital Admission: N  -CS Side: Left  -CS Orientation: lower  -CS Location: leg  -CS Primary Wound Type: Other  -CS Additional Comments: chronic redness and swelling  -CS    Closure  NELY  -JT  NELY  -JT  NELY  -KS    Base  red  -JT  red  -JT  red  -KS    Periwound  edematous;excoriated;redness  -JT  edematous;excoriated;redness  -JT  edematous;excoriated;redness  -KS    Periwound Temperature  warm;hot  -JT  warm;hot  -JT  warm;hot  -KS    Retired Wound - Properties Group Date first assessed: 02/06/21  -CS Time first assessed: 0033  -CS Present on Hospital Admission: N  -CS Side: Left  -CS Location: leg  -CS Primary Wound Type: Other  -CS Additional Comments: chronic redness and swelling  -CS       Wound 02/06/21 0035 Left anterior ankle Other (comment)    Wound - Properties Group Placement Date: 02/06/21  -CS Placement Time: 0035  -CS Present on Hospital Admission: Y  -CS Side: Left  -CS Orientation: anterior  -CS Location: ankle  -CS Primary Wound Type: Other  -CS    Closure  NELY  -JT  NELY  -JT  NELY  -KS    Base  red  -JT  red  -JT  red  -KS    Periwound  redness;edematous;excoriated  -JT  redness;edematous;excoriated  -JT  redness;edematous;excoriated  -KS    Periwound Temperature  warm  -JT  warm  -JT  warm  -KS    Periwound Skin Turgor  firm  -JT  firm  -JT  firm  -KS    Retired Wound - Properties Group Date first assessed: 02/06/21  -CS Time first assessed: 0035  -CS Present on Hospital Admission: Y  -CS Side: Left  -CS Location: ankle  -CS Primary Wound Type: Other  -CS       Wound 02/06/21 0047 Right heel Other (comment)    Wound - Properties Group Placement Date: 02/06/21  -CS Placement Time: 0047  -CS Present on Hospital Admission: Y  -CS Side: Right  -CS Location: heel  -CS Primary Wound Type: Other  -CS, Chronic redness and swelling       Closure  NELY  -JT  NELY  -JT  NELY  -KS    Base  red  -JT  red  -JT  red  -KS    Periwound  edematous;excoriated;redness  -JT  edematous;excoriated;redness  -JT  edematous;excoriated;redness  -KS    Retired Wound - Properties Group Date first assessed: 02/06/21  -CS Time first assessed: 0047  -CS Present on Hospital Admission: Y  -CS Side: Right  -CS Location: heel  -CS Primary Wound Type: Other  -CS, Chronic redness and swelling         Wound 02/06/21 0050 Left posterior thigh Other (comment)    Wound - Properties Group Placement Date: 02/06/21  -CS Placement Time: 0050  -CS Present on Hospital Admission: Y  -CS Side: Left  -CS Orientation: posterior  -CS Location: thigh  -CS Primary Wound Type: Other  -CS, Chronic redness and swelling      Base  red;scab  -JT  red;scab  -JT  red;scab  -KS    Periwound  edematous;excoriated;redness  -JT  edematous;excoriated;redness  -JT  edematous;excoriated;redness  -KS    Retired Wound - Properties Group Date first assessed: 02/06/21  -CS Time first assessed: 0050  -CS Present on Hospital Admission: Y  -CS Side: Left  -CS Location: thigh  -CS Primary Wound Type: Other  -CS, Chronic redness and swelling         Wound 02/06/21 0051 coccyx Pressure Injury    Wound - Properties Group Placement Date: 02/06/21  -CS Placement Time: 0051  -CS Present on Hospital Admission: Y  -CS Location: coccyx  -CS Primary Wound Type: Pressure inj  -CS Stage, Pressure Injury : Stage 2  -CS    Base  non-blanchable;scab  -JT  non-blanchable;scab  -JT  non-blanchable;scab  -KS    Periwound  non-blanchable;redness  -JT  non-blanchable;redness  -JT  non-blanchable;redness  -KS    Retired Wound - Properties Group Date first assessed: 02/06/21  -CS Time first assessed: 0051  -CS Present on Hospital Admission: Y  -CS Location: coccyx  -CS Primary Wound Type: Pressure inj  -CS      User Key  (r) = Recorded By, (t) = Taken By, (c) = Cosigned By    Initials Name Provider Type    CS Naeem Jiang RN Registered  Nurse    Violet Tabares, RN Registered Nurse    Elin Benz RN Registered Nurse    Anali Costello, RN Registered Nurse    Shelbie Fernando RN Registered Nurse          Procedures:              Results Review:     I reviewed the patient's new clinical results.      Lab Results (last 24 hours)     Procedure Component Value Units Date/Time    Blood Culture - Blood, Arm, Right [[id number]] Collected: 02/06/21 1307    Specimen: Blood from Arm, Right Updated: 02/09/21 1431     Blood Culture No growth at 3 days    Blood Culture - Blood, Arm, Left [[id number]] Collected: 02/06/21 1309    Specimen: Blood from Arm, Left Updated: 02/09/21 1431     Blood Culture No growth at 3 days    CBC & Differential [[id number]]  (Abnormal) Collected: 02/09/21 0524    Specimen: Blood Updated: 02/09/21 0701    Narrative:      The following orders were created for panel order CBC & Differential.  Procedure                               Abnormality         Status                     ---------                               -----------         ------                     Scan Slide[[id number]]                                       Final result               CBC Auto Differential[[id number]]        Abnormal            Final result                 Please view results for these tests on the individual orders.    CBC Auto Differential [[id number]]  (Abnormal) Collected: 02/09/21 0524    Specimen: Blood Updated: 02/09/21 0701     WBC 14.40 10*3/mm3      RBC 3.11 10*6/mm3      Hemoglobin 9.5 g/dL      Hematocrit 29.6 %      MCV 95.1 fL      MCH 30.7 pg      MCHC 32.2 g/dL      RDW 15.7 %      RDW-SD 52.5 fl      MPV 8.8 fL      Platelets 54 10*3/mm3      Comment: Platelet estimate performed due to platelet clumping.       Narrative:      The previously reported component NRBC is no longer being reported. Previous result was 0.5 /100 WBC (Reference Range: 0.0-0.2 /100 WBC) on 2/9/2021 at 0538 EST.    Scan Slide [[id number]] Collected:  02/09/21 0524    Specimen: Blood Updated: 02/09/21 0701     Scan Slide --     Comment: See Manual Differential Results       Manual Differential [[id number]]  (Abnormal) Collected: 02/09/21 0524    Specimen: Blood Updated: 02/09/21 0701     Neutrophil % 68.0 %      Lymphocyte % 2.0 %      Monocyte % 1.0 %      Eosinophil % 2.0 %      Bands %  26.0 %      Myelocyte % 1.0 %      Neutrophils Absolute 13.54 10*3/mm3      Lymphocytes Absolute 0.29 10*3/mm3      Monocytes Absolute 0.14 10*3/mm3      Eosinophils Absolute 0.29 10*3/mm3      Anisocytosis Slight/1+     Toxic Granulation Slight/1+     Platelet Morphology Normal    Magnesium [[id number]]  (Normal) Collected: 02/09/21 0524    Specimen: Blood Updated: 02/09/21 0546     Magnesium 1.8 mg/dL     Basic Metabolic Panel [[id number]]  (Abnormal) Collected: 02/09/21 0524    Specimen: Blood Updated: 02/09/21 0546     Glucose 119 mg/dL      BUN 98 mg/dL      Creatinine 3.38 mg/dL      Sodium 138 mmol/L      Potassium 3.3 mmol/L      Chloride 101 mmol/L      CO2 23.0 mmol/L      Calcium 6.8 mg/dL      eGFR   Amer --     Comment: <15 Indicative of kidney failure.        eGFR Non African Amer 13 mL/min/1.73      Comment: <15 Indicative of kidney failure.        BUN/Creatinine Ratio 29.0     Anion Gap 14.0 mmol/L     Narrative:      GFR Normal >60  Chronic Kidney Disease <60  Kidney Failure <15      BNP [[id number]]  (Abnormal) Collected: 02/08/21 1658    Specimen: Blood Updated: 02/08/21 1744     proBNP 54,786.0 pg/mL     Narrative:      Among patients with dyspnea, NT-proBNP is highly sensitive for the detection of acute congestive heart failure. In addition NT-proBNP of <300 pg/ml effectively rules out acute congestive heart failure with 99% negative predictive value.    Results may be falsely decreased if patient taking Biotin.          No results found for: HGBA1C        Results from last 7 days   Lab Units 02/06/21  1904   PH, ARTERIAL pH units 7.337*   PO2 ART mm Hg  79.5*   PCO2, ARTERIAL mm Hg 26.4*   HCO3 ART mmol/L 14.2*     Lab Results   Component Value Date    LIPASE 42 01/13/2018     Lab Results   Component Value Date    CHOL 126 02/05/2020    TRIG 51 02/05/2020    HDL 66 (H) 02/05/2020    LDL 50 02/05/2020       No results found for: INTRAOP, PREDX, FINALDX, COMDX    Microbiology Results (last 10 days)     Procedure Component Value - Date/Time    Clostridium Difficile Toxin - Stool, Per Rectum [[id number]]  (Normal) Collected: 02/06/21 1952    Lab Status: Final result Specimen: Stool from Per Rectum Updated: 02/07/21 0725    Narrative:      The following orders were created for panel order Clostridium Difficile Toxin - Stool, Per Rectum.  Procedure                               Abnormality         Status                     ---------                               -----------         ------                     Clostridium Difficile EI...[[id number]]  Normal              Final result                 Please view results for these tests on the individual orders.    Clostridium Difficile EIA - Stool, Per Rectum [[id number]]  (Normal) Collected: 02/06/21 1952    Lab Status: Final result Specimen: Stool from Per Rectum Updated: 02/07/21 0725     C Diff GDH / Toxin Negative    Blood Culture - Blood, Arm, Left [[id number]] Collected: 02/06/21 1309    Lab Status: Preliminary result Specimen: Blood from Arm, Left Updated: 02/09/21 1431     Blood Culture No growth at 3 days    Blood Culture - Blood, Arm, Right [[id number]] Collected: 02/06/21 1307    Lab Status: Preliminary result Specimen: Blood from Arm, Right Updated: 02/09/21 1431     Blood Culture No growth at 3 days    MRSA Screen, PCR (Inpatient) - Swab, Nares [[id number]]  (Abnormal) Collected: 02/06/21 1114    Lab Status: Final result Specimen: Swab from Nares Updated: 02/06/21 1311     MRSA PCR MRSA Detected    COVID PRE-OP / PRE-PROCEDURE SCREENING ORDER (NO ISOLATION) - Swab, Nasopharynx [[id number]]  (Normal) Collected:  02/06/21 0153    Lab Status: Final result Specimen: Swab from Nasopharynx Updated: 02/06/21 1534    Narrative:      The following orders were created for panel order COVID PRE-OP / PRE-PROCEDURE SCREENING ORDER (NO ISOLATION) - Swab, Nasopharynx.  Procedure                               Abnormality         Status                     ---------                               -----------         ------                     COVID-19,APTIMA PANTHER,...[[id number]]  Normal              Final result                 Please view results for these tests on the individual orders.    COVID-19,APTIMA PANTHER,[facility] IN-HOUSE, NP/OP SWAB IN UTM/VTM/SALINE TRANSPORT MEDIA,24 HR TAT - Swab, Nasopharynx [[id number]]  (Normal) Collected: 02/06/21 0153    Lab Status: Final result Specimen: Swab from Nasopharynx Updated: 02/06/21 1534     COVID19 Not Detected    Narrative:      Fact sheet for providers: https://www.fda.gov/media/470524/download     Fact sheet for patients: https://www.fda.gov/media/719537/download    Test performed by RT PCR.          ECG/EMG Results (most recent)     Procedure Component Value Units Date/Time    Adult Transthoracic Echo Complete W/ Cont if Necessary Per Protocol [[id number]] Collected: 02/06/21 0850     Updated: 02/06/21 1609     BSA 1.8 m^2      RVIDd 2.7 cm      IVSd 1.3 cm      LVIDd 3.9 cm      LVIDs 2.8 cm      LVPWd 1.2 cm      IVS/LVPW 1.0     FS 28.8 %      EDV(Teich) 67.0 ml      ESV(Teich) 29.4 ml      EF(Teich) 56.0 %      EDV(cubed) 60.5 ml      ESV(cubed) 21.9 ml      EF(cubed) 63.9 %      LV mass(C)d 170.0 grams      LV mass(C)dI 92.8 grams/m^2      SV(Teich) 37.5 ml      SI(Teich) 20.5 ml/m^2      SV(cubed) 38.7 ml      SI(cubed) 21.1 ml/m^2      Ao root diam 2.5 cm      Ao root area 5.1 cm^2      ACS 1.6 cm      asc Aorta Diam 3.2 cm      LVOT diam 1.8 cm      LVOT area 2.6 cm^2      RVOT diam 2.6 cm      RVOT area 5.5 cm^2      EDV(MOD-sp4) 59.5 ml      ESV(MOD-sp4) 26.6 ml      EF(MOD-sp4)  55.3 %      EDV(MOD-sp2) 53.3 ml      ESV(MOD-sp2) 20.0 ml      EF(MOD-sp2) 62.4 %      SV(MOD-sp4) 32.9 ml      SI(MOD-sp4) 18.0 ml/m^2      SV(MOD-sp2) 33.2 ml      SI(MOD-sp2) 18.1 ml/m^2      Ao root area (BSA corrected) 1.4     LV Olivier Vol (BSA corrected) 32.5 ml/m^2      LV Sys Vol (BSA corrected) 14.5 ml/m^2      Aortic R-R 1.0 sec      Aortic HR 59.0 BPM      MV V2 max 134.6 cm/sec      MV max PG 7.2 mmHg      MV V2 mean 51.6 cm/sec      MV mean PG 1.8 mmHg      MV V2 VTI 24.3 cm      MVA(VTI) 2.3 cm^2      Ao pk devante 182.4 cm/sec      Ao max PG 13.3 mmHg      Ao max PG (full) 8.5 mmHg      Ao V2 mean 142.3 cm/sec      Ao mean PG 8.7 mmHg      Ao mean PG (full) 5.5 mmHg      Ao V2 VTI 37.9 cm      WILLIS(I,A) 1.5 cm^2      WILLIS(I,D) 1.5 cm^2      WILLIS(V,A) 1.5 cm^2      WILLIS(V,D) 1.5 cm^2      AI max devante 264.5 cm/sec      AI max PG 28.0 mmHg      AI dec slope 171.9 cm/sec^2      AI dec time 1.5 sec      AI P1/2t 450.8 msec      LV V1 max PG 4.8 mmHg      LV V1 mean PG 3.2 mmHg      LV V1 max 110.0 cm/sec      LV V1 mean 85.9 cm/sec      LV V1 VTI 21.6 cm      CO(Ao) 11.4 l/min      CI(Ao) 6.2 l/min/m^2      SV(Ao) 192.9 ml      SI(Ao) 105.3 ml/m^2      CO(LVOT) 3.3 l/min      CI(LVOT) 1.8 l/min/m^2      SV(LVOT) 55.2 ml      SV(RVOT) 59.1 ml      SI(LVOT) 30.1 ml/m^2      PA V2 max 88.8 cm/sec      PA max PG 3.2 mmHg      PA max PG (full) 1.9 mmHg      PA V2 mean 63.8 cm/sec      PA mean PG 1.8 mmHg      PA mean PG (full) 1.0 mmHg      PA V2 VTI 17.2 cm      PVA(I,A) 3.4 cm^2      BH CV ECHO SAHIL - PVA(I,D) 3.4 cm^2      BH CV ECHO SAHIL - PVA(V,A) 3.4 cm^2      BH CV ECHO SAHIL - PVA(V,D) 3.4 cm^2      PA acc time 0.07 sec      RV V1 max PG 1.2 mmHg      RV V1 mean PG 0.77 mmHg      RV V1 max 55.1 cm/sec      RV V1 mean 41.7 cm/sec      RV V1 VTI 10.8 cm      TR max devante 237.7 cm/sec      RVSP(TR) 25.6 mmHg      RAP systole 3.0 mmHg      PA pr(Accel) 48.2 mmHg      Pulm Sys Devante 41.0 cm/sec      Pulm Olivier Devante 37.5  cm/sec      Pulm S/D 1.1     Qp/Qs 1.1      CV ECHO SAHIL - BZI_BMI 33.1 kilograms/m^2       CV ECHO SAHIL - BSA(HAYCOCK) 1.9 m^2       CV ECHO SAHIL - BZI_METRIC_WEIGHT 82.1 kg       CV ECHO SAHIL - BZI_METRIC_HEIGHT 157.5 cm      EF(MOD-bp) 60.0 %      LA dimension(2D) 4.2 cm     Narrative:      Normal LV size and contractility EF of 55%  Moderate right ventricular enlargement with severe right atrial   enlargement, catheter probably pacemaker lead seen.  Severe left atrial enlargement seen.  Aortic valve, mitral valve, tricuspid valve appears structurally normal,   mild MR, AR, seen.  There is moderate  tricuspid regurgitation seen.    Calculated RV systolic pressure is 24mmHg.  No pericardial effusion seen.  Proximal aorta appears normal in size.          Results for orders placed during the hospital encounter of 02/05/21   Duplex Venous Lower Extremity - Bilateral CAR    Narrative · Exam limited by patient intolerance to compression and body habitus.  · The distal left femoral and the right and left peroneal veins are not   imaged.  · All other veins appeared normal bilaterally.          Results for orders placed during the hospital encounter of 02/05/21   Adult Transthoracic Echo Complete W/ Cont if Necessary Per Protocol    Narrative Normal LV size and contractility EF of 55%  Moderate right ventricular enlargement with severe right atrial   enlargement, catheter probably pacemaker lead seen.  Severe left atrial enlargement seen.  Aortic valve, mitral valve, tricuspid valve appears structurally normal,   mild MR, AR, seen.  There is moderate  tricuspid regurgitation seen.    Calculated RV systolic pressure is 24mmHg.  No pericardial effusion seen.  Proximal aorta appears normal in size.       Xr Chest 1 View    Result Date: 2/7/2021  1. New right-sided PICC line with tip terminating over the low SVC. 2. Stable cardiomegaly.  Electronically Signed By-Dae Auguste MD On:2/7/2021 10:45 AM This report was  finalized on [id number] by  Dae Auguste MD.    Xr Chest 1 View    Result Date: 2/6/2021  1.Cardiomegaly  Electronically Signed By-Azar Alarocn MD On:2/6/2021 8:01 AM This report was finalized on [id number] by  Azar Alarcon MD.          Xrays, labs reviewed personally by physician.    Medication Review:   I have reviewed the patient's current medication list      Scheduled Meds  aspirin, 81 mg, Oral, Daily  bumetanide, 2 mg, Intravenous, Q6H  ceftaroline, 200 mg, Intravenous, Q12H  febuxostat, 40 mg, Oral, BID  gabapentin, 100 mg, Oral, TID  haloperidol lactate, 5 mg, Intramuscular, Once  magic butt paste, , Topical, Daily  midodrine, 10 mg, Oral, TID AC  sodium chloride, 10 mL, Intravenous, Q12H  Zinc Oxide, , Apply externally, BID        Meds Infusions  DOPamine, 2-20 mcg/kg/min, Last Rate: 5 mcg/kg/min (02/09/21 1411)        Meds PRN  •  acetaminophen **OR** acetaminophen **OR** acetaminophen  •  magnesium sulfate **OR** magnesium sulfate in D5W 1g/100mL (PREMIX) **OR** magnesium sulfate  •  melatonin  •  nitroglycerin  •  OLANZapine  •  ondansetron **OR** ondansetron  •  potassium chloride  •  potassium chloride  •  sodium chloride  •  traMADol        Assessment/Plan   Assessment/Plan     Active Hospital Problems    Diagnosis  POA   • Sepsis (CMS/[facility]) [A41.9]  Yes   • CONG (acute kidney injury) (CMS/[facility]) [N17.9]  Yes   • Lactic acidosis [E87.2]  Yes   • Metabolic acidosis [E87.2]  Yes   • Delirium, acute [R41.0]  Yes   • Lower extremity cellulitis [L03.119]  Yes   • CAD (coronary artery disease) [I25.10]  Yes   • Acute UTI (urinary tract infection) [N39.0]  Yes   • Non-pressure chronic ulcer right ankle, limited to breakdown skin (CMS/[facility]) [L97.311]  Yes   • Automatic implantable cardiac defibrillator in situ [Z95.810]  Yes   • Lymphedema [I89.0]  Unknown   • Gout [M10.9]  Yes   • Hypertension, benign [I10]  Yes   • Congestive heart failure (CMS/HCC) [I50.9]  Yes   • Atrial fibrillation (CMS/HCC)  [I48.91]  Yes      Resolved Hospital Problems   No resolved problems to display.       MEDICAL DECISION MAKING COMPLEXITY BY PROBLEM:       Septic shock from LE cellulitis and UTI   - bilateral LE Lymphedema   -Rocephin given x1 at sending facility,   -started vancomycin and cefepime 2/6/2021.  Added Flagyl 2/7/21 because of leukocytosis  -Started on dopamine 2/6/2021  - procalcitonin 171.12  -MRSA screen positive  -C. difficile negative  -Falls precautions  -Wound care consulted  -ID consulted 2/7/2021.  -Antibiotics changed to ceftaroline       Urinary tract infection  -Rocephin given at sending facility  -cefepime   -Urine culture pending     CONG on CKD III:  -Hold colchicine  -Concern for fluid loss from LE cellulitis  -Consulted nephrology  -s/p renal ultrasound 2/5/2020    Thrombocytopenia possible related to sepsis.  Will check heparin-induced antibody as well as folic acid and B12 levels.  I believe we did not have any heparin products used but will check with pharmacy.  Normal gap metabolic acidosis:  -Given bicarb drip overnight    Delirium:  -No history of dementia per daughter  -Avoid sedating medications    Right arm IV infiltration:  -Continue ice to arm and monitor    CAD  -Denies chest pain  -Continue aspirin, beta-blocker     Atrial fibrillation s/p pacemaker  -Hold digoxin given her kidney failure  Currently on metoprolol at home  -Repeat TTE 2/13/2018--> LVEF 40% with mild aortic insufficiency  -TTE 2/6/2021--> LVEF 55%, severe right atrial enlargement, severe left atrial enlargement, moderate TR  -Daughter claims patient off Xarelto     Hypertension  -Hold metoprolol temporarily because of low BP     Hyperlipidemia  -Check lipid panel  -on  home Zetia at home     Chronic pain  -Continue gabapentin and tramadol     Gout  -Hold colchicine because ok CONG     Pressure ulcer on right ankle  -Wound care consulted             VTE Prophylaxis -   Mechanical Order History:     None      Pharmalogical  Order History:      Ordered     Dose Route Frequency Stop    02/06/21 0500  rivaroxaban (XARELTO) tablet 10 mg     Question Answer Comment   Are you ordering rivaroxaban for the prevention of blood clots in an acutely ill medical patient? Yes    Select any exclusion criteria that may apply to patient Exclusion Criteria Does Not Apply to This Patient Alternative to Lovenox/heparin inpatient with thrombocytopenia unable to receive mechanical prophylaxis       10 mg PO Daily With Dinner --    02/06/21 0020  heparin (porcine) 5000 UNIT/ML injection 5,000 Units  Status:  Discontinued      5,000 Units SC Every 12 Hours Scheduled 02/06/21 0147                  Code Status -   Code Status and Medical Interventions:   Ordered at: 02/06/21 0258     Level Of Support Discussed With:    Patient     Code Status:    CPR     Medical Interventions (Level of Support Prior to Arrest):    Full       This patient has been examined wearing appropriate Personal Protective Equipment and discussed with nursing. 02/09/21        Discharge Planning    Patient does have episodes of confusion in the evenings Zyprexa PRN ordered.  Elevated procalcitonin and worsening WBC thus added Flagyl to Vanco and cefepime.  ID consulted 2/7/2021.      Electronically signed by Alexandro Huitron MD, 02/09/21, 15:08 EST.  [facility]ist [facility]

## 2021-02-09 NOTE — PROGRESS NOTES
PROGRESS NOTE      Patient Name: Emperatriz Childs  : 1945  MRN: [id number]  Primary Care Physician: Rosa M Chavez APRN  Date of admission: 2021    Patient Care Team:  Rosa M Chavez APRN as PCP - General (Nurse Practitioner)        Subjective   Subjective:     Acute kidney injury, patient is still swollen not feeling good, no significant shortness of breath  Review of systems:  All other review of system unremarkable      Allergies:    Allergies   Allergen Reactions   • Allopurinol Unknown - High Severity   • Clindamycin Hcl Unknown - High Severity   • Codeine Unknown - High Severity   • Furosemide Unknown - High Severity   • Hydrochlorothiazide Unknown - High Severity   • Naproxen Unknown - High Severity   • Sulfa Antibiotics Unknown - High Severity       Objective   Exam:     Vital Signs  Temp:  [97.8 °F (36.6 °C)-97.9 °F (36.6 °C)] 97.8 °F (36.6 °C)  Heart Rate:  [60-68] 60  Resp:  [20] 20  BP: ()/(33-71) 127/51  SpO2:  [92 %-96 %] 94 %  on   ;   Device (Oxygen Therapy): room air  Body mass index is 32.38 kg/m².    General: Elderly  female in no acute distress.    Head:      Normocephalic and atraumatic.    Eyes:      PERRL/EOM intact, conjunctiva and sclera clear with out nystagmus.    Neck:      No masses, thyromegaly,  trachea central with normal respiratory effort   Lungs:    Clear bilaterally to auscultation.    Heart:      Regular rate and rhythm, no murmur no gallop  Abd:        Soft, nontender, not distended, bowel sounds positive, no shifting dullness   Pulses:   Pulses palpable  Extr:        No cyanosis or clubbing--significant bilateral edema.    Neuro:    No focal deficits.   alert oriented x3  Skin:       Intact without lesions or rashes.    Psych:    Alert and cooperative; normal mood and affect; .      Results Review:  I have personally reviewed most recent Data :  CBC    Results from last 7 days   Lab Units 21  0524 21  0529 21  1305 21  1904  02/06/21  0311 02/06/21  0045   WBC 10*3/mm3 14.40* 18.60* 25.00*  --  10.70 7.10   HEMOGLOBIN g/dL 9.5* 9.6* 10.1*  --  10.1* 10.5*   HEMOGLOBIN, POC g/dL  --   --   --  9.9*  --   --    PLATELETS 10*3/mm3 54* 34* 82*  --  72* 86*     CMP   Results from last 7 days   Lab Units 02/09/21  1540 02/09/21  0524 02/08/21  0529 02/07/21  1305 02/06/21  2247 02/06/21  1905 02/06/21  1708   SODIUM mmol/L 138 138 140 140 139 139 141   POTASSIUM mmol/L 3.9 3.3* 4.3 4.5 5.1 4.5 5.9*   CHLORIDE mmol/L 105 101 104 109* 112* 114* 117*   CO2 mmol/L 20.0* 23.0 22.0 18.0* 14.0* 12.0* 8.0*   BUN mg/dL 92* 98* 98* 87* 80* 76* 77*   CREATININE mg/dL 3.09* 3.38* 3.39* 3.50* 3.35* 3.28* 3.23*   GLUCOSE mg/dL 68 119* 109* 87 100* 91 58*   ALBUMIN g/dL  --   --   --  2.50* 2.40* 2.40*  --    BILIRUBIN mg/dL  --   --   --  0.6 0.4 0.4  --    ALK PHOS U/L  --   --   --  135* 109 100  --    AST (SGOT) U/L  --   --   --  30 32 27  --    ALT (SGPT) U/L  --   --   --  12 10 9  --      ABG    Results from last 7 days   Lab Units 02/06/21  1904   PH, ARTERIAL pH units 7.337*   PCO2, ARTERIAL mm Hg 26.4*   PO2 ART mm Hg 79.5*   O2 SATURATION ART % 95.2   BASE EXCESS ART mmol/L -10.4*     Xr Chest 1 View    Result Date: 2/7/2021  1. New right-sided PICC line with tip terminating over the low SVC. 2. Stable cardiomegaly.  Electronically Signed By-Dae Auguste MD On:2/7/2021 10:45 AM This report was finalized on [id number] by  Dae Auguste MD.    Xr Chest 1 View    Result Date: 2/6/2021  1.Cardiomegaly  Electronically Signed By-Azar Alarcon MD On:2/6/2021 8:01 AM This report was finalized on [id number] by  Azar Alarcon MD.      Results for orders placed during the hospital encounter of 02/05/21   Adult Transthoracic Echo Complete W/ Cont if Necessary Per Protocol    Narrative Normal LV size and contractility EF of 55%  Moderate right ventricular enlargement with severe right atrial   enlargement, catheter probably pacemaker lead  seen.  Severe left atrial enlargement seen.  Aortic valve, mitral valve, tricuspid valve appears structurally normal,   mild MR, AR, seen.  There is moderate  tricuspid regurgitation seen.    Calculated RV systolic pressure is 24mmHg.  No pericardial effusion seen.  Proximal aorta appears normal in size.     Scheduled Meds:aspirin, 81 mg, Oral, Daily  bumetanide, 2 mg, Intravenous, Q6H  ceftaroline, 200 mg, Intravenous, Q12H  febuxostat, 40 mg, Oral, BID  folic acid, 1 mg, Oral, Daily  gabapentin, 100 mg, Oral, TID  haloperidol lactate, 5 mg, Intramuscular, Once  magic butt paste, , Topical, Daily  midodrine, 10 mg, Oral, TID AC  nystatin, 5 mL, Swish & Swallow, 4x Daily  sodium chloride, 10 mL, Intravenous, Q12H  vitamin B-12, 500 mcg, Oral, Daily  Zinc Oxide, , Apply externally, BID      Continuous Infusions:DOPamine, 2-20 mcg/kg/min, Last Rate: 2.5 mcg/kg/min (02/09/21 5482)      PRN Meds:•  acetaminophen **OR** acetaminophen **OR** acetaminophen  •  magnesium sulfate **OR** magnesium sulfate in D5W 1g/100mL (PREMIX) **OR** magnesium sulfate  •  melatonin  •  nitroglycerin  •  OLANZapine  •  ondansetron **OR** ondansetron  •  potassium chloride  •  potassium chloride  •  sodium chloride  •  traMADol    Assessment/Plan   Assessment and Plan:         Congestive heart failure (CMS/HCC)    Atrial fibrillation (CMS/[facility])    Hypertension, benign    Lymphedema    Gout    Non-pressure chronic ulcer right ankle, limited to breakdown skin (CMS/[facility])    Automatic implantable cardiac defibrillator in situ    Acute UTI (urinary tract infection)    Lower extremity cellulitis    CAD (coronary artery disease)    Sepsis (CMS/HCC)    CONG (acute kidney injury) (CMS/HCC)    Lactic acidosis    Metabolic acidosis    Delirium, acute    ASSESSMENT:  · Acute kidney injury, oliguric, stage 2-3, evolving, ongoing hypotension, ? Sepsis, and prerenal azotemia component with ongoing diarrhea, diuretics use,   ·  might have some degree of  progression of CKD too.   · CKD 4, with baseline creatinine around 1.7- 2.2, till last year,02/2020, never followed up in clinic. Work up then  ua negative RBC,WBC,no protein. UPC, was unremarkable in 02/2020 USG, right 8.9 cm, andleft 8.3 cm, medial renal disease.  · Metabolic acidosis, gap and non gap, more due to GI bicarb loss, CKD and persistent lactic acidosis. patient apparently had large bowel movement, significant metabolic acidosis  · Hypotension, concern hypovolemia,   · Congestive heart failure with last ejection fraction around 40% from 2018,   · History of atrial fibrillation  · Significant metabolic acidosis  · Significant anemia  · Chronic lymphedema  · Cellulitis of lower extremities.   · Thrombocytopenia, also on 02/2020  · Anemia.      Plan:        · Patient baseline creatinine 1.8-2.  Acute increase in creatinine with acute increase in volume with infection and metabolic acidosis and GI bicarbonate losses going on at this time.  · Abelardo complicated with acidosis, gap and non gap, more due to GI bicarb loss, CKD and persistent lactic acidosis. patient apparently had large bowel movement, significant metabolic acidosis, had earlier given 2 amp of sodium bicarb,   · Patient creatinine slightly better with extra dose of diuretics and urine output is also improving.  It may help with the edema.    · Clinically patient seems to be volume overloaded continue diuresis keep patient net negative for next 3 to 4 days.    · Follow-up with repeat labs  · Patient has cardiorenal syndrome at this time  · Continue dopamine drip  · Continue bliss.   · Fu sepsis work up follow-up with infectious disease  · Repeat labs,   · Decrease fluids in evening.        Note started  by Feliz Silva MD,   [facility] kidney consultant

## 2021-02-09 NOTE — THERAPY EVALUATION
Patient Name: Emperatriz Childs  : 1945    MRN: [id number]                              Today's Date: 2021       Admit Date: 2021    Visit Dx: No diagnosis found.  Patient Active Problem List   Diagnosis   • Congestive heart failure (CMS/HCC)   • Chronic low back pain   • Atrial fibrillation (CMS/HCC)   • Hypertension, benign   • Lymphedema   • Gout   • Urinary tract infection   • Syncope and collapse   • Lymphedema of both lower extremities   • Non-pressure chronic ulcer right ankle, limited to breakdown skin (CMS/HCC)   • Non-pressure chronic ulcer left lower leg, limited to breakdown skin (CMS/HCC)   • Automatic implantable cardiac defibrillator in situ   • Acute UTI (urinary tract infection)   • Lower extremity cellulitis   • CAD (coronary artery disease)   • Sepsis (CMS/HCC)   • CONG (acute kidney injury) (CMS/[facility])   • Lactic acidosis   • Metabolic acidosis   • Delirium, acute     Past Medical History:   Diagnosis Date   • CHF (congestive heart failure) (CMS/HCC)    • History of transfusion    • Hypertension    • Lymphedema of leg    • Myocardial infarction (CMS/HCC)      Past Surgical History:   Procedure Laterality Date   • CARDIAC ELECTROPHYSIOLOGY PROCEDURE N/A 2020    Procedure: ICD battery change;  Surgeon: Dionna Laird MD;  Location: [facility] CATH INVASIVE LOCATION;  Service: Cardiovascular   • CARDIAC ELECTROPHYSIOLOGY PROCEDURE N/A 2020    Procedure: Temporary Pacemaker;  Surgeon: Dionna Laird MD;  Location: [facility] CATH INVASIVE LOCATION;  Service: Cardiovascular   • CARDIAC ELECTROPHYSIOLOGY PROCEDURE N/A 2020    Procedure: Pocket Revision;  Surgeon: Dionna Laird MD;  Location: [facility] CATH INVASIVE LOCATION;  Service: Cardiovascular   • EYE SURGERY     • PACEMAKER IMPLANTATION       General Information     Row Name 21 0952          General Information    Prior Level of Function  independent:;ADL's;all household mobility  -MH     Existing  Precautions/Restrictions  fall  -     Barriers to Rehab  cognitive status  -     Row Name 02/09/21 0952          Living Environment    Lives With  alone  -     Row Name 02/09/21 0952          Home Main Entrance    Number of Stairs, Main Entrance  one  -     Row Name 02/09/21 0952          Stairs Within Home, Primary    Number of Stairs, Within Home, Primary  none  -     Row Name 02/09/21 0952          Cognition    Orientation Status (Cognition)  oriented to;person;place;time;disoriented to;situation;verbal cues/prompts needed for orientation confusion present. Pt could not recall at times if her spouse had passed away of if she still lived with him, for example. Able to follow commands. Alert.  -     Row Name 02/09/21 0952          Safety Issues, Functional Mobility    Impairments Affecting Function (Mobility)  balance;cognition;coordination;endurance/activity tolerance;grasp;strength;pain;range of motion (ROM);sensation/sensory awareness  -     Cognitive Impairments, Mobility Safety/Performance  awareness, need for assistance;insight into deficits/self-awareness;impulsivity;judgment;problem-solving/reasoning;safety precaution awareness  -       User Key  (r) = Recorded By, (t) = Taken By, (c) = Cosigned By    Initials Name Provider Type     Sahra Davila OT Occupational Therapist          Mobility/ADL's     Row Name 02/09/21 0953          Bed Mobility    Bed Mobility  supine-sit  -     Supine-Sit [place] (Bed Mobility)  moderate assist (50% patient effort)  -     Bed Mobility, Safety Issues  cognitive deficits limit understanding;decreased use of arms for pushing/pulling;decreased use of legs for bridging/pushing  -     Assistive Device (Bed Mobility)  head of bed elevated;draw sheet;bed rails  -     Row Name 02/09/21 0953          Transfers    Transfers  sit-stand transfer;bed-chair transfer  -     Bed-Chair [place] (Transfers)  2 person assist;minimum assist (75% patient  effort)  -     Sit-Stand [place] (Transfers)  minimum assist (75% patient effort)  -[facility] Name 02/09/21 0953          Functional Mobility    Functional Mobility- Ind. Level  not tested  -     Functional Mobility- Comment  pt needs RW to attempt functional ambulation  -[facility] Name 02/09/21 0953          Activities of Daily Living    BADL Assessment/Intervention  lower body dressing;grooming;toileting;feeding;upper body dressing  -[facility] Name 02/09/21 0953          Lower Body Dressing Assessment/Training    [place] Level (Lower Body Dressing)  don;socks;dependent (less than 25% patient effort)  -     Position (Lower Body Dressing)  edge of bed sitting  -[facility] Name 02/09/21 0953          Grooming Assessment/Training    [place] Level (Grooming)  hair care, combing/brushing;dependent (less than 25% patient effort);oral care regimen;minimum assist (75% patient effort);wash face, hands;set up  -     Position (Grooming)  edge of bed sitting  -     Comment (Grooming)  sore mouth & white coating on dentures. Pt has been biting & combative. dentures & oral care completed w/ pt assist. RN notified to evaluate for thrush.  -[facility] Name 02/09/21 0953          Toileting Assessment/Training    Comment (Toileting)  bliss present. Requested nursing to aquire [facility] as pt stated she needed to move bowels & she is able to transfer now. Will require max (A) for bowel mgmt.  -[facility] Name 02/09/21 0953          Self-Feeding Assessment/Training    [place] Level (Feeding)  liquids to mouth;set up  -MH     Row Name 02/09/21 0953          Upper Body Dressing Assessment/Training    [place] Level (Upper Body Dressing)  don;front opening garment;maximum assist (25% patient effort)  -     Position (Upper Body Dressing)  edge of bed sitting  -       User Key  (r) = Recorded By, (t) = Taken By, (c) = Cosigned By    Initials Name Provider Type     Sahra Davila OT Occupational  Therapist        Obj/Interventions     Row Name 02/09/21 0956          Sensory Interventions    Comment, Sensory Intervention  limited sensation in feet, lower legs, & hyperalgia w/ mvmt.  -     Row Name 02/09/21 0956          Vision Assessment/Intervention    Visual Impairment/Limitations  corrective lenses full-time  -[facility] Name 02/09/21 0956          Range of Motion Comprehensive    Comment, General Range of Motion  shld flex 50% due to ORTIZ shld pathology.  -[facility] Name 02/09/21 0956          Strength Comprehensive (MMT)    Comment, General Manual Muscle Testing (MMT) Assessment  biceps 3-/5, triceps 3+/5,  4-/5; BLE grossly 3+/5  -     Row Name 02/09/21 0956          Balance    Balance Assessment  sitting static balance;sitting dynamic balance;standing static balance;standing dynamic balance  -     Static Sitting Balance  WFL  -     Dynamic Sitting Balance  mild impairment  -     Static Standing Balance  mild impairment;supported  -     Dynamic Standing Balance  mild impairment;moderate impairment;supported  -       User Key  (r) = Recorded By, (t) = Taken By, (c) = Cosigned By    Initials Name Provider Type     Sahra Davila, OT Occupational Therapist        Goals/Plan     [facility] Name 02/09/21 1004          Bed Mobility Goal 1 (OT)    Activity/Assistive Device (Bed Mobility Goal 1, OT)  bed mobility activities, all  -     [place] Level/Cues Needed (Bed Mobility Goal 1, OT)  minimum assist (75% or more patient effort)  -     Time Frame (Bed Mobility Goal 1, OT)  2 weeks  -[facility] Name 02/09/21 1004          Transfer Goal 1 (OT)    Activity/Assistive Device (Transfer Goal 1, OT)  transfers, all;walker, rolling  -     [place] Level/Cues Needed (Transfer Goal 1, OT)  contact guard assist  -     Time Frame (Transfer Goal 1, OT)  2 weeks  -[facility] Name 02/09/21 1004          Bathing Goal 1 (OT)    Activity/Device (Bathing Goal 1, OT)  bathing skills, all  -      [place] Level/Cues Needed (Bathing Goal 1, OT)  moderate assist (50-74% patient effort)  -     Time Frame (Bathing Goal 1, OT)  2 weeks  -MH     Row Name 02/09/21 1004          Dressing Goal 1 (OT)    Activity/Device (Dressing Goal 1, OT)  dressing skills, all  -     [place]/Cues Needed (Dressing Goal 1, OT)  moderate assist (50-74% patient effort)  -     Time Frame (Dressing Goal 1, OT)  2 weeks  -MH     Row Name 02/09/21 1004          Toileting Goal 1 (OT)    Activity/Device (Toileting Goal 1, OT)  adjust/manage clothing;perform perineal hygiene  -     [place] Level/Cues Needed (Toileting Goal 1, OT)  minimum assist (75% or more patient effort)  -     Time Frame (Toileting Goal 1, OT)  2 weeks  -MH     Row Name 02/09/21 1004          Grooming Goal 1 (OT)    Activity/Device (Grooming Goal 1, OT)  grooming skills, all  -     [place] (Grooming Goal 1, OT)  minimum assist (75% or more patient effort) standing at sink  -     Time Frame (Grooming Goal 1, OT)  2 weeks  -MH     Row Name 02/09/21 1004          Therapy Assessment/Plan (OT)    Planned Therapy Interventions (OT)  activity tolerance training;adaptive equipment training;BADL retraining;cognitive/visual perception retraining;functional balance retraining;occupation/activity based interventions;patient/caregiver education/training;ROM/therapeutic exercise;transfer/mobility retraining  -       User Key  (r) = Recorded By, (t) = Taken By, (c) = Cosigned By    Initials Name Provider Type     Sahra Davila OT Occupational Therapist        Clinical Impression     Row Name 02/09/21 0998          Pain Assessment    Additional Documentation  Pain Scale: Numbers Pre/Post-Treatment (Group)  -MH     Row Name 02/09/21 0957          Pain Scale: Numbers Pre/Post-Treatment    Pretreatment Pain Rating  0/10 - no pain  -     Posttreatment Pain Rating  8/10 legs & especially Rt foot  -MH     Row Name 02/09/21 0955          Plan of Care  Review    Outcome Summary  Pt alert & oriented to self, place, & time, but confused as to her situation & demonstrating episodic & long-term memory deficits suggestive of underlying dementia. Pt is admitted from home alone 5 days ago w/ acute UTI & is still confused & combatitve at times per RN, although pleasant & cooperative this am for OT eval & Tx. Pt was able to come to sit EOB w/ mod (A) of 2, then sat EOB for some grooming tasks & finally transferred to the chair w/ min (A) of 2. Her cognition & ADL skill as well as her bed & basic mobility are significantly affected & she will benefit from rehab at d/c. Family may wish to discuss possible neuropshych evaluation for potential dementing disorder & to determine the potential need for longer term care or 24 hour supervision. PPE worn, mask, gloves, safety glasses, gown.  -     Row Name 02/09/21 0958          Therapy Assessment/Plan (OT)    Rehab Potential (OT)  good, to achieve stated therapy goals  -     Criteria for Skilled Therapeutic Interventions Met (OT)  skilled treatment is necessary  -     Therapy Frequency (OT)  3 times/wk  -     Predicted Duration of Therapy Intervention (OT)  until D/C  -     Row Name 02/09/21 0958          Therapy Plan Review/Discharge Plan (OT)    Anticipated Discharge Disposition (OT)  inpatient rehabilitation facility  -     Row Name 02/09/21 0958          Vital Signs    O2 Delivery Pre Treatment  room air  -     Pre Patient Position  Supine  -     Intra Patient Position  Standing  -     Post Patient Position  Sitting  -     Row Name 02/09/21 0958          Positioning and Restraints    Pre-Treatment Position  in bed  -     Post Treatment Position  chair  -     In Chair  notified nsg;reclined;call light within reach;encouraged to call for assist;exit alarm on  -       User Key  (r) = Recorded By, (t) = Taken By, (c) = Cosigned By    Initials Name Provider Type    Sahra Scott, OT Occupational  Therapist        Outcome Measures     Row Name 02/09/21 1005          How much help from another person do you currently need...    Turning from your back to your side while in flat bed without using bedrails?  2  -MH     Moving from lying on back to sitting on the side of a flat bed without bedrails?  2  -MH     Moving to and from a bed to a chair (including a wheelchair)?  2  -MH     Standing up from a chair using your arms (e.g., wheelchair, bedside chair)?  3  -MH     Climbing 3-5 steps with a railing?  1  -MH     To walk in hospital room?  2  -MH     AM-PAC 6 Clicks Score (PT)  12  -MH       User Key  (r) = Recorded By, (t) = Taken By, (c) = Cosigned By    Initials Name Provider Type    Sahra Scott OT Occupational Therapist        Occupational Therapy Education                 Title: PT OT SLP Therapies (Done)     Topic: Occupational Therapy (Done)     Point: ADL training (Done)     Description:   Instruct learner(s) on proper safety adaptation and remediation techniques during self care or transfers.   Instruct in proper use of assistive devices.              Learning Progress Summary           Patient Acceptance, E,TB, VU,NR,DU by  at 2/9/2021 1007                   Point: Home exercise program (Done)     Description:   Instruct learner(s) on appropriate technique for monitoring, assisting and/or progressing therapeutic exercises/activities.              Learning Progress Summary           Patient Acceptance, E,TB, VU,NR,DU by  at 2/9/2021 1007                   Point: Precautions (Done)     Description:   Instruct learner(s) on prescribed precautions during self-care and functional transfers.              Learning Progress Summary           Patient Acceptance, E,TB, VU,NR,DU by  at 2/9/2021 1007                   Point: Body mechanics (Done)     Description:   Instruct learner(s) on proper positioning and spine alignment during self-care, functional mobility activities and/or exercises.               Learning Progress Summary           Patient Acceptance, E,TB, VU,NR,DU by  at 2/9/2021 1007                               User Key     Initials Effective Dates Name Provider Type Discipline     03/01/19 -  Sahra Davila, LUIS Occupational Therapist OT              OT Recommendation and Plan  Planned Therapy Interventions (OT): activity tolerance training, adaptive equipment training, BADL retraining, cognitive/visual perception retraining, functional balance retraining, occupation/activity based interventions, patient/caregiver education/training, ROM/therapeutic exercise, transfer/mobility retraining  Therapy Frequency (OT): 3 times/wk  Plan of Care Review  Outcome Summary: Pt alert & oriented to self, place, & time, but confused as to her situation & demonstrating episodic & long-term memory deficits suggestive of underlying dementia. Pt is admitted from home alone 5 days ago w/ acute UTI & is still confused & combatitve at times per RN, although pleasant & cooperative this am for OT eval & Tx. Pt was able to come to sit EOB w/ mod (A) of 2, then sat EOB for some grooming tasks & finally transferred to the chair w/ min (A) of 2. Her cognition & ADL skill as well as her bed & basic mobility are significantly affected & she will benefit from rehab at d/c. Family may wish to discuss possible neuropshych evaluation for potential dementing disorder & to determine the potential need for longer term care or 24 hour supervision. PPE worn, mask, gloves, safety glasses, gown.     Time Calculation:   Time Calculation- OT     Row Name 02/09/21 1008             Time Calculation-     OT Start Time  0826  -      OT Stop Time  0856  -      OT Time Calculation (min)  30 min  -      Total Timed Code Minutes- OT  23 minute(s)  -      OT Received On  02/09/21  -      OT - Next Appointment  02/11/21  -      OT Goal Re-Cert Due Date  02/23/21  -        User Key  (r) = Recorded By, (t) = Taken By, (c) = Cosigned  By    Initials Name Provider Type     Sahra Davila OT Occupational Therapist        Therapy Charges for Today     Code Description Service Date Service Provider Modifiers Qty    38922914543 HC OT EVAL MOD COMPLEXITY 3 2/9/2021 Sahra Davila OT GO 1    79395369748 HC OT SELF CARE/MGMT/TRAIN EA 15 MIN 2/9/2021 Sahra Davila OT GO 1    20408342273 HC OT THERAPEUTIC ACT EA 15 MIN 2/9/2021 Sahra Davila OT GO 1               Sahra Davila OT  2/9/2021

## 2021-02-09 NOTE — PLAN OF CARE
Goal Outcome Evaluation:        Outcome Summary: Pt alert & oriented to self, place, & time, but confused as to her situation & demonstrating episodic & long-term memory deficits suggestive of underlying dementia. Pt is admitted from home alone 5 days ago w/ acute UTI & is still confused & combatitve at times per RN, although pleasant & cooperative this am for OT eval & Tx. Pt was able to come to sit EOB w/ mod (A) of 2, then sat EOB for some grooming tasks & finally transferred to the chair w/ min (A) of 2. Her cognition & ADL skill as well as her bed & basic mobility are significantly affected & she will benefit from rehab at d/c. Family may wish to discuss possible neuropshych evaluation for potential dementing disorder & to determine the potential need for longer term care or 24 hour supervision. PPE worn, mask, gloves, safety glasses, gown.

## 2021-02-09 NOTE — DISCHARGE PLACEMENT REQUEST
"Jodi Watson (75 y.o. Female)     Date of Birth Social Security Number Address Home Phone MRN    1945  [address] [facility] [address] [phone] [id number]    [employer]ism Marital Status          Protestant        Admission Date Admission Type Admitting Provider Attending Provider Department, Room/Bed    2/5/21 Urgent Alexandro Huitron MD Gad, George Fayez Labib Youssief, MD [facility], 2120/1    Discharge Date Discharge Disposition Discharge Destination                       Attending Provider: Alexandro Huitron MD    Allergies: Allopurinol, Clindamycin Hcl, Codeine, Furosemide, Hydrochlorothiazide, Naproxen, Sulfa Antibiotics    Isolation: Contact   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 [id number]       Subscriber Name Subscriber Birth Date Member ID       JODI WATSON 1945 [id number]                 Emergency Contacts      (Rel.) Home Phone Work Phone Mobile Phone    JADA WATSON (Daughter) [phone] -- [phone]    ANUSHKA WATSON (Grandchild) -- -- [phone]    Stacey Watson (Daughter) [phone] -- --            Emergency Contact Information     Name Relation Home Work Mobile    JADA WATSON Daughter [phone]    ANUSHKA WATSON Grandchild   [phone]    Stacey Watson Daughter [phone]            Insurance Information                ANTHEM MEDICARE REPLACEMENT/ANTHEM MEDICARE ADVANTAGE Phone: [phone]    Subscriber: " Emperatriz Childs Subscriber#: [id number]    Group#: [id number] Precert#:

## 2021-02-10 LAB
ALBUMIN SERPL-MCNC: 2.1 G/DL (ref 3.5–5.2)
ALBUMIN/GLOB SERPL: 0.7 G/DL
ALP SERPL-CCNC: 396 U/L (ref 39–117)
ALT SERPL W P-5'-P-CCNC: 27 U/L (ref 1–33)
ANION GAP SERPL CALCULATED.3IONS-SCNC: 10 MMOL/L (ref 5–15)
ANISOCYTOSIS BLD QL: ABNORMAL
AST SERPL-CCNC: 81 U/L (ref 1–32)
BACTERIA UR QL AUTO: ABNORMAL /HPF
BACTERIA UR QL AUTO: ABNORMAL /HPF
BILIRUB SERPL-MCNC: 1.4 MG/DL (ref 0–1.2)
BILIRUB UR QL STRIP: NEGATIVE
BILIRUB UR QL STRIP: NEGATIVE
BUN SERPL-MCNC: 95 MG/DL (ref 8–23)
BUN/CREAT SERPL: 31.9 (ref 7–25)
CALCIUM SPEC-SCNC: 7 MG/DL (ref 8.6–10.5)
CHLORIDE SERPL-SCNC: 104 MMOL/L (ref 98–107)
CLARITY UR: ABNORMAL
CLARITY UR: ABNORMAL
CLUMPED PLATELETS: PRESENT
CO2 SERPL-SCNC: 24 MMOL/L (ref 22–29)
COLOR UR: YELLOW
COLOR UR: YELLOW
CREAT SERPL-MCNC: 2.98 MG/DL (ref 0.57–1)
DEPRECATED RDW RBC AUTO: 54.3 FL (ref 37–54)
EOSINOPHIL # BLD MANUAL: 0.21 10*3/MM3 (ref 0–0.4)
EOSINOPHIL NFR BLD MANUAL: 2 % (ref 0.3–6.2)
ERYTHROCYTE [DISTWIDTH] IN BLOOD BY AUTOMATED COUNT: 16.1 % (ref 12.3–15.4)
GFR SERPL CREATININE-BSD FRML MDRD: 15 ML/MIN/1.73
GLOBULIN UR ELPH-MCNC: 2.9 GM/DL
GLUCOSE BLDC GLUCOMTR-MCNC: 59 MG/DL (ref 70–105)
GLUCOSE BLDC GLUCOMTR-MCNC: 62 MG/DL (ref 70–105)
GLUCOSE BLDC GLUCOMTR-MCNC: 78 MG/DL (ref 70–105)
GLUCOSE BLDC GLUCOMTR-MCNC: 80 MG/DL (ref 70–105)
GLUCOSE BLDC GLUCOMTR-MCNC: 88 MG/DL (ref 70–105)
GLUCOSE BLDC GLUCOMTR-MCNC: 91 MG/DL (ref 70–105)
GLUCOSE BLDC GLUCOMTR-MCNC: 95 MG/DL (ref 70–105)
GLUCOSE SERPL-MCNC: 92 MG/DL (ref 65–99)
GLUCOSE UR STRIP-MCNC: NEGATIVE MG/DL
GLUCOSE UR STRIP-MCNC: NEGATIVE MG/DL
HCT VFR BLD AUTO: 27.2 % (ref 34–46.6)
HGB BLD-MCNC: 9 G/DL (ref 12–15.9)
HGB UR QL STRIP.AUTO: ABNORMAL
HGB UR QL STRIP.AUTO: ABNORMAL
HYALINE CASTS UR QL AUTO: ABNORMAL /LPF
HYALINE CASTS UR QL AUTO: ABNORMAL /LPF
KETONES UR QL STRIP: NEGATIVE
KETONES UR QL STRIP: NEGATIVE
LARGE PLATELETS: ABNORMAL
LEUKOCYTE ESTERASE UR QL STRIP.AUTO: ABNORMAL
LEUKOCYTE ESTERASE UR QL STRIP.AUTO: ABNORMAL
LYMPHOCYTES # BLD MANUAL: 0.31 10*3/MM3 (ref 0.7–3.1)
LYMPHOCYTES NFR BLD MANUAL: 3 % (ref 19.6–45.3)
LYMPHOCYTES NFR BLD MANUAL: 3 % (ref 5–12)
MAGNESIUM SERPL-MCNC: 1.4 MG/DL (ref 1.6–2.4)
MCH RBC QN AUTO: 31.6 PG (ref 26.6–33)
MCHC RBC AUTO-ENTMCNC: 33.2 G/DL (ref 31.5–35.7)
MCV RBC AUTO: 95.4 FL (ref 79–97)
MONOCYTES # BLD AUTO: 0.31 10*3/MM3 (ref 0.1–0.9)
NEUTROPHILS # BLD AUTO: 9.48 10*3/MM3 (ref 1.7–7)
NEUTROPHILS NFR BLD MANUAL: 64 % (ref 42.7–76)
NEUTS BAND NFR BLD MANUAL: 28 % (ref 0–5)
NITRITE UR QL STRIP: NEGATIVE
NITRITE UR QL STRIP: NEGATIVE
PH UR STRIP.AUTO: <=5 [PH] (ref 5–8)
PH UR STRIP.AUTO: <=5 [PH] (ref 5–8)
PLATELET # BLD AUTO: 35 10*3/MM3 (ref 140–450)
PMV BLD AUTO: 10.2 FL (ref 6–12)
POTASSIUM SERPL-SCNC: 3.7 MMOL/L (ref 3.5–5.2)
PROT SERPL-MCNC: 5 G/DL (ref 6–8.5)
PROT UR QL STRIP: ABNORMAL
PROT UR QL STRIP: NEGATIVE
RBC # BLD AUTO: 2.85 10*6/MM3 (ref 3.77–5.28)
RBC # UR: ABNORMAL /HPF
RBC # UR: ABNORMAL /HPF
REF LAB TEST METHOD: ABNORMAL
REF LAB TEST METHOD: ABNORMAL
SCAN SLIDE: NORMAL
SMALL PLATELETS BLD QL SMEAR: ADEQUATE
SODIUM SERPL-SCNC: 138 MMOL/L (ref 136–145)
SP GR UR STRIP: 1.01 (ref 1–1.03)
SP GR UR STRIP: 1.01 (ref 1–1.03)
SQUAMOUS #/AREA URNS HPF: ABNORMAL /HPF
SQUAMOUS #/AREA URNS HPF: ABNORMAL /HPF
UROBILINOGEN UR QL STRIP: ABNORMAL
UROBILINOGEN UR QL STRIP: ABNORMAL
WBC # BLD AUTO: 10.3 10*3/MM3 (ref 3.4–10.8)
WBC MORPH BLD: NORMAL
WBC UR QL AUTO: ABNORMAL /HPF
WBC UR QL AUTO: ABNORMAL /HPF
YEAST URNS QL MICRO: ABNORMAL /HPF
YEAST URNS QL MICRO: ABNORMAL /HPF

## 2021-02-10 PROCEDURE — 81001 URINALYSIS AUTO W/SCOPE: CPT | Performed by: NURSE PRACTITIONER

## 2021-02-10 PROCEDURE — 97530 THERAPEUTIC ACTIVITIES: CPT

## 2021-02-10 PROCEDURE — 87086 URINE CULTURE/COLONY COUNT: CPT | Performed by: PHYSICIAN ASSISTANT

## 2021-02-10 PROCEDURE — 85007 BL SMEAR W/DIFF WBC COUNT: CPT | Performed by: PHYSICIAN ASSISTANT

## 2021-02-10 PROCEDURE — 99233 SBSQ HOSP IP/OBS HIGH 50: CPT | Performed by: INTERNAL MEDICINE

## 2021-02-10 PROCEDURE — 82962 GLUCOSE BLOOD TEST: CPT

## 2021-02-10 PROCEDURE — 25010000002 ONDANSETRON PER 1 MG: Performed by: PHYSICIAN ASSISTANT

## 2021-02-10 PROCEDURE — 83735 ASSAY OF MAGNESIUM: CPT | Performed by: PHYSICIAN ASSISTANT

## 2021-02-10 PROCEDURE — 25010000002 MAGNESIUM SULFATE IN D5W 1G/100ML (PREMIX) 1-5 GM/100ML-% SOLUTION: Performed by: HOSPITALIST

## 2021-02-10 PROCEDURE — 80053 COMPREHEN METABOLIC PANEL: CPT | Performed by: INTERNAL MEDICINE

## 2021-02-10 PROCEDURE — 87086 URINE CULTURE/COLONY COUNT: CPT | Performed by: NURSE PRACTITIONER

## 2021-02-10 PROCEDURE — 25010000002 CEFTAROLINE FOSAMIL PER 10 MG: Performed by: NURSE PRACTITIONER

## 2021-02-10 PROCEDURE — 85025 COMPLETE CBC W/AUTO DIFF WBC: CPT | Performed by: PHYSICIAN ASSISTANT

## 2021-02-10 PROCEDURE — 81001 URINALYSIS AUTO W/SCOPE: CPT | Performed by: PHYSICIAN ASSISTANT

## 2021-02-10 RX ORDER — COSYNTROPIN 0.25 MG/ML
0.25 INJECTION, POWDER, FOR SOLUTION INTRAMUSCULAR; INTRAVENOUS ONCE
Status: COMPLETED | OUTPATIENT
Start: 2021-02-11 | End: 2021-02-11

## 2021-02-10 RX ORDER — HYDROCODONE BITARTRATE AND ACETAMINOPHEN 5; 325 MG/1; MG/1
1 TABLET ORAL EVERY 8 HOURS PRN
Status: DISPENSED | OUTPATIENT
Start: 2021-02-10 | End: 2021-02-17

## 2021-02-10 RX ORDER — HYDROCODONE BITARTRATE AND ACETAMINOPHEN 5; 325 MG/1; MG/1
1 TABLET ORAL ONCE AS NEEDED
Status: COMPLETED | OUTPATIENT
Start: 2021-02-10 | End: 2021-02-10

## 2021-02-10 RX ADMIN — Medication: at 21:42

## 2021-02-10 RX ADMIN — NYSTATIN 500000 UNITS: 100000 SUSPENSION ORAL at 21:25

## 2021-02-10 RX ADMIN — NYSTATIN 500000 UNITS: 100000 SUSPENSION ORAL at 11:51

## 2021-02-10 RX ADMIN — ACETAMINOPHEN 650 MG: 325 TABLET, FILM COATED ORAL at 03:05

## 2021-02-10 RX ADMIN — ZINC OXIDE: 200 OINTMENT TOPICAL at 08:38

## 2021-02-10 RX ADMIN — BUMETANIDE 2 MG: 0.25 INJECTION, SOLUTION INTRAMUSCULAR; INTRAVENOUS at 15:34

## 2021-02-10 RX ADMIN — FEBUXOSTAT 40 MG: 40 TABLET, FILM COATED ORAL at 08:37

## 2021-02-10 RX ADMIN — HYDROCODONE BITARTRATE AND ACETAMINOPHEN 1 TABLET: 5; 325 TABLET ORAL at 05:32

## 2021-02-10 RX ADMIN — Medication 10 ML: at 08:37

## 2021-02-10 RX ADMIN — GABAPENTIN 100 MG: 100 CAPSULE ORAL at 15:34

## 2021-02-10 RX ADMIN — FEBUXOSTAT 40 MG: 40 TABLET, FILM COATED ORAL at 21:25

## 2021-02-10 RX ADMIN — Medication: at 08:46

## 2021-02-10 RX ADMIN — MAGNESIUM SULFATE HEPTAHYDRATE 1 G: 1 INJECTION, SOLUTION INTRAVENOUS at 10:27

## 2021-02-10 RX ADMIN — NYSTATIN 500000 UNITS: 100000 SUSPENSION ORAL at 17:56

## 2021-02-10 RX ADMIN — MAGNESIUM SULFATE HEPTAHYDRATE 1 G: 1 INJECTION, SOLUTION INTRAVENOUS at 08:37

## 2021-02-10 RX ADMIN — ONDANSETRON 4 MG: 2 INJECTION, SOLUTION INTRAMUSCULAR; INTRAVENOUS at 05:49

## 2021-02-10 RX ADMIN — BUMETANIDE 2 MG: 0.25 INJECTION, SOLUTION INTRAMUSCULAR; INTRAVENOUS at 05:32

## 2021-02-10 RX ADMIN — BUMETANIDE 2 MG: 0.25 INJECTION, SOLUTION INTRAMUSCULAR; INTRAVENOUS at 21:25

## 2021-02-10 RX ADMIN — NYSTATIN 500000 UNITS: 100000 SUSPENSION ORAL at 08:36

## 2021-02-10 RX ADMIN — MIDODRINE HYDROCHLORIDE 10 MG: 5 TABLET ORAL at 08:38

## 2021-02-10 RX ADMIN — SODIUM CHLORIDE 200 MG: 9 INJECTION, SOLUTION INTRAVENOUS at 05:32

## 2021-02-10 RX ADMIN — BUMETANIDE 2 MG: 0.25 INJECTION, SOLUTION INTRAMUSCULAR; INTRAVENOUS at 10:27

## 2021-02-10 RX ADMIN — SODIUM CHLORIDE 200 MG: 9 INJECTION, SOLUTION INTRAVENOUS at 16:17

## 2021-02-10 RX ADMIN — MAGNESIUM SULFATE HEPTAHYDRATE 1 G: 1 INJECTION, SOLUTION INTRAVENOUS at 11:53

## 2021-02-10 RX ADMIN — TRAMADOL HYDROCHLORIDE 50 MG: 50 TABLET, FILM COATED ORAL at 00:22

## 2021-02-10 RX ADMIN — HYDROCODONE BITARTRATE AND ACETAMINOPHEN 1 TABLET: 5; 325 TABLET ORAL at 14:28

## 2021-02-10 RX ADMIN — CYANOCOBALAMIN TAB 250 MCG 500 MCG: 250 TAB at 08:37

## 2021-02-10 RX ADMIN — MIDODRINE HYDROCHLORIDE 10 MG: 5 TABLET ORAL at 17:56

## 2021-02-10 RX ADMIN — ASPIRIN 81 MG CHEWABLE TABLET 81 MG: 81 TABLET CHEWABLE at 08:37

## 2021-02-10 RX ADMIN — MIDODRINE HYDROCHLORIDE 10 MG: 5 TABLET ORAL at 11:51

## 2021-02-10 RX ADMIN — FOLIC ACID 1 MG: 1 TABLET ORAL at 08:38

## 2021-02-10 RX ADMIN — GABAPENTIN 100 MG: 100 CAPSULE ORAL at 21:25

## 2021-02-10 RX ADMIN — Medication 10 ML: at 21:26

## 2021-02-10 RX ADMIN — GABAPENTIN 100 MG: 100 CAPSULE ORAL at 08:38

## 2021-02-10 NOTE — PLAN OF CARE
Objective:      Bed mobility - Mod-A, Max-A and Assist x 2  Transfers - N/A or Not attempted.  Ambulation - 0 feet N/A or Not attempted.    Assessment: Emperatriz Childs presents with functional mobility impairments which indicate the need for skilled intervention. Tolerating session today without incident. Pt initially refusing, but gave effort with encouragement. Pt requires MOD/MAX A x2 to come to sitting. Pt with improvements in seated balance this date able to maintain seated balance with dynamic lower extremity movements with SBA. Will continue to follow and progress as tolerated.

## 2021-02-10 NOTE — PLAN OF CARE
Problem: Adult Inpatient Plan of Care  Goal: Plan of Care Review  Outcome: Ongoing, Progressing  Flowsheets (Taken 2/9/2021 1955)  Plan of Care Reviewed With: patient  Outcome Summary: patient is stable on room air. dopamine drip is still infusing with midodrine given as ordered as well. patient complains of pain in her lower extremeties. she remains very edematous.she has refused to lay in bed today, refusing to be repositioned in the recliner. lower extremities have been elevated on pillows in recliner. bliss catheter remains in place per dr means with very strict intake and output. Patient's blood sugar was very low when BMP resulted. She was not willing to drink or eat enopugh to bring it up so D50 was given and gr delicia ordered D5 20ml/hr to start infusing. Patient has had a very poor appetite today and refusing to reposition in the chair or bed. will continue to monitor  Goal: Patient-Specific Goal (Individualized)  Outcome: Ongoing, Progressing  Goal: Absence of Hospital-Acquired Illness or Injury  Outcome: Ongoing, Progressing  Intervention: Identify and Manage Fall Risk  Description: Perform standard risk assessment with a validated tool or comprehensive approach appropriate to the patient on admission; reassess fall risk frequently, with change in status or transfer to another level of care.  Communicate fall injury risk to interprofessional healthcare team.  Determine need for increased observation, equipment and environmental modification, such as low bed and signage, as well as supportive, nonskid footwear.  Adjust safety measures to individual developmental age, stage and identified risk factors.  Reinforce the importance of safety and physical activity with patient and family.  Perform regular intentional rounding to assess need for position change, pain assessment, personal needs, including assistance with toileting.  Recent Flowsheet Documentation  Taken 2/9/2021 1600 by Violet Alexander, RN  Safety  Promotion/Fall Prevention:   safety round/check completed   nonskid shoes/slippers when out of bed   gait belt   activity supervised   fall prevention program maintained  Taken 2/9/2021 1200 by Violet Alexander RN  Safety Promotion/Fall Prevention:   activity supervised   fall prevention program maintained   gait belt   nonskid shoes/slippers when out of bed   safety round/check completed  Intervention: Prevent Skin Injury  Description: Assess skin risk on admission and at regular intervals throughout hospital stay.  Keep all areas of skin (especially folds) clean and dry.  Maintain adequate skin hydration.  Relieve and redistribute pressure and protect bony prominences; implement measures based on patient-specific risk factors.  Match turning and repositioning schedule to clinical condition.  Encourage weight shift frequently; assist with reposition if unable to complete independently.  Float heels off bed. Avoid pressure on the Achilles tendon.  Keep skin free from extended contact with medical devices.  Use aids (e.g., slide boards, mechanical lift) during transfer.  Recent Flowsheet Documentation  Taken 2/9/2021 1600 by Violet Alexander RN  Body Position: (refusing to reposition or go back to bed) patient/family refused  Taken 2/9/2021 1200 by Violet Alexander RN  Body Position: (patient in chair and refused to reposition at this time) patient/family refused  Goal: Optimal Comfort and Wellbeing  Outcome: Ongoing, Progressing  Intervention: Provide Person-Centered Care  Description: Use a family-focused approach to care.  Develop trust and rapport by proactively providing information, encouraging questions, addressing concerns and offering reassurance.  Acknowledge emotional response to hospitalization.  Recognize and utilize personal coping strategies.  Honor spiritual and cultural preferences.  Recent Flowsheet Documentation  Taken 2/9/2021 1200 by Violet Alexander RN  Trust Relationship/Rapport:   care explained    choices provided   thoughts/feelings acknowledged  Goal: Readiness for Transition of Care  Outcome: Ongoing, Progressing   Goal Outcome Evaluation:  Plan of Care Reviewed With: patient     Outcome Summary: patient is stable on room air. dopamine drip is still infusing with midodrine given as ordered as well. patient complains of pain in her lower extremeties. she remains very edematous.she has refused to lay in bed today, refusing to be repositioned in the recliner. lower extremities have been elevated on pillows in recliner. bliss catheter remains in place per dr means with very strict intake and output. Patient's blood sugar was very low when BMP resulted. She was not willing to drink or eat enopugh to bring it up so D50 was given and gr delicia ordered D5 20ml/hr to start infusing. Patient has had a very poor appetite today and refusing to reposition in the chair or bed. will continue to monitor

## 2021-02-10 NOTE — PROGRESS NOTES
Notified by patient's nurse of pain in patient's legs.  Patient came no relief with tramadol, legs are Ace wrapped.  Discussed with patient's nurse, assess legs and status of Ace wraps; one-time dose of Norco 5 mg ordered

## 2021-02-10 NOTE — PROGRESS NOTES
PROGRESS NOTE      Patient Name: Emperatriz Childs  : 1945  MRN: [id number]  Primary Care Physician: RosaM Chavez APRN  Date of admission: 2021    Patient Care Team:  Rosa M Chavez APRN as PCP - General (Nurse Practitioner)        Subjective   Subjective:     Acute kidney injury, patient is still swollen not feeling good, no significant shortness of breath  Review of systems:  All other review of system unremarkable      Allergies:    Allergies   Allergen Reactions   • Allopurinol Unknown - High Severity   • Clindamycin Hcl Unknown - High Severity   • Codeine Unknown - High Severity   • Furosemide Unknown - High Severity   • Hydrochlorothiazide Unknown - High Severity   • Naproxen Unknown - High Severity   • Sulfa Antibiotics Unknown - High Severity       Objective   Exam:     Vital Signs  Temp:  [94.7 °F (34.8 °C)-98.7 °F (37.1 °C)] 97.4 °F (36.3 °C)  Heart Rate:  [60-69] 60  Resp:  [14-24] 16  BP: ()/() 114/43  SpO2:  [93 %-98 %] 94 %  on   ;   Device (Oxygen Therapy): room air  Body mass index is 33.19 kg/m².    General: Elderly  female in no acute distress.    Head:      Normocephalic and atraumatic.    Eyes:      PERRL/EOM intact, conjunctiva and sclera clear with out nystagmus.    Neck:      No masses, thyromegaly,  trachea central with normal respiratory effort   Lungs:    Clear bilaterally to auscultation.    Heart:      Regular rate and rhythm, no murmur no gallop  Abd:        Soft, nontender, not distended, bowel sounds positive, no shifting dullness   Pulses:   Pulses palpable  Extr:        No cyanosis or clubbing--significant bilateral edema.    Neuro:    No focal deficits.   alert oriented x3  Skin:       Intact without lesions or rashes.    Psych:    Alert and cooperative; normal mood and affect; .      Results Review:  I have personally reviewed most recent Data :  CBC    Results from last 7 days   Lab Units 02/10/21  0547 21  0524 21  0532  02/07/21  1305 02/06/21  1904 02/06/21  0311 02/06/21  0045   WBC 10*3/mm3 10.30 14.40* 18.60* 25.00*  --  10.70 7.10   HEMOGLOBIN g/dL 9.0* 9.5* 9.6* 10.1*  --  10.1* 10.5*   HEMOGLOBIN, POC g/dL  --   --   --   --  9.9*  --   --    PLATELETS 10*3/mm3 35* 54* 34* 82*  --  72* 86*     CMP   Results from last 7 days   Lab Units 02/10/21  0547 02/09/21  1540 02/09/21  0524 02/08/21  0529 02/07/21  1305 02/06/21  2247 02/06/21  1905   SODIUM mmol/L 138 138 138 140 140 139 139   POTASSIUM mmol/L 3.7 3.9 3.3* 4.3 4.5 5.1 4.5   CHLORIDE mmol/L 104 105 101 104 109* 112* 114*   CO2 mmol/L 24.0 20.0* 23.0 22.0 18.0* 14.0* 12.0*   BUN mg/dL 95* 92* 98* 98* 87* 80* 76*   CREATININE mg/dL 2.98* 3.09* 3.38* 3.39* 3.50* 3.35* 3.28*   GLUCOSE mg/dL 92 68 119* 109* 87 100* 91   ALBUMIN g/dL 2.10*  --   --   --  2.50* 2.40* 2.40*   BILIRUBIN mg/dL 1.4*  --   --   --  0.6 0.4 0.4   ALK PHOS U/L 396*  --   --   --  135* 109 100   AST (SGOT) U/L 81*  --   --   --  30 32 27   ALT (SGPT) U/L 27  --   --   --  12 10 9     ABG    Results from last 7 days   Lab Units 02/06/21 1904   PH, ARTERIAL pH units 7.337*   PCO2, ARTERIAL mm Hg 26.4*   PO2 ART mm Hg 79.5*   O2 SATURATION ART % 95.2   BASE EXCESS ART mmol/L -10.4*     Xr Chest 1 View    Result Date: 2/7/2021  1. New right-sided PICC line with tip terminating over the low SVC. 2. Stable cardiomegaly.  Electronically Signed By-Dae Auguste MD On:2/7/2021 10:45 AM This report was finalized on [id number] by  Dae Auguste MD.    Xr Chest 1 View    Result Date: 2/6/2021  1.Cardiomegaly  Electronically Signed By-Azar Alarcon MD On:2/6/2021 8:01 AM This report was finalized on [id number] by  Azar Alarcon MD.      Results for orders placed during the hospital encounter of 02/05/21   Adult Transthoracic Echo Complete W/ Cont if Necessary Per Protocol    Narrative Normal LV size and contractility EF of 55%  Moderate right ventricular enlargement with severe right atrial    enlargement, catheter probably pacemaker lead seen.  Severe left atrial enlargement seen.  Aortic valve, mitral valve, tricuspid valve appears structurally normal,   mild MR, AR, seen.  There is moderate  tricuspid regurgitation seen.    Calculated RV systolic pressure is 24mmHg.  No pericardial effusion seen.  Proximal aorta appears normal in size.     Scheduled Meds:aspirin, 81 mg, Oral, Daily  bumetanide, 2 mg, Intravenous, Q6H  ceftaroline, 200 mg, Intravenous, Q12H  febuxostat, 40 mg, Oral, BID  folic acid, 1 mg, Oral, Daily  gabapentin, 100 mg, Oral, TID  haloperidol lactate, 5 mg, Intramuscular, Once  magic butt paste, , Topical, Daily  midodrine, 10 mg, Oral, TID AC  nystatin, 5 mL, Swish & Swallow, 4x Daily  sodium chloride, 10 mL, Intravenous, Q12H  vitamin B-12, 500 mcg, Oral, Daily  Zinc Oxide, , Apply externally, BID      Continuous Infusions:dextrose, 20 mL/hr, Last Rate: 20 mL/hr (02/09/21 1827)  DOPamine, 2-20 mcg/kg/min, Last Rate: 2 mcg/kg/min (02/09/21 2222)      PRN Meds:•  acetaminophen **OR** acetaminophen **OR** acetaminophen  •  dextrose  •  dextrose  •  glucagon (human recombinant)  •  magnesium sulfate **OR** magnesium sulfate in D5W 1g/100mL (PREMIX) **OR** magnesium sulfate  •  melatonin  •  nitroglycerin  •  OLANZapine  •  ondansetron **OR** ondansetron  •  potassium chloride  •  potassium chloride  •  sodium chloride  •  traMADol    Assessment/Plan   Assessment and Plan:         Congestive heart failure (CMS/HCC)    Atrial fibrillation (CMS/HCC)    Hypertension, benign    Lymphedema    Gout    Non-pressure chronic ulcer right ankle, limited to breakdown skin (CMS/HCC)    Automatic implantable cardiac defibrillator in situ    Acute UTI (urinary tract infection)    Lower extremity cellulitis    CAD (coronary artery disease)    Sepsis (CMS/HCC)    CONG (acute kidney injury) (CMS/HCC)    Lactic acidosis    Metabolic acidosis    Delirium, acute    ASSESSMENT:  · Acute kidney injury,  oliguric, stage 2-3, evolving, ongoing hypotension, ? Sepsis, and prerenal azotemia component with ongoing diarrhea, diuretics use,   ·  might have some degree of progression of CKD too.   · CKD 4, with baseline creatinine around 1.7- 2.2, till last year,02/2020, never followed up in clinic. Work up then  ua negative RBC,WBC,no protein. UPC, was unremarkable in 02/2020 USG, right 8.9 cm, andleft 8.3 cm, medial renal disease.  · Metabolic acidosis, gap and non gap, more due to GI bicarb loss, CKD and persistent lactic acidosis. patient apparently had large bowel movement, significant metabolic acidosis  · Hypotension, concern hypovolemia,   · Congestive heart failure with last ejection fraction around 40% from 2018,   · History of atrial fibrillation  · Significant metabolic acidosis  · Significant anemia  · Chronic lymphedema  · Cellulitis of lower extremities.   · Thrombocytopenia, also on 02/2020  · Anemia.      Plan:        · Patient baseline creatinine 1.8-2.  Acute increase in creatinine with acute increase in volume with infection and metabolic acidosis and GI bicarbonate losses going on at this time.  · Patient creatinine is slightly better with extra diuretics at this time.   · Continue to keep patient net negative continue to improve patient volume status that will help  to improve renal function and patient cardiac status and symptoms  · Clinically patient is still volume overloaded continue diuresis keep patient net negative for next 3 to 4 days.    · Discussed with Dr. Davila  · Follow-up with repeat labs  · Patient has cardiorenal syndrome at this time  · Continue dopamine drip  · Continue bliss.   · Fu sepsis work up follow-up with infectious disease  · Repeat labs,   · Decrease fluids in evening.        Note started  by Feliz Silva MD,   [facility] kidney consultant

## 2021-02-10 NOTE — PLAN OF CARE
Goal Outcome Evaluation:  Plan of Care Reviewed With: patient  Progress: no change  Outcome Summary: pt has been complaining odf pain in shoulders and R leg on and off during the night. pain meds and nonpharmocological interventions being used to help treat pt's pain. pt had an episode of low body temp, [place] was called at [phone] and ordered a warming blanket and a reorder of pt's UA. pt is currently taking antibiotics and fluids. will continue to monitor pt during rest of shift. pt dopamine drip taken down to 2mcg during shift.

## 2021-02-10 NOTE — PROGRESS NOTES
[facility] Medicine Services Daily Progress Note      Hospitalist Team  LOS 2 days      Patient Care Team:  Rosa M Chavez APRN as PCP - General (Nurse Practitioner)    Patient Location: 2120/1      Subjective   Subjective     Chief Complaint / Subjective  Fevers, leg pain      Brief Synopsis of Hospital Course/HPI    The patient is a 75-year-old female with history atrial fibrillation s/p pacemaker placement, hyperlipidemia, hypertension, CAD, CKD stage III and  chronic lower extremity lymphedema.    Apparently the patient has been having several weeks of worsening lower extremity edema thus went to outside facility.    Laboratory work was significant for , potassium 5.3, BUN 63, creatinine 2.41, WBC 4.5, lactic acid 3.2 and UA with 25-50 WBCs.  The patient was transferred to [facility] for further care      Date::    2/6/21: Poor historian.  Low BP thus gave bolus.  Started on dopamine and transferred to PCU.  Started on bicarb drip.  Confused in the evening.  2/7/21: PICC line placed.  Daughter at the bedside discussed care.  Daughter claims no history of dementia or sundowning.  Leukocytosis.  Added Flagyl.  Consulted ID.  Daughter claims patient off Xarelto.  C. difficile ruled out.      2/8  Has third spacing and bruising.  Patient denies any chest pain  On dopamine drip and being tapered off.  Started on midodrine.    2/9/2021  Outpatient recliner  She has Ace wrap in place  She had been complaining of some tight wraps on the right side and will need to be addressed.  White count better  Her platelet count is 54 slightly better than yesterday  Still on a small amount of dopamine drip.  Renal function still elevated but probably stabilized.    2/10  Patient is now feeling better and her leg swelling is under control with compression dressing that has been changed to allow for some relief of her pain.  She had drop in her creatinine which suggests improvement with  "diuresis and possible cardiorenal etiology    She has been eating yesterday and her sugar dropped and started D10    B12 is above 2000  Haptoglobin is 100 within normal.  She is hypomagnesemic as well.  Folic acid 10.9  Still with significant thrombocytopenia.  We will ask hematologist for evaluation.      Review of Systems   All other systems reviewed and are negative.        Objective   Objective      Vital Signs  Temp:  [94.7 °F (34.8 °C)-98.7 °F (37.1 °C)] 97.8 °F (36.6 °C)  Heart Rate:  [60-69] 60  Resp:  [14-24] 18  BP: ()/() 103/41  Oxygen Therapy  SpO2: 97 %  Pulse Oximetry Type: Continuous  Device (Oxygen Therapy): room air  Flowsheet Rows      First Filed Value   Admission Height  157.5 cm (62\") Documented at 02/05/2021 2359   Admission Weight  78 kg (172 lb) Documented at 02/05/2021 2359        Intake & Output (last 3 days)       02/07 0701 - 02/08 0700 02/08 0701 - 02/09 0700 02/09 0701 - 02/10 0700 02/10 0701 - 02/11 0700    P.O.   780     I.V. (mL/kg) 1978 (24.8) 200 (2.5)      Other    100    IV Piggyback 550 350      Total Intake(mL/kg) 2528 (31.7) 550 (6.8) 780 (9.5) 100 (1.2)    Urine (mL/kg/hr) 450 (0.2) 2175 (1.1) 3540 (1.8) 250 (0.4)    Stool 0  0     Total Output 450 2175 3540 250    Net +1549 -0025 -8930 -150            Stool Unmeasured Occurrence 1 x  2 x         Lines, Drains & Airways    Active LDAs     Name:   Placement date:   Placement time:   Site:   Days:    Peripheral IV 02/06/21 0403 Anterior;Right Forearm   02/06/21 0403    Forearm   less than 1                  Physical Exam:    Physical Exam  HENT:      Head: Normocephalic.      Nose: Nose normal.   Eyes:      General: No scleral icterus.     Extraocular Movements: Extraocular movements intact.      Pupils: Pupils are equal, round, and reactive to light.   Neck:      Musculoskeletal: Normal range of motion.   Cardiovascular:      Rate and Rhythm: Normal rate and regular rhythm.   Pulmonary:      Effort: Pulmonary " effort is normal.      Breath sounds: Normal breath sounds.   Abdominal:      General: Bowel sounds are normal.      Palpations: Abdomen is soft.   Musculoskeletal:      Comments: Bilateral shin with erythema and edema.  Lymphedema.   Lymphadenopathy:      Cervical: No cervical adenopathy.   Skin:     General: Skin is warm.   Neurological:      Mental Status: She is alert.   Psychiatric:         Attention and Perception: Attention normal.              Wounds (last 24 hours)      LDA Wound     Row Name 02/10/21 1200 02/10/21 0800 02/10/21 0405       Wound 02/06/21 0031 Right lower leg Other (comment)    Wound - Properties Group Placement Date: 02/06/21  -CS Placement Time: 0031  -CS Present on Hospital Admission: Y  -CS Side: Right  -CS Orientation: lower  -CS Location: leg  -CS Primary Wound Type: Other  -CS, chronic redness and swelling     Dressing Appearance  --  dry;intact  -AS  dry;intact  -HB    Closure  NELY  -AS  NELY  -AS  NELY  -HB    Base  red  -AS  red  -AS  red  -HB    Periwound  excoriated;blistered;edematous  -AS  excoriated;blistered;edematous  -AS  excoriated;blistered;edematous  -HB    Periwound Temperature  warm;hot  -AS  warm;hot  -AS  warm;hot  -HB    Drainage Amount  moderate  -AS  moderate  -AS  --    Retired Wound - Properties Group Date first assessed: 02/06/21  -CS Time first assessed: 0031  -CS Present on Hospital Admission: Y  -CS Side: Right  -CS Location: leg  -CS Primary Wound Type: Other  -CS, chronic redness and swelling        Wound 02/06/21 0032 Right anterior foot Other (comment)    Wound - Properties Group Placement Date: 02/06/21  -CS Placement Time: 0032  -CS Present on Hospital Admission: Y  -CS Side: Right  -CS Orientation: anterior  -CS Location: foot  -CS Primary Wound Type: Other  -CS Additional Comments: chronic redness and swelling  -CS    Dressing Appearance  --  dry;intact  -AS  dry;intact  -HB    Closure  NELY  -AS  NELY  -AS  NELY  -HB    Base  red  -AS  red  -AS  red  -HB     Periwound  edematous;excoriated;redness  -AS  edematous;excoriated;redness  -AS  edematous;excoriated;redness  -HB    Periwound Temperature  warm;hot  -AS  warm;hot  -AS  warm;hot  -HB    Periwound Skin Turgor  firm  -AS  firm  -AS  firm  -HB    Drainage Amount  scant  -AS  scant  -AS  --    Dressing Care  --  dressing changed  -AS  --    Retired Wound - Properties Group Date first assessed: 02/06/21  -CS Time first assessed: 0032  -CS Present on Hospital Admission: Y  -CS Side: Right  -CS Location: foot  -CS Primary Wound Type: Other  -CS Additional Comments: chronic redness and swelling  -CS       Wound 02/06/21 0033 Left lower leg Other (comment)    Wound - Properties Group Placement Date: 02/06/21  -CS Placement Time: 0033  -CS Present on Hospital Admission: N  -CS Side: Left  -CS Orientation: lower  -CS Location: leg  -CS Primary Wound Type: Other  -CS Additional Comments: chronic redness and swelling  -CS    Dressing Appearance  --  dry;intact  -AS  dry;intact  -HB    Closure  NELY  -AS  NELY  -AS  NELY  -HB    Base  red  -AS  red  -AS  red  -HB    Periwound  edematous;excoriated;redness  -AS  edematous;excoriated;redness  -AS  edematous;excoriated;redness  -HB    Periwound Temperature  warm;hot  -AS  warm;hot  -AS  warm;hot  -HB    Retired Wound - Properties Group Date first assessed: 02/06/21  -CS Time first assessed: 0033  -CS Present on Hospital Admission: N  -CS Side: Left  -CS Location: leg  -CS Primary Wound Type: Other  -CS Additional Comments: chronic redness and swelling  -CS       Wound 02/06/21 0035 Left anterior ankle Other (comment)    Wound - Properties Group Placement Date: 02/06/21  -CS Placement Time: 0035  -CS Present on Hospital Admission: Y  -CS Side: Left  -CS Orientation: anterior  -CS Location: ankle  -CS Primary Wound Type: Other  -CS    Dressing Appearance  --  dry;intact  -AS  dry;intact  -HB    Closure  NELY  -AS  NELY  -AS  NELY  -HB    Base  red  -AS  red  -AS  red  -HB    Periwound   edematous;excoriated;redness  -AS  edematous;excoriated;redness  -AS  edematous;excoriated;redness  -HB    Periwound Temperature  warm  -AS  warm  -AS  warm  -HB    Periwound Skin Turgor  firm  -AS  firm  -AS  firm  -HB    Retired Wound - Properties Group Date first assessed: 02/06/21  -CS Time first assessed: 0035  -CS Present on Hospital Admission: Y  -CS Side: Left  -CS Location: ankle  -CS Primary Wound Type: Other  -CS       Wound 02/06/21 0047 Right heel Other (comment)    Wound - Properties Group Placement Date: 02/06/21  -CS Placement Time: 0047  -CS Present on Hospital Admission: Y  -CS Side: Right  -CS Location: heel  -CS Primary Wound Type: Other  -CS, Chronic redness and swelling      Dressing Appearance  --  dry;intact  -AS  dry;intact  -HB    Closure  NELY  -AS  NELY  -AS  NELY  -HB    Base  red  -AS  red  -AS  red  -HB    Periwound  edematous;excoriated;redness  -AS  edematous;excoriated;redness  -AS  edematous;excoriated;redness  -HB    Retired Wound - Properties Group Date first assessed: 02/06/21  -CS Time first assessed: 0047  -CS Present on Hospital Admission: Y  -CS Side: Right  -CS Location: heel  -CS Primary Wound Type: Other  -CS, Chronic redness and swelling         Wound 02/06/21 0050 Left posterior thigh Other (comment)    Wound - Properties Group Placement Date: 02/06/21  -CS Placement Time: 0050  -CS Present on Hospital Admission: Y  -CS Side: Left  -CS Orientation: posterior  -CS Location: thigh  -CS Primary Wound Type: Other  -CS, Chronic redness and swelling      Dressing Appearance  --  dry;intact  -AS  dry;intact  -HB    Base  red;scab  -AS  red;scab  -AS  red;scab  -HB    Periwound  edematous;excoriated;redness  -AS  edematous;excoriated;redness  -AS  edematous;excoriated;redness  -HB    Retired Wound - Properties Group Date first assessed: 02/06/21  -CS Time first assessed: 0050  -CS Present on Hospital Admission: Y  -CS Side: Left  -CS Location: thigh  -CS Primary Wound Type: Other   -CS, Chronic redness and swelling         Wound 02/06/21 0051 coccyx Pressure Injury    Wound - Properties Group Placement Date: 02/06/21  -CS Placement Time: 0051  -CS Present on Hospital Admission: Y  -CS Location: coccyx  -CS Primary Wound Type: Pressure inj  -CS Stage, Pressure Injury : Stage 2  -CS    Dressing Appearance  --  dry;intact  -AS  dry;intact  -HB    Base  non-blanchable;scab  -AS  non-blanchable;scab  -AS  non-blanchable;scab  -HB    Periwound  non-blanchable;redness  -AS  non-blanchable;redness  -AS  non-blanchable;redness  -HB    Dressing Care  --  open to air  -AS  --    Periwound Care  --  barrier ointment applied  -AS  --    Retired Wound - Properties Group Date first assessed: 02/06/21  -CS Time first assessed: 0051  -CS Present on Hospital Admission: Y  -CS Location: coccyx  -CS Primary Wound Type: Pressure inj  -CS    Row Name 02/10/21 0000 02/09/21 2000 02/09/21 1600       Wound 02/06/21 0031 Right lower leg Other (comment)    Wound - Properties Group Placement Date: 02/06/21  -CS Placement Time: 0031  -CS Present on Hospital Admission: Y  -CS Side: Right  -CS Orientation: lower  -CS Location: leg  -CS Primary Wound Type: Other  -CS, chronic redness and swelling     Dressing Appearance  dry;intact  -HB  dry;intact  -HB  --    Closure  NELY  -HB  NELY  -HB  NELY  -LS    Base  red  -HB  red  -HB  red  -LS    Periwound  excoriated;blistered;edematous  -HB  excoriated;blistered;edematous  -HB  blistered;excoriated;edematous  -LS    Periwound Temperature  warm;hot  -HB  warm;hot  -HB  hot;warm  -LS    Drainage Amount  --  --  moderate  -LS    Retired Wound - Properties Group Date first assessed: 02/06/21  -CS Time first assessed: 0031  -CS Present on Hospital Admission: Y  -CS Side: Right  -CS Location: leg  -CS Primary Wound Type: Other  -CS, chronic redness and swelling        Wound 02/06/21 0032 Right anterior foot Other (comment)    Wound - Properties Group Placement Date: 02/06/21  -CS  Placement Time: 0032 -CS Present on Hospital Admission: Y  -CS Side: Right  -CS Orientation: anterior  -CS Location: foot  -CS Primary Wound Type: Other  -CS Additional Comments: chronic redness and swelling  -CS    Dressing Appearance  dry;intact  -HB  dry;intact  -HB  --    Closure  NELY  -HB  NELY  -HB  NELY  -LS    Base  red  -HB  red  -HB  red  -LS    Periwound  edematous;excoriated;redness  -HB  edematous;excoriated;redness  -HB  edematous;excoriated;redness  -LS    Periwound Temperature  warm;hot  -HB  warm;hot  -HB  warm;hot  -LS    Periwound Skin Turgor  firm  -HB  firm  -HB  firm  -LS    Drainage Amount  --  --  scant  -LS    Retired Wound - Properties Group Date first assessed: 02/06/21  -CS Time first assessed: 0032 -CS Present on Hospital Admission: Y  -CS Side: Right  -CS Location: foot  -CS Primary Wound Type: Other  -CS Additional Comments: chronic redness and swelling  -CS       Wound 02/06/21 0033 Left lower leg Other (comment)    Wound - Properties Group Placement Date: 02/06/21  -CS Placement Time: 0033  -CS Present on Hospital Admission: N  -CS Side: Left  -CS Orientation: lower  -CS Location: leg  -CS Primary Wound Type: Other  -CS Additional Comments: chronic redness and swelling  -CS    Dressing Appearance  dry;intact  -HB  dry;intact  -HB  --    Closure  NELY  -HB  NELY  -HB  NELY  -LS    Base  red  -HB  red  -HB  red  -LS    Periwound  edematous;excoriated;redness  -HB  edematous;excoriated;redness  -HB  edematous;excoriated;redness  -LS    Periwound Temperature  warm;hot  -HB  warm;hot  -HB  warm;hot  -LS    Retired Wound - Properties Group Date first assessed: 02/06/21  -CS Time first assessed: 0033 -CS Present on Hospital Admission: N  -CS Side: Left  -CS Location: leg  -CS Primary Wound Type: Other  -CS Additional Comments: chronic redness and swelling  -CS       Wound 02/06/21 0035 Left anterior ankle Other (comment)    Wound - Properties Group Placement Date: 02/06/21  -CS Placement  Time: 0035  -CS Present on Hospital Admission: Y  -CS Side: Left  -CS Orientation: anterior  -CS Location: ankle  -CS Primary Wound Type: Other  -CS    Dressing Appearance  dry;intact  -HB  dry;intact  -HB  --    Closure  NELY  -HB  NELY  -HB  NELY  -LS    Base  red  -HB  red  -HB  red  -LS    Periwound  edematous;excoriated;redness  -HB  edematous;excoriated;redness  -HB  redness;edematous;excoriated  -LS    Periwound Temperature  warm  -HB  warm  -HB  warm  -LS    Periwound Skin Turgor  firm  -HB  firm  -HB  firm  -LS    Retired Wound - Properties Group Date first assessed: 02/06/21  -CS Time first assessed: 0035  -CS Present on Hospital Admission: Y  -CS Side: Left  -CS Location: ankle  -CS Primary Wound Type: Other  -CS       Wound 02/06/21 0047 Right heel Other (comment)    Wound - Properties Group Placement Date: 02/06/21  -CS Placement Time: 0047 -CS Present on Hospital Admission: Y  -CS Side: Right  -CS Location: heel  -CS Primary Wound Type: Other  -CS, Chronic redness and swelling      Dressing Appearance  dry;intact  -HB  dry;intact  -HB  --    Closure  NELY  -HB  NELY  -HB  NELY  -LS    Base  red  -HB  red  -HB  red  -LS    Periwound  edematous;excoriated;redness  -HB  edematous;excoriated;redness  -HB  edematous;excoriated;redness  -LS    Drainage Amount  --  --  moderate  -LS    Retired Wound - Properties Group Date first assessed: 02/06/21 -CS Time first assessed: 0047  -CS Present on Hospital Admission: Y  -CS Side: Right  -CS Location: heel  -CS Primary Wound Type: Other  -CS, Chronic redness and swelling         Wound 02/06/21 0050 Left posterior thigh Other (comment)    Wound - Properties Group Placement Date: 02/06/21  -CS Placement Time: 0050  -CS Present on Hospital Admission: Y  -CS Side: Left  -CS Orientation: posterior  -CS Location: thigh  -CS Primary Wound Type: Other  -CS, Chronic redness and swelling      Dressing Appearance  dry;intact  -HB  dry;intact  -HB  --    Base  red;scab  -HB   red;scab  -HB  red;scab  -LS    Periwound  edematous;excoriated;redness  -HB  edematous;excoriated;redness  -HB  edematous;excoriated;redness  -LS    Retired Wound - Properties Group Date first assessed: 02/06/21 -CS Time first assessed: 0050 -CS Present on Hospital Admission: Y  -CS Side: Left  -CS Location: thigh  -CS Primary Wound Type: Other  -CS, Chronic redness and swelling         Wound 02/06/21 0051 coccyx Pressure Injury    Wound - Properties Group Placement Date: 02/06/21  -CS Placement Time: 0051  -CS Present on Hospital Admission: Y  -CS Location: coccyx  -CS Primary Wound Type: Pressure inj  -CS Stage, Pressure Injury : Stage 2  -CS    Dressing Appearance  dry;intact  -HB  dry;intact  -HB  --    Base  non-blanchable;scab  -HB  non-blanchable;scab  -HB  non-blanchable;scab  -LS    Periwound  non-blanchable;redness  -HB  non-blanchable;redness  -HB  non-blanchable;redness  -LS    Dressing Care  --  open to air  -HB  open to air  -LS    Periwound Care  --  barrier ointment applied  -HB  barrier ointment applied  -LS    Retired Wound - Properties Group Date first assessed: 02/06/21  -CS Time first assessed: 0051  -CS Present on Hospital Admission: Y  -CS Location: coccyx  -CS Primary Wound Type: Pressure inj  -CS      User Key  (r) = Recorded By, (t) = Taken By, (c) = Cosigned By    Initials Name Provider Type    Naeem Moser RN Registered Nurse    Violet Tabares RN Registered Nurse    AS Ariana Chacon RN Registered Nurse    Harper Brown, RN Registered Nurse          Procedures:              Results Review:     I reviewed the patient's new clinical results.      Lab Results (last 24 hours)     Procedure Component Value Units Date/Time    Blood Culture - Blood, Arm, Right [[id number]] Collected: 02/06/21 1307    Specimen: Blood from Arm, Right Updated: 02/10/21 1431     Blood Culture No growth at 4 days    Blood Culture - Blood, Arm, Left [[id number]] Collected: 02/06/21 1309     Specimen: Blood from Arm, Left Updated: 02/10/21 1431     Blood Culture No growth at 4 days    POC Glucose Once [[id number]]  (Normal) Collected: 02/10/21 1118    Specimen: Blood Updated: 02/10/21 1124     Glucose 88 mg/dL      Comment: Serial Number: [id number]:  [id number]       POC Glucose Once [[id number]]  (Normal) Collected: 02/10/21 0809    Specimen: Blood Updated: 02/10/21 0810     Glucose 80 mg/dL      Comment: Serial Number: [id number]:  [id number]       CBC & Differential [[id number]]  (Abnormal) Collected: 02/10/21 0547    Specimen: Blood Updated: 02/10/21 0750    Narrative:      The following orders were created for panel order CBC & Differential.  Procedure                               Abnormality         Status                     ---------                               -----------         ------                     Scan Slide[[id number]]                                       Final result               CBC Auto Differential[[id number]]        Abnormal            Final result                 Please view results for these tests on the individual orders.    CBC Auto Differential [[id number]]  (Abnormal) Collected: 02/10/21 0547    Specimen: Blood Updated: 02/10/21 0750     WBC 10.30 10*3/mm3      RBC 2.85 10*6/mm3      Hemoglobin 9.0 g/dL      Hematocrit 27.2 %      MCV 95.4 fL      MCH 31.6 pg      MCHC 33.2 g/dL      RDW 16.1 %      RDW-SD 54.3 fl      MPV 10.2 fL      Platelets 35 10*3/mm3     Narrative:      The previously reported component NRBC is no longer being reported. Previous result was 0.1 /100 WBC (Reference Range: 0.0-0.2 /100 WBC) on 2/10/2021 at 0627 EST.    Scan Slide [[id number]] Collected: 02/10/21 0547    Specimen: Blood Updated: 02/10/21 0750     Scan Slide --     Comment: See Manual Differential Results       Manual Differential [[id number]]  (Abnormal) Collected: 02/10/21 0547    Specimen: Blood Updated: 02/10/21 0750     Neutrophil % 64.0 %      Lymphocyte % 3.0 %       Monocyte % 3.0 %      Eosinophil % 2.0 %      Bands %  28.0 %      Neutrophils Absolute 9.48 10*3/mm3      Lymphocytes Absolute 0.31 10*3/mm3      Monocytes Absolute 0.31 10*3/mm3      Eosinophils Absolute 0.21 10*3/mm3      Anisocytosis Slight/1+     WBC Morphology Normal     Platelet Estimate Adequate     Clumped Platelets Present     Large Platelets Slight/1+    Narrative:      Manual platelet count performed. 02.10.2021 tc      POC Glucose Once [[id number]]  (Abnormal) Collected: 02/10/21 0743    Specimen: Blood Updated: 02/10/21 0747     Glucose 62 mg/dL      Comment: Serial Number: [id number]:  [id number]       POC Glucose Once [[id number]]  (Abnormal) Collected: 02/10/21 0720    Specimen: Blood Updated: 02/10/21 0723     Glucose 59 mg/dL      Comment: Serial Number: [id number]:  [id number]       Comprehensive Metabolic Panel [[id number]]  (Abnormal) Collected: 02/10/21 0547    Specimen: Blood Updated: 02/10/21 0648     Glucose 92 mg/dL      BUN 95 mg/dL      Creatinine 2.98 mg/dL      Sodium 138 mmol/L      Potassium 3.7 mmol/L      Chloride 104 mmol/L      CO2 24.0 mmol/L      Calcium 7.0 mg/dL      Total Protein 5.0 g/dL      Albumin 2.10 g/dL      ALT (SGPT) 27 U/L      AST (SGOT) 81 U/L      Alkaline Phosphatase 396 U/L      Total Bilirubin 1.4 mg/dL      eGFR Non African Amer 15 mL/min/1.73      Globulin 2.9 gm/dL      A/G Ratio 0.7 g/dL      BUN/Creatinine Ratio 31.9     Anion Gap 10.0 mmol/L     Narrative:      GFR Normal >60  Chronic Kidney Disease <60  Kidney Failure <15      Magnesium [[id number]]  (Abnormal) Collected: 02/10/21 0547    Specimen: Blood Updated: 02/10/21 0647     Magnesium 1.4 mg/dL     Urinalysis, Microscopic Only - Urine, Catheter [[id number]]  (Abnormal) Collected: 02/10/21 0306    Specimen: Urine, Catheter Updated: 02/10/21 0417     RBC, UA 6-12 /HPF      WBC, UA 6-12 /HPF      Bacteria, UA Trace /HPF      Squamous Epithelial Cells, UA 3-6 /HPF      Yeast, UA        Moderate/2+ Budding Yeast w/Hyphae     /HPF     Hyaline Casts, UA 0-2 /LPF      Methodology Manual Light Microscopy    Urine Culture - Urine, Urine, Catheter [[id number]] Collected: 02/10/21 0306    Specimen: Urine, Catheter Updated: 02/10/21 0417    Urinalysis With Culture If Indicated - Urine, Catheter [[id number]]  (Abnormal) Collected: 02/10/21 0306    Specimen: Urine, Catheter Updated: 02/10/21 0414     Color, UA Yellow     Appearance, UA Hazy     Comment: Result checked         pH, UA <=5.0     Specific Gravity, UA 1.009     Glucose, UA Negative     Ketones, UA Negative     Bilirubin, UA Negative     Blood, UA Trace     Protein, UA Negative     Leuk Esterase, UA Small (1+)     Nitrite, UA Negative     Urobilinogen, UA 0.2 E.U./dL    POC Glucose Once [[id number]]  (Normal) Collected: 02/10/21 0020    Specimen: Blood Updated: 02/10/21 0021     Glucose 91 mg/dL      Comment: Serial Number: [id number]:  [id number]       Vitamin B12 [[id number]]  (Abnormal) Collected: 02/09/21 1540    Specimen: Blood Updated: 02/09/21 2007     Vitamin B-12 >2,000 pg/mL     Narrative:      Results may be falsely increased if patient taking Biotin.      Folate [[id number]]  (Normal) Collected: 02/09/21 1540    Specimen: Blood Updated: 02/09/21 2007     Folate 10.90 ng/mL     Narrative:      Results may be falsely increased if patient taking Biotin.      POC Glucose Once [[id number]]  (Abnormal) Collected: 02/09/21 1729    Specimen: Blood Updated: 02/09/21 1730     Glucose 147 mg/dL      Comment: Serial Number: [id number]:  [id number]       POC Glucose Once [[id number]]  (Abnormal) Collected: 02/09/21 1659    Specimen: Blood Updated: 02/09/21 1700     Glucose 69 mg/dL      Comment: Serial Number: [id number]:  [id number]       Basic Metabolic Panel [[id number]]  (Abnormal) Collected: 02/09/21 1540    Specimen: Blood Updated: 02/09/21 1627     Glucose 68 mg/dL      BUN 92 mg/dL      Creatinine 3.09 mg/dL      Sodium 138 mmol/L       Potassium 3.9 mmol/L      Comment: Slight hemolysis detected by analyzer. Results may be affected.        Chloride 105 mmol/L      CO2 20.0 mmol/L      Calcium 6.9 mg/dL      eGFR Non African Amer 15 mL/min/1.73      BUN/Creatinine Ratio 29.8     Anion Gap 13.0 mmol/L     Narrative:      GFR Normal >60  Chronic Kidney Disease <60  Kidney Failure <15      Heparin-induced Platelet Antibody [[id number]] Collected: 02/09/21 1540    Specimen: Blood Updated: 02/09/21 1557        No results found for: HGBA1C        Results from last 7 days   Lab Units 02/06/21  1904   PH, ARTERIAL pH units 7.337*   PO2 ART mm Hg 79.5*   PCO2, ARTERIAL mm Hg 26.4*   HCO3 ART mmol/L 14.2*     Lab Results   Component Value Date    LIPASE 42 01/13/2018     Lab Results   Component Value Date    CHOL 126 02/05/2020    TRIG 51 02/05/2020    HDL 66 (H) 02/05/2020    LDL 50 02/05/2020       No results found for: INTRAOP, PREDX, FINALDX, COMDX    Microbiology Results (last 10 days)     Procedure Component Value - Date/Time    Clostridium Difficile Toxin - Stool, Per Rectum [[id number]]  (Normal) Collected: 02/06/21 1952    Lab Status: Final result Specimen: Stool from Per Rectum Updated: 02/07/21 0725    Narrative:      The following orders were created for panel order Clostridium Difficile Toxin - Stool, Per Rectum.  Procedure                               Abnormality         Status                     ---------                               -----------         ------                     Clostridium Difficile EI...[[id number]]  Normal              Final result                 Please view results for these tests on the individual orders.    Clostridium Difficile EIA - Stool, Per Rectum [[id number]]  (Normal) Collected: 02/06/21 1952    Lab Status: Final result Specimen: Stool from Per Rectum Updated: 02/07/21 0725     C Diff GDH / Toxin Negative    Blood Culture - Blood, Arm, Left [[id number]] Collected: 02/06/21 1309    Lab Status: Preliminary  result Specimen: Blood from Arm, Left Updated: 02/10/21 1431     Blood Culture No growth at 4 days    Blood Culture - Blood, Arm, Right [[id number]] Collected: 02/06/21 1307    Lab Status: Preliminary result Specimen: Blood from Arm, Right Updated: 02/10/21 1431     Blood Culture No growth at 4 days    MRSA Screen, PCR (Inpatient) - Swab, Nares [[id number]]  (Abnormal) Collected: 02/06/21 1114    Lab Status: Final result Specimen: Swab from Nares Updated: 02/06/21 1311     MRSA PCR MRSA Detected    COVID PRE-OP / PRE-PROCEDURE SCREENING ORDER (NO ISOLATION) - Swab, Nasopharynx [[id number]]  (Normal) Collected: 02/06/21 0153    Lab Status: Final result Specimen: Swab from Nasopharynx Updated: 02/06/21 1534    Narrative:      The following orders were created for panel order COVID PRE-OP / PRE-PROCEDURE SCREENING ORDER (NO ISOLATION) - Swab, Nasopharynx.  Procedure                               Abnormality         Status                     ---------                               -----------         ------                     COVID-19,APTIMA PANTHER,...[[id number]]  Normal              Final result                 Please view results for these tests on the individual orders.    COVID-19,APTIMA PANTHER,[facility] IN-HOUSE, NP/OP SWAB IN UTM/VTM/[facility] TRANSPORT MEDIA,24 HR TAT - Swab, Nasopharynx [[id number]]  (Normal) Collected: 02/06/21 0153    Lab Status: Final result Specimen: Swab from Nasopharynx Updated: 02/06/21 1534     COVID19 Not Detected    Narrative:      Fact sheet for providers: https://www.fda.gov/media/499139/download     Fact sheet for patients: https://www.fda.gov/media/647937/download    Test performed by RT PCR.          ECG/EMG Results (most recent)     Procedure Component Value Units Date/Time    Adult Transthoracic Echo Complete W/ Cont if Necessary Per Protocol [[id number]] Collected: 02/06/21 0850     Updated: 02/06/21 1609     BSA 1.8 m^2      RVIDd 2.7 cm      IVSd 1.3 cm      LVIDd 3.9 cm      LVIDs 2.8  cm      LVPWd 1.2 cm      IVS/LVPW 1.0     FS 28.8 %      EDV(Teich) 67.0 ml      ESV(Teich) 29.4 ml      EF(Teich) 56.0 %      EDV(cubed) 60.5 ml      ESV(cubed) 21.9 ml      EF(cubed) 63.9 %      LV mass(C)d 170.0 grams      LV mass(C)dI 92.8 grams/m^2      SV(Teich) 37.5 ml      SI(Teich) 20.5 ml/m^2      SV(cubed) 38.7 ml      SI(cubed) 21.1 ml/m^2      Ao root diam 2.5 cm      Ao root area 5.1 cm^2      ACS 1.6 cm      asc Aorta Diam 3.2 cm      LVOT diam 1.8 cm      LVOT area 2.6 cm^2      RVOT diam 2.6 cm      RVOT area 5.5 cm^2      EDV(MOD-sp4) 59.5 ml      ESV(MOD-sp4) 26.6 ml      EF(MOD-sp4) 55.3 %      EDV(MOD-sp2) 53.3 ml      ESV(MOD-sp2) 20.0 ml      EF(MOD-sp2) 62.4 %      SV(MOD-sp4) 32.9 ml      SI(MOD-sp4) 18.0 ml/m^2      SV(MOD-sp2) 33.2 ml      SI(MOD-sp2) 18.1 ml/m^2      Ao root area (BSA corrected) 1.4     LV Olivier Vol (BSA corrected) 32.5 ml/m^2      LV Sys Vol (BSA corrected) 14.5 ml/m^2      Aortic R-R 1.0 sec      Aortic HR 59.0 BPM      MV V2 max 134.6 cm/sec      MV max PG 7.2 mmHg      MV V2 mean 51.6 cm/sec      MV mean PG 1.8 mmHg      MV V2 VTI 24.3 cm      MVA(VTI) 2.3 cm^2      Ao pk chastity 182.4 cm/sec      Ao max PG 13.3 mmHg      Ao max PG (full) 8.5 mmHg      Ao V2 mean 142.3 cm/sec      Ao mean PG 8.7 mmHg      Ao mean PG (full) 5.5 mmHg      Ao V2 VTI 37.9 cm      WILLIS(I,A) 1.5 cm^2      WILLIS(I,D) 1.5 cm^2      WILLIS(V,A) 1.5 cm^2      WILLIS(V,D) 1.5 cm^2      AI max chastity 264.5 cm/sec      AI max PG 28.0 mmHg      AI dec slope 171.9 cm/sec^2      AI dec time 1.5 sec      AI P1/2t 450.8 msec      LV V1 max PG 4.8 mmHg      LV V1 mean PG 3.2 mmHg      LV V1 max 110.0 cm/sec      LV V1 mean 85.9 cm/sec      LV V1 VTI 21.6 cm      CO(Ao) 11.4 l/min      CI(Ao) 6.2 l/min/m^2      SV(Ao) 192.9 ml      SI(Ao) 105.3 ml/m^2      CO(LVOT) 3.3 l/min      CI(LVOT) 1.8 l/min/m^2      SV(LVOT) 55.2 ml      SV(RVOT) 59.1 ml      SI(LVOT) 30.1 ml/m^2      PA V2 max 88.8 cm/sec      PA max PG 3.2  mmHg      PA max PG (full) 1.9 mmHg      PA V2 mean 63.8 cm/sec      PA mean PG 1.8 mmHg      PA mean PG (full) 1.0 mmHg      PA V2 VTI 17.2 cm      PVA(I,A) 3.4 cm^2       CV ECHO SAHIL - PVA(I,D) 3.4 cm^2      BH CV ECHO SAHIL - PVA(V,A) 3.4 cm^2       CV ECHO SAHIL - PVA(V,D) 3.4 cm^2      PA acc time 0.07 sec      RV V1 max PG 1.2 mmHg      RV V1 mean PG 0.77 mmHg      RV V1 max 55.1 cm/sec      RV V1 mean 41.7 cm/sec      RV V1 VTI 10.8 cm      TR max devante 237.7 cm/sec      RVSP(TR) 25.6 mmHg      RAP systole 3.0 mmHg      PA pr(Accel) 48.2 mmHg      Pulm Sys Devante 41.0 cm/sec      Pulm Olivier Devante 37.5 cm/sec      Pulm S/D 1.1     Qp/Qs 1.1      CV ECHO SAHIL - BZI_BMI 33.1 kilograms/m^2       CV ECHO SAHIL - BSA([facility]) 1.9 m^2       CV ECHO SAHIL - BZI_METRIC_WEIGHT 82.1 kg       CV ECHO SAHIL - BZI_METRIC_HEIGHT 157.5 cm      EF(MOD-bp) 60.0 %      LA dimension(2D) 4.2 cm     Narrative:      Normal LV size and contractility EF of 55%  Moderate right ventricular enlargement with severe right atrial   enlargement, catheter probably pacemaker lead seen.  Severe left atrial enlargement seen.  Aortic valve, mitral valve, tricuspid valve appears structurally normal,   mild MR, AR, seen.  There is moderate  tricuspid regurgitation seen.    Calculated RV systolic pressure is 24mmHg.  No pericardial effusion seen.  Proximal aorta appears normal in size.          Results for orders placed during the hospital encounter of 02/05/21   Duplex Venous Lower Extremity - Bilateral CAR    Narrative · Exam limited by patient intolerance to compression and body habitus.  · The distal left femoral and the right and left peroneal veins are not   imaged.  · All other veins appeared normal bilaterally.          Results for orders placed during the hospital encounter of 02/05/21   Adult Transthoracic Echo Complete W/ Cont if Necessary Per Protocol    Narrative Normal LV size and contractility EF of 55%  Moderate right ventricular  enlargement with severe right atrial   enlargement, catheter probably pacemaker lead seen.  Severe left atrial enlargement seen.  Aortic valve, mitral valve, tricuspid valve appears structurally normal,   mild MR, AR, seen.  There is moderate  tricuspid regurgitation seen.    Calculated RV systolic pressure is 24mmHg.  No pericardial effusion seen.  Proximal aorta appears normal in size.       Xr Chest 1 View    Result Date: 2/7/2021  1. New right-sided PICC line with tip terminating over the low SVC. 2. Stable cardiomegaly.  Electronically Signed By-Dae Auguste MD On:2/7/2021 10:45 AM This report was finalized on [id number] by  Dae Auguste MD.    Xr Chest 1 View    Result Date: 2/6/2021  1.Cardiomegaly  Electronically Signed By-Azar Alarcon MD On:2/6/2021 8:01 AM This report was finalized on [id number] by  Azar Alarcon MD.          Xrays, labs reviewed personally by physician.    Medication Review:   I have reviewed the patient's current medication list      Scheduled Meds  aspirin, 81 mg, Oral, Daily  bumetanide, 2 mg, Intravenous, Q6H  ceftaroline, 200 mg, Intravenous, Q12H  febuxostat, 40 mg, Oral, BID  folic acid, 1 mg, Oral, Daily  gabapentin, 100 mg, Oral, TID  haloperidol lactate, 5 mg, Intramuscular, Once  magic butt paste, , Topical, Daily  midodrine, 10 mg, Oral, TID AC  nystatin, 5 mL, Swish & Swallow, 4x Daily  sodium chloride, 10 mL, Intravenous, Q12H  vitamin B-12, 500 mcg, Oral, Daily  Zinc Oxide, , Apply externally, BID        Meds Infusions  dextrose, 20 mL/hr, Last Rate: 20 mL/hr (02/09/21 1827)  DOPamine, 2-20 mcg/kg/min, Last Rate: 2 mcg/kg/min (02/09/21 2222)        Meds PRN  •  acetaminophen **OR** acetaminophen **OR** acetaminophen  •  dextrose  •  dextrose  •  glucagon (human recombinant)  •  HYDROcodone-acetaminophen  •  magnesium sulfate **OR** magnesium sulfate in D5W 1g/100mL (PREMIX) **OR** magnesium sulfate  •  melatonin  •  nitroglycerin  •  OLANZapine  •   ondansetron **OR** ondansetron  •  potassium chloride  •  potassium chloride  •  sodium chloride  •  traMADol        Assessment/Plan   Assessment/Plan     Active Hospital Problems    Diagnosis  POA   • Sepsis (CMS/[facility]) [A41.9]  Yes   • CONG (acute kidney injury) (CMS/[facility]) [N17.9]  Yes   • Lactic acidosis [E87.2]  Yes   • Metabolic acidosis [E87.2]  Yes   • Delirium, acute [R41.0]  Yes   • Lower extremity cellulitis [L03.119]  Yes   • CAD (coronary artery disease) [I25.10]  Yes   • Acute UTI (urinary tract infection) [N39.0]  Yes   • Non-pressure chronic ulcer right ankle, limited to breakdown skin (CMS/[facility]) [L97.311]  Yes   • Automatic implantable cardiac defibrillator in situ [Z95.810]  Yes   • Lymphedema [I89.0]  Unknown   • Gout [M10.9]  Yes   • Hypertension, benign [I10]  Yes   • Congestive heart failure (CMS/[facility]) [I50.9]  Yes   • Atrial fibrillation (CMS/[facility]) [I48.91]  Yes      Resolved Hospital Problems   No resolved problems to display.       MEDICAL DECISION MAKING COMPLEXITY BY PROBLEM:       Septic shock from LE cellulitis and UTI   - bilateral LE Lymphedema   -Rocephin given x1 at sending facility,   -started vancomycin and cefepime 2/6/2021.  Added Flagyl 2/7/21 because of leukocytosis  -Started on dopamine 2/6/2021  - procalcitonin 171.12  -MRSA screen positive  -C. difficile negative  -Falls precautions  -Wound care consulted  -ID consulted 2/7/2021.  -Antibiotics changed to ceftaroline    Hypotension on midodrine  Dopamine drip.  Continues but attempting to do taper off    Urinary tract infection  -Rocephin given at sending facility  -cefepime   -Urine culture pending     CONG on CKD III:  Likely cardiorenal etiology  -Hold colchicine  -Concern for fluid loss from LE cellulitis  -Consulted nephrology  -s/p renal ultrasound 2/5/2020  -Responsive to diuresis    Thrombocytopenia possible related to sepsis.  Awaiting heparin-induced antibody  B12 and folic acid not decreased  Consult  hematology      Delirium:  -No history of dementia per daughter  -Avoid sedating medications  -Improving    Right arm IV infiltration:  -Continue ice to arm and monitor  -Monitor    CAD  -Denies chest pain  -Continue aspirin,  Resume beta-blocker when blood pressure allows.    Atrial fibrillation s/p pacemaker  -Hold digoxin given her kidney failure  Currently on metoprolol at home  -Repeat TTE 2/13/2018--> LVEF 40% with mild aortic insufficiency  -TTE 2/6/2021--> LVEF 55%, severe right atrial enlargement, severe left atrial enlargement, moderate TR  -Daughter claims patient off Xarelto     Hypertension  -Hold metoprolol temporarily because of low BP  On Bumex    Hyperlipidemia  -Check lipid panel  -on  home Zetia at home     Chronic pain  -Continue gabapentin and tramadol     Gout  -Hold colchicine because ok CONG     Pressure ulcer on right ankle  -Wound care consulted     -Hypoglycemia.  Rule out adrenal insufficiency given low blood pressure  Cosyntropin stimulation test in the morning  -Continue D10 for now until patient appetite improves.      VTE Prophylaxis -   Mechanical Order History:     None      Pharmalogical Order History:      Ordered     Dose Route Frequency Stop    02/06/21 0500  rivaroxaban (XARELTO) tablet 10 mg     Question Answer Comment   Are you ordering rivaroxaban for the prevention of blood clots in an acutely ill medical patient? Yes    Select any exclusion criteria that may apply to patient Exclusion Criteria Does Not Apply to This Patient Alternative to Lovenox/heparin inpatient with thrombocytopenia unable to receive mechanical prophylaxis       10 mg PO Daily With Dinner --    02/06/21 0020  heparin (porcine) 5000 UNIT/ML injection 5,000 Units  Status:  Discontinued      5,000 Units SC Every 12 Hours Scheduled 02/06/21 0147                  Code Status -   Code Status and Medical Interventions:   Ordered at: 02/06/21 0258     Level Of Support Discussed With:    Patient     Code Status:     CPR     Medical Interventions (Level of Support Prior to Arrest):    Full       This patient has been examined wearing appropriate Personal Protective Equipment and discussed with nursing. 02/10/21        Discharge Planning    Patient does have episodes of confusion in the evenings Zyprexa PRN ordered.  Elevated procalcitonin and worsening WBC thus added Flagyl to Vanco and cefepime.  ID consulted 2/7/2021.      Electronically signed by Alexandro Huitron MD, 02/10/21, 15:15 EST.  [facility]ist [facility]

## 2021-02-10 NOTE — THERAPY TREATMENT NOTE
Subjective: Pt agreeable to therapeutic plan of care.    Objective:     Bed mobility - Mod-A, Max-A and Assist x 2  Transfers - N/A or Not attempted.  Ambulation - 0 feet N/A or Not attempted.    Pain: 4 VAS  Education: Provided education on importance of mobility and skilled verbal / tactile cueing throughout intervention.     Assessment: Emperatriz Childs presents with functional mobility impairments which indicate the need for skilled intervention. Tolerating session today without incident. Pt initially refusing, but gave effort with encouragement. Pt requires MOD/MAX A x2 to come to sitting. Pt with improvements in seated balance this date able to maintain seated balance with dynamic lower extremity movements with SBA. Will continue to follow and progress as tolerated.     Plan/Recommendations:   Pt would benefit from Inpatient Rehabilitation placement at discharge from facility and requires no DME at discharge.   Pt desires Inpatient Rehabilitation placement and LTACH placement at discharge. Pt cooperative; agreeable to therapeutic recommendations and plan of care.     Basic Mobility 6-click:  Rollin = Total, A lot = 2, A little = 3; 4 = None  Supine>Sit:   1 = Total, A lot = 2, A little = 3; 4 = None   Sit>Stand with arms:  1 = Total, A lot = 2, A little = 3; 4 = None  Bed>Chair:   1 = Total, A lot = 2, A little = 3; 4 = None  Ambulate in room:  1 = Total, A lot = 2, A little = 3; 4 = None  3-5 Steps with railin = Total, A lot = 2, A little = 3; 4 = None  Score: 10      Post-Tx Position: Supine with HOB Elevated  PPE: gloves, surgical mask, eyewear protection

## 2021-02-11 ENCOUNTER — APPOINTMENT (OUTPATIENT)
Dept: GENERAL RADIOLOGY | Facility: HOSPITAL | Age: 76
End: 2021-02-11

## 2021-02-11 LAB
ANION GAP SERPL CALCULATED.3IONS-SCNC: 12 MMOL/L (ref 5–15)
ANION GAP SERPL CALCULATED.3IONS-SCNC: 14 MMOL/L (ref 5–15)
ANISOCYTOSIS BLD QL: ABNORMAL
APTT PPP: 34 SECONDS (ref 24–31)
BACTERIA SPEC AEROBE CULT: ABNORMAL
BACTERIA SPEC AEROBE CULT: ABNORMAL
BACTERIA SPEC AEROBE CULT: NORMAL
BACTERIA SPEC AEROBE CULT: NORMAL
BUN SERPL-MCNC: 100 MG/DL (ref 8–23)
BUN SERPL-MCNC: 95 MG/DL (ref 8–23)
BUN/CREAT SERPL: 27.9 (ref 7–25)
BUN/CREAT SERPL: 29.1 (ref 7–25)
CALCIUM SPEC-SCNC: 7.1 MG/DL (ref 8.6–10.5)
CALCIUM SPEC-SCNC: 7.8 MG/DL (ref 8.6–10.5)
CHLORIDE SERPL-SCNC: 100 MMOL/L (ref 98–107)
CHLORIDE SERPL-SCNC: 99 MMOL/L (ref 98–107)
CLUMPED PLATELETS: PRESENT
CO2 SERPL-SCNC: 23 MMOL/L (ref 22–29)
CO2 SERPL-SCNC: 24 MMOL/L (ref 22–29)
CORTIS SERPL-MCNC: 15.09 MCG/DL
CORTIS SERPL-MCNC: 21.84 MCG/DL
CORTIS SERPL-MCNC: 24.66 MCG/DL
CREAT SERPL-MCNC: 3.26 MG/DL (ref 0.57–1)
CREAT SERPL-MCNC: 3.58 MG/DL (ref 0.57–1)
DEPRECATED RDW RBC AUTO: 57.8 FL (ref 37–54)
ERYTHROCYTE [DISTWIDTH] IN BLOOD BY AUTOMATED COUNT: 16.6 % (ref 12.3–15.4)
FERRITIN SERPL-MCNC: 663.7 NG/ML (ref 13–150)
FIBRINOGEN PPP-MCNC: 554 MG/DL (ref 210–450)
GFR SERPL CREATININE-BSD FRML MDRD: 12 ML/MIN/1.73
GFR SERPL CREATININE-BSD FRML MDRD: 14 ML/MIN/1.73
GFR SERPL CREATININE-BSD FRML MDRD: ABNORMAL ML/MIN/{1.73_M2}
GFR SERPL CREATININE-BSD FRML MDRD: ABNORMAL ML/MIN/{1.73_M2}
GLUCOSE BLDC GLUCOMTR-MCNC: 103 MG/DL (ref 70–105)
GLUCOSE BLDC GLUCOMTR-MCNC: 129 MG/DL (ref 70–105)
GLUCOSE BLDC GLUCOMTR-MCNC: 139 MG/DL (ref 70–105)
GLUCOSE BLDC GLUCOMTR-MCNC: 73 MG/DL (ref 70–105)
GLUCOSE BLDC GLUCOMTR-MCNC: 86 MG/DL (ref 70–105)
GLUCOSE SERPL-MCNC: 142 MG/DL (ref 65–99)
GLUCOSE SERPL-MCNC: 314 MG/DL (ref 65–99)
HAPTOGLOB SERPL-MCNC: 138 MG/DL (ref 30–200)
HCT VFR BLD AUTO: 27.8 % (ref 34–46.6)
HGB BLD-MCNC: 9 G/DL (ref 12–15.9)
INR PPP: 1.22 (ref 0.93–1.1)
IRON 24H UR-MRATE: 50 MCG/DL (ref 37–145)
IRON SATN MFR SERPL: 38 % (ref 20–50)
LDH SERPL-CCNC: 260 U/L (ref 135–214)
LYMPHOCYTES # BLD MANUAL: 0.12 10*3/MM3 (ref 0.7–3.1)
LYMPHOCYTES NFR BLD MANUAL: 1 % (ref 19.6–45.3)
LYMPHOCYTES NFR BLD MANUAL: 3 % (ref 5–12)
MAGNESIUM SERPL-MCNC: 2 MG/DL (ref 1.6–2.4)
MCH RBC QN AUTO: 31.6 PG (ref 26.6–33)
MCHC RBC AUTO-ENTMCNC: 32.4 G/DL (ref 31.5–35.7)
MCV RBC AUTO: 97.5 FL (ref 79–97)
METAMYELOCYTES NFR BLD MANUAL: 1 % (ref 0–0)
MONOCYTES # BLD AUTO: 0.36 10*3/MM3 (ref 0.1–0.9)
NEUTROPHILS # BLD AUTO: 11.4 10*3/MM3 (ref 1.7–7)
NEUTROPHILS NFR BLD MANUAL: 67 % (ref 42.7–76)
NEUTS BAND NFR BLD MANUAL: 28 % (ref 0–5)
NT-PROBNP SERPL-MCNC: ABNORMAL PG/ML (ref 0–1800)
PATHOLOGY REVIEW: YES
PF4 HEPARIN CMPLX IGG SERPL IA: 0.08 OD (ref 0–0.4)
PLATELET # BLD AUTO: 92 10*3/MM3 (ref 140–450)
PMV BLD AUTO: 9.2 FL (ref 6–12)
POIKILOCYTOSIS BLD QL SMEAR: ABNORMAL
POTASSIUM SERPL-SCNC: 3.7 MMOL/L (ref 3.5–5.2)
POTASSIUM SERPL-SCNC: 4.1 MMOL/L (ref 3.5–5.2)
PROTHROMBIN TIME: 13.3 SECONDS (ref 9.6–11.7)
RBC # BLD AUTO: 2.86 10*6/MM3 (ref 3.77–5.28)
RETICS # AUTO: 0.07 10*6/MM3 (ref 0.02–0.13)
RETICS/RBC NFR AUTO: 1.98 % (ref 0.7–1.9)
SCAN SLIDE: NORMAL
SMALL PLATELETS BLD QL SMEAR: ABNORMAL
SODIUM SERPL-SCNC: 135 MMOL/L (ref 136–145)
SODIUM SERPL-SCNC: 137 MMOL/L (ref 136–145)
TIBC SERPL-MCNC: 133 MCG/DL (ref 298–536)
TRANSFERRIN SERPL-MCNC: 89 MG/DL (ref 200–360)
WBC # BLD AUTO: 12 10*3/MM3 (ref 3.4–10.8)
WBC MORPH BLD: NORMAL

## 2021-02-11 PROCEDURE — 84165 PROTEIN E-PHORESIS SERUM: CPT | Performed by: NURSE PRACTITIONER

## 2021-02-11 PROCEDURE — 85045 AUTOMATED RETICULOCYTE COUNT: CPT | Performed by: NURSE PRACTITIONER

## 2021-02-11 PROCEDURE — 99233 SBSQ HOSP IP/OBS HIGH 50: CPT | Performed by: INTERNAL MEDICINE

## 2021-02-11 PROCEDURE — 84155 ASSAY OF PROTEIN SERUM: CPT | Performed by: NURSE PRACTITIONER

## 2021-02-11 PROCEDURE — 84466 ASSAY OF TRANSFERRIN: CPT | Performed by: NURSE PRACTITIONER

## 2021-02-11 PROCEDURE — 83880 ASSAY OF NATRIURETIC PEPTIDE: CPT | Performed by: INTERNAL MEDICINE

## 2021-02-11 PROCEDURE — 82962 GLUCOSE BLOOD TEST: CPT

## 2021-02-11 PROCEDURE — 85730 THROMBOPLASTIN TIME PARTIAL: CPT | Performed by: NURSE PRACTITIONER

## 2021-02-11 PROCEDURE — 83735 ASSAY OF MAGNESIUM: CPT | Performed by: PHYSICIAN ASSISTANT

## 2021-02-11 PROCEDURE — 71045 X-RAY EXAM CHEST 1 VIEW: CPT

## 2021-02-11 PROCEDURE — 99222 1ST HOSP IP/OBS MODERATE 55: CPT | Performed by: INTERNAL MEDICINE

## 2021-02-11 PROCEDURE — 83615 LACTATE (LD) (LDH) ENZYME: CPT | Performed by: NURSE PRACTITIONER

## 2021-02-11 PROCEDURE — P9047 ALBUMIN (HUMAN), 25%, 50ML: HCPCS | Performed by: INTERNAL MEDICINE

## 2021-02-11 PROCEDURE — 25010000002 ONDANSETRON PER 1 MG: Performed by: PHYSICIAN ASSISTANT

## 2021-02-11 PROCEDURE — 83010 ASSAY OF HAPTOGLOBIN QUANT: CPT | Performed by: NURSE PRACTITIONER

## 2021-02-11 PROCEDURE — 80048 BASIC METABOLIC PNL TOTAL CA: CPT | Performed by: INTERNAL MEDICINE

## 2021-02-11 PROCEDURE — 25010000002 COSYNTROPIN PER 0.25 MG: Performed by: INTERNAL MEDICINE

## 2021-02-11 PROCEDURE — 97530 THERAPEUTIC ACTIVITIES: CPT

## 2021-02-11 PROCEDURE — 85610 PROTHROMBIN TIME: CPT | Performed by: NURSE PRACTITIONER

## 2021-02-11 PROCEDURE — 25010000002 DOPAMINE PER 40 MG: Performed by: HOSPITALIST

## 2021-02-11 PROCEDURE — 82728 ASSAY OF FERRITIN: CPT | Performed by: NURSE PRACTITIONER

## 2021-02-11 PROCEDURE — 83540 ASSAY OF IRON: CPT | Performed by: NURSE PRACTITIONER

## 2021-02-11 PROCEDURE — 25010000002 ALBUMIN HUMAN 25% PER 50 ML: Performed by: INTERNAL MEDICINE

## 2021-02-11 PROCEDURE — 25010000002 CEFTAROLINE FOSAMIL PER 10 MG: Performed by: NURSE PRACTITIONER

## 2021-02-11 PROCEDURE — 85384 FIBRINOGEN ACTIVITY: CPT | Performed by: NURSE PRACTITIONER

## 2021-02-11 PROCEDURE — 82533 TOTAL CORTISOL: CPT | Performed by: INTERNAL MEDICINE

## 2021-02-11 PROCEDURE — 85007 BL SMEAR W/DIFF WBC COUNT: CPT | Performed by: PHYSICIAN ASSISTANT

## 2021-02-11 PROCEDURE — 80048 BASIC METABOLIC PNL TOTAL CA: CPT | Performed by: NURSE PRACTITIONER

## 2021-02-11 PROCEDURE — 85025 COMPLETE CBC W/AUTO DIFF WBC: CPT | Performed by: PHYSICIAN ASSISTANT

## 2021-02-11 RX ORDER — FLUCONAZOLE 100 MG/1
100 TABLET ORAL EVERY 24 HOURS
Status: DISCONTINUED | OUTPATIENT
Start: 2021-02-11 | End: 2021-02-15

## 2021-02-11 RX ORDER — ALBUMIN (HUMAN) 12.5 G/50ML
12.5 SOLUTION INTRAVENOUS EVERY 6 HOURS
Status: DISCONTINUED | OUTPATIENT
Start: 2021-02-11 | End: 2021-02-11

## 2021-02-11 RX ORDER — DOXYCYCLINE 100 MG/1
100 TABLET ORAL EVERY 12 HOURS SCHEDULED
Status: DISCONTINUED | OUTPATIENT
Start: 2021-02-11 | End: 2021-02-15

## 2021-02-11 RX ORDER — ALBUMIN (HUMAN) 12.5 G/50ML
12.5 SOLUTION INTRAVENOUS EVERY 6 HOURS
Status: COMPLETED | OUTPATIENT
Start: 2021-02-11 | End: 2021-02-12

## 2021-02-11 RX ADMIN — ALBUMIN HUMAN 12.5 G: 0.25 SOLUTION INTRAVENOUS at 15:54

## 2021-02-11 RX ADMIN — NYSTATIN 500000 UNITS: 100000 SUSPENSION ORAL at 21:09

## 2021-02-11 RX ADMIN — GABAPENTIN 100 MG: 100 CAPSULE ORAL at 21:09

## 2021-02-11 RX ADMIN — COSYNTROPIN 0.25 MG: 0.25 INJECTION, POWDER, LYOPHILIZED, FOR SOLUTION INTRAMUSCULAR; INTRAVENOUS at 08:54

## 2021-02-11 RX ADMIN — DOPAMINE HYDROCHLORIDE 2 MCG/KG/MIN: 160 INJECTION, SOLUTION INTRAVENOUS at 12:34

## 2021-02-11 RX ADMIN — CYANOCOBALAMIN TAB 250 MCG 500 MCG: 250 TAB at 08:55

## 2021-02-11 RX ADMIN — Medication 10 ML: at 21:12

## 2021-02-11 RX ADMIN — Medication: at 08:58

## 2021-02-11 RX ADMIN — BUMETANIDE 2 MG: 0.25 INJECTION, SOLUTION INTRAMUSCULAR; INTRAVENOUS at 04:04

## 2021-02-11 RX ADMIN — MIDODRINE HYDROCHLORIDE 10 MG: 5 TABLET ORAL at 12:19

## 2021-02-11 RX ADMIN — ALBUMIN HUMAN 12.5 G: 0.25 SOLUTION INTRAVENOUS at 21:17

## 2021-02-11 RX ADMIN — FOLIC ACID 1 MG: 1 TABLET ORAL at 08:58

## 2021-02-11 RX ADMIN — HYDROCODONE BITARTRATE AND ACETAMINOPHEN 1 TABLET: 5; 325 TABLET ORAL at 04:04

## 2021-02-11 RX ADMIN — SODIUM CHLORIDE 200 MG: 9 INJECTION, SOLUTION INTRAVENOUS at 04:04

## 2021-02-11 RX ADMIN — SODIUM CHLORIDE 200 MG: 9 INJECTION, SOLUTION INTRAVENOUS at 17:15

## 2021-02-11 RX ADMIN — FEBUXOSTAT 40 MG: 40 TABLET, FILM COATED ORAL at 08:54

## 2021-02-11 RX ADMIN — ONDANSETRON 4 MG: 2 INJECTION, SOLUTION INTRAMUSCULAR; INTRAVENOUS at 14:00

## 2021-02-11 RX ADMIN — MIDODRINE HYDROCHLORIDE 10 MG: 5 TABLET ORAL at 08:55

## 2021-02-11 RX ADMIN — BUMETANIDE 2 MG: 0.25 INJECTION, SOLUTION INTRAMUSCULAR; INTRAVENOUS at 09:01

## 2021-02-11 RX ADMIN — BUMETANIDE 1 MG/HR: 0.25 INJECTION, SOLUTION INTRAMUSCULAR; INTRAVENOUS at 19:54

## 2021-02-11 RX ADMIN — NYSTATIN 500000 UNITS: 100000 SUSPENSION ORAL at 17:14

## 2021-02-11 RX ADMIN — GABAPENTIN 100 MG: 100 CAPSULE ORAL at 17:15

## 2021-02-11 RX ADMIN — ZINC OXIDE: 200 OINTMENT TOPICAL at 08:58

## 2021-02-11 RX ADMIN — FEBUXOSTAT 40 MG: 40 TABLET, FILM COATED ORAL at 21:09

## 2021-02-11 RX ADMIN — DOXYCYCLINE 100 MG: 100 TABLET, FILM COATED ORAL at 21:09

## 2021-02-11 RX ADMIN — NYSTATIN 500000 UNITS: 100000 SUSPENSION ORAL at 12:18

## 2021-02-11 RX ADMIN — Medication 10 ML: at 08:59

## 2021-02-11 RX ADMIN — BUMETANIDE 2 MG: 0.25 INJECTION, SOLUTION INTRAMUSCULAR; INTRAVENOUS at 16:45

## 2021-02-11 RX ADMIN — HYDROCODONE BITARTRATE AND ACETAMINOPHEN 1 TABLET: 5; 325 TABLET ORAL at 12:19

## 2021-02-11 RX ADMIN — FLUCONAZOLE 100 MG: 100 TABLET ORAL at 18:20

## 2021-02-11 RX ADMIN — TRAMADOL HYDROCHLORIDE 50 MG: 50 TABLET, FILM COATED ORAL at 08:54

## 2021-02-11 RX ADMIN — ASPIRIN 81 MG CHEWABLE TABLET 81 MG: 81 TABLET CHEWABLE at 08:55

## 2021-02-11 RX ADMIN — Medication: at 21:45

## 2021-02-11 RX ADMIN — GABAPENTIN 100 MG: 100 CAPSULE ORAL at 08:55

## 2021-02-11 RX ADMIN — NYSTATIN 500000 UNITS: 100000 SUSPENSION ORAL at 08:54

## 2021-02-11 NOTE — PROGRESS NOTES
[facility] Medicine Services Daily Progress Note      Hospitalist Team  LOS 3 days      Patient Care Team:  Rosa M Chavez APRN as PCP - General (Nurse Practitioner)    Patient Location: 2120/1      Subjective   Subjective     Chief Complaint / Subjective  Fevers, leg pain      Brief Synopsis of Hospital Course/HPI    The patient is a 75-year-old female with history atrial fibrillation s/p pacemaker placement, hyperlipidemia, hypertension, CAD, CKD stage III and  chronic lower extremity lymphedema.    Apparently the patient has been having several weeks of worsening lower extremity edema thus went to outside facility.    Laboratory work was significant for , potassium 5.3, BUN 63, creatinine 2.41, WBC 4.5, lactic acid 3.2 and UA with 25-50 WBCs.  The patient was transferred to [facility] for further care      Date::    2/6/21: Poor historian.  Low BP thus gave bolus.  Started on dopamine and transferred to PCU.  Started on bicarb drip.  Confused in the evening.  2/7/21: PICC line placed.  Daughter at the bedside discussed care.  Daughter claims no history of dementia or sundowning.  Leukocytosis.  Added Flagyl.  Consulted ID.  Daughter claims patient off Xarelto.  C. difficile ruled out.      2/8  Has third spacing and bruising.  Patient denies any chest pain  On dopamine drip and being tapered off.  Started on midodrine.    2/9/2021  Outpatient recliner  She has Ace wrap in place  She had been complaining of some tight wraps on the right side and will need to be addressed.  White count better  Her platelet count is 54 slightly better than yesterday  Still on a small amount of dopamine drip.  Renal function still elevated but probably stabilized.    2/10  Patient is now feeling better and her leg swelling is under control with compression dressing that has been changed to allow for some relief of her pain.  She had drop in her creatinine which suggests improvement with  "diuresis and possible cardiorenal etiology    She has been eating yesterday and her sugar dropped and started D10    B12 is above 2000  Haptoglobin is 100 within normal.  She is hypomagnesemic as well.  Folic acid 10.9  Still with significant thrombocytopenia.  We will ask hematologist for evaluation.    2/11/2021  Urine output decreased overnight and Dr. Pinedo wants to start dopamine drip again  Creatinine slightly up again today  Patient is asking when she is going to go home.    Review of Systems   All other systems reviewed and are negative.        Objective   Objective      Vital Signs  Temp:  [96.9 °F (36.1 °C)-98.9 °F (37.2 °C)] 98 °F (36.7 °C)  Heart Rate:  [60-72] 62  Resp:  [16-20] 16  BP: ()/(41-71) 118/41  Oxygen Therapy  SpO2: 92 %  Pulse Oximetry Type: Intermittent  Device (Oxygen Therapy): room air  Flowsheet Rows      First Filed Value   Admission Height  157.5 cm (62\") Documented at 02/05/2021 2359   Admission Weight  78 kg (172 lb) Documented at 02/05/2021 2359        Intake & Output (last 3 days)       02/08 0701 - 02/09 0700 02/09 0701 - 02/10 0700 02/10 0701 - 02/11 0700 02/11 0701 - 02/12 0700    P.O.  780      I.V. (mL/kg) 200 (2.5)       Other   100     IV Piggyback 350       Total Intake(mL/kg) 550 (6.8) 780 (9.5) 100 (1.2)     Urine (mL/kg/hr) 2175 (1.1) 3540 (1.8) 485 (0.2) 60 (0.1)    Stool  0      Total Output 2175 3540 485 60    Net -1625 -2760 -385 -60            Stool Unmeasured Occurrence  2 x          Lines, Drains & Airways    Active LDAs     Name:   Placement date:   Placement time:   Site:   Days:    Peripheral IV 02/06/21 0403 Anterior;Right Forearm   02/06/21 0403    Forearm   less than 1                  Physical Exam:    Physical Exam  HENT:      Head: Normocephalic.      Nose: Nose normal.   Eyes:      General: No scleral icterus.     Extraocular Movements: Extraocular movements intact.      Pupils: Pupils are equal, round, and reactive to light.   Neck:      " Musculoskeletal: Normal range of motion.   Cardiovascular:      Rate and Rhythm: Normal rate and regular rhythm.   Pulmonary:      Effort: Pulmonary effort is normal.      Breath sounds: Normal breath sounds.   Abdominal:      General: Bowel sounds are normal.      Palpations: Abdomen is soft.   Musculoskeletal:      Comments: Bilateral shin with erythema and edema.  Lymphedema.   Lymphadenopathy:      Cervical: No cervical adenopathy.   Skin:     General: Skin is warm.   Neurological:      Mental Status: She is alert.   Psychiatric:         Attention and Perception: Attention normal.              Wounds (last 24 hours)      LDA Wound     Row Name 02/11/21 0800 02/11/21 0400 02/11/21 0000       Wound 02/06/21 0031 Right lower leg Other (comment)    Wound - Properties Group Placement Date: 02/06/21  -CS Placement Time: 0031  -CS Present on Hospital Admission: Y  -CS Side: Right  -CS Orientation: lower  -CS Location: leg  -CS Primary Wound Type: Other  -CS, chronic redness and swelling     Closure  NELY  -SK  NELY  -JT  NELY  -JT    Base  red  -SK  red  -JT  red  -JT    Periwound  excoriated;blistered;edematous  -SK  excoriated;blistered;edematous  -JT  excoriated;blistered;edematous  -JT    Periwound Temperature  warm;hot  -SK  warm;hot  -JT  warm;hot  -JT    Drainage Amount  moderate  -SK  moderate  -JT  moderate  -JT    Retired Wound - Properties Group Date first assessed: 02/06/21  -CS Time first assessed: 0031  -CS Present on Hospital Admission: Y  -CS Side: Right  -CS Location: leg  -CS Primary Wound Type: Other  -CS, chronic redness and swelling        Wound 02/06/21 0032 Right anterior foot Other (comment)    Wound - Properties Group Placement Date: 02/06/21  -CS Placement Time: 0032  -CS Present on Hospital Admission: Y  -CS Side: Right  -CS Orientation: anterior  -CS Location: foot  -CS Primary Wound Type: Other  -CS Additional Comments: chronic redness and swelling  -CS    Closure  NELY  -SK  NELY  -JT  NELY  -JT     Base  red  -SK  red  -JT  red  -JT    Periwound  edematous;excoriated;redness  -SK  edematous;excoriated;redness  -JT  edematous;excoriated;redness  -JT    Periwound Temperature  warm;hot  -SK  warm;hot  -JT  warm;hot  -JT    Periwound Skin Turgor  firm  -SK  firm  -JT  firm  -JT    Drainage Amount  scant  -SK  scant  -JT  scant  -JT    Retired Wound - Properties Group Date first assessed: 02/06/21  -CS Time first assessed: 0032  -CS Present on Hospital Admission: Y  -CS Side: Right  -CS Location: foot  -CS Primary Wound Type: Other  -CS Additional Comments: chronic redness and swelling  -CS       Wound 02/06/21 0033 Left lower leg Other (comment)    Wound - Properties Group Placement Date: 02/06/21  -CS Placement Time: 0033  -CS Present on Hospital Admission: N  -CS Side: Left  -CS Orientation: lower  -CS Location: leg  -CS Primary Wound Type: Other  -CS Additional Comments: chronic redness and swelling  -CS    Closure  NELY  -SK  NELY  -JT  NELY  -JT    Base  red  -SK  red  -JT  red  -JT    Periwound  edematous;excoriated;redness  -SK  edematous;excoriated;redness  -JT  edematous;excoriated;redness  -JT    Periwound Temperature  warm;hot  -SK  warm;hot  -JT  warm;hot  -JT    Retired Wound - Properties Group Date first assessed: 02/06/21  -CS Time first assessed: 0033  -CS Present on Hospital Admission: N  -CS Side: Left  -CS Location: leg  -CS Primary Wound Type: Other  -CS Additional Comments: chronic redness and swelling  -CS       Wound 02/06/21 0035 Left anterior ankle Other (comment)    Wound - Properties Group Placement Date: 02/06/21  -CS Placement Time: 0035  -CS Present on Hospital Admission: Y  -CS Side: Left  -CS Orientation: anterior  -CS Location: ankle  -CS Primary Wound Type: Other  -CS    Closure  NELY  -SK  NELY  -JT  NELY  -JT    Base  red  -SK  red  -JT  red  -JT    Periwound  edematous;excoriated;redness  -SK  edematous;excoriated;redness  -JT  edematous;excoriated;redness  -JT    Periwound  Temperature  warm  -SK  warm  -JT  warm  -JT    Periwound Skin Turgor  firm  -SK  firm  -JT  firm  -JT    Retired Wound - Properties Group Date first assessed: 02/06/21  -CS Time first assessed: 0035  -CS Present on Hospital Admission: Y  -CS Side: Left  -CS Location: ankle  -CS Primary Wound Type: Other  -CS       Wound 02/06/21 0047 Right heel Other (comment)    Wound - Properties Group Placement Date: 02/06/21  -CS Placement Time: 0047  -CS Present on Hospital Admission: Y  -CS Side: Right  -CS Location: heel  -CS Primary Wound Type: Other  -CS, Chronic redness and swelling      Closure  NELY  -SK  NELY  -JT  NELY  -JT    Base  red  -SK  red  -JT  red  -JT    Periwound  edematous;excoriated;redness  -SK  edematous;excoriated;redness  -JT  edematous;excoriated;redness  -JT    Retired Wound - Properties Group Date first assessed: 02/06/21  -CS Time first assessed: 0047  -CS Present on Hospital Admission: Y  -CS Side: Right  -CS Location: heel  -CS Primary Wound Type: Other  -CS, Chronic redness and swelling         Wound 02/06/21 0050 Left posterior thigh Other (comment)    Wound - Properties Group Placement Date: 02/06/21  -CS Placement Time: 0050  -CS Present on Hospital Admission: Y  -CS Side: Left  -CS Orientation: posterior  -CS Location: thigh  -CS Primary Wound Type: Other  -CS, Chronic redness and swelling      Base  red;scab  -SK  red;scab  -JT  red;scab  -JT    Periwound  edematous;excoriated;redness  -SK  edematous;excoriated;redness  -JT  edematous;excoriated;redness  -JT    Retired Wound - Properties Group Date first assessed: 02/06/21  -CS Time first assessed: 0050  -CS Present on Hospital Admission: Y  -CS Side: Left  -CS Location: thigh  -CS Primary Wound Type: Other  -CS, Chronic redness and swelling         Wound 02/06/21 0051 coccyx Pressure Injury    Wound - Properties Group Placement Date: 02/06/21  -CS Placement Time: 0051  -CS Present on Hospital Admission: Y  -CS Location: coccyx  -CS Primary  Wound Type: Pressure inj  -CS Stage, Pressure Injury : Stage 2  -CS    Base  non-blanchable;scab  -SK  non-blanchable;scab  -JT  non-blanchable;scab  -JT    Periwound  non-blanchable;redness  -SK  non-blanchable;redness  -JT  non-blanchable;redness  -JT    Retired Wound - Properties Group Date first assessed: 02/06/21  -CS Time first assessed: 0051  -CS Present on Hospital Admission: Y  -CS Location: coccyx  -CS Primary Wound Type: Pressure inj  -CS    Row Name 02/10/21 2000 02/10/21 1600          Wound 02/06/21 0031 Right lower leg Other (comment)    Wound - Properties Group Placement Date: 02/06/21  -CS Placement Time: 0031  -CS Present on Hospital Admission: Y  -CS Side: Right  -CS Orientation: lower  -CS Location: leg  -CS Primary Wound Type: Other  -CS, chronic redness and swelling     Closure  NELY  -JT  NELY  -AS     Base  red  -JT  red  -AS     Periwound  excoriated;blistered;edematous  -JT  excoriated;blistered;edematous  -AS     Periwound Temperature  warm;hot  -JT  warm;hot  -AS     Drainage Amount  moderate  -JT  moderate  -AS     Retired Wound - Properties Group Date first assessed: 02/06/21  -CS Time first assessed: 0031  -CS Present on Hospital Admission: Y  -CS Side: Right  -CS Location: leg  -CS Primary Wound Type: Other  -CS, chronic redness and swelling        Wound 02/06/21 0032 Right anterior foot Other (comment)    Wound - Properties Group Placement Date: 02/06/21  -CS Placement Time: 0032  -CS Present on Hospital Admission: Y  -CS Side: Right  -CS Orientation: anterior  -CS Location: foot  -CS Primary Wound Type: Other  -CS Additional Comments: chronic redness and swelling  -CS    Closure  NELY  -JT  NELY  -AS     Base  red  -JT  red  -AS     Periwound  edematous;excoriated;redness  -JT  edematous;excoriated;redness  -AS     Periwound Temperature  warm;hot  -JT  warm;hot  -AS     Periwound Skin Turgor  firm  -JT  firm  -AS     Drainage Amount  scant  -JT  scant  -AS     Retired Wound - Properties  Group Date first assessed: 02/06/21  -CS Time first assessed: 0032  -CS Present on Hospital Admission: Y  -CS Side: Right  -CS Location: foot  -CS Primary Wound Type: Other  -CS Additional Comments: chronic redness and swelling  -CS       Wound 02/06/21 0033 Left lower leg Other (comment)    Wound - Properties Group Placement Date: 02/06/21  -CS Placement Time: 0033  -CS Present on Hospital Admission: N  -CS Side: Left  -CS Orientation: lower  -CS Location: leg  -CS Primary Wound Type: Other  -CS Additional Comments: chronic redness and swelling  -CS    Closure  NELY  -JT  NELY  -AS     Base  red  -JT  red  -AS     Periwound  edematous;excoriated;redness  -JT  edematous;excoriated;redness  -AS     Periwound Temperature  warm;hot  -JT  warm;hot  -AS     Retired Wound - Properties Group Date first assessed: 02/06/21  -CS Time first assessed: 0033  -CS Present on Hospital Admission: N  -CS Side: Left  -CS Location: leg  -CS Primary Wound Type: Other  -CS Additional Comments: chronic redness and swelling  -CS       Wound 02/06/21 0035 Left anterior ankle Other (comment)    Wound - Properties Group Placement Date: 02/06/21  -CS Placement Time: 0035  -CS Present on Hospital Admission: Y  -CS Side: Left  -CS Orientation: anterior  -CS Location: ankle  -CS Primary Wound Type: Other  -CS    Closure  NELY  -JT  NELY  -AS     Base  red  -JT  red  -AS     Periwound  edematous;excoriated;redness  -JT  edematous;excoriated;redness  -AS     Periwound Temperature  warm  -JT  warm  -AS     Periwound Skin Turgor  firm  -JT  firm  -AS     Retired Wound - Properties Group Date first assessed: 02/06/21  -CS Time first assessed: 0035  -CS Present on Hospital Admission: Y  -CS Side: Left  -CS Location: ankle  -CS Primary Wound Type: Other  -CS       Wound 02/06/21 0047 Right heel Other (comment)    Wound - Properties Group Placement Date: 02/06/21  -CS Placement Time: 0047  -CS Present on Hospital Admission: Y  -CS Side: Right  -CS Location:  heel  -CS Primary Wound Type: Other  -CS, Chronic redness and swelling      Closure  NELY  -JT  NELY  -AS     Base  red  -JT  red  -AS     Periwound  edematous;excoriated;redness  -JT  edematous;excoriated;redness  -AS     Retired Wound - Properties Group Date first assessed: 02/06/21  -CS Time first assessed: 0047  -CS Present on Hospital Admission: Y  -CS Side: Right  -CS Location: heel  -CS Primary Wound Type: Other  -CS, Chronic redness and swelling         Wound 02/06/21 0050 Left posterior thigh Other (comment)    Wound - Properties Group Placement Date: 02/06/21  -CS Placement Time: 0050  -CS Present on Hospital Admission: Y  -CS Side: Left  -CS Orientation: posterior  -CS Location: thigh  -CS Primary Wound Type: Other  -CS, Chronic redness and swelling      Base  red;scab  -JT  red;scab  -AS     Periwound  edematous;excoriated;redness  -JT  edematous;excoriated;redness  -AS     Retired Wound - Properties Group Date first assessed: 02/06/21  -CS Time first assessed: 0050  -CS Present on Hospital Admission: Y  -CS Side: Left  -CS Location: thigh  -CS Primary Wound Type: Other  -CS, Chronic redness and swelling         Wound 02/06/21 0051 coccyx Pressure Injury    Wound - Properties Group Placement Date: 02/06/21  -CS Placement Time: 0051  -CS Present on Hospital Admission: Y  -CS Location: coccyx  -CS Primary Wound Type: Pressure inj  -CS Stage, Pressure Injury : Stage 2  -CS    Base  non-blanchable;scab  -JT  non-blanchable;scab  -AS     Periwound  non-blanchable;redness  -JT  non-blanchable;redness  -AS     Retired Wound - Properties Group Date first assessed: 02/06/21  -CS Time first assessed: 0051  -CS Present on Hospital Admission: Y  -CS Location: coccyx  -CS Primary Wound Type: Pressure inj  -CS      User Key  (r) = Recorded By, (t) = Taken By, (c) = Cosigned By    Initials Name Provider Type    CS Naeem Jiang RN Registered Nurse    Rosa Grady RN Registered Nurse    Shelbie Fernando,  RN Registered Nurse    AS Ariana Chacon RN Registered Nurse          Procedures:              Results Review:     I reviewed the patient's new clinical results.      Lab Results (last 24 hours)     Procedure Component Value Units Date/Time    BNP [[id number]] Collected: 02/11/21 1005    Specimen: Blood Updated: 02/11/21 1205    Ferritin [[id number]]  (Abnormal) Collected: 02/11/21 1005    Specimen: Blood Updated: 02/11/21 1122     Ferritin 663.70 ng/mL     Narrative:      Results may be falsely decreased if patient taking Biotin.      Lactate Dehydrogenase [[id number]]  (Abnormal) Collected: 02/11/21 1005    Specimen: Blood Updated: 02/11/21 1121      U/L      Comment: Specimen hemolyzed.  Results may be affected.       Iron Profile [[id number]]  (Abnormal) Collected: 02/11/21 1005    Specimen: Blood Updated: 02/11/21 1120     Iron 50 mcg/dL      Iron Saturation 38 %      Transferrin 89 mg/dL      TIBC 133 mcg/dL     Cortisol [[id number]] Collected: 02/11/21 1005    Specimen: Blood Updated: 02/11/21 1047     Cortisol 24.66 mcg/dL     Narrative:      Cortisol Reference Ranges:    Cortisol 6AM - 10AM Range: 6.02-18.40 mcg/dl  Cortisol 4PM - 8PM Range: 2.68-10.50 mcg/dl      Results may be falsely increased if patient taking Biotin.      Cortisol [[id number]] Collected: 02/11/21 0938    Specimen: Blood Updated: 02/11/21 1011     Cortisol 21.84 mcg/dL     Narrative:      Cortisol Reference Ranges:    Cortisol 6AM - 10AM Range: 6.02-18.40 mcg/dl  Cortisol 4PM - 8PM Range: 2.68-10.50 mcg/dl      Results may be falsely increased if patient taking Biotin.      Cortisol [[id number]] Collected: 02/11/21 0848    Specimen: Blood Updated: 02/11/21 0938     Cortisol 15.09 mcg/dL     Narrative:      Cortisol Reference Ranges:    Cortisol 6AM - 10AM Range: 6.02-18.40 mcg/dl  Cortisol 4PM - 8PM Range: 2.68-10.50 mcg/dl      Results may be falsely increased if patient taking Biotin.      POC Glucose Once [[id number]]   (Normal) Collected: 02/11/21 0711    Specimen: Blood Updated: 02/11/21 0712     Glucose 73 mg/dL      Comment: Serial Number: [id number]:  [id number]       CBC & Differential [[id number]]  (Abnormal) Collected: 02/11/21 0027    Specimen: Blood Updated: 02/11/21 0659    Narrative:      The following orders were created for panel order CBC & Differential.  Procedure                               Abnormality         Status                     ---------                               -----------         ------                     Scan Slide[[id number]]                                       Edited Result - FINAL      CBC Auto Differential[[id number]]        Abnormal            Edited Result - FINAL        Please view results for these tests on the individual orders.    CBC Auto Differential [[id number]]  (Abnormal) Collected: 02/11/21 0027    Specimen: Blood Updated: 02/11/21 0659     WBC 12.00 10*3/mm3      RBC 2.86 10*6/mm3      Hemoglobin 9.0 g/dL      Hematocrit 27.8 %      MCV 97.5 fL      MCH 31.6 pg      MCHC 32.4 g/dL      RDW 16.6 %      RDW-SD 57.8 fl      MPV 9.2 fL      Platelets 92 10*3/mm3      Comment: Platelet estimate performed due to platelet clumping.   Modified report. Previous result was 35 10*3/mm3 on 2/11/2021 at 0548 EST.       Narrative:      The previously reported component NRBC is no longer being reported. Previous result was 0.1 /100 WBC (Reference Range: 0.0-0.2 /100 WBC) on 2/11/2021 at 0548 EST.    Scan Slide [[id number]] Collected: 02/11/21 0027    Specimen: Blood Updated: 02/11/21 0659     Scan Slide --     Comment: See Manual Differential Results       Manual Differential [[id number]]  (Abnormal) Collected: 02/11/21 0027    Specimen: Blood Updated: 02/11/21 0659     Neutrophil % 67.0 %      Lymphocyte % 1.0 %      Monocyte % 3.0 %      Bands %  28.0 %      Metamyelocyte % 1.0 %      Neutrophils Absolute 11.40 10*3/mm3      Lymphocytes Absolute 0.12 10*3/mm3      Monocytes Absolute 0.36  10*3/mm3      Anisocytosis Slight/1+     Poikilocytes Slight/1+     WBC Morphology Normal     Platelet Estimate Decreased     Clumped Platelets Present    Magnesium [[id number]]  (Normal) Collected: 02/11/21 0027    Specimen: Blood Updated: 02/11/21 0538     Magnesium 2.0 mg/dL     Basic Metabolic Panel [[id number]]  (Abnormal) Collected: 02/11/21 0027    Specimen: Blood Updated: 02/11/21 0531     Glucose 314 mg/dL      BUN 95 mg/dL      Creatinine 3.26 mg/dL      Sodium 135 mmol/L      Potassium 3.7 mmol/L      Chloride 100 mmol/L      CO2 23.0 mmol/L      Calcium 7.1 mg/dL      eGFR   Amer --     Comment: <15 Indicative of kidney failure.        eGFR Non African Amer 14 mL/min/1.73      Comment: <15 Indicative of kidney failure.        BUN/Creatinine Ratio 29.1     Anion Gap 12.0 mmol/L     Narrative:      GFR Normal >60  Chronic Kidney Disease <60  Kidney Failure <15      POC Glucose Once [[id number]]  (Normal) Collected: 02/11/21 0245    Specimen: Blood Updated: 02/11/21 0246     Glucose 86 mg/dL      Comment: Serial Number: [id number]:  [id number]       POC Glucose Once [[id number]]  (Normal) Collected: 02/10/21 2024    Specimen: Blood Updated: 02/10/21 2025     Glucose 78 mg/dL      Comment: Serial Number: [id number]:  [id number]       Urinalysis, Microscopic Only - Urine, Random Void [[id number]]  (Abnormal) Collected: 02/10/21 1616    Specimen: Urine, Random Void Updated: 02/10/21 1815     RBC, UA 3-5 /HPF      WBC, UA 6-12 /HPF      Bacteria, UA Trace /HPF      Squamous Epithelial Cells, UA 3-6 /HPF      Yeast, UA       Moderate/2+ Budding Yeast w/Hyphae     /HPF     Hyaline Casts, UA 0-2 /LPF      Methodology Manual Light Microscopy    Urine Culture - Urine, Urine, Random Void [[id number]] Collected: 02/10/21 1616    Specimen: Urine, Random Void Updated: 02/10/21 1815    Urinalysis With Culture If Indicated - Urine, Random Void [[id number]]  (Abnormal) Collected: 02/10/21 1616    Specimen:  Urine, Random Void Updated: 02/10/21 1802     Color, UA Yellow     Comment: Result checked         Appearance, UA Turbid     Comment: Result checked         pH, UA <=5.0     Specific Gravity, UA 1.014     Glucose, UA Negative     Ketones, UA Negative     Bilirubin, UA Negative     Blood, UA Large (3+)     Protein, UA 30 mg/dL (1+)     Leuk Esterase, UA Large (3+)     Nitrite, UA Negative     Urobilinogen, UA 0.2 E.U./dL    POC Glucose Once [[id number]]  (Normal) Collected: 02/10/21 1755    Specimen: Blood Updated: 02/10/21 1756     Glucose 95 mg/dL      Comment: Serial Number: [id number]:  [id number]       Blood Culture - Blood, Arm, Right [[id number]] Collected: 02/06/21 1307    Specimen: Blood from Arm, Right Updated: 02/10/21 1431     Blood Culture No growth at 4 days    Blood Culture - Blood, Arm, Left [[id number]] Collected: 02/06/21 1309    Specimen: Blood from Arm, Left Updated: 02/10/21 1431     Blood Culture No growth at 4 days        No results found for: HGBA1C        Results from last 7 days   Lab Units 02/06/21  1904   PH, ARTERIAL pH units 7.337*   PO2 ART mm Hg 79.5*   PCO2, ARTERIAL mm Hg 26.4*   HCO3 ART mmol/L 14.2*     Lab Results   Component Value Date    LIPASE 42 01/13/2018     Lab Results   Component Value Date    CHOL 126 02/05/2020    TRIG 51 02/05/2020    HDL 66 (H) 02/05/2020    LDL 50 02/05/2020       No results found for: INTRAOP, PREDX, FINALDX, COMDX    Microbiology Results (last 10 days)     Procedure Component Value - Date/Time    Clostridium Difficile Toxin - Stool, Per Rectum [[id number]]  (Normal) Collected: 02/06/21 1952    Lab Status: Final result Specimen: Stool from Per Rectum Updated: 02/07/21 9053    Narrative:      The following orders were created for panel order Clostridium Difficile Toxin - Stool, Per Rectum.  Procedure                               Abnormality         Status                     ---------                               -----------         ------                      Clostridium Difficile EI...[[id number]]  Normal              Final result                 Please view results for these tests on the individual orders.    Clostridium Difficile EIA - Stool, Per Rectum [[id number]]  (Normal) Collected: 02/06/21 1952    Lab Status: Final result Specimen: Stool from Per Rectum Updated: 02/07/21 0725     C Diff GDH / Toxin Negative    Blood Culture - Blood, Arm, Left [[id number]] Collected: 02/06/21 1309    Lab Status: Preliminary result Specimen: Blood from Arm, Left Updated: 02/10/21 1431     Blood Culture No growth at 4 days    Blood Culture - Blood, Arm, Right [[id number]] Collected: 02/06/21 1307    Lab Status: Preliminary result Specimen: Blood from Arm, Right Updated: 02/10/21 1431     Blood Culture No growth at 4 days    MRSA Screen, PCR (Inpatient) - Swab, Nares [[id number]]  (Abnormal) Collected: 02/06/21 1114    Lab Status: Final result Specimen: Swab from Nares Updated: 02/06/21 1311     MRSA PCR MRSA Detected    COVID PRE-OP / PRE-PROCEDURE SCREENING ORDER (NO ISOLATION) - Swab, Nasopharynx [[id number]]  (Normal) Collected: 02/06/21 0153    Lab Status: Final result Specimen: Swab from Nasopharynx Updated: 02/06/21 1534    Narrative:      The following orders were created for panel order COVID PRE-OP / PRE-PROCEDURE SCREENING ORDER (NO ISOLATION) - Swab, Nasopharynx.  Procedure                               Abnormality         Status                     ---------                               -----------         ------                     COVID-19,APTIMA PANTHER,...[[id number]]  Normal              Final result                 Please view results for these tests on the individual orders.    COVID-19,APTIMA PANTHER,[facility] IN-HOUSE, NP/OP SWAB IN UTM/VTM/SALINE TRANSPORT MEDIA,24 HR TAT - Swab, Nasopharynx [[id number]]  (Normal) Collected: 02/06/21 0153    Lab Status: Final result Specimen: Swab from Nasopharynx Updated: 02/06/21 1534     COVID19 Not Detected    Narrative:       Fact sheet for providers: https://www.fda.gov/media/918701/download     Fact sheet for patients: https://www.fda.gov/media/623664/download    Test performed by RT PCR.          ECG/EMG Results (most recent)     Procedure Component Value Units Date/Time    Adult Transthoracic Echo Complete W/ Cont if Necessary Per Protocol [[id number]] Collected: 02/06/21 0850     Updated: 02/06/21 1609     BSA 1.8 m^2      RVIDd 2.7 cm      IVSd 1.3 cm      LVIDd 3.9 cm      LVIDs 2.8 cm      LVPWd 1.2 cm      IVS/LVPW 1.0     FS 28.8 %      EDV(Teich) 67.0 ml      ESV(Teich) 29.4 ml      EF(Teich) 56.0 %      EDV(cubed) 60.5 ml      ESV(cubed) 21.9 ml      EF(cubed) 63.9 %      LV mass(C)d 170.0 grams      LV mass(C)dI 92.8 grams/m^2      SV(Teich) 37.5 ml      SI(Teich) 20.5 ml/m^2      SV(cubed) 38.7 ml      SI(cubed) 21.1 ml/m^2      Ao root diam 2.5 cm      Ao root area 5.1 cm^2      ACS 1.6 cm      asc Aorta Diam 3.2 cm      LVOT diam 1.8 cm      LVOT area 2.6 cm^2      RVOT diam 2.6 cm      RVOT area 5.5 cm^2      EDV(MOD-sp4) 59.5 ml      ESV(MOD-sp4) 26.6 ml      EF(MOD-sp4) 55.3 %      EDV(MOD-sp2) 53.3 ml      ESV(MOD-sp2) 20.0 ml      EF(MOD-sp2) 62.4 %      SV(MOD-sp4) 32.9 ml      SI(MOD-sp4) 18.0 ml/m^2      SV(MOD-sp2) 33.2 ml      SI(MOD-sp2) 18.1 ml/m^2      Ao root area (BSA corrected) 1.4     LV Olivier Vol (BSA corrected) 32.5 ml/m^2      LV Sys Vol (BSA corrected) 14.5 ml/m^2      Aortic R-R 1.0 sec      Aortic HR 59.0 BPM      MV V2 max 134.6 cm/sec      MV max PG 7.2 mmHg      MV V2 mean 51.6 cm/sec      MV mean PG 1.8 mmHg      MV V2 VTI 24.3 cm      MVA(VTI) 2.3 cm^2      Ao pk chastity 182.4 cm/sec      Ao max PG 13.3 mmHg      Ao max PG (full) 8.5 mmHg      Ao V2 mean 142.3 cm/sec      Ao mean PG 8.7 mmHg      Ao mean PG (full) 5.5 mmHg      Ao V2 VTI 37.9 cm      WILLIS(I,A) 1.5 cm^2      WILLIS(I,D) 1.5 cm^2      WILLIS(V,A) 1.5 cm^2      WILLIS(V,D) 1.5 cm^2      AI max chastity 264.5 cm/sec      AI max PG 28.0 mmHg      AI  dec slope 171.9 cm/sec^2      AI dec time 1.5 sec      AI P1/2t 450.8 msec      LV V1 max PG 4.8 mmHg      LV V1 mean PG 3.2 mmHg      LV V1 max 110.0 cm/sec      LV V1 mean 85.9 cm/sec      LV V1 VTI 21.6 cm      CO(Ao) 11.4 l/min      CI(Ao) 6.2 l/min/m^2      SV(Ao) 192.9 ml      SI(Ao) 105.3 ml/m^2      CO(LVOT) 3.3 l/min      CI(LVOT) 1.8 l/min/m^2      SV(LVOT) 55.2 ml      SV(RVOT) 59.1 ml      SI(LVOT) 30.1 ml/m^2      PA V2 max 88.8 cm/sec      PA max PG 3.2 mmHg      PA max PG (full) 1.9 mmHg      PA V2 mean 63.8 cm/sec      PA mean PG 1.8 mmHg      PA mean PG (full) 1.0 mmHg      PA V2 VTI 17.2 cm      PVA(I,A) 3.4 cm^2       CV ECHO SAHIL - PVA(I,D) 3.4 cm^2       CV ECHO SAHIL - PVA(V,A) 3.4 cm^2       CV ECHO SAHIL - PVA(V,D) 3.4 cm^2      PA acc time 0.07 sec      RV V1 max PG 1.2 mmHg      RV V1 mean PG 0.77 mmHg      RV V1 max 55.1 cm/sec      RV V1 mean 41.7 cm/sec      RV V1 VTI 10.8 cm      TR max devante 237.7 cm/sec      RVSP(TR) 25.6 mmHg      RAP systole 3.0 mmHg      PA pr(Accel) 48.2 mmHg      Pulm Sys Devante 41.0 cm/sec      Pulm Olivier Devante 37.5 cm/sec      Pulm S/D 1.1     Qp/Qs 1.1      CV ECHO SAHIL - BZI_BMI 33.1 kilograms/m^2       CV ECHO SAHIL - BSA(HAYCOCK) 1.9 m^2       CV ECHO SAHIL - BZI_METRIC_WEIGHT 82.1 kg       CV ECHO SAHIL - BZI_METRIC_HEIGHT 157.5 cm      EF(MOD-bp) 60.0 %      LA dimension(2D) 4.2 cm     Narrative:      Normal LV size and contractility EF of 55%  Moderate right ventricular enlargement with severe right atrial   enlargement, catheter probably pacemaker lead seen.  Severe left atrial enlargement seen.  Aortic valve, mitral valve, tricuspid valve appears structurally normal,   mild MR, AR, seen.  There is moderate  tricuspid regurgitation seen.    Calculated RV systolic pressure is 24mmHg.  No pericardial effusion seen.  Proximal aorta appears normal in size.          Results for orders placed during the hospital encounter of 02/05/21   Duplex Venous Lower  Extremity - Bilateral CAR    Narrative · Exam limited by patient intolerance to compression and body habitus.  · The distal left femoral and the right and left peroneal veins are not   imaged.  · All other veins appeared normal bilaterally.          Results for orders placed during the hospital encounter of 02/05/21   Adult Transthoracic Echo Complete W/ Cont if Necessary Per Protocol    Narrative Normal LV size and contractility EF of 55%  Moderate right ventricular enlargement with severe right atrial   enlargement, catheter probably pacemaker lead seen.  Severe left atrial enlargement seen.  Aortic valve, mitral valve, tricuspid valve appears structurally normal,   mild MR, AR, seen.  There is moderate  tricuspid regurgitation seen.    Calculated RV systolic pressure is 24mmHg.  No pericardial effusion seen.  Proximal aorta appears normal in size.       Xr Chest 1 View    Result Date: 2/7/2021  1. New right-sided PICC line with tip terminating over the low SVC. 2. Stable cardiomegaly.  Electronically Signed By-Dae Auguste MD On:2/7/2021 10:45 AM This report was finalized on [id number] by  Dae Auguste MD.    Xr Chest 1 View    Result Date: 2/6/2021  1.Cardiomegaly  Electronically Signed By-Azar Alarcon MD On:2/6/2021 8:01 AM This report was finalized on [id number] by  Azar Alarcon MD.          Xrays, labs reviewed personally by physician.    Medication Review:   I have reviewed the patient's current medication list      Scheduled Meds  albumin human, 12.5 g, Intravenous, Q6H  aspirin, 81 mg, Oral, Daily  bumetanide, 2 mg, Intravenous, Q6H  ceftaroline, 200 mg, Intravenous, Q12H  febuxostat, 40 mg, Oral, BID  folic acid, 1 mg, Oral, Daily  gabapentin, 100 mg, Oral, TID  haloperidol lactate, 5 mg, Intramuscular, Once  magic butt paste, , Topical, Daily  midodrine, 10 mg, Oral, TID AC  nystatin, 5 mL, Swish & Swallow, 4x Daily  sodium chloride, 10 mL, Intravenous, Q12H  vitamin B-12, 500 mcg, Oral,  Daily  Zinc Oxide, , Apply externally, BID        Meds Infusions  dextrose, 20 mL/hr, Last Rate: 20 mL/hr (02/09/21 1827)  DOPamine, 2-20 mcg/kg/min, Last Rate: 2 mcg/kg/min (02/09/21 2222)        Meds PRN  •  acetaminophen **OR** acetaminophen **OR** acetaminophen  •  dextrose  •  dextrose  •  glucagon (human recombinant)  •  HYDROcodone-acetaminophen  •  magnesium sulfate **OR** magnesium sulfate in D5W 1g/100mL (PREMIX) **OR** magnesium sulfate  •  melatonin  •  nitroglycerin  •  OLANZapine  •  ondansetron **OR** ondansetron  •  potassium chloride  •  potassium chloride  •  sodium chloride  •  traMADol        Assessment/Plan   Assessment/Plan     Active Hospital Problems    Diagnosis  POA   • Sepsis (CMS/[facility]) [A41.9]  Yes   • CONG (acute kidney injury) (CMS/[facility]) [N17.9]  Yes   • Lactic acidosis [E87.2]  Yes   • Metabolic acidosis [E87.2]  Yes   • Delirium, acute [R41.0]  Yes   • Lower extremity cellulitis [L03.119]  Yes   • CAD (coronary artery disease) [I25.10]  Yes   • Acute UTI (urinary tract infection) [N39.0]  Yes   • Non-pressure chronic ulcer right ankle, limited to breakdown skin (CMS/[facility]) [L97.311]  Yes   • Automatic implantable cardiac defibrillator in situ [Z95.810]  Yes   • Lymphedema [I89.0]  Unknown   • Gout [M10.9]  Yes   • Hypertension, benign [I10]  Yes   • Congestive heart failure (CMS/[facility]) [I50.9]  Yes   • Atrial fibrillation (CMS/[facility]) [I48.91]  Yes      Resolved Hospital Problems   No resolved problems to display.       MEDICAL DECISION MAKING COMPLEXITY BY PROBLEM:       Septic shock from LE cellulitis and UTI   - bilateral LE Lymphedema   -Rocephin given x1 at sending facility,   -started vancomycin and cefepime 2/6/2021.  Added Flagyl 2/7/21 because of leukocytosis  -Started on dopamine 2/6/2021  - procalcitonin 171.12  -MRSA screen positive  -C. difficile negative  -Falls precautions  -Wound care consulted Ace wraps as tolerated.  -Venous Doppler lower extremities negative.  -ID consulted  2/7/2021.  -Antibiotics changed to ceftaroline    Hypotension on midodrine  Dopamine drip.  Continues but attempting to do taper off    Candiduria.  Exchange Ayon catheter versus external catheter/repeat urinalysis per ID       CONG on CKD III:  Likely cardiorenal etiology  -Hold colchicine  --Consulted nephrology  -s/p renal ultrasound 2/5/2020  -Responsive to diuresis    Thrombocytopenia possible related to sepsis.  Awaiting heparin-induced antibody  B12 and folic acid not decreased  Consult hematology      Delirium:  -No history of dementia per daughter  -Avoid sedating medications  -Improving    Right arm IV infiltration:  -S/p ice to arm and monitor  -Improved  Patient has multiple ecchymotic patches bilateral forearms.    CAD  -Denies chest pain  -Continue aspirin,  Resume beta-blocker when blood pressure allows.    Atrial fibrillation s/p pacemaker  -Hold digoxin given her kidney failure  Currently on metoprolol at home  -Repeat TTE 2/13/2018--> LVEF 40% with mild aortic insufficiency  -TTE 2/6/2021--> LVEF 55%, severe right atrial enlargement, severe left atrial enlargement, moderate TR  -Daughter claims patient off Xarelto     Hypertension  -Hold metoprolol temporarily because of low BP  On Bumex  Cosyntropin stimulation shows normal responses    Hyperlipidemia  -Check lipid panel  -on  home Zetia at home     Chronic pain  -Continue gabapentin and tramadol     Gout  -Hold colchicine because ok CONG     Pressure ulcer on right ankle  -Wound care consulted     -Hypoglycemia.  Cosyntropin stimulation negative  -Continue D10 for now until patient appetite improves.      VTE Prophylaxis -   Mechanical Order History:     None      Pharmalogical Order History:      Ordered     Dose Route Frequency Stop    02/06/21 0500  rivaroxaban (XARELTO) tablet 10 mg     Question Answer Comment   Are you ordering rivaroxaban for the prevention of blood clots in an acutely ill medical patient? Yes    Select any exclusion  criteria that may apply to patient Exclusion Criteria Does Not Apply to This Patient Alternative to Lovenox/heparin inpatient with thrombocytopenia unable to receive mechanical prophylaxis       10 mg PO Daily With Dinner --    02/06/21 0020  heparin (porcine) 5000 UNIT/ML injection 5,000 Units  Status:  Discontinued      5,000 Units SC Every 12 Hours Scheduled 02/06/21 0147                  Code Status -   Code Status and Medical Interventions:   Ordered at: 02/06/21 0258     Level Of Support Discussed With:    Patient     Code Status:    CPR     Medical Interventions (Level of Support Prior to Arrest):    Full       This patient has been examined wearing appropriate Personal Protective Equipment and discussed with nursing. 02/11/21        Discharge Planning    Patient does have episodes of confusion in the evenings Zyprexa PRN ordered.  Elevated procalcitonin and worsening WBC thus added Flagyl to Vanco and cefepime.  ID consulted 2/7/2021.      Electronically signed by Alexandro Huitron MD, 02/11/21, 12:12 EST.  [facility]ist [facility]

## 2021-02-11 NOTE — PROGRESS NOTES
Infectious Diseases Progress Note      LOS: 2 days   Patient Care Team:  Rosa M Chavez APRN as PCP - General (Nurse Practitioner)    Chief Complaint: Lower extremities edema    Subjective     Patient had no fever during the last 24 hours.  Patient did have a temperature as low as 94.7 degrees last evening.  The patient remained hemodynamically stable.  The patient is currently on room air and is still on dopamine.  Patient states that her leg pain is worse today.    Review of Systems:   Review of Systems   Constitutional: Negative.    HENT: Negative.    Eyes: Negative.    Respiratory: Negative.    Cardiovascular: Positive for leg swelling.   Gastrointestinal: Negative.    Genitourinary: Negative.    Musculoskeletal: Negative.         Bilateral leg pain   Skin: Negative.    Neurological: Negative.    Hematological: Negative.    Psychiatric/Behavioral: Negative.         Objective     Vital Signs  Temp:  [94.7 °F (34.8 °C)-98.7 °F (37.1 °C)] 97.4 °F (36.3 °C)  Heart Rate:  [60-67] 60  Resp:  [14-24] 18  BP: ()/(38-59) 111/46    Physical Exam:  Physical Exam  Vitals signs and nursing note reviewed.   Constitutional:       Appearance: She is well-developed.   HENT:      Head: Normocephalic and atraumatic.   Eyes:      Pupils: Pupils are equal, round, and reactive to light.   Neck:      Musculoskeletal: Normal range of motion and neck supple.   Cardiovascular:      Rate and Rhythm: Normal rate and regular rhythm.      Heart sounds: Normal heart sounds.   Pulmonary:      Effort: Pulmonary effort is normal. No respiratory distress.      Breath sounds: Normal breath sounds. No wheezing or rales.   Abdominal:      General: Bowel sounds are normal. There is no distension.      Palpations: Abdomen is soft. There is no mass.      Tenderness: There is no abdominal tenderness. There is no guarding or rebound.   Musculoskeletal: Normal range of motion.         General: No deformity.      Right lower leg: Edema present.       Left lower leg: Edema present.      Comments: Superimposed erythema of both lower extremities more on the left than the right  -Erythema is improving slowly   Skin:     General: Skin is warm.      Findings: No erythema or rash.   Neurological:      Mental Status: She is alert and oriented to person, place, and time.      Cranial Nerves: No cranial nerve deficit.          Results Review:    I have reviewed all clinical data, test, lab, and imaging results.     Radiology  No Radiology Exams Resulted Within Past 24 Hours    Cardiology    Laboratory    Results from last 7 days   Lab Units 02/10/21  0547 02/09/21  0524 02/08/21  0529 02/07/21  1305 02/06/21  1904 02/06/21  0311 02/06/21  0045   WBC 10*3/mm3 10.30 14.40* 18.60* 25.00*  --  10.70 7.10   HEMOGLOBIN g/dL 9.0* 9.5* 9.6* 10.1*  --  10.1* 10.5*   HEMOGLOBIN, POC g/dL  --   --   --   --  9.9*  --   --    HEMATOCRIT % 27.2* 29.6* 29.1* 31.3*  --  31.7* 32.5*   HEMATOCRIT POC %  --   --   --   --  29*  --   --    PLATELETS 10*3/mm3 35* 54* 34* 82*  --  72* 86*     Results from last 7 days   Lab Units 02/10/21  0547 02/09/21  1540 02/09/21  0524 02/08/21  0529 02/07/21  1305 02/06/21  2247 02/06/21  1905   SODIUM mmol/L 138 138 138 140 140 139 139   POTASSIUM mmol/L 3.7 3.9 3.3* 4.3 4.5 5.1 4.5   CHLORIDE mmol/L 104 105 101 104 109* 112* 114*   CO2 mmol/L 24.0 20.0* 23.0 22.0 18.0* 14.0* 12.0*   BUN mg/dL 95* 92* 98* 98* 87* 80* 76*   CREATININE mg/dL 2.98* 3.09* 3.38* 3.39* 3.50* 3.35* 3.28*   GLUCOSE mg/dL 92 68 119* 109* 87 100* 91   ALBUMIN g/dL 2.10*  --   --   --  2.50* 2.40* 2.40*   BILIRUBIN mg/dL 1.4*  --   --   --  0.6 0.4 0.4   ALK PHOS U/L 396*  --   --   --  135* 109 100   AST (SGOT) U/L 81*  --   --   --  30 32 27   ALT (SGPT) U/L 27  --   --   --  12 10 9   CALCIUM mg/dL 7.0* 6.9* 6.8* 6.9* 7.1* 7.3* 7.3*     Results from last 7 days   Lab Units 02/06/21  1708   CK TOTAL U/L 225*             Microbiology   Microbiology Results (last 10 days)      Procedure Component Value - Date/Time    Clostridium Difficile Toxin - Stool, Per Rectum [[id number]]  (Normal) Collected: 02/06/21 1952    Lab Status: Final result Specimen: Stool from Per Rectum Updated: 02/07/21 0725    Narrative:      The following orders were created for panel order Clostridium Difficile Toxin - Stool, Per Rectum.  Procedure                               Abnormality         Status                     ---------                               -----------         ------                     Clostridium Difficile EI...[[id number]]  Normal              Final result                 Please view results for these tests on the individual orders.    Clostridium Difficile EIA - Stool, Per Rectum [[id number]]  (Normal) Collected: 02/06/21 1952    Lab Status: Final result Specimen: Stool from Per Rectum Updated: 02/07/21 0725     C Diff GDH / Toxin Negative    Blood Culture - Blood, Arm, Left [[id number]] Collected: 02/06/21 1309    Lab Status: Preliminary result Specimen: Blood from Arm, Left Updated: 02/10/21 1431     Blood Culture No growth at 4 days    Blood Culture - Blood, Arm, Right [[id number]] Collected: 02/06/21 1307    Lab Status: Preliminary result Specimen: Blood from Arm, Right Updated: 02/10/21 1431     Blood Culture No growth at 4 days    MRSA Screen, PCR (Inpatient) - Swab, Nares [[id number]]  (Abnormal) Collected: 02/06/21 1114    Lab Status: Final result Specimen: Swab from Nares Updated: 02/06/21 1311     MRSA PCR MRSA Detected    COVID PRE-OP / PRE-PROCEDURE SCREENING ORDER (NO ISOLATION) - Swab, Nasopharynx [[id number]]  (Normal) Collected: 02/06/21 0153    Lab Status: Final result Specimen: Swab from Nasopharynx Updated: 02/06/21 1534    Narrative:      The following orders were created for panel order COVID PRE-OP / PRE-PROCEDURE SCREENING ORDER (NO ISOLATION) - Swab, Nasopharynx.  Procedure                               Abnormality         Status                     ---------                                -----------         ------                     COVID-19,APTIMA PANTHER,...[[id number]]  Normal              Final result                 Please view results for these tests on the individual orders.    COVID-19,[facility] IN-HOUSE, NP/OP SWAB IN UTM/VTM/SALINE TRANSPORT MEDIA,24 HR TAT - Swab, Nasopharynx [[id number]]  (Normal) Collected: 02/06/21 0153    Lab Status: Final result Specimen: Swab from Nasopharynx Updated: 02/06/21 1534     COVID19 Not Detected    Narrative:      Fact sheet for providers: https://www.fda.gov/media/267613/download     Fact sheet for patients: https://www.fda.gov/media/439533/download    Test performed by RT PCR.          Medication Review:       Schedule Meds  aspirin, 81 mg, Oral, Daily  bumetanide, 2 mg, Intravenous, Q6H  ceftaroline, 200 mg, Intravenous, Q12H  [START ON 2/11/2021] cosyntropin, 0.25 mg, Intravenous, Once  febuxostat, 40 mg, Oral, BID  folic acid, 1 mg, Oral, Daily  gabapentin, 100 mg, Oral, TID  haloperidol lactate, 5 mg, Intramuscular, Once  magic butt paste, , Topical, Daily  midodrine, 10 mg, Oral, TID AC  nystatin, 5 mL, Swish & Swallow, 4x Daily  sodium chloride, 10 mL, Intravenous, Q12H  vitamin B-12, 500 mcg, Oral, Daily  Zinc Oxide, , Apply externally, BID        Infusion Meds  dextrose, 20 mL/hr, Last Rate: 20 mL/hr (02/09/21 1827)  DOPamine, 2-20 mcg/kg/min, Last Rate: 2 mcg/kg/min (02/09/21 2222)        PRN Meds  •  acetaminophen **OR** acetaminophen **OR** acetaminophen  •  dextrose  •  dextrose  •  glucagon (human recombinant)  •  HYDROcodone-acetaminophen  •  magnesium sulfate **OR** magnesium sulfate in D5W 1g/100mL (PREMIX) **OR** magnesium sulfate  •  melatonin  •  nitroglycerin  •  OLANZapine  •  ondansetron **OR** ondansetron  •  potassium chloride  •  potassium chloride  •  sodium chloride  •  traMADol        Assessment/Plan       Antimicrobial Therapy   1.  IV  Teflaro      day  2.      Day  3.      Day  4.      Day  5.      Day    Assessment     Severe bilateral lower leg cellulitis with significant erythema and warmth that extends up both bilateral inner thighs  -Patient has chronic lymphedema in both lower legs  -Came in with increased pain, redness, and swelling  -Patient does not appear to be septic at this time  -Blood cultures are negative   -Erythema start to improve  -Doppler was negative for acute findings     Acute kidney injury     Leukocytosis-likely related to severe cellulitis.  Significantly improved    Congestive heart failure     History of pacemaker implantation    Hypothermia-appears to have resolved    Candiduria     Plan     Continue IV Teflaro 200 mg every 12 hours  Ask nursing staff to replace Ayon catheter or change external catheter and repeat urinalysis for clearance of yeast  Continue supportive care  A.m. labs        Harper Darling, APRN  02/10/21  19:36 EST      Note is dictated utilizing voice recognition software/Dragon

## 2021-02-11 NOTE — THERAPY TREATMENT NOTE
Patient Name: Emperatriz Childs  : 1945    MRN: [id number]                              Today's Date: 2021       Admit Date: 2021    Visit Dx: No diagnosis found.  Patient Active Problem List   Diagnosis   • Congestive heart failure (CMS/HCC)   • Chronic low back pain   • Atrial fibrillation (CMS/HCC)   • Hypertension, benign   • Lymphedema   • Gout   • Urinary tract infection   • Syncope and collapse   • Lymphedema of both lower extremities   • Non-pressure chronic ulcer right ankle, limited to breakdown skin (CMS/HCC)   • Non-pressure chronic ulcer left lower leg, limited to breakdown skin (CMS/HCC)   • Automatic implantable cardiac defibrillator in situ   • Acute UTI (urinary tract infection)   • Lower extremity cellulitis   • CAD (coronary artery disease)   • Sepsis (CMS/HCC)   • CONG (acute kidney injury) (CMS/HCC)   • Lactic acidosis   • Metabolic acidosis   • Delirium, acute     Past Medical History:   Diagnosis Date   • CHF (congestive heart failure) (CMS/HCC)    • History of transfusion    • Hypertension    • Lymphedema of leg    • Myocardial infarction (CMS/HCC)      Past Surgical History:   Procedure Laterality Date   • CARDIAC ELECTROPHYSIOLOGY PROCEDURE N/A 2020    Procedure: ICD battery change;  Surgeon: Dionna Laird MD;  Location: [facility] CATH INVASIVE LOCATION;  Service: Cardiovascular   • CARDIAC ELECTROPHYSIOLOGY PROCEDURE N/A 2020    Procedure: Temporary Pacemaker;  Surgeon: Dionna Laird MD;  Location: [facility] CATH INVASIVE LOCATION;  Service: Cardiovascular   • CARDIAC ELECTROPHYSIOLOGY PROCEDURE N/A 2020    Procedure: Pocket Revision;  Surgeon: Dionna Laird MD;  Location: [facility] CATH INVASIVE LOCATION;  Service: Cardiovascular   • EYE SURGERY     • PACEMAKER IMPLANTATION       General Information     Row Name 21 1622          OT Time and Intention    Document Type  therapy note (daily note)  -MP     Mode of Treatment  occupational therapy;individual  therapy  -MP     [facility] Name 02/11/21 1622          General Information    Patient Profile Reviewed  yes  -MP       User Key  (r) = Recorded By, (t) = Taken By, (c) = Cosigned By    Initials Name Provider Type    Tyrel Cervantes OT Occupational Therapist          Mobility/ADL's     Row [facility] 02/11/21 1623          Bed Mobility    Supine-Sit [place] (Bed Mobility)  moderate assist (50% patient effort)  -MP     Row Name 02/11/21 1623          Transfers    Transfers  sit-stand transfer;bed-chair transfer  -MP     Bed-Chair [place] (Transfers)  2 person assist;minimum assist (75% patient effort);moderate assist (50% patient effort)  -MP     Sit-Stand [place] (Transfers)  2 person assist;moderate assist (50% patient effort)  -MP     Row Name 02/11/21 1623          Lower Body Dressing Assessment/Training    [place] Level (Lower Body Dressing)  doff;don;socks;dependent (less than 25% patient effort)  -MP       User Key  (r) = Recorded By, (t) = Taken By, (c) = Cosigned By    Initials Name Provider Type    Tyrel Cervantes OT Occupational Therapist        Obj/Interventions     Row Name 02/11/21 1625          Balance    Static Sitting Balance  WFL  -MP     Dynamic Sitting Balance  mild impairment  -MP     Static Standing Balance  mild impairment;supported;standing  -MP     Dynamic Standing Balance  moderate impairment;supported;standing  -MP     Balance Interventions  standing;sitting;sit to stand;supported;static;dynamic  -MP       User Key  (r) = Recorded By, (t) = Taken By, (c) = Cosigned By    Initials Name Provider Type    Tyrel Cervantes OT Occupational Therapist        Goals/Plan    No documentation.       Clinical Impression     Row Name 02/11/21 1625          Pain Assessment    Additional Documentation  Pain Scale: Numbers Pre/Post-Treatment (Group);Pain Scale: FACES Pre/Post-Treatment (Group)  -MP     Row Name 02/11/21 1625          Pain Scale: FACES Pre/Post-Treatment    Pain:  FACES Scale, Pretreatment  4-->hurts little more  -MP     Posttreatment Pain Rating  4-->hurts little more  -MP     Pain Location - Orientation  lower  -MP     Pain Location  extremity  -MP     Row Name 02/11/21 1625          Plan of Care Review    Plan of Care Reviewed With  patient  -MP     Progress  improving  -MP     Outcome Summary  Pt. demonstrates improved functional mobility w/ min-mod A x 2 SPS transfer EOB to armchair, albeit progress limited secondary to decreased standing tolerance w/ increased BLE pain this date. Recommend IP rehab at d/c to address aforementioned deficits.  -MP     Row Name 02/11/21 1625          Therapy Assessment/Plan (OT)    Rehab Potential (OT)  good, to achieve stated therapy goals  -MP     Row Name 02/11/21 1625          Therapy Plan Review/Discharge Plan (OT)    Anticipated Discharge Disposition (OT)  inpatient rehabilitation facility  -     Row Name 02/11/21 1625          Vital Signs    Pre Patient Position  Supine  -MP     Intra Patient Position  Standing  -MP     Post Patient Position  Sitting  -MP     Row Name 02/11/21 1625          Positioning and Restraints    Pre-Treatment Position  in bed  -MP     Post Treatment Position  chair  -MP     In Chair  sitting;call light within reach;reclined;encouraged to call for assist;exit alarm on  -MP       User Key  (r) = Recorded By, (t) = Taken By, (c) = Cosigned By    Initials Name Provider Type    Tyrel Cervantes OT Occupational Therapist        Outcome Measures    No documentation.       Occupational Therapy Education                 Title: PT OT SLP Therapies (Done)     Topic: Occupational Therapy (Done)     Point: ADL training (Done)     Description:   Instruct learner(s) on proper safety adaptation and remediation techniques during self care or transfers.   Instruct in proper use of assistive devices.              Learning Progress Summary           Patient Acceptance, E, VU,NR by RAFIQ at 2/9/2021 1955    Acceptance,  E,TB, VU,NR,DU by  at 2/9/2021 1007                   Point: Home exercise program (Done)     Description:   Instruct learner(s) on appropriate technique for monitoring, assisting and/or progressing therapeutic exercises/activities.              Learning Progress Summary           Patient Acceptance, E, VU,NR by  at 2/9/2021 1955    Acceptance, E,TB, VU,NR,DU by  at 2/9/2021 1007                   Point: Precautions (Done)     Description:   Instruct learner(s) on prescribed precautions during self-care and functional transfers.              Learning Progress Summary           Patient Acceptance, E, VU,NR by  at 2/9/2021 1955    Acceptance, E,TB, VU,NR,DU by  at 2/9/2021 1007                   Point: Body mechanics (Done)     Description:   Instruct learner(s) on proper positioning and spine alignment during self-care, functional mobility activities and/or exercises.              Learning Progress Summary           Patient Acceptance, E,TB, VU by  at 2/11/2021 1628    Acceptance, E, VU,NR by  at 2/9/2021 1955    Acceptance, E,TB, VU,NR,DU by  at 2/9/2021 1007                               User Key     Initials Effective Dates Name Provider Type Discipline     03/01/19 -  Sahra Davila OT Occupational Therapist OT     03/01/19 -  Tyrel Duffy OT Occupational Therapist OT     04/01/20 -  Violet Alexander, JEANNE Registered Nurse Nurse              OT Recommendation and Plan     Plan of Care Review  Plan of Care Reviewed With: patient  Progress: improving  Outcome Summary: Pt. demonstrates improved functional mobility w/ min-mod A x 2 SPS transfer EOB to armchair, albeit progress limited secondary to decreased standing tolerance w/ increased BLE pain this date. Recommend IP rehab at d/c to address aforementioned deficits.     Time Calculation:   Time Calculation- OT     Row Name 02/11/21 1629             Time Calculation- OT    OT Start Time  1015  -MP      OT Stop Time  1040  -MP      OT Time  Calculation (min)  25 min  -MP      Total Timed Code Minutes- OT  25 minute(s)  -MP      OT Received On  02/11/21  -MP      OT - Next Appointment  02/15/21  -MP        User Key  (r) = Recorded By, (t) = Taken By, (c) = Cosigned By    Initials Name Provider Type    Tyrel Cervantes OT Occupational Therapist        Therapy Charges for Today     Code Description Service Date Service Provider Modifiers Qty    [id number]  OT THERAPEUTIC ACT EA 15 MIN 2/11/2021 Tyrel Duffy OT GO 2               Tyrel Duffy OT  2/11/2021

## 2021-02-11 NOTE — CONSULTS
Hematology/Oncology Inpatient Consultation    Patient name: Emperatriz Childs  : 1945  MRN: [id number]  Referring Provider: Alexandro Huitron MD   Reason for Consultation: Persistent thrombocytopenia    Chief complaint: Lymphedema    History of present illness:    Emperatriz Childs is a 75 y.o. female who presented to [facility] on 2021 with complaints of increasing leg swelling and pain. Patient reported chronic lymphedema with erythema that has progressively gotten worse over the past several weeks. She reported associated pain with weightbearing for an extended period of time, fever, and chills.  Labs in the ED were significant for , potassium 5.3, creatinine 2.41, BUN 63, EGFR 19, lactate 3.2, hemoglobin 11.3.  Urinalysis was positive for blood, leukocytes, and nitrites.  Chest x-ray revealed cardiomegaly.  Patient was admitted for further evaluation and management.  Since admission, nephrology has been consulted for acute kidney injury.  Patient was started on antibiotics for lower extremity cellulitis and UTI.  Infectious disease was also consulted.    21  Hematology/Oncology was consulted for persistent thrombocytopenia.  Platelet count on admission were 86,000.  Platelets have continued to remain low throughout admission with lowest platelet count of 34,000 on 2021.  On review of patient's chart, patient has a history of thrombocytopenia. Patient was seen by Dr. Nathan outpatient on 2018 for anemia.  Initial work-up was performed and patient was to follow up in 2 weeks, however, patient was lost to follow-up.    She  has a past medical history of CHF (congestive heart failure) (CMS/HCC), History of transfusion, Hypertension, Lymphedema of leg, and Myocardial infarction (CMS/HCC).    PCP: Rosa M Chavez APRN    History:  Past Medical History:   Diagnosis Date   • CHF (congestive heart failure) (CMS/HCC)    • History of transfusion    •  Hypertension    • Lymphedema of leg    • Myocardial infarction (CMS/HCC)    ,   Past Surgical History:   Procedure Laterality Date   • CARDIAC ELECTROPHYSIOLOGY PROCEDURE N/A 1/31/2020    Procedure: ICD battery change;  Surgeon: Dionna Laird MD;  Location: [facility] CATH INVASIVE LOCATION;  Service: Cardiovascular   • CARDIAC ELECTROPHYSIOLOGY PROCEDURE N/A 1/31/2020    Procedure: Temporary Pacemaker;  Surgeon: Dionna Laird MD;  Location: [facility] CATH INVASIVE LOCATION;  Service: Cardiovascular   • CARDIAC ELECTROPHYSIOLOGY PROCEDURE N/A 1/31/2020    Procedure: Pocket Revision;  Surgeon: Dionna Laird MD;  Location: [facility] CATH INVASIVE LOCATION;  Service: Cardiovascular   • EYE SURGERY     • PACEMAKER IMPLANTATION     ,   Family History   Family history unknown: Yes   ,   Social History     Tobacco Use   • Smoking status: Never Smoker   • Smokeless tobacco: Never Used   Substance Use Topics   • Alcohol use: Never     Frequency: Never   • Drug use: Never   ,   Medications Prior to Admission   Medication Sig Dispense Refill Last Dose   • cetirizine (zyrTEC) 10 MG tablet Take 10 mg by mouth Daily As Needed for Allergies (at bedtime for itching).      • aspirin 81 MG chewable tablet Chew 81 mg Daily.      • bumetanide (BUMEX) 1 MG tablet Take 1 mg by mouth 2 (Two) Times a Day. Morning and noon  1    • colchicine 0.6 MG tablet Take 0.6 mg by mouth Daily.      • digoxin (LANOXIN) 125 MCG tablet Take 125 mcg by mouth Daily.      • ezetimibe (ZETIA) 10 MG tablet Take 10 mg by mouth Daily.      • febuxostat (ULORIC) 40 MG tablet Take 40 mg by mouth 2 (Two) Times a Day.      • gabapentin (NEURONTIN) 100 MG capsule Take 100 mg by mouth 3 (Three) Times a Day.      • metoprolol tartrate (LOPRESSOR) 50 MG tablet Take 50 mg by mouth 2 (Two) Times a Day.      • traMADol (ULTRAM) 50 MG tablet Take 50 mg by mouth Every 6 (Six) Hours As Needed.      , Scheduled Meds:  aspirin, 81 mg, Oral, Daily  bumetanide, 2 mg,  "Intravenous, Q6H  ceftaroline, 200 mg, Intravenous, Q12H  febuxostat, 40 mg, Oral, BID  folic acid, 1 mg, Oral, Daily  gabapentin, 100 mg, Oral, TID  haloperidol lactate, 5 mg, Intramuscular, Once  magic butt paste, , Topical, Daily  midodrine, 10 mg, Oral, TID AC  nystatin, 5 mL, Swish & Swallow, 4x Daily  sodium chloride, 10 mL, Intravenous, Q12H  vitamin B-12, 500 mcg, Oral, Daily  Zinc Oxide, , Apply externally, BID    , Continuous Infusions:  dextrose, 20 mL/hr, Last Rate: 20 mL/hr (02/09/21 1827)  DOPamine, 2-20 mcg/kg/min, Last Rate: 2 mcg/kg/min (02/09/21 2222)    , PRN Meds:  •  acetaminophen **OR** acetaminophen **OR** acetaminophen  •  dextrose  •  dextrose  •  glucagon (human recombinant)  •  HYDROcodone-acetaminophen  •  magnesium sulfate **OR** magnesium sulfate in D5W 1g/100mL (PREMIX) **OR** magnesium sulfate  •  melatonin  •  nitroglycerin  •  OLANZapine  •  ondansetron **OR** ondansetron  •  potassium chloride  •  potassium chloride  •  sodium chloride  •  traMADol   Allergies:  Allopurinol, Clindamycin hcl, Codeine, Furosemide, Hydrochlorothiazide, Naproxen, and Sulfa antibiotics    Subjective     ROS:  Review of Systems   Unable to perform ROS: Acuity of condition   Constitutional: Negative for chills, fatigue and fever.   HENT: Negative for mouth sores and nosebleeds.    Eyes: Negative for photophobia and visual disturbance.   Respiratory: Negative for cough and shortness of breath.    Cardiovascular: Positive for leg swelling. Negative for chest pain.   Gastrointestinal: Negative for nausea and vomiting.   Endocrine: Negative for cold intolerance and heat intolerance.   Genitourinary: Negative for frequency and hematuria.   Musculoskeletal: Negative for arthralgias and myalgias.   Skin: Positive for color change.   Neurological: Negative for facial asymmetry and speech difficulty.        Objective   Vital Signs:   /41   Pulse 72   Temp 98.9 °F (37.2 °C)   Resp 18   Ht 157.5 cm (62\")  "  Wt 82.5 kg (181 lb 14.1 oz)   SpO2 93%   BMI 33.27 kg/m²     Physical Exam: (performed by MD)  Physical Exam  Vitals signs and nursing note reviewed.   Constitutional:       General: She is not in acute distress.     Appearance: She is obese. She is ill-appearing. She is not diaphoretic.   HENT:      Head: Normocephalic and atraumatic.   Eyes:      General: No scleral icterus.        Right eye: No discharge.         Left eye: No discharge.      Conjunctiva/sclera: Conjunctivae normal.   Neck:      Musculoskeletal: Normal range of motion and neck supple.      Thyroid: No thyromegaly.   Cardiovascular:      Rate and Rhythm: Normal rate and regular rhythm.      Heart sounds: Normal heart sounds. No friction rub. No gallop.    Pulmonary:      Effort: Pulmonary effort is normal. No respiratory distress.      Breath sounds: No stridor. No wheezing.   Abdominal:      General: Bowel sounds are normal.      Palpations: Abdomen is soft. There is no mass.      Tenderness: There is no abdominal tenderness. There is no guarding or rebound.   Musculoskeletal: Normal range of motion.         General: No tenderness.      Right lower leg: Edema present.      Left lower leg: Edema present.   Lymphadenopathy:      Cervical: No cervical adenopathy.   Skin:     General: Skin is warm.      Findings: No erythema or rash.   Neurological:      General: No focal deficit present.      Mental Status: She is alert and oriented to person, place, and time.      Motor: No abnormal muscle tone.   Psychiatric:         Mood and Affect: Mood normal.         Behavior: Behavior normal.         Results Review:  Lab Results (last 48 hours)     Procedure Component Value Units Date/Time    Cortisol [[id number]] Collected: 02/11/21 0938    Specimen: Blood Updated: 02/11/21 0943    Cortisol [[id number]] Collected: 02/11/21 0848    Specimen: Blood Updated: 02/11/21 0938     Cortisol 15.09 mcg/dL     Narrative:      Cortisol Reference Ranges:    Cortisol 6AM  - 10AM Range: 6.02-18.40 mcg/dl  Cortisol 4PM - 8PM Range: 2.68-10.50 mcg/dl      Results may be falsely increased if patient taking Biotin.      POC Glucose Once [[id number]]  (Normal) Collected: 02/11/21 0711    Specimen: Blood Updated: 02/11/21 0712     Glucose 73 mg/dL      Comment: Serial Number: [id number]:  [id number]       CBC & Differential [[id number]]  (Abnormal) Collected: 02/11/21 0027    Specimen: Blood Updated: 02/11/21 0659    Narrative:      The following orders were created for panel order CBC & Differential.  Procedure                               Abnormality         Status                     ---------                               -----------         ------                     Scan Slide[[id number]]                                       Edited Result - FINAL      CBC Auto Differential[[id number]]        Abnormal            Edited Result - FINAL        Please view results for these tests on the individual orders.    CBC Auto Differential [[id number]]  (Abnormal) Collected: 02/11/21 0027    Specimen: Blood Updated: 02/11/21 0659     WBC 12.00 10*3/mm3      RBC 2.86 10*6/mm3      Hemoglobin 9.0 g/dL      Hematocrit 27.8 %      MCV 97.5 fL      MCH 31.6 pg      MCHC 32.4 g/dL      RDW 16.6 %      RDW-SD 57.8 fl      MPV 9.2 fL      Platelets 92 10*3/mm3      Comment: Platelet estimate performed due to platelet clumping.   Modified report. Previous result was 35 10*3/mm3 on 2/11/2021 at 0548 EST.       Narrative:      The previously reported component NRBC is no longer being reported. Previous result was 0.1 /100 WBC (Reference Range: 0.0-0.2 /100 WBC) on 2/11/2021 at 0548 EST.    Scan Slide [[id number]] Collected: 02/11/21 0027    Specimen: Blood Updated: 02/11/21 0659     Scan Slide --     Comment: See Manual Differential Results       Manual Differential [[id number]]  (Abnormal) Collected: 02/11/21 0027    Specimen: Blood Updated: 02/11/21 0659     Neutrophil % 67.0 %      Lymphocyte % 1.0 %       Monocyte % 3.0 %      Bands %  28.0 %      Metamyelocyte % 1.0 %      Neutrophils Absolute 11.40 10*3/mm3      Lymphocytes Absolute 0.12 10*3/mm3      Monocytes Absolute 0.36 10*3/mm3      Anisocytosis Slight/1+     Poikilocytes Slight/1+     WBC Morphology Normal     Platelet Estimate Decreased     Clumped Platelets Present    Magnesium [[id number]]  (Normal) Collected: 02/11/21 0027    Specimen: Blood Updated: 02/11/21 0538     Magnesium 2.0 mg/dL     Basic Metabolic Panel [[id number]]  (Abnormal) Collected: 02/11/21 0027    Specimen: Blood Updated: 02/11/21 0531     Glucose 314 mg/dL      BUN 95 mg/dL      Creatinine 3.26 mg/dL      Sodium 135 mmol/L      Potassium 3.7 mmol/L      Chloride 100 mmol/L      CO2 23.0 mmol/L      Calcium 7.1 mg/dL      eGFR   Amer --     Comment: <15 Indicative of kidney failure.        eGFR Non African Amer 14 mL/min/1.73      Comment: <15 Indicative of kidney failure.        BUN/Creatinine Ratio 29.1     Anion Gap 12.0 mmol/L     Narrative:      GFR Normal >60  Chronic Kidney Disease <60  Kidney Failure <15      POC Glucose Once [[id number]]  (Normal) Collected: 02/11/21 0245    Specimen: Blood Updated: 02/11/21 0246     Glucose 86 mg/dL      Comment: Serial Number: [id number]:  [id number]       POC Glucose Once [[id number]]  (Normal) Collected: 02/10/21 2024    Specimen: Blood Updated: 02/10/21 2025     Glucose 78 mg/dL      Comment: Serial Number: [id number]:  [id number]       Urinalysis, Microscopic Only - Urine, Random Void [[id number]]  (Abnormal) Collected: 02/10/21 1616    Specimen: Urine, Random Void Updated: 02/10/21 1815     RBC, UA 3-5 /HPF      WBC, UA 6-12 /HPF      Bacteria, UA Trace /HPF      Squamous Epithelial Cells, UA 3-6 /HPF      Yeast, UA       Moderate/2+ Budding Yeast w/Hyphae     /HPF     Hyaline Casts, UA 0-2 /LPF      Methodology Manual Light Microscopy    Urine Culture - Urine, Urine, Random Void [[id number]] Collected: 02/10/21 1616     Specimen: Urine, Random Void Updated: 02/10/21 1815    Urinalysis With Culture If Indicated - Urine, Random Void [[id number]]  (Abnormal) Collected: 02/10/21 1616    Specimen: Urine, Random Void Updated: 02/10/21 1802     Color, UA Yellow     Comment: Result checked         Appearance, UA Turbid     Comment: Result checked         pH, UA <=5.0     Specific Gravity, UA 1.014     Glucose, UA Negative     Ketones, UA Negative     Bilirubin, UA Negative     Blood, UA Large (3+)     Protein, UA 30 mg/dL (1+)     Leuk Esterase, UA Large (3+)     Nitrite, UA Negative     Urobilinogen, UA 0.2 E.U./dL    POC Glucose Once [[id number]]  (Normal) Collected: 02/10/21 1755    Specimen: Blood Updated: 02/10/21 1756     Glucose 95 mg/dL      Comment: Serial Number: [id number]:  [id number]       Blood Culture - Blood, Arm, Right [[id number]] Collected: 02/06/21 1307    Specimen: Blood from Arm, Right Updated: 02/10/21 1431     Blood Culture No growth at 4 days    Blood Culture - Blood, Arm, Left [[id number]] Collected: 02/06/21 1309    Specimen: Blood from Arm, Left Updated: 02/10/21 1431     Blood Culture No growth at 4 days    POC Glucose Once [[id number]]  (Normal) Collected: 02/10/21 1118    Specimen: Blood Updated: 02/10/21 1124     Glucose 88 mg/dL      Comment: Serial Number: [id number]:  [id number]       POC Glucose Once [[id number]]  (Normal) Collected: 02/10/21 0809    Specimen: Blood Updated: 02/10/21 0810     Glucose 80 mg/dL      Comment: Serial Number: [id number]:  [id number]       CBC & Differential [[id number]]  (Abnormal) Collected: 02/10/21 0547    Specimen: Blood Updated: 02/10/21 0750    Narrative:      The following orders were created for panel order CBC & Differential.  Procedure                               Abnormality         Status                     ---------                               -----------         ------                     Scan Slide[[id number]]                                        Final result               CBC Auto Differential[[id number]]        Abnormal            Final result                 Please view results for these tests on the individual orders.    CBC Auto Differential [[id number]]  (Abnormal) Collected: 02/10/21 0547    Specimen: Blood Updated: 02/10/21 0750     WBC 10.30 10*3/mm3      RBC 2.85 10*6/mm3      Hemoglobin 9.0 g/dL      Hematocrit 27.2 %      MCV 95.4 fL      MCH 31.6 pg      MCHC 33.2 g/dL      RDW 16.1 %      RDW-SD 54.3 fl      MPV 10.2 fL      Platelets 35 10*3/mm3     Narrative:      The previously reported component NRBC is no longer being reported. Previous result was 0.1 /100 WBC (Reference Range: 0.0-0.2 /100 WBC) on 2/10/2021 at 0627 EST.    Scan Slide [[id number]] Collected: 02/10/21 0547    Specimen: Blood Updated: 02/10/21 0750     Scan Slide --     Comment: See Manual Differential Results       Manual Differential [[id number]]  (Abnormal) Collected: 02/10/21 0547    Specimen: Blood Updated: 02/10/21 0750     Neutrophil % 64.0 %      Lymphocyte % 3.0 %      Monocyte % 3.0 %      Eosinophil % 2.0 %      Bands %  28.0 %      Neutrophils Absolute 9.48 10*3/mm3      Lymphocytes Absolute 0.31 10*3/mm3      Monocytes Absolute 0.31 10*3/mm3      Eosinophils Absolute 0.21 10*3/mm3      Anisocytosis Slight/1+     WBC Morphology Normal     Platelet Estimate Adequate     Clumped Platelets Present     Large Platelets Slight/1+    Narrative:      Manual platelet count performed. 02.10.2021 tc      POC Glucose Once [[id number]]  (Abnormal) Collected: 02/10/21 0743    Specimen: Blood Updated: 02/10/21 0747     Glucose 62 mg/dL      Comment: Serial Number: [id number]:  [id number]       POC Glucose Once [[id number]]  (Abnormal) Collected: 02/10/21 0720    Specimen: Blood Updated: 02/10/21 0723     Glucose 59 mg/dL      Comment: Serial Number: [id number]:  [id number]       Comprehensive Metabolic Panel [[id number]]  (Abnormal) Collected: 02/10/21 0547    Specimen:  Blood Updated: 02/10/21 0648     Glucose 92 mg/dL      BUN 95 mg/dL      Creatinine 2.98 mg/dL      Sodium 138 mmol/L      Potassium 3.7 mmol/L      Chloride 104 mmol/L      CO2 24.0 mmol/L      Calcium 7.0 mg/dL      Total Protein 5.0 g/dL      Albumin 2.10 g/dL      ALT (SGPT) 27 U/L      AST (SGOT) 81 U/L      Alkaline Phosphatase 396 U/L      Total Bilirubin 1.4 mg/dL      eGFR Non African Amer 15 mL/min/1.73      Globulin 2.9 gm/dL      A/G Ratio 0.7 g/dL      BUN/Creatinine Ratio 31.9     Anion Gap 10.0 mmol/L     Narrative:      GFR Normal >60  Chronic Kidney Disease <60  Kidney Failure <15      Magnesium [[id number]]  (Abnormal) Collected: 02/10/21 0547    Specimen: Blood Updated: 02/10/21 0647     Magnesium 1.4 mg/dL     Urinalysis, Microscopic Only - Urine, Catheter [[id number]]  (Abnormal) Collected: 02/10/21 0306    Specimen: Urine, Catheter Updated: 02/10/21 0417     RBC, UA 6-12 /HPF      WBC, UA 6-12 /HPF      Bacteria, UA Trace /HPF      Squamous Epithelial Cells, UA 3-6 /HPF      Yeast, UA       Moderate/2+ Budding Yeast w/Hyphae     /HPF     Hyaline Casts, UA 0-2 /LPF      Methodology Manual Light Microscopy    Urine Culture - Urine, Urine, Catheter [[id number]] Collected: 02/10/21 0306    Specimen: Urine, Catheter Updated: 02/10/21 0417    Urinalysis With Culture If Indicated - Urine, Catheter [[id number]]  (Abnormal) Collected: 02/10/21 0306    Specimen: Urine, Catheter Updated: 02/10/21 0414     Color, UA Yellow     Appearance, UA Hazy     Comment: Result checked         pH, UA <=5.0     Specific Gravity, UA 1.009     Glucose, UA Negative     Ketones, UA Negative     Bilirubin, UA Negative     Blood, UA Trace     Protein, UA Negative     Leuk Esterase, UA Small (1+)     Nitrite, UA Negative     Urobilinogen, UA 0.2 E.U./dL    POC Glucose Once [[id number]]  (Normal) Collected: 02/10/21 0020    Specimen: Blood Updated: 02/10/21 0021     Glucose 91 mg/dL      Comment: Serial Number:  [id number]:  [id number]       Vitamin B12 [[id number]]  (Abnormal) Collected: 02/09/21 1540    Specimen: Blood Updated: 02/09/21 2007     Vitamin B-12 >2,000 pg/mL     Narrative:      Results may be falsely increased if patient taking Biotin.      Folate [[id number]]  (Normal) Collected: 02/09/21 1540    Specimen: Blood Updated: 02/09/21 2007     Folate 10.90 ng/mL     Narrative:      Results may be falsely increased if patient taking Biotin.      POC Glucose Once [[id number]]  (Abnormal) Collected: 02/09/21 1729    Specimen: Blood Updated: 02/09/21 1730     Glucose 147 mg/dL      Comment: Serial Number: [id number]:  [id number]       POC Glucose Once [[id number]]  (Abnormal) Collected: 02/09/21 1659    Specimen: Blood Updated: 02/09/21 1700     Glucose 69 mg/dL      Comment: Serial Number: [id number]:  [id number]       Basic Metabolic Panel [[id number]]  (Abnormal) Collected: 02/09/21 1540    Specimen: Blood Updated: 02/09/21 1627     Glucose 68 mg/dL      BUN 92 mg/dL      Creatinine 3.09 mg/dL      Sodium 138 mmol/L      Potassium 3.9 mmol/L      Comment: Slight hemolysis detected by analyzer. Results may be affected.        Chloride 105 mmol/L      CO2 20.0 mmol/L      Calcium 6.9 mg/dL      eGFR Non African Amer 15 mL/min/1.73      BUN/Creatinine Ratio 29.8     Anion Gap 13.0 mmol/L     Narrative:      GFR Normal >60  Chronic Kidney Disease <60  Kidney Failure <15      Heparin-induced Platelet Antibody [[id number]] Collected: 02/09/21 1540    Specimen: Blood Updated: 02/09/21 1557         Pending Results: Ferritin, iron profile, haptoglobin, reticulocytes, LDH, SPEP + IFX, fibrinogen, PTT, PT/INR, LÁZARO, peripheral smear, heparin induced platelet antibody    Imaging Reviewed:   Xr Chest 1 View    Result Date: 2/7/2021  1. New right-sided PICC line with tip terminating over the low SVC. 2. Stable cardiomegaly.  Electronically Signed By-Dae Auguste MD On:2/7/2021 10:45 AM This report was finalized  on [id number] by  Dae Auguste MD.    Xr Chest 1 View    Result Date: 2/6/2021  1.Cardiomegaly  Electronically Signed By-Azar Alarcon MD On:2/6/2021 8:01 AM This report was finalized on [id number] by  Azar Alarcon MD.           Assessment/Plan   ASSESSMENT  1. Thrombocytopenia: Thrombocytopenia seems to be multifactorial due to bone marrow suppression from acute illness, or medication effect  .  Folate 10.90, vitamin B12 >2000.  HIT antibody pending will complete work-up.  2. Anemia: Noted history of anemia.  Consider relation to anemia of chronic disease and CKD.  Will obtain further work-up.  Patient currently receiving p.o. vitamin B12 and folic acid.  3. Leukocytosis: Likely related to cellulitis and UTI.  4. Cellulitis of bilateral lower extremities: ID consulted.  Blood cultures also drawn- NGTD. On IV antibiotics.   5. UTI: On IV antibiotics.   6. CONG/CKD: Nephrology consulted.   7. CAD/atrial fibrillation/HTN/HLD/gout: Managed per primary team    PLAN  1. CBC daily  2. Anemia and thrombocytopenia work-up ordered  3. We will transfuse platelets if less than 15 K  4. Continue IV antibiotics   5. Further recommendations per Dr. Nathan    Electronically signed by CHRISTIANO Lomeli, 02/11/21, 10:38 AM EST.       I personally reviewed all pertinent labs, related documentation and the  imaging. ROS performed and physical exam done during face to face enounter with patient. I agree with  nurse practitioner's doumentation, assessment and plan.  Patient is reactive leukocytosis due to acute illness.  Also has anemia from acute illness/CKD.  Patient with acute illness from cellulitis of bilateral lower extremities, UTI, and thrombocytopenia, HIT antibody pending.  At this point, treat underlying acute illness.  Monitor platelet count.  Transfuse platelets if less than 15,000.  Will have to consider changing antibiotics if platelets even drop further.  I do not suspect a bone marrow etiology.  Thank you for  this consult. We will be happy to follow along with you.       Electronically signed by Kevin Nathan MD, 02/12/21, 4:44 PM EST.

## 2021-02-11 NOTE — PROGRESS NOTES
Infectious Diseases Progress Note      LOS: 3 days   Patient Care Team:  Rosa M Chavez APRN as PCP - General (Nurse Practitioner)    Chief Complaint: Lower extremities edema    Subjective     Patient had no fever during the last 24 hours.  Patient did have a temperature as low as 94.7 degrees last evening.  The patient remained hemodynamically stable.  The patient is currently on dopamine  Review of Systems:   Review of Systems   Constitutional: Negative.    HENT: Negative.    Eyes: Negative.    Respiratory: Negative.    Cardiovascular: Positive for leg swelling.   Gastrointestinal: Negative.    Genitourinary: Negative.    Musculoskeletal: Negative.         Bilateral leg pain   Skin: Negative.    Neurological: Negative.    Hematological: Negative.    Psychiatric/Behavioral: Negative.         Objective     Vital Signs  Temp:  [96.9 °F (36.1 °C)-98.9 °F (37.2 °C)] 97.4 °F (36.3 °C)  Heart Rate:  [60-72] 60  Resp:  [16-20] 18  BP: ()/(41-71) 149/60    Physical Exam:  Physical Exam  Vitals signs and nursing note reviewed.   Constitutional:       Appearance: She is well-developed.   HENT:      Head: Normocephalic and atraumatic.   Eyes:      Pupils: Pupils are equal, round, and reactive to light.   Neck:      Musculoskeletal: Normal range of motion and neck supple.   Cardiovascular:      Rate and Rhythm: Normal rate and regular rhythm.      Heart sounds: Normal heart sounds.   Pulmonary:      Effort: Pulmonary effort is normal. No respiratory distress.      Breath sounds: Normal breath sounds. No wheezing or rales.   Abdominal:      General: Bowel sounds are normal. There is no distension.      Palpations: Abdomen is soft. There is no mass.      Tenderness: There is no abdominal tenderness. There is no guarding or rebound.   Musculoskeletal: Normal range of motion.         General: No deformity.      Right lower leg: Edema present.      Left lower leg: Edema present.      Comments: Superimposed erythema of  both lower extremities more on the left than the right  -Erythema is improving slowly   Skin:     General: Skin is warm.      Findings: No erythema or rash.   Neurological:      Mental Status: She is alert and oriented to person, place, and time.      Cranial Nerves: No cranial nerve deficit.          Results Review:    I have reviewed all clinical data, test, lab, and imaging results.     Radiology  Xr Chest 1 View    Result Date: 2/11/2021  EXAMINATION: XR CHEST 1 VW-  DATE OF EXAM: 2/11/2021 2:07 PM  INDICATION: Shortness of breath.  Shortness of air  COMPARISON: Chest radiograph dated 2/7/2021  TECHNIQUE: Portable AP view of the chest was obtained.  FINDINGS: There is a right-sided PICC line with tip terminating over the upper SVC. There is a single lead left chest wall ICD in unchanged position. There is cardiomegaly without interstitial pulmonary edema. There is no pleural effusion or pneumothorax. There are degenerative changes of the thoracic spine.      1. Cardiomegaly without acute pulmonary process. No change from prior exam.  Electronically Signed By-Dae Auguste MD On:2/11/2021 2:18 PM This report was finalized on [id number] by  Dae Auguste MD.      Cardiology    Laboratory    Results from last 7 days   Lab Units 02/11/21  0027 02/10/21  0547 02/09/21  0524 02/08/21  0529 02/07/21  1305 02/06/21  1904 02/06/21  0311 02/06/21  0045   WBC 10*3/mm3 12.00* 10.30 14.40* 18.60* 25.00*  --  10.70 7.10   HEMOGLOBIN g/dL 9.0* 9.0* 9.5* 9.6* 10.1*  --  10.1* 10.5*   HEMOGLOBIN, POC g/dL  --   --   --   --   --  9.9*  --   --    HEMATOCRIT % 27.8* 27.2* 29.6* 29.1* 31.3*  --  31.7* 32.5*   HEMATOCRIT POC %  --   --   --   --   --  29*  --   --    PLATELETS 10*3/mm3 92* 35* 54* 34* 82*  --  72* 86*     Results from last 7 days   Lab Units 02/11/21  0027 02/10/21  0547 02/09/21  1540 02/09/21  0524 02/08/21  0529 02/07/21  1305 02/06/21  2247 02/06/21  1905   SODIUM mmol/L 135* 138 138 138 140 140 139  139   POTASSIUM mmol/L 3.7 3.7 3.9 3.3* 4.3 4.5 5.1 4.5   CHLORIDE mmol/L 100 104 105 101 104 109* 112* 114*   CO2 mmol/L 23.0 24.0 20.0* 23.0 22.0 18.0* 14.0* 12.0*   BUN mg/dL 95* 95* 92* 98* 98* 87* 80* 76*   CREATININE mg/dL 3.26* 2.98* 3.09* 3.38* 3.39* 3.50* 3.35* 3.28*   GLUCOSE mg/dL 314* 92 68 119* 109* 87 100* 91   ALBUMIN g/dL  --  2.10*  --   --   --  2.50* 2.40* 2.40*   BILIRUBIN mg/dL  --  1.4*  --   --   --  0.6 0.4 0.4   ALK PHOS U/L  --  396*  --   --   --  135* 109 100   AST (SGOT) U/L  --  81*  --   --   --  30 32 27   ALT (SGPT) U/L  --  27  --   --   --  12 10 9   CALCIUM mg/dL 7.1* 7.0* 6.9* 6.8* 6.9* 7.1* 7.3* 7.3*     Results from last 7 days   Lab Units 02/06/21  1708   CK TOTAL U/L 225*             Microbiology   Microbiology Results (last 10 days)     Procedure Component Value - Date/Time    Urine Culture - Urine, Urine, Random Void [[id number]]  (Abnormal) Collected: 02/10/21 1616    Lab Status: Final result Specimen: Urine, Random Void Updated: 02/11/21 1234     Urine Culture Yeast isolated     Comment: No further workup.       Urine Culture - Urine, Urine, Catheter [[id number]]  (Abnormal) Collected: 02/10/21 0306    Lab Status: Final result Specimen: Urine, Catheter Updated: 02/11/21 1234     Urine Culture Yeast isolated     Comment: No further workup.       Clostridium Difficile Toxin - Stool, Per Rectum [[id number]]  (Normal) Collected: 02/06/21 1952    Lab Status: Final result Specimen: Stool from Per Rectum Updated: 02/07/21 0745    Narrative:      The following orders were created for panel order Clostridium Difficile Toxin - Stool, Per Rectum.  Procedure                               Abnormality         Status                     ---------                               -----------         ------                     Clostridium Difficile EI...[[id number]]  Normal              Final result                 Please view results for these tests on the individual orders.    Clostridium  Difficile EIA - Stool, Per Rectum [[id number]]  (Normal) Collected: 02/06/21 1952    Lab Status: Final result Specimen: Stool from Per Rectum Updated: 02/07/21 0725     C Diff GDH / Toxin Negative    Blood Culture - Blood, Arm, Left [[id number]] Collected: 02/06/21 1309    Lab Status: Final result Specimen: Blood from Arm, Left Updated: 02/11/21 1431     Blood Culture No growth at 5 days    Blood Culture - Blood, Arm, Right [[id number]] Collected: 02/06/21 1307    Lab Status: Final result Specimen: Blood from Arm, Right Updated: 02/11/21 1431     Blood Culture No growth at 5 days    MRSA Screen, PCR (Inpatient) - Swab, Nares [[id number]]  (Abnormal) Collected: 02/06/21 1114    Lab Status: Final result Specimen: Swab from Nares Updated: 02/06/21 1311     MRSA PCR MRSA Detected    COVID PRE-OP / PRE-PROCEDURE SCREENING ORDER (NO ISOLATION) - Swab, Nasopharynx [[id number]]  (Normal) Collected: 02/06/21 0153    Lab Status: Final result Specimen: Swab from Nasopharynx Updated: 02/06/21 1534    Narrative:      The following orders were created for panel order COVID PRE-OP / PRE-PROCEDURE SCREENING ORDER (NO ISOLATION) - Swab, Nasopharynx.  Procedure                               Abnormality         Status                     ---------                               -----------         ------                     COVID-19,APTIMA PANTHER,...[[id number]]  Normal              Final result                 Please view results for these tests on the individual orders.    COVID-19,APTIMA PANTHER,[facility] IN-HOUSE, NP/OP SWAB IN UTM/VTM/[facility] TRANSPORT MEDIA,24 HR TAT - Swab, Nasopharynx [[id number]]  (Normal) Collected: 02/06/21 0153    Lab Status: Final result Specimen: Swab from Nasopharynx Updated: 02/06/21 1534     COVID19 Not Detected    Narrative:      Fact sheet for providers: https://www.fda.gov/media/522096/download     Fact sheet for patients: https://www.fda.gov/media/030873/download    Test performed by RT PCR.          Medication  Review:       Schedule Meds  albumin human, 12.5 g, Intravenous, Q6H  aspirin, 81 mg, Oral, Daily  bumetanide, 2 mg, Intravenous, Q6H  ceftaroline, 200 mg, Intravenous, Q12H  febuxostat, 40 mg, Oral, BID  folic acid, 1 mg, Oral, Daily  gabapentin, 100 mg, Oral, TID  haloperidol lactate, 5 mg, Intramuscular, Once  magic butt paste, , Topical, Daily  midodrine, 10 mg, Oral, TID AC  nystatin, 5 mL, Swish & Swallow, 4x Daily  sodium chloride, 10 mL, Intravenous, Q12H  vitamin B-12, 500 mcg, Oral, Daily  Zinc Oxide, , Apply externally, BID        Infusion Meds  dextrose, 20 mL/hr, Last Rate: 20 mL/hr (02/09/21 1827)  DOPamine, 2-20 mcg/kg/min, Last Rate: 2 mcg/kg/min (02/11/21 1234)        PRN Meds  •  acetaminophen **OR** acetaminophen **OR** acetaminophen  •  dextrose  •  dextrose  •  glucagon (human recombinant)  •  HYDROcodone-acetaminophen  •  magnesium sulfate **OR** magnesium sulfate in D5W 1g/100mL (PREMIX) **OR** magnesium sulfate  •  melatonin  •  nitroglycerin  •  OLANZapine  •  ondansetron **OR** ondansetron  •  potassium chloride  •  potassium chloride  •  sodium chloride  •  traMADol        Assessment/Plan       Antimicrobial Therapy   1.  IV Teflaro      day  2.      Day  3.      Day  4.      Day  5.      Day    Assessment     Bilateral lower extremities lymphedema with erythema of the right leg consistent with cellulitis.  Erythema had improved     Acute kidney injury     Leukocytosis-likely related to severe cellulitis.  Significantly improved    Congestive heart failure     History of pacemaker implantation    Hypothermia-appears to have resolved    Candiduria.  Patient s/p replacement of Ayon catheter on February 10, 2021 and repeat urinalysis was positive for candiduria     Plan     Discontinue IV Teflaro  Start doxycycline 100 mg p.o. twice daily for 7 days  Start Diflucan 100 mg p.o. daily for 7 days  Continue supportive care  A.m. labs        Kurt Diehl MD  02/11/21  17:14 EST      Note is  dictated utilizing voice recognition software/Dragon

## 2021-02-11 NOTE — PLAN OF CARE
Goal Outcome Evaluation:  Plan of Care Reviewed With: patient  Progress: improving  Outcome Summary: Pt. demonstrates improved functional mobility w/ min-mod A x 2 SPS transfer EOB to armchair, albeit progress limited secondary to decreased standing tolerance w/ increased BLE pain this date. Recommend IP rehab at d/c to address aforementioned deficits.    PPE: gloves, mask ,face shield

## 2021-02-11 NOTE — PROGRESS NOTES
PROGRESS NOTE      Patient Name: Emperatriz Childs  : 1945  MRN: [id number]  Primary Care Physician: Rosa M Chavez APRN  Date of admission: 2021    Patient Care Team:  Rosa M Chavez APRN as PCP - General (Nurse Practitioner)        Subjective   Subjective:     Acute kidney injury, patient is still swollen not feeling good, no significant shortness of breath  Review of systems:  All other review of system unremarkable      Allergies:    Allergies   Allergen Reactions   • Allopurinol Unknown - High Severity   • Clindamycin Hcl Unknown - High Severity   • Codeine Unknown - High Severity   • Furosemide Unknown - High Severity   • Hydrochlorothiazide Unknown - High Severity   • Naproxen Unknown - High Severity   • Sulfa Antibiotics Unknown - High Severity       Objective   Exam:     Vital Signs  Temp:  [96.9 °F (36.1 °C)-98.9 °F (37.2 °C)] 98 °F (36.7 °C)  Heart Rate:  [60-72] 62  Resp:  [16-20] 16  BP: ()/(41-71) 118/41  SpO2:  [92 %-98 %] 92 %  on   ;   Device (Oxygen Therapy): room air  Body mass index is 33.27 kg/m².    General: Elderly  female in no acute distress.    Head:      Normocephalic and atraumatic.    Eyes:      PERRL/EOM intact, conjunctiva and sclera clear with out nystagmus.    Neck:      No masses, thyromegaly,  trachea central with normal respiratory effort   Lungs:    Clear bilaterally to auscultation.    Heart:      Regular rate and rhythm, no murmur no gallop  Abd:        Soft, nontender, not distended, bowel sounds positive, no shifting dullness   Pulses:   Pulses palpable  Extr:        No cyanosis or clubbing--significant bilateral edema.    Neuro:    No focal deficits.   alert oriented x3  Skin:       Intact without lesions or rashes.    Psych:    Alert and cooperative; normal mood and affect; .      Results Review:  I have personally reviewed most recent Data :  CBC    Results from last 7 days   Lab Units 21  0027 02/10/21  0547 21  0524  02/08/21  0529 02/07/21  1305 02/06/21  1904 02/06/21  0311 02/06/21  0045   WBC 10*3/mm3 12.00* 10.30 14.40* 18.60* 25.00*  --  10.70 7.10   HEMOGLOBIN g/dL 9.0* 9.0* 9.5* 9.6* 10.1*  --  10.1* 10.5*   HEMOGLOBIN, POC g/dL  --   --   --   --   --  9.9*  --   --    PLATELETS 10*3/mm3 92* 35* 54* 34* 82*  --  72* 86*     CMP   Results from last 7 days   Lab Units 02/11/21  0027 02/10/21  0547 02/09/21  1540 02/09/21  0524 02/08/21  0529 02/07/21  1305 02/06/21  2247 02/06/21  1905   SODIUM mmol/L 135* 138 138 138 140 140 139 139   POTASSIUM mmol/L 3.7 3.7 3.9 3.3* 4.3 4.5 5.1 4.5   CHLORIDE mmol/L 100 104 105 101 104 109* 112* 114*   CO2 mmol/L 23.0 24.0 20.0* 23.0 22.0 18.0* 14.0* 12.0*   BUN mg/dL 95* 95* 92* 98* 98* 87* 80* 76*   CREATININE mg/dL 3.26* 2.98* 3.09* 3.38* 3.39* 3.50* 3.35* 3.28*   GLUCOSE mg/dL 314* 92 68 119* 109* 87 100* 91   ALBUMIN g/dL  --  2.10*  --   --   --  2.50* 2.40* 2.40*   BILIRUBIN mg/dL  --  1.4*  --   --   --  0.6 0.4 0.4   ALK PHOS U/L  --  396*  --   --   --  135* 109 100   AST (SGOT) U/L  --  81*  --   --   --  30 32 27   ALT (SGPT) U/L  --  27  --   --   --  12 10 9     ABG    Results from last 7 days   Lab Units 02/06/21  1904   PH, ARTERIAL pH units 7.337*   PCO2, ARTERIAL mm Hg 26.4*   PO2 ART mm Hg 79.5*   O2 SATURATION ART % 95.2   BASE EXCESS ART mmol/L -10.4*     Xr Chest 1 View    Result Date: 2/7/2021  1. New right-sided PICC line with tip terminating over the low SVC. 2. Stable cardiomegaly.  Electronically Signed By-Dae Auguste MD On:2/7/2021 10:45 AM This report was finalized on [id number] by  Dae Auguste MD.    Xr Chest 1 View    Result Date: 2/6/2021  1.Cardiomegaly  Electronically Signed By-Azar Alarcon MD On:2/6/2021 8:01 AM This report was finalized on [id number] by  Azar Alarcon MD.      Results for orders placed during the hospital encounter of 02/05/21   Adult Transthoracic Echo Complete W/ Cont if Necessary Per Protocol    Narrative Normal  LV size and contractility EF of 55%  Moderate right ventricular enlargement with severe right atrial   enlargement, catheter probably pacemaker lead seen.  Severe left atrial enlargement seen.  Aortic valve, mitral valve, tricuspid valve appears structurally normal,   mild MR, AR, seen.  There is moderate  tricuspid regurgitation seen.    Calculated RV systolic pressure is 24mmHg.  No pericardial effusion seen.  Proximal aorta appears normal in size.     Scheduled Meds:aspirin, 81 mg, Oral, Daily  bumetanide, 2 mg, Intravenous, Q6H  ceftaroline, 200 mg, Intravenous, Q12H  febuxostat, 40 mg, Oral, BID  folic acid, 1 mg, Oral, Daily  gabapentin, 100 mg, Oral, TID  haloperidol lactate, 5 mg, Intramuscular, Once  magic butt paste, , Topical, Daily  midodrine, 10 mg, Oral, TID AC  nystatin, 5 mL, Swish & Swallow, 4x Daily  sodium chloride, 10 mL, Intravenous, Q12H  vitamin B-12, 500 mcg, Oral, Daily  Zinc Oxide, , Apply externally, BID      Continuous Infusions:dextrose, 20 mL/hr, Last Rate: 20 mL/hr (02/09/21 1827)  DOPamine, 2-20 mcg/kg/min, Last Rate: 2 mcg/kg/min (02/09/21 2222)      PRN Meds:•  acetaminophen **OR** acetaminophen **OR** acetaminophen  •  dextrose  •  dextrose  •  glucagon (human recombinant)  •  HYDROcodone-acetaminophen  •  magnesium sulfate **OR** magnesium sulfate in D5W 1g/100mL (PREMIX) **OR** magnesium sulfate  •  melatonin  •  nitroglycerin  •  OLANZapine  •  ondansetron **OR** ondansetron  •  potassium chloride  •  potassium chloride  •  sodium chloride  •  traMADol    Assessment/Plan   Assessment and Plan:         Congestive heart failure (CMS/HCC)    Atrial fibrillation (CMS/HCC)    Hypertension, benign    Lymphedema    Gout    Non-pressure chronic ulcer right ankle, limited to breakdown skin (CMS/HCC)    Automatic implantable cardiac defibrillator in situ    Acute UTI (urinary tract infection)    Lower extremity cellulitis    CAD (coronary artery disease)    Sepsis (CMS/HCC)    CONG (acute  kidney injury) (CMS/[facility])    Lactic acidosis    Metabolic acidosis    Delirium, acute    ASSESSMENT:  · Acute kidney injury, oliguric, stage 2-3, evolving, ongoing hypotension, ? Sepsis, and prerenal azotemia component with ongoing diarrhea, diuretics use,   ·  might have some degree of progression of CKD too.   · CKD 4, with baseline creatinine around 1.7- 2.2, till last year,02/2020, never followed up in clinic. Work up then  ua negative RBC,WBC,no protein. UPC, was unremarkable in 02/2020 USG, right 8.9 cm, andleft 8.3 cm, medial renal disease.  · Metabolic acidosis, gap and non gap, more due to GI bicarb loss, CKD and persistent lactic acidosis. patient apparently had large bowel movement, significant metabolic acidosis  · Hypotension, concern hypovolemia,   · Congestive heart failure with last ejection fraction around 40% from 2018,   · History of atrial fibrillation  · Significant metabolic acidosis  · Significant anemia  · Chronic lymphedema  · Cellulitis of lower extremities.   · Thrombocytopenia, also on 02/2020  · Anemia.      Plan:        · Patient baseline creatinine 1.8-2.  Acute increase in creatinine with acute increase in volume with infection and metabolic acidosis and GI bicarbonate losses going on at this time.  · Patient renal functions significantly worse from yesterday etiology uncertain patient dopamine was stopped  · Going to repeat chest x-ray repeat labs  · To me patient seems to be still volume overloaded  · We will give patient albumin to to give patient extra oncotic pressure to improve urine output  · Clinically patient is still volume overloaded continue diuresis keep patient net negative for next 3 to 4 days.    · Discussed with Dr. Davila  · Follow-up with repeat labs  · Patient has cardiorenal syndrome at this time  · Continue dopamine drip  · Continue bliss.   · Fu sepsis work up follow-up with infectious disease  · Repeat labs,   · Decrease fluids in evening.        Note started  by  Feliz Silva MD,   [facility] kidney consultant

## 2021-02-11 NOTE — PLAN OF CARE
Goal Outcome Evaluation:         Pt creat elevated, bliss in for strict I&O. Dopamine restarted. More alert today and sat in chair for a little while. No distress noted.

## 2021-02-12 LAB
ALBUMIN SERPL ELPH-MCNC: 2 G/DL (ref 2.9–4.4)
ALBUMIN SERPL-MCNC: 2.9 G/DL (ref 3.5–5.2)
ALBUMIN/GLOB SERPL: 0.7 {RATIO} (ref 0.7–1.7)
ALBUMIN/GLOB SERPL: 1 G/DL
ALP SERPL-CCNC: 762 U/L (ref 39–117)
ALPHA1 GLOB SERPL ELPH-MCNC: 0.5 G/DL (ref 0–0.4)
ALPHA2 GLOB SERPL ELPH-MCNC: 0.7 G/DL (ref 0.4–1)
ALT SERPL W P-5'-P-CCNC: 35 U/L (ref 1–33)
AMMONIA BLD-SCNC: 480 UMOL/L (ref 11–51)
ANION GAP SERPL CALCULATED.3IONS-SCNC: 11 MMOL/L (ref 5–15)
ANION GAP SERPL CALCULATED.3IONS-SCNC: 13 MMOL/L (ref 5–15)
ANISOCYTOSIS BLD QL: ABNORMAL
AST SERPL-CCNC: 72 U/L (ref 1–32)
B-GLOBULIN SERPL ELPH-MCNC: 0.7 G/DL (ref 0.7–1.3)
BILIRUB SERPL-MCNC: 1.2 MG/DL (ref 0–1.2)
BUN SERPL-MCNC: 102 MG/DL (ref 8–23)
BUN SERPL-MCNC: 98 MG/DL (ref 8–23)
BUN/CREAT SERPL: 27.5 (ref 7–25)
BUN/CREAT SERPL: 29.5 (ref 7–25)
CALCIUM SPEC-SCNC: 7.2 MG/DL (ref 8.6–10.5)
CALCIUM SPEC-SCNC: 8 MG/DL (ref 8.6–10.5)
CHLORIDE SERPL-SCNC: 102 MMOL/L (ref 98–107)
CHLORIDE SERPL-SCNC: 99 MMOL/L (ref 98–107)
CO2 SERPL-SCNC: 24 MMOL/L (ref 22–29)
CO2 SERPL-SCNC: 25 MMOL/L (ref 22–29)
CREAT SERPL-MCNC: 3.46 MG/DL (ref 0.57–1)
CREAT SERPL-MCNC: 3.57 MG/DL (ref 0.57–1)
DEPRECATED RDW RBC AUTO: 57.3 FL (ref 37–54)
ERYTHROCYTE [DISTWIDTH] IN BLOOD BY AUTOMATED COUNT: 16.7 % (ref 12.3–15.4)
GAMMA GLOB SERPL ELPH-MCNC: 1 G/DL (ref 0.4–1.8)
GFR SERPL CREATININE-BSD FRML MDRD: 12 ML/MIN/1.73
GFR SERPL CREATININE-BSD FRML MDRD: 13 ML/MIN/1.73
GFR SERPL CREATININE-BSD FRML MDRD: ABNORMAL ML/MIN/{1.73_M2}
GFR SERPL CREATININE-BSD FRML MDRD: ABNORMAL ML/MIN/{1.73_M2}
GLOBULIN SER CALC-MCNC: 2.9 G/DL (ref 2.2–3.9)
GLOBULIN UR ELPH-MCNC: 2.8 GM/DL
GLUCOSE BLDC GLUCOMTR-MCNC: 104 MG/DL (ref 70–105)
GLUCOSE BLDC GLUCOMTR-MCNC: 105 MG/DL (ref 70–105)
GLUCOSE BLDC GLUCOMTR-MCNC: 117 MG/DL (ref 70–105)
GLUCOSE BLDC GLUCOMTR-MCNC: 142 MG/DL (ref 70–105)
GLUCOSE BLDC GLUCOMTR-MCNC: 184 MG/DL (ref 70–105)
GLUCOSE BLDC GLUCOMTR-MCNC: 238 MG/DL (ref 70–105)
GLUCOSE SERPL-MCNC: 174 MG/DL (ref 65–99)
GLUCOSE SERPL-MCNC: 220 MG/DL (ref 65–99)
HCT VFR BLD AUTO: 28.3 % (ref 34–46.6)
HGB BLD-MCNC: 9 G/DL (ref 12–15.9)
LAB AP CASE REPORT: NORMAL
LABORATORY COMMENT REPORT: ABNORMAL
LYMPHOCYTES # BLD MANUAL: 0.57 10*3/MM3 (ref 0.7–3.1)
LYMPHOCYTES NFR BLD MANUAL: 4 % (ref 19.6–45.3)
LYMPHOCYTES NFR BLD MANUAL: 5 % (ref 5–12)
M PROTEIN SERPL ELPH-MCNC: 0.1 G/DL
MAGNESIUM SERPL-MCNC: 2.1 MG/DL (ref 1.6–2.4)
MCH RBC QN AUTO: 30.9 PG (ref 26.6–33)
MCHC RBC AUTO-ENTMCNC: 31.8 G/DL (ref 31.5–35.7)
MCV RBC AUTO: 97 FL (ref 79–97)
MONOCYTES # BLD AUTO: 0.71 10*3/MM3 (ref 0.1–0.9)
NEUTROPHILS # BLD AUTO: 12.92 10*3/MM3 (ref 1.7–7)
NEUTROPHILS NFR BLD MANUAL: 74 % (ref 42.7–76)
NEUTS BAND NFR BLD MANUAL: 17 % (ref 0–5)
NT-PROBNP SERPL-MCNC: ABNORMAL PG/ML (ref 0–1800)
PATH REPORT.FINAL DX SPEC: NORMAL
PLATELET # BLD AUTO: 63 10*3/MM3 (ref 140–450)
PMV BLD AUTO: 9.4 FL (ref 6–12)
POTASSIUM SERPL-SCNC: 3.5 MMOL/L (ref 3.5–5.2)
POTASSIUM SERPL-SCNC: 3.8 MMOL/L (ref 3.5–5.2)
PROT SERPL-MCNC: 4.9 G/DL (ref 6–8.5)
PROT SERPL-MCNC: 5.7 G/DL (ref 6–8.5)
RBC # BLD AUTO: 2.92 10*6/MM3 (ref 3.77–5.28)
SCAN SLIDE: NORMAL
SMALL PLATELETS BLD QL SMEAR: ABNORMAL
SODIUM SERPL-SCNC: 136 MMOL/L (ref 136–145)
SODIUM SERPL-SCNC: 138 MMOL/L (ref 136–145)
TOXIC GRANULATION: ABNORMAL
WBC # BLD AUTO: 14.2 10*3/MM3 (ref 3.4–10.8)

## 2021-02-12 PROCEDURE — 83880 ASSAY OF NATRIURETIC PEPTIDE: CPT | Performed by: INTERNAL MEDICINE

## 2021-02-12 PROCEDURE — 97530 THERAPEUTIC ACTIVITIES: CPT

## 2021-02-12 PROCEDURE — 99232 SBSQ HOSP IP/OBS MODERATE 35: CPT | Performed by: INTERNAL MEDICINE

## 2021-02-12 PROCEDURE — 99233 SBSQ HOSP IP/OBS HIGH 50: CPT | Performed by: INTERNAL MEDICINE

## 2021-02-12 PROCEDURE — 83735 ASSAY OF MAGNESIUM: CPT | Performed by: PHYSICIAN ASSISTANT

## 2021-02-12 PROCEDURE — 82390 ASSAY OF CERULOPLASMIN: CPT | Performed by: NURSE PRACTITIONER

## 2021-02-12 PROCEDURE — 80053 COMPREHEN METABOLIC PANEL: CPT | Performed by: INTERNAL MEDICINE

## 2021-02-12 PROCEDURE — 85007 BL SMEAR W/DIFF WBC COUNT: CPT | Performed by: PHYSICIAN ASSISTANT

## 2021-02-12 PROCEDURE — 25010000002 ALBUMIN HUMAN 25% PER 50 ML: Performed by: INTERNAL MEDICINE

## 2021-02-12 PROCEDURE — 80143 DRUG ASSAY ACETAMINOPHEN: CPT | Performed by: NURSE PRACTITIONER

## 2021-02-12 PROCEDURE — 85025 COMPLETE CBC W/AUTO DIFF WBC: CPT | Performed by: PHYSICIAN ASSISTANT

## 2021-02-12 PROCEDURE — P9047 ALBUMIN (HUMAN), 25%, 50ML: HCPCS | Performed by: INTERNAL MEDICINE

## 2021-02-12 PROCEDURE — 82962 GLUCOSE BLOOD TEST: CPT

## 2021-02-12 PROCEDURE — 82140 ASSAY OF AMMONIA: CPT | Performed by: INTERNAL MEDICINE

## 2021-02-12 RX ADMIN — DOXYCYCLINE 100 MG: 100 TABLET, FILM COATED ORAL at 20:29

## 2021-02-12 RX ADMIN — HYDROCODONE BITARTRATE AND ACETAMINOPHEN 1 TABLET: 5; 325 TABLET ORAL at 08:33

## 2021-02-12 RX ADMIN — GABAPENTIN 100 MG: 100 CAPSULE ORAL at 20:30

## 2021-02-12 RX ADMIN — ALBUMIN HUMAN 12.5 G: 0.25 SOLUTION INTRAVENOUS at 08:32

## 2021-02-12 RX ADMIN — DOXYCYCLINE 100 MG: 100 TABLET, FILM COATED ORAL at 08:33

## 2021-02-12 RX ADMIN — Medication 10 ML: at 08:36

## 2021-02-12 RX ADMIN — POTASSIUM CHLORIDE 40 MEQ: 1500 TABLET, EXTENDED RELEASE ORAL at 18:00

## 2021-02-12 RX ADMIN — NYSTATIN 500000 UNITS: 100000 SUSPENSION ORAL at 20:30

## 2021-02-12 RX ADMIN — GABAPENTIN 100 MG: 100 CAPSULE ORAL at 16:27

## 2021-02-12 RX ADMIN — TRAMADOL HYDROCHLORIDE 50 MG: 50 TABLET, FILM COATED ORAL at 12:33

## 2021-02-12 RX ADMIN — CYANOCOBALAMIN TAB 250 MCG 500 MCG: 250 TAB at 08:32

## 2021-02-12 RX ADMIN — ALBUMIN HUMAN 12.5 G: 0.25 SOLUTION INTRAVENOUS at 02:47

## 2021-02-12 RX ADMIN — HYDROCODONE BITARTRATE AND ACETAMINOPHEN 1 TABLET: 5; 325 TABLET ORAL at 16:26

## 2021-02-12 RX ADMIN — NYSTATIN 500000 UNITS: 100000 SUSPENSION ORAL at 08:33

## 2021-02-12 RX ADMIN — Medication 10 ML: at 20:30

## 2021-02-12 RX ADMIN — ASPIRIN 81 MG CHEWABLE TABLET 81 MG: 81 TABLET CHEWABLE at 08:33

## 2021-02-12 RX ADMIN — FLUCONAZOLE 100 MG: 100 TABLET ORAL at 16:26

## 2021-02-12 RX ADMIN — Medication: at 08:35

## 2021-02-12 RX ADMIN — NYSTATIN 500000 UNITS: 100000 SUSPENSION ORAL at 16:25

## 2021-02-12 RX ADMIN — FOLIC ACID 1 MG: 1 TABLET ORAL at 08:32

## 2021-02-12 RX ADMIN — GABAPENTIN 100 MG: 100 CAPSULE ORAL at 08:33

## 2021-02-12 RX ADMIN — FEBUXOSTAT 40 MG: 40 TABLET, FILM COATED ORAL at 08:33

## 2021-02-12 RX ADMIN — ZINC OXIDE: 200 OINTMENT TOPICAL at 08:36

## 2021-02-12 RX ADMIN — NYSTATIN 500000 UNITS: 100000 SUSPENSION ORAL at 11:26

## 2021-02-12 RX ADMIN — Medication: at 20:30

## 2021-02-12 RX ADMIN — FEBUXOSTAT 40 MG: 40 TABLET, FILM COATED ORAL at 20:30

## 2021-02-12 NOTE — PROGRESS NOTES
Hematology/Oncology Inpatient Progress Note    PATIENT NAME: Emperatriz Childs  : 1945  MRN: [id number]    CHIEF COMPLAINT: Lymphedema    HISTORY OF PRESENT ILLNESS:    Emperatriz Childs is a 75 y.o. female who presented to [facility] on 2021 with complaints of increasing leg swelling and pain. Patient reported chronic lymphedema with erythema that has progressively gotten worse over the past several weeks. She reported associated pain with weightbearing for an extended period of time, fever, and chills.  Labs in the ED were significant for , potassium 5.3, creatinine 2.41, BUN 63, EGFR 19, lactate 3.2, hemoglobin 11.3.  Urinalysis was positive for blood, leukocytes, and nitrites.  Chest x-ray revealed cardiomegaly.  Patient was admitted for further evaluation and management.  Since admission, nephrology has been consulted for acute kidney injury.  Patient was started on antibiotics for lower extremity cellulitis and UTI. Infectious disease was also consulted.     21  Hematology/Oncology was consulted for persistent thrombocytopenia.  Platelet count on admission were 86,000.  Platelets have continued to remain low throughout admission with lowest platelet count of 34,000 on 2021.  On review of patient's chart, patient has a history of thrombocytopenia. Patient was seen by Dr. Nathan outpatient on 2018 for anemia.  Initial work-up was performed and patient was to follow up in 2 weeks, however, patient was lost to follow-up.     She  has a past medical history of CHF (congestive heart failure) (CMS/[facility]), History of transfusion, Hypertension, Lymphedema of leg, and Myocardial infarction (CMS/[facility]).     PCP: Rosa M Chavez APRN    Subjective   Patient feeling a lot better today.  Wants to go home.  Lying in bed and very weak  ROS:  Review of Systems   Constitutional: Positive for fatigue. Negative for chills and fever.   HENT: Negative for mouth sores and nosebleeds.     Eyes: Negative for photophobia, pain, redness and visual disturbance.   Respiratory: Negative for cough and shortness of breath.    Cardiovascular: Positive for leg swelling.   Gastrointestinal: Negative for diarrhea, nausea and vomiting.   Endocrine: Negative for cold intolerance and heat intolerance.   Genitourinary: Negative for hematuria.   Musculoskeletal: Negative for arthralgias and myalgias.   Skin: Positive for color change.   Neurological: Negative for facial asymmetry, speech difficulty and light-headedness.   Psychiatric/Behavioral: Negative for confusion and hallucinations.      MEDICATIONS:    Scheduled Meds:  aspirin, 81 mg, Oral, Daily  doxycycline, 100 mg, Oral, Q12H  febuxostat, 40 mg, Oral, BID  fluconazole, 100 mg, Oral, Q24H  folic acid, 1 mg, Oral, Daily  gabapentin, 100 mg, Oral, TID  haloperidol lactate, 5 mg, Intramuscular, Once  magic butt paste, , Topical, Daily  midodrine, 10 mg, Oral, TID AC  nystatin, 5 mL, Swish & Swallow, 4x Daily  sodium chloride, 10 mL, Intravenous, Q12H  vitamin B-12, 500 mcg, Oral, Daily  Zinc Oxide, , Apply externally, BID       Continuous Infusions:  bumetanide, 2 mg/hr, Last Rate: 2 mg/hr (02/12/21 0836)  dextrose, 20 mL/hr, Last Rate: 20 mL/hr (02/09/21 1827)  DOPamine, 2-20 mcg/kg/min, Last Rate: 2 mcg/kg/min (02/11/21 1234)       PRN Meds:  •  acetaminophen **OR** acetaminophen **OR** acetaminophen  •  dextrose  •  dextrose  •  glucagon (human recombinant)  •  HYDROcodone-acetaminophen  •  magnesium sulfate **OR** magnesium sulfate in D5W 1g/100mL (PREMIX) **OR** magnesium sulfate  •  melatonin  •  nitroglycerin  •  OLANZapine  •  ondansetron **OR** ondansetron  •  potassium chloride  •  potassium chloride  •  sodium chloride  •  traMADol     ALLERGIES:    Allergies   Allergen Reactions   • Allopurinol Unknown - High Severity   • Clindamycin Hcl Unknown - High Severity   • Codeine Unknown - High Severity   • Furosemide Unknown - High Severity   •  "Hydrochlorothiazide Unknown - High Severity   • Naproxen Unknown - High Severity   • Sulfa Antibiotics Unknown - High Severity       Objective    VITALS:   /51 (BP Location: Left arm, Patient Position: Lying)   Pulse 63   Temp 98.7 °F (37.1 °C) (Oral)   Resp 20   Ht 157.5 cm (62\")   Wt 83.1 kg (183 lb 3.2 oz)   SpO2 93%   BMI 33.51 kg/m²     PHYSICAL EXAM: (performed by MD)  Physical Exam  Vitals signs and nursing note reviewed.   Constitutional:       General: She is not in acute distress.     Appearance: She is obese. She is ill-appearing. She is not diaphoretic.   HENT:      Head: Normocephalic and atraumatic.   Eyes:      General: No scleral icterus.        Right eye: No discharge.         Left eye: No discharge.      Conjunctiva/sclera: Conjunctivae normal.   Neck:      Musculoskeletal: Normal range of motion and neck supple.      Thyroid: No thyromegaly.   Cardiovascular:      Rate and Rhythm: Normal rate and regular rhythm.      Heart sounds: Normal heart sounds. No friction rub. No gallop.    Pulmonary:      Effort: Pulmonary effort is normal. No respiratory distress.      Breath sounds: No stridor. No wheezing.   Abdominal:      General: Bowel sounds are normal.      Palpations: Abdomen is soft. There is no mass.      Tenderness: There is no abdominal tenderness. There is no guarding or rebound.   Musculoskeletal: Normal range of motion.         General: No tenderness.      Right lower leg: Edema present.      Left lower leg: Edema present.      Comments: Both legs are wrapped in bandage   Lymphadenopathy:      Cervical: No cervical adenopathy.   Skin:     General: Skin is warm.      Findings: No erythema or rash.   Neurological:      Mental Status: She is alert and oriented to person, place, and time.      Motor: No abnormal muscle tone.   Psychiatric:         Mood and Affect: Mood normal.         Behavior: Behavior normal.         Thought Content: Thought content normal.           RECENT " LABS:  Lab Results (last 24 hours)     Procedure Component Value Units Date/Time    Pathology Consultation [[id number]] Collected: 02/11/21 1227    Specimen: Blood, Venous Line Updated: 02/12/21 1203     Final Diagnosis --     Leukocytosis  Anemia  Thrombocytopenia  No blasts identified       Case Report --     Surgical Pathology Report                         Case: [id number]                                  Authorizing Provider:  Kiah Brown APRN      Collected:           02/11/2021 12:27 PM          Ordering Location:     [facility]       Received:            02/12/2021 07:46 AM                                 PROGRESS CARE                                                                Pathologist:           Marcus Stern MD                                                            Specimen:    Blood, Venous Line                                                                         POC Glucose Once [[id number]]  (Normal) Collected: 02/12/21 1131    Specimen: Blood Updated: 02/12/21 1139     Glucose 104 mg/dL      Comment: Serial Number: [id number]:  [id number]       POC Glucose Once [[id number]]  (Normal) Collected: 02/12/21 0814    Specimen: Blood Updated: 02/12/21 0815     Glucose 105 mg/dL      Comment: Serial Number: [id number]:  [id number]       CBC & Differential [[id number]]  (Abnormal) Collected: 02/12/21 0427    Specimen: Blood Updated: 02/12/21 0542    Narrative:      The following orders were created for panel order CBC & Differential.  Procedure                               Abnormality         Status                     ---------                               -----------         ------                     Scan Slide[[id number]]                                       Final result               CBC Auto Differential[[id number]]        Abnormal            Final result                 Please view results for these tests on the individual orders.    Scan Slide [[id number]]  Collected: 02/12/21 0427    Specimen: Blood Updated: 02/12/21 0542     Scan Slide --     Comment: See Manual Differential Results       Manual Differential [[id number]]  (Abnormal) Collected: 02/12/21 0427    Specimen: Blood Updated: 02/12/21 0542     Neutrophil % 74.0 %      Lymphocyte % 4.0 %      Monocyte % 5.0 %      Bands %  17.0 %      Neutrophils Absolute 12.92 10*3/mm3      Lymphocytes Absolute 0.57 10*3/mm3      Monocytes Absolute 0.71 10*3/mm3      Anisocytosis Slight/1+     Toxic Granulation Slight/1+     Platelet Estimate Decreased    CBC Auto Differential [[id number]]  (Abnormal) Collected: 02/12/21 0427    Specimen: Blood Updated: 02/12/21 0542     WBC 14.20 10*3/mm3      RBC 2.92 10*6/mm3      Hemoglobin 9.0 g/dL      Hematocrit 28.3 %      MCV 97.0 fL      MCH 30.9 pg      MCHC 31.8 g/dL      RDW 16.7 %      RDW-SD 57.3 fl      MPV 9.4 fL      Platelets 63 10*3/mm3     Narrative:      The previously reported component NRBC is no longer being reported. Previous result was 0.3 /100 WBC (Reference Range: 0.0-0.2 /100 WBC) on 2/12/2021 at 0450 EST.    Comprehensive Metabolic Panel [[id number]]  (Abnormal) Collected: 02/12/21 0427    Specimen: Blood Updated: 02/12/21 0512     Glucose 220 mg/dL       mg/dL      Creatinine 3.46 mg/dL      Sodium 136 mmol/L      Potassium 3.8 mmol/L      Chloride 99 mmol/L      CO2 24.0 mmol/L      Calcium 8.0 mg/dL      Total Protein 5.7 g/dL      Albumin 2.90 g/dL      ALT (SGPT) 35 U/L      AST (SGOT) 72 U/L      Alkaline Phosphatase 762 U/L      Comment: Result checked         Total Bilirubin 1.2 mg/dL      eGFR Non African Amer 13 mL/min/1.73      Comment: <15 Indicative of kidney failure.        eGFR   Amer --     Comment: <15 Indicative of kidney failure.        Globulin 2.8 gm/dL      A/G Ratio 1.0 g/dL      BUN/Creatinine Ratio 29.5     Anion Gap 13.0 mmol/L     Narrative:      GFR Normal >60  Chronic Kidney Disease <60  Kidney Failure <15      BNP  [[id number]]  (Abnormal) Collected: 02/12/21 0427    Specimen: Blood Updated: 02/12/21 0512     proBNP 66,389.0 pg/mL     Narrative:      Among patients with dyspnea, NT-proBNP is highly sensitive for the detection of acute congestive heart failure. In addition NT-proBNP of <300 pg/ml effectively rules out acute congestive heart failure with 99% negative predictive value.    Results may be falsely decreased if patient taking Biotin.      Magnesium [[id number]]  (Normal) Collected: 02/12/21 0427    Specimen: Blood Updated: 02/12/21 0501     Magnesium 2.1 mg/dL     POC Glucose Once [[id number]]  (Abnormal) Collected: 02/11/21 2010    Specimen: Blood Updated: 02/11/21 2011     Glucose 129 mg/dL      Comment: Serial Number: [id number]:  [id number]       Basic Metabolic Panel [[id number]]  (Abnormal) Collected: 02/11/21 1714    Specimen: Blood Updated: 02/11/21 1739     Glucose 142 mg/dL       mg/dL      Creatinine 3.58 mg/dL      Sodium 137 mmol/L      Potassium 4.1 mmol/L      Chloride 99 mmol/L      CO2 24.0 mmol/L      Calcium 7.8 mg/dL      eGFR   Amer --     Comment: <15 Indicative of kidney failure.        eGFR Non African Amer 12 mL/min/1.73      Comment: <15 Indicative of kidney failure.        BUN/Creatinine Ratio 27.9     Anion Gap 14.0 mmol/L     Narrative:      GFR Normal >60  Chronic Kidney Disease <60  Kidney Failure <15      POC Glucose Once [[id number]]  (Abnormal) Collected: 02/11/21 1635    Specimen: Blood Updated: 02/11/21 1637     Glucose 139 mg/dL      Comment: Serial Number: [id number]:  [id number]       Heparin-induced Platelet Antibody [[id number]] Collected: 02/09/21 1540    Specimen: Blood Updated: 02/11/21 1612     Heparin Induced Plt Ab 0.079 OD     Narrative:      Performed at:  [address] [place] [address]  [id number]  : Misa Calvillo MD, Phone:  [phone]    Haptoglobin [[id number]]  (Normal) Collected: 02/11/21 1227    Specimen:  Blood Updated: 02/11/21 1611     Haptoglobin 138 mg/dL     Blood Culture - Blood, Arm, Right [[id number]] Collected: 02/06/21 1307    Specimen: Blood from Arm, Right Updated: 02/11/21 1431     Blood Culture No growth at 5 days    Blood Culture - Blood, Arm, Left [[id number]] Collected: 02/06/21 1309    Specimen: Blood from Arm, Left Updated: 02/11/21 1431     Blood Culture No growth at 5 days    aPTT [[id number]]  (Abnormal) Collected: 02/11/21 1227    Specimen: Blood Updated: 02/11/21 1257     PTT 34.0 seconds     Protime-INR [[id number]]  (Abnormal) Collected: 02/11/21 1227    Specimen: Blood Updated: 02/11/21 1257     Protime 13.3 Seconds      INR 1.22    Fibrinogen [[id number]]  (Abnormal) Collected: 02/11/21 1227    Specimen: Blood Updated: 02/11/21 1257     Fibrinogen 554 mg/dL     Peripheral Blood Smear [[id number]] Collected: 02/11/21 1227    Specimen: Blood Updated: 02/11/21 1247     Pathology Review Yes    Reticulocytes [[id number]]  (Abnormal) Collected: 02/11/21 1227    Specimen: Blood Updated: 02/11/21 1245     Reticulocyte % 1.98 %      Reticulocyte Absolute 0.0665 10*6/mm3     Urine Culture - Urine, Urine, Catheter [[id number]]  (Abnormal) Collected: 02/10/21 0306    Specimen: Urine, Catheter Updated: 02/11/21 1234     Urine Culture Yeast isolated     Comment: No further workup.       Urine Culture - Urine, Urine, Random Void [[id number]]  (Abnormal) Collected: 02/10/21 1616    Specimen: Urine, Random Void Updated: 02/11/21 1234     Urine Culture Yeast isolated     Comment: No further workup.       Protein Electrophoresis, Total [[id number]] Collected: 02/11/21 1227    Specimen: Blood Updated: 02/11/21 1234    BNP [[id number]]  (Abnormal) Collected: 02/11/21 1005    Specimen: Blood Updated: 02/11/21 1230     proBNP 65,952.0 pg/mL     Narrative:      Among patients with dyspnea, NT-proBNP is highly sensitive for the detection of acute congestive heart failure. In addition NT-proBNP of <300 pg/ml effectively rules  out acute congestive heart failure with 99% negative predictive value.    Results may be falsely decreased if patient taking Biotin.      POC Glucose Once [[id number]]  (Normal) Collected: 02/11/21 1219    Specimen: Blood Updated: 02/11/21 1228     Glucose 103 mg/dL      Comment: Serial Number: [id number]:  [id number]             PENDING RESULTS: SPEP + IFX    IMAGING REVIEWED:  Xr Chest 1 View    Result Date: 2/11/2021  1. Cardiomegaly without acute pulmonary process. No change from prior exam.  Electronically Signed By-Dae Auguste MD On:2/11/2021 2:18 PM This report was finalized on [id number] by  Dae Auguste MD.      Assessment/Plan   ASSESSMENT:  1. Thrombocytopenia: Very likely from bone marrow suppression due to acute illness.  Antibiotics may be contributing.. Currently on aspirin.  Heparin-induced platelet antibody normal.  Folate 10.90, vitamin B12 >2000, fibrinogen 554, PTT 34, PT/INR 13.3/1.22.  Peripheral smear showed leukocytosis, anemia, and thrombocytopenia.  No blasts identified.  2. Anemia: Noted history of anemia.  Consider relation to anemia of chronic disease and CKD.  Anemia studies: ferritin 663.70, iron 50, iron saturation 38%, transferrin 89, TIBC 133, , haptoglobin 138,  reticulocytes 1.98%. Patient currently receiving p.o. vitamin B12 and folic acid.  3. Leukocytosis: WBC 14.20, bands 17%. Likely related to cellulitis and UTI.  4. Elevated liver enzymes: ALT 35 and AST 71 today.   5. Elevated alkaline phosphatase  6. Cellulitis of bilateral lower extremities: ID consulted.  Blood cultures also drawn- NGTD. On IV antibiotics.   7. UTI: On IV antibiotics.   8. CONG/CKD: Nephrology consulted.   9. CHF: proBNP 66,389  10. CAD/atrial fibrillation/HTN/HLD/gout: Managed per primary team     PLAN  1. CBC daily  2. Continue IV antibiotics   3. Further recommendations per Dr. Nathan    Electronically signed by CHRISTIANO Lomeli, 02/12/21, 3:42 PM EST.           I personally  reviewed all pertinent labs, related documentation and the  imaging. ROS performed and physical exam done during face to face enounter with patient. I agree with  nurse practitioner's doumentation, assessment and plan.    Patient has thrombocytopenia, leukocytosis and anemia , which appears to be multifactorial due to acute illness causing bone marrow suppression, probably medication effect and reactive leukocytosis.  Continue to monitor.  Treat underlying infection.  No acute intervention needed.  Monitor CBC.      Electronically signed by Kevin Nathan MD, 02/13/21, 4:23 PM EST.

## 2021-02-12 NOTE — THERAPY TREATMENT NOTE
Subjective: Pt agreeable to therapeutic plan of care. Pt reports standing this date would be easy if it wasn't for the pain in her legs.    Objective:     Bed mobility - Max-A and Assist x 2  Transfers - Max-A and Assist x 2  Ambulation - 0 feet N/A or Not attempted.    Pain: 8 VAS BLE  Education: Provided education on importance of mobility and skilled verbal / tactile cueing throughout intervention.     Assessment: Emperatriz Childs presents with functional mobility impairments which indicate the need for skilled intervention. Tolerating session today without incident. Pt this date was hesitant but agreed with encouragement. Pt continues to require significant assistance to come to sitting EOB, however seated balance appeared improved this date. Pt then stood with MAX A x2 and maintained standing for approx 2 min while bed adjusted. Pt attempted to transfer to chair but was unable to step with LLE. Pt returned to supine, and required MAX Ax2 to return to supine, as well as rolling.  Will continue to follow and progress as tolerated.     Plan/Recommendations:   Pt would benefit from Inpatient Rehabilitation placement at discharge from facility and requires no DME at discharge.   Pt desires Home at discharge but not safe for d/c home. Pt cooperative; agreeable to therapeutic recommendations and plan of care.     Basic Mobility 6-click:  Rollin = Total, A lot = 2, A little = 3; 4 = None  Supine>Sit:   1 = Total, A lot = 2, A little = 3; 4 = None   Sit>Stand with arms:  1 = Total, A lot = 2, A little = 3; 4 = None  Bed>Chair:   1 = Total, A lot = 2, A little = 3; 4 = None  Ambulate in room:  1 = Total, A lot = 2, A little = 3; 4 = None  3-5 Steps with railin = Total, A lot = 2, A little = 3; 4 = None  Score: 9    Modified Leena: N/A = No pre-op stroke/TIA    Post-Tx Position: Supine with HOB Elevated and Call light and personal items within reach  PPE: gloves, surgical mask, eyewear protection

## 2021-02-12 NOTE — PROGRESS NOTES
PROGRESS NOTE      Patient Name: Emperatriz Childs  : 1945  MRN: [id number]  Primary Care Physician: Rosa M Chavez APRN  Date of admission: 2021    Patient Care Team:  Rosa M Chavez APRN as PCP - General (Nurse Practitioner)        Subjective   Subjective:     Acute kidney injury, patient is still swollen not feeling good, no significant shortness of breath  Review of systems:  All other review of system unremarkable      Allergies:    Allergies   Allergen Reactions   • Allopurinol Unknown - High Severity   • Clindamycin Hcl Unknown - High Severity   • Codeine Unknown - High Severity   • Furosemide Unknown - High Severity   • Hydrochlorothiazide Unknown - High Severity   • Naproxen Unknown - High Severity   • Sulfa Antibiotics Unknown - High Severity       Objective   Exam:     Vital Signs  Temp:  [97.4 °F (36.3 °C)-98.9 °F (37.2 °C)] 98.9 °F (37.2 °C)  Heart Rate:  [60-62] 61  Resp:  [16-22] 22  BP: (118-149)/(41-94) 118/94  SpO2:  [91 %-98 %] 92 %  on   ;      Body mass index is 33.51 kg/m².    General: Elderly  female in no acute distress.    Head:      Normocephalic and atraumatic.    Eyes:      PERRL/EOM intact, conjunctiva and sclera clear with out nystagmus.    Neck:      No masses, thyromegaly,  trachea central with normal respiratory effort   Lungs:    Clear bilaterally to auscultation.    Heart:      Regular rate and rhythm, no murmur no gallop  Abd:        Soft, nontender, not distended, bowel sounds positive, no shifting dullness   Pulses:   Pulses palpable  Extr:        No cyanosis or clubbing--significant bilateral edema.    Neuro:    No focal deficits.   alert oriented x3  Skin:       Intact without lesions or rashes.    Psych:    Alert and cooperative; normal mood and affect; .      Results Review:  I have personally reviewed most recent Data :  CBC    Results from last 7 days   Lab Units 21  0427 21  0027 02/10/21  0547 21  0524 21  0529  02/07/21  1305 02/06/21  1904 02/06/21  0311   WBC 10*3/mm3 14.20* 12.00* 10.30 14.40* 18.60* 25.00*  --  10.70   HEMOGLOBIN g/dL 9.0* 9.0* 9.0* 9.5* 9.6* 10.1*  --  10.1*   HEMOGLOBIN, POC g/dL  --   --   --   --   --   --  9.9*  --    PLATELETS 10*3/mm3 63* 92* 35* 54* 34* 82*  --  72*     CMP   Results from last 7 days   Lab Units 02/12/21  0427 02/11/21  1714 02/11/21  0027 02/10/21  0547 02/09/21  1540 02/09/21  0524 02/08/21  0529 02/07/21  1305 02/06/21  2247 02/06/21  1905   SODIUM mmol/L 136 137 135* 138 138 138 140 140 139 139   POTASSIUM mmol/L 3.8 4.1 3.7 3.7 3.9 3.3* 4.3 4.5 5.1 4.5   CHLORIDE mmol/L 99 99 100 104 105 101 104 109* 112* 114*   CO2 mmol/L 24.0 24.0 23.0 24.0 20.0* 23.0 22.0 18.0* 14.0* 12.0*   BUN mg/dL 102* 100* 95* 95* 92* 98* 98* 87* 80* 76*   CREATININE mg/dL 3.46* 3.58* 3.26* 2.98* 3.09* 3.38* 3.39* 3.50* 3.35* 3.28*   GLUCOSE mg/dL 220* 142* 314* 92 68 119* 109* 87 100* 91   ALBUMIN g/dL 2.90*  --   --  2.10*  --   --   --  2.50* 2.40* 2.40*   BILIRUBIN mg/dL 1.2  --   --  1.4*  --   --   --  0.6 0.4 0.4   ALK PHOS U/L 762*  --   --  396*  --   --   --  135* 109 100   AST (SGOT) U/L 72*  --   --  81*  --   --   --  30 32 27   ALT (SGPT) U/L 35*  --   --  27  --   --   --  12 10 9     ABG    Results from last 7 days   Lab Units 02/06/21  1904   PH, ARTERIAL pH units 7.337*   PCO2, ARTERIAL mm Hg 26.4*   PO2 ART mm Hg 79.5*   O2 SATURATION ART % 95.2   BASE EXCESS ART mmol/L -10.4*     Xr Chest 1 View    Result Date: 2/11/2021  1. Cardiomegaly without acute pulmonary process. No change from prior exam.  Electronically Signed By-Dae Auguste MD On:2/11/2021 2:18 PM This report was finalized on [id number] by  Dae Auguste MD.    Xr Chest 1 View    Result Date: 2/7/2021  1. New right-sided PICC line with tip terminating over the low SVC. 2. Stable cardiomegaly.  Electronically Signed By-Dae Auguste MD On:2/7/2021 10:45 AM This report was finalized on [id number] by   Dae Auguste MD.    Xr Chest 1 View    Result Date: 2/6/2021  1.Cardiomegaly  Electronically Signed By-Azar Alarcon MD On:2/6/2021 8:01 AM This report was finalized on [id number] by  Azar Alarcon MD.      Results for orders placed during the hospital encounter of 02/05/21   Adult Transthoracic Echo Complete W/ Cont if Necessary Per Protocol    Narrative Normal LV size and contractility EF of 55%  Moderate right ventricular enlargement with severe right atrial   enlargement, catheter probably pacemaker lead seen.  Severe left atrial enlargement seen.  Aortic valve, mitral valve, tricuspid valve appears structurally normal,   mild MR, AR, seen.  There is moderate  tricuspid regurgitation seen.    Calculated RV systolic pressure is 24mmHg.  No pericardial effusion seen.  Proximal aorta appears normal in size.     Scheduled Meds:aspirin, 81 mg, Oral, Daily  doxycycline, 100 mg, Oral, Q12H  febuxostat, 40 mg, Oral, BID  fluconazole, 100 mg, Oral, Q24H  folic acid, 1 mg, Oral, Daily  gabapentin, 100 mg, Oral, TID  haloperidol lactate, 5 mg, Intramuscular, Once  magic butt paste, , Topical, Daily  midodrine, 10 mg, Oral, TID AC  nystatin, 5 mL, Swish & Swallow, 4x Daily  sodium chloride, 10 mL, Intravenous, Q12H  vitamin B-12, 500 mcg, Oral, Daily  Zinc Oxide, , Apply externally, BID      Continuous Infusions:bumetanide, 2 mg/hr, Last Rate: 2 mg/hr (02/12/21 0836)  dextrose, 20 mL/hr, Last Rate: 20 mL/hr (02/09/21 1827)  DOPamine, 2-20 mcg/kg/min, Last Rate: 2 mcg/kg/min (02/11/21 1234)      PRN Meds:•  acetaminophen **OR** acetaminophen **OR** acetaminophen  •  dextrose  •  dextrose  •  glucagon (human recombinant)  •  HYDROcodone-acetaminophen  •  magnesium sulfate **OR** magnesium sulfate in D5W 1g/100mL (PREMIX) **OR** magnesium sulfate  •  melatonin  •  nitroglycerin  •  OLANZapine  •  ondansetron **OR** ondansetron  •  potassium chloride  •  potassium chloride  •  sodium chloride  •   traMADol    Assessment/Plan   Assessment and Plan:         Congestive heart failure (CMS/[facility])    Atrial fibrillation (CMS/[facility])    Hypertension, benign    Lymphedema    Gout    Non-pressure chronic ulcer right ankle, limited to breakdown skin (CMS/[facility])    Automatic implantable cardiac defibrillator in situ    Acute UTI (urinary tract infection)    Lower extremity cellulitis    CAD (coronary artery disease)    Sepsis (CMS/[facility])    CONG (acute kidney injury) (CMS/[facility])    Lactic acidosis    Metabolic acidosis    Delirium, acute    ASSESSMENT:  · Acute kidney injury, oliguric, stage 2-3, evolving, ongoing hypotension, ? Sepsis, and prerenal azotemia component with ongoing diarrhea, diuretics use,   ·  might have some degree of progression of CKD too.   · CKD 4, with baseline creatinine around 1.7- 2.2, till last year,02/2020, never followed up in clinic. Work up then  ua negative RBC,WBC,no protein. UPC, was unremarkable in 02/2020 USG, right 8.9 cm, andleft 8.3 cm, medial renal disease.  · Metabolic acidosis, gap and non gap, more due to GI bicarb loss, CKD and persistent lactic acidosis. patient apparently had large bowel movement, significant metabolic acidosis  · Hypotension, concern hypovolemia,   · Congestive heart failure with last ejection fraction around 40% from 2018,   · History of atrial fibrillation  · Significant metabolic acidosis  · Significant anemia  · Chronic lymphedema  · Cellulitis of lower extremities.   · Thrombocytopenia, also on 02/2020  · Anemia.      Plan:        · Patient baseline creatinine 1.8-2.  Acute increase in creatinine with acute increase in volume with infection and metabolic acidosis and GI  bicarbonate losses was going on which is better  · Patient creatinine is same as yesterday with some improvement in the urine output and patient is on Bumex drip at this time.  · Patient still has significantly engorged neck veins with significant peripheral edema  · Repeat chest x-ray with mild  pulmonary congestion  · To me patient seems to be still volume overloaded  · We will decrease Bumex drip to 1 mg/h after 8 PM again  · Discussed with Dr. Davila  · Follow-up with repeat labs  · Patient has cardiorenal syn will drome at this time  · I will cut down on the dopamine drip soon as hemodynamics are improving  · Continue bliss.   · Fu sepsis work up follow-up with infectious disease  · Repeat labs,   · Decrease fluids in evening.        Note started  by Feliz Silva MD,   [facility] kidney consultant

## 2021-02-12 NOTE — PLAN OF CARE
Goal Outcome Evaluation:         Pt on bumex and dopamine drip. Tolerating well, much more output than yesterday

## 2021-02-12 NOTE — PROGRESS NOTES
Infectious Diseases Progress Note      LOS: 4 days   Patient Care Team:  Rosa M Chavez APRN as PCP - General (Nurse Practitioner)    Chief Complaint: Lower extremities edema    Subjective       The patient has been afebrile for the last 24 hours.  The patient is on room air, hemodynamically stable, and is tolerating antimicrobial therapy.  States that leg pain is better today        Review of Systems:   Review of Systems   Constitutional: Negative.    HENT: Negative.    Eyes: Negative.    Respiratory: Negative.    Cardiovascular: Positive for leg swelling.   Gastrointestinal: Negative.    Genitourinary: Negative.    Musculoskeletal: Negative.         Bilateral leg pain   Skin: Negative.    Neurological: Negative.    Hematological: Negative.    Psychiatric/Behavioral: Negative.         Objective     Vital Signs  Temp:  [97.4 °F (36.3 °C)-98.9 °F (37.2 °C)] 98.7 °F (37.1 °C)  Heart Rate:  [60-63] 63  Resp:  [18-22] 20  BP: (118-149)/(51-94) 148/51    Physical Exam:  Physical Exam  Vitals signs and nursing note reviewed.   Constitutional:       Appearance: She is well-developed.   HENT:      Head: Normocephalic and atraumatic.   Eyes:      Pupils: Pupils are equal, round, and reactive to light.   Neck:      Musculoskeletal: Normal range of motion and neck supple.   Cardiovascular:      Rate and Rhythm: Normal rate and regular rhythm.      Heart sounds: Normal heart sounds.   Pulmonary:      Effort: Pulmonary effort is normal. No respiratory distress.      Breath sounds: Normal breath sounds. No wheezing or rales.   Abdominal:      General: Bowel sounds are normal. There is no distension.      Palpations: Abdomen is soft. There is no mass.      Tenderness: There is no abdominal tenderness. There is no guarding or rebound.   Musculoskeletal: Normal range of motion.         General: No deformity.      Right lower leg: Edema present.      Left lower leg: Edema present.      Comments: Superimposed erythema of both  lower extremities more on the left than the right  -Erythema is improving slowly   Skin:     General: Skin is warm.      Findings: No erythema or rash.   Neurological:      Mental Status: She is alert and oriented to person, place, and time.      Cranial Nerves: No cranial nerve deficit.          Results Review:    I have reviewed all clinical data, test, lab, and imaging results.     Radiology  No Radiology Exams Resulted Within Past 24 Hours    Cardiology    Laboratory    Results from last 7 days   Lab Units 02/12/21  0427 02/11/21  0027 02/10/21  0547 02/09/21  0524 02/08/21  0529 02/07/21  1305 02/06/21  1904 02/06/21  0311   WBC 10*3/mm3 14.20* 12.00* 10.30 14.40* 18.60* 25.00*  --  10.70   HEMOGLOBIN g/dL 9.0* 9.0* 9.0* 9.5* 9.6* 10.1*  --  10.1*   HEMOGLOBIN, POC g/dL  --   --   --   --   --   --  9.9*  --    HEMATOCRIT % 28.3* 27.8* 27.2* 29.6* 29.1* 31.3*  --  31.7*   HEMATOCRIT POC %  --   --   --   --   --   --  29*  --    PLATELETS 10*3/mm3 63* 92* 35* 54* 34* 82*  --  72*     Results from last 7 days   Lab Units 02/12/21  0427 02/11/21  1714 02/11/21  0027 02/10/21  0547 02/09/21  1540 02/09/21  0524 02/08/21  0529 02/07/21  1305 02/06/21  2247 02/06/21  1905   SODIUM mmol/L 136 137 135* 138 138 138 140 140 139 139   POTASSIUM mmol/L 3.8 4.1 3.7 3.7 3.9 3.3* 4.3 4.5 5.1 4.5   CHLORIDE mmol/L 99 99 100 104 105 101 104 109* 112* 114*   CO2 mmol/L 24.0 24.0 23.0 24.0 20.0* 23.0 22.0 18.0* 14.0* 12.0*   BUN mg/dL 102* 100* 95* 95* 92* 98* 98* 87* 80* 76*   CREATININE mg/dL 3.46* 3.58* 3.26* 2.98* 3.09* 3.38* 3.39* 3.50* 3.35* 3.28*   GLUCOSE mg/dL 220* 142* 314* 92 68 119* 109* 87 100* 91   ALBUMIN g/dL 2.90*  --   --  2.10*  --   --   --  2.50* 2.40* 2.40*   BILIRUBIN mg/dL 1.2  --   --  1.4*  --   --   --  0.6 0.4 0.4   ALK PHOS U/L 762*  --   --  396*  --   --   --  135* 109 100   AST (SGOT) U/L 72*  --   --  81*  --   --   --  30 32 27   ALT (SGPT) U/L 35*  --   --  27  --   --   --  12 10 9   CALCIUM  mg/dL 8.0* 7.8* 7.1* 7.0* 6.9* 6.8* 6.9* 7.1* 7.3* 7.3*     Results from last 7 days   Lab Units 02/06/21  1708   CK TOTAL U/L 225*             Microbiology   Microbiology Results (last 10 days)     Procedure Component Value - Date/Time    Urine Culture - Urine, Urine, Random Void [[id number]]  (Abnormal) Collected: 02/10/21 1616    Lab Status: Final result Specimen: Urine, Random Void Updated: 02/11/21 1234     Urine Culture Yeast isolated     Comment: No further workup.       Urine Culture - Urine, Urine, Catheter [[id number]]  (Abnormal) Collected: 02/10/21 0306    Lab Status: Final result Specimen: Urine, Catheter Updated: 02/11/21 1234     Urine Culture Yeast isolated     Comment: No further workup.       Clostridium Difficile Toxin - Stool, Per Rectum [[id number]]  (Normal) Collected: 02/06/21 1952    Lab Status: Final result Specimen: Stool from Per Rectum Updated: 02/07/21 0725    Narrative:      The following orders were created for panel order Clostridium Difficile Toxin - Stool, Per Rectum.  Procedure                               Abnormality         Status                     ---------                               -----------         ------                     Clostridium Difficile EI...[[id number]]  Normal              Final result                 Please view results for these tests on the individual orders.    Clostridium Difficile EIA - Stool, Per Rectum [[id number]]  (Normal) Collected: 02/06/21 1952    Lab Status: Final result Specimen: Stool from Per Rectum Updated: 02/07/21 0725     C Diff GDH / Toxin Negative    Blood Culture - Blood, Arm, Left [[id number]] Collected: 02/06/21 1309    Lab Status: Final result Specimen: Blood from Arm, Left Updated: 02/11/21 1431     Blood Culture No growth at 5 days    Blood Culture - Blood, Arm, Right [[id number]] Collected: 02/06/21 1307    Lab Status: Final result Specimen: Blood from Arm, Right Updated: 02/11/21 1431     Blood Culture No growth at 5 days    MRSA  Screen, PCR (Inpatient) - Swab, Nares [[id number]]  (Abnormal) Collected: 02/06/21 1114    Lab Status: Final result Specimen: Swab from Nares Updated: 02/06/21 1311     MRSA PCR MRSA Detected    COVID PRE-OP / PRE-PROCEDURE SCREENING ORDER (NO ISOLATION) - Swab, Nasopharynx [[id number]]  (Normal) Collected: 02/06/21 0153    Lab Status: Final result Specimen: Swab from Nasopharynx Updated: 02/06/21 1534    Narrative:      The following orders were created for panel order COVID PRE-OP / PRE-PROCEDURE SCREENING ORDER (NO ISOLATION) - Swab, Nasopharynx.  Procedure                               Abnormality         Status                     ---------                               -----------         ------                     COVID-19,APTIMA PANTHER,...[[id number]]  Normal              Final result                 Please view results for these tests on the individual orders.    COVID-19,APTIMA PANTHER,[facility] IN-HOUSE, NP/OP SWAB IN UTM/VTM/[facility] TRANSPORT MEDIA,24 HR TAT - Swab, Nasopharynx [[id number]]  (Normal) Collected: 02/06/21 0153    Lab Status: Final result Specimen: Swab from Nasopharynx Updated: 02/06/21 1534     COVID19 Not Detected    Narrative:      Fact sheet for providers: https://www.fda.gov/media/559023/download     Fact sheet for patients: https://www.fda.gov/media/087952/download    Test performed by RT PCR.          Medication Review:       Schedule Meds  aspirin, 81 mg, Oral, Daily  doxycycline, 100 mg, Oral, Q12H  febuxostat, 40 mg, Oral, BID  fluconazole, 100 mg, Oral, Q24H  folic acid, 1 mg, Oral, Daily  gabapentin, 100 mg, Oral, TID  haloperidol lactate, 5 mg, Intramuscular, Once  magic butt paste, , Topical, Daily  midodrine, 10 mg, Oral, TID AC  nystatin, 5 mL, Swish & Swallow, 4x Daily  sodium chloride, 10 mL, Intravenous, Q12H  vitamin B-12, 500 mcg, Oral, Daily  Zinc Oxide, , Apply externally, BID        Infusion Meds  bumetanide, 2 mg/hr, Last Rate: 2 mg/hr (02/12/21 0836)  dextrose, 20 mL/hr,  Last Rate: 20 mL/hr (02/09/21 3907)  DOPamine, 2-20 mcg/kg/min, Last Rate: 2 mcg/kg/min (02/11/21 1234)        PRN Meds  •  acetaminophen **OR** acetaminophen **OR** acetaminophen  •  dextrose  •  dextrose  •  glucagon (human recombinant)  •  HYDROcodone-acetaminophen  •  magnesium sulfate **OR** magnesium sulfate in D5W 1g/100mL (PREMIX) **OR** magnesium sulfate  •  melatonin  •  nitroglycerin  •  OLANZapine  •  ondansetron **OR** ondansetron  •  potassium chloride  •  potassium chloride  •  sodium chloride  •  traMADol        Assessment/Plan       Antimicrobial Therapy   1.  Doxycycline     day  2.  Diflucan      day  3.      Day  4.      Day  5.      Day    Assessment     Bilateral lower extremities lymphedema with erythema of the right leg consistent with cellulitis.  Erythema had improved     Acute kidney injury     Leukocytosis-likely related to severe cellulitis.  Significantly improved    Congestive heart failure     History of pacemaker implantation    Hypothermia-appears to have resolved    Candiduria.  Patient s/p replacement of Ayon catheter on February 10, 2021 and repeat urinalysis was positive for candiduria     Plan     Continue doxycycline 100 mg p.o. twice daily for 7 days  Continue Diflucan 100 mg p.o. daily for 7 days  Continue supportive care  Okay to discharge from Infectious Disease standpoint        Harper Darling, CHRISTIANO  02/12/21  14:56 EST      Note is dictated utilizing voice recognition software/Dragon

## 2021-02-12 NOTE — PLAN OF CARE
Objective:   Bed mobility - Max-A and Assist x 2  Transfers - Max-A and Assist x 2  Ambulation - 0 feet N/A or Not attempted.    Assessment: Emperatriz Childs presents with functional mobility impairments which indicate the need for skilled intervention. Tolerating session today without incident. Pt this date was hesitant but agreed with encouragement. Pt continues to require significant assistance to come to sitting EOB, however seated balance appeared improved this date. Pt then stood with MAX A x2 and maintained standing for approx 2 min while bed adjusted. Pt attempted to transfer to chair but was unable to step with LLE. Pt returned to supine, and required MAX Ax2 to return to supine, as well as rolling.  Will continue to follow and progress as tolerated.

## 2021-02-12 NOTE — PROGRESS NOTES
[facility] Medicine Services Daily Progress Note      Hospitalist Team  LOS 4 days      Patient Care Team:  Rosa M Chavez APRN as PCP - General (Nurse Practitioner)    Patient Location: 2120/1      Subjective   Subjective     Chief Complaint / Subjective  Fevers, leg pain      Brief Synopsis of Hospital Course/HPI    The patient is a 75-year-old female with history atrial fibrillation s/p pacemaker placement, hyperlipidemia, hypertension, CAD, CKD stage III and  chronic lower extremity lymphedema.    Apparently the patient has been having several weeks of worsening lower extremity edema thus went to outside facility.    Laboratory work was significant for , potassium 5.3, BUN 63, creatinine 2.41, WBC 4.5, lactic acid 3.2 and UA with 25-50 WBCs.  The patient was transferred to [facility] for further care      Date::    2/6/21: Poor historian.  Low BP thus gave bolus.  Started on dopamine and transferred to PCU.  Started on bicarb drip.  Confused in the evening.  2/7/21: PICC line placed.  Daughter at the bedside discussed care.  Daughter claims no history of dementia or sundowning.  Leukocytosis.  Added Flagyl.  Consulted ID.  Daughter claims patient off Xarelto.  C. difficile ruled out.      2/8  Has third spacing and bruising.  Patient denies any chest pain  On dopamine drip and being tapered off.  Started on midodrine.    2/9/2021  Outpatient recliner  She has Ace wrap in place  She had been complaining of some tight wraps on the right side and will need to be addressed.  White count better  Her platelet count is 54 slightly better than yesterday  Still on a small amount of dopamine drip.  Renal function still elevated but probably stabilized.    2/10  Patient is now feeling better and her leg swelling is under control with compression dressing that has been changed to allow for some relief of her pain.  She had drop in her creatinine which suggests improvement with  "diuresis and possible cardiorenal etiology    She has been eating yesterday and her sugar dropped and started D10    B12 is above 2000  Haptoglobin is 100 within normal.  She is hypomagnesemic as well.  Folic acid 10.9  Still with significant thrombocytopenia.  We will ask hematologist for evaluation.    2/11/2021  Urine output decreased overnight and Dr. Pinedo wants to start dopamine drip again  Creatinine slightly up again today  Patient is asking when she is going to go home.  2/12  Coaches in hips and arms hyun w ambulation  Bands 17%  platelet 63  Hallucinating; talking to her !  Check ammmonia  Review of Systems   All other systems reviewed and are negative.        Objective   Objective      Vital Signs  Temp:  [97.4 °F (36.3 °C)-98.9 °F (37.2 °C)] 98.7 °F (37.1 °C)  Heart Rate:  [60-63] 63  Resp:  [18-22] 20  BP: (118-149)/(51-94) 148/51  Oxygen Therapy  SpO2: 93 %  Pulse Oximetry Type: Intermittent  Device (Oxygen Therapy): room air  Flowsheet Rows      First Filed Value   Admission Height  157.5 cm (62\") Documented at 02/05/2021 2359   Admission Weight  78 kg (172 lb) Documented at 02/05/2021 2359        Intake & Output (last 3 days)       02/09 0701 - 02/10 0700 02/10 0701 - 02/11 0700 02/11 0701 - 02/12 0700 02/12 0701 - 02/13 0700    P.O. 780       I.V. (mL/kg)        Other  100      IV Piggyback        Total Intake(mL/kg) 780 (9.5) 100 (1.2)      Urine (mL/kg/hr) 3540 (1.8) 485 (0.2) 1030 (0.5) 395 (0.9)    Stool 0       Total Output 3540 485 1030 395    Net -2760 -385 -1030 -395            Stool Unmeasured Occurrence 2 x           Lines, Drains & Airways    Active LDAs     Name:   Placement date:   Placement time:   Site:   Days:    Peripheral IV 02/06/21 0403 Anterior;Right Forearm   02/06/21    0403    Forearm   less than 1                  Physical Exam:    Physical Exam  HENT:      Head: Normocephalic.      Nose: Nose normal.   Eyes:      General: No scleral icterus.     Extraocular " Movements: Extraocular movements intact.      Pupils: Pupils are equal, round, and reactive to light.   Neck:      Musculoskeletal: Normal range of motion.   Cardiovascular:      Rate and Rhythm: Normal rate and regular rhythm.   Pulmonary:      Effort: Pulmonary effort is normal.      Breath sounds: Normal breath sounds.   Abdominal:      General: Bowel sounds are normal.      Palpations: Abdomen is soft.   Musculoskeletal:      Comments: Bilateral shin with erythema and edema.  Lymphedema.   Lymphadenopathy:      Cervical: No cervical adenopathy.   Skin:     General: Skin is warm.   Neurological:      Mental Status: She is alert.   Psychiatric:         Attention and Perception: Attention normal.              Wounds (last 24 hours)      LDA Wound     Row Name 02/12/21 1200 02/12/21 0800 02/12/21 0400       Wound 02/06/21 0031 Right lower leg Other (comment)    Wound - Properties Group Placement Date: 02/06/21  -CS Placement Time: 0031  -CS Present on Hospital Admission: Y  -CS Side: Right  -CS Orientation: lower  -CS Location: leg  -CS Primary Wound Type: Other  -CS, chronic redness and swelling     Closure  NELY  -SK  NELY  -SK  NELY  -JT    Base  red  -SK  red  -SK  red  -JT    Periwound  excoriated;blistered;edematous  -SK  excoriated;blistered;edematous  -SK  excoriated;blistered;edematous  -JT    Periwound Temperature  warm;hot  -SK  warm;hot  -SK  warm;hot  -JT    Drainage Amount  moderate  -SK  moderate  -SK  moderate  -JT    Retired Wound - Properties Group Date first assessed: 02/06/21  -CS Time first assessed: 0031  -CS Present on Hospital Admission: Y  -CS Side: Right  -CS Location: leg  -CS Primary Wound Type: Other  -CS, chronic redness and swelling        Wound 02/06/21 0032 Right anterior foot Other (comment)    Wound - Properties Group Placement Date: 02/06/21  -CS Placement Time: 0032  -CS Present on Hospital Admission: Y  -CS Side: Right  -CS Orientation: anterior  -CS Location: foot  -CS Primary  Wound Type: Other  -CS Additional Comments: chronic redness and swelling  -CS    Closure  NELY  -SK  NELY  -SK  NELY  -JT    Base  red  -SK  red  -SK  red  -JT    Periwound  edematous;excoriated;redness  -SK  edematous;excoriated;redness  -SK  edematous;excoriated;redness  -JT    Periwound Temperature  warm;hot  -SK  warm;hot  -SK  warm;hot  -JT    Periwound Skin Turgor  firm  -SK  firm  -SK  firm  -JT    Drainage Amount  scant  -SK  scant  -SK  scant  -JT    Retired Wound - Properties Group Date first assessed: 02/06/21  -CS Time first assessed: 0032  -CS Present on Hospital Admission: Y  -CS Side: Right  -CS Location: foot  -CS Primary Wound Type: Other  -CS Additional Comments: chronic redness and swelling  -CS       Wound 02/06/21 0033 Left lower leg Other (comment)    Wound - Properties Group Placement Date: 02/06/21  -CS Placement Time: 0033  -CS Present on Hospital Admission: N  -CS Side: Left  -CS Orientation: lower  -CS Location: leg  -CS Primary Wound Type: Other  -CS Additional Comments: chronic redness and swelling  -CS    Closure  NELY  -SK  NELY  -SK  NELY  -JT    Base  red  -SK  red  -SK  red  -JT    Periwound  edematous;excoriated;redness  -SK  edematous;excoriated;redness  -SK  edematous;excoriated;redness  -JT    Periwound Temperature  warm;hot  -SK  warm;hot  -SK  warm;hot  -JT    Retired Wound - Properties Group Date first assessed: 02/06/21  -CS Time first assessed: 0033  -CS Present on Hospital Admission: N  -CS Side: Left  -CS Location: leg  -CS Primary Wound Type: Other  -CS Additional Comments: chronic redness and swelling  -CS       Wound 02/06/21 0035 Left anterior ankle Other (comment)    Wound - Properties Group Placement Date: 02/06/21  -CS Placement Time: 0035  -CS Present on Hospital Admission: Y  -CS Side: Left  -CS Orientation: anterior  -CS Location: ankle  -CS Primary Wound Type: Other  -CS    Closure  NELY  -SK  NELY  -SK  NELY  -JT    Base  red  -SK  red  -SK  red  -JT    Periwound   edematous;excoriated;redness  -SK  edematous;excoriated;redness  -SK  edematous;excoriated;redness  -JT    Periwound Temperature  warm  -SK  warm  -SK  warm  -JT    Periwound Skin Turgor  firm  -SK  firm  -SK  firm  -JT    Retired Wound - Properties Group Date first assessed: 02/06/21  -CS Time first assessed: 0035  -CS Present on Hospital Admission: Y  -CS Side: Left  -CS Location: ankle  -CS Primary Wound Type: Other  -CS       Wound 02/06/21 0047 Right heel Other (comment)    Wound - Properties Group Placement Date: 02/06/21  -CS Placement Time: 0047  -CS Present on Hospital Admission: Y  -CS Side: Right  -CS Location: heel  -CS Primary Wound Type: Other  -CS, Chronic redness and swelling      Closure  NELY  -SK  NELY  -SK  NELY  -JT    Base  red  -SK  red  -SK  red  -JT    Periwound  edematous;excoriated;redness  -SK  edematous;excoriated;redness  -SK  edematous;excoriated;redness  -JT    Retired Wound - Properties Group Date first assessed: 02/06/21  -CS Time first assessed: 0047  -CS Present on Hospital Admission: Y  -CS Side: Right  -CS Location: heel  -CS Primary Wound Type: Other  -CS, Chronic redness and swelling         Wound 02/06/21 0050 Left posterior thigh Other (comment)    Wound - Properties Group Placement Date: 02/06/21  -CS Placement Time: 0050  -CS Present on Hospital Admission: Y  -CS Side: Left  -CS Orientation: posterior  -CS Location: thigh  -CS Primary Wound Type: Other  -CS, Chronic redness and swelling      Base  red;scab  -SK  red;scab  -SK  red;scab  -JT    Periwound  edematous;excoriated;redness  -SK  edematous;excoriated;redness  -SK  edematous;excoriated;redness  -JT    Retired Wound - Properties Group Date first assessed: 02/06/21  -CS Time first assessed: 0050  -CS Present on Hospital Admission: Y  -CS Side: Left  -CS Location: thigh  -CS Primary Wound Type: Other  -CS, Chronic redness and swelling         Wound 02/06/21 0051 coccyx Pressure Injury    Wound - Properties Group  Placement Date: 02/06/21  -CS Placement Time: 0051  -CS Present on Hospital Admission: Y  -CS Location: coccyx  -CS Primary Wound Type: Pressure inj  -CS Stage, Pressure Injury : Stage 2  -CS    Base  non-blanchable;scab  -SK  non-blanchable;scab  -SK  non-blanchable;scab  -JT    Periwound  non-blanchable;redness  -SK  non-blanchable;redness  -SK  non-blanchable;redness  -JT    Retired Wound - Properties Group Date first assessed: 02/06/21  -CS Time first assessed: 0051  -CS Present on Hospital Admission: Y  -CS Location: coccyx  -CS Primary Wound Type: Pressure inj  -CS    Row Name 02/12/21 0000 02/11/21 2000 02/11/21 1600       Wound 02/06/21 0031 Right lower leg Other (comment)    Wound - Properties Group Placement Date: 02/06/21  -CS Placement Time: 0031 -CS Present on Hospital Admission: Y  -CS Side: Right  -CS Orientation: lower  -CS Location: leg  -CS Primary Wound Type: Other  -CS, chronic redness and swelling     Closure  NELY  -JT  NELY  -JT  NELY  -SK    Base  red  -JT  red  -JT  red  -SK    Periwound  excoriated;blistered;edematous  -JT  excoriated;blistered;edematous  -JT  excoriated;blistered;edematous  -SK    Periwound Temperature  warm;hot  -JT  warm;hot  -JT  warm;hot  -SK    Drainage Amount  moderate  -JT  moderate  -JT  moderate  -SK    Retired Wound - Properties Group Date first assessed: 02/06/21  -CS Time first assessed: 0031  -CS Present on Hospital Admission: Y  -CS Side: Right  -CS Location: leg  -CS Primary Wound Type: Other  -CS, chronic redness and swelling        Wound 02/06/21 0032 Right anterior foot Other (comment)    Wound - Properties Group Placement Date: 02/06/21  -CS Placement Time: 0032  -CS Present on Hospital Admission: Y  -CS Side: Right  -CS Orientation: anterior  -CS Location: foot  -CS Primary Wound Type: Other  -CS Additional Comments: chronic redness and swelling  -CS    Closure  NELY  -JT  NELY  -JT  NELY  -SK    Base  red  -JT  red  -JT  red  -SK    Periwound   edematous;excoriated;redness  -JT  edematous;excoriated;redness  -JT  edematous;excoriated;redness  -SK    Periwound Temperature  warm;hot  -JT  warm;hot  -JT  warm;hot  -SK    Periwound Skin Turgor  firm  -JT  firm  -JT  firm  -SK    Drainage Amount  scant  -JT  scant  -JT  scant  -SK    Retired Wound - Properties Group Date first assessed: 02/06/21  -CS Time first assessed: 0032  -CS Present on Hospital Admission: Y  -CS Side: Right  -CS Location: foot  -CS Primary Wound Type: Other  -CS Additional Comments: chronic redness and swelling  -CS       Wound 02/06/21 0033 Left lower leg Other (comment)    Wound - Properties Group Placement Date: 02/06/21  -CS Placement Time: 0033  -CS Present on Hospital Admission: N  -CS Side: Left  -CS Orientation: lower  -CS Location: leg  -CS Primary Wound Type: Other  -CS Additional Comments: chronic redness and swelling  -CS    Closure  NELY  -JT  NELY  -JT  NELY  -SK    Base  red  -JT  red  -JT  red  -SK    Periwound  edematous;excoriated;redness  -JT  edematous;excoriated;redness  -JT  edematous;excoriated;redness  -SK    Periwound Temperature  warm;hot  -JT  warm;hot  -JT  warm;hot  -SK    Retired Wound - Properties Group Date first assessed: 02/06/21  -CS Time first assessed: 0033  -CS Present on Hospital Admission: N  -CS Side: Left  -CS Location: leg  -CS Primary Wound Type: Other  -CS Additional Comments: chronic redness and swelling  -CS       Wound 02/06/21 0035 Left anterior ankle Other (comment)    Wound - Properties Group Placement Date: 02/06/21  -CS Placement Time: 0035  -CS Present on Hospital Admission: Y  -CS Side: Left  -CS Orientation: anterior  -CS Location: ankle  -CS Primary Wound Type: Other  -CS    Closure  NELY  -JT  NELY  -JT  NELY  -SK    Base  red  -JT  red  -JT  red  -SK    Periwound  edematous;excoriated;redness  -JT  edematous;excoriated;redness  -JT  edematous;excoriated;redness  -SK    Periwound Temperature  warm  -JT  warm  -JT  warm  -SK    Periwound  Skin Turgor  firm  -JT  firm  -JT  firm  -SK    Retired Wound - Properties Group Date first assessed: 02/06/21  -CS Time first assessed: 0035  -CS Present on Hospital Admission: Y  -CS Side: Left  -CS Location: ankle  -CS Primary Wound Type: Other  -CS       Wound 02/06/21 0047 Right heel Other (comment)    Wound - Properties Group Placement Date: 02/06/21  -CS Placement Time: 0047  -CS Present on Hospital Admission: Y  -CS Side: Right  -CS Location: heel  -CS Primary Wound Type: Other  -CS, Chronic redness and swelling      Closure  NELY  -JT  NELY  -JT  NELY  -SK    Base  red  -JT  red  -JT  red  -SK    Periwound  edematous;excoriated;redness  -JT  edematous;excoriated;redness  -JT  edematous;excoriated;redness  -SK    Retired Wound - Properties Group Date first assessed: 02/06/21  -CS Time first assessed: 0047  -CS Present on Hospital Admission: Y  -CS Side: Right  -CS Location: heel  -CS Primary Wound Type: Other  -CS, Chronic redness and swelling         Wound 02/06/21 0050 Left posterior thigh Other (comment)    Wound - Properties Group Placement Date: 02/06/21  -CS Placement Time: 0050  -CS Present on Hospital Admission: Y  -CS Side: Left  -CS Orientation: posterior  -CS Location: thigh  -CS Primary Wound Type: Other  -CS, Chronic redness and swelling      Base  red;scab  -JT  red;scab  -JT  red;scab  -SK    Periwound  edematous;excoriated;redness  -JT  edematous;excoriated;redness  -JT  edematous;excoriated;redness  -SK    Retired Wound - Properties Group Date first assessed: 02/06/21  -CS Time first assessed: 0050  -CS Present on Hospital Admission: Y  -CS Side: Left  -CS Location: thigh  -CS Primary Wound Type: Other  -CS, Chronic redness and swelling         Wound 02/06/21 0051 coccyx Pressure Injury    Wound - Properties Group Placement Date: 02/06/21  -CS Placement Time: 0051  -CS Present on Hospital Admission: Y  -CS Location: coccyx  -CS Primary Wound Type: Pressure inj  -CS Stage, Pressure Injury :  Stage 2  -CS    Base  non-blanchable;scab  -JT  non-blanchable;scab  -JT  non-blanchable;scab  -SK    Periwound  non-blanchable;redness  -JT  non-blanchable;redness  -JT  non-blanchable;redness  -SK    Retired Wound - Properties Group Date first assessed: 02/06/21  -CS Time first assessed: 0051  -CS Present on Hospital Admission: Y  -CS Location: coccyx  -CS Primary Wound Type: Pressure inj  -CS      User Key  (r) = Recorded By, (t) = Taken By, (c) = Cosigned By    Initials Name Provider Type    CS Naeem Jiang, RN Registered Nurse    Rosa Grady RN Registered Nurse    Shelbie Fernando RN Registered Nurse          Procedures:              Results Review:     I reviewed the patient's new clinical results.      Lab Results (last 24 hours)     Procedure Component Value Units Date/Time    Pathology Consultation [[id number]] Collected: 02/11/21 1227    Specimen: Blood, Venous Line Updated: 02/12/21 1203     Final Diagnosis --     Leukocytosis  Anemia  Thrombocytopenia  No blasts identified       Case Report --     Surgical Pathology Report                         Case: [id number]                                  Authorizing Provider:  Kiah Brown APRN      Collected:           02/11/2021 12:27 PM          Ordering Location:     [facility]       Received:            02/12/2021 07:46 AM                                 PROGRESS CARE                                                                Pathologist:           Marcus Stern MD                                                            Specimen:    Blood, Venous Line                                                                         POC Glucose Once [[id number]]  (Normal) Collected: 02/12/21 1131    Specimen: Blood Updated: 02/12/21 1139     Glucose 104 mg/dL      Comment: Serial Number: [id number]:  [id number]       POC Glucose Once [[id number]]  (Normal) Collected: 02/12/21 0814    Specimen: Blood Updated: 02/12/21 0815      Glucose 105 mg/dL      Comment: Serial Number: [id number]:  [id number]       CBC & Differential [[id number]]  (Abnormal) Collected: 02/12/21 0427    Specimen: Blood Updated: 02/12/21 0542    Narrative:      The following orders were created for panel order CBC & Differential.  Procedure                               Abnormality         Status                     ---------                               -----------         ------                     Scan Slide[[id number]]                                       Final result               CBC Auto Differential[[id number]]        Abnormal            Final result                 Please view results for these tests on the individual orders.    Scan Slide [[id number]] Collected: 02/12/21 0427    Specimen: Blood Updated: 02/12/21 0542     Scan Slide --     Comment: See Manual Differential Results       Manual Differential [[id number]]  (Abnormal) Collected: 02/12/21 0427    Specimen: Blood Updated: 02/12/21 0542     Neutrophil % 74.0 %      Lymphocyte % 4.0 %      Monocyte % 5.0 %      Bands %  17.0 %      Neutrophils Absolute 12.92 10*3/mm3      Lymphocytes Absolute 0.57 10*3/mm3      Monocytes Absolute 0.71 10*3/mm3      Anisocytosis Slight/1+     Toxic Granulation Slight/1+     Platelet Estimate Decreased    CBC Auto Differential [[id number]]  (Abnormal) Collected: 02/12/21 0427    Specimen: Blood Updated: 02/12/21 0542     WBC 14.20 10*3/mm3      RBC 2.92 10*6/mm3      Hemoglobin 9.0 g/dL      Hematocrit 28.3 %      MCV 97.0 fL      MCH 30.9 pg      MCHC 31.8 g/dL      RDW 16.7 %      RDW-SD 57.3 fl      MPV 9.4 fL      Platelets 63 10*3/mm3     Narrative:      The previously reported component NRBC is no longer being reported. Previous result was 0.3 /100 WBC (Reference Range: 0.0-0.2 /100 WBC) on 2/12/2021 at 0450 EST.    Comprehensive Metabolic Panel [[id number]]  (Abnormal) Collected: 02/12/21 0427    Specimen: Blood Updated: 02/12/21 0512     Glucose 220 mg/dL        mg/dL      Creatinine 3.46 mg/dL      Sodium 136 mmol/L      Potassium 3.8 mmol/L      Chloride 99 mmol/L      CO2 24.0 mmol/L      Calcium 8.0 mg/dL      Total Protein 5.7 g/dL      Albumin 2.90 g/dL      ALT (SGPT) 35 U/L      AST (SGOT) 72 U/L      Alkaline Phosphatase 762 U/L      Comment: Result checked         Total Bilirubin 1.2 mg/dL      eGFR Non African Amer 13 mL/min/1.73      Comment: <15 Indicative of kidney failure.        eGFR   Amer --     Comment: <15 Indicative of kidney failure.        Globulin 2.8 gm/dL      A/G Ratio 1.0 g/dL      BUN/Creatinine Ratio 29.5     Anion Gap 13.0 mmol/L     Narrative:      GFR Normal >60  Chronic Kidney Disease <60  Kidney Failure <15      BNP [[id number]]  (Abnormal) Collected: 02/12/21 0427    Specimen: Blood Updated: 02/12/21 0512     proBNP 66,389.0 pg/mL     Narrative:      Among patients with dyspnea, NT-proBNP is highly sensitive for the detection of acute congestive heart failure. In addition NT-proBNP of <300 pg/ml effectively rules out acute congestive heart failure with 99% negative predictive value.    Results may be falsely decreased if patient taking Biotin.      Magnesium [[id number]]  (Normal) Collected: 02/12/21 0427    Specimen: Blood Updated: 02/12/21 0501     Magnesium 2.1 mg/dL     POC Glucose Once [[id number]]  (Abnormal) Collected: 02/11/21 2010    Specimen: Blood Updated: 02/11/21 2011     Glucose 129 mg/dL      Comment: Serial Number: [id number]:  [id number]       Basic Metabolic Panel [[id number]]  (Abnormal) Collected: 02/11/21 1714    Specimen: Blood Updated: 02/11/21 1739     Glucose 142 mg/dL       mg/dL      Creatinine 3.58 mg/dL      Sodium 137 mmol/L      Potassium 4.1 mmol/L      Chloride 99 mmol/L      CO2 24.0 mmol/L      Calcium 7.8 mg/dL      eGFR   Amer --     Comment: <15 Indicative of kidney failure.        eGFR Non African Amer 12 mL/min/1.73      Comment: <15 Indicative of kidney failure.         BUN/Creatinine Ratio 27.9     Anion Gap 14.0 mmol/L     Narrative:      GFR Normal >60  Chronic Kidney Disease <60  Kidney Failure <15      POC Glucose Once [[id number]]  (Abnormal) Collected: 02/11/21 1635    Specimen: Blood Updated: 02/11/21 1637     Glucose 139 mg/dL      Comment: Serial Number: [id number]:  [id number]       Heparin-induced Platelet Antibody [[id number]] Collected: 02/09/21 1540    Specimen: Blood Updated: 02/11/21 1612     Heparin Induced Plt Ab 0.079 OD     Narrative:      Performed at:  [address] [place] [address]  [id number]  : Misa Calvillo MD, Phone:  [phone]    Haptoglobin [[id number]]  (Normal) Collected: 02/11/21 1227    Specimen: Blood Updated: 02/11/21 1611     Haptoglobin 138 mg/dL     Blood Culture - Blood, Arm, Right [[id number]] Collected: 02/06/21 1307    Specimen: Blood from Arm, Right Updated: 02/11/21 1431     Blood Culture No growth at 5 days    Blood Culture - Blood, Arm, Left [[id number]] Collected: 02/06/21 1309    Specimen: Blood from Arm, Left Updated: 02/11/21 1431     Blood Culture No growth at 5 days    aPTT [[id number]]  (Abnormal) Collected: 02/11/21 1227    Specimen: Blood Updated: 02/11/21 1257     PTT 34.0 seconds     Protime-INR [[id number]]  (Abnormal) Collected: 02/11/21 1227    Specimen: Blood Updated: 02/11/21 1257     Protime 13.3 Seconds      INR 1.22    Fibrinogen [[id number]]  (Abnormal) Collected: 02/11/21 1227    Specimen: Blood Updated: 02/11/21 1257     Fibrinogen 554 mg/dL     Peripheral Blood Smear [[id number]] Collected: 02/11/21 1227    Specimen: Blood Updated: 02/11/21 1247     Pathology Review Yes    Reticulocytes [[id number]]  (Abnormal) Collected: 02/11/21 1227    Specimen: Blood Updated: 02/11/21 1245     Reticulocyte % 1.98 %      Reticulocyte Absolute 0.0665 10*6/mm3     Urine Culture - Urine, Urine, Catheter [[id number]]  (Abnormal) Collected: 02/10/21 0306    Specimen: Urine, Catheter Updated:  02/11/21 1234     Urine Culture Yeast isolated     Comment: No further workup.       Urine Culture - Urine, Urine, Random Void [[id number]]  (Abnormal) Collected: 02/10/21 1616    Specimen: Urine, Random Void Updated: 02/11/21 1234     Urine Culture Yeast isolated     Comment: No further workup.       Protein Electrophoresis, Total [[id number]] Collected: 02/11/21 1227    Specimen: Blood Updated: 02/11/21 1234    BNP [[id number]]  (Abnormal) Collected: 02/11/21 1005    Specimen: Blood Updated: 02/11/21 1230     proBNP 65,952.0 pg/mL     Narrative:      Among patients with dyspnea, NT-proBNP is highly sensitive for the detection of acute congestive heart failure. In addition NT-proBNP of <300 pg/ml effectively rules out acute congestive heart failure with 99% negative predictive value.    Results may be falsely decreased if patient taking Biotin.      POC Glucose Once [[id number]]  (Normal) Collected: 02/11/21 1219    Specimen: Blood Updated: 02/11/21 1228     Glucose 103 mg/dL      Comment: Serial Number: [id number]:  [id number]           No results found for: HGBA1C  Results from last 7 days   Lab Units 02/11/21  1227   INR  1.22*       Results from last 7 days   Lab Units 02/06/21  1904   PH, ARTERIAL pH units 7.337*   PO2 ART mm Hg 79.5*   PCO2, ARTERIAL mm Hg 26.4*   HCO3 ART mmol/L 14.2*     Lab Results   Component Value Date    LIPASE 42 01/13/2018     Lab Results   Component Value Date    CHOL 126 02/05/2020    TRIG 51 02/05/2020    HDL 66 (H) 02/05/2020    LDL 50 02/05/2020       Lab Results   Lab Value Date/Time    FINALDX  02/11/2021 1227     Leukocytosis  Anemia  Thrombocytopenia  No blasts identified         Microbiology Results (last 10 days)     Procedure Component Value - Date/Time    Urine Culture - Urine, Urine, Random Void [[id number]]  (Abnormal) Collected: 02/10/21 1616    Lab Status: Final result Specimen: Urine, Random Void Updated: 02/11/21 1234     Urine Culture Yeast isolated     Comment:  No further workup.       Urine Culture - Urine, Urine, Catheter [[id number]]  (Abnormal) Collected: 02/10/21 0306    Lab Status: Final result Specimen: Urine, Catheter Updated: 02/11/21 1234     Urine Culture Yeast isolated     Comment: No further workup.       Clostridium Difficile Toxin - Stool, Per Rectum [[id number]]  (Normal) Collected: 02/06/21 1952    Lab Status: Final result Specimen: Stool from Per Rectum Updated: 02/07/21 0725    Narrative:      The following orders were created for panel order Clostridium Difficile Toxin - Stool, Per Rectum.  Procedure                               Abnormality         Status                     ---------                               -----------         ------                     Clostridium Difficile EI...[[id number]]  Normal              Final result                 Please view results for these tests on the individual orders.    Clostridium Difficile EIA - Stool, Per Rectum [[id number]]  (Normal) Collected: 02/06/21 1952    Lab Status: Final result Specimen: Stool from Per Rectum Updated: 02/07/21 0725     C Diff GDH / Toxin Negative    Blood Culture - Blood, Arm, Left [[id number]] Collected: 02/06/21 1309    Lab Status: Final result Specimen: Blood from Arm, Left Updated: 02/11/21 1431     Blood Culture No growth at 5 days    Blood Culture - Blood, Arm, Right [[id number]] Collected: 02/06/21 1307    Lab Status: Final result Specimen: Blood from Arm, Right Updated: 02/11/21 1431     Blood Culture No growth at 5 days    MRSA Screen, PCR (Inpatient) - Swab, Nares [[id number]]  (Abnormal) Collected: 02/06/21 1114    Lab Status: Final result Specimen: Swab from Nares Updated: 02/06/21 1311     MRSA PCR MRSA Detected    COVID PRE-OP / PRE-PROCEDURE SCREENING ORDER (NO ISOLATION) - Swab, Nasopharynx [[id number]]  (Normal) Collected: 02/06/21 0153    Lab Status: Final result Specimen: Swab from Nasopharynx Updated: 02/06/21 1534    Narrative:      The following orders were created  for panel order COVID PRE-OP / PRE-PROCEDURE SCREENING ORDER (NO ISOLATION) - Swab, Nasopharynx.  Procedure                               Abnormality         Status                     ---------                               -----------         ------                     COVID-19,APTIMA PANTHER,...[[id number]]  Normal              Final result                 Please view results for these tests on the individual orders.    COVID-19,APTIMA PANTHER,[facility]-HOUSE, NP/OP SWAB IN UTM/VTM/[facility] TRANSPORT MEDIA,24 HR TAT - Swab, Nasopharynx [[id number]]  (Normal) Collected: 02/06/21 0153    Lab Status: Final result Specimen: Swab from Nasopharynx Updated: 02/06/21 1534     COVID19 Not Detected    Narrative:      Fact sheet for providers: https://www.fda.gov/media/872452/download     Fact sheet for patients: https://www.fda.gov/media/854635/download    Test performed by RT PCR.          ECG/EMG Results (most recent)     Procedure Component Value Units Date/Time    Adult Transthoracic Echo Complete W/ Cont if Necessary Per Protocol [[id number]] Collected: 02/06/21 0850     Updated: 02/06/21 1609     BSA 1.8 m^2      RVIDd 2.7 cm      IVSd 1.3 cm      LVIDd 3.9 cm      LVIDs 2.8 cm      LVPWd 1.2 cm      IVS/LVPW 1.0     FS 28.8 %      EDV(Teich) 67.0 ml      ESV(Teich) 29.4 ml      EF(Teich) 56.0 %      EDV(cubed) 60.5 ml      ESV(cubed) 21.9 ml      EF(cubed) 63.9 %      LV mass(C)d 170.0 grams      LV mass(C)dI 92.8 grams/m^2      SV(Teich) 37.5 ml      SI(Teich) 20.5 ml/m^2      SV(cubed) 38.7 ml      SI(cubed) 21.1 ml/m^2      Ao root diam 2.5 cm      Ao root area 5.1 cm^2      ACS 1.6 cm      asc Aorta Diam 3.2 cm      LVOT diam 1.8 cm      LVOT area 2.6 cm^2      RVOT diam 2.6 cm      RVOT area 5.5 cm^2      EDV(MOD-sp4) 59.5 ml      ESV(MOD-sp4) 26.6 ml      EF(MOD-sp4) 55.3 %      EDV(MOD-sp2) 53.3 ml      ESV(MOD-sp2) 20.0 ml      EF(MOD-sp2) 62.4 %      SV(MOD-sp4) 32.9 ml      SI(MOD-sp4) 18.0 ml/m^2      SV(MOD-sp2)  33.2 ml      SI(MOD-sp2) 18.1 ml/m^2      Ao root area (BSA corrected) 1.4     LV Olivier Vol (BSA corrected) 32.5 ml/m^2      LV Sys Vol (BSA corrected) 14.5 ml/m^2      Aortic R-R 1.0 sec      Aortic HR 59.0 BPM      MV V2 max 134.6 cm/sec      MV max PG 7.2 mmHg      MV V2 mean 51.6 cm/sec      MV mean PG 1.8 mmHg      MV V2 VTI 24.3 cm      MVA(VTI) 2.3 cm^2      Ao pk devante 182.4 cm/sec      Ao max PG 13.3 mmHg      Ao max PG (full) 8.5 mmHg      Ao V2 mean 142.3 cm/sec      Ao mean PG 8.7 mmHg      Ao mean PG (full) 5.5 mmHg      Ao V2 VTI 37.9 cm      WILLIS(I,A) 1.5 cm^2      WILLIS(I,D) 1.5 cm^2      WILLIS(V,A) 1.5 cm^2      WILLIS(V,D) 1.5 cm^2      AI max devante 264.5 cm/sec      AI max PG 28.0 mmHg      AI dec slope 171.9 cm/sec^2      AI dec time 1.5 sec      AI P1/2t 450.8 msec      LV V1 max PG 4.8 mmHg      LV V1 mean PG 3.2 mmHg      LV V1 max 110.0 cm/sec      LV V1 mean 85.9 cm/sec      LV V1 VTI 21.6 cm      CO(Ao) 11.4 l/min      CI(Ao) 6.2 l/min/m^2      SV(Ao) 192.9 ml      SI(Ao) 105.3 ml/m^2      CO(LVOT) 3.3 l/min      CI(LVOT) 1.8 l/min/m^2      SV(LVOT) 55.2 ml      SV(RVOT) 59.1 ml      SI(LVOT) 30.1 ml/m^2      PA V2 max 88.8 cm/sec      PA max PG 3.2 mmHg      PA max PG (full) 1.9 mmHg      PA V2 mean 63.8 cm/sec      PA mean PG 1.8 mmHg      PA mean PG (full) 1.0 mmHg      PA V2 VTI 17.2 cm      PVA(I,A) 3.4 cm^2      BH CV ECHO SAHIL - PVA(I,D) 3.4 cm^2       CV ECHO SAHIL - PVA(V,A) 3.4 cm^2       CV ECHO SAHIL - PVA(V,D) 3.4 cm^2      PA acc time 0.07 sec      RV V1 max PG 1.2 mmHg      RV V1 mean PG 0.77 mmHg      RV V1 max 55.1 cm/sec      RV V1 mean 41.7 cm/sec      RV V1 VTI 10.8 cm      TR max devante 237.7 cm/sec      RVSP(TR) 25.6 mmHg      RAP systole 3.0 mmHg      PA pr(Accel) 48.2 mmHg      Pulm Sys Devante 41.0 cm/sec      Pulm Olivier Devante 37.5 cm/sec      Pulm S/D 1.1     Qp/Qs 1.1      CV ECHO SAHIL - BZI_BMI 33.1 kilograms/m^2       CV ECHO SAHIL - BSA(HAYCOCK) 1.9 m^2       CV ECHO SAHIL -  BZI_METRIC_WEIGHT 82.1 kg       CV ECHO SAHIL - BZI_METRIC_HEIGHT 157.5 cm      EF(MOD-bp) 60.0 %      LA dimension(2D) 4.2 cm     Narrative:      Normal LV size and contractility EF of 55%  Moderate right ventricular enlargement with severe right atrial   enlargement, catheter probably pacemaker lead seen.  Severe left atrial enlargement seen.  Aortic valve, mitral valve, tricuspid valve appears structurally normal,   mild MR, AR, seen.  There is moderate  tricuspid regurgitation seen.    Calculated RV systolic pressure is 24mmHg.  No pericardial effusion seen.  Proximal aorta appears normal in size.          Results for orders placed during the hospital encounter of 02/05/21   Duplex Venous Lower Extremity - Bilateral CAR    Narrative · Exam limited by patient intolerance to compression and body habitus.  · The distal left femoral and the right and left peroneal veins are not   imaged.  · All other veins appeared normal bilaterally.          Results for orders placed during the hospital encounter of 02/05/21   Adult Transthoracic Echo Complete W/ Cont if Necessary Per Protocol    Narrative Normal LV size and contractility EF of 55%  Moderate right ventricular enlargement with severe right atrial   enlargement, catheter probably pacemaker lead seen.  Severe left atrial enlargement seen.  Aortic valve, mitral valve, tricuspid valve appears structurally normal,   mild MR, AR, seen.  There is moderate  tricuspid regurgitation seen.    Calculated RV systolic pressure is 24mmHg.  No pericardial effusion seen.  Proximal aorta appears normal in size.       Xr Chest 1 View    Result Date: 2/11/2021  1. Cardiomegaly without acute pulmonary process. No change from prior exam.  Electronically Signed By-Dae Auguste MD On:2/11/2021 2:18 PM This report was finalized on [id number] by  Dae Auguste MD.    Xr Chest 1 View    Result Date: 2/7/2021  1. New right-sided PICC line with tip terminating over the low SVC. 2.  Stable cardiomegaly.  Electronically Signed By-Dae Auguste MD On:2/7/2021 10:45 AM This report was finalized on [id number] by  Dae Auguste MD.    Xr Chest 1 View    Result Date: 2/6/2021  1.Cardiomegaly  Electronically Signed By-Azar Alarcon MD On:2/6/2021 8:01 AM This report was finalized on [id number] by  Azar Alarcon MD.          Xrays, labs reviewed personally by physician.    Medication Review:   I have reviewed the patient's current medication list      Scheduled Meds  aspirin, 81 mg, Oral, Daily  doxycycline, 100 mg, Oral, Q12H  febuxostat, 40 mg, Oral, BID  fluconazole, 100 mg, Oral, Q24H  folic acid, 1 mg, Oral, Daily  gabapentin, 100 mg, Oral, TID  haloperidol lactate, 5 mg, Intramuscular, Once  magic butt paste, , Topical, Daily  midodrine, 10 mg, Oral, TID AC  nystatin, 5 mL, Swish & Swallow, 4x Daily  sodium chloride, 10 mL, Intravenous, Q12H  vitamin B-12, 500 mcg, Oral, Daily  Zinc Oxide, , Apply externally, BID        Meds Infusions  bumetanide, 2 mg/hr, Last Rate: 2 mg/hr (02/12/21 0836)  dextrose, 20 mL/hr, Last Rate: 20 mL/hr (02/09/21 1827)  DOPamine, 2-20 mcg/kg/min, Last Rate: 2 mcg/kg/min (02/11/21 1234)        Meds PRN  •  acetaminophen **OR** acetaminophen **OR** acetaminophen  •  dextrose  •  dextrose  •  glucagon (human recombinant)  •  HYDROcodone-acetaminophen  •  magnesium sulfate **OR** magnesium sulfate in D5W 1g/100mL (PREMIX) **OR** magnesium sulfate  •  melatonin  •  nitroglycerin  •  OLANZapine  •  ondansetron **OR** ondansetron  •  potassium chloride  •  potassium chloride  •  sodium chloride  •  traMADol        Assessment/Plan   Assessment/Plan     Active Hospital Problems    Diagnosis  POA   • Sepsis (CMS/HCC) [A41.9]  Yes   • CONG (acute kidney injury) (CMS/HCC) [N17.9]  Yes   • Lactic acidosis [E87.2]  Yes   • Metabolic acidosis [E87.2]  Yes   • Delirium, acute [R41.0]  Yes   • Lower extremity cellulitis [L03.119]  Yes   • CAD (coronary artery disease)  [I25.10]  Yes   • Acute UTI (urinary tract infection) [N39.0]  Yes   • Non-pressure chronic ulcer right ankle, limited to breakdown skin (CMS/HCC) [L97.311]  Yes   • Automatic implantable cardiac defibrillator in situ [Z95.810]  Yes   • Lymphedema [I89.0]  Unknown   • Gout [M10.9]  Yes   • Hypertension, benign [I10]  Yes   • Congestive heart failure (CMS/HCC) [I50.9]  Yes   • Atrial fibrillation (CMS/HCC) [I48.91]  Yes      Resolved Hospital Problems   No resolved problems to display.       MEDICAL DECISION MAKING COMPLEXITY BY PROBLEM:       Septic shock from LE cellulitis and UTI   - bilateral LE Lymphedema   -Rocephin given x1 at sending facility,   -started vancomycin and cefepime 2/6/2021.  Added Flagyl 2/7/21 because of leukocytosis  -Started on dopamine 2/6/2021  - procalcitonin 171.12  -MRSA screen positive  -C. difficile negative  -Falls precautions  -Wound care consulted Ace wraps as tolerated.  -Venous Doppler lower extremities negative.  -ID consulted 2/7/2021.  -Antibiotics changed to ceftaroline then to doxy/diflucan x1w    Hypotension on midodrine  Dopamine drip.  Continues but attempting to do taper off    Candiduria.  Exchange Ayon catheter versus external catheter/repeat urinalysis per ID       CONG on CKD III:  Likely cardiorenal etiology  -Hold colchicine  --Consulted nephrology  -s/p renal ultrasound 2/5/2020  -Responsive to diuresis    Thrombocytopenia possible related to sepsis.  Awaiting heparin-induced antibody  B12 and folic acid not decreased  Consult hematology      Delirium:  -No history of dementia per daughter  -Avoid sedating medications  -Improving    Right arm IV infiltration:  -S/p ice to arm and monitor  -Improved  Patient has multiple ecchymotic patches bilateral forearms.    CAD  -Denies chest pain  -Continue aspirin,  Resume beta-blocker when blood pressure allows.    Atrial fibrillation s/p pacemaker  -Hold digoxin given her kidney failure  Currently on metoprolol at  home  -Repeat TTE 2/13/2018--> LVEF 40% with mild aortic insufficiency  -TTE 2/6/2021--> LVEF 55%, severe right atrial enlargement, severe left atrial enlargement, moderate TR  -Daughter claims patient off Xarelto     Hypertension  -Hold metoprolol temporarily because of low BP  On Bumex  Cosyntropin stimulation shows normal responses    Hyperlipidemia  -Check lipid panel  -on  home Zetia at home     Chronic pain  -Continue gabapentin and tramadol     Gout  -Hold colchicine because ok CONG     Pressure ulcer on right ankle  -Wound care consulted     -Hypoglycemia.  Cosyntropin stimulation negative  -Continue D10 for now until patient appetite improves.      VTE Prophylaxis -   Mechanical Order History:     None      Pharmalogical Order History:      Ordered     Dose Route Frequency Stop    02/06/21 0500  rivaroxaban (XARELTO) tablet 10 mg     Question Answer Comment   Are you ordering rivaroxaban for the prevention of blood clots in an acutely ill medical patient? Yes    Select any exclusion criteria that may apply to patient Exclusion Criteria Does Not Apply to This Patient Alternative to Lovenox/heparin inpatient with thrombocytopenia unable to receive mechanical prophylaxis       10 mg PO Daily With Dinner --    02/06/21 0020  heparin (porcine) 5000 UNIT/ML injection 5,000 Units  Status:  Discontinued      5,000 Units SC Every 12 Hours Scheduled 02/06/21 0147                  Code Status -   Code Status and Medical Interventions:   Ordered at: 02/06/21 0258     Level Of Support Discussed With:    Patient     Code Status:    CPR     Medical Interventions (Level of Support Prior to Arrest):    Full       This patient has been examined wearing appropriate Personal Protective Equipment and discussed with nursing. 02/12/21        Discharge Planning    Patient does have episodes of confusion in the evenings Zyprexa PRN ordered.  Elevated procalcitonin and worsening WBC thus added Flagyl to Vanco and cefepime.  ID  consulted 2/7/2021.      Electronically signed by Alexandro Huitron MD, 02/12/21, 12:25 EST.  [facility]ist Team

## 2021-02-13 ENCOUNTER — INPATIENT HOSPITAL (OUTPATIENT)
Dept: URBAN - METROPOLITAN AREA HOSPITAL 84 | Facility: HOSPITAL | Age: 76
End: 2021-02-13

## 2021-02-13 ENCOUNTER — APPOINTMENT (OUTPATIENT)
Dept: GENERAL RADIOLOGY | Facility: HOSPITAL | Age: 76
End: 2021-02-13

## 2021-02-13 DIAGNOSIS — R74.8 ABNORMAL LEVELS OF OTHER SERUM ENZYMES: ICD-10-CM

## 2021-02-13 DIAGNOSIS — R41.0 DISORIENTATION, UNSPECIFIED: ICD-10-CM

## 2021-02-13 DIAGNOSIS — K72.90 HEPATIC FAILURE, UNSPECIFIED WITHOUT COMA: ICD-10-CM

## 2021-02-13 DIAGNOSIS — R93.2 ABNORMAL FINDINGS ON DIAGNOSTIC IMAGING OF LIVER AND BILIARY: ICD-10-CM

## 2021-02-13 DIAGNOSIS — D64.9 ANEMIA, UNSPECIFIED: ICD-10-CM

## 2021-02-13 LAB
ALBUMIN SERPL-MCNC: 2.4 G/DL (ref 3.5–5.2)
ALBUMIN/GLOB SERPL: 0.9 G/DL
ALP SERPL-CCNC: 994 U/L (ref 39–117)
ALT SERPL W P-5'-P-CCNC: 45 U/L (ref 1–33)
ANION GAP SERPL CALCULATED.3IONS-SCNC: 11 MMOL/L (ref 5–15)
ANION GAP SERPL CALCULATED.3IONS-SCNC: 12 MMOL/L (ref 5–15)
APAP SERPL-MCNC: 7.7 MCG/ML (ref 0–30)
AST SERPL-CCNC: 108 U/L (ref 1–32)
BILIRUB SERPL-MCNC: 0.8 MG/DL (ref 0–1.2)
BUN SERPL-MCNC: 102 MG/DL (ref 8–23)
BUN SERPL-MCNC: 99 MG/DL (ref 8–23)
BUN/CREAT SERPL: 26.8 (ref 7–25)
BUN/CREAT SERPL: 27.3 (ref 7–25)
CALCIUM SPEC-SCNC: 7.3 MG/DL (ref 8.6–10.5)
CALCIUM SPEC-SCNC: 7.7 MG/DL (ref 8.6–10.5)
CHLORIDE SERPL-SCNC: 102 MMOL/L (ref 98–107)
CHLORIDE SERPL-SCNC: 103 MMOL/L (ref 98–107)
CO2 SERPL-SCNC: 25 MMOL/L (ref 22–29)
CO2 SERPL-SCNC: 27 MMOL/L (ref 22–29)
CREAT SERPL-MCNC: 3.7 MG/DL (ref 0.57–1)
CREAT SERPL-MCNC: 3.73 MG/DL (ref 0.57–1)
DEPRECATED RDW RBC AUTO: 56.9 FL (ref 37–54)
EOSINOPHIL # BLD MANUAL: 0.11 10*3/MM3 (ref 0–0.4)
EOSINOPHIL NFR BLD MANUAL: 1 % (ref 0.3–6.2)
ERYTHROCYTE [DISTWIDTH] IN BLOOD BY AUTOMATED COUNT: 16.6 % (ref 12.3–15.4)
GFR SERPL CREATININE-BSD FRML MDRD: 12 ML/MIN/1.73
GFR SERPL CREATININE-BSD FRML MDRD: 12 ML/MIN/1.73
GFR SERPL CREATININE-BSD FRML MDRD: ABNORMAL ML/MIN/{1.73_M2}
GFR SERPL CREATININE-BSD FRML MDRD: ABNORMAL ML/MIN/{1.73_M2}
GLOBULIN UR ELPH-MCNC: 2.6 GM/DL
GLUCOSE BLDC GLUCOMTR-MCNC: 106 MG/DL (ref 70–105)
GLUCOSE BLDC GLUCOMTR-MCNC: 123 MG/DL (ref 70–105)
GLUCOSE BLDC GLUCOMTR-MCNC: 228 MG/DL (ref 70–105)
GLUCOSE BLDC GLUCOMTR-MCNC: 87 MG/DL (ref 70–105)
GLUCOSE SERPL-MCNC: 78 MG/DL (ref 65–99)
GLUCOSE SERPL-MCNC: 85 MG/DL (ref 65–99)
HAV IGM SERPL QL IA: NORMAL
HBV CORE IGM SERPL QL IA: NORMAL
HBV SURFACE AG SERPL QL IA: NORMAL
HCT VFR BLD AUTO: 26.3 % (ref 34–46.6)
HCV AB SER DONR QL: NORMAL
HGB BLD-MCNC: 8.5 G/DL (ref 12–15.9)
INR PPP: 1.17 (ref 0.93–1.1)
LYMPHOCYTES # BLD MANUAL: 0.55 10*3/MM3 (ref 0.7–3.1)
LYMPHOCYTES NFR BLD MANUAL: 3 % (ref 5–12)
LYMPHOCYTES NFR BLD MANUAL: 5 % (ref 19.6–45.3)
MAGNESIUM SERPL-MCNC: 1.8 MG/DL (ref 1.6–2.4)
MCH RBC QN AUTO: 31.5 PG (ref 26.6–33)
MCHC RBC AUTO-ENTMCNC: 32.4 G/DL (ref 31.5–35.7)
MCV RBC AUTO: 97.3 FL (ref 79–97)
MONOCYTES # BLD AUTO: 0.33 10*3/MM3 (ref 0.1–0.9)
NEUTROPHILS # BLD AUTO: 10.01 10*3/MM3 (ref 1.7–7)
NEUTROPHILS NFR BLD MANUAL: 78 % (ref 42.7–76)
NEUTS BAND NFR BLD MANUAL: 13 % (ref 0–5)
PLATELET # BLD AUTO: 74 10*3/MM3 (ref 140–450)
PMV BLD AUTO: 9 FL (ref 6–12)
POTASSIUM SERPL-SCNC: 3.7 MMOL/L (ref 3.5–5.2)
POTASSIUM SERPL-SCNC: 3.9 MMOL/L (ref 3.5–5.2)
PROT SERPL-MCNC: 5 G/DL (ref 6–8.5)
PROTHROMBIN TIME: 12.8 SECONDS (ref 9.6–11.7)
RBC # BLD AUTO: 2.7 10*6/MM3 (ref 3.77–5.28)
RBC MORPH BLD: NORMAL
SCAN SLIDE: NORMAL
SMALL PLATELETS BLD QL SMEAR: ABNORMAL
SODIUM SERPL-SCNC: 140 MMOL/L (ref 136–145)
SODIUM SERPL-SCNC: 140 MMOL/L (ref 136–145)
TOXIC GRANULATION: ABNORMAL
WBC # BLD AUTO: 11 10*3/MM3 (ref 3.4–10.8)

## 2021-02-13 PROCEDURE — 99232 SBSQ HOSP IP/OBS MODERATE 35: CPT | Performed by: INTERNAL MEDICINE

## 2021-02-13 PROCEDURE — 82105 ALPHA-FETOPROTEIN SERUM: CPT | Performed by: NURSE PRACTITIONER

## 2021-02-13 PROCEDURE — 85007 BL SMEAR W/DIFF WBC COUNT: CPT | Performed by: PHYSICIAN ASSISTANT

## 2021-02-13 PROCEDURE — 99233 SBSQ HOSP IP/OBS HIGH 50: CPT | Performed by: INTERNAL MEDICINE

## 2021-02-13 PROCEDURE — 86038 ANTINUCLEAR ANTIBODIES: CPT | Performed by: NURSE PRACTITIONER

## 2021-02-13 PROCEDURE — 86376 MICROSOMAL ANTIBODY EACH: CPT | Performed by: NURSE PRACTITIONER

## 2021-02-13 PROCEDURE — 80074 ACUTE HEPATITIS PANEL: CPT | Performed by: INTERNAL MEDICINE

## 2021-02-13 PROCEDURE — 25010000002 MAGNESIUM SULFATE 2 GM/50ML SOLUTION: Performed by: HOSPITALIST

## 2021-02-13 PROCEDURE — 82103 ALPHA-1-ANTITRYPSIN TOTAL: CPT | Performed by: NURSE PRACTITIONER

## 2021-02-13 PROCEDURE — 80053 COMPREHEN METABOLIC PANEL: CPT | Performed by: INTERNAL MEDICINE

## 2021-02-13 PROCEDURE — 71046 X-RAY EXAM CHEST 2 VIEWS: CPT

## 2021-02-13 PROCEDURE — 85025 COMPLETE CBC W/AUTO DIFF WBC: CPT | Performed by: PHYSICIAN ASSISTANT

## 2021-02-13 PROCEDURE — 99222 1ST HOSP IP/OBS MODERATE 55: CPT | Performed by: NURSE PRACTITIONER

## 2021-02-13 PROCEDURE — 83516 IMMUNOASSAY NONANTIBODY: CPT | Performed by: NURSE PRACTITIONER

## 2021-02-13 PROCEDURE — 83735 ASSAY OF MAGNESIUM: CPT | Performed by: PHYSICIAN ASSISTANT

## 2021-02-13 PROCEDURE — 85610 PROTHROMBIN TIME: CPT | Performed by: INTERNAL MEDICINE

## 2021-02-13 PROCEDURE — 82962 GLUCOSE BLOOD TEST: CPT

## 2021-02-13 RX ORDER — BUMETANIDE 0.25 MG/ML
1 INJECTION INTRAMUSCULAR; INTRAVENOUS EVERY 6 HOURS SCHEDULED
Status: DISCONTINUED | OUTPATIENT
Start: 2021-02-13 | End: 2021-02-14

## 2021-02-13 RX ORDER — URSODIOL 300 MG/1
300 CAPSULE ORAL 2 TIMES DAILY
Status: DISCONTINUED | OUTPATIENT
Start: 2021-02-13 | End: 2021-02-18 | Stop reason: HOSPADM

## 2021-02-13 RX ORDER — LACTULOSE 10 G/15ML
20 SOLUTION ORAL 3 TIMES DAILY
Status: DISCONTINUED | OUTPATIENT
Start: 2021-02-13 | End: 2021-02-17

## 2021-02-13 RX ADMIN — BUMETANIDE 1 MG: 0.25 INJECTION, SOLUTION INTRAMUSCULAR; INTRAVENOUS at 17:32

## 2021-02-13 RX ADMIN — CYANOCOBALAMIN TAB 250 MCG 500 MCG: 250 TAB at 09:08

## 2021-02-13 RX ADMIN — GABAPENTIN 100 MG: 100 CAPSULE ORAL at 20:30

## 2021-02-13 RX ADMIN — Medication: at 09:08

## 2021-02-13 RX ADMIN — DOXYCYCLINE 100 MG: 100 TABLET, FILM COATED ORAL at 09:08

## 2021-02-13 RX ADMIN — BUMETANIDE 1 MG: 0.25 INJECTION, SOLUTION INTRAMUSCULAR; INTRAVENOUS at 23:55

## 2021-02-13 RX ADMIN — HYDROCODONE BITARTRATE AND ACETAMINOPHEN 1 TABLET: 5; 325 TABLET ORAL at 02:15

## 2021-02-13 RX ADMIN — ASPIRIN 81 MG CHEWABLE TABLET 81 MG: 81 TABLET CHEWABLE at 09:08

## 2021-02-13 RX ADMIN — Medication 10 ML: at 20:41

## 2021-02-13 RX ADMIN — Medication 10 ML: at 09:14

## 2021-02-13 RX ADMIN — FLUCONAZOLE 100 MG: 100 TABLET ORAL at 17:27

## 2021-02-13 RX ADMIN — MAGNESIUM SULFATE HEPTAHYDRATE 2 G: 40 INJECTION, SOLUTION INTRAVENOUS at 05:26

## 2021-02-13 RX ADMIN — BUMETANIDE 1 MG: 0.25 INJECTION, SOLUTION INTRAMUSCULAR; INTRAVENOUS at 12:20

## 2021-02-13 RX ADMIN — NYSTATIN 500000 UNITS: 100000 SUSPENSION ORAL at 09:07

## 2021-02-13 RX ADMIN — FEBUXOSTAT 40 MG: 40 TABLET, FILM COATED ORAL at 09:08

## 2021-02-13 RX ADMIN — LACTULOSE 20 G: 20 SOLUTION ORAL at 20:30

## 2021-02-13 RX ADMIN — NYSTATIN 500000 UNITS: 100000 SUSPENSION ORAL at 20:31

## 2021-02-13 RX ADMIN — RIFAXIMIN 550 MG: 550 TABLET ORAL at 20:30

## 2021-02-13 RX ADMIN — GABAPENTIN 100 MG: 100 CAPSULE ORAL at 09:08

## 2021-02-13 RX ADMIN — NYSTATIN 500000 UNITS: 100000 SUSPENSION ORAL at 12:12

## 2021-02-13 RX ADMIN — URSODIOL 300 MG: 300 CAPSULE ORAL at 20:30

## 2021-02-13 RX ADMIN — Medication: at 20:47

## 2021-02-13 RX ADMIN — TRAMADOL HYDROCHLORIDE 50 MG: 50 TABLET, FILM COATED ORAL at 20:31

## 2021-02-13 RX ADMIN — ZINC OXIDE: 200 OINTMENT TOPICAL at 09:10

## 2021-02-13 RX ADMIN — DOXYCYCLINE 100 MG: 100 TABLET, FILM COATED ORAL at 20:30

## 2021-02-13 RX ADMIN — LACTULOSE 20 G: 20 SOLUTION ORAL at 17:26

## 2021-02-13 RX ADMIN — FEBUXOSTAT 40 MG: 40 TABLET, FILM COATED ORAL at 20:31

## 2021-02-13 RX ADMIN — HYDROCODONE BITARTRATE AND ACETAMINOPHEN 1 TABLET: 5; 325 TABLET ORAL at 12:20

## 2021-02-13 RX ADMIN — GABAPENTIN 100 MG: 100 CAPSULE ORAL at 17:26

## 2021-02-13 RX ADMIN — FOLIC ACID 1 MG: 1 TABLET ORAL at 12:20

## 2021-02-13 RX ADMIN — NYSTATIN 500000 UNITS: 100000 SUSPENSION ORAL at 17:26

## 2021-02-13 RX ADMIN — DEXTROSE MONOHYDRATE 20 ML/HR: 100 INJECTION, SOLUTION INTRAVENOUS at 20:42

## 2021-02-13 NOTE — PROGRESS NOTES
[facility] Medicine Services Daily Progress Note      Hospitalist Team  LOS 5 days      Patient Care Team:  Rosa M Chavez APRN as PCP - General (Nurse Practitioner)    Patient Location: 2120/1      Subjective   Subjective     Chief Complaint / Subjective  Fevers, leg pain      Brief Synopsis of Hospital Course/HPI    The patient is a 75-year-old female with history atrial fibrillation s/p pacemaker placement, hyperlipidemia, hypertension, CAD, CKD stage III and  chronic lower extremity lymphedema.    Apparently the patient has been having several weeks of worsening lower extremity edema thus went to outside facility.    Laboratory work was significant for , potassium 5.3, BUN 63, creatinine 2.41, WBC 4.5, lactic acid 3.2 and UA with 25-50 WBCs.  The patient was transferred to [facility] for further care      Date::    2/6/21: Poor historian.  Low BP thus gave bolus.  Started on dopamine and transferred to PCU.  Started on bicarb drip.  Confused in the evening.  2/7/21: PICC line placed.  Daughter at the bedside discussed care.  Daughter claims no history of dementia or sundowning.  Leukocytosis.  Added Flagyl.  Consulted ID.  Daughter claims patient off Xarelto.  C. difficile ruled out.      2/8  Has third spacing and bruising.  Patient denies any chest pain  On dopamine drip and being tapered off.  Started on midodrine.    2/9/2021  Outpatient recliner  She has Ace wrap in place  She had been complaining of some tight wraps on the right side and will need to be addressed.  White count better  Her platelet count is 54 slightly better than yesterday  Still on a small amount of dopamine drip.  Renal function still elevated but probably stabilized.    2/10  Patient is now feeling better and her leg swelling is under control with compression dressing that has been changed to allow for some relief of her pain.  She had drop in her creatinine which suggests improvement with  "diuresis and possible cardiorenal etiology    She has been eating yesterday and her sugar dropped and started D10    B12 is above 2000  Haptoglobin is 100 within normal.  She is hypomagnesemic as well.  Folic acid 10.9  Still with significant thrombocytopenia.  We will ask hematologist for evaluation.    2/11/2021  Urine output decreased overnight and Dr. Pinedo wants to start dopamine drip again  Creatinine slightly up again today  Patient is asking when she is going to go home.  Since her right ventricle atrial or enlarged  2/12  Coaches in hips and arms hyun w ambulation  Bands 17%  platelet 63  Hallucinating; talking to her !  Check ammmonia    2/13  Feels sleepy most pf time  [id number]   Initiated lactulose enema and oral lactulose  Consult GI  Worsening alkaline phosphatase and ALT elevation noted  Check hepatitis panel  On Bumex drip  Had good urine output overnight      Review of Systems   All other systems reviewed and are negative.        Objective   Objective      Vital Signs  Temp:  [97.4 °F (36.3 °C)-98.1 °F (36.7 °C)] 97.5 °F (36.4 °C)  Heart Rate:  [60-65] 60  Resp:  [18-22] 19  BP: (124-158)/(53-63) 124/56  Oxygen Therapy  SpO2: 98 %  Pulse Oximetry Type: Continuous  Device (Oxygen Therapy): nasal cannula  Flow (L/min): 2  Flowsheet Rows      First Filed Value   Admission Height  157.5 cm (62\") Documented at 02/05/2021 2359   Admission Weight  78 kg (172 lb) Documented at 02/05/2021 2359        Intake & Output (last 3 days)       02/10 0701 - 02/11 0700 02/11 0701 - 02/12 0700 02/12 0701 - 02/13 0700 02/13 0701 - 02/14 0700    P.O.   480     Other 100       Total Intake(mL/kg) 100 (1.2)  480 (5.8)     Urine (mL/kg/hr) 485 (0.2) 1030 (0.5) 1770 (0.9) 400 (0.8)    Stool        Total Output 485 1030 1770 400    Net -385 -1030 -1290 -400                Lines, Drains & Airways    Active LDAs     Name:   Placement date:   Placement time:   Site:   Days:    Peripheral IV 02/06/21 0403 Anterior;Right " Forearm   02/06/21    0403    Forearm   less than 1                  Physical Exam:    Physical Exam  HENT:      Head: Normocephalic.      Nose: Nose normal.   Eyes:      General: No scleral icterus.     Extraocular Movements: Extraocular movements intact.      Pupils: Pupils are equal, round, and reactive to light.   Neck:      Musculoskeletal: Normal range of motion.   Cardiovascular:      Rate and Rhythm: Normal rate and regular rhythm.   Pulmonary:      Effort: Pulmonary effort is normal.      Breath sounds: Normal breath sounds.   Abdominal:      General: Bowel sounds are normal.      Palpations: Abdomen is soft.   Musculoskeletal:      Comments: Bilateral shin with erythema and edema.  Lymphedema.   Lymphadenopathy:      Cervical: No cervical adenopathy.   Skin:     General: Skin is warm.   Neurological:      Mental Status: She is alert.   Psychiatric:         Attention and Perception: Attention normal.              Wounds (last 24 hours)      LDA Wound     Row Name 02/13/21 0400 02/13/21 0000 02/12/21 2000       Wound 02/06/21 0031 Right lower leg Other (comment)    Wound - Properties Group Placement Date: 02/06/21  -CS Placement Time: 0031  -CS Present on Hospital Admission: Y  -CS Side: Right  -CS Orientation: lower  -CS Location: leg  -CS Primary Wound Type: Other  -CS, chronic redness and swelling     Dressing Appearance  --  --  dry;intact  -JS    Closure  NELY  -JS  NELY  -JS  NELY  -JS    Retired Wound - Properties Group Date first assessed: 02/06/21  -CS Time first assessed: 0031  -CS Present on Hospital Admission: Y  -CS Side: Right  -CS Location: leg  -CS Primary Wound Type: Other  -CS, chronic redness and swelling        Wound 02/06/21 0032 Right anterior foot Other (comment)    Wound - Properties Group Placement Date: 02/06/21  -CS Placement Time: 0032  -CS Present on Hospital Admission: Y  -CS Side: Right  -CS Orientation: anterior  -CS Location: foot  -CS Primary Wound Type: Other  -CS Additional  Comments: chronic redness and swelling  -CS    Dressing Appearance  --  --  dry;intact  -JS    Closure  NELY  -JS  NELY  -JS  NELY  -JS    Retired Wound - Properties Group Date first assessed: 02/06/21  -CS Time first assessed: 0032  -CS Present on Hospital Admission: Y  -CS Side: Right  -CS Location: foot  -CS Primary Wound Type: Other  -CS Additional Comments: chronic redness and swelling  -CS       Wound 02/06/21 0033 Left lower leg Other (comment)    Wound - Properties Group Placement Date: 02/06/21  -CS Placement Time: 0033  -CS Present on Hospital Admission: N  -CS Side: Left  -CS Orientation: lower  -CS Location: leg  -CS Primary Wound Type: Other  -CS Additional Comments: chronic redness and swelling  -CS    Dressing Appearance  --  --  intact;dry  -JS    Closure  NELY  -JS  NELY  -JS  NELY  -JS    Retired Wound - Properties Group Date first assessed: 02/06/21  -CS Time first assessed: 0033  -CS Present on Hospital Admission: N  -CS Side: Left  -CS Location: leg  -CS Primary Wound Type: Other  -CS Additional Comments: chronic redness and swelling  -CS       Wound 02/06/21 0035 Left anterior ankle Other (comment)    Wound - Properties Group Placement Date: 02/06/21  -CS Placement Time: 0035  -CS Present on Hospital Admission: Y  -CS Side: Left  -CS Orientation: anterior  -CS Location: ankle  -CS Primary Wound Type: Other  -CS    Dressing Appearance  --  --  dry;intact  -JS    Closure  NELY  -JS  NELY  -JS  NELY  -JS    Retired Wound - Properties Group Date first assessed: 02/06/21  -CS Time first assessed: 0035  -CS Present on Hospital Admission: Y  -CS Side: Left  -CS Location: ankle  -CS Primary Wound Type: Other  -CS       Wound 02/06/21 0047 Right heel Other (comment)    Wound - Properties Group Placement Date: 02/06/21  -CS Placement Time: 0047  -CS Present on Hospital Admission: Y  -CS Side: Right  -CS Location: heel  -CS Primary Wound Type: Other  -CS, Chronic redness and swelling      Dressing Appearance  --   --  dry;intact  -JS    Closure  NELY  -JS  NELY  -JS  NELY  -JS    Retired Wound - Properties Group Date first assessed: 02/06/21  -CS Time first assessed: 0047  -CS Present on Hospital Admission: Y  -CS Side: Right  -CS Location: heel  -CS Primary Wound Type: Other  -CS, Chronic redness and swelling         Wound 02/06/21 0050 Left posterior thigh Other (comment)    Wound - Properties Group Placement Date: 02/06/21  -CS Placement Time: 0050  -CS Present on Hospital Admission: Y  -CS Side: Left  -CS Orientation: posterior  -CS Location: thigh  -CS Primary Wound Type: Other  -CS, Chronic redness and swelling      Base  red;scab  -JS  red;scab  -JS  red;scab  -JS    Retired Wound - Properties Group Date first assessed: 02/06/21  -CS Time first assessed: 0050  -CS Present on Hospital Admission: Y  -CS Side: Left  -CS Location: thigh  -CS Primary Wound Type: Other  -CS, Chronic redness and swelling         Wound 02/06/21 0051 coccyx Pressure Injury    Wound - Properties Group Placement Date: 02/06/21  -CS Placement Time: 0051  -CS Present on Hospital Admission: Y  -CS Location: coccyx  -CS Primary Wound Type: Pressure inj  -CS Stage, Pressure Injury : Stage 2  -CS    Base  non-blanchable  -JS  non-blanchable  -JS  non-blanchable  -JS    Retired Wound - Properties Group Date first assessed: 02/06/21  -CS Time first assessed: 0051  -CS Present on Hospital Admission: Y  -CS Location: coccyx  -CS Primary Wound Type: Pressure inj  -CS    Row Name 02/12/21 1554             Wound 02/06/21 0031 Right lower leg Other (comment)    Wound - Properties Group Placement Date: 02/06/21  -CS Placement Time: 0031  -CS Present on Hospital Admission: Y  -CS Side: Right  -CS Orientation: lower  -CS Location: leg  -CS Primary Wound Type: Other  -CS, chronic redness and swelling     Closure  NELY  -SK      Base  red  -SK      Periwound  excoriated;blistered;edematous  -SK      Periwound Temperature  warm;hot  -SK      Drainage Amount  moderate   -SK      Retired Wound - Properties Group Date first assessed: 02/06/21  -CS Time first assessed: 0031  -CS Present on Hospital Admission: Y  -CS Side: Right  -CS Location: leg  -CS Primary Wound Type: Other  -CS, chronic redness and swelling        Wound 02/06/21 0032 Right anterior foot Other (comment)    Wound - Properties Group Placement Date: 02/06/21  -CS Placement Time: 0032  -CS Present on Hospital Admission: Y  -CS Side: Right  -CS Orientation: anterior  -CS Location: foot  -CS Primary Wound Type: Other  -CS Additional Comments: chronic redness and swelling  -CS    Closure  NELY  -SK      Base  red  -SK      Periwound  edematous;excoriated;redness  -SK      Periwound Temperature  warm;hot  -SK      Periwound Skin Turgor  firm  -SK      Drainage Amount  scant  -SK      Retired Wound - Properties Group Date first assessed: 02/06/21  -CS Time first assessed: 0032  -CS Present on Hospital Admission: Y  -CS Side: Right  -CS Location: foot  -CS Primary Wound Type: Other  -CS Additional Comments: chronic redness and swelling  -CS       Wound 02/06/21 0033 Left lower leg Other (comment)    Wound - Properties Group Placement Date: 02/06/21  -CS Placement Time: 0033  -CS Present on Hospital Admission: N  -CS Side: Left  -CS Orientation: lower  -CS Location: leg  -CS Primary Wound Type: Other  -CS Additional Comments: chronic redness and swelling  -CS    Closure  NELY  -SK      Base  red  -SK      Periwound  edematous;excoriated;redness  -SK      Periwound Temperature  warm;hot  -SK      Retired Wound - Properties Group Date first assessed: 02/06/21  -CS Time first assessed: 0033  -CS Present on Hospital Admission: N  -CS Side: Left  -CS Location: leg  -CS Primary Wound Type: Other  -CS Additional Comments: chronic redness and swelling  -CS       Wound 02/06/21 0035 Left anterior ankle Other (comment)    Wound - Properties Group Placement Date: 02/06/21  -CS Placement Time: 0035  -CS Present on Hospital Admission: Y   -CS Side: Left  -CS Orientation: anterior  -CS Location: ankle  -CS Primary Wound Type: Other  -CS    Closure  NELY  -SK      Base  red  -SK      Periwound  edematous;excoriated;redness  -SK      Periwound Temperature  warm  -SK      Periwound Skin Turgor  firm  -SK      Retired Wound - Properties Group Date first assessed: 02/06/21  -CS Time first assessed: 0035  -CS Present on Hospital Admission: Y  -CS Side: Left  -CS Location: ankle  -CS Primary Wound Type: Other  -CS       Wound 02/06/21 0047 Right heel Other (comment)    Wound - Properties Group Placement Date: 02/06/21  -CS Placement Time: 0047  -CS Present on Hospital Admission: Y  -CS Side: Right  -CS Location: heel  -CS Primary Wound Type: Other  -CS, Chronic redness and swelling      Closure  NELY  -SK      Base  red  -SK      Periwound  edematous;excoriated;redness  -SK      Retired Wound - Properties Group Date first assessed: 02/06/21  -CS Time first assessed: 0047  -CS Present on Hospital Admission: Y  -CS Side: Right  -CS Location: heel  -CS Primary Wound Type: Other  -CS, Chronic redness and swelling         Wound 02/06/21 0050 Left posterior thigh Other (comment)    Wound - Properties Group Placement Date: 02/06/21  -CS Placement Time: 0050  -CS Present on Hospital Admission: Y  -CS Side: Left  -CS Orientation: posterior  -CS Location: thigh  -CS Primary Wound Type: Other  -CS, Chronic redness and swelling      Base  red;scab  -SK      Periwound  edematous;excoriated;redness  -SK      Retired Wound - Properties Group Date first assessed: 02/06/21  -CS Time first assessed: 0050  -CS Present on Hospital Admission: Y  -CS Side: Left  -CS Location: thigh  -CS Primary Wound Type: Other  -CS, Chronic redness and swelling         Wound 02/06/21 0051 coccyx Pressure Injury    Wound - Properties Group Placement Date: 02/06/21  -CS Placement Time: 0051  -CS Present on Hospital Admission: Y  -CS Location: coccyx  -CS Primary Wound Type: Pressure inj  -CS Stage,  Pressure Injury : Stage 2  -CS    Base  non-blanchable;scab  -SK      Periwound  non-blanchable;redness  -SK      Retired Wound - Properties Group Date first assessed: 02/06/21  -CS Time first assessed: 0051  -CS Present on Hospital Admission: Y  -CS Location: coccyx  -CS Primary Wound Type: Pressure inj  -CS      User Key  (r) = Recorded By, (t) = Taken By, (c) = Cosigned By    Initials Name Provider Type    CS Naeem Jiang, RN Registered Nurse    Rosa Grady RN Registered Nurse    Kenisha Guerra RN Registered Nurse          Procedures:              Results Review:     I reviewed the patient's new clinical results.      Lab Results (last 24 hours)     Procedure Component Value Units Date/Time    POC Glucose Once [[id number]]  (Abnormal) Collected: 02/13/21 1201    Specimen: Blood Updated: 02/13/21 1205     Glucose 106 mg/dL      Comment: Serial Number: [id number]:  [id number]       POC Glucose Once [[id number]]  (Abnormal) Collected: 02/13/21 0849    Specimen: Blood Updated: 02/13/21 0924     Glucose 123 mg/dL      Comment: Serial Number: [id number]:  [id number]       Comprehensive Metabolic Panel [[id number]]  (Abnormal) Collected: 02/13/21 0350    Specimen: Blood Updated: 02/13/21 0757     Glucose 78 mg/dL       mg/dL      Creatinine 3.73 mg/dL      Sodium 140 mmol/L      Potassium 3.9 mmol/L      Chloride 103 mmol/L      CO2 25.0 mmol/L      Calcium 7.7 mg/dL      Total Protein 5.0 g/dL      Albumin 2.40 g/dL      ALT (SGPT) 45 U/L      AST (SGOT) 108 U/L      Alkaline Phosphatase 994 U/L      Total Bilirubin 0.8 mg/dL      eGFR Non African Amer 12 mL/min/1.73      Comment: <15 Indicative of kidney failure.        eGFR   Amer --     Comment: <15 Indicative of kidney failure.        Globulin 2.6 gm/dL      A/G Ratio 0.9 g/dL      BUN/Creatinine Ratio 27.3     Anion Gap 12.0 mmol/L     Narrative:      GFR Normal >60  Chronic Kidney Disease <60  Kidney Failure <15       CBC & Differential [[id number]]  (Abnormal) Collected: 02/13/21 0350    Specimen: Blood Updated: 02/13/21 0454    Narrative:      The following orders were created for panel order CBC & Differential.  Procedure                               Abnormality         Status                     ---------                               -----------         ------                     Scan Slide[[id number]]                                       Final result               CBC Auto Differential[[id number]]        Abnormal            Final result                 Please view results for these tests on the individual orders.    Scan Slide [[id number]] Collected: 02/13/21 0350    Specimen: Blood Updated: 02/13/21 0454     Scan Slide --     Comment: See Manual Differential Results       Manual Differential [[id number]]  (Abnormal) Collected: 02/13/21 0350    Specimen: Blood Updated: 02/13/21 0454     Neutrophil % 78.0 %      Lymphocyte % 5.0 %      Monocyte % 3.0 %      Eosinophil % 1.0 %      Bands %  13.0 %      Neutrophils Absolute 10.01 10*3/mm3      Lymphocytes Absolute 0.55 10*3/mm3      Monocytes Absolute 0.33 10*3/mm3      Eosinophils Absolute 0.11 10*3/mm3      RBC Morphology Normal     Toxic Granulation Slight/1+     Platelet Estimate Decreased    CBC Auto Differential [[id number]]  (Abnormal) Collected: 02/13/21 0350    Specimen: Blood Updated: 02/13/21 0454     WBC 11.00 10*3/mm3      RBC 2.70 10*6/mm3      Hemoglobin 8.5 g/dL      Hematocrit 26.3 %      MCV 97.3 fL      MCH 31.5 pg      MCHC 32.4 g/dL      RDW 16.6 %      RDW-SD 56.9 fl      MPV 9.0 fL      Platelets 74 10*3/mm3     Narrative:      The previously reported component NRBC is no longer being reported. Previous result was 0.3 /100 WBC (Reference Range: 0.0-0.2 /100 WBC) on 2/13/2021 at 0407 EST.    Magnesium [[id number]]  (Normal) Collected: 02/13/21 0350    Specimen: Blood Updated: 02/13/21 0415     Magnesium 1.8 mg/dL     POC Glucose Once [[id number]]  (Abnormal)  Collected: 02/13/21 0242    Specimen: Blood Updated: 02/13/21 0243     Glucose 228 mg/dL      Comment: Serial Number: [id number]:  [id number]       POC Glucose Once [[id number]]  (Abnormal) Collected: 02/12/21 2012    Specimen: Blood Updated: 02/12/21 2013     Glucose 117 mg/dL      Comment: Serial Number: [id number]:  [id number]       Ammonia [[id number]]  (Abnormal) Collected: 02/12/21 1759    Specimen: Blood Updated: 02/12/21 1841     Ammonia 480 umol/L     Basic Metabolic Panel [[id number]]  (Abnormal) Collected: 02/12/21 1714    Specimen: Blood Updated: 02/12/21 1742     Glucose 174 mg/dL      BUN 98 mg/dL      Creatinine 3.57 mg/dL      Sodium 138 mmol/L      Potassium 3.5 mmol/L      Chloride 102 mmol/L      CO2 25.0 mmol/L      Calcium 7.2 mg/dL      eGFR   Amer --     Comment: <15 Indicative of kidney failure.        eGFR Non African Amer 12 mL/min/1.73      Comment: <15 Indicative of kidney failure.        BUN/Creatinine Ratio 27.5     Anion Gap 11.0 mmol/L     Narrative:      GFR Normal >60  Chronic Kidney Disease <60  Kidney Failure <15      POC Glucose Once [[id number]]  (Abnormal) Collected: 02/12/21 1723    Specimen: Blood Updated: 02/12/21 1724     Glucose 142 mg/dL      Comment: Serial Number: [id number]:  [id number]       POC Glucose Once [[id number]]  (Abnormal) Collected: 02/12/21 1707    Specimen: Blood Updated: 02/12/21 1710     Glucose 184 mg/dL      Comment: Serial Number: [id number]:  [id number]       Protein Electrophoresis, Total [[id number]]  (Abnormal) Collected: 02/11/21 1227    Specimen: Blood Updated: 02/12/21 1709     Total Protein 4.9 g/dL      Albumin 2.0 g/dL      Alpha-1-Globulin 0.5 g/dL      Alpha-2-Globulin 0.7 g/dL      Beta Globulin 0.7 g/dL      Gamma Globulin 1.0 g/dL      M-[place] 0.1 g/dL      Globulin 2.9 g/dL      A/G Ratio 0.7     Please note Comment     Comment: Protein electrophoresis scan will follow via computer, mail, or   delivery.        Narrative:      Performed at:  [address]  [id number]  : Balta Tellez PhD, Phone:  [phone]    POC Glucose Once [[id number]]  (Abnormal) Collected: 02/12/21 1705    Specimen: Blood Updated: 02/12/21 1708     Glucose 238 mg/dL      Comment: Serial Number: [id number]:  [id number]           No results found for: HGBA1C  Results from last 7 days   Lab Units 02/11/21  1227   INR  1.22*       Results from last 7 days   Lab Units 02/06/21  1904   PH, ARTERIAL pH units 7.337*   PO2 ART mm Hg 79.5*   PCO2, ARTERIAL mm Hg 26.4*   HCO3 ART mmol/L 14.2*     Lab Results   Component Value Date    LIPASE 42 01/13/2018     Lab Results   Component Value Date    CHOL 126 02/05/2020    TRIG 51 02/05/2020    HDL 66 (H) 02/05/2020    LDL 50 02/05/2020       Lab Results   Lab Value Date/Time    FINALDX  02/11/2021 1227     Leukocytosis  Anemia  Thrombocytopenia  No blasts identified         Microbiology Results (last 10 days)     Procedure Component Value - Date/Time    Urine Culture - Urine, Urine, Random Void [[id number]]  (Abnormal) Collected: 02/10/21 1616    Lab Status: Final result Specimen: Urine, Random Void Updated: 02/11/21 1234     Urine Culture Yeast isolated     Comment: No further workup.       Urine Culture - Urine, Urine, Catheter [[id number]]  (Abnormal) Collected: 02/10/21 0306    Lab Status: Final result Specimen: Urine, Catheter Updated: 02/11/21 1234     Urine Culture Yeast isolated     Comment: No further workup.       Clostridium Difficile Toxin - Stool, Per Rectum [[id number]]  (Normal) Collected: 02/06/21 1952    Lab Status: Final result Specimen: Stool from Per Rectum Updated: 02/07/21 0725    Narrative:      The following orders were created for panel order Clostridium Difficile Toxin - Stool, Per Rectum.  Procedure                               Abnormality         Status                     ---------                               -----------          ------                     Clostridium Difficile EI...[[id number]]  Normal              Final result                 Please view results for these tests on the individual orders.    Clostridium Difficile EIA - Stool, Per Rectum [[id number]]  (Normal) Collected: 02/06/21 1952    Lab Status: Final result Specimen: Stool from Per Rectum Updated: 02/07/21 0725     C Diff GDH / Toxin Negative    Blood Culture - Blood, Arm, Left [[id number]] Collected: 02/06/21 1309    Lab Status: Final result Specimen: Blood from Arm, Left Updated: 02/11/21 1431     Blood Culture No growth at 5 days    Blood Culture - Blood, Arm, Right [[id number]] Collected: 02/06/21 1307    Lab Status: Final result Specimen: Blood from Arm, Right Updated: 02/11/21 1431     Blood Culture No growth at 5 days    MRSA Screen, PCR (Inpatient) - Swab, Nares [[id number]]  (Abnormal) Collected: 02/06/21 1114    Lab Status: Final result Specimen: Swab from Nares Updated: 02/06/21 1311     MRSA PCR MRSA Detected    COVID PRE-OP / PRE-PROCEDURE SCREENING ORDER (NO ISOLATION) - Swab, Nasopharynx [[id number]]  (Normal) Collected: 02/06/21 0153    Lab Status: Final result Specimen: Swab from Nasopharynx Updated: 02/06/21 1534    Narrative:      The following orders were created for panel order COVID PRE-OP / PRE-PROCEDURE SCREENING ORDER (NO ISOLATION) - Swab, Nasopharynx.  Procedure                               Abnormality         Status                     ---------                               -----------         ------                     COVID-19,APTIMA PANTHER,...[[id number]]  Normal              Final result                 Please view results for these tests on the individual orders.    COVID-19,APTIMA PANTHERJENNIFERU IN-HOUSE, NP/OP SWAB IN UTM/VTM/SALINE TRANSPORT MEDIA,24 HR TAT - Swab, Nasopharynx [[id number]]  (Normal) Collected: 02/06/21 0153    Lab Status: Final result Specimen: Swab from Nasopharynx Updated: 02/06/21 1534     COVID19 Not Detected     Narrative:      Fact sheet for providers: https://www.fda.gov/media/289939/download     Fact sheet for patients: https://www.fda.gov/media/765595/download    Test performed by RT PCR.          ECG/EMG Results (most recent)     Procedure Component Value Units Date/Time    Adult Transthoracic Echo Complete W/ Cont if Necessary Per Protocol [[id number]] Collected: 02/06/21 0850     Updated: 02/06/21 1609     BSA 1.8 m^2      RVIDd 2.7 cm      IVSd 1.3 cm      LVIDd 3.9 cm      LVIDs 2.8 cm      LVPWd 1.2 cm      IVS/LVPW 1.0     FS 28.8 %      EDV(Teich) 67.0 ml      ESV(Teich) 29.4 ml      EF(Teich) 56.0 %      EDV(cubed) 60.5 ml      ESV(cubed) 21.9 ml      EF(cubed) 63.9 %      LV mass(C)d 170.0 grams      LV mass(C)dI 92.8 grams/m^2      SV(Teich) 37.5 ml      SI(Teich) 20.5 ml/m^2      SV(cubed) 38.7 ml      SI(cubed) 21.1 ml/m^2      Ao root diam 2.5 cm      Ao root area 5.1 cm^2      ACS 1.6 cm      asc Aorta Diam 3.2 cm      LVOT diam 1.8 cm      LVOT area 2.6 cm^2      RVOT diam 2.6 cm      RVOT area 5.5 cm^2      EDV(MOD-sp4) 59.5 ml      ESV(MOD-sp4) 26.6 ml      EF(MOD-sp4) 55.3 %      EDV(MOD-sp2) 53.3 ml      ESV(MOD-sp2) 20.0 ml      EF(MOD-sp2) 62.4 %      SV(MOD-sp4) 32.9 ml      SI(MOD-sp4) 18.0 ml/m^2      SV(MOD-sp2) 33.2 ml      SI(MOD-sp2) 18.1 ml/m^2      Ao root area (BSA corrected) 1.4     LV Olivier Vol (BSA corrected) 32.5 ml/m^2      LV Sys Vol (BSA corrected) 14.5 ml/m^2      Aortic R-R 1.0 sec      Aortic HR 59.0 BPM      MV V2 max 134.6 cm/sec      MV max PG 7.2 mmHg      MV V2 mean 51.6 cm/sec      MV mean PG 1.8 mmHg      MV V2 VTI 24.3 cm      MVA(VTI) 2.3 cm^2      Ao pk chastity 182.4 cm/sec      Ao max PG 13.3 mmHg      Ao max PG (full) 8.5 mmHg      Ao V2 mean 142.3 cm/sec      Ao mean PG 8.7 mmHg      Ao mean PG (full) 5.5 mmHg      Ao V2 VTI 37.9 cm      WILLIS(I,A) 1.5 cm^2      WILLIS(I,D) 1.5 cm^2      WILLIS(V,A) 1.5 cm^2      WILLIS(V,D) 1.5 cm^2      AI max chastity 264.5 cm/sec      AI max PG 28.0  mmHg      AI dec slope 171.9 cm/sec^2      AI dec time 1.5 sec      AI P1/2t 450.8 msec      LV V1 max PG 4.8 mmHg      LV V1 mean PG 3.2 mmHg      LV V1 max 110.0 cm/sec      LV V1 mean 85.9 cm/sec      LV V1 VTI 21.6 cm      CO(Ao) 11.4 l/min      CI(Ao) 6.2 l/min/m^2      SV(Ao) 192.9 ml      SI(Ao) 105.3 ml/m^2      CO(LVOT) 3.3 l/min      CI(LVOT) 1.8 l/min/m^2      SV(LVOT) 55.2 ml      SV(RVOT) 59.1 ml      SI(LVOT) 30.1 ml/m^2      PA V2 max 88.8 cm/sec      PA max PG 3.2 mmHg      PA max PG (full) 1.9 mmHg      PA V2 mean 63.8 cm/sec      PA mean PG 1.8 mmHg      PA mean PG (full) 1.0 mmHg      PA V2 VTI 17.2 cm      PVA(I,A) 3.4 cm^2       CV ECHO SAHIL - PVA(I,D) 3.4 cm^2       CV ECHO SAHIL - PVA(V,A) 3.4 cm^2       CV ECHO SAHIL - PVA(V,D) 3.4 cm^2      PA acc time 0.07 sec      RV V1 max PG 1.2 mmHg      RV V1 mean PG 0.77 mmHg      RV V1 max 55.1 cm/sec      RV V1 mean 41.7 cm/sec      RV V1 VTI 10.8 cm      TR max devante 237.7 cm/sec      RVSP(TR) 25.6 mmHg      RAP systole 3.0 mmHg      PA pr(Accel) 48.2 mmHg      Pulm Sys Devante 41.0 cm/sec      Pulm Olivier Devante 37.5 cm/sec      Pulm S/D 1.1     Qp/Qs 1.1      CV ECHO SAHIL - BZI_BMI 33.1 kilograms/m^2       CV ECHO SAHIL - BSA(HAYCOCK) 1.9 m^2       CV ECHO SAHIL - BZI_METRIC_WEIGHT 82.1 kg       CV ECHO SAHIL - BZI_METRIC_HEIGHT 157.5 cm      EF(MOD-bp) 60.0 %      LA dimension(2D) 4.2 cm     Narrative:      Normal LV size and contractility EF of 55%  Moderate right ventricular enlargement with severe right atrial   enlargement, catheter probably pacemaker lead seen.  Severe left atrial enlargement seen.  Aortic valve, mitral valve, tricuspid valve appears structurally normal,   mild MR, AR, seen.  There is moderate  tricuspid regurgitation seen.    Calculated RV systolic pressure is 24mmHg.  No pericardial effusion seen.  Proximal aorta appears normal in size.          Results for orders placed during the hospital encounter of 02/05/21   Duplex  Venous Lower Extremity - Bilateral CAR    Narrative · Exam limited by patient intolerance to compression and body habitus.  · The distal left femoral and the right and left peroneal veins are not   imaged.  · All other veins appeared normal bilaterally.          Results for orders placed during the hospital encounter of 02/05/21   Adult Transthoracic Echo Complete W/ Cont if Necessary Per Protocol    Narrative Normal LV size and contractility EF of 55%  Moderate right ventricular enlargement with severe right atrial   enlargement, catheter probably pacemaker lead seen.  Severe left atrial enlargement seen.  Aortic valve, mitral valve, tricuspid valve appears structurally normal,   mild MR, AR, seen.  There is moderate  tricuspid regurgitation seen.    Calculated RV systolic pressure is 24mmHg.  No pericardial effusion seen.  Proximal aorta appears normal in size.       Xr Chest 1 View    Result Date: 2/11/2021  1. Cardiomegaly without acute pulmonary process. No change from prior exam.  Electronically Signed By-Dae Auguste MD On:2/11/2021 2:18 PM This report was finalized on [id number] by  Dae Auguste MD.    Xr Chest 1 View    Result Date: 2/7/2021  1. New right-sided PICC line with tip terminating over the low SVC. 2. Stable cardiomegaly.  Electronically Signed By-Dae Auguste MD On:2/7/2021 10:45 AM This report was finalized on [id number] by  Dae Auguste MD.    Xr Chest 1 View    Result Date: 2/6/2021  1.Cardiomegaly  Electronically Signed By-Azar Alarcon MD On:2/6/2021 8:01 AM This report was finalized on [id number] by  Azar Alarcon MD.          Xrays, labs reviewed personally by physician.    Medication Review:   I have reviewed the patient's current medication list      Scheduled Meds  aspirin, 81 mg, Oral, Daily  bumetanide, 1 mg, Intravenous, Q6H  doxycycline, 100 mg, Oral, Q12H  febuxostat, 40 mg, Oral, BID  fluconazole, 100 mg, Oral, Q24H  folic acid, 1 mg, Oral,  Daily  gabapentin, 100 mg, Oral, TID  haloperidol lactate, 5 mg, Intramuscular, Once  magic butt paste, , Topical, Daily  midodrine, 10 mg, Oral, TID AC  nystatin, 5 mL, Swish & Swallow, 4x Daily  sodium chloride, 10 mL, Intravenous, Q12H  vitamin B-12, 500 mcg, Oral, Daily  Zinc Oxide, , Apply externally, BID        Meds Infusions  dextrose, 20 mL/hr, Last Rate: 20 mL/hr (02/09/21 1827)        Meds PRN  •  acetaminophen **OR** acetaminophen **OR** acetaminophen  •  dextrose  •  dextrose  •  glucagon (human recombinant)  •  HYDROcodone-acetaminophen  •  magnesium sulfate **OR** magnesium sulfate in D5W 1g/100mL (PREMIX) **OR** magnesium sulfate  •  melatonin  •  nitroglycerin  •  OLANZapine  •  ondansetron **OR** ondansetron  •  potassium chloride  •  potassium chloride  •  sodium chloride  •  traMADol        Assessment/Plan   Assessment/Plan     Active Hospital Problems    Diagnosis  POA   • Sepsis (CMS/HCC) [A41.9]  Yes   • CONG (acute kidney injury) (CMS/HCC) [N17.9]  Yes   • Lactic acidosis [E87.2]  Yes   • Metabolic acidosis [E87.2]  Yes   • Delirium, acute [R41.0]  Yes   • Lower extremity cellulitis [L03.119]  Yes   • CAD (coronary artery disease) [I25.10]  Yes   • Acute UTI (urinary tract infection) [N39.0]  Yes   • Non-pressure chronic ulcer right ankle, limited to breakdown skin (CMS/HCC) [L97.311]  Yes   • Automatic implantable cardiac defibrillator in situ [Z95.810]  Yes   • Lymphedema [I89.0]  Unknown   • Gout [M10.9]  Yes   • Hypertension, benign [I10]  Yes   • Congestive heart failure (CMS/HCC) [I50.9]  Yes   • Atrial fibrillation (CMS/HCC) [I48.91]  Yes      Resolved Hospital Problems   No resolved problems to display.       MEDICAL DECISION MAKING COMPLEXITY BY PROBLEM:     Severe hyperammonemia 480  Possible liver failure  Worsening elevation of liver enzymes could be related to congestive hepatopathy  Lites panel ordered  GI input appreciated  Start lactulose enema and orally.  Add  Xifaxan      Septic shock from LE cellulitis and UTI   - bilateral LE Lymphedema   -Rocephin given x1 at sending facility,   -started vancomycin and cefepime 2/6/2021.  Added Flagyl 2/7/21 because of leukocytosis  -Started on dopamine 2/6/2021  - procalcitonin 171.12  -MRSA screen positive  -C. difficile negative  -Falls precautions  -Wound care consulted Ace wraps as tolerated.  -Venous Doppler lower extremities negative.  -ID consulted 2/7/2021.  -Antibiotics changed to ceftaroline then to doxy/diflucan x1w    Hypotension on midodrine  Dopamine drip.  Tapered off    Candiduria.  Exchange Ayon catheter versus external catheter/repeat urinalysis per ID       CONG on CKD III:  Likely cardiorenal etiology  -Hold colchicine  --Consulted nephrology  -s/p renal ultrasound 2/5/2020  -Responsive to diuresis    Thrombocytopenia possible related to sepsis.  Awaiting heparin-induced antibody  B12 and folic acid not decreased  Consult hematology  Heparin-induced platelet antibody normal    Delirium:  -No history of dementia per daughter  -Avoid sedating medications  -Improving    Right arm IV infiltration:  -S/p ice to arm and monitor  -Improved  Patient has multiple ecchymotic patches bilateral forearms.    CAD  -Denies chest pain  -Continue aspirin,  Resume beta-blocker when blood pressure allows.    Atrial fibrillation s/p pacemaker  -Hold digoxin given her kidney failure  Currently on metoprolol at home  -Repeat TTE 2/13/2018--> LVEF 40% with mild aortic insufficiency  -TTE 2/6/2021--> LVEF 55%, severe right atrial enlargement, severe left atrial enlargement, moderate TR  -Daughter claims patient off Xarelto    Echo shows right ventricular dilation and right atrial dilation.  We will check VQ scan.  Venous Doppler negative.  Might be related to  untreated sleep apnea    Hypertension  -Hold metoprolol temporarily because of low BP  On Bumex  Cosyntropin stimulation shows normal responses    Hyperlipidemia  -Check lipid  panel  -on  home Zetia at home     Chronic pain  -Continue gabapentin and tramadol     Gout  -Hold colchicine because ok CONG     Pressure ulcer on right ankle  -Wound care consulted     -Hypoglycemia.  Cosyntropin stimulation negative  -Continue D10 for now until patient appetite improves.      VTE Prophylaxis -   Mechanical Order History:     None      Pharmalogical Order History:      Ordered     Dose Route Frequency Stop    02/06/21 0500  rivaroxaban (XARELTO) tablet 10 mg     Question Answer Comment   Are you ordering rivaroxaban for the prevention of blood clots in an acutely ill medical patient? Yes    Select any exclusion criteria that may apply to patient Exclusion Criteria Does Not Apply to This Patient Alternative to Lovenox/heparin inpatient with thrombocytopenia unable to receive mechanical prophylaxis       10 mg PO Daily With Dinner --    02/06/21 0020  heparin (porcine) 5000 UNIT/ML injection 5,000 Units  Status:  Discontinued      5,000 Units SC Every 12 Hours Scheduled 02/06/21 0147                  Code Status -   Code Status and Medical Interventions:   Ordered at: 02/06/21 0258     Level Of Support Discussed With:    Patient     Code Status:    CPR     Medical Interventions (Level of Support Prior to Arrest):    Full       This patient has been examined wearing appropriate Personal Protective Equipment and discussed with nursing. 02/13/21        Discharge Planning    Patient does have episodes of confusion in the evenings Zyprexa PRN ordered.  Elevated procalcitonin and worsening WBC thus added Flagyl to Vanco and cefepime.  ID consulted 2/7/2021.      Electronically signed by Alexandro Huitron MD, 02/13/21, 13:15 EST.  [facility] Hospitalist Team

## 2021-02-13 NOTE — PROGRESS NOTES
Infectious Diseases Progress Note      LOS: 5 days   Patient Care Team:  Rosa M Chavez APRN as PCP - General (Nurse Practitioner)    Chief Complaint: Lower extremities edema    Subjective       The patient has been afebrile for the last 24 hours.  The patient is on room air, hemodynamically stable, and is tolerating antimicrobial therapy.        Review of Systems:   Review of Systems   Constitutional: Negative.    HENT: Negative.    Eyes: Negative.    Respiratory: Negative.    Cardiovascular: Positive for leg swelling.   Gastrointestinal: Negative.    Genitourinary: Negative.    Musculoskeletal: Negative.         Bilateral leg pain   Skin: Negative.    Neurological: Negative.    Hematological: Negative.    Psychiatric/Behavioral: Negative.         Objective     Vital Signs  Temp:  [97.4 °F (36.3 °C)-98.1 °F (36.7 °C)] 97.5 °F (36.4 °C)  Heart Rate:  [60-65] 60  Resp:  [18-22] 19  BP: (124-158)/(53-63) 124/56    Physical Exam:  Physical Exam  Vitals signs and nursing note reviewed.   Constitutional:       Appearance: She is well-developed.   HENT:      Head: Normocephalic and atraumatic.   Eyes:      Pupils: Pupils are equal, round, and reactive to light.   Neck:      Musculoskeletal: Normal range of motion and neck supple.   Cardiovascular:      Rate and Rhythm: Normal rate and regular rhythm.      Heart sounds: Normal heart sounds.   Pulmonary:      Effort: Pulmonary effort is normal. No respiratory distress.      Breath sounds: Normal breath sounds. No wheezing or rales.   Abdominal:      General: Bowel sounds are normal. There is no distension.      Palpations: Abdomen is soft. There is no mass.      Tenderness: There is no abdominal tenderness. There is no guarding or rebound.   Musculoskeletal: Normal range of motion.         General: No deformity.      Right lower leg: Edema present.      Left lower leg: Edema present.      Comments: Superimposed erythema of both lower extremities more on the left than  the right  -Erythema is improving slowly   Skin:     General: Skin is warm.      Findings: No erythema or rash.   Neurological:      Mental Status: She is alert and oriented to person, place, and time.      Cranial Nerves: No cranial nerve deficit.          Results Review:    I have reviewed all clinical data, test, lab, and imaging results.     Radiology  No Radiology Exams Resulted Within Past 24 Hours    Cardiology    Laboratory    Results from last 7 days   Lab Units 02/13/21  0350 02/12/21  0427 02/11/21  0027 02/10/21  0547 02/09/21  0524 02/08/21  0529 02/07/21  1305   WBC 10*3/mm3 11.00* 14.20* 12.00* 10.30 14.40* 18.60* 25.00*   HEMOGLOBIN g/dL 8.5* 9.0* 9.0* 9.0* 9.5* 9.6* 10.1*   HEMATOCRIT % 26.3* 28.3* 27.8* 27.2* 29.6* 29.1* 31.3*   PLATELETS 10*3/mm3 74* 63* 92* 35* 54* 34* 82*     Results from last 7 days   Lab Units 02/13/21  0350 02/12/21  1759 02/12/21  1714 02/12/21  0427 02/11/21  1714 02/11/21  1227 02/11/21  0027 02/10/21  0547 02/09/21  1540  02/07/21  1305 02/06/21  2247 02/06/21  1905   SODIUM mmol/L 140  --  138 136 137  --  135* 138 138   < > 140 139 139   POTASSIUM mmol/L 3.9  --  3.5 3.8 4.1  --  3.7 3.7 3.9   < > 4.5 5.1 4.5   CHLORIDE mmol/L 103  --  102 99 99  --  100 104 105   < > 109* 112* 114*   CO2 mmol/L 25.0  --  25.0 24.0 24.0  --  23.0 24.0 20.0*   < > 18.0* 14.0* 12.0*   BUN mg/dL 102*  --  98* 102* 100*  --  95* 95* 92*   < > 87* 80* 76*   CREATININE mg/dL 3.73*  --  3.57* 3.46* 3.58*  --  3.26* 2.98* 3.09*   < > 3.50* 3.35* 3.28*   GLUCOSE mg/dL 78  --  174* 220* 142*  --  314* 92 68   < > 87 100* 91   ALBUMIN g/dL 2.40*  --   --  2.90*  --  2.0*  --  2.10*  --   --  2.50* 2.40* 2.40*   BILIRUBIN mg/dL 0.8  --   --  1.2  --   --   --  1.4*  --   --  0.6 0.4 0.4   ALK PHOS U/L 994*  --   --  762*  --   --   --  396*  --   --  135* 109 100   AST (SGOT) U/L 108*  --   --  72*  --   --   --  81*  --   --  30 32 27   ALT (SGPT) U/L 45*  --   --  35*  --   --   --  27  --   --   12 10 9   AMMONIA umol/L  --  480*  --   --   --   --   --   --   --   --   --   --   --    CALCIUM mg/dL 7.7*  --  7.2* 8.0* 7.8*  --  7.1* 7.0* 6.9*   < > 7.1* 7.3* 7.3*    < > = values in this interval not displayed.     Results from last 7 days   Lab Units 02/06/21  1708   CK TOTAL U/L 225*             Microbiology   Microbiology Results (last 10 days)     Procedure Component Value - Date/Time    Urine Culture - Urine, Urine, Random Void [[id number]]  (Abnormal) Collected: 02/10/21 1616    Lab Status: Final result Specimen: Urine, Random Void Updated: 02/11/21 1234     Urine Culture Yeast isolated     Comment: No further workup.       Urine Culture - Urine, Urine, Catheter [[id number]]  (Abnormal) Collected: 02/10/21 0306    Lab Status: Final result Specimen: Urine, Catheter Updated: 02/11/21 1234     Urine Culture Yeast isolated     Comment: No further workup.       Clostridium Difficile Toxin - Stool, Per Rectum [[id number]]  (Normal) Collected: 02/06/21 1952    Lab Status: Final result Specimen: Stool from Per Rectum Updated: 02/07/21 0725    Narrative:      The following orders were created for panel order Clostridium Difficile Toxin - Stool, Per Rectum.  Procedure                               Abnormality         Status                     ---------                               -----------         ------                     Clostridium Difficile EI...[[id number]]  Normal              Final result                 Please view results for these tests on the individual orders.    Clostridium Difficile EIA - Stool, Per Rectum [[id number]]  (Normal) Collected: 02/06/21 1952    Lab Status: Final result Specimen: Stool from Per Rectum Updated: 02/07/21 0725     C Diff GDH / Toxin Negative    Blood Culture - Blood, Arm, Left [[id number]] Collected: 02/06/21 1309    Lab Status: Final result Specimen: Blood from Arm, Left Updated: 02/11/21 1431     Blood Culture No growth at 5 days    Blood Culture - Blood, Arm, Right  [[id number]] Collected: 02/06/21 1307    Lab Status: Final result Specimen: Blood from Arm, Right Updated: 02/11/21 1431     Blood Culture No growth at 5 days    MRSA Screen, PCR (Inpatient) - Swab, Nares [[id number]]  (Abnormal) Collected: 02/06/21 1114    Lab Status: Final result Specimen: Swab from Nares Updated: 02/06/21 1311     MRSA PCR MRSA Detected    COVID PRE-OP / PRE-PROCEDURE SCREENING ORDER (NO ISOLATION) - Swab, Nasopharynx [[id number]]  (Normal) Collected: 02/06/21 0153    Lab Status: Final result Specimen: Swab from Nasopharynx Updated: 02/06/21 1534    Narrative:      The following orders were created for panel order COVID PRE-OP / PRE-PROCEDURE SCREENING ORDER (NO ISOLATION) - Swab, Nasopharynx.  Procedure                               Abnormality         Status                     ---------                               -----------         ------                     COVID-19,APTIMA PANTHER,...[[id number]]  Normal              Final result                 Please view results for these tests on the individual orders.    COVID-19,APTIMA PANTHER,[facility] IN-HOUSE, NP/OP SWAB IN UTM/VTM/[facility] TRANSPORT MEDIA,24 HR TAT - Swab, Nasopharynx [[id number]]  (Normal) Collected: 02/06/21 0153    Lab Status: Final result Specimen: Swab from Nasopharynx Updated: 02/06/21 1534     COVID19 Not Detected    Narrative:      Fact sheet for providers: https://www.fda.gov/media/267073/download     Fact sheet for patients: https://www.fda.gov/media/008183/download    Test performed by RT PCR.          Medication Review:       Schedule Meds  aspirin, 81 mg, Oral, Daily  bumetanide, 1 mg, Intravenous, Q6H  doxycycline, 100 mg, Oral, Q12H  febuxostat, 40 mg, Oral, BID  fluconazole, 100 mg, Oral, Q24H  folic acid, 1 mg, Oral, Daily  gabapentin, 100 mg, Oral, TID  haloperidol lactate, 5 mg, Intramuscular, Once  lactulose, 20 g, Oral, TID  lactulose in sterile water, 300 mL, Rectal, Once  magic butt paste, , Topical, Daily  midodrine,  10 mg, Oral, TID AC  nystatin, 5 mL, Swish & Swallow, 4x Daily  sodium chloride, 10 mL, Intravenous, Q12H  vitamin B-12, 500 mcg, Oral, Daily  Zinc Oxide, , Apply externally, BID        Infusion Meds  dextrose, 20 mL/hr, Last Rate: 20 mL/hr (02/09/21 1827)        PRN Meds  •  acetaminophen **OR** acetaminophen **OR** acetaminophen  •  dextrose  •  dextrose  •  glucagon (human recombinant)  •  HYDROcodone-acetaminophen  •  magnesium sulfate **OR** magnesium sulfate in D5W 1g/100mL (PREMIX) **OR** magnesium sulfate  •  melatonin  •  nitroglycerin  •  OLANZapine  •  ondansetron **OR** ondansetron  •  potassium chloride  •  potassium chloride  •  sodium chloride  •  traMADol        Assessment/Plan       Antimicrobial Therapy   1.  Doxycycline     day  2.  Diflucan      day  3.      Day  4.      Day  5.      Day    Assessment     Bilateral lower extremities lymphedema with erythema of the right leg consistent with cellulitis.  Erythema had improved     Acute kidney injury     Leukocytosis-likely related to severe cellulitis.  Significantly improved    Congestive heart failure     History of pacemaker implantation    Hypothermia-appears to have resolved    Candiduria.  Patient s/p replacement of Ayon catheter on February 10, 2021 and repeat urinalysis was positive for candiduria     Plan     Continue doxycycline 100 mg p.o. twice daily for 7 days  Continue Diflucan 100 mg p.o. daily for 7 days  Continue supportive care  Okay to discharge from Infectious Disease standpoint        Kurt Diehl MD  02/13/21  13:48 EST      Note is dictated utilizing voice recognition software/Dragon

## 2021-02-13 NOTE — PROGRESS NOTES
Hematology/Oncology Inpatient Progress Note    PATIENT NAME: Emperatriz Childs  : 1945  MRN: [id number]    CHIEF COMPLAINT: Lymphedema    HISTORY OF PRESENT ILLNESS:    Emperatriz Childs is a 75 y.o. female who presented to [facility] on 2021 with complaints of increasing leg swelling and pain. Patient reported chronic lymphedema with erythema that has progressively gotten worse over the past several weeks. She reported associated pain with weightbearing for an extended period of time, fever, and chills.  Labs in the ED were significant for , potassium 5.3, creatinine 2.41, BUN 63, EGFR 19, lactate 3.2, hemoglobin 11.3.  Urinalysis was positive for blood, leukocytes, and nitrites.  Chest x-ray revealed cardiomegaly.  Patient was admitted for further evaluation and management.  Since admission, nephrology has been consulted for acute kidney injury.  Patient was started on antibiotics for lower extremity cellulitis and UTI. Infectious disease was also consulted.     21  Hematology/Oncology was consulted for persistent thrombocytopenia.  Platelet count on admission were 86,000.  Platelets have continued to remain low throughout admission with lowest platelet count of 34,000 on 2021.  On review of patient's chart, patient has a history of thrombocytopenia. Patient was seen by Dr. Nathan outpatient on 2018 for anemia.  Initial work-up was performed and patient was to follow up in 2 weeks, however, patient was lost to follow-up.     She  has a past medical history of CHF (congestive heart failure) (CMS/[facility]ly [facility]), History of transfusion, Hypertension, Lymphedema of leg, and Myocardial infarction (CMS/Formerly [facility]).     PCP: Rosa M Chavez APRN    Subjective   Patient feeling a lot better today.  Wants to go home.  She is still lying in bed and very weak  ROS:  Review of Systems   Constitutional: Positive for fatigue. Negative for chills and fever.   HENT: Negative for mouth sores and  nosebleeds.    Eyes: Negative for photophobia, pain, redness and visual disturbance.   Respiratory: Negative for cough and shortness of breath.    Cardiovascular: Positive for leg swelling.   Gastrointestinal: Negative for diarrhea, nausea and vomiting.   Endocrine: Negative for cold intolerance and heat intolerance.   Genitourinary: Negative for hematuria.   Musculoskeletal: Negative for arthralgias and myalgias.   Skin: Positive for color change.   Neurological: Negative for dizziness, speech difficulty, light-headedness and headaches.   Psychiatric/Behavioral: Negative for confusion and hallucinations.      MEDICATIONS:    Scheduled Meds:  aspirin, 81 mg, Oral, Daily  bumetanide, 1 mg, Intravenous, Q6H  doxycycline, 100 mg, Oral, Q12H  febuxostat, 40 mg, Oral, BID  fluconazole, 100 mg, Oral, Q24H  folic acid, 1 mg, Oral, Daily  gabapentin, 100 mg, Oral, TID  haloperidol lactate, 5 mg, Intramuscular, Once  lactulose, 20 g, Oral, TID  lactulose in sterile water, 300 mL, Rectal, Once  magic butt paste, , Topical, Daily  midodrine, 10 mg, Oral, TID AC  nystatin, 5 mL, Swish & Swallow, 4x Daily  rifAXIMin, 550 mg, Oral, Q12H  sodium chloride, 10 mL, Intravenous, Q12H  ursodiol, 300 mg, Oral, BID  vitamin B-12, 500 mcg, Oral, Daily  Zinc Oxide, , Apply externally, BID       Continuous Infusions:  dextrose, 20 mL/hr, Last Rate: 20 mL/hr (02/09/21 1827)       PRN Meds:  •  acetaminophen **OR** acetaminophen **OR** acetaminophen  •  dextrose  •  dextrose  •  glucagon (human recombinant)  •  HYDROcodone-acetaminophen  •  magnesium sulfate **OR** magnesium sulfate in D5W 1g/100mL (PREMIX) **OR** magnesium sulfate  •  melatonin  •  nitroglycerin  •  OLANZapine  •  ondansetron **OR** ondansetron  •  potassium chloride  •  potassium chloride  •  sodium chloride  •  traMADol     ALLERGIES:    Allergies   Allergen Reactions   • Allopurinol Unknown - High Severity   • Clindamycin Hcl Unknown - High Severity   • Codeine Unknown -  "High Severity   • Furosemide Unknown - High Severity   • Hydrochlorothiazide Unknown - High Severity   • Naproxen Unknown - High Severity   • Sulfa Antibiotics Unknown - High Severity       Objective    VITALS:   /61 (BP Location: Left arm, Patient Position: Lying)   Pulse 65   Temp 97.5 °F (36.4 °C) (Oral)   Resp 17   Ht 157.5 cm (62\")   Wt 83.2 kg (183 lb 6.8 oz)   SpO2 98%   BMI 33.55 kg/m²     PHYSICAL EXAM: (performed by MD)  Physical Exam  Vitals signs and nursing note reviewed.   Constitutional:       General: She is not in acute distress.     Appearance: She is obese. She is ill-appearing. She is not diaphoretic.   HENT:      Head: Normocephalic and atraumatic.   Eyes:      General: No scleral icterus.        Right eye: No discharge.         Left eye: No discharge.      Conjunctiva/sclera: Conjunctivae normal.   Neck:      Musculoskeletal: Normal range of motion and neck supple. No muscular tenderness.      Thyroid: No thyromegaly.   Cardiovascular:      Rate and Rhythm: Normal rate and regular rhythm.      Heart sounds: Normal heart sounds. No friction rub. No gallop.    Pulmonary:      Effort: Pulmonary effort is normal. No respiratory distress.      Breath sounds: No stridor. No wheezing.   Abdominal:      General: Bowel sounds are normal.      Palpations: Abdomen is soft. There is no mass.      Tenderness: There is no abdominal tenderness. There is no guarding or rebound.   Musculoskeletal: Normal range of motion.         General: No tenderness.      Right lower leg: Edema present.      Left lower leg: Edema present.      Comments: Both legs are wrapped in bandage   Lymphadenopathy:      Cervical: No cervical adenopathy.   Skin:     General: Skin is warm.      Findings: No erythema or rash.   Neurological:      Mental Status: She is alert and oriented to person, place, and time.      Sensory: No sensory deficit.      Motor: No abnormal muscle tone.   Psychiatric:         Mood and Affect: " Mood normal.         Behavior: Behavior normal.         Thought Content: Thought content normal.           RECENT LABS:  Lab Results (last 24 hours)     Procedure Component Value Units Date/Time    POC Glucose Once [[id number]]  (Abnormal) Collected: 02/13/21 1201    Specimen: Blood Updated: 02/13/21 1205     Glucose 106 mg/dL      Comment: Serial Number: [id number]:  [id number]       POC Glucose Once [[id number]]  (Abnormal) Collected: 02/13/21 0849    Specimen: Blood Updated: 02/13/21 0924     Glucose 123 mg/dL      Comment: Serial Number: [id number]:  [id number]       Comprehensive Metabolic Panel [[id number]]  (Abnormal) Collected: 02/13/21 0350    Specimen: Blood Updated: 02/13/21 0757     Glucose 78 mg/dL       mg/dL      Creatinine 3.73 mg/dL      Sodium 140 mmol/L      Potassium 3.9 mmol/L      Chloride 103 mmol/L      CO2 25.0 mmol/L      Calcium 7.7 mg/dL      Total Protein 5.0 g/dL      Albumin 2.40 g/dL      ALT (SGPT) 45 U/L      AST (SGOT) 108 U/L      Alkaline Phosphatase 994 U/L      Total Bilirubin 0.8 mg/dL      eGFR Non African Amer 12 mL/min/1.73      Comment: <15 Indicative of kidney failure.        eGFR   Amer --     Comment: <15 Indicative of kidney failure.        Globulin 2.6 gm/dL      A/G Ratio 0.9 g/dL      BUN/Creatinine Ratio 27.3     Anion Gap 12.0 mmol/L     Narrative:      GFR Normal >60  Chronic Kidney Disease <60  Kidney Failure <15      CBC & Differential [[id number]]  (Abnormal) Collected: 02/13/21 0350    Specimen: Blood Updated: 02/13/21 0454    Narrative:      The following orders were created for panel order CBC & Differential.  Procedure                               Abnormality         Status                     ---------                               -----------         ------                     Scan Slide[[id number]]                                       Final result               CBC Auto Differential[[id number]]        Abnormal            Final  result                 Please view results for these tests on the individual orders.    Scan Slide [[id number]] Collected: 02/13/21 0350    Specimen: Blood Updated: 02/13/21 0454     Scan Slide --     Comment: See Manual Differential Results       Manual Differential [[id number]]  (Abnormal) Collected: 02/13/21 0350    Specimen: Blood Updated: 02/13/21 0454     Neutrophil % 78.0 %      Lymphocyte % 5.0 %      Monocyte % 3.0 %      Eosinophil % 1.0 %      Bands %  13.0 %      Neutrophils Absolute 10.01 10*3/mm3      Lymphocytes Absolute 0.55 10*3/mm3      Monocytes Absolute 0.33 10*3/mm3      Eosinophils Absolute 0.11 10*3/mm3      RBC Morphology Normal     Toxic Granulation Slight/1+     Platelet Estimate Decreased    CBC Auto Differential [[id number]]  (Abnormal) Collected: 02/13/21 0350    Specimen: Blood Updated: 02/13/21 0454     WBC 11.00 10*3/mm3      RBC 2.70 10*6/mm3      Hemoglobin 8.5 g/dL      Hematocrit 26.3 %      MCV 97.3 fL      MCH 31.5 pg      MCHC 32.4 g/dL      RDW 16.6 %      RDW-SD 56.9 fl      MPV 9.0 fL      Platelets 74 10*3/mm3     Narrative:      The previously reported component NRBC is no longer being reported. Previous result was 0.3 /100 WBC (Reference Range: 0.0-0.2 /100 WBC) on 2/13/2021 at 0407 EST.    Magnesium [[id number]]  (Normal) Collected: 02/13/21 0350    Specimen: Blood Updated: 02/13/21 0415     Magnesium 1.8 mg/dL     POC Glucose Once [[id number]]  (Abnormal) Collected: 02/13/21 0242    Specimen: Blood Updated: 02/13/21 0243     Glucose 228 mg/dL      Comment: Serial Number: [id number]:  [id number]       POC Glucose Once [[id number]]  (Abnormal) Collected: 02/12/21 2012    Specimen: Blood Updated: 02/12/21 2013     Glucose 117 mg/dL      Comment: Serial Number: [id number]:  [id number]       Ammonia [[id number]]  (Abnormal) Collected: 02/12/21 1759    Specimen: Blood Updated: 02/12/21 1841     Ammonia 480 umol/L     Basic Metabolic Panel [[id number]]  (Abnormal)  Collected: 02/12/21 1714    Specimen: Blood Updated: 02/12/21 1742     Glucose 174 mg/dL      BUN 98 mg/dL      Creatinine 3.57 mg/dL      Sodium 138 mmol/L      Potassium 3.5 mmol/L      Chloride 102 mmol/L      CO2 25.0 mmol/L      Calcium 7.2 mg/dL      eGFR   Amer --     Comment: <15 Indicative of kidney failure.        eGFR Non African Amer 12 mL/min/1.73      Comment: <15 Indicative of kidney failure.        BUN/Creatinine Ratio 27.5     Anion Gap 11.0 mmol/L     Narrative:      GFR Normal >60  Chronic Kidney Disease <60  Kidney Failure <15      POC Glucose Once [[id number]]  (Abnormal) Collected: 02/12/21 1723    Specimen: Blood Updated: 02/12/21 1724     Glucose 142 mg/dL      Comment: Serial Number: [id number]:  [id number]       POC Glucose Once [[id number]]  (Abnormal) Collected: 02/12/21 1707    Specimen: Blood Updated: 02/12/21 1710     Glucose 184 mg/dL      Comment: Serial Number: [id number]:  [id number]       Protein Electrophoresis, Total [[id number]]  (Abnormal) Collected: 02/11/21 1227    Specimen: Blood Updated: 02/12/21 1709     Total Protein 4.9 g/dL      Albumin 2.0 g/dL      Alpha-1-Globulin 0.5 g/dL      Alpha-2-Globulin 0.7 g/dL      Beta Globulin 0.7 g/dL      Gamma Globulin 1.0 g/dL      M-[place] 0.1 g/dL      Globulin 2.9 g/dL      A/G Ratio 0.7     Please note Comment     Comment: Protein electrophoresis scan will follow via computer, mail, or   delivery.       Narrative:      Performed at:  [address]  [id number]  : Balta Tellez PhD, Phone:  [phone]    POC Glucose Once [[id number]]  (Abnormal) Collected: 02/12/21 1705    Specimen: Blood Updated: 02/12/21 1708     Glucose 238 mg/dL      Comment: Serial Number: [id number]:  [id number]             PENDING RESULTS: SPEP + IFX    IMAGING REVIEWED:  No radiology results for the last day    Assessment/Plan   ASSESSMENT:  1. Thrombocytopenia: Very likely from  bone marrow suppression due to acute illness.  Antibiotics may be contributing.. Currently on aspirin.  Heparin-induced platelet antibody normal.  Folate 10.90, vitamin B12 >2000, fibrinogen 554, PTT 34, PT/INR 13.3/1.22.  Peripheral smear showed leukocytosis, anemia, and thrombocytopenia.  No blasts identified.  2. Anemia: .  Consider relation to anemia of chronic disease and CKD.  Anemia studies: ferritin 663.70, iron 50, iron saturation 38%, transferrin 89, TIBC 133, , haptoglobin 138,  reticulocytes 1.98%. Patient currently receiving p.o. vitamin B12 and folic acid.  3. Leukocytosis: Reactive leukocytosis due to underlying cellulitis and UTI.  WBC 14.20, bands 17%. Elevated liver enzymes: ALT 35 and AST 71 today.   4. Elevated alkaline phosphatase  5. Cellulitis of bilateral lower extremities: ID consulted.  Blood cultures also drawn- NGTD. On IV antibiotics.   6. UTI: On IV antibiotics.   7. CONG/CKD: Nephrology consulted.   8. CHF: proBNP 66,389  9. CAD/atrial fibrillation/HTN/HLD/gout: Managed per primary team     PLAN  1. CBC daily  2. Continue IV antibiotics   Patient has thrombocytopenia, leukocytosis and anemia , which appears to be multifactorial due to acute illness causing bone marrow suppression, probably medication effect and reactive leukocytosis.  Continue to monitor.  Treat underlying infection.  No acute intervention needed.  Monitor CBC.    Electronically signed by Kevin Nathan MD, 02/13/21, 4:26 PM EST.
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
## 2021-02-14 ENCOUNTER — APPOINTMENT (OUTPATIENT)
Dept: GENERAL RADIOLOGY | Facility: HOSPITAL | Age: 76
End: 2021-02-14

## 2021-02-14 ENCOUNTER — APPOINTMENT (OUTPATIENT)
Dept: NUCLEAR MEDICINE | Facility: HOSPITAL | Age: 76
End: 2021-02-14

## 2021-02-14 ENCOUNTER — APPOINTMENT (OUTPATIENT)
Dept: ULTRASOUND IMAGING | Facility: HOSPITAL | Age: 76
End: 2021-02-14

## 2021-02-14 LAB
ALBUMIN SERPL-MCNC: 2.2 G/DL (ref 3.5–5.2)
ALBUMIN/GLOB SERPL: 0.8 G/DL
ALP SERPL-CCNC: 1082 U/L (ref 39–117)
ALPHA-FETOPROTEIN: 2.3 NG/ML (ref 0–8.3)
ALPHA1 GLOB MFR UR ELPH: 218 MG/DL (ref 90–200)
ALT SERPL W P-5'-P-CCNC: 46 U/L (ref 1–33)
AMMONIA BLD-SCNC: 72 UMOL/L (ref 11–51)
ANION GAP SERPL CALCULATED.3IONS-SCNC: 10 MMOL/L (ref 5–15)
ANISOCYTOSIS BLD QL: ABNORMAL
AST SERPL-CCNC: 101 U/L (ref 1–32)
BILIRUB SERPL-MCNC: 0.8 MG/DL (ref 0–1.2)
BUN SERPL-MCNC: 100 MG/DL (ref 8–23)
BUN/CREAT SERPL: 26 (ref 7–25)
CALCIUM SPEC-SCNC: 7.6 MG/DL (ref 8.6–10.5)
CERULOPLASMIN SERPL-MCNC: 26 MG/DL (ref 19–39)
CHLORIDE SERPL-SCNC: 102 MMOL/L (ref 98–107)
CO2 SERPL-SCNC: 28 MMOL/L (ref 22–29)
CREAT SERPL-MCNC: 3.84 MG/DL (ref 0.57–1)
DEPRECATED RDW RBC AUTO: 56 FL (ref 37–54)
EOSINOPHIL # BLD MANUAL: 0.42 10*3/MM3 (ref 0–0.4)
EOSINOPHIL NFR BLD MANUAL: 5 % (ref 0.3–6.2)
ERYTHROCYTE [DISTWIDTH] IN BLOOD BY AUTOMATED COUNT: 16.2 % (ref 12.3–15.4)
GFR SERPL CREATININE-BSD FRML MDRD: 11 ML/MIN/1.73
GFR SERPL CREATININE-BSD FRML MDRD: ABNORMAL ML/MIN/{1.73_M2}
GLOBULIN UR ELPH-MCNC: 2.8 GM/DL
GLUCOSE BLDC GLUCOMTR-MCNC: 107 MG/DL (ref 70–105)
GLUCOSE BLDC GLUCOMTR-MCNC: 160 MG/DL (ref 70–105)
GLUCOSE BLDC GLUCOMTR-MCNC: 70 MG/DL (ref 70–105)
GLUCOSE BLDC GLUCOMTR-MCNC: 88 MG/DL (ref 70–105)
GLUCOSE SERPL-MCNC: 72 MG/DL (ref 65–99)
HCT VFR BLD AUTO: 26.5 % (ref 34–46.6)
HGB BLD-MCNC: 8.7 G/DL (ref 12–15.9)
INR PPP: 1.19 (ref 0.93–1.1)
LYMPHOCYTES # BLD MANUAL: 0.25 10*3/MM3 (ref 0.7–3.1)
LYMPHOCYTES NFR BLD MANUAL: 3 % (ref 19.6–45.3)
LYMPHOCYTES NFR BLD MANUAL: 6 % (ref 5–12)
MAGNESIUM SERPL-MCNC: 1.8 MG/DL (ref 1.6–2.4)
MCH RBC QN AUTO: 31.8 PG (ref 26.6–33)
MCHC RBC AUTO-ENTMCNC: 32.7 G/DL (ref 31.5–35.7)
MCV RBC AUTO: 97.3 FL (ref 79–97)
MONOCYTES # BLD AUTO: 0.5 10*3/MM3 (ref 0.1–0.9)
NEUTROPHILS # BLD AUTO: 7.14 10*3/MM3 (ref 1.7–7)
NEUTROPHILS NFR BLD MANUAL: 76 % (ref 42.7–76)
NEUTS BAND NFR BLD MANUAL: 10 % (ref 0–5)
NRBC SPEC MANUAL: 1 /100 WBC (ref 0–0.2)
NT-PROBNP SERPL-MCNC: ABNORMAL PG/ML (ref 0–1800)
PLATELET # BLD AUTO: 72 10*3/MM3 (ref 140–450)
PMV BLD AUTO: 7.7 FL (ref 6–12)
POTASSIUM SERPL-SCNC: 3.6 MMOL/L (ref 3.5–5.2)
PROT SERPL-MCNC: 5 G/DL (ref 6–8.5)
PROTHROMBIN TIME: 13 SECONDS (ref 9.6–11.7)
RBC # BLD AUTO: 2.73 10*6/MM3 (ref 3.77–5.28)
SCAN SLIDE: NORMAL
SMALL PLATELETS BLD QL SMEAR: ABNORMAL
SODIUM SERPL-SCNC: 140 MMOL/L (ref 136–145)
TOXIC GRANULATION: ABNORMAL
WBC # BLD AUTO: 8.3 10*3/MM3 (ref 3.4–10.8)

## 2021-02-14 PROCEDURE — 85007 BL SMEAR W/DIFF WBC COUNT: CPT | Performed by: PHYSICIAN ASSISTANT

## 2021-02-14 PROCEDURE — 97110 THERAPEUTIC EXERCISES: CPT

## 2021-02-14 PROCEDURE — A9540 TC99M MAA: HCPCS | Performed by: INTERNAL MEDICINE

## 2021-02-14 PROCEDURE — 84075 ASSAY ALKALINE PHOSPHATASE: CPT | Performed by: NURSE PRACTITIONER

## 2021-02-14 PROCEDURE — 0 TECHNETIUM TC99M PYROPHOSPHATE: Performed by: INTERNAL MEDICINE

## 2021-02-14 PROCEDURE — 99232 SBSQ HOSP IP/OBS MODERATE 35: CPT | Performed by: INTERNAL MEDICINE

## 2021-02-14 PROCEDURE — 82962 GLUCOSE BLOOD TEST: CPT

## 2021-02-14 PROCEDURE — 85610 PROTHROMBIN TIME: CPT | Performed by: INTERNAL MEDICINE

## 2021-02-14 PROCEDURE — 82140 ASSAY OF AMMONIA: CPT | Performed by: NURSE PRACTITIONER

## 2021-02-14 PROCEDURE — 99222 1ST HOSP IP/OBS MODERATE 55: CPT | Performed by: INTERNAL MEDICINE

## 2021-02-14 PROCEDURE — 97112 NEUROMUSCULAR REEDUCATION: CPT

## 2021-02-14 PROCEDURE — 85025 COMPLETE CBC W/AUTO DIFF WBC: CPT | Performed by: PHYSICIAN ASSISTANT

## 2021-02-14 PROCEDURE — 97535 SELF CARE MNGMENT TRAINING: CPT

## 2021-02-14 PROCEDURE — 83880 ASSAY OF NATRIURETIC PEPTIDE: CPT | Performed by: INTERNAL MEDICINE

## 2021-02-14 PROCEDURE — 99232 SBSQ HOSP IP/OBS MODERATE 35: CPT | Performed by: NURSE PRACTITIONER

## 2021-02-14 PROCEDURE — 83735 ASSAY OF MAGNESIUM: CPT | Performed by: PHYSICIAN ASSISTANT

## 2021-02-14 PROCEDURE — 73590 X-RAY EXAM OF LOWER LEG: CPT

## 2021-02-14 PROCEDURE — A9538 TC99M PYROPHOSPHATE: HCPCS | Performed by: INTERNAL MEDICINE

## 2021-02-14 PROCEDURE — 97530 THERAPEUTIC ACTIVITIES: CPT

## 2021-02-14 PROCEDURE — 80053 COMPREHEN METABOLIC PANEL: CPT | Performed by: INTERNAL MEDICINE

## 2021-02-14 PROCEDURE — 0 TECHNETIUM ALBUMIN AGGREGATED: Performed by: INTERNAL MEDICINE

## 2021-02-14 PROCEDURE — 78582 LUNG VENTILAT&PERFUS IMAGING: CPT

## 2021-02-14 PROCEDURE — 99233 SBSQ HOSP IP/OBS HIGH 50: CPT | Performed by: INTERNAL MEDICINE

## 2021-02-14 PROCEDURE — 76705 ECHO EXAM OF ABDOMEN: CPT

## 2021-02-14 PROCEDURE — 84080 ASSAY ALKALINE PHOSPHATASES: CPT | Performed by: NURSE PRACTITIONER

## 2021-02-14 RX ORDER — BUMETANIDE 0.25 MG/ML
2 INJECTION INTRAMUSCULAR; INTRAVENOUS EVERY 6 HOURS SCHEDULED
Status: DISCONTINUED | OUTPATIENT
Start: 2021-02-14 | End: 2021-02-17

## 2021-02-14 RX ORDER — SORBITOL SOLUTION 70 %
50 SOLUTION, ORAL MISCELLANEOUS DAILY
Status: DISCONTINUED | OUTPATIENT
Start: 2021-02-15 | End: 2021-02-14

## 2021-02-14 RX ADMIN — Medication: at 22:02

## 2021-02-14 RX ADMIN — FEBUXOSTAT 40 MG: 40 TABLET, FILM COATED ORAL at 12:05

## 2021-02-14 RX ADMIN — GABAPENTIN 100 MG: 100 CAPSULE ORAL at 12:05

## 2021-02-14 RX ADMIN — ASPIRIN 81 MG CHEWABLE TABLET 81 MG: 81 TABLET CHEWABLE at 12:06

## 2021-02-14 RX ADMIN — TECHNETIUM TC99M PYROPHOSPHATE 1 DOSE: 12 INJECTION INTRAVENOUS at 10:00

## 2021-02-14 RX ADMIN — ZINC OXIDE: 200 OINTMENT TOPICAL at 12:05

## 2021-02-14 RX ADMIN — DOXYCYCLINE 100 MG: 100 TABLET, FILM COATED ORAL at 22:01

## 2021-02-14 RX ADMIN — URSODIOL 300 MG: 300 CAPSULE ORAL at 12:05

## 2021-02-14 RX ADMIN — FOLIC ACID 1 MG: 1 TABLET ORAL at 12:05

## 2021-02-14 RX ADMIN — CYANOCOBALAMIN TAB 250 MCG 500 MCG: 250 TAB at 12:05

## 2021-02-14 RX ADMIN — KIT FOR THE PREPARATION OF TECHNETIUM TC 99M ALBUMIN AGGREGATED 1 DOSE: 2.5 INJECTION, POWDER, FOR SOLUTION INTRAVENOUS at 10:30

## 2021-02-14 RX ADMIN — URSODIOL 300 MG: 300 CAPSULE ORAL at 22:00

## 2021-02-14 RX ADMIN — FLUCONAZOLE 100 MG: 100 TABLET ORAL at 18:17

## 2021-02-14 RX ADMIN — LACTULOSE 300 ML: 10 SOLUTION ORAL at 01:02

## 2021-02-14 RX ADMIN — Medication 10 ML: at 22:06

## 2021-02-14 RX ADMIN — BUMETANIDE 2 MG: 0.25 INJECTION, SOLUTION INTRAMUSCULAR; INTRAVENOUS at 18:17

## 2021-02-14 RX ADMIN — RIFAXIMIN 550 MG: 550 TABLET ORAL at 12:05

## 2021-02-14 RX ADMIN — NYSTATIN 500000 UNITS: 100000 SUSPENSION ORAL at 12:19

## 2021-02-14 RX ADMIN — RIFAXIMIN 550 MG: 550 TABLET ORAL at 22:01

## 2021-02-14 RX ADMIN — DOXYCYCLINE 100 MG: 100 TABLET, FILM COATED ORAL at 12:06

## 2021-02-14 RX ADMIN — LACTULOSE 20 G: 20 SOLUTION ORAL at 22:00

## 2021-02-14 RX ADMIN — GABAPENTIN 100 MG: 100 CAPSULE ORAL at 22:01

## 2021-02-14 RX ADMIN — FEBUXOSTAT 40 MG: 40 TABLET, FILM COATED ORAL at 22:00

## 2021-02-14 RX ADMIN — LACTULOSE 20 G: 20 SOLUTION ORAL at 18:17

## 2021-02-14 RX ADMIN — GABAPENTIN 100 MG: 100 CAPSULE ORAL at 18:17

## 2021-02-14 RX ADMIN — BUMETANIDE 1 MG: 0.25 INJECTION, SOLUTION INTRAMUSCULAR; INTRAVENOUS at 05:08

## 2021-02-14 RX ADMIN — BUMETANIDE 2 MG: 0.25 INJECTION, SOLUTION INTRAMUSCULAR; INTRAVENOUS at 12:20

## 2021-02-14 RX ADMIN — Medication: at 12:07

## 2021-02-14 RX ADMIN — Medication 10 ML: at 12:08

## 2021-02-14 NOTE — PROGRESS NOTES
PROGRESS NOTE      Patient Name: Emperatriz Childs  : 1945  MRN: [id number]  Primary Care Physician: Rosa M Chavez APRN  Date of admission: 2021    Patient Care Team:  Rosa M Chavez APRN as PCP - General (Nurse Practitioner)        Subjective   Subjective:     Acute kidney injury, patient is still swollen not feeling good, no significant shortness of breath  Review of systems:  All other review of system unremarkable      Allergies:    Allergies   Allergen Reactions   • Allopurinol Unknown - High Severity   • Clindamycin Hcl Unknown - High Severity   • Codeine Unknown - High Severity   • Furosemide Unknown - High Severity   • Hydrochlorothiazide Unknown - High Severity   • Naproxen Unknown - High Severity   • Sulfa Antibiotics Unknown - High Severity       Objective   Exam:     Vital Signs  Temp:  [97 °F (36.1 °C)-98.3 °F (36.8 °C)] 97 °F (36.1 °C)  Heart Rate:  [60-65] 60  Resp:  [16-20] 18  BP: (124-142)/(53-61) 137/58  SpO2:  [98 %-100 %] 100 %  on  Flow (L/min):  [2] 2;   Device (Oxygen Therapy): nasal cannula  Body mass index is 32.02 kg/m².    General: Elderly  female in no acute distress.    Head:      Normocephalic and atraumatic.    Eyes:      PERRL/EOM intact, conjunctiva and sclera clear with out nystagmus.    Neck:      No masses, thyromegaly,  trachea central with normal respiratory effort   Lungs:    Clear bilaterally to auscultation.    Heart:      Regular rate and rhythm, no murmur no gallop  Abd:        Soft, nontender, not distended, bowel sounds positive, no shifting dullness   Pulses:   Pulses palpable  Extr:        No cyanosis or clubbing--significant bilateral edema.    Neuro:    No focal deficits.   alert oriented x3  Skin:       Intact without lesions or rashes.    Psych:    Alert and cooperative; normal mood and affect; .      Results Review:  I have personally reviewed most recent Data :  CBC    Results from last 7 days   Lab Units 21  0446 21  9113  02/12/21  0427 02/11/21  0027 02/10/21  0547 02/09/21  0524 02/08/21  0529   WBC 10*3/mm3 8.30 11.00* 14.20* 12.00* 10.30 14.40* 18.60*   HEMOGLOBIN g/dL 8.7* 8.5* 9.0* 9.0* 9.0* 9.5* 9.6*   PLATELETS 10*3/mm3 72* 74* 63* 92* 35* 54* 34*     CMP   Results from last 7 days   Lab Units 02/14/21  0446 02/13/21  1722 02/13/21  0350 02/12/21  1759 02/12/21  1714 02/12/21 0427 02/11/21  1714 02/11/21  1227 02/11/21  0027 02/10/21  0547  02/07/21  1305   SODIUM mmol/L 140 140 140  --  138 136 137  --  135* 138   < > 140   POTASSIUM mmol/L 3.6 3.7 3.9  --  3.5 3.8 4.1  --  3.7 3.7   < > 4.5   CHLORIDE mmol/L 102 102 103  --  102 99 99  --  100 104   < > 109*   CO2 mmol/L 28.0 27.0 25.0  --  25.0 24.0 24.0  --  23.0 24.0   < > 18.0*   BUN mg/dL 100* 99* 102*  --  98* 102* 100*  --  95* 95*   < > 87*   CREATININE mg/dL 3.84* 3.70* 3.73*  --  3.57* 3.46* 3.58*  --  3.26* 2.98*   < > 3.50*   GLUCOSE mg/dL 72 85 78  --  174* 220* 142*  --  314* 92   < > 87   ALBUMIN g/dL 2.20*  --  2.40*  --   --  2.90*  --  2.0*  --  2.10*  --  2.50*   BILIRUBIN mg/dL 0.8  --  0.8  --   --  1.2  --   --   --  1.4*  --  0.6   ALK PHOS U/L 1,082*  --  994*  --   --  762*  --   --   --  396*  --  135*   AST (SGOT) U/L 101*  --  108*  --   --  72*  --   --   --  81*  --  30   ALT (SGPT) U/L 46*  --  45*  --   --  35*  --   --   --  27  --  12   AMMONIA umol/L 72*  --   --  480*  --   --   --   --   --   --   --   --     < > = values in this interval not displayed.     ABG        Xr Chest 1 View    Result Date: 2/11/2021  1. Cardiomegaly without acute pulmonary process. No change from prior exam.  Electronically Signed By-Dae Auguste MD On:2/11/2021 2:18 PM This report was finalized on [id number] by  Dae Auguste MD.    Xr Chest 1 View    Result Date: 2/7/2021  1. New right-sided PICC line with tip terminating over the low SVC. 2. Stable cardiomegaly.  Electronically Signed By-Dae Auguste MD On:2/7/2021 10:45 AM This report was  finalized on [id number] by  Dae Auguste MD.    Xr Chest 1 View    Result Date: 2/6/2021  1.Cardiomegaly  Electronically Signed By-Azar Alarcon MD On:2/6/2021 8:01 AM This report was finalized on [id number] by  Azar Alarcon MD.    Xr Chest Pa & Lateral    Result Date: 2/13/2021   1. Cardiomegaly. 2. Small to moderate left pleural effusion with compressive atelectasis and possible airspace disease from pneumonia.  Electronically Signed By-Fletcher Barros MD On:2/13/2021 5:43 PM This report was finalized on [id number] by  Fletcher Barros MD.      Results for orders placed during the hospital encounter of 02/05/21   Adult Transthoracic Echo Complete W/ Cont if Necessary Per Protocol    Narrative Normal LV size and contractility EF of 55%  Moderate right ventricular enlargement with severe right atrial   enlargement, catheter probably pacemaker lead seen.  Severe left atrial enlargement seen.  Aortic valve, mitral valve, tricuspid valve appears structurally normal,   mild MR, AR, seen.  There is moderate  tricuspid regurgitation seen.    Calculated RV systolic pressure is 24mmHg.  No pericardial effusion seen.  Proximal aorta appears normal in size.     Scheduled Meds:aspirin, 81 mg, Oral, Daily  bumetanide, 1 mg, Intravenous, Q6H  doxycycline, 100 mg, Oral, Q12H  febuxostat, 40 mg, Oral, BID  fluconazole, 100 mg, Oral, Q24H  folic acid, 1 mg, Oral, Daily  gabapentin, 100 mg, Oral, TID  haloperidol lactate, 5 mg, Intramuscular, Once  lactulose, 20 g, Oral, TID  magic butt paste, , Topical, Daily  midodrine, 10 mg, Oral, TID AC  nystatin, 5 mL, Swish & Swallow, 4x Daily  rifAXIMin, 550 mg, Oral, Q12H  sodium chloride, 10 mL, Intravenous, Q12H  ursodiol, 300 mg, Oral, BID  vitamin B-12, 500 mcg, Oral, Daily  Zinc Oxide, , Apply externally, BID      Continuous Infusions:dextrose, 20 mL/hr, Last Rate: 20 mL/hr (02/13/21 2042)      PRN Meds:•  acetaminophen **OR** acetaminophen **OR** acetaminophen  •  dextrose  •   dextrose  •  glucagon (human recombinant)  •  HYDROcodone-acetaminophen  •  magnesium sulfate **OR** magnesium sulfate in D5W 1g/100mL (PREMIX) **OR** magnesium sulfate  •  melatonin  •  nitroglycerin  •  OLANZapine  •  ondansetron **OR** ondansetron  •  potassium chloride  •  potassium chloride  •  sodium chloride  •  traMADol    Assessment/Plan   Assessment and Plan:         Congestive heart failure (CMS/HCC)    Atrial fibrillation (CMS/[facility])    Hypertension, benign    Lymphedema    Gout    Non-pressure chronic ulcer right ankle, limited to breakdown skin (CMS/[facility])    Automatic implantable cardiac defibrillator in situ    Acute UTI (urinary tract infection)    Lower extremity cellulitis    CAD (coronary artery disease)    Sepsis (CMS/HCC)    CONG (acute kidney injury) (CMS/[facility])    Lactic acidosis    Metabolic acidosis    Delirium, acute    ASSESSMENT:  · Acute kidney injury, oliguric, stage 2-3, evolving, ongoing hypotension, ? Sepsis, and prerenal azotemia component with ongoing diarrhea, diuretics use,   ·  might have some degree of progression of CKD too.   · CKD 4, with baseline creatinine around 1.7- 2.2, till last year,02/2020, never followed up in clinic. Work up then  ua negative RBC,WBC,no protein. UPC, was unremarkable in 02/2020 USG, right 8.9 cm, andleft 8.3 cm, medial renal disease.  · Metabolic acidosis, gap and non gap, more due to GI bicarb loss, CKD and persistent lactic acidosis. patient apparently had large bowel movement, significant metabolic acidosis  · Hypotension, concern hypovolemia,   · Congestive heart failure with last ejection fraction around 40% from 2018,   · History of atrial fibrillation  · Significant metabolic acidosis  · Significant anemia  · Chronic lymphedema  · Cellulitis of lower extremities.   · Thrombocytopenia, also on 02/2020  · Anemia.      Plan:        · Patient baseline creatinine 1.8-2.  Acute increase in creatinine with acute increase in volume with infection and  metabolic acidosis and GI  bicarbonate losses was going on which is better  · Urine output improved significantly BUN and creatinine almost stable from yesterday  · Will increase Bumex to improve urine output as patient BNP level increasing.  · Patient still has significantly engorged neck veins with significant peripheral edema  · Repeat chest x-ray with mild pulmonary congestion  · To me patient seems to be still volume overloaded  · Follow-up with repeat lab  · Hemodynamics are so far acceptable  · Follow-up with a urine output and electrolytes  · Follow-up with repeat labs  · Patient has cardiorenal syn will drome at this time  · Kelly is off dopamine.  · Continue bliss.   · Fu sepsis work up follow-up with infectious disease  · Repeat labs,   · Decrease fluids in evening.        Note started  by Feliz Silva MD,   [facility] kidney consultant

## 2021-02-14 NOTE — PROGRESS NOTES
" LOS: 6 days   Patient Care Team:  Rosa M Chavez APRN as PCP - General (Nurse Practitioner)      Subjective   \"Sleepy, but I am feeling OK\"    Interval History:   Right upper quadrant ultrasound 2/14/2021 showed hepatic cirrhosis, cholelithiasis and minimal ascites.  Ammonia level is down to 72.  Alk phos continues to rise from 994-1082.  AST and ALT are the same and total bili is normal.    ROS:   Leg pain   No chest pain, shortness of breath, or cough.       Medication Review:     Current Facility-Administered Medications:   •  acetaminophen (TYLENOL) tablet 650 mg, 650 mg, Oral, Q4H PRN, 650 mg at 02/10/21 0305 **OR** acetaminophen (TYLENOL) 160 MG/5ML solution 650 mg, 650 mg, Oral, Q4H PRN **OR** acetaminophen (TYLENOL) suppository 650 mg, 650 mg, Rectal, Q4H PRN, Bert Bateman PA-C  •  aspirin chewable tablet 81 mg, 81 mg, Oral, Daily, Bert Bateman PA-C, 81 mg at 02/13/21 0908  •  bumetanide (BUMEX) injection 2 mg, 2 mg, Intravenous, Q6H, Feliz Silva MD  •  dextrose (D50W) 25 g/ 50mL Intravenous Solution 25 g, 25 g, Intravenous, Q15 Min PRN, Alexandro Huitron MD  •  dextrose (GLUTOSE) oral gel 15 g, 15 g, Oral, Q15 Min PRN, Alexandro Huitron MD  •  dextrose 10 % infusion, 20 mL/hr, Intravenous, Continuous, Alexandro Huitron MD, Last Rate: 20 mL/hr at 02/13/21 2042, 20 mL/hr at 02/13/21 2042  •  doxycycline (ADOXA) tablet 100 mg, 100 mg, Oral, Q12H, Kurt Diehl MD, 100 mg at 02/13/21 2030  •  febuxostat (ULORIC) tablet 40 mg, 40 mg, Oral, BID, Bert Bateman PA-C, 40 mg at 02/13/21 2031  •  fluconazole (DIFLUCAN) tablet 100 mg, 100 mg, Oral, Q24H, Kurt Diehl MD, 100 mg at 02/13/21 1727  •  folic acid (FOLVITE) tablet 1 mg, 1 mg, Oral, Daily, Alexandro Huitron MD, 1 mg at 02/13/21 1220  •  gabapentin (NEURONTIN) capsule 100 mg, 100 mg, Oral, TID, Bert Bateman PA-C, 100 mg at 02/13/21 2030  •  glucagon " (human recombinant) (GLUCAGEN DIAGNOSTIC) injection 1 mg, 1 mg, Subcutaneous, Q15 Min PRN, Tomy, Alexandro Bains MD  •  haloperidol lactate (HALDOL) injection 5 mg, 5 mg, Intramuscular, Once, Goldy-Egfroilan Jake I, DO  •  HYDROcodone-acetaminophen (NORCO) 5-325 MG per tablet 1 tablet, 1 tablet, Oral, Q8H PRN, Alexandro Huitron MD, 1 tablet at 02/13/21 1220  •  lactulose (CHRONULAC) 10 GM/15ML solution 20 g, 20 g, Oral, TID, Alexandro Huitron MD, 20 g at 02/13/21 2030  •  magic butt paste, , Topical, Daily, Goldy-aVlery Jake I, DO, Given at 02/13/21 0910  •  magnesium sulfate 4 gram infusion - Mg less than or equal to 1mg/dL, 4 g, Intravenous, PRN **OR** magnesium sulfate 3 gram infusion (1gm x 3) - Mg 1.1 - 1.5 mg/dL, 1 g, Intravenous, PRN, Last Rate: 100 mL/hr at 02/10/21 1153, 1 g at 02/10/21 1153 **OR** Magnesium Sulfate 2 gram infusion- Mg 1.6 - 1.9 mg/dL, 2 g, Intravenous, PRN, Goldy-Egziabher, Jake I, DO, Last Rate: 25 mL/hr at 02/13/21 0526, 2 g at 02/13/21 0526  •  melatonin tablet 5 mg, 5 mg, Oral, Nightly PRN, Bert Bateman PA-C, 5 mg at 02/08/21 1955  •  midodrine (PROAMATINE) tablet 10 mg, 10 mg, Oral, TID AC, Goldy-Egkhangabher Jake I, DO, 10 mg at 02/11/21 1219  •  nitroglycerin (NITROSTAT) SL tablet 0.4 mg, 0.4 mg, Sublingual, Q5 Min PRN, Bert Bateman PA-C  •  nystatin (MYCOSTATIN) 202460 UNIT/ML suspension 500,000 Units, 5 mL, Swish & Swallow, 4x Daily, Alexandro Huitron MD, 500,000 Units at 02/13/21 2031  •  OLANZapine (zyPREXA) injection 2.5 mg, 2.5 mg, Intramuscular, Q8H PRN, Goldy-Egkhangab, Jake I, DO  •  ondansetron (ZOFRAN) tablet 4 mg, 4 mg, Oral, Q6H PRN **OR** ondansetron (ZOFRAN) injection 4 mg, 4 mg, Intravenous, Q6H PRN, Bert Bateman PA-C, 4 mg at 02/11/21 1400  •  potassium chloride (K-DUR,KLOR-CON) CR tablet 40 mEq, 40 mEq, Oral, PRN, Goldy-Valery, Jake I, DO, 40 mEq at 02/12/21 1800  •  potassium  chloride (KLOR-CON) packet 40 mEq, 40 mEq, Oral, PRN, Jake Marx I, DO  •  riFAXIMin (XIFAXAN) tablet 550 mg, 550 mg, Oral, Q12H, Alexandro Huitron MD, 550 mg at 02/13/21 2030  •  sodium chloride 0.9 % flush 10 mL, 10 mL, Intravenous, Q12H, Bert Bateman PA-C, 10 mL at 02/13/21 2041  •  sodium chloride 0.9 % flush 10 mL, 10 mL, Intravenous, PRN, Bert Bateman PA-C  •  traMADol (ULTRAM) tablet 50 mg, 50 mg, Oral, Q12H PRN, Bert Bateman PA-C, 50 mg at 02/13/21 2031  •  ursodiol (ACTIGALL) capsule 300 mg, 300 mg, Oral, BID, Joyce Alcala APRN, 300 mg at 02/13/21 2030  •  vitamin B-12 (CYANOCOBALAMIN) tablet 500 mcg, 500 mcg, Oral, Daily, Alexandro Huitron MD, 500 mcg at 02/13/21 0908  •  Zinc Oxide 16 % ointment, , Apply externally, BID, Jake Marx DO, Given at 02/13/21 2047      Objective  Resting in hospital bed. Just returned from testing. Nurse at bedside.     Vital Signs  Temp:  [97 °F (36.1 °C)-98.3 °F (36.8 °C)] 97 °F (36.1 °C)  Heart Rate:  [60-65] 60  Resp:  [16-20] 18  BP: (124-142)/(55-61) 133/55  Physical Exam:    General Appearance:    Awake and alert, in no acute distress   Head:    Normocephalic, without obvious abnormality   Eyes:          Conjunctivae normal, anicteric sclera   Ears:    Hearing intact   Throat:   No oral lesions, no thrush, oral mucosa moist   Neck:   No adenopathy, supple, no JVD   Lungs:     Clear to auscultation bilaterally, respirations regular, even and unlabored    Heart:    Regular rhythm and normal rate, normal S1 and S2, no            murmur, no gallop, no rub   Abdomen:     Normal bowel sounds, soft, non-tender, no rebound or guarding, non-distended, no hepatosplenomegaly   Rectal:     Deferred   Extremities:   No edema, no cyanosis, no redness   Skin:   No bleeding, bruising or rash, no jaundice   Neurologic:   Cranial nerves 2 - 12 grossly intact, no asterixis, sensation   intact         Results Review:    CBC    Results from last 7 days   Lab Units 02/14/21  0446 02/13/21  0350 02/12/21  0427 02/11/21  0027 02/10/21  0547 02/09/21  0524 02/08/21  0529   WBC 10*3/mm3 8.30 11.00* 14.20* 12.00* 10.30 14.40* 18.60*   HEMOGLOBIN g/dL 8.7* 8.5* 9.0* 9.0* 9.0* 9.5* 9.6*   PLATELETS 10*3/mm3 72* 74* 63* 92* 35* 54* 34*     CMP   Results from last 7 days   Lab Units 02/14/21  0446 02/13/21  1722 02/13/21 0350 02/12/21  1759 02/12/21  1714 02/12/21 0427 02/11/21  1714 02/11/21  1227 02/11/21  0027 02/10/21  0547  02/09/21  0524 02/08/21  0529 02/07/21  1305   SODIUM mmol/L 140 140 140  --  138 136 137  --  135* 138   < > 138 140 140   POTASSIUM mmol/L 3.6 3.7 3.9  --  3.5 3.8 4.1  --  3.7 3.7   < > 3.3* 4.3 4.5   CHLORIDE mmol/L 102 102 103  --  102 99 99  --  100 104   < > 101 104 109*   CO2 mmol/L 28.0 27.0 25.0  --  25.0 24.0 24.0  --  23.0 24.0   < > 23.0 22.0 18.0*   BUN mg/dL 100* 99* 102*  --  98* 102* 100*  --  95* 95*   < > 98* 98* 87*   CREATININE mg/dL 3.84* 3.70* 3.73*  --  3.57* 3.46* 3.58*  --  3.26* 2.98*   < > 3.38* 3.39* 3.50*   GLUCOSE mg/dL 72 85 78  --  174* 220* 142*  --  314* 92   < > 119* 109* 87   ALBUMIN g/dL 2.20*  --  2.40*  --   --  2.90*  --  2.0*  --  2.10*  --   --   --  2.50*   BILIRUBIN mg/dL 0.8  --  0.8  --   --  1.2  --   --   --  1.4*  --   --   --  0.6   ALK PHOS U/L 1,082*  --  994*  --   --  762*  --   --   --  396*  --   --   --  135*   AST (SGOT) U/L 101*  --  108*  --   --  72*  --   --   --  81*  --   --   --  30   ALT (SGPT) U/L 46*  --  45*  --   --  35*  --   --   --  27  --   --   --  12   MAGNESIUM mg/dL 1.8  --  1.8  --   --  2.1  --   --  2.0 1.4*  --  1.8 1.5* 1.4*   AMMONIA umol/L 72*  --   --  480*  --   --   --   --   --   --   --   --   --   --     < > = values in this interval not displayed.     Cr Clearance Estimated Creatinine Clearance: 12.2 mL/min (A) (by C-G formula based on SCr of 3.84 mg/dL (H)).  Coag   Results from last 7 days   Lab  Units 02/14/21  0446 02/13/21  1722 02/11/21  1227   INR  1.19* 1.17* 1.22*   APTT seconds  --   --  34.0*     HbA1C   Lab Results   Component Value Date    HGBA1C 4.3 02/05/2020    HGBA1C 4.3 01/28/2020     Blood Glucose   Glucose   Date/Time Value Ref Range Status   02/14/2021 0745 88 70 - 105 mg/dL Final     Comment:     Serial Number: [id number]:  [id number]   02/13/2021 2007 87 70 - 105 mg/dL Final     Comment:     Serial Number: [id number]:  [id number]   02/13/2021 1201 106 (H) 70 - 105 mg/dL Final     Comment:     Serial Number: [id number]:  [id number]   02/13/2021 0849 123 (H) 70 - 105 mg/dL Final     Comment:     Serial Number: [id number]:  [id number]   02/13/2021 0242 228 (H) 70 - 105 mg/dL Final     Comment:     Serial Number: [id number]:  [id number]   02/12/2021 2012 117 (H) 70 - 105 mg/dL Final     Comment:     Serial Number: [id number]:  [id number]   02/12/2021 1723 142 (H) 70 - 105 mg/dL Final     Comment:     Serial Number: [id number]:  [id number]   02/12/2021 1707 184 (H) 70 - 105 mg/dL Final     Comment:     Serial Number: [id number]:  [id number]     Infection   Results from last 7 days   Lab Units 02/10/21  1616 02/10/21  0306 02/08/21  0529   URINECX  Yeast isolated* Yeast isolated*  --    PROCALCITONIN ng/mL  --   --  276.80*     UA    Results from last 7 days   Lab Units 02/10/21  1616   NITRITE UA  Negative   WBC UA /HPF 6-12*   BACTERIA UA /HPF Trace*   SQUAM EPITHEL UA /HPF 3-6*   URINECX  Yeast isolated*     Radiology(recent) Us Liver    Result Date: 2/14/2021   1. Abnormal appearance of liver felt to be secondary to hepatic cirrhosis. 2. Cholelithiasis. 3. Minimal ascites.  Electronically Signed By-Fletcher Barros MD On:2/14/2021 10:41 AM This report was finalized on [id number] by  Fletcher Barros MD.    Xr Chest Pa & Lateral    Result Date: 2/13/2021   1. Cardiomegaly. 2. Small to moderate left pleural effusion with compressive atelectasis and  possible airspace disease from pneumonia.  Electronically Signed By-Fletcher Barros MD On:2/13/2021 5:43 PM This report was finalized on [id number] by  Fletcher Barros MD.            Assessment/Plan   Encephalopathy with hyperammoniemia  Elevated LFT's consider liver injury vs liver disease vs medication induced   Abnormal US showing cirrhosis & cholelithiasis   UTI-candiduria  Acute kidney injury on chronic kidney disease  Lymphedema leading to cellulitis   Anemia of chronic kidney disease  Thrombocytopenia consider related to liver vs infection related vs ITP  CHF  CAD/MI/Atrial fib/pacemaker   HTN     PLAN:   Alk phos continues to rise now at 1082.  I have sent for alk phos to be fractionated.  Her elevation may be related to bone injury/inflammation or osteomyelitis due to her cellulitis vs liver injury (medication induced).  Continue lactulose po to keep ammonia levels down and prevent confusion.    Right upper quadrant ultrasound shows cirrhosis and Cholelithiasis.  But her LFTs are not consistent with biliary obstruction.   Acute hepatitis panel was negative.  Pending full liver serologies, acetaminophen level was 7.7 on 2/12/2021. She has been receiving Norco 5's twice a day for pain.      I discussed the patients findings and my recommendations with the patient.  Assessment & treatment plan will be reviewed with Dr Guthrie.     Joyce Aclala, APRN  02/14/21  11:11 EST

## 2021-02-14 NOTE — PROGRESS NOTES
[facility] ER Medicine Services Daily Progress Note      Hospitalist Team  LOS 6 days      Patient Care Team:  Rosa M Chavez APRN as PCP - General (Nurse Practitioner)    Patient Location: 2120/1      Subjective   Subjective     Chief Complaint / Subjective  Fevers, leg pain      Brief Synopsis of Hospital Course/HPI    The patient is a 75-year-old female with history atrial fibrillation s/p pacemaker placement, hyperlipidemia, hypertension, CAD, CKD stage III and  chronic lower extremity lymphedema.    Apparently the patient has been having several weeks of worsening lower extremity edema thus went to outside facility.    Laboratory work was significant for , potassium 5.3, BUN 63, creatinine 2.41, WBC 4.5, lactic acid 3.2 and UA with 25-50 WBCs.  The patient was transferred to [facility] for further care      Date::    2/6/21: Poor historian.  Low BP thus gave bolus.  Started on dopamine and transferred to PCU.  Started on bicarb drip.  Confused in the evening.  2/7/21: PICC line placed.  Daughter at the bedside discussed care.  Daughter claims no history of dementia or sundowning.  Leukocytosis.  Added Flagyl.  Consulted ID.  Daughter claims patient off Xarelto.  C. difficile ruled out.      2/8  Has third spacing and bruising.  Patient denies any chest pain  On dopamine drip and being tapered off.  Started on midodrine.    2/9/2021  Outpatient recliner  She has Ace wrap in place  She had been complaining of some tight wraps on the right side and will need to be addressed.  White count better  Her platelet count is 54 slightly better than yesterday  Still on a small amount of dopamine drip.  Renal function still elevated but probably stabilized.    2/10  Patient is now feeling better and her leg swelling is under control with compression dressing that has been changed to allow for some relief of her pain.  She had drop in her creatinine which suggests improvement with  "diuresis and possible cardiorenal etiology    She has been eating yesterday and her sugar dropped and started D10    B12 is above 2000  Haptoglobin is 100 within normal.  She is hypomagnesemic as well.  Folic acid 10.9  Still with significant thrombocytopenia.  We will ask hematologist for evaluation.    2/11/2021  Urine output decreased overnight and Dr. Pinedo wants to start dopamine drip again  Creatinine slightly up again today  Patient is asking when she is going to go home.  Since her right ventricle atrial or enlarged  2/12  Coaches in hips and arms hyun w ambulation  Bands 17%  platelet 63  Hallucinating; talking to her !  Check ammmonia    2/13  Feels sleepy most pf time  [id number]   Initiated lactulose enema and oral lactulose  Consult GI  Worsening alkaline phosphatase and ALT elevation noted  Check hepatitis panel  On Bumex drip  Had good urine output overnight    2/14  Patient has been switched to IV Bumex  She had worsening elevation of alkaline phosphatase  Still has poor oral intake  No evident hallucinations on exam today  Complaining of pain bilateral legs.  X-ray ordered for evaluation.    Review of Systems   All other systems reviewed and are negative.        Objective   Objective      Vital Signs  Temp:  [97 °F (36.1 °C)-98.4 °F (36.9 °C)] 98.4 °F (36.9 °C)  Heart Rate:  [60-65] 62  Resp:  [16-20] 16  BP: (133-152)/(52-61) 152/52  Oxygen Therapy  SpO2: 97 %  Pulse Oximetry Type: Continuous  Device (Oxygen Therapy): nasal cannula  Flow (L/min): 2  Flowsheet Rows      First Filed Value   Admission Height  157.5 cm (62\") Documented at 02/05/2021 2359   Admission Weight  78 kg (172 lb) Documented at 02/05/2021 2359        Intake & Output (last 3 days)       02/11 0701 - 02/12 0700 02/12 0701 - 02/13 0700 02/13 0701 - 02/14 0700 02/14 0701 - 02/15 0700    P.O.  480 480     Other        Total Intake(mL/kg)  480 (5.8) 480 (6)     Urine (mL/kg/hr) 1030 (0.5) 1770 (0.9) 1875 (1)     Stool   0     " Total Output 1030 1770 1875     Net -1030 -1290 -1395             Stool Unmeasured Occurrence   1 x         Lines, Drains & Airways    Active LDAs     Name:   Placement date:   Placement time:   Site:   Days:    Peripheral IV 02/06/21 0403 Anterior;Right Forearm   02/06/21    0403    Forearm   less than 1                  Physical Exam:    Physical Exam  HENT:      Head: Normocephalic.      Nose: Nose normal.   Eyes:      General: No scleral icterus.     Extraocular Movements: Extraocular movements intact.      Pupils: Pupils are equal, round, and reactive to light.   Neck:      Musculoskeletal: Normal range of motion.   Cardiovascular:      Rate and Rhythm: Normal rate and regular rhythm.   Pulmonary:      Effort: Pulmonary effort is normal.      Breath sounds: Normal breath sounds.   Abdominal:      General: Bowel sounds are normal.      Palpations: Abdomen is soft.   Musculoskeletal:      Comments: Bilateral shin with erythema and edema.  Lymphedema.   Lymphadenopathy:      Cervical: No cervical adenopathy.   Skin:     General: Skin is warm.   Neurological:      Mental Status: She is alert.   Psychiatric:         Attention and Perception: Attention normal.              Wounds (last 24 hours)      LDA Wound     Row Name 02/14/21 0735 02/14/21 0400 02/14/21 0000       Wound 02/06/21 0031 Right lower leg Other (comment)    Wound - Properties Group Placement Date: 02/06/21  -CS Placement Time: 0031  -CS Present on Hospital Admission: Y  -CS Side: Right  -CS Orientation: lower  -CS Location: leg  -CS Primary Wound Type: Other  -CS, chronic redness and swelling     Closure  NELY  -AW  NELY  -JS  NELY  -JS    Retired Wound - Properties Group Date first assessed: 02/06/21  -CS Time first assessed: 0031  -CS Present on Hospital Admission: Y  -CS Side: Right  -CS Location: leg  -CS Primary Wound Type: Other  -CS, chronic redness and swelling        Wound 02/06/21 0032 Right anterior foot Other (comment)    Wound - Properties  Group Placement Date: 02/06/21  -CS Placement Time: 0032  -CS Present on Hospital Admission: Y  -CS Side: Right  -CS Orientation: anterior  -CS Location: foot  -CS Primary Wound Type: Other  -CS Additional Comments: chronic redness and swelling  -CS    Closure  NELY  -AW  NELY  -JS  NELY  -JS    Retired Wound - Properties Group Date first assessed: 02/06/21  -CS Time first assessed: 0032  -CS Present on Hospital Admission: Y  -CS Side: Right  -CS Location: foot  -CS Primary Wound Type: Other  -CS Additional Comments: chronic redness and swelling  -CS       Wound 02/06/21 0033 Left lower leg Other (comment)    Wound - Properties Group Placement Date: 02/06/21  -CS Placement Time: 0033  -CS Present on Hospital Admission: N  -CS Side: Left  -CS Orientation: lower  -CS Location: leg  -CS Primary Wound Type: Other  -CS Additional Comments: chronic redness and swelling  -CS    Closure  NELY  -AW  NELY  -JS  NELY  -JS    Retired Wound - Properties Group Date first assessed: 02/06/21  -CS Time first assessed: 0033  -CS Present on Hospital Admission: N  -CS Side: Left  -CS Location: leg  -CS Primary Wound Type: Other  -CS Additional Comments: chronic redness and swelling  -CS       Wound 02/06/21 0035 Left anterior ankle Other (comment)    Wound - Properties Group Placement Date: 02/06/21  -CS Placement Time: 0035  -CS Present on Hospital Admission: Y  -CS Side: Left  -CS Orientation: anterior  -CS Location: ankle  -CS Primary Wound Type: Other  -CS    Closure  NELY  -AW  NELY  -JS  NELY  -JS    Retired Wound - Properties Group Date first assessed: 02/06/21  -CS Time first assessed: 0035  -CS Present on Hospital Admission: Y  -CS Side: Left  -CS Location: ankle  -CS Primary Wound Type: Other  -CS       Wound 02/06/21 0047 Right heel Other (comment)    Wound - Properties Group Placement Date: 02/06/21  -CS Placement Time: 0047  -CS Present on Hospital Admission: Y  -CS Side: Right  -CS Location: heel  -CS Primary Wound Type: Other   -CS, Chronic redness and swelling      Closure  NELY  -AW  NELY  -JS  NELY  -JS    Retired Wound - Properties Group Date first assessed: 02/06/21  -CS Time first assessed: 0047  -CS Present on Hospital Admission: Y  -CS Side: Right  -CS Location: heel  -CS Primary Wound Type: Other  -CS, Chronic redness and swelling         Wound 02/06/21 0050 Left posterior thigh Other (comment)    Wound - Properties Group Placement Date: 02/06/21  -CS Placement Time: 0050  -CS Present on Hospital Admission: Y  -CS Side: Left  -CS Orientation: posterior  -CS Location: thigh  -CS Primary Wound Type: Other  -CS, Chronic redness and swelling      Base  red;scab  -AW  red;scab  -JS  red;scab  -JS    Retired Wound - Properties Group Date first assessed: 02/06/21  -CS Time first assessed: 0050  -CS Present on Hospital Admission: Y  -CS Side: Left  -CS Location: thigh  -CS Primary Wound Type: Other  -CS, Chronic redness and swelling         Wound 02/06/21 0051 coccyx Pressure Injury    Wound - Properties Group Placement Date: 02/06/21  -CS Placement Time: 0051  -CS Present on Hospital Admission: Y  -CS Location: coccyx  -CS Primary Wound Type: Pressure inj  -CS Stage, Pressure Injury : Stage 2  -CS    Base  non-blanchable  -AW  non-blanchable  -JS  non-blanchable  -JS    Retired Wound - Properties Group Date first assessed: 02/06/21  -CS Time first assessed: 0051  -CS Present on Hospital Admission: Y  -CS Location: coccyx  -CS Primary Wound Type: Pressure inj  -CS    Row Name 02/13/21 2000 02/13/21 1600          Wound 02/06/21 0031 Right lower leg Other (comment)    Wound - Properties Group Placement Date: 02/06/21  -CS Placement Time: 0031  -CS Present on Hospital Admission: Y  -CS Side: Right  -CS Orientation: lower  -CS Location: leg  -CS Primary Wound Type: Other  -CS, chronic redness and swelling     Closure  NELY  -JS  NELY  -AW     Retired Wound - Properties Group Date first assessed: 02/06/21  -CS Time first assessed: 0031  -CS Present  on Hospital Admission: Y  -CS Side: Right  -CS Location: leg  -CS Primary Wound Type: Other  -CS, chronic redness and swelling        Wound 02/06/21 0032 Right anterior foot Other (comment)    Wound - Properties Group Placement Date: 02/06/21  -CS Placement Time: 0032  -CS Present on Hospital Admission: Y  -CS Side: Right  -CS Orientation: anterior  -CS Location: foot  -CS Primary Wound Type: Other  -CS Additional Comments: chronic redness and swelling  -CS    Closure  NELY  -JS  NELY  -AW     Retired Wound - Properties Group Date first assessed: 02/06/21  -CS Time first assessed: 0032  -CS Present on Hospital Admission: Y  -CS Side: Right  -CS Location: foot  -CS Primary Wound Type: Other  -CS Additional Comments: chronic redness and swelling  -CS       Wound 02/06/21 0033 Left lower leg Other (comment)    Wound - Properties Group Placement Date: 02/06/21  -CS Placement Time: 0033  -CS Present on Hospital Admission: N  -CS Side: Left  -CS Orientation: lower  -CS Location: leg  -CS Primary Wound Type: Other  -CS Additional Comments: chronic redness and swelling  -CS    Closure  NELY  -JS  NELY  -AW     Retired Wound - Properties Group Date first assessed: 02/06/21  -CS Time first assessed: 0033  -CS Present on Hospital Admission: N  -CS Side: Left  -CS Location: leg  -CS Primary Wound Type: Other  -CS Additional Comments: chronic redness and swelling  -CS       Wound 02/06/21 0035 Left anterior ankle Other (comment)    Wound - Properties Group Placement Date: 02/06/21  -CS Placement Time: 0035  -CS Present on Hospital Admission: Y  -CS Side: Left  -CS Orientation: anterior  -CS Location: ankle  -CS Primary Wound Type: Other  -CS    Closure  NELY  -  NELY  -AW     Retired Wound - Properties Group Date first assessed: 02/06/21  -CS Time first assessed: 0035  -CS Present on Hospital Admission: Y  -CS Side: Left  -CS Location: ankle  -CS Primary Wound Type: Other  -CS       Wound 02/06/21 0047 Right heel Other (comment)     Wound - Properties Group Placement Date: 02/06/21  -CS Placement Time: 0047  -CS Present on Hospital Admission: Y  -CS Side: Right  -CS Location: heel  -CS Primary Wound Type: Other  -CS, Chronic redness and swelling      Closure  NELY  -JS  NELY  -AW     Retired Wound - Properties Group Date first assessed: 02/06/21  -CS Time first assessed: 0047  -CS Present on Hospital Admission: Y  -CS Side: Right  -CS Location: heel  -CS Primary Wound Type: Other  -CS, Chronic redness and swelling         Wound 02/06/21 0050 Left posterior thigh Other (comment)    Wound - Properties Group Placement Date: 02/06/21  -CS Placement Time: 0050  -CS Present on Hospital Admission: Y  -CS Side: Left  -CS Orientation: posterior  -CS Location: thigh  -CS Primary Wound Type: Other  -CS, Chronic redness and swelling      Base  red;scab  -JS  red;scab  -AW     Retired Wound - Properties Group Date first assessed: 02/06/21  -CS Time first assessed: 0050  -CS Present on Hospital Admission: Y  -CS Side: Left  -CS Location: thigh  -CS Primary Wound Type: Other  -CS, Chronic redness and swelling         Wound 02/06/21 0051 coccyx Pressure Injury    Wound - Properties Group Placement Date: 02/06/21  -CS Placement Time: 0051  -CS Present on Hospital Admission: Y  -CS Location: coccyx  -CS Primary Wound Type: Pressure inj  -CS Stage, Pressure Injury : Stage 2  -CS    Base  non-blanchable  -JS  non-blanchable  -AW     Retired Wound - Properties Group Date first assessed: 02/06/21  -CS Time first assessed: 0051  -CS Present on Hospital Admission: Y  -CS Location: coccyx  -CS Primary Wound Type: Pressure inj  -CS      User Key  (r) = Recorded By, (t) = Taken By, (c) = Cosigned By    Initials Name Provider Type    Naeem Moser RN Registered Nurse    Kenisha Guerra RN Registered Nurse    Giovanna Pagan RN Registered Nurse          Procedures:              Results Review:     I reviewed the patient's new clinical results.      Lab Results  (last 24 hours)     Procedure Component Value Units Date/Time    Alkaline Phosphatase, Isoenzymes [[id number]] Collected: 02/14/21 1241    Specimen: Blood Updated: 02/14/21 1243    Ceruloplasmin [[id number]]  (Normal) Collected: 02/12/21 0427    Specimen: Blood Updated: 02/14/21 1225     Ceruloplasmin 26 mg/dL     AFP Tumor Marker [[id number]]  (Normal) Collected: 02/13/21 1722    Specimen: Blood Updated: 02/14/21 1141     ALPHA-FETOPROTEIN 2.30 ng/mL     Narrative:      Alpha Fetoprotein Tumor Marker Reference Range:    0.0-8.3 ng/mL    Note: Normal values apply only to males and nonpregnant females. These results are not interpretable for pregnant females.    Results may be falsely decreased if patient taking Biotin.      Alpha - 1 - Antitrypsin [[id number]]  (Abnormal) Collected: 02/13/21 0350    Specimen: Blood Updated: 02/14/21 1136     ALPHA -1 ANTITRYPSIN 218 mg/dL     POC Glucose Once [[id number]]  (Normal) Collected: 02/14/21 1119    Specimen: Blood Updated: 02/14/21 1120     Glucose 70 mg/dL      Comment: Serial Number: [id number]:  [id number]       POC Glucose Once [[id number]]  (Normal) Collected: 02/14/21 0745    Specimen: Blood Updated: 02/14/21 0746     Glucose 88 mg/dL      Comment: Serial Number: [id number]:  [id number]       BNP [[id number]]  (Abnormal) Collected: 02/14/21 0446    Specimen: Blood Updated: 02/14/21 0610     proBNP >70,000.0 pg/mL     Narrative:      Among patients with dyspnea, NT-proBNP is highly sensitive for the detection of acute congestive heart failure. In addition NT-proBNP of <300 pg/ml effectively rules out acute congestive heart failure with 99% negative predictive value.    Results may be falsely decreased if patient taking Biotin.      CBC & Differential [[id number]]  (Abnormal) Collected: 02/14/21 0446    Specimen: Blood Updated: 02/14/21 0600    Narrative:      The following orders were created for panel order CBC & Differential.  Procedure                                Abnormality         Status                     ---------                               -----------         ------                     Scan Slide[[id number]]                                       Final result               CBC Auto Differential[[id number]]        Abnormal            Final result                 Please view results for these tests on the individual orders.    CBC Auto Differential [[id number]]  (Abnormal) Collected: 02/14/21 0446    Specimen: Blood Updated: 02/14/21 0600     WBC 8.30 10*3/mm3      RBC 2.73 10*6/mm3      Hemoglobin 8.7 g/dL      Hematocrit 26.5 %      MCV 97.3 fL      MCH 31.8 pg      MCHC 32.7 g/dL      RDW 16.2 %      RDW-SD 56.0 fl      MPV 7.7 fL      Platelets 72 10*3/mm3     Narrative:      The previously reported component NRBC is no longer being reported. Previous result was 0.3 /100 WBC (Reference Range: 0.0-0.2 /100 WBC) on 2/14/2021 at 0509 EST.    Scan Slide [[id number]] Collected: 02/14/21 0446    Specimen: Blood Updated: 02/14/21 0600     Scan Slide --     Comment: See Manual Differential Results       Manual Differential [[id number]]  (Abnormal) Collected: 02/14/21 0446    Specimen: Blood Updated: 02/14/21 0600     Neutrophil % 76.0 %      Lymphocyte % 3.0 %      Monocyte % 6.0 %      Eosinophil % 5.0 %      Bands %  10.0 %      Neutrophils Absolute 7.14 10*3/mm3      Lymphocytes Absolute 0.25 10*3/mm3      Monocytes Absolute 0.50 10*3/mm3      Eosinophils Absolute 0.42 10*3/mm3      nRBC 1.0 /100 WBC      Anisocytosis Slight/1+     Toxic Granulation Slight/1+     Platelet Estimate Decreased    Ammonia [[id number]]  (Abnormal) Collected: 02/14/21 0446    Specimen: Blood Updated: 02/14/21 0541     Ammonia 72 umol/L     Comprehensive Metabolic Panel [[id number]]  (Abnormal) Collected: 02/14/21 0446    Specimen: Blood Updated: 02/14/21 0533     Glucose 72 mg/dL       mg/dL      Creatinine 3.84 mg/dL      Sodium 140 mmol/L      Potassium 3.6 mmol/L      Chloride 102  mmol/L      CO2 28.0 mmol/L      Calcium 7.6 mg/dL      Total Protein 5.0 g/dL      Albumin 2.20 g/dL      ALT (SGPT) 46 U/L      AST (SGOT) 101 U/L      Alkaline Phosphatase 1,082 U/L      Total Bilirubin 0.8 mg/dL      eGFR Non African Amer 11 mL/min/1.73      Comment: <15 Indicative of kidney failure.        eGFR   Amer --     Comment: <15 Indicative of kidney failure.        Globulin 2.8 gm/dL      A/G Ratio 0.8 g/dL      BUN/Creatinine Ratio 26.0     Anion Gap 10.0 mmol/L     Narrative:      GFR Normal >60  Chronic Kidney Disease <60  Kidney Failure <15      Magnesium [[id number]]  (Normal) Collected: 02/14/21 0446    Specimen: Blood Updated: 02/14/21 0533     Magnesium 1.8 mg/dL     Protime-INR [[id number]]  (Abnormal) Collected: 02/14/21 0446    Specimen: Blood Updated: 02/14/21 0515     Protime 13.0 Seconds      INR 1.19    Hepatitis Panel, Acute [[id number]]  (Normal) Collected: 02/13/21 1721    Specimen: Blood Updated: 02/13/21 2109     Hepatitis B Surface Ag Non-Reactive     Hep A IgM Non-Reactive     Hep B C IgM Non-Reactive     Hepatitis C Ab Non-Reactive    Narrative:      Results may be falsely decreased if patient taking Biotin.     POC Glucose Once [[id number]]  (Normal) Collected: 02/13/21 2007    Specimen: Blood Updated: 02/13/21 2009     Glucose 87 mg/dL      Comment: Serial Number: [id number]:  [id number]       Basic Metabolic Panel [[id number]]  (Abnormal) Collected: 02/13/21 1722    Specimen: Blood Updated: 02/13/21 1759     Glucose 85 mg/dL      BUN 99 mg/dL      Creatinine 3.70 mg/dL      Sodium 140 mmol/L      Potassium 3.7 mmol/L      Chloride 102 mmol/L      CO2 27.0 mmol/L      Calcium 7.3 mg/dL      eGFR   Amer --     Comment: <15 Indicative of kidney failure.        eGFR Non African Amer 12 mL/min/1.73      Comment: <15 Indicative of kidney failure.        BUN/Creatinine Ratio 26.8     Anion Gap 11.0 mmol/L     Narrative:      GFR Normal >60  Chronic Kidney Disease  <60  Kidney Failure <15      Protime-INR [[id number]]  (Abnormal) Collected: 02/13/21 1722    Specimen: Blood Updated: 02/13/21 1744     Protime 12.8 Seconds      INR 1.17    LÁZARO [[id number]] Collected: 02/13/21 1722    Specimen: Blood Updated: 02/13/21 1729    Anti-Smooth Muscle Antibody Titer [[id number]] Collected: 02/13/21 1722    Specimen: Blood Updated: 02/13/21 1729    Anti-microsomal Antibody [[id number]] Collected: 02/13/21 1722    Specimen: Blood Updated: 02/13/21 1729    Acetaminophen Level [[id number]]  (Normal) Collected: 02/12/21 1714    Specimen: Blood Updated: 02/13/21 1711     Acetaminophen 7.7 mcg/mL     Narrative:      Acetaminophen Therapeutic Range  5-20 ug/mL      Hours after ingestion            Toxic Value    4 Hours                           150 ug/mL    8 Hours                            70 ug/mL   12 Hours                            40 ug/mL   16 Hours                            20 ug/mL    These values apply to a single ingestion only.         No results found for: HGBA1C  Results from last 7 days   Lab Units 02/14/21  0446 02/13/21  1722 02/11/21  1227   INR  1.19* 1.17* 1.22*           Lab Results   Component Value Date    LIPASE 42 01/13/2018     Lab Results   Component Value Date    CHOL 126 02/05/2020    TRIG 51 02/05/2020    HDL 66 (H) 02/05/2020    LDL 50 02/05/2020       Lab Results   Lab Value Date/Time    FINALDX  02/11/2021 1227     Leukocytosis  Anemia  Thrombocytopenia  No blasts identified         Microbiology Results (last 10 days)     Procedure Component Value - Date/Time    Urine Culture - Urine, Urine, Random Void [[id number]]  (Abnormal) Collected: 02/10/21 1616    Lab Status: Final result Specimen: Urine, Random Void Updated: 02/11/21 1234     Urine Culture Yeast isolated     Comment: No further workup.       Urine Culture - Urine, Urine, Catheter [[id number]]  (Abnormal) Collected: 02/10/21 0306    Lab Status: Final result Specimen: Urine, Catheter Updated: 02/11/21 1234      Urine Culture Yeast isolated     Comment: No further workup.       Clostridium Difficile Toxin - Stool, Per Rectum [[id number]]  (Normal) Collected: 02/06/21 1952    Lab Status: Final result Specimen: Stool from Per Rectum Updated: 02/07/21 0725    Narrative:      The following orders were created for panel order Clostridium Difficile Toxin - Stool, Per Rectum.  Procedure                               Abnormality         Status                     ---------                               -----------         ------                     Clostridium Difficile EI...[[id number]]  Normal              Final result                 Please view results for these tests on the individual orders.    Clostridium Difficile EIA - Stool, Per Rectum [[id number]]  (Normal) Collected: 02/06/21 1952    Lab Status: Final result Specimen: Stool from Per Rectum Updated: 02/07/21 0725     C Diff GDH / Toxin Negative    Blood Culture - Blood, Arm, Left [[id number]] Collected: 02/06/21 1309    Lab Status: Final result Specimen: Blood from Arm, Left Updated: 02/11/21 1431     Blood Culture No growth at 5 days    Blood Culture - Blood, Arm, Right [[id number]] Collected: 02/06/21 1307    Lab Status: Final result Specimen: Blood from Arm, Right Updated: 02/11/21 1431     Blood Culture No growth at 5 days    MRSA Screen, PCR (Inpatient) - Swab, Nares [[id number]]  (Abnormal) Collected: 02/06/21 1114    Lab Status: Final result Specimen: Swab from Nares Updated: 02/06/21 1311     MRSA PCR MRSA Detected    COVID PRE-OP / PRE-PROCEDURE SCREENING ORDER (NO ISOLATION) - Swab, Nasopharynx [[id number]]  (Normal) Collected: 02/06/21 0153    Lab Status: Final result Specimen: Swab from Nasopharynx Updated: 02/06/21 1534    Narrative:      The following orders were created for panel order COVID PRE-OP / PRE-PROCEDURE SCREENING ORDER (NO ISOLATION) - Swab, Nasopharynx.  Procedure                               Abnormality         Status                     ---------                                -----------         ------                     COVID-19,APTIMA PANTHER,...[[id number]]  Normal              Final result                 Please view results for these tests on the individual orders.    COVID-19,APTIMA CHILO ROSS IN-HOUSE, NP/OP SWAB IN UTM/VTM/[facility] TRANSPORT MEDIA,24 HR TAT - Swab, Nasopharynx [[id number]]  (Normal) Collected: 02/06/21 0153    Lab Status: Final result Specimen: Swab from Nasopharynx Updated: 02/06/21 1534     COVID19 Not Detected    Narrative:      Fact sheet for providers: https://www.fda.gov/media/759252/download     Fact sheet for patients: https://www.fda.gov/media/590589/download    Test performed by RT PCR.          ECG/EMG Results (most recent)     Procedure Component Value Units Date/Time    Adult Transthoracic Echo Complete W/ Cont if Necessary Per Protocol [[id number]] Collected: 02/06/21 0850     Updated: 02/06/21 1609     BSA 1.8 m^2      RVIDd 2.7 cm      IVSd 1.3 cm      LVIDd 3.9 cm      LVIDs 2.8 cm      LVPWd 1.2 cm      IVS/LVPW 1.0     FS 28.8 %      EDV(Teich) 67.0 ml      ESV(Teich) 29.4 ml      EF(Teich) 56.0 %      EDV(cubed) 60.5 ml      ESV(cubed) 21.9 ml      EF(cubed) 63.9 %      LV mass(C)d 170.0 grams      LV mass(C)dI 92.8 grams/m^2      SV(Teich) 37.5 ml      SI(Teich) 20.5 ml/m^2      SV(cubed) 38.7 ml      SI(cubed) 21.1 ml/m^2      Ao root diam 2.5 cm      Ao root area 5.1 cm^2      ACS 1.6 cm      asc Aorta Diam 3.2 cm      LVOT diam 1.8 cm      LVOT area 2.6 cm^2      RVOT diam 2.6 cm      RVOT area 5.5 cm^2      EDV(MOD-sp4) 59.5 ml      ESV(MOD-sp4) 26.6 ml      EF(MOD-sp4) 55.3 %      EDV(MOD-sp2) 53.3 ml      ESV(MOD-sp2) 20.0 ml      EF(MOD-sp2) 62.4 %      SV(MOD-sp4) 32.9 ml      SI(MOD-sp4) 18.0 ml/m^2      SV(MOD-sp2) 33.2 ml      SI(MOD-sp2) 18.1 ml/m^2      Ao root area (BSA corrected) 1.4     LV Olivier Vol (BSA corrected) 32.5 ml/m^2      LV Sys Vol (BSA corrected) 14.5 ml/m^2      Aortic R-R 1.0 sec       Aortic HR 59.0 BPM      MV V2 max 134.6 cm/sec      MV max PG 7.2 mmHg      MV V2 mean 51.6 cm/sec      MV mean PG 1.8 mmHg      MV V2 VTI 24.3 cm      MVA(VTI) 2.3 cm^2      Ao pk devante 182.4 cm/sec      Ao max PG 13.3 mmHg      Ao max PG (full) 8.5 mmHg      Ao V2 mean 142.3 cm/sec      Ao mean PG 8.7 mmHg      Ao mean PG (full) 5.5 mmHg      Ao V2 VTI 37.9 cm      WILLIS(I,A) 1.5 cm^2      WILLIS(I,D) 1.5 cm^2      WILLIS(V,A) 1.5 cm^2      WILLIS(V,D) 1.5 cm^2      AI max devante 264.5 cm/sec      AI max PG 28.0 mmHg      AI dec slope 171.9 cm/sec^2      AI dec time 1.5 sec      AI P1/2t 450.8 msec      LV V1 max PG 4.8 mmHg      LV V1 mean PG 3.2 mmHg      LV V1 max 110.0 cm/sec      LV V1 mean 85.9 cm/sec      LV V1 VTI 21.6 cm      CO(Ao) 11.4 l/min      CI(Ao) 6.2 l/min/m^2      SV(Ao) 192.9 ml      SI(Ao) 105.3 ml/m^2      CO(LVOT) 3.3 l/min      CI(LVOT) 1.8 l/min/m^2      SV(LVOT) 55.2 ml      SV(RVOT) 59.1 ml      SI(LVOT) 30.1 ml/m^2      PA V2 max 88.8 cm/sec      PA max PG 3.2 mmHg      PA max PG (full) 1.9 mmHg      PA V2 mean 63.8 cm/sec      PA mean PG 1.8 mmHg      PA mean PG (full) 1.0 mmHg      PA V2 VTI 17.2 cm      PVA(I,A) 3.4 cm^2      BH CV ECHO SAHIL - PVA(I,D) 3.4 cm^2      BH CV ECHO SAHIL - PVA(V,A) 3.4 cm^2      BH CV ECHO SAHIL - PVA(V,D) 3.4 cm^2      PA acc time 0.07 sec      RV V1 max PG 1.2 mmHg      RV V1 mean PG 0.77 mmHg      RV V1 max 55.1 cm/sec      RV V1 mean 41.7 cm/sec      RV V1 VTI 10.8 cm      TR max devante 237.7 cm/sec      RVSP(TR) 25.6 mmHg      RAP systole 3.0 mmHg      PA pr(Accel) 48.2 mmHg      Pulm Sys Devante 41.0 cm/sec      Pulm Olivier Devante 37.5 cm/sec      Pulm S/D 1.1     Qp/Qs 1.1     BH CV ECHO SAHIL - BZI_BMI 33.1 kilograms/m^2       CV ECHO SAHIL - BSA(HAYCOCK) 1.9 m^2       CV ECHO SAHIL - BZI_METRIC_WEIGHT 82.1 kg       CV ECHO SAHIL - BZI_METRIC_HEIGHT 157.5 cm      EF(MOD-bp) 60.0 %      LA dimension(2D) 4.2 cm     Narrative:      Normal LV size and contractility EF of  55%  Moderate right ventricular enlargement with severe right atrial   enlargement, catheter probably pacemaker lead seen.  Severe left atrial enlargement seen.  Aortic valve, mitral valve, tricuspid valve appears structurally normal,   mild MR, AR, seen.  There is moderate  tricuspid regurgitation seen.    Calculated RV systolic pressure is 24mmHg.  No pericardial effusion seen.  Proximal aorta appears normal in size.          Results for orders placed during the hospital encounter of 02/05/21   Duplex Venous Lower Extremity - Bilateral CAR    Narrative · Exam limited by patient intolerance to compression and body habitus.  · The distal left femoral and the right and left peroneal veins are not   imaged.  · All other veins appeared normal bilaterally.          Results for orders placed during the hospital encounter of 02/05/21   Adult Transthoracic Echo Complete W/ Cont if Necessary Per Protocol    Narrative Normal LV size and contractility EF of 55%  Moderate right ventricular enlargement with severe right atrial   enlargement, catheter probably pacemaker lead seen.  Severe left atrial enlargement seen.  Aortic valve, mitral valve, tricuspid valve appears structurally normal,   mild MR, AR, seen.  There is moderate  tricuspid regurgitation seen.    Calculated RV systolic pressure is 24mmHg.  No pericardial effusion seen.  Proximal aorta appears normal in size.       Nm Lung Ventilation Perfusion Aerosol    Result Date: 2/14/2021   1. Abnormal appearance of the ventilation study which can be seen with obstructive pulmonary disease. 2. Low probability of acute pulmonary embolic disease.  Electronically Signed By-Fletcher Barros MD On:2/14/2021 11:29 AM This report was finalized on [id number] by  Fletcher Barros MD.    Us Liver    Result Date: 2/14/2021   1. Abnormal appearance of liver felt to be secondary to hepatic cirrhosis. 2. Cholelithiasis. 3. Minimal ascites.  Electronically Signed By-Fletcher Barros MD On:2/14/2021  10:41 AM This report was finalized on [id number] by  Fletcher Barros MD.    Xr Chest 1 View    Result Date: 2/11/2021  1. Cardiomegaly without acute pulmonary process. No change from prior exam.  Electronically Signed By-Dae Auguste MD On:2/11/2021 2:18 PM This report was finalized on [id number] by  Dae Auguste MD.    Xr Chest 1 View    Result Date: 2/7/2021  1. New right-sided PICC line with tip terminating over the low SVC. 2. Stable cardiomegaly.  Electronically Signed By-Dae Auguste MD On:2/7/2021 10:45 AM This report was finalized on [id number] by  Dae Auguste MD.    Xr Chest Pa & Lateral    Result Date: 2/13/2021   1. Cardiomegaly. 2. Small to moderate left pleural effusion with compressive atelectasis and possible airspace disease from pneumonia.  Electronically Signed By-Fletcher Barros MD On:2/13/2021 5:43 PM This report was finalized on [id number] by  Fletcher Barros MD.          Xrays, labs reviewed personally by physician.    Medication Review:   I have reviewed the patient's current medication list      Scheduled Meds  aspirin, 81 mg, Oral, Daily  bumetanide, 2 mg, Intravenous, Q6H  doxycycline, 100 mg, Oral, Q12H  febuxostat, 40 mg, Oral, BID  fluconazole, 100 mg, Oral, Q24H  folic acid, 1 mg, Oral, Daily  gabapentin, 100 mg, Oral, TID  haloperidol lactate, 5 mg, Intramuscular, Once  lactulose, 20 g, Oral, TID  magic butt paste, , Topical, Daily  midodrine, 10 mg, Oral, TID AC  nystatin, 5 mL, Swish & Swallow, 4x Daily  rifAXIMin, 550 mg, Oral, Q12H  sodium chloride, 10 mL, Intravenous, Q12H  ursodiol, 300 mg, Oral, BID  vitamin B-12, 500 mcg, Oral, Daily  Zinc Oxide, , Apply externally, BID        Meds Infusions  dextrose, 20 mL/hr, Last Rate: 20 mL/hr (02/13/21 2042)        Meds PRN  •  acetaminophen **OR** acetaminophen **OR** acetaminophen  •  dextrose  •  dextrose  •  glucagon (human recombinant)  •  HYDROcodone-acetaminophen  •  magnesium sulfate **OR** magnesium sulfate  in D5W 1g/100mL (PREMIX) **OR** magnesium sulfate  •  melatonin  •  nitroglycerin  •  OLANZapine  •  ondansetron **OR** ondansetron  •  potassium chloride  •  potassium chloride  •  sodium chloride  •  traMADol        Assessment/Plan   Assessment/Plan     Active Hospital Problems    Diagnosis  POA   • Sepsis (CMS/HCC) [A41.9]  Yes   • CONG (acute kidney injury) (CMS/HCC) [N17.9]  Yes   • Lactic acidosis [E87.2]  Yes   • Metabolic acidosis [E87.2]  Yes   • Delirium, acute [R41.0]  Yes   • Lower extremity cellulitis [L03.119]  Yes   • CAD (coronary artery disease) [I25.10]  Yes   • Acute UTI (urinary tract infection) [N39.0]  Yes   • Non-pressure chronic ulcer right ankle, limited to breakdown skin (CMS/HCC) [L97.311]  Yes   • Automatic implantable cardiac defibrillator in situ [Z95.810]  Yes   • Lymphedema [I89.0]  Unknown   • Gout [M10.9]  Yes   • Hypertension, benign [I10]  Yes   • Congestive heart failure (CMS/HCC) [I50.9]  Yes   • Atrial fibrillation (CMS/HCC) [I48.91]  Yes      Resolved Hospital Problems   No resolved problems to display.       MEDICAL DECISION MAKING COMPLEXITY BY PROBLEM:     Severe hyperammonemia 480-not present on admission   possible liver failure  Worsening elevation of liver enzymes could be related to congestive hepatopathy.  Ultrasound shows gallstones and cirrhosis.LFTs are not consistent with biliary obstruction. Pending full liver serologies, acetaminophen level was 7.7 on 2/12/2021. She has been receiving Norco 5's twice a day for pain.   Hepatitis panel negative  GI input appreciated  Start lactulose enema and orally.  Add Xifaxan  Autoimmune work-up pending    Alk phos continues to rise now at 1082.  I have sent for alk phos to be fractionated.  Her elevation may be related to bone injury/inflammation or osteomyelitis due to her cellulitis vs liver injury (medication induced).  X-ray tib-fib ordered  Consider bone scan    Septic shock from LE cellulitis and UTI   - bilateral LE  Lymphedema   -Rocephin given x1 at sending facility,   -started vancomycin and cefepime 2/6/2021.  Added Flagyl 2/7/21 because of leukocytosis  -Started on dopamine 2/6/2021.  Now off of dopamine.  - procalcitonin 171.12  -MRSA screen positive  -C. difficile negative  -Falls precautions  -Wound care consulted Ace wraps as tolerated.  -Venous Doppler lower extremities negative.  -Check x-ray tib-fib since patient has increasing pain anterior legs.  -ID consulted 2/7/2021.  -Antibiotics changed to ceftaroline then to doxy/diflucan x1w    Hypotension on midodrine  Dopamine drip.  Tapered off    Candiduria.  Exchange Ayon catheter versus external catheter/repeat urinalysis per ID       CONG on CKD III:  Likely cardiorenal etiology  -Hold colchicine  --Consulted nephrology  -s/p renal ultrasound 2/5/2020  -Responsive to diuresis    Thrombocytopenia multifactorial including related to sepsis.  Negative heparin-induced antibody  B12 and folic acid not decreased  Hematology been following.  Supportive care    Delirium likely related to hepatic encephalopathy:  -No history of dementia per daughter  -Minimize sedation    Right arm IV infiltration:  -S/p ice to arm and monitor  -Improved  Patient has multiple ecchymotic patches bilateral forearms.    CAD  -Denies chest pain  -Continue aspirin,  Resume beta-blocker when blood pressure allows.    Atrial fibrillation s/p pacemaker  -Hold digoxin given her kidney failure  -Repeat TTE 2/13/2018--> LVEF 40% with mild aortic insufficiency  -TTE 2/6/2021--> LVEF 55%, severe right atrial enlargement, severe left atrial enlargement, moderate TR  -Daughter claims patient off Xarelto    Echo shows right ventricular dilation and right atrial dilation.  Low probability VQ scan.  Venous Doppler negative.  Might be related to  untreated sleep apnea    Hypertension  -Hold metoprolol temporarily because of low BP  On Bumex  Cosyntropin stimulation shows normal  responses    Hyperlipidemia  -Check lipid panel  -on  home Zetia at home     Chronic pain  -Continue gabapentin and tramadol     Gout  -Hold colchicine because ok CONG     Pressure ulcer on right ankle  -Wound care consulted     -Hypoglycemia.  Cosyntropin stimulation negative  -Continue D10 for now until patient appetite improves.      VTE Prophylaxis -   Mechanical Order History:     None      Pharmalogical Order History:      Ordered     Dose Route Frequency Stop    02/06/21 0500  rivaroxaban (XARELTO) tablet 10 mg     Question Answer Comment   Are you ordering rivaroxaban for the prevention of blood clots in an acutely ill medical patient? Yes    Select any exclusion criteria that may apply to patient Exclusion Criteria Does Not Apply to This Patient Alternative to Lovenox/heparin inpatient with thrombocytopenia unable to receive mechanical prophylaxis       10 mg PO Daily With Dinner --    02/06/21 0020  heparin (porcine) 5000 UNIT/ML injection 5,000 Units  Status:  Discontinued      5,000 Units SC Every 12 Hours Scheduled 02/06/21 0147                  Code Status -   Code Status and Medical Interventions:   Ordered at: 02/06/21 0258     Level Of Support Discussed With:    Patient     Code Status:    CPR     Medical Interventions (Level of Support Prior to Arrest):    Full       This patient has been examined wearing appropriate Personal Protective Equipment and discussed with nursing. 02/14/21        Discharge Planning    Patient does have episodes of confusion in the evenings Zyprexa PRN ordered.  Elevated procalcitonin and worsening WBC thus added Flagyl to Vanco and cefepime.  ID consulted 2/7/2021.      Electronically signed by Alexandro Huitron MD, 02/14/21, 13:32 EST.  [facility]ist [facility]

## 2021-02-14 NOTE — PLAN OF CARE
Subjective: Pt agreeable to therapeutic plan of care. Wanted to sit EOB to eat supper    Objective:     Bed mobility - Max-A  Transfers - N/A or Not attempted.  Ambulation -  feet N/A or Not attempted.    Pain:6/10 overall    Education: Provided education on importance of mobility and skilled verbal / tactile cueing throughout intervention.     Assessment: Emperatriz Childs presents with functional mobility impairments which indicate the need for skilled intervention. Tolerating session today without incident.Sat EOB x45 mins unsupported to feed herself supper. Performed LE exs x 10 at EOB. Very swollen and painful to touch BLE.  Will continue to follow and progress as tolerated.     Plan/Recommendations:   Pt would benefit from Inpatient Rehabilitation placement at discharge from facility and requires no DME at discharge.   Pt desires Inpatient Rehabilitation placement at discharge. Pt cooperative; agreeable to therapeutic recommendations and plan of care.     Basic Mobility 6-click:  Rollin = Total, A lot = 2, A little = 3; 4 = None  Supine>Sit:   1 = Total, A lot = 2, A little = 3; 4 = None   Sit>Stand with arms:  1 = Total, A lot = 2, A little = 3; 4 = None  Bed>Chair:   1 = Total, A lot = 2, A little = 3; 4 = None  Ambulate in room:  1 = Total, A lot = 2, A little = 3; 4 = None  3-5 Steps with railin = Total, A lot = 2, A little = 3; 4 = None  Score: 10    Modified Dania: N/A = No pre-op stroke/TIA    Post-Tx Position: Supine with HOB Elevated, Staff Present, Alarms activated and Call light and personal items within reach  PPE: gloves, surgical mask, eyewear protection

## 2021-02-14 NOTE — CONSULTS
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  (Normal) Collected: 02/08/21 0529    Specimen: Blood Updated: 02/08/21 0612     Digoxin 1.10 ng/mL     Vancomycin, Random [[id number]]  (Normal) Collected: 02/08/21 0529    Specimen: Blood Updated: 02/08/21 0600     Vancomycin Random 19.90 mcg/mL     Narrative:      Therapeutic Ranges for Vancomycin    Vancomycin Random   5.0-40.0 mcg/mL  Vancomycin Trough   5.0-20.0 mcg.mL  Vancomycin Peak     20.0-40.0 mcg/mL    POC Lactate [[id number]]  (Abnormal) Collected: 02/06/21 1904    Specimen: Blood Updated: 02/08/21 0537     Lactate 2.9 mmol/L      Comment: Serial Number: [id number]:  [id number]       C-reactive Protein [[id number]]  (Abnormal) Collected: 02/07/21 1305    Specimen: Blood Updated: 02/07/21 1348     C-Reactive Protein 31.97 mg/dL     Digoxin Level [[id number]]  (Normal) Collected: 02/07/21 1305    Specimen: Blood Updated: 02/07/21 1348     Digoxin 1.20 ng/mL     Creatinine, Urine, Random - Urine, Clean Catch [[id number]] Collected: 02/06/21 1850    Specimen: Urine, Clean Catch Updated: 02/07/21 1213     Creatinine, Urine 205.7 mg/dL     Narrative:      Reference intervals for random urine have not been established.  Clinical usage is dependent upon  physician's interpretation in combination with other laboratory tests.       Haptoglobin [[id number]]  (Normal) Collected: 02/06/21 2247    Specimen: Blood Updated: 02/07/21 1155     Haptoglobin 100 mg/dL      Comment: Specimen hemolyzed. Results may be affected.       Clostridium Difficile Toxin - Stool, Per Rectum [[id number]]  (Normal) Collected: 02/06/21 1952    Specimen: Stool from Per Rectum Updated: 02/07/21 0725    Narrative:      The following orders were created for panel order Clostridium Difficile Toxin - Stool, Per Rectum.  Procedure                               Abnormality         Status                     ---------                               -----------         ------                     Clostridium Difficile EI...[[id number]]  Normal              Final result                 Please view results for these tests on the individual orders.    Clostridium Difficile EIA - Stool, Per Rectum [[id number]]  (Normal) Collected: 02/06/21 1952    Specimen: Stool from Per Rectum Updated: 02/07/21 0725     C Diff GDH / Toxin Negative    Hepatitis C Antibody [[id number]]  (Normal) Collected: 02/06/21 1309    Specimen: Blood Updated: 02/06/21 1957     Hepatitis C Ab Non-Reactive    Narrative:      Results may be falsely decreased if patient taking Biotin.      Hepatitis B Surface Antigen [[id number]]  (Normal) Collected: 02/06/21 1309    Specimen: Blood Updated: 02/06/21 1957     Hepatitis B Surface Ag Non-Reactive    Narrative:      Results may be falsely decreased if patient taking Biotin.      Lactic Acid, Plasma [[id number]]  (Abnormal) Collected: 02/06/21 1901    Specimen: Blood Updated: 02/06/21 1932     Lactate 3.2 mmol/L     Protein, Urine, Random - Urine, Clean Catch [[id number]] Collected: 02/06/21 1850    Specimen: Urine, Clean Catch Updated: 02/06/21 1922     Total Protein, Urine 136.9 mg/dL     Narrative:      Reference intervals for random urine have not been established.  Clinical usage is dependent upon  physician's interpretation in combination with other laboratory tests.       Urinalysis With Microscopic If Indicated (No Culture) - Urine, Clean Catch [[id number]]  (Abnormal) Collected: 02/06/21 1850    Specimen: Urine, Clean Catch Updated: 02/06/21 1914     Color, UA Dark Yellow     Comment: Result checked         Appearance, UA Cloudy     Comment: Result checked         pH, UA 7.0     Specific Gravity, UA 1.022     Glucose, UA Negative     Ketones, UA Trace     Bilirubin, UA Small (1+)     Comment: Confirmation testing is unavailable.  A serum bilirubin is recommended for further assessment.        Blood, UA Small (1+)     Protein,  mg/dL (2+)     Leuk Esterase, UA Moderate (2+)     Nitrite, UA Positive     Urobilinogen, UA 0.2 E.U./dL    Blood Gas, Arterial - [[id number]]  (Abnormal) Collected: 02/06/21 1904    Specimen: Arterial Blood Updated: 02/06/21 1909     Site Left Radial     Bright's Test Positive     pH, Arterial 7.337 pH units      pCO2, Arterial 26.4 mm Hg      pO2, Arterial 79.5 mm Hg      HCO3, Arterial 14.2 mmol/L      Base Excess, Arterial -10.4 mmol/L      Comment: Serial Number: [id number]:  [id number]        O2 Saturation, Arterial 95.2 %      CO2 Content 15.0 mmol/L      Barometric Pressure for Blood Gas --     Comment: N/A        Modality Room Air     FIO2 21 %      Hemodilution No    POCT Electrolytes +HGB +HCT [[id number]]  (Abnormal) Collected: 02/06/21 1904    Specimen: Blood Updated: 02/06/21 1909     Sodium 139 mmol/L      POC Potassium 4.3 mmol/L      Ionized Calcium 0.94 mmol/L      Comment: Serial Number: [id number]:  [id number]        Glucose 92 mg/dL      Hematocrit 29 %      Hemoglobin 9.9 g/dL     CK [[id number]]  (Abnormal) Collected: 02/06/21 1708    Specimen: Blood Updated: 02/06/21 1906     Creatine Kinase 225 U/L     Lactate Dehydrogenase [[id number]]  (Abnormal) Collected: 02/06/21 1708    Specimen: Blood Updated: 02/06/21 1906      U/L      Comment: Specimen  hemolyzed.  Results may be affected.       Uric Acid [[id number]]  (Normal) Collected: 02/06/21 1708    Specimen: Blood Updated: 02/06/21 1906     Uric Acid 3.2 mg/dL     Vancomycin, Random [[id number]]  (Normal) Collected: 02/06/21 1708    Specimen: Blood Updated: 02/06/21 1905     Vancomycin Random 11.90 mcg/mL     Narrative:      Therapeutic Ranges for Vancomycin    Vancomycin Random   5.0-40.0 mcg/mL  Vancomycin Trough   5.0-20.0 mcg.mL  Vancomycin Peak     20.0-40.0 mcg/mL    Phosphorus [[id number]]  (Normal) Collected: 02/06/21 1708    Specimen: Blood Updated: 02/06/21 1904     Phosphorus 4.2 mg/dL     COVID PRE-OP / PRE-PROCEDURE SCREENING ORDER (NO ISOLATION) - Swab, Nasopharynx [[id number]]  (Normal) Collected: 02/06/21 0153    Specimen: Swab from Nasopharynx Updated: 02/06/21 1534    Narrative:      The following orders were created for panel order COVID PRE-OP / PRE-PROCEDURE SCREENING ORDER (NO ISOLATION) - Swab, Nasopharynx.  Procedure                               Abnormality         Status                     ---------                               -----------         ------                     COVID-19,APTIMA PANTHER,...[[id number]]  Normal              Final result                 Please view results for these tests on the individual orders.    COVID-19,APTIMA PANTHER,[facility] IN-HOUSE, NP/OP SWAB IN UTM/VTM/[facility] TRANSPORT MEDIA,24 HR TAT - Swab, Nasopharynx [[id number]]  (Normal) Collected: 02/06/21 0153    Specimen: Swab from Nasopharynx Updated: 02/06/21 1534     COVID19 Not Detected    Narrative:      Fact sheet for providers: https://www.fda.gov/media/343385/download     Fact sheet for patients: https://www.fda.gov/media/145260/download    Test performed by RT PCR.    Calcium, Ionized [[id number]]  (Abnormal) Collected: 02/06/21 1309    Specimen: Blood Updated: 02/06/21 1428     Ionized Calcium 1.13 mmol/L     MRSA Screen, PCR (Inpatient) - Swab, Nares [[id number]]  (Abnormal) Collected: 02/06/21 1114     Specimen: Swab from Nares Updated: 02/06/21 1311     MRSA PCR MRSA Detected    Troponin [[id number]]  (Abnormal) Collected: 02/06/21 0311    Specimen: Blood Updated: 02/06/21 0513     Troponin T 0.031 ng/mL     Narrative:      Troponin T Reference Range:  <= 0.03 ng/mL-   Negative for AMI  >0.03 ng/mL-     Abnormal for myocardial necrosis.  Clinicians would have to utilize clinical acumen, EKG, Troponin and serial changes to determine if it is an Acute Myocardial Infarction or myocardial injury due to an underlying chronic condition.       Results may be falsely decreased if patient taking Biotin.      Lactic Acid, Reflex [[id number]]  (Abnormal) Collected: 02/06/21 0440    Specimen: Blood Updated: 02/06/21 0512     Lactate 3.1 mmol/L     Lactic Acid, Reflex Timer (This will reflex a repeat order 3-3:15 hours after ordered.) [[id number]] Collected: 02/06/21 0045    Specimen: Blood Updated: 02/06/21 0431     Hold Tube Hold for add-ons.     Comment: Auto resulted.       Sodium, Urine, Random - Urine, Clean Catch [[id number]] Collected: 02/06/21 0148    Specimen: Urine, Clean Catch Updated: 02/06/21 0253     Sodium, Urine 54 mmol/L     Narrative:      Reference intervals for random urine have not been established.  Clinical usage is dependent upon physician's interpretation in combination with other laboratory tests.       Procalcitonin [[id number]]  (Abnormal) Collected: 02/06/21 0045    Specimen: Blood Updated: 02/06/21 0250     Procalcitonin 171.12 ng/mL      Comment: Linear range verified.         Narrative:      As a Marker for Sepsis (Non-Neonates):   1. <0.5 ng/mL represents a low risk of severe sepsis and/or septic shock.  1. >2 ng/mL represents a high risk of severe sepsis and/or septic shock.    As a Marker for Lower Respiratory Tract Infections that require antibiotic therapy:  PCT on Admission     Antibiotic Therapy             6-12 Hrs later  > 0.5                Strongly Recommended            >0.25 - <0.5      "    Recommended  0.1 - 0.25           Discouraged                   Remeasure/reassess PCT  <0.1                 Strongly Discouraged          Remeasure/reassess PCT      As 28 day mortality risk marker: \"Change in Procalcitonin Result\" (> 80 % or <=80 %) if Day 0 (or Day 1) and Day 4 values are available. Refer to http://www.Voxoundpct-calculator.com/   Change in PCT <=80 %   A decrease of PCT levels below or equal to 80 % defines a positive change in PCT test result representing a higher risk for 28-day all-cause mortality of patients diagnosed with severe sepsis or septic shock.  Change in PCT > 80 %   A decrease of PCT levels of more than 80 % defines a negative change in PCT result representing a lower risk for 28-day all-cause mortality of patients diagnosed with severe sepsis or septic shock.                Results may be falsely decreased if patient taking Biotin.     Troponin [[id number]]  (Abnormal) Collected: 02/06/21 0045    Specimen: Blood Updated: 02/06/21 0129     Troponin T 0.032 ng/mL     Narrative:      Troponin T Reference Range:  <= 0.03 ng/mL-   Negative for AMI  >0.03 ng/mL-     Abnormal for myocardial necrosis.  Clinicians would have to utilize clinical acumen, EKG, Troponin and serial changes to determine if it is an Acute Myocardial Infarction or myocardial injury due to an underlying chronic condition.       Results may be falsely decreased if patient taking Biotin.      Lactic Acid, Plasma [[id number]]  (Abnormal) Collected: 02/06/21 0045    Specimen: Blood Updated: 02/06/21 0117     Lactate 3.4 mmol/L           Imaging Results (Most Recent)     Procedure Component Value Units Date/Time    NM Lung Ventilation Perfusion Aerosol [[id number]] Collected: 02/14/21 1127     Updated: 02/14/21 1131    Narrative:      DATE OF EXAM:  2/14/2021 10:00 AM     PROCEDURE:  NM LUNG VENTILATION PERFUSION AEROSOL-     INDICATIONS:  Shortness of breath, heart disease, hypoxia.     COMPARISON:  Chest x-ray " performed on 02/13/2021.     TECHNIQUE:   The patient was ventilated with 44.8 mCi of technetium 99m pertechnetate  aerosol and ventilation images were acquired in multiple obliquities.  The patient then received 5.5 mCi of technetium 99m MAA intravenously  and perfusion images were acquired in multiple obliquities.     FINDINGS:  The ventilation study is abnormal. There are multiple small to moderate  ventilation defects scattered throughout both lungs. This can be seen  with the obstructed pulmonary disease. There is some retention of  radiotracer in the trachea. There is a photopenic area projecting over  the left upper lobe consistent with the patient's transvenous pacemaker.  On the perfusion study, there are similar, but much smaller matched  defects present. There are no peripheral wedge-shaped mismatched  ventilation/perfusion defects. When utilizing the revised PIOPED  criteria, the patient has a low probability of acute pulmonary embolic  disease.        Impression:         1. Abnormal appearance of the ventilation study which can be seen with  obstructive pulmonary disease.  2. Low probability of acute pulmonary embolic disease.     Electronically Signed By-Fletcher Barros MD On:2/14/2021 11:29 AM  This report was finalized on [id number] by  Fletcher Barros MD.    US Liver [[id number]] Collected: 02/14/21 1039     Updated: 02/14/21 1043    Narrative:      DATE OF EXAM:  2/14/2021 9:56 AM     PROCEDURE:  US LIVER-     INDICATIONS:  Elevated liver enzymes.     COMPARISON:  No Comparisons Available     TECHNIQUE:   Grayscale and color Doppler ultrasound evaluation of the limited abdomen  was performed.     FINDINGS:  The hepatic echogenicity is diffusely increased and heterogeneous. The  surface of the liver also appears to have a nodular contour. This is  suggestive of hepatic cirrhosis. There are no focal hepatic masses.  There is normal directional flow in the main portal vein and the hepatic  veins. The  patient has a minimal ascites. There are multiple echogenic  stones within the gallbladder. The common bile duct caliber is normal  measuring 4 mm.       Impression:         1. Abnormal appearance of liver felt to be secondary to hepatic  cirrhosis.  2. Cholelithiasis.  3. Minimal ascites.     Electronically Signed By-Fletcher Barros MD On:2/14/2021 10:41 AM  This report was finalized on [id number] by  Fletcher Braros MD.    XR Chest PA & Lateral [[id number]] Collected: 02/13/21 1741     Updated: 02/13/21 1745    Narrative:      DATE OF EXAM:  2/13/2021 5:06 PM     PROCEDURE:  XR CHEST PA AND LATERAL-     INDICATIONS:  Atrial fibrillation, hypertension, coronary artery disease.       COMPARISON:  02/11/2021.     TECHNIQUE:   Two radiologic views of the chest.     FINDINGS:  The heart is enlarged. There is a stable right upper extremity PICC line  and left-sided transvenous pacemaker/intracardiac defibrillator in  place. The pulmonary vascular markings are normal. The right lung and  pleural space are clear. The patient has a small to moderate layering  left pleural effusion with compressive atelectasis and possible  underlying airspace disease from pneumonia.  There are chronic  age-related changes involving the bony thorax and thoracic aorta.       Impression:         1. Cardiomegaly.  2. Small to moderate left pleural effusion with compressive atelectasis  and possible airspace disease from pneumonia.     Electronically Signed By-Fletcher Barros MD On:2/13/2021 5:43 PM  This report was finalized on [id number] by  Fletcher Barros MD.    XR Chest 1 View [[id number]] Collected: 02/11/21 1417     Updated: 02/11/21 1420    Narrative:      EXAMINATION: XR CHEST 1 VW-     DATE OF EXAM: 2/11/2021 2:07 PM     INDICATION: Shortness of breath.  Shortness of air      COMPARISON: Chest radiograph dated 2/7/2021     TECHNIQUE: Portable AP view of the chest was obtained.     FINDINGS:  There is a right-sided PICC line with tip terminating  over the upper  SVC. There is a single lead left chest wall ICD in unchanged position.  There is cardiomegaly without interstitial pulmonary edema. There is no  pleural effusion or pneumothorax. There are degenerative changes of the  thoracic spine.       Impression:      1. Cardiomegaly without acute pulmonary process. No change from prior  exam.     Electronically Signed By-Dae Auguste MD On:2/11/2021 2:18 PM  This report was finalized on [id number] by  Dae Auguste MD.    XR Chest 1 View [[id number]] Collected: 02/07/21 1043     Updated: 02/07/21 1047    Narrative:      EXAMINATION: XR CHEST 1 VW-     DATE OF EXAM: 2/7/2021 10:20 AM     INDICATION: picc line placement.     COMPARISON: Chest radiograph dated 02/06/2021     TECHNIQUE: Portable AP view of the chest was obtained.     FINDINGS:  There is a right-sided PICC line with tip terminating over the low SVC.  There is a left chest wall ICD with lead in unchanged position.  Pulmonary vascularity appears within normal limits. There is no acute  infectious consolidation, pleural effusion, or pneumothorax. There are  degenerative changes of the thoracic spine.       Impression:      1. New right-sided PICC line with tip terminating over the low SVC.  2. Stable cardiomegaly.     Electronically Signed By-Dae Auguste MD On:2/7/2021 10:45 AM  This report was finalized on [id number] by  Dae Auguste MD.    XR Chest 1 View [[id number]] Collected: 02/06/21 0800     Updated: 02/06/21 0803    Narrative:      DATE OF EXAM:  2/6/2021 7:41 AM     PROCEDURE:  XR CHEST 1 VW-     INDICATIONS:  ? pneumonia     COMPARISON:  01/28/2020     TECHNIQUE:   Single radiographic AP view of the chest was obtained.     FINDINGS:  An AICD device is present. The heart is enlarged. There are no definite  infiltrates. There are no pleural effusions.        Impression:      1.Cardiomegaly     Electronically Signed By-Azar Alarcon MD On:2/6/2021 8:01 AM  This report was  finalized on [id number] by  Azar Alarcon MD.        reviewed    ECG/EMG Results (most recent)     Procedure Component Value Units Date/Time    Adult Transthoracic Echo Complete W/ Cont if Necessary Per Protocol [[id number]] Collected: 02/06/21 0850     Updated: 02/06/21 1609     BSA 1.8 m^2      RVIDd 2.7 cm      IVSd 1.3 cm      LVIDd 3.9 cm      LVIDs 2.8 cm      LVPWd 1.2 cm      IVS/LVPW 1.0     FS 28.8 %      EDV(Teich) 67.0 ml      ESV(Teich) 29.4 ml      EF(Teich) 56.0 %      EDV(cubed) 60.5 ml      ESV(cubed) 21.9 ml      EF(cubed) 63.9 %      LV mass(C)d 170.0 grams      LV mass(C)dI 92.8 grams/m^2      SV(Teich) 37.5 ml      SI(Teich) 20.5 ml/m^2      SV(cubed) 38.7 ml      SI(cubed) 21.1 ml/m^2      Ao root diam 2.5 cm      Ao root area 5.1 cm^2      ACS 1.6 cm      asc Aorta Diam 3.2 cm      LVOT diam 1.8 cm      LVOT area 2.6 cm^2      RVOT diam 2.6 cm      RVOT area 5.5 cm^2      EDV(MOD-sp4) 59.5 ml      ESV(MOD-sp4) 26.6 ml      EF(MOD-sp4) 55.3 %      EDV(MOD-sp2) 53.3 ml      ESV(MOD-sp2) 20.0 ml      EF(MOD-sp2) 62.4 %      SV(MOD-sp4) 32.9 ml      SI(MOD-sp4) 18.0 ml/m^2      SV(MOD-sp2) 33.2 ml      SI(MOD-sp2) 18.1 ml/m^2      Ao root area (BSA corrected) 1.4     LV Olivier Vol (BSA corrected) 32.5 ml/m^2      LV Sys Vol (BSA corrected) 14.5 ml/m^2      Aortic R-R 1.0 sec      Aortic HR 59.0 BPM      MV V2 max 134.6 cm/sec      MV max PG 7.2 mmHg      MV V2 mean 51.6 cm/sec      MV mean PG 1.8 mmHg      MV V2 VTI 24.3 cm      MVA(VTI) 2.3 cm^2      Ao pk chastity 182.4 cm/sec      Ao max PG 13.3 mmHg      Ao max PG (full) 8.5 mmHg      Ao V2 mean 142.3 cm/sec      Ao mean PG 8.7 mmHg      Ao mean PG (full) 5.5 mmHg      Ao V2 VTI 37.9 cm      WILLIS(I,A) 1.5 cm^2      WILLIS(I,D) 1.5 cm^2      WILLIS(V,A) 1.5 cm^2      WILLIS(V,D) 1.5 cm^2      AI max chastity 264.5 cm/sec      AI max PG 28.0 mmHg      AI dec slope 171.9 cm/sec^2      AI dec time 1.5 sec      AI P1/2t 450.8 msec      LV V1 max PG 4.8 mmHg      LV V1  mean PG 3.2 mmHg      LV V1 max 110.0 cm/sec      LV V1 mean 85.9 cm/sec      LV V1 VTI 21.6 cm      CO(Ao) 11.4 l/min      CI(Ao) 6.2 l/min/m^2      SV(Ao) 192.9 ml      SI(Ao) 105.3 ml/m^2      CO(LVOT) 3.3 l/min      CI(LVOT) 1.8 l/min/m^2      SV(LVOT) 55.2 ml      SV(RVOT) 59.1 ml      SI(LVOT) 30.1 ml/m^2      PA V2 max 88.8 cm/sec      PA max PG 3.2 mmHg      PA max PG (full) 1.9 mmHg      PA V2 mean 63.8 cm/sec      PA mean PG 1.8 mmHg      PA mean PG (full) 1.0 mmHg      PA V2 VTI 17.2 cm      PVA(I,A) 3.4 cm^2       CV ECHO SAHIL - PVA(I,D) 3.4 cm^2       CV ECHO SAHIL - PVA(V,A) 3.4 cm^2       CV ECHO SAHIL - PVA(V,D) 3.4 cm^2      PA acc time 0.07 sec      RV V1 max PG 1.2 mmHg      RV V1 mean PG 0.77 mmHg      RV V1 max 55.1 cm/sec      RV V1 mean 41.7 cm/sec      RV V1 VTI 10.8 cm      TR max devante 237.7 cm/sec      RVSP(TR) 25.6 mmHg      RAP systole 3.0 mmHg      PA pr(Accel) 48.2 mmHg      Pulm Sys Devante 41.0 cm/sec      Pulm Olivier Devante 37.5 cm/sec      Pulm S/D 1.1     Qp/Qs 1.1      CV ECHO SAHIL - BZI_BMI 33.1 kilograms/m^2       [facility] 1.9 m^2       CV ECHO SAHIL - BZI_METRIC_WEIGHT 82.1 kg       CV ECHO SAHIL - BZI_METRIC_HEIGHT 157.5 cm      EF(MOD-bp) 60.0 %      LA dimension(2D) 4.2 cm     Narrative:      Normal LV size and contractility EF of 55%  Moderate right ventricular enlargement with severe right atrial   enlargement, catheter probably pacemaker lead seen.  Severe left atrial enlargement seen.  Aortic valve, mitral valve, tricuspid valve appears structurally normal,   mild MR, AR, seen.  There is moderate  tricuspid regurgitation seen.    Calculated RV systolic pressure is 24mmHg.  No pericardial effusion seen.  Proximal aorta appears normal in size.        reviewed    Assessment/Plan     Acute on chronic systolic heart failure  Appreciate nephrology involvement  Needs diuresis for volume removal with hypervolemia  Advance goal-directed medical therapy as  tolerated clinically  CONG on CKD, likely multifactorial  Liver work-up underway with elevated LFTs, could be congestive hepatopathy  A. fib is rate controlled presently  Device in place she denies any AICD shocks    Avoid any hepatotoxic or nephrotoxic medications  Diuresis with Bumex every 6 hours  Confusion pleasant, likely secondary to hepatic dysfunction  Chronic disability  Wound care for wrapping of her lower extremities  Correct electrolytes as indicated    She can be followed by primary cardiologist tomorrow Dr. Root    I discussed the patient's findings and my recommendations with patient and staff    Maynor Rodas MD  02/14/21  15:10 EST    New patient me with new problems above

## 2021-02-14 NOTE — PROGRESS NOTES
Hematology/Oncology Inpatient Progress Note    PATIENT NAME: Emperatriz Childs  : 1945  MRN: [id number]    CHIEF COMPLAINT: Lymphedema    HISTORY OF PRESENT ILLNESS:    Emperatriz Childs is a 75 y.o. female who presented to [facility] on 2021 with complaints of increasing leg swelling and pain. Patient reported chronic lymphedema with erythema that has progressively gotten worse over the past several weeks. She reported associated pain with weightbearing for an extended period of time, fever, and chills.  Labs in the ED were significant for , potassium 5.3, creatinine 2.41, BUN 63, EGFR 19, lactate 3.2, hemoglobin 11.3.  Urinalysis was positive for blood, leukocytes, and nitrites.  Chest x-ray revealed cardiomegaly.  Patient was admitted for further evaluation and management.  Since admission, nephrology has been consulted for acute kidney injury.  Patient was started on antibiotics for lower extremity cellulitis and UTI. Infectious disease was also consulted.     21  Hematology/Oncology was consulted for persistent thrombocytopenia.  Platelet count on admission were 86,000.  Platelets have continued to remain low throughout admission with lowest platelet count of 34,000 on 2021.  On review of patient's chart, patient has a history of thrombocytopenia. Patient was seen by Dr. Nathan outpatient on 2018 for anemia.  Initial work-up was performed and patient was to follow up in 2 weeks, however, patient was lost to follow-up.     She  has a past medical history of CHF (congestive heart failure) (CMS/[facility]), History of transfusion, Hypertension, Lymphedema of leg, and Myocardial infarction (CMS/[facility]).     PCP: Rosa M Chavez APRN    Subjective    Patient is not able to eat well.  She is lying in bed.  Denies any pain.        ROS:  Review of Systems   Constitutional: Positive for fatigue. Negative for chills and fever.   HENT: Negative for mouth sores and nosebleeds.     Eyes: Negative for photophobia, pain, redness and visual disturbance.   Respiratory: Negative for cough and shortness of breath.    Cardiovascular: Positive for leg swelling. Negative for chest pain.   Gastrointestinal: Negative for diarrhea, nausea and vomiting.   Endocrine: Negative for cold intolerance and heat intolerance.   Genitourinary: Negative for hematuria.   Musculoskeletal: Negative for arthralgias and myalgias.   Skin: Positive for color change.   Neurological: Negative for dizziness, speech difficulty, light-headedness and headaches.   Psychiatric/Behavioral: Negative for confusion and hallucinations.      MEDICATIONS:    Scheduled Meds:  aspirin, 81 mg, Oral, Daily  bumetanide, 2 mg, Intravenous, Q6H  doxycycline, 100 mg, Oral, Q12H  febuxostat, 40 mg, Oral, BID  fluconazole, 100 mg, Oral, Q24H  folic acid, 1 mg, Oral, Daily  gabapentin, 100 mg, Oral, TID  haloperidol lactate, 5 mg, Intramuscular, Once  lactulose, 20 g, Oral, TID  magic butt paste, , Topical, Daily  midodrine, 10 mg, Oral, TID AC  nystatin, 5 mL, Swish & Swallow, 4x Daily  rifAXIMin, 550 mg, Oral, Q12H  sodium chloride, 10 mL, Intravenous, Q12H  ursodiol, 300 mg, Oral, BID  vitamin B-12, 500 mcg, Oral, Daily  Zinc Oxide, , Apply externally, BID       Continuous Infusions:  dextrose, 20 mL/hr, Last Rate: 20 mL/hr (02/13/21 2042)       PRN Meds:  •  acetaminophen **OR** acetaminophen **OR** acetaminophen  •  dextrose  •  dextrose  •  glucagon (human recombinant)  •  HYDROcodone-acetaminophen  •  magnesium sulfate **OR** magnesium sulfate in D5W 1g/100mL (PREMIX) **OR** magnesium sulfate  •  melatonin  •  nitroglycerin  •  OLANZapine  •  ondansetron **OR** ondansetron  •  potassium chloride  •  potassium chloride  •  sodium chloride  •  traMADol     ALLERGIES:    Allergies   Allergen Reactions   • Allopurinol Unknown - High Severity   • Clindamycin Hcl Unknown - High Severity   • Codeine Unknown - High Severity   • Furosemide Unknown -  "High Severity   • Hydrochlorothiazide Unknown - High Severity   • Naproxen Unknown - High Severity   • Sulfa Antibiotics Unknown - High Severity       Objective    VITALS:   /52 (BP Location: Left arm, Patient Position: Lying)   Pulse 62   Temp 98.4 °F (36.9 °C) (Axillary)   Resp 16   Ht 157.5 cm (62\")   Wt 79.4 kg (175 lb 0.7 oz)   SpO2 97%   BMI 32.02 kg/m²     PHYSICAL EXAM: (performed by MD)  Physical Exam  Vitals signs and nursing note reviewed.   Constitutional:       General: She is not in acute distress.     Appearance: She is obese. She is ill-appearing. She is not diaphoretic.   HENT:      Head: Normocephalic and atraumatic.   Eyes:      General: No scleral icterus.        Right eye: No discharge.         Left eye: No discharge.      Conjunctiva/sclera: Conjunctivae normal.   Neck:      Musculoskeletal: Normal range of motion and neck supple. No muscular tenderness.      Thyroid: No thyromegaly.   Cardiovascular:      Rate and Rhythm: Normal rate and regular rhythm.      Heart sounds: Normal heart sounds. No friction rub. No gallop.    Pulmonary:      Effort: Pulmonary effort is normal. No respiratory distress.      Breath sounds: No stridor. No wheezing.   Abdominal:      General: Bowel sounds are normal.      Palpations: Abdomen is soft. There is no mass.      Tenderness: There is no abdominal tenderness. There is no guarding or rebound.   Musculoskeletal: Normal range of motion.         General: Swelling present. No tenderness.      Right lower leg: Edema present.      Left lower leg: Edema present.      Comments: Both legs are wrapped in bandage   Lymphadenopathy:      Cervical: No cervical adenopathy.   Skin:     General: Skin is warm.      Findings: No bruising or rash.   Neurological:      General: No focal deficit present.      Mental Status: She is alert and oriented to person, place, and time.      Cranial Nerves: No cranial nerve deficit.      Sensory: No sensory deficit.      " Motor: Weakness present. No abnormal muscle tone.   Psychiatric:         Mood and Affect: Mood normal.         Behavior: Behavior normal.         Thought Content: Thought content normal.           RECENT LABS:  Lab Results (last 24 hours)     Procedure Component Value Units Date/Time    Alkaline Phosphatase, Isoenzymes [[id number]] Collected: 02/14/21 1241    Specimen: Blood Updated: 02/14/21 1243    Ceruloplasmin [[id number]]  (Normal) Collected: 02/12/21 0427    Specimen: Blood Updated: 02/14/21 1225     Ceruloplasmin 26 mg/dL     AFP Tumor Marker [[id number]]  (Normal) Collected: 02/13/21 1722    Specimen: Blood Updated: 02/14/21 1141     ALPHA-FETOPROTEIN 2.30 ng/mL     Narrative:      Alpha Fetoprotein Tumor Marker Reference Range:    0.0-8.3 ng/mL    Note: Normal values apply only to males and nonpregnant females. These results are not interpretable for pregnant females.    Results may be falsely decreased if patient taking Biotin.      Alpha - 1 - Antitrypsin [[id number]]  (Abnormal) Collected: 02/13/21 0350    Specimen: Blood Updated: 02/14/21 1136     ALPHA -1 ANTITRYPSIN 218 mg/dL     POC Glucose Once [[id number]]  (Normal) Collected: 02/14/21 1119    Specimen: Blood Updated: 02/14/21 1120     Glucose 70 mg/dL      Comment: Serial Number: [id number]:  [id number]       POC Glucose Once [[id number]]  (Normal) Collected: 02/14/21 0745    Specimen: Blood Updated: 02/14/21 0746     Glucose 88 mg/dL      Comment: Serial Number: [id number]:  [id number]       BNP [[id number]]  (Abnormal) Collected: 02/14/21 0446    Specimen: Blood Updated: 02/14/21 0610     proBNP >70,000.0 pg/mL     Narrative:      Among patients with dyspnea, NT-proBNP is highly sensitive for the detection of acute congestive heart failure. In addition NT-proBNP of <300 pg/ml effectively rules out acute congestive heart failure with 99% negative predictive value.    Results may be falsely decreased if patient taking Biotin.      CBC &  Differential [[id number]]  (Abnormal) Collected: 02/14/21 0446    Specimen: Blood Updated: 02/14/21 0600    Narrative:      The following orders were created for panel order CBC & Differential.  Procedure                               Abnormality         Status                     ---------                               -----------         ------                     Scan Slide[[id number]]                                       Final result               CBC Auto Differential[[id number]]        Abnormal            Final result                 Please view results for these tests on the individual orders.    CBC Auto Differential [[id number]]  (Abnormal) Collected: 02/14/21 0446    Specimen: Blood Updated: 02/14/21 0600     WBC 8.30 10*3/mm3      RBC 2.73 10*6/mm3      Hemoglobin 8.7 g/dL      Hematocrit 26.5 %      MCV 97.3 fL      MCH 31.8 pg      MCHC 32.7 g/dL      RDW 16.2 %      RDW-SD 56.0 fl      MPV 7.7 fL      Platelets 72 10*3/mm3     Narrative:      The previously reported component NRBC is no longer being reported. Previous result was 0.3 /100 WBC (Reference Range: 0.0-0.2 /100 WBC) on 2/14/2021 at 0509 EST.    Scan Slide [[id number]] Collected: 02/14/21 0446    Specimen: Blood Updated: 02/14/21 0600     Scan Slide --     Comment: See Manual Differential Results       Manual Differential [[id number]]  (Abnormal) Collected: 02/14/21 0446    Specimen: Blood Updated: 02/14/21 0600     Neutrophil % 76.0 %      Lymphocyte % 3.0 %      Monocyte % 6.0 %      Eosinophil % 5.0 %      Bands %  10.0 %      Neutrophils Absolute 7.14 10*3/mm3      Lymphocytes Absolute 0.25 10*3/mm3      Monocytes Absolute 0.50 10*3/mm3      Eosinophils Absolute 0.42 10*3/mm3      nRBC 1.0 /100 WBC      Anisocytosis Slight/1+     Toxic Granulation Slight/1+     Platelet Estimate Decreased    Ammonia [[id number]]  (Abnormal) Collected: 02/14/21 0446    Specimen: Blood Updated: 02/14/21 0541     Ammonia 72 umol/L     Comprehensive Metabolic Panel  [[id number]]  (Abnormal) Collected: 02/14/21 0446    Specimen: Blood Updated: 02/14/21 0533     Glucose 72 mg/dL       mg/dL      Creatinine 3.84 mg/dL      Sodium 140 mmol/L      Potassium 3.6 mmol/L      Chloride 102 mmol/L      CO2 28.0 mmol/L      Calcium 7.6 mg/dL      Total Protein 5.0 g/dL      Albumin 2.20 g/dL      ALT (SGPT) 46 U/L      AST (SGOT) 101 U/L      Alkaline Phosphatase 1,082 U/L      Total Bilirubin 0.8 mg/dL      eGFR Non African Amer 11 mL/min/1.73      Comment: <15 Indicative of kidney failure.        eGFR   Amer --     Comment: <15 Indicative of kidney failure.        Globulin 2.8 gm/dL      A/G Ratio 0.8 g/dL      BUN/Creatinine Ratio 26.0     Anion Gap 10.0 mmol/L     Narrative:      GFR Normal >60  Chronic Kidney Disease <60  Kidney Failure <15      Magnesium [[id number]]  (Normal) Collected: 02/14/21 0446    Specimen: Blood Updated: 02/14/21 0533     Magnesium 1.8 mg/dL     Protime-INR [[id number]]  (Abnormal) Collected: 02/14/21 0446    Specimen: Blood Updated: 02/14/21 0515     Protime 13.0 Seconds      INR 1.19    Hepatitis Panel, Acute [[id number]]  (Normal) Collected: 02/13/21 1721    Specimen: Blood Updated: 02/13/21 2109     Hepatitis B Surface Ag Non-Reactive     Hep A IgM Non-Reactive     Hep B C IgM Non-Reactive     Hepatitis C Ab Non-Reactive    Narrative:      Results may be falsely decreased if patient taking Biotin.     POC Glucose Once [[id number]]  (Normal) Collected: 02/13/21 2007    Specimen: Blood Updated: 02/13/21 2009     Glucose 87 mg/dL      Comment: Serial Number: [id number]:  [id number]       Basic Metabolic Panel [[id number]]  (Abnormal) Collected: 02/13/21 1722    Specimen: Blood Updated: 02/13/21 1759     Glucose 85 mg/dL      BUN 99 mg/dL      Creatinine 3.70 mg/dL      Sodium 140 mmol/L      Potassium 3.7 mmol/L      Chloride 102 mmol/L      CO2 27.0 mmol/L      Calcium 7.3 mg/dL      eGFR   Amer --     Comment: <15 Indicative of kidney  failure.        eGFR Non African Amer 12 mL/min/1.73      Comment: <15 Indicative of kidney failure.        BUN/Creatinine Ratio 26.8     Anion Gap 11.0 mmol/L     Narrative:      GFR Normal >60  Chronic Kidney Disease <60  Kidney Failure <15      Protime-INR [[id number]]  (Abnormal) Collected: 02/13/21 1722    Specimen: Blood Updated: 02/13/21 1744     Protime 12.8 Seconds      INR 1.17    LÁZARO [[id number]] Collected: 02/13/21 1722    Specimen: Blood Updated: 02/13/21 1729    Anti-Smooth Muscle Antibody Titer [[id number]] Collected: 02/13/21 1722    Specimen: Blood Updated: 02/13/21 1729    Anti-microsomal Antibody [[id number]] Collected: 02/13/21 1722    Specimen: Blood Updated: 02/13/21 1729    Acetaminophen Level [[id number]]  (Normal) Collected: 02/12/21 1714    Specimen: Blood Updated: 02/13/21 1711     Acetaminophen 7.7 mcg/mL     Narrative:      Acetaminophen Therapeutic Range  5-20 ug/mL      Hours after ingestion            Toxic Value    4 Hours                           150 ug/mL    8 Hours                            70 ug/mL   12 Hours                            40 ug/mL   16 Hours                            20 ug/mL    These values apply to a single ingestion only.           PENDING RESULTS: SPEP + IFX    IMAGING REVIEWED:  Nm Lung Ventilation Perfusion Aerosol    Result Date: 2/14/2021   1. Abnormal appearance of the ventilation study which can be seen with obstructive pulmonary disease. 2. Low probability of acute pulmonary embolic disease.  Electronically Signed By-Fletcher Barros MD On:2/14/2021 11:29 AM This report was finalized on [id number] by  Fletcher Barros MD.    Us Liver    Result Date: 2/14/2021   1. Abnormal appearance of liver felt to be secondary to hepatic cirrhosis. 2. Cholelithiasis. 3. Minimal ascites.  Electronically Signed By-Fletcher Barros MD On:2/14/2021 10:41 AM This report was finalized on [id number] by  Fletcher Barros MD.    Xr Chest Pa & Lateral    Result Date: 2/13/2021   1.  Cardiomegaly. 2. Small to moderate left pleural effusion with compressive atelectasis and possible airspace disease from pneumonia.  Electronically Signed By-Fletcher Barros MD On:2/13/2021 5:43 PM This report was finalized on [id number] by  Fletcher Barros MD.      Assessment/Plan     ASSESSMENT:        1. Thrombocytopenia: Very likely from bone marrow suppression due to acute illness.  Antibiotics may be contributing.. Currently on aspirin.  Heparin-induced platelet antibody normal.  Folate 10.90, vitamin B12 >2000, fibrinogen 554, PTT 34, PT/INR 13.3/1.22.  Peripheral smear showed leukocytosis, anemia, and thrombocytopenia.  .  2. Anemia: .  Consider relation to anemia of chronic disease and CKD.  Anemia studies: ferritin 663.70, iron 50, iron saturation 38%, transferrin 89, TIBC 133, , haptoglobin 138,  reticulocytes 1.98%. Patient currently receiving p.o. vitamin B12 and folic acid.  3. Leukocytosis: Reactive leukocytosis due to underlying cellulitis and UTI.    Now resolved  4. Elevated liver enzymes:  Elevated alkaline phosphatase.  Patient on fluconazole which can cause increased liver enzymes.  5. Cellulitis of bilateral lower extremities: ID consulted.  Blood cultures also drawn- NGTD. On IV antibiotics.   6. UTI: On IV antibiotics.   7. CONG/CKD: Nephrology    8. CHF: proBNP 66,389  9. CAD/atrial fibrillation/HTN/HLD/gout: Managed per primary team       PLAN:      1. Thrombocytopenia, anemia  stable.  Leukocytosis resolved.  Anemia and thrombocytopenia are multifactorial due to acute illness with the bone marrow suppression.  Continue to monitor.  Treat underlying infection.  2. Patient appears very weak.  3. Continue IV antibiotics           Electronically signed by Kevin Nathan MD, 02/14/21, 1:53 PM EST.

## 2021-02-14 NOTE — PROGRESS NOTES
Infectious Diseases Progress Note      LOS: 6 days   Patient Care Team:  Rosa M Chavez APRN as PCP - General (Nurse Practitioner)    Chief Complaint: Lower extremities edema    Subjective       The patient has been afebrile for the last 24 hours.  The patient is on 2 L of oxygen by nasal cannula, hemodynamically stable, and is tolerating antimicrobial therapy.  Unwrapped the patient's dressings and examined the patient with the nurse at bedside      Review of Systems:   Review of Systems   Constitutional: Negative.    HENT: Negative.    Eyes: Negative.    Respiratory: Negative.    Cardiovascular: Positive for leg swelling.   Gastrointestinal: Negative.    Genitourinary: Negative.    Musculoskeletal: Negative.         Bilateral leg pain   Skin: Negative.    Neurological: Negative.    Hematological: Negative.    Psychiatric/Behavioral: Negative.         Objective     Vital Signs  Temp:  [97 °F (36.1 °C)-98.4 °F (36.9 °C)] 98.4 °F (36.9 °C)  Heart Rate:  [60-62] 62  Resp:  [16-20] 16  BP: (130-152)/(52-58) 130/55    Physical Exam:  Physical Exam  Vitals signs and nursing note reviewed.   Constitutional:       Appearance: She is well-developed.   HENT:      Head: Normocephalic and atraumatic.   Eyes:      Pupils: Pupils are equal, round, and reactive to light.   Neck:      Musculoskeletal: Normal range of motion and neck supple.   Cardiovascular:      Rate and Rhythm: Normal rate and regular rhythm.      Heart sounds: Normal heart sounds.   Pulmonary:      Effort: Pulmonary effort is normal. No respiratory distress.      Breath sounds: Normal breath sounds. No wheezing or rales.   Abdominal:      General: Bowel sounds are normal. There is no distension.      Palpations: Abdomen is soft. There is no mass.      Tenderness: There is no abdominal tenderness. There is no guarding or rebound.   Musculoskeletal: Normal range of motion.         General: No deformity.      Right lower leg: Edema present.      Left lower  leg: Edema present.      Comments: Superimposed erythema of both lower extremities more on the left than the right-erythema is difficult to assess because of the Magic Butt paste on the legs however erythema does appear improved  -Erythema is significantly improved since we first saw the patient   Skin:     General: Skin is warm.      Findings: No erythema or rash.   Neurological:      Mental Status: She is alert and oriented to person, place, and time.      Cranial Nerves: No cranial nerve deficit.          Results Review:    I have reviewed all clinical data, test, lab, and imaging results.     Radiology  Nm Lung Ventilation Perfusion Aerosol    Result Date: 2/14/2021  DATE OF EXAM: 2/14/2021 10:00 AM  PROCEDURE: NM LUNG VENTILATION PERFUSION AEROSOL-  INDICATIONS: Shortness of breath, heart disease, hypoxia.  COMPARISON: Chest x-ray performed on 02/13/2021.  TECHNIQUE: The patient was ventilated with 44.8 mCi of technetium 99m pertechnetate aerosol and ventilation images were acquired in multiple obliquities. The patient then received 5.5 mCi of technetium 99m MAA intravenously and perfusion images were acquired in multiple obliquities.  FINDINGS: The ventilation study is abnormal. There are multiple small to moderate ventilation defects scattered throughout both lungs. This can be seen with the obstructed pulmonary disease. There is some retention of radiotracer in the trachea. There is a photopenic area projecting over the left upper lobe consistent with the patient's transvenous pacemaker. On the perfusion study, there are similar, but much smaller matched defects present. There are no peripheral wedge-shaped mismatched ventilation/perfusion defects. When utilizing the revised PIOPED criteria, the patient has a low probability of acute pulmonary embolic disease.       1. Abnormal appearance of the ventilation study which can be seen with obstructive pulmonary disease. 2. Low probability of acute pulmonary  embolic disease.  Electronically Signed By-Fletcher Barros MD On:2/14/2021 11:29 AM This report was finalized on [id number] by  Fletcher Barros MD.    Us Liver    Result Date: 2/14/2021  DATE OF EXAM: 2/14/2021 9:56 AM  PROCEDURE: US LIVER-  INDICATIONS: Elevated liver enzymes.  COMPARISON: No Comparisons Available  TECHNIQUE: Grayscale and color Doppler ultrasound evaluation of the limited abdomen was performed.  FINDINGS: The hepatic echogenicity is diffusely increased and heterogeneous. The surface of the liver also appears to have a nodular contour. This is suggestive of hepatic cirrhosis. There are no focal hepatic masses. There is normal directional flow in the main portal vein and the hepatic veins. The patient has a minimal ascites. There are multiple echogenic stones within the gallbladder. The common bile duct caliber is normal measuring 4 mm.       1. Abnormal appearance of liver felt to be secondary to hepatic cirrhosis. 2. Cholelithiasis. 3. Minimal ascites.  Electronically Signed By-Fletcher Barros MD On:2/14/2021 10:41 AM This report was finalized on [id number] by  Fletcher Barros MD.    Xr Chest Pa & Lateral    Result Date: 2/13/2021  DATE OF EXAM: 2/13/2021 5:06 PM  PROCEDURE: XR CHEST PA AND LATERAL-  INDICATIONS: Atrial fibrillation, hypertension, coronary artery disease.   COMPARISON: 02/11/2021.  TECHNIQUE: Two radiologic views of the chest.  FINDINGS: The heart is enlarged. There is a stable right upper extremity PICC line and left-sided transvenous pacemaker/intracardiac defibrillator in place. The pulmonary vascular markings are normal. The right lung and pleural space are clear. The patient has a small to moderate layering left pleural effusion with compressive atelectasis and possible underlying airspace disease from pneumonia.  There are chronic age-related changes involving the bony thorax and thoracic aorta.       1. Cardiomegaly. 2. Small to moderate left pleural effusion with compressive  atelectasis and possible airspace disease from pneumonia.  Electronically Signed By-Fletcher Barros MD On:2/13/2021 5:43 PM This report was finalized on [id number] by  Fletcher Barros MD.      Cardiology    Laboratory    Results from last 7 days   Lab Units 02/14/21  0446 02/13/21  0350 02/12/21  0427 02/11/21  0027 02/10/21  0547 02/09/21  0524 02/08/21  0529   WBC 10*3/mm3 8.30 11.00* 14.20* 12.00* 10.30 14.40* 18.60*   HEMOGLOBIN g/dL 8.7* 8.5* 9.0* 9.0* 9.0* 9.5* 9.6*   HEMATOCRIT % 26.5* 26.3* 28.3* 27.8* 27.2* 29.6* 29.1*   PLATELETS 10*3/mm3 72* 74* 63* 92* 35* 54* 34*     Results from last 7 days   Lab Units 02/14/21 0446 02/13/21  1722 02/13/21  0350 02/12/21  1759 02/12/21  1714 02/12/21  0427 02/11/21  1714 02/11/21  1227 02/11/21  0027 02/10/21  0547   SODIUM mmol/L 140 140 140  --  138 136 137  --  135* 138   POTASSIUM mmol/L 3.6 3.7 3.9  --  3.5 3.8 4.1  --  3.7 3.7   CHLORIDE mmol/L 102 102 103  --  102 99 99  --  100 104   CO2 mmol/L 28.0 27.0 25.0  --  25.0 24.0 24.0  --  23.0 24.0   BUN mg/dL 100* 99* 102*  --  98* 102* 100*  --  95* 95*   CREATININE mg/dL 3.84* 3.70* 3.73*  --  3.57* 3.46* 3.58*  --  3.26* 2.98*   GLUCOSE mg/dL 72 85 78  --  174* 220* 142*  --  314* 92   ALBUMIN g/dL 2.20*  --  2.40*  --   --  2.90*  --  2.0*  --  2.10*   BILIRUBIN mg/dL 0.8  --  0.8  --   --  1.2  --   --   --  1.4*   ALK PHOS U/L 1,082*  --  994*  --   --  762*  --   --   --  396*   AST (SGOT) U/L 101*  --  108*  --   --  72*  --   --   --  81*   ALT (SGPT) U/L 46*  --  45*  --   --  35*  --   --   --  27   AMMONIA umol/L 72*  --   --  480*  --   --   --   --   --   --    CALCIUM mg/dL 7.6* 7.3* 7.7*  --  7.2* 8.0* 7.8*  --  7.1* 7.0*                 Microbiology   Microbiology Results (last 10 days)     Procedure Component Value - Date/Time    Urine Culture - Urine, Urine, Random Void [[id number]]  (Abnormal) Collected: 02/10/21 1616    Lab Status: Final result Specimen: Urine, Random Void Updated: 02/11/21 8774      Urine Culture Yeast isolated     Comment: No further workup.       Urine Culture - Urine, Urine, Catheter [[id number]]  (Abnormal) Collected: 02/10/21 0306    Lab Status: Final result Specimen: Urine, Catheter Updated: 02/11/21 1234     Urine Culture Yeast isolated     Comment: No further workup.       Clostridium Difficile Toxin - Stool, Per Rectum [[id number]]  (Normal) Collected: 02/06/21 1952    Lab Status: Final result Specimen: Stool from Per Rectum Updated: 02/07/21 0725    Narrative:      The following orders were created for panel order Clostridium Difficile Toxin - Stool, Per Rectum.  Procedure                               Abnormality         Status                     ---------                               -----------         ------                     Clostridium Difficile EI...[[id number]]  Normal              Final result                 Please view results for these tests on the individual orders.    Clostridium Difficile EIA - Stool, Per Rectum [[id number]]  (Normal) Collected: 02/06/21 1952    Lab Status: Final result Specimen: Stool from Per Rectum Updated: 02/07/21 0725     C Diff GDH / Toxin Negative    Blood Culture - Blood, Arm, Left [[id number]] Collected: 02/06/21 1309    Lab Status: Final result Specimen: Blood from Arm, Left Updated: 02/11/21 1431     Blood Culture No growth at 5 days    Blood Culture - Blood, Arm, Right [[id number]] Collected: 02/06/21 1307    Lab Status: Final result Specimen: Blood from Arm, Right Updated: 02/11/21 1431     Blood Culture No growth at 5 days    MRSA Screen, PCR (Inpatient) - Swab, Nares [[id number]]  (Abnormal) Collected: 02/06/21 1114    Lab Status: Final result Specimen: Swab from Nares Updated: 02/06/21 1311     MRSA PCR MRSA Detected    COVID PRE-OP / PRE-PROCEDURE SCREENING ORDER (NO ISOLATION) - Swab, Nasopharynx [[id number]]  (Normal) Collected: 02/06/21 0153    Lab Status: Final result Specimen: Swab from Nasopharynx Updated: 02/06/21 1534     Narrative:      The following orders were created for panel order COVID PRE-OP / PRE-PROCEDURE SCREENING ORDER (NO ISOLATION) - Swab, Nasopharynx.  Procedure                               Abnormality         Status                     ---------                               -----------         ------                     COVID-19,APTIMA PANTHER,...[[id number]]  Normal              Final result                 Please view results for these tests on the individual orders.    COVID-19,APTIMA PANTHER,[facility]-HOUSE, NP/OP SWAB IN UTM/VTM/[facility] TRANSPORT MEDIA,24 HR TAT - Swab, Nasopharynx [[id number]]  (Normal) Collected: 02/06/21 0153    Lab Status: Final result Specimen: Swab from Nasopharynx Updated: 02/06/21 1534     COVID19 Not Detected    Narrative:      Fact sheet for providers: https://www.fda.gov/media/674513/download     Fact sheet for patients: https://www.fda.gov/media/846723/download    Test performed by RT PCR.          Medication Review:       Schedule Meds  aspirin, 81 mg, Oral, Daily  bumetanide, 2 mg, Intravenous, Q6H  doxycycline, 100 mg, Oral, Q12H  febuxostat, 40 mg, Oral, BID  fluconazole, 100 mg, Oral, Q24H  folic acid, 1 mg, Oral, Daily  gabapentin, 100 mg, Oral, TID  haloperidol lactate, 5 mg, Intramuscular, Once  lactulose, 20 g, Oral, TID  magic butt paste, , Topical, Daily  midodrine, 10 mg, Oral, TID AC  nystatin, 5 mL, Swish & Swallow, 4x Daily  rifAXIMin, 550 mg, Oral, Q12H  sodium chloride, 10 mL, Intravenous, Q12H  ursodiol, 300 mg, Oral, BID  vitamin B-12, 500 mcg, Oral, Daily  Zinc Oxide, , Apply externally, BID        Infusion Meds  dextrose, 20 mL/hr, Last Rate: 20 mL/hr (02/13/21 2042)        PRN Meds  •  acetaminophen **OR** acetaminophen **OR** acetaminophen  •  dextrose  •  dextrose  •  glucagon (human recombinant)  •  HYDROcodone-acetaminophen  •  magnesium sulfate **OR** magnesium sulfate in D5W 1g/100mL (PREMIX) **OR** magnesium sulfate  •  melatonin  •  nitroglycerin  •   OLANZapine  •  ondansetron **OR** ondansetron  •  potassium chloride  •  potassium chloride  •  sodium chloride  •  traMADol        Assessment/Plan       Antimicrobial Therapy   1.  Doxycycline     day  2.  Diflucan      day  3.      Day  4.      Day  5.      Day    Assessment     Bilateral lower extremities lymphedema with erythema of the right leg consistent with cellulitis.  Erythema had improved     Acute kidney injury     Leukocytosis-likely related to severe cellulitis.  Significantly improved    Congestive heart failure     History of pacemaker implantation    Hypothermia-appears to have resolved    Candiduria.  Patient s/p replacement of Ayon catheter on February 10, 2021 and repeat urinalysis was positive for candiduria     Plan     Continue doxycycline 100 mg p.o. twice daily for 7 days  Continue Diflucan 100 mg p.o. daily for 7 days  Continue supportive care  Okay to discharge from Infectious Disease standpoint        Harper Darling, APRN  02/14/21  16:16 EST      Note is dictated utilizing voice recognition software/Dragon

## 2021-02-14 NOTE — THERAPY TREATMENT NOTE
Subjective: Pt agreeable to therapeutic plan of care. Wanted to sit EOB to eat supper    Objective:     Bed mobility - Max-A  Transfers - N/A or Not attempted.  Ambulation -  feet N/A or Not attempted.    Pain:6/10 overall    Education: Provided education on importance of mobility and skilled verbal / tactile cueing throughout intervention.     Assessment: Emperatriz Childs presents with functional mobility impairments which indicate the need for skilled intervention. Tolerating session today without incident.Sat EOB x45 mins unsupported to feed herself supper. Performed LE exs x 10 at EOB. Very swollen and painful to touch BLE.  Will continue to follow and progress as tolerated.     Plan/Recommendations:   Pt would benefit from Inpatient Rehabilitation placement at discharge from facility and requires no DME at discharge.   Pt desires Inpatient Rehabilitation placement at discharge. Pt cooperative; agreeable to therapeutic recommendations and plan of care.     Basic Mobility 6-click:  Rollin = Total, A lot = 2, A little = 3; 4 = None  Supine>Sit:   1 = Total, A lot = 2, A little = 3; 4 = None   Sit>Stand with arms:  1 = Total, A lot = 2, A little = 3; 4 = None  Bed>Chair:   1 = Total, A lot = 2, A little = 3; 4 = None  Ambulate in room:  1 = Total, A lot = 2, A little = 3; 4 = None  3-5 Steps with railin = Total, A lot = 2, A little = 3; 4 = None  Score: 10    Modified [place]: N/A = No pre-op stroke/TIA    Post-Tx Position: Supine with HOB Elevated, Staff Present, Alarms activated and Call light and personal items within reach  PPE: gloves, surgical mask, eyewear protection

## 2021-02-15 ENCOUNTER — INPATIENT HOSPITAL (OUTPATIENT)
Dept: URBAN - METROPOLITAN AREA HOSPITAL 84 | Facility: HOSPITAL | Age: 76
End: 2021-02-15

## 2021-02-15 DIAGNOSIS — R74.8 ABNORMAL LEVELS OF OTHER SERUM ENZYMES: ICD-10-CM

## 2021-02-15 DIAGNOSIS — N18.9 CHRONIC KIDNEY DISEASE, UNSPECIFIED: ICD-10-CM

## 2021-02-15 DIAGNOSIS — D63.1 ANEMIA IN CHRONIC KIDNEY DISEASE: ICD-10-CM

## 2021-02-15 DIAGNOSIS — N39.0 URINARY TRACT INFECTION, SITE NOT SPECIFIED: ICD-10-CM

## 2021-02-15 DIAGNOSIS — E72.20 DISORDER OF UREA CYCLE METABOLISM, UNSPECIFIED: ICD-10-CM

## 2021-02-15 LAB
ALBUMIN SERPL-MCNC: 2.4 G/DL (ref 3.5–5.2)
ALBUMIN/GLOB SERPL: 0.9 G/DL
ALP SERPL-CCNC: 945 U/L (ref 39–117)
ALT SERPL W P-5'-P-CCNC: 40 U/L (ref 1–33)
AMMONIA BLD-SCNC: 43 UMOL/L (ref 11–51)
ANA SER QL: NEGATIVE
ANION GAP SERPL CALCULATED.3IONS-SCNC: 11 MMOL/L (ref 5–15)
AST SERPL-CCNC: 78 U/L (ref 1–32)
BILIRUB SERPL-MCNC: 0.8 MG/DL (ref 0–1.2)
BUN SERPL-MCNC: 96 MG/DL (ref 8–23)
BUN/CREAT SERPL: 26.7 (ref 7–25)
CALCIUM SPEC-SCNC: 7.6 MG/DL (ref 8.6–10.5)
CHLORIDE SERPL-SCNC: 102 MMOL/L (ref 98–107)
CO2 SERPL-SCNC: 29 MMOL/L (ref 22–29)
CREAT SERPL-MCNC: 3.59 MG/DL (ref 0.57–1)
DEPRECATED RDW RBC AUTO: 55.6 FL (ref 37–54)
ERYTHROCYTE [DISTWIDTH] IN BLOOD BY AUTOMATED COUNT: 16.2 % (ref 12.3–15.4)
GFR SERPL CREATININE-BSD FRML MDRD: 12 ML/MIN/1.73
GFR SERPL CREATININE-BSD FRML MDRD: ABNORMAL ML/MIN/{1.73_M2}
GLOBULIN UR ELPH-MCNC: 2.8 GM/DL
GLUCOSE BLDC GLUCOMTR-MCNC: 122 MG/DL (ref 70–105)
GLUCOSE BLDC GLUCOMTR-MCNC: 139 MG/DL (ref 70–105)
GLUCOSE BLDC GLUCOMTR-MCNC: 168 MG/DL (ref 70–105)
GLUCOSE BLDC GLUCOMTR-MCNC: 192 MG/DL (ref 70–105)
GLUCOSE SERPL-MCNC: 91 MG/DL (ref 65–99)
HCT VFR BLD AUTO: 26.9 % (ref 34–46.6)
HGB BLD-MCNC: 8.6 G/DL (ref 12–15.9)
INR PPP: 1.2 (ref 0.93–1.1)
LYMPHOCYTES # BLD MANUAL: 0.67 10*3/MM3 (ref 0.7–3.1)
LYMPHOCYTES NFR BLD MANUAL: 5 % (ref 5–12)
LYMPHOCYTES NFR BLD MANUAL: 8 % (ref 19.6–45.3)
MAGNESIUM SERPL-MCNC: 1.6 MG/DL (ref 1.6–2.4)
MCH RBC QN AUTO: 31.4 PG (ref 26.6–33)
MCHC RBC AUTO-ENTMCNC: 32 G/DL (ref 31.5–35.7)
MCV RBC AUTO: 98 FL (ref 79–97)
MONOCYTES # BLD AUTO: 0.42 10*3/MM3 (ref 0.1–0.9)
NEUTROPHILS # BLD AUTO: 7.31 10*3/MM3 (ref 1.7–7)
NEUTROPHILS NFR BLD MANUAL: 87 % (ref 42.7–76)
NRBC SPEC MANUAL: 1 /100 WBC (ref 0–0.2)
NT-PROBNP SERPL-MCNC: ABNORMAL PG/ML (ref 0–1800)
NT-PROBNP SERPL-MCNC: ABNORMAL PG/ML (ref 0–1800)
PLATELET # BLD AUTO: 87 10*3/MM3 (ref 140–450)
PMV BLD AUTO: 9.1 FL (ref 6–12)
POTASSIUM SERPL-SCNC: 3.3 MMOL/L (ref 3.5–5.2)
PROT SERPL-MCNC: 5.2 G/DL (ref 6–8.5)
PROTHROMBIN TIME: 13.1 SECONDS (ref 9.6–11.7)
RBC # BLD AUTO: 2.75 10*6/MM3 (ref 3.77–5.28)
RBC MORPH BLD: NORMAL
SCAN SLIDE: NORMAL
SMALL PLATELETS BLD QL SMEAR: ABNORMAL
SODIUM SERPL-SCNC: 142 MMOL/L (ref 136–145)
WBC # BLD AUTO: 8.4 10*3/MM3 (ref 3.4–10.8)
WBC MORPH BLD: NORMAL

## 2021-02-15 PROCEDURE — 82962 GLUCOSE BLOOD TEST: CPT

## 2021-02-15 PROCEDURE — 83880 ASSAY OF NATRIURETIC PEPTIDE: CPT | Performed by: NURSE PRACTITIONER

## 2021-02-15 PROCEDURE — 85007 BL SMEAR W/DIFF WBC COUNT: CPT | Performed by: PHYSICIAN ASSISTANT

## 2021-02-15 PROCEDURE — 99233 SBSQ HOSP IP/OBS HIGH 50: CPT | Performed by: INTERNAL MEDICINE

## 2021-02-15 PROCEDURE — 99232 SBSQ HOSP IP/OBS MODERATE 35: CPT | Performed by: INTERNAL MEDICINE

## 2021-02-15 PROCEDURE — 99232 SBSQ HOSP IP/OBS MODERATE 35: CPT | Performed by: NURSE PRACTITIONER

## 2021-02-15 PROCEDURE — 99233 SBSQ HOSP IP/OBS HIGH 50: CPT | Performed by: FAMILY MEDICINE

## 2021-02-15 PROCEDURE — 86334 IMMUNOFIX E-PHORESIS SERUM: CPT | Performed by: NURSE PRACTITIONER

## 2021-02-15 PROCEDURE — 83880 ASSAY OF NATRIURETIC PEPTIDE: CPT | Performed by: INTERNAL MEDICINE

## 2021-02-15 PROCEDURE — 82140 ASSAY OF AMMONIA: CPT | Performed by: NURSE PRACTITIONER

## 2021-02-15 PROCEDURE — 80053 COMPREHEN METABOLIC PANEL: CPT | Performed by: INTERNAL MEDICINE

## 2021-02-15 PROCEDURE — 83735 ASSAY OF MAGNESIUM: CPT | Performed by: PHYSICIAN ASSISTANT

## 2021-02-15 PROCEDURE — 85025 COMPLETE CBC W/AUTO DIFF WBC: CPT | Performed by: PHYSICIAN ASSISTANT

## 2021-02-15 PROCEDURE — 82784 ASSAY IGA/IGD/IGG/IGM EACH: CPT | Performed by: NURSE PRACTITIONER

## 2021-02-15 PROCEDURE — 25010000002 CEFTAROLINE FOSAMIL PER 10 MG: Performed by: INTERNAL MEDICINE

## 2021-02-15 PROCEDURE — 85610 PROTHROMBIN TIME: CPT | Performed by: INTERNAL MEDICINE

## 2021-02-15 RX ORDER — HYDROCODONE BITARTRATE AND ACETAMINOPHEN 5; 325 MG/1; MG/1
1 TABLET ORAL EVERY 6 HOURS PRN
Status: DISCONTINUED | OUTPATIENT
Start: 2021-02-15 | End: 2021-02-15

## 2021-02-15 RX ORDER — LIDOCAINE 50 MG/G
2 PATCH TOPICAL
Status: DISCONTINUED | OUTPATIENT
Start: 2021-02-15 | End: 2021-02-18 | Stop reason: HOSPADM

## 2021-02-15 RX ORDER — MIDODRINE HYDROCHLORIDE 5 MG/1
5 TABLET ORAL
Status: DISCONTINUED | OUTPATIENT
Start: 2021-02-15 | End: 2021-02-18 | Stop reason: HOSPADM

## 2021-02-15 RX ORDER — TIZANIDINE 2 MG/1
2 TABLET ORAL EVERY 12 HOURS PRN
Status: DISCONTINUED | OUTPATIENT
Start: 2021-02-15 | End: 2021-02-18 | Stop reason: HOSPADM

## 2021-02-15 RX ORDER — DIGOXIN 125 MCG
125 TABLET ORAL
Status: DISCONTINUED | OUTPATIENT
Start: 2021-02-15 | End: 2021-02-18 | Stop reason: HOSPADM

## 2021-02-15 RX ADMIN — MIDODRINE HYDROCHLORIDE 10 MG: 5 TABLET ORAL at 08:57

## 2021-02-15 RX ADMIN — Medication 10 ML: at 09:04

## 2021-02-15 RX ADMIN — GABAPENTIN 100 MG: 100 CAPSULE ORAL at 08:57

## 2021-02-15 RX ADMIN — Medication: at 21:25

## 2021-02-15 RX ADMIN — BUMETANIDE 2 MG: 0.25 INJECTION, SOLUTION INTRAMUSCULAR; INTRAVENOUS at 20:57

## 2021-02-15 RX ADMIN — ZINC OXIDE: 200 OINTMENT TOPICAL at 09:06

## 2021-02-15 RX ADMIN — ASPIRIN 81 MG CHEWABLE TABLET 81 MG: 81 TABLET CHEWABLE at 08:57

## 2021-02-15 RX ADMIN — Medication 10 ML: at 21:24

## 2021-02-15 RX ADMIN — CYANOCOBALAMIN TAB 250 MCG 500 MCG: 250 TAB at 08:57

## 2021-02-15 RX ADMIN — URSODIOL 300 MG: 300 CAPSULE ORAL at 08:57

## 2021-02-15 RX ADMIN — GABAPENTIN 100 MG: 100 CAPSULE ORAL at 21:24

## 2021-02-15 RX ADMIN — DIGOXIN 125 MCG: 125 TABLET ORAL at 14:51

## 2021-02-15 RX ADMIN — MIDODRINE HYDROCHLORIDE 5 MG: 5 TABLET ORAL at 14:53

## 2021-02-15 RX ADMIN — BUMETANIDE 2 MG: 0.25 INJECTION, SOLUTION INTRAMUSCULAR; INTRAVENOUS at 14:54

## 2021-02-15 RX ADMIN — POTASSIUM CHLORIDE 40 MEQ: 1500 TABLET, EXTENDED RELEASE ORAL at 14:52

## 2021-02-15 RX ADMIN — POTASSIUM CHLORIDE 40 MEQ: 1500 TABLET, EXTENDED RELEASE ORAL at 09:07

## 2021-02-15 RX ADMIN — URSODIOL 300 MG: 300 CAPSULE ORAL at 21:24

## 2021-02-15 RX ADMIN — MIDODRINE HYDROCHLORIDE 5 MG: 5 TABLET ORAL at 17:57

## 2021-02-15 RX ADMIN — BUMETANIDE 2 MG: 0.25 INJECTION, SOLUTION INTRAMUSCULAR; INTRAVENOUS at 01:22

## 2021-02-15 RX ADMIN — DOXYCYCLINE 100 MG: 100 TABLET, FILM COATED ORAL at 08:57

## 2021-02-15 RX ADMIN — SODIUM CHLORIDE 200 MG: 9 INJECTION, SOLUTION INTRAVENOUS at 17:59

## 2021-02-15 RX ADMIN — BUMETANIDE 2 MG: 0.25 INJECTION, SOLUTION INTRAMUSCULAR; INTRAVENOUS at 05:25

## 2021-02-15 RX ADMIN — LACTULOSE 20 G: 20 SOLUTION ORAL at 21:24

## 2021-02-15 RX ADMIN — FEBUXOSTAT 40 MG: 40 TABLET, FILM COATED ORAL at 08:57

## 2021-02-15 RX ADMIN — RIFAXIMIN 550 MG: 550 TABLET ORAL at 21:24

## 2021-02-15 RX ADMIN — Medication: at 09:06

## 2021-02-15 RX ADMIN — FEBUXOSTAT 40 MG: 40 TABLET, FILM COATED ORAL at 21:24

## 2021-02-15 RX ADMIN — LIDOCAINE 1 PATCH: 50 PATCH TOPICAL at 18:02

## 2021-02-15 NOTE — PROGRESS NOTES
LOS: 7 days   Patient Care Team:  Rosa M Chavez APRN as PCP - General (Nurse Practitioner)      Subjective     Interval History:     Subjective: Patient denies abdominal pain, but is sore to palpation.  She reports multiple bowel movements overnight.  No nausea/vomiting.      ROS:   No chest pain, shortness of breath, or cough.        Medication Review:     Current Facility-Administered Medications:   •  acetaminophen (TYLENOL) tablet 650 mg, 650 mg, Oral, Q4H PRN, 650 mg at 02/10/21 0305 **OR** acetaminophen (TYLENOL) 160 MG/5ML solution 650 mg, 650 mg, Oral, Q4H PRN **OR** acetaminophen (TYLENOL) suppository 650 mg, 650 mg, Rectal, Q4H PRN, Bert Bateman PA-C  •  aspirin chewable tablet 81 mg, 81 mg, Oral, Daily, Bert Bateman PA-C, 81 mg at 02/15/21 0857  •  bumetanide (BUMEX) injection 2 mg, 2 mg, Intravenous, Q6H, Feliz Silva MD, 2 mg at 02/15/21 0525  •  dextrose (D50W) 25 g/ 50mL Intravenous Solution 25 g, 25 g, Intravenous, Q15 Min PRN, Alexandro Huitron MD  •  dextrose (GLUTOSE) oral gel 15 g, 15 g, Oral, Q15 Min PRN, Alexandro Huitron MD  •  dextrose 10 % infusion, 20 mL/hr, Intravenous, Continuous, Alexandro Huitron MD, Last Rate: 20 mL/hr at 02/13/21 2042, 20 mL/hr at 02/13/21 2042  •  doxycycline (ADOXA) tablet 100 mg, 100 mg, Oral, Q12H, Kurt Diehl MD, 100 mg at 02/15/21 0857  •  febuxostat (ULORIC) tablet 40 mg, 40 mg, Oral, BID, Bert Bateman PA-C, 40 mg at 02/15/21 0857  •  fluconazole (DIFLUCAN) tablet 100 mg, 100 mg, Oral, Q24H, Kurt Diehl MD, 100 mg at 02/14/21 1817  •  folic acid (FOLVITE) tablet 1 mg, 1 mg, Oral, Daily, Alexandro Huitron MD, 1 mg at 02/14/21 1205  •  gabapentin (NEURONTIN) capsule 100 mg, 100 mg, Oral, TID, Bert Bateman PA-C, 100 mg at 02/15/21 0883  •  glucagon (human recombinant) (GLUCAGEN DIAGNOSTIC) injection 1 mg, 1 mg, Subcutaneous, Q15 Min PRN, Alexandro Huitron  Niki Bains MD  •  haloperidol lactate (HALDOL) injection 5 mg, 5 mg, Intramuscular, Once, Jake Marx DO  •  HYDROcodone-acetaminophen (NORCO) 5-325 MG per tablet 1 tablet, 1 tablet, Oral, Q8H PRN, Tomy, Alexandro Bains MD, 1 tablet at 02/13/21 1220  •  lactulose (CHRONULAC) 10 GM/15ML solution 20 g, 20 g, Oral, TID, Tomy, Alexandro Bains MD, 20 g at 02/14/21 2200  •  lidocaine (LIDODERM) 5 % 2 patch, 2 patch, Transdermal, Q24H, Estuardo Torres MD  •  magic butt paste, , Topical, Daily, Jake Marx DO, Given at 02/15/21 0906  •  magnesium sulfate 4 gram infusion - Mg less than or equal to 1mg/dL, 4 g, Intravenous, PRN **OR** magnesium sulfate 3 gram infusion (1gm x 3) - Mg 1.1 - 1.5 mg/dL, 1 g, Intravenous, PRN, Last Rate: 100 mL/hr at 02/10/21 1153, 1 g at 02/10/21 1153 **OR** Magnesium Sulfate 2 gram infusion- Mg 1.6 - 1.9 mg/dL, 2 g, Intravenous, PRN, Jake Marx I, DO, Last Rate: 25 mL/hr at 02/13/21 0526, 2 g at 02/13/21 0526  •  melatonin tablet 5 mg, 5 mg, Oral, Nightly PRN, Bert Bateman PA-C, 5 mg at 02/08/21 1955  •  midodrine (PROAMATINE) tablet 5 mg, 5 mg, Oral, TID AC, Estuardo Torres MD  •  nitroglycerin (NITROSTAT) SL tablet 0.4 mg, 0.4 mg, Sublingual, Q5 Min PRN, Bert Bateman PA-C  •  OLANZapine (zyPREXA) injection 2.5 mg, 2.5 mg, Intramuscular, Q8H PRN, Goldy-Egziabher, Jake I, DO  •  ondansetron (ZOFRAN) tablet 4 mg, 4 mg, Oral, Q6H PRN **OR** ondansetron (ZOFRAN) injection 4 mg, 4 mg, Intravenous, Q6H PRN, Bert Bateman PA-C, 4 mg at 02/11/21 1400  •  potassium chloride (K-DUR,KLOR-CON) CR tablet 40 mEq, 40 mEq, Oral, PRN, Jake Marx I, DO, 40 mEq at 02/15/21 0907  •  potassium chloride (KLOR-CON) packet 40 mEq, 40 mEq, Oral, PRN, Goldy-Egkhangabher Jake I, DO  •  riFAXIMin (XIFAXAN) tablet 550 mg, 550 mg, Oral, Q12H, Alexandro Huitron MD, 550 mg at 02/14/21  2201  •  sodium chloride 0.9 % flush 10 mL, 10 mL, Intravenous, Q12H, Bert Bateman PA-C, 10 mL at 02/15/21 0904  •  sodium chloride 0.9 % flush 10 mL, 10 mL, Intravenous, PRN, Bert Bateman PA-C  •  tiZANidine (ZANAFLEX) tablet 2 mg, 2 mg, Oral, Q12H PRN, Estuardo Torres MD  •  ursodiol (ACTIGALL) capsule 300 mg, 300 mg, Oral, BID, Joyce Alcala APRN, 300 mg at 02/15/21 0857  •  vitamin B-12 (CYANOCOBALAMIN) tablet 500 mcg, 500 mcg, Oral, Daily, Alexandro Huitron MD, 500 mcg at 02/15/21 0857  •  Zinc Oxide 16 % ointment, , Apply externally, BID, Jake Marx DO, Given at 02/15/21 0906      Objective     Vital Signs  Vitals:    02/14/21 2223 02/15/21 0205 02/15/21 0545 02/15/21 0857   BP: 135/50 131/54 132/62 139/70   BP Location: Left arm Left arm Left arm    Patient Position: Lying Lying Lying    Pulse: 60 62 60 63   Resp: 16 18 16    Temp: 97.7 °F (36.5 °C) 97.7 °F (36.5 °C) 97.7 °F (36.5 °C)    TempSrc: Oral Oral Oral    SpO2: 97% 99% 100%    Weight:   79 kg (174 lb 2.6 oz)    Height:           Physical Exam:     General Appearance:    Awake and alert, in no acute distress   Head:    Normocephalic, without obvious abnormality   Eyes:          Conjunctivae normal, anicteric sclera   Throat:   No oral lesions, no thrush, oral mucosa moist   Neck:   No adenopathy, supple, no JVD   Lungs:     respirations regular, even and unlabored   Abdomen:     Soft, mild generalized tenderness, no rebound or guarding, non-distended   Rectal:     Deferred   Extremities:   No cyanosis, bilateral lower extremity edema with Kerlix wrap in place   Skin:   No bruising or rash, no jaundice        Results Review:    CBC    Results from last 7 days   Lab Units 02/15/21  0530 02/14/21  0446 02/13/21  0350 02/12/21  0427 02/11/21  0027 02/10/21  0547 02/09/21  0524   WBC 10*3/mm3 8.40 8.30 11.00* 14.20* 12.00* 10.30 14.40*   HEMOGLOBIN g/dL 8.6* 8.7* 8.5* 9.0* 9.0* 9.0* 9.5*   PLATELETS  10*3/mm3 87* 72* 74* 63* 92* 35* 54*     CMP   Results from last 7 days   Lab Units 02/15/21  0530 02/14/21  0446 02/13/21  1722 02/13/21  0350 02/12/21  1759 02/12/21  1714 02/12/21  0427 02/11/21  1714 02/11/21  1227 02/11/21  0027 02/10/21  0547  02/09/21  0524   SODIUM mmol/L 142 140 140 140  --  138 136 137  --  135* 138   < > 138   POTASSIUM mmol/L 3.3* 3.6 3.7 3.9  --  3.5 3.8 4.1  --  3.7 3.7   < > 3.3*   CHLORIDE mmol/L 102 102 102 103  --  102 99 99  --  100 104   < > 101   CO2 mmol/L 29.0 28.0 27.0 25.0  --  25.0 24.0 24.0  --  23.0 24.0   < > 23.0   BUN mg/dL 96* 100* 99* 102*  --  98* 102* 100*  --  95* 95*   < > 98*   CREATININE mg/dL 3.59* 3.84* 3.70* 3.73*  --  3.57* 3.46* 3.58*  --  3.26* 2.98*   < > 3.38*   GLUCOSE mg/dL 91 72 85 78  --  174* 220* 142*  --  314* 92   < > 119*   ALBUMIN g/dL 2.40* 2.20*  --  2.40*  --   --  2.90*  --  2.0*  --  2.10*  --   --    BILIRUBIN mg/dL 0.8 0.8  --  0.8  --   --  1.2  --   --   --  1.4*  --   --    ALK PHOS U/L 945* 1,082*  --  994*  --   --  762*  --   --   --  396*  --   --    AST (SGOT) U/L 78* 101*  --  108*  --   --  72*  --   --   --  81*  --   --    ALT (SGPT) U/L 40* 46*  --  45*  --   --  35*  --   --   --  27  --   --    MAGNESIUM mg/dL 1.6 1.8  --  1.8  --   --  2.1  --   --  2.0 1.4*  --  1.8   AMMONIA umol/L  --  72*  --   --  480*  --   --   --   --   --   --   --   --     < > = values in this interval not displayed.     Cr Clearance Estimated Creatinine Clearance: 13 mL/min (A) (by C-G formula based on SCr of 3.59 mg/dL (H)).  Coag   Results from last 7 days   Lab Units 02/15/21  0530 02/14/21  0446 02/13/21  1722 02/11/21  1227   INR  1.20* 1.19* 1.17* 1.22*   APTT seconds  --   --   --  34.0*     HbA1C   Lab Results   Component Value Date    HGBA1C 4.3 02/05/2020    HGBA1C 4.3 01/28/2020     Blood Glucose   Glucose   Date/Time Value Ref Range Status   02/15/2021 0745 168 (H) 70 - 105 mg/dL Final     Comment:     Serial Number:  [id number]:  [id number]   02/14/2021 2049 160 (H) 70 - 105 mg/dL Final     Comment:     Serial Number: [id number]:  [id number]   02/14/2021 1624 107 (H) 70 - 105 mg/dL Final     Comment:     Serial Number: [id number]:  [id number]   02/14/2021 1119 70 70 - 105 mg/dL Final     Comment:     Serial Number: [id number]:  [id number]   02/14/2021 0745 88 70 - 105 mg/dL Final     Comment:     Serial Number: [id number]:  [id number]   02/13/2021 2007 87 70 - 105 mg/dL Final     Comment:     Serial Number: [id number]:  [id number]   02/13/2021 1201 106 (H) 70 - 105 mg/dL Final     Comment:     Serial Number: [id number]:  [id number]   02/13/2021 0849 123 (H) 70 - 105 mg/dL Final     Comment:     Serial Number: [id number]:  [id number]     Infection   Results from last 7 days   Lab Units 02/10/21  1616 02/10/21  0306   URINECX  Yeast isolated* Yeast isolated*     UA    Results from last 7 days   Lab Units 02/10/21  1616   NITRITE UA  Negative   WBC UA /HPF 6-12*   BACTERIA UA /HPF Trace*   SQUAM EPITHEL UA /HPF 3-6*   URINECX  Yeast isolated*     Radiology(recent) Nm Lung Ventilation Perfusion Aerosol    Result Date: 2/14/2021   1. Abnormal appearance of the ventilation study which can be seen with obstructive pulmonary disease. 2. Low probability of acute pulmonary embolic disease.  Electronically Signed By-Fletcher Barros MD On:2/14/2021 11:29 AM This report was finalized on [id number] by  Fletcher Barros MD.    Us Liver    Result Date: 2/14/2021   1. Abnormal appearance of liver felt to be secondary to hepatic cirrhosis. 2. Cholelithiasis. 3. Minimal ascites.  Electronically Signed By-Fletcher Barros MD On:2/14/2021 10:41 AM This report was finalized on [id number] by  Fletcher Barros MD.    Xr Chest Pa & Lateral    Result Date: 2/13/2021   1. Cardiomegaly. 2. Small to moderate left pleural effusion with compressive atelectasis and possible airspace disease from pneumonia.  Electronically  Signed By-Fletcher Barros MD On:2/13/2021 5:43 PM This report was finalized on [id number] by  Fletcher Barros MD.    Xr Tibia Fibula 2 View Bilateral    Result Date: 2/14/2021  IMPRESSION :  1. No acute findings. 2. Tricompartment osteoarthritis of both knees. 3. Bony demineralization.  Electronically Signed By-Fletcher Barros MD On:2/14/2021 5:39 PM This report was finalized on [id number] by  Fletcher Barros MD.         Assessment/Plan     ASSESSMENT:  -Encephalopathy with hyperammoniemia  -Elevated LFT's -consider congestive hepatopathy superimposed on cirrhosis vs DILI vs other   -Abnormal US showing cirrhosis & cholelithiasis   -UTI-candiduria  -Acute kidney injury on chronic kidney disease  -Lymphedema leading to cellulitis   -Anemia of chronic kidney disease  -Thrombocytopenia consider related to liver vs infection related vs ITP  -CHF  -CAD/MI/Atrial fib/pacemaker   -HTN     PLAN:   Patient denies abdominal pain, but is tender to palpation.  No nausea/vomiting.  Reports multiple bowel movements overnight.  Alk phos with slight trend down to 945 today (1082 yesterday).  AST and ALT trending down.  Alkaline phosphatase isoenzymes pending.  Liver serologies thus far have been unremarkable.  Await further results.  Acute hepatitis panel negative.  RUQ US does show cirrhosis and cholelithiasis.  MRCP unable to be performed due to the patient's pacemaker.  BNP is significantly elevated at 69,000.  It is possible that the patient significant elevation in alkaline phosphatase is due to congestive hepatopathy superimposed on underlying cirrhosis.  Also consider drug-induced liver injury.  Alk phos was normal on 2/6/2021.  Medication list reviewed in depth. Doxycycline has been associated with severe and prolonged cholestatic liver injury. Would recommend switching antibiotic.   Could also consider alk phos elevation due to bone injury/inflammation or osteomyelitis due to her cellulitis vs liver injury (medication  induced).  Continue Melisa.  Ammonia level down to 72 yesterday. Will recheck ammonia level today. Continue lactulose.    Continue supportive care.       Sandra Brown, CHRISTIANO  02/15/21  10:28 EST

## 2021-02-15 NOTE — PROGRESS NOTES
PROGRESS NOTE      Patient Name: Emperatriz Childs  : 1945  MRN: [id number]  Primary Care Physician: Rosa M Chavez APRN  Date of admission: 2021    Patient Care Team:  Rosa M Chavez APRN as PCP - General (Nurse Practitioner)        Subjective   Subjective:     Acute kidney injury, patient is still swollen not feeling good, no significant shortness of breath  Review of systems:  All other review of system unremarkable      Allergies:    Allergies   Allergen Reactions   • Allopurinol Unknown - High Severity   • Clindamycin Hcl Unknown - High Severity   • Codeine Unknown - High Severity   • Furosemide Unknown - High Severity   • Hydrochlorothiazide Unknown - High Severity   • Naproxen Unknown - High Severity   • Sulfa Antibiotics Unknown - High Severity       Objective   Exam:     Vital Signs  Temp:  [97.7 °F (36.5 °C)-98.4 °F (36.9 °C)] 97.7 °F (36.5 °C)  Heart Rate:  [60-63] 63  Resp:  [16-18] 16  BP: (130-147)/(50-70) 139/70  SpO2:  [97 %-100 %] 100 %  on  Flow (L/min):  [2] 2;   Device (Oxygen Therapy): nasal cannula  Body mass index is 31.85 kg/m².    General: Elderly  female in no acute distress.    Head:      Normocephalic and atraumatic.    Eyes:      PERRL/EOM intact, conjunctiva and sclera clear with out nystagmus.    Neck:      No masses, thyromegaly,  trachea central with normal respiratory effort   Lungs:    Clear bilaterally to auscultation.    Heart:      Regular rate and rhythm, no murmur no gallop  Abd:        Soft, nontender, not distended, bowel sounds positive, no shifting dullness   Pulses:   Pulses palpable  Extr:        No cyanosis or clubbing--significant bilateral edema.    Neuro:    No focal deficits.   alert oriented x3  Skin:       Intact without lesions or rashes.    Psych:    Alert and cooperative; normal mood and affect; .      Results Review:  I have personally reviewed most recent Data :  CBC    Results from last 7 days   Lab Units 02/15/21  0530 21  9880  02/13/21  0350 02/12/21  0427 02/11/21  0027 02/10/21  0547 02/09/21  0524   WBC 10*3/mm3 8.40 8.30 11.00* 14.20* 12.00* 10.30 14.40*   HEMOGLOBIN g/dL 8.6* 8.7* 8.5* 9.0* 9.0* 9.0* 9.5*   PLATELETS 10*3/mm3 87* 72* 74* 63* 92* 35* 54*     CMP   Results from last 7 days   Lab Units 02/15/21  0530 02/14/21  0446 02/13/21  1722 02/13/21  0350 02/12/21  1759 02/12/21  1714 02/12/21  0427 02/11/21  1714 02/11/21  1227  02/10/21  0547   SODIUM mmol/L 142 140 140 140  --  138 136 137  --    < > 138   POTASSIUM mmol/L 3.3* 3.6 3.7 3.9  --  3.5 3.8 4.1  --    < > 3.7   CHLORIDE mmol/L 102 102 102 103  --  102 99 99  --    < > 104   CO2 mmol/L 29.0 28.0 27.0 25.0  --  25.0 24.0 24.0  --    < > 24.0   BUN mg/dL 96* 100* 99* 102*  --  98* 102* 100*  --    < > 95*   CREATININE mg/dL 3.59* 3.84* 3.70* 3.73*  --  3.57* 3.46* 3.58*  --    < > 2.98*   GLUCOSE mg/dL 91 72 85 78  --  174* 220* 142*  --    < > 92   ALBUMIN g/dL 2.40* 2.20*  --  2.40*  --   --  2.90*  --  2.0*  --  2.10*   BILIRUBIN mg/dL 0.8 0.8  --  0.8  --   --  1.2  --   --   --  1.4*   ALK PHOS U/L 945* 1,082*  --  994*  --   --  762*  --   --   --  396*   AST (SGOT) U/L 78* 101*  --  108*  --   --  72*  --   --   --  81*   ALT (SGPT) U/L 40* 46*  --  45*  --   --  35*  --   --   --  27   AMMONIA umol/L  --  72*  --   --  480*  --   --   --   --   --   --     < > = values in this interval not displayed.     ABG        Nm Lung Ventilation Perfusion Aerosol    Result Date: 2/14/2021   1. Abnormal appearance of the ventilation study which can be seen with obstructive pulmonary disease. 2. Low probability of acute pulmonary embolic disease.  Electronically Signed By-Fletcher Barros MD On:2/14/2021 11:29 AM This report was finalized on [id number] by  Fletcher Barros MD.    Us Liver    Result Date: 2/14/2021   1. Abnormal appearance of liver felt to be secondary to hepatic cirrhosis. 2. Cholelithiasis. 3. Minimal ascites.  Electronically Signed By-Fletcher Barros MD  On:2/14/2021 10:41 AM This report was finalized on [id number] by  Fletcher Barros MD.    Xr Chest 1 View    Result Date: 2/11/2021  1. Cardiomegaly without acute pulmonary process. No change from prior exam.  Electronically Signed By-Dae Auguste MD On:2/11/2021 2:18 PM This report was finalized on [id number] by  Dae Auguste MD.    Xr Chest Pa & Lateral    Result Date: 2/13/2021   1. Cardiomegaly. 2. Small to moderate left pleural effusion with compressive atelectasis and possible airspace disease from pneumonia.  Electronically Signed By-Fletcher Barros MD On:2/13/2021 5:43 PM This report was finalized on [id number] by  Fletcher Barros MD.    Xr Tibia Fibula 2 View Bilateral    Result Date: 2/14/2021  IMPRESSION :  1. No acute findings. 2. Tricompartment osteoarthritis of both knees. 3. Bony demineralization.  Electronically Signed By-Fletcher Barros MD On:2/14/2021 5:39 PM This report was finalized on [id number] by  Fletcher Barros MD.      Results for orders placed during the hospital encounter of 02/05/21   Adult Transthoracic Echo Complete W/ Cont if Necessary Per Protocol    Narrative Normal LV size and contractility EF of 55%  Moderate right ventricular enlargement with severe right atrial   enlargement, catheter probably pacemaker lead seen.  Severe left atrial enlargement seen.  Aortic valve, mitral valve, tricuspid valve appears structurally normal,   mild MR, AR, seen.  There is moderate  tricuspid regurgitation seen.    Calculated RV systolic pressure is 24mmHg.  No pericardial effusion seen.  Proximal aorta appears normal in size.     Scheduled Meds:aspirin, 81 mg, Oral, Daily  bumetanide, 2 mg, Intravenous, Q6H  digoxin, 125 mcg, Oral, Daily  doxycycline, 100 mg, Oral, Q12H  febuxostat, 40 mg, Oral, BID  fluconazole, 100 mg, Oral, Q24H  folic acid, 1 mg, Oral, Daily  gabapentin, 100 mg, Oral, TID  haloperidol lactate, 5 mg, Intramuscular, Once  lactulose, 20 g, Oral, TID  lidocaine, 2 patch,  Transdermal, Q24H  magic butt paste, , Topical, Daily  midodrine, 5 mg, Oral, TID AC  rifAXIMin, 550 mg, Oral, Q12H  sodium chloride, 10 mL, Intravenous, Q12H  ursodiol, 300 mg, Oral, BID  vitamin B-12, 500 mcg, Oral, Daily  Zinc Oxide, , Apply externally, BID      Continuous Infusions:dextrose, 20 mL/hr, Last Rate: 20 mL/hr (02/13/21 2042)      PRN Meds:•  acetaminophen **OR** acetaminophen **OR** acetaminophen  •  dextrose  •  dextrose  •  glucagon (human recombinant)  •  HYDROcodone-acetaminophen  •  magnesium sulfate **OR** magnesium sulfate in D5W 1g/100mL (PREMIX) **OR** magnesium sulfate  •  melatonin  •  nitroglycerin  •  OLANZapine  •  ondansetron **OR** ondansetron  •  potassium chloride  •  potassium chloride  •  sodium chloride  •  tiZANidine    Assessment/Plan   Assessment and Plan:         Congestive heart failure (CMS/HCC)    Atrial fibrillation (CMS/[facility])    Hypertension, benign    Lymphedema    Gout    Non-pressure chronic ulcer right ankle, limited to breakdown skin (CMS/[facility])    Automatic implantable cardiac defibrillator in situ    Acute UTI (urinary tract infection)    Lower extremity cellulitis    CAD (coronary artery disease)    Sepsis (CMS/HCC)    CONG (acute kidney injury) (CMS/[facility])    Lactic acidosis    Metabolic acidosis    Delirium, acute    ASSESSMENT:  · Acute kidney injury, oliguric, stage 2-3, evolving, ongoing hypotension, ? Sepsis, and prerenal azotemia component with ongoing diarrhea, diuretics use,   ·  might have some degree of progression of CKD too.   · CKD 4, with baseline creatinine around 1.7- 2.2, till last year,02/2020, never followed up in clinic. Work up then  ua negative RBC,WBC,no protein. UPC, was unremarkable in 02/2020 USG, right 8.9 cm, andleft 8.3 cm, medial renal disease.  · Metabolic acidosis, gap and non gap, more due to GI bicarb loss, CKD and persistent lactic acidosis. patient apparently had large bowel movement, significant metabolic acidosis  · Hypotension,  concern hypovolemia,   · Congestive heart failure with last ejection fraction around 40% from 2018,   · History of atrial fibrillation  · Significant metabolic acidosis  · Significant anemia  · Chronic lymphedema  · Cellulitis of lower extremities.   · Thrombocytopenia, also on 02/2020  · Anemia.      Plan:        · Patient baseline creatinine 1.8-2.  Acute increase in creatinine with acute increase in volume with infection and metabolic acidosis and had some  GI  bicarbonate loss which is better  · Urine output improved slightly better BUN at this time. and creatinine creatinine slightly better  · Will increase Bumex to improve urine output as patient BNP level increasing.  · Patient still has significantly engorged neck veins with significant peripheral edema  · Repeat chest x-ray with mild pulmonary congestion  · To me patient seems to be still volume overloaded  · Follow-up with repeat lab  · Hemodynamics are so far acceptable  · Follow-up with a urine output and electrolytes  · Follow-up with repeat labs  · Patient has cardiorenal syn will drome at this time  · Monitor 24-hour urine output for another 24-hour then we can stop it  · Continue bliss for 24-hour  · Fu sepsis work up follow-up with infectious disease  · Repeat labs,   · Decrease fluids in evening.        Note started  by Feliz Silva MD,   [facility] kidney consultant

## 2021-02-15 NOTE — PLAN OF CARE
Goal Outcome Evaluation:         Patient had restful night. VSS. Two large liquid bowel movements.

## 2021-02-15 NOTE — PROGRESS NOTES
[facility] Medicine [facility] Daily Progress Note      Hospitalist Team  LOS 7 days      Patient Care Team:  Rosa M Chaevz APRN as PCP - General (Nurse Practitioner)    Patient Location: 2120/1      Subjective   Subjective     Chief Complaint / Subjective  No chief complaint on file.    Patient reports having diffuse pain today but most prominent in her back.  No chest pain or shortness of breath.  Renal function appears to be improving mildly.  Palliative care consulted for discussing goals of care given patient's significant comorbidities.    Brief Synopsis of Hospital Course/HPI  The patient is a 75-year-old female with history atrial fibrillation s/p pacemaker placement, hyperlipidemia, hypertension, CAD, CKD stage III and  chronic lower extremity lymphedema.     Apparently the patient has been having several weeks of worsening lower extremity edema thus went to outside facility.     Laboratory work was significant for , potassium 5.3, BUN 63, creatinine 2.41, WBC 4.5, lactic acid 3.2 and UA with 25-50 WBCs.  The patient was transferred to [facility] for further care        Date::    2/6/21: Poor historian.  Low BP thus gave bolus.  Started on dopamine and transferred to PCU.  Started on bicarb drip.  Confused in the evening.    2/7/21: PICC line placed.  Daughter at the bedside discussed care.  Daughter claims no history of dementia or sundowning.  Leukocytosis.  Added Flagyl.  Consulted ID.  Daughter claims patient off Xarelto.  C. difficile ruled out.     2/8  Has third spacing and bruising.  Patient denies any chest pain  On dopamine drip and being tapered off.  Started on midodrine.     2/9/2021  Outpatient recliner  She has Ace wrap in place  She had been complaining of some tight wraps on the right side and will need to be addressed.  White count better  Her platelet count is 54 slightly better than yesterday  Still on a small amount of dopamine drip.  Renal  function still elevated but probably stabilized.     2/10  Patient is now feeling better and her leg swelling is under control with compression dressing that has been changed to allow for some relief of her pain.  She had drop in her creatinine which suggests improvement with diuresis and possible cardiorenal etiology     She has been eating yesterday and her sugar dropped and started D10     B12 is above 2000  Haptoglobin is 100 within normal.  She is hypomagnesemic as well.  Folic acid 10.9  Still with significant thrombocytopenia.  We will ask hematologist for evaluation.     2/11/2021  Urine output decreased overnight and Dr. Pinedo wants to start dopamine drip again  Creatinine slightly up again today  Patient is asking when she is going to go home.  Since her right ventricle atrial or enlarged    2/12  Coaches in hips and arms hyun w ambulation  Bands 17%  platelet 63  Hallucinating; talking to her !  Check ammmonia     2/13  Feels sleepy most pf time  [id number]   Initiated lactulose enema and oral lactulose  Consult GI  Worsening alkaline phosphatase and ALT elevation noted  Check hepatitis panel  On Bumex drip  Had good urine output overnight     2/14  Patient has been switched to IV Bumex  She had worsening elevation of alkaline phosphatase  Still has poor oral intake  No evident hallucinations on exam today  Complaining of pain bilateral legs.  X-ray ordered for evaluation.      Date::          Review of Systems   Constitution: Positive for malaise/fatigue. Negative for chills and fever.   HENT: Negative for hoarse voice and sore throat.    Eyes: Negative for double vision and photophobia.   Cardiovascular: Positive for leg swelling. Negative for chest pain.   Respiratory: Negative for cough and shortness of breath.    Musculoskeletal: Positive for back pain, joint pain and myalgias.   Gastrointestinal: Positive for bloating. Negative for nausea and vomiting.   Genitourinary: Negative for dysuria  "and flank pain.   Neurological: Positive for weakness. Negative for dizziness.   Psychiatric/Behavioral: Negative for memory loss. The patient is not nervous/anxious.          Objective   Objective      Vital Signs  Temp:  [97.7 °F (36.5 °C)-98.4 °F (36.9 °C)] 97.7 °F (36.5 °C)  Heart Rate:  [60-63] 63  Resp:  [16-18] 16  BP: (130-147)/(50-70) 139/70  Oxygen Therapy  SpO2: 100 %  Pulse Oximetry Type: Continuous  Device (Oxygen Therapy): nasal cannula  Flow (L/min): 2  Flowsheet Rows      First Filed Value   Admission Height  157.5 cm (62\") Documented at 02/05/2021 2359   Admission Weight  78 kg (172 lb) Documented at 02/05/2021 2359        Intake & Output (last 3 days)       02/12 0701 - 02/13 0700 02/13 0701 - 02/14 0700 02/14 0701 - 02/15 0700 02/15 0701 - 02/16 0700    P.O. 480 480 236     I.V. (mL/kg)    50 (0.6)    Total Intake(mL/kg) 480 (5.8) 480 (6) 236 (3) 50 (0.6)    Urine (mL/kg/hr) 1770 (0.9) 1875 (1) 1800 (0.9) 805 (2)    Stool  0 0     Total Output 1770 1875 1800 805    Net -1290 -1395 -1564 -755            Stool Unmeasured Occurrence  1 x 3 x         Lines, Drains & Airways    Active LDAs     Name:   Placement date:   Placement time:   Site:   Days:    PICC Triple Lumen 02/07/21 Right Brachial   02/07/21    1024    Brachial   8    Urethral Catheter Non-latex;Silicone 16 Fr.   02/06/21    1826     8                  Physical Exam:    Physical Exam    General: Chronically ill appearing obese elderly female lying in bed breathing comfortably on 2 L via nasal cannula no acute distress   HEENT: NC/AT, EOMI, mucosa moist  Heart: Regular, rate controlled  Chest: Normal work of breathing, moving air well no wheezing  Abdominal: Soft.  Mild distention, nontender  Musculoskeletal: Normal ROM.  No cyanosis. No calf tenderness.  Neurological: Awake and alert, no focal deficits  Skin: Skin is warm and dry. No rash  Psychiatric: Patient appearing calm.         Wounds (last 24 hours)      LDA Wound     Row Name " 02/15/21 0400 02/15/21 0000 02/14/21 2000       Wound 02/06/21 0031 Right lower leg Other (comment)    Wound - Properties Group Placement Date: 02/06/21  -CS Placement Time: 0031  -CS Present on Hospital Admission: Y  -CS Side: Right  -CS Orientation: lower  -CS Location: leg  -CS Primary Wound Type: Other  -CS, chronic redness and swelling     Closure  NELY  -JS  NELY  -JS  NELY  -JS    Retired Wound - Properties Group Date first assessed: 02/06/21  -CS Time first assessed: 0031  -CS Present on Hospital Admission: Y  -CS Side: Right  -CS Location: leg  -CS Primary Wound Type: Other  -CS, chronic redness and swelling        Wound 02/06/21 0032 Right anterior foot Other (comment)    Wound - Properties Group Placement Date: 02/06/21  -CS Placement Time: 0032  -CS Present on Hospital Admission: Y  -CS Side: Right  -CS Orientation: anterior  -CS Location: foot  -CS Primary Wound Type: Other  -CS Additional Comments: chronic redness and swelling  -CS    Closure  NELY  -JS  NELY  -JS  NELY  -JS    Retired Wound - Properties Group Date first assessed: 02/06/21  -CS Time first assessed: 0032  -CS Present on Hospital Admission: Y  -CS Side: Right  -CS Location: foot  -CS Primary Wound Type: Other  -CS Additional Comments: chronic redness and swelling  -CS       Wound 02/06/21 0033 Left lower leg Other (comment)    Wound - Properties Group Placement Date: 02/06/21  -CS Placement Time: 0033  -CS Present on Hospital Admission: N  -CS Side: Left  -CS Orientation: lower  -CS Location: leg  -CS Primary Wound Type: Other  -CS Additional Comments: chronic redness and swelling  -CS    Closure  NELY  -JS  NELY  -JS  NELY  -JS    Retired Wound - Properties Group Date first assessed: 02/06/21  -CS Time first assessed: 0033  -CS Present on Hospital Admission: N  -CS Side: Left  -CS Location: leg  -CS Primary Wound Type: Other  -CS Additional Comments: chronic redness and swelling  -CS       Wound 02/06/21 0035 Left anterior ankle Other (comment)     Wound - Properties Group Placement Date: 02/06/21  -CS Placement Time: 0035  -CS Present on Hospital Admission: Y  -CS Side: Left  -CS Orientation: anterior  -CS Location: ankle  -CS Primary Wound Type: Other  -CS    Closure  NELY  -JS  NELY  -JS  NELY  -JS    Retired Wound - Properties Group Date first assessed: 02/06/21  -CS Time first assessed: 0035  -CS Present on Hospital Admission: Y  -CS Side: Left  -CS Location: ankle  -CS Primary Wound Type: Other  -CS       Wound 02/06/21 0047 Right heel Other (comment)    Wound - Properties Group Placement Date: 02/06/21  -CS Placement Time: 0047  -CS Present on Hospital Admission: Y  -CS Side: Right  -CS Location: heel  -CS Primary Wound Type: Other  -CS, Chronic redness and swelling      Closure  NELY  -JS  NELY  -JS  NELY  -JS    Retired Wound - Properties Group Date first assessed: 02/06/21  -CS Time first assessed: 0047  -CS Present on Hospital Admission: Y  -CS Side: Right  -CS Location: heel  -CS Primary Wound Type: Other  -CS, Chronic redness and swelling         Wound 02/06/21 0050 Left posterior thigh Other (comment)    Wound - Properties Group Placement Date: 02/06/21  -CS Placement Time: 0050  -CS Present on Hospital Admission: Y  -CS Side: Left  -CS Orientation: posterior  -CS Location: thigh  -CS Primary Wound Type: Other  -CS, Chronic redness and swelling      Base  red;scab  -JS  red;scab  -JS  red;scab  -JS    Retired Wound - Properties Group Date first assessed: 02/06/21  -CS Time first assessed: 0050  -CS Present on Hospital Admission: Y  -CS Side: Left  -CS Location: thigh  -CS Primary Wound Type: Other  -CS, Chronic redness and swelling         Wound 02/06/21 0051 coccyx Pressure Injury    Wound - Properties Group Placement Date: 02/06/21  -CS Placement Time: 0051  -CS Present on Hospital Admission: Y  -CS Location: coccyx  -CS Primary Wound Type: Pressure inj  -CS Stage, Pressure Injury : Stage 2  -CS    Base  non-blanchable  -JS  non-blanchable  -JS   non-blanchable  -JS    Retired Wound - Properties Group Date first assessed: 02/06/21  -CS Time first assessed: 0051  -CS Present on Hospital Admission: Y  -CS Location: coccyx  -CS Primary Wound Type: Pressure inj  -CS    Row Name 02/14/21 1600             Wound 02/06/21 0031 Right lower leg Other (comment)    Wound - Properties Group Placement Date: 02/06/21  -CS Placement Time: 0031  -CS Present on Hospital Admission: Y  -CS Side: Right  -CS Orientation: lower  -CS Location: leg  -CS Primary Wound Type: Other  -CS, chronic redness and swelling     Closure  [facility]  -AW      Retired Wound - Properties Group Date first assessed: 02/06/21  -CS Time first assessed: 0031  -CS Present on Hospital Admission: Y  -CS Side: Right  -CS Location: leg  -CS Primary Wound Type: Other  -CS, chronic redness and swelling        Wound 02/06/21 0032 Right anterior foot Other (comment)    Wound - Properties Group Placement Date: 02/06/21  -CS Placement Time: 0032  -CS Present on Hospital Admission: Y  -CS Side: Right  -CS Orientation: anterior  -CS Location: foot  -CS Primary Wound Type: Other  -CS Additional Comments: chronic redness and swelling  -CS    Closure  [facility]  -      Retired Wound - Properties Group Date first assessed: 02/06/21  -CS Time first assessed: 0032  -CS Present on Hospital Admission: Y  -CS Side: Right  -CS Location: foot  -CS Primary Wound Type: Other  -CS Additional Comments: chronic redness and swelling  -CS       Wound 02/06/21 0033 Left lower leg Other (comment)    Wound - Properties Group Placement Date: 02/06/21  -CS Placement Time: 0033  -CS Present on Hospital Admission: N  -CS Side: Left  -CS Orientation: lower  -CS Location: leg  -CS Primary Wound Type: Other  -CS Additional Comments: chronic redness and swelling  -CS    Closure  [facility]  -      Retired Wound - Properties Group Date first assessed: 02/06/21  -CS Time first assessed: 0033  -CS Present on Hospital Admission: N  -CS Side: Left  -CS Location:  leg  -CS Primary Wound Type: Other  -CS Additional Comments: chronic redness and swelling  -CS       Wound 02/06/21 0035 Left anterior ankle Other (comment)    Wound - Properties Group Placement Date: 02/06/21  -CS Placement Time: 0035  -CS Present on Hospital Admission: Y  -CS Side: Left  -CS Orientation: anterior  -CS Location: ankle  -CS Primary Wound Type: Other  -CS    Closure  [facility]  -AW      Retired Wound - Properties Group Date first assessed: 02/06/21  -CS Time first assessed: 0035  -CS Present on Hospital Admission: Y  -CS Side: Left  -CS Location: ankle  -CS Primary Wound Type: Other  -CS       Wound 02/06/21 0047 Right heel Other (comment)    Wound - Properties Group Placement Date: 02/06/21  -CS Placement Time: 0047  -CS Present on Hospital Admission: Y  -CS Side: Right  -CS Location: heel  -CS Primary Wound Type: Other  -CS, Chronic redness and swelling      Closure  NELY  -AW      Retired Wound - Properties Group Date first assessed: 02/06/21  -CS Time first assessed: 0047  -CS Present on Hospital Admission: Y  -CS Side: Right  -CS Location: heel  -CS Primary Wound Type: Other  -CS, Chronic redness and swelling         Wound 02/06/21 0050 Left posterior thigh Other (comment)    Wound - Properties Group Placement Date: 02/06/21  -CS Placement Time: 0050  -CS Present on Hospital Admission: Y  -CS Side: Left  -CS Orientation: posterior  -CS Location: thigh  -CS Primary Wound Type: Other  -CS, Chronic redness and swelling      Base  red;scab  -AW      Retired Wound - Properties Group Date first assessed: 02/06/21  -CS Time first assessed: 0050  -CS Present on Hospital Admission: Y  -CS Side: Left  -CS Location: thigh  -CS Primary Wound Type: Other  -CS, Chronic redness and swelling         Wound 02/06/21 0051 coccyx Pressure Injury    Wound - Properties Group Placement Date: 02/06/21  -CS Placement Time: 0051  -CS Present on Hospital Admission: Y  -CS Location: coccyx  -CS Primary Wound Type: Pressure inj   -CS Stage, Pressure Injury : Stage 2  -CS    Base  non-blanchable  -AW      Retired Wound - Properties Group Date first assessed: 02/06/21  -CS Time first assessed: 0051  -CS Present on Hospital Admission: Y  -CS Location: coccyx  -CS Primary Wound Type: Pressure inj  -CS      User Key  (r) = Recorded By, (t) = Taken By, (c) = Cosigned By    Initials Name Provider Type    CS Naeem Jiang RN Registered Nurse    Kenisha Guerra RN Registered Nurse    Giovanna Pagan RN Registered Nurse          Procedures:              Results Review:     I reviewed the patient's new clinical results.      Lab Results (last 24 hours)     Procedure Component Value Units Date/Time    LÁZARO [[id number]]  (Normal) Collected: 02/13/21 1722    Specimen: Blood Updated: 02/15/21 0923     Antinuclear Antibodies (LÁZARO) Negative    POC Glucose Once [[id number]]  (Abnormal) Collected: 02/15/21 0745    Specimen: Blood Updated: 02/15/21 0750     Glucose 168 mg/dL      Comment: Serial Number: [id number]:  [id number]       CBC & Differential [[id number]]  (Abnormal) Collected: 02/15/21 0530    Specimen: Blood Updated: 02/15/21 0626    Narrative:      The following orders were created for panel order CBC & Differential.  Procedure                               Abnormality         Status                     ---------                               -----------         ------                     Scan Slide[[id number]]                                       Final result               CBC Auto Differential[[id number]]        Abnormal            Final result                 Please view results for these tests on the individual orders.    CBC Auto Differential [[id number]]  (Abnormal) Collected: 02/15/21 0530    Specimen: Blood Updated: 02/15/21 0626     WBC 8.40 10*3/mm3      RBC 2.75 10*6/mm3      Hemoglobin 8.6 g/dL      Hematocrit 26.9 %      MCV 98.0 fL      MCH 31.4 pg      MCHC 32.0 g/dL      RDW 16.2 %      RDW-SD 55.6 fl      MPV 9.1 fL       Platelets 87 10*3/mm3     Narrative:      The previously reported component NRBC is no longer being reported. Previous result was 0.2 /100 WBC (Reference Range: 0.0-0.2 /100 WBC) on 2/15/2021 at 0544 EST.    Scan Slide [[id number]] Collected: 02/15/21 0530    Specimen: Blood Updated: 02/15/21 0626     Scan Slide --     Comment: See Manual Differential Results       Manual Differential [[id number]]  (Abnormal) Collected: 02/15/21 0530    Specimen: Blood Updated: 02/15/21 0626     Neutrophil % 87.0 %      Lymphocyte % 8.0 %      Monocyte % 5.0 %      Neutrophils Absolute 7.31 10*3/mm3      Lymphocytes Absolute 0.67 10*3/mm3      Monocytes Absolute 0.42 10*3/mm3      nRBC 1.0 /100 WBC      RBC Morphology Normal     WBC Morphology Normal     Platelet Estimate Decreased    BNP [[id number]]  (Abnormal) Collected: 02/15/21 0530    Specimen: Blood Updated: 02/15/21 0613     proBNP 69,019.0 pg/mL     Narrative:      Among patients with dyspnea, NT-proBNP is highly sensitive for the detection of acute congestive heart failure. In addition NT-proBNP of <300 pg/ml effectively rules out acute congestive heart failure with 99% negative predictive value.    Results may be falsely decreased if patient taking Biotin.      Magnesium [[id number]]  (Normal) Collected: 02/15/21 0530    Specimen: Blood Updated: 02/15/21 0603     Magnesium 1.6 mg/dL     Comprehensive Metabolic Panel [[id number]]  (Abnormal) Collected: 02/15/21 0530    Specimen: Blood Updated: 02/15/21 0603     Glucose 91 mg/dL      BUN 96 mg/dL      Creatinine 3.59 mg/dL      Sodium 142 mmol/L      Potassium 3.3 mmol/L      Comment: Slight hemolysis detected by analyzer. Results may be affected.        Chloride 102 mmol/L      CO2 29.0 mmol/L      Calcium 7.6 mg/dL      Total Protein 5.2 g/dL      Albumin 2.40 g/dL      ALT (SGPT) 40 U/L      AST (SGOT) 78 U/L      Alkaline Phosphatase 945 U/L      Total Bilirubin 0.8 mg/dL      eGFR Non African Amer 12 mL/min/1.73       Comment: <15 Indicative of kidney failure.        eGFR   Amer --     Comment: <15 Indicative of kidney failure.        Globulin 2.8 gm/dL      A/G Ratio 0.9 g/dL      BUN/Creatinine Ratio 26.7     Anion Gap 11.0 mmol/L     Narrative:      GFR Normal >60  Chronic Kidney Disease <60  Kidney Failure <15      Protime-INR [[id number]]  (Abnormal) Collected: 02/15/21 0530    Specimen: Blood Updated: 02/15/21 0550     Protime 13.1 Seconds      INR 1.20    POC Glucose Once [[id number]]  (Abnormal) Collected: 02/14/21 2049    Specimen: Blood Updated: 02/14/21 2050     Glucose 160 mg/dL      Comment: Serial Number: [id number]:  [id number]       POC Glucose Once [[id number]]  (Abnormal) Collected: 02/14/21 1624    Specimen: Blood Updated: 02/14/21 1626     Glucose 107 mg/dL      Comment: Serial Number: [id number]:  [id number]       Alkaline Phosphatase, Isoenzymes [[id number]] Collected: 02/14/21 1241    Specimen: Blood Updated: 02/14/21 1243    Ceruloplasmin [[id number]]  (Normal) Collected: 02/12/21 0427    Specimen: Blood Updated: 02/14/21 1225     Ceruloplasmin 26 mg/dL         No results found for: HGBA1C  Results from last 7 days   Lab Units 02/15/21  0530 02/14/21  0446 02/13/21  1722   INR  1.20* 1.19* 1.17*           Lab Results   Component Value Date    LIPASE 42 01/13/2018     Lab Results   Component Value Date    CHOL 126 02/05/2020    TRIG 51 02/05/2020    HDL 66 (H) 02/05/2020    LDL 50 02/05/2020       Lab Results   Lab Value Date/Time    FINALDX  02/11/2021 1227     Leukocytosis  Anemia  Thrombocytopenia  No blasts identified         Microbiology Results (last 10 days)     Procedure Component Value - Date/Time    Urine Culture - Urine, Urine, Random Void [[id number]]  (Abnormal) Collected: 02/10/21 1616    Lab Status: Final result Specimen: Urine, Random Void Updated: 02/11/21 1234     Urine Culture Yeast isolated     Comment: No further workup.       Urine Culture - Urine, Urine, Catheter  [[id number]]  (Abnormal) Collected: 02/10/21 0306    Lab Status: Final result Specimen: Urine, Catheter Updated: 02/11/21 1234     Urine Culture Yeast isolated     Comment: No further workup.       Clostridium Difficile Toxin - Stool, Per Rectum [[id number]]  (Normal) Collected: 02/06/21 1952    Lab Status: Final result Specimen: Stool from Per Rectum Updated: 02/07/21 0725    Narrative:      The following orders were created for panel order Clostridium Difficile Toxin - Stool, Per Rectum.  Procedure                               Abnormality         Status                     ---------                               -----------         ------                     Clostridium Difficile EI...[[id number]]  Normal              Final result                 Please view results for these tests on the individual orders.    Clostridium Difficile EIA - Stool, Per Rectum [[id number]]  (Normal) Collected: 02/06/21 1952    Lab Status: Final result Specimen: Stool from Per Rectum Updated: 02/07/21 0725     C Diff GDH / Toxin Negative    Blood Culture - Blood, Arm, Left [[id number]] Collected: 02/06/21 1309    Lab Status: Final result Specimen: Blood from Arm, Left Updated: 02/11/21 1431     Blood Culture No growth at 5 days    Blood Culture - Blood, Arm, Right [[id number]] Collected: 02/06/21 1307    Lab Status: Final result Specimen: Blood from Arm, Right Updated: 02/11/21 1431     Blood Culture No growth at 5 days    MRSA Screen, PCR (Inpatient) - Swab, Nares [[id number]]  (Abnormal) Collected: 02/06/21 1114    Lab Status: Final result Specimen: Swab from Nares Updated: 02/06/21 1311     MRSA PCR MRSA Detected    COVID PRE-OP / PRE-PROCEDURE SCREENING ORDER (NO ISOLATION) - Swab, Nasopharynx [[id number]]  (Normal) Collected: 02/06/21 0153    Lab Status: Final result Specimen: Swab from Nasopharynx Updated: 02/06/21 1534    Narrative:      The following orders were created for panel order COVID PRE-OP / PRE-PROCEDURE SCREENING ORDER (NO  ISOLATION) - Swab, Nasopharynx.  Procedure                               Abnormality         Status                     ---------                               -----------         ------                     COVID-19,APTIMA PANTHER,...[[id number]]  Normal              Final result                 Please view results for these tests on the individual orders.    COVID-19,APTIMA PANTHER,[facility] IN-HOUSE, NP/OP SWAB IN UTM/VTM/[facility] TRANSPORT MEDIA,24 HR TAT - Swab, Nasopharynx [[id number]]  (Normal) Collected: 02/06/21 0153    Lab Status: Final result Specimen: Swab from Nasopharynx Updated: 02/06/21 1534     COVID19 Not Detected    Narrative:      Fact sheet for providers: https://www.fda.gov/media/426283/download     Fact sheet for patients: https://www.fda.gov/media/325518/download    Test performed by RT PCR.          ECG/EMG Results (most recent)     Procedure Component Value Units Date/Time    Adult Transthoracic Echo Complete W/ Cont if Necessary Per Protocol [[id number]] Collected: 02/06/21 0850     Updated: 02/06/21 1609     BSA 1.8 m^2      RVIDd 2.7 cm      IVSd 1.3 cm      LVIDd 3.9 cm      LVIDs 2.8 cm      LVPWd 1.2 cm      IVS/LVPW 1.0     FS 28.8 %      EDV(Teich) 67.0 ml      ESV(Teich) 29.4 ml      EF(Teich) 56.0 %      EDV(cubed) 60.5 ml      ESV(cubed) 21.9 ml      EF(cubed) 63.9 %      LV mass(C)d 170.0 grams      LV mass(C)dI 92.8 grams/m^2      SV(Teich) 37.5 ml      SI(Teich) 20.5 ml/m^2      SV(cubed) 38.7 ml      SI(cubed) 21.1 ml/m^2      Ao root diam 2.5 cm      Ao root area 5.1 cm^2      ACS 1.6 cm      asc Aorta Diam 3.2 cm      LVOT diam 1.8 cm      LVOT area 2.6 cm^2      RVOT diam 2.6 cm      RVOT area 5.5 cm^2      EDV(MOD-sp4) 59.5 ml      ESV(MOD-sp4) 26.6 ml      EF(MOD-sp4) 55.3 %      EDV(MOD-sp2) 53.3 ml      ESV(MOD-sp2) 20.0 ml      EF(MOD-sp2) 62.4 %      SV(MOD-sp4) 32.9 ml      SI(MOD-sp4) 18.0 ml/m^2      SV(MOD-sp2) 33.2 ml      SI(MOD-sp2) 18.1 ml/m^2      Ao root area (BSA  corrected) 1.4     LV Olivier Vol (BSA corrected) 32.5 ml/m^2      LV Sys Vol (BSA corrected) 14.5 ml/m^2      Aortic R-R 1.0 sec      Aortic HR 59.0 BPM      MV V2 max 134.6 cm/sec      MV max PG 7.2 mmHg      MV V2 mean 51.6 cm/sec      MV mean PG 1.8 mmHg      MV V2 VTI 24.3 cm      MVA(VTI) 2.3 cm^2      Ao pk devante 182.4 cm/sec      Ao max PG 13.3 mmHg      Ao max PG (full) 8.5 mmHg      Ao V2 mean 142.3 cm/sec      Ao mean PG 8.7 mmHg      Ao mean PG (full) 5.5 mmHg      Ao V2 VTI 37.9 cm      WILLIS(I,A) 1.5 cm^2      WILLIS(I,D) 1.5 cm^2      WILLIS(V,A) 1.5 cm^2      WILLIS(V,D) 1.5 cm^2      AI max devante 264.5 cm/sec      AI max PG 28.0 mmHg      AI dec slope 171.9 cm/sec^2      AI dec time 1.5 sec      AI P1/2t 450.8 msec      LV V1 max PG 4.8 mmHg      LV V1 mean PG 3.2 mmHg      LV V1 max 110.0 cm/sec      LV V1 mean 85.9 cm/sec      LV V1 VTI 21.6 cm      CO(Ao) 11.4 l/min      CI(Ao) 6.2 l/min/m^2      SV(Ao) 192.9 ml      SI(Ao) 105.3 ml/m^2      CO(LVOT) 3.3 l/min      CI(LVOT) 1.8 l/min/m^2      SV(LVOT) 55.2 ml      SV(RVOT) 59.1 ml      SI(LVOT) 30.1 ml/m^2      PA V2 max 88.8 cm/sec      PA max PG 3.2 mmHg      PA max PG (full) 1.9 mmHg      PA V2 mean 63.8 cm/sec      PA mean PG 1.8 mmHg      PA mean PG (full) 1.0 mmHg      PA V2 VTI 17.2 cm      PVA(I,A) 3.4 cm^2      BH CV ECHO SAHIL - PVA(I,D) 3.4 cm^2      BH CV ECHO SAHIL - PVA(V,A) 3.4 cm^2      BH CV ECHO SAHIL - PVA(V,D) 3.4 cm^2      PA acc time 0.07 sec      RV V1 max PG 1.2 mmHg      RV V1 mean PG 0.77 mmHg      RV V1 max 55.1 cm/sec      RV V1 mean 41.7 cm/sec      RV V1 VTI 10.8 cm      TR max devante 237.7 cm/sec      RVSP(TR) 25.6 mmHg      RAP systole 3.0 mmHg      PA pr(Accel) 48.2 mmHg      Pulm Sys Devante 41.0 cm/sec      Pulm Olivier Devante 37.5 cm/sec      Pulm S/D 1.1     Qp/Qs 1.1      CV ECHO SAHIL - BZI_BMI 33.1 kilograms/m^2       CV ECHO SAHIL - BSA(HAYCOCK) 1.9 m^2       CV ECHO SAHIL - BZI_METRIC_WEIGHT 82.1 kg       CV ECHO SAHIL -  BZI_METRIC_HEIGHT 157.5 cm      EF(MOD-bp) 60.0 %      LA dimension(2D) 4.2 cm     Narrative:      Normal LV size and contractility EF of 55%  Moderate right ventricular enlargement with severe right atrial   enlargement, catheter probably pacemaker lead seen.  Severe left atrial enlargement seen.  Aortic valve, mitral valve, tricuspid valve appears structurally normal,   mild MR, AR, seen.  There is moderate  tricuspid regurgitation seen.    Calculated RV systolic pressure is 24mmHg.  No pericardial effusion seen.  Proximal aorta appears normal in size.          Results for orders placed during the hospital encounter of 02/05/21   Duplex Venous Lower Extremity - Bilateral CAR    Narrative · Exam limited by patient intolerance to compression and body habitus.  · The distal left femoral and the right and left peroneal veins are not   imaged.  · All other veins appeared normal bilaterally.          Results for orders placed during the hospital encounter of 02/05/21   Adult Transthoracic Echo Complete W/ Cont if Necessary Per Protocol    Narrative Normal LV size and contractility EF of 55%  Moderate right ventricular enlargement with severe right atrial   enlargement, catheter probably pacemaker lead seen.  Severe left atrial enlargement seen.  Aortic valve, mitral valve, tricuspid valve appears structurally normal,   mild MR, AR, seen.  There is moderate  tricuspid regurgitation seen.    Calculated RV systolic pressure is 24mmHg.  No pericardial effusion seen.  Proximal aorta appears normal in size.       Nm Lung Ventilation Perfusion Aerosol    Result Date: 2/14/2021   1. Abnormal appearance of the ventilation study which can be seen with obstructive pulmonary disease. 2. Low probability of acute pulmonary embolic disease.  Electronically Signed By-Fletcher Barros MD On:2/14/2021 11:29 AM This report was finalized on [id number] by  Fletcher Barros MD.    Us Liver    Result Date: 2/14/2021   1. Abnormal appearance of  liver felt to be secondary to hepatic cirrhosis. 2. Cholelithiasis. 3. Minimal ascites.  Electronically Signed By-Fletcher Barros MD On:2/14/2021 10:41 AM This report was finalized on [id number] by  Fletcher Barros MD.    Xr Chest 1 View    Result Date: 2/11/2021  1. Cardiomegaly without acute pulmonary process. No change from prior exam.  Electronically Signed By-Dae Auguste MD On:2/11/2021 2:18 PM This report was finalized on [id number] by  Dae Auguste MD.    Xr Chest Pa & Lateral    Result Date: 2/13/2021   1. Cardiomegaly. 2. Small to moderate left pleural effusion with compressive atelectasis and possible airspace disease from pneumonia.  Electronically Signed By-Fletcher Barros MD On:2/13/2021 5:43 PM This report was finalized on [id number] by  Fletcher Barros MD.    Xr Tibia Fibula 2 View Bilateral    Result Date: 2/14/2021  IMPRESSION :  1. No acute findings. 2. Tricompartment osteoarthritis of both knees. 3. Bony demineralization.  Electronically Signed By-Fletcher Barros MD On:2/14/2021 5:39 PM This report was finalized on [id number] by  Fletcher Barros MD.          Xrays, labs reviewed personally by physician.    Medication Review:   I have reviewed the patient's current medication list      Scheduled Meds  aspirin, 81 mg, Oral, Daily  bumetanide, 2 mg, Intravenous, Q6H  doxycycline, 100 mg, Oral, Q12H  febuxostat, 40 mg, Oral, BID  fluconazole, 100 mg, Oral, Q24H  folic acid, 1 mg, Oral, Daily  gabapentin, 100 mg, Oral, TID  haloperidol lactate, 5 mg, Intramuscular, Once  lactulose, 20 g, Oral, TID  lidocaine, 2 patch, Transdermal, Q24H  magic butt paste, , Topical, Daily  midodrine, 5 mg, Oral, TID AC  rifAXIMin, 550 mg, Oral, Q12H  sodium chloride, 10 mL, Intravenous, Q12H  ursodiol, 300 mg, Oral, BID  vitamin B-12, 500 mcg, Oral, Daily  Zinc Oxide, , Apply externally, BID        Meds Infusions  dextrose, 20 mL/hr, Last Rate: 20 mL/hr (02/13/21 2042)        Meds PRN  •  acetaminophen **OR**  acetaminophen **OR** acetaminophen  •  dextrose  •  dextrose  •  glucagon (human recombinant)  •  HYDROcodone-acetaminophen  •  magnesium sulfate **OR** magnesium sulfate in D5W 1g/100mL (PREMIX) **OR** magnesium sulfate  •  melatonin  •  nitroglycerin  •  OLANZapine  •  ondansetron **OR** ondansetron  •  potassium chloride  •  potassium chloride  •  sodium chloride  •  tiZANidine        Assessment/Plan   Assessment/Plan     Active Hospital Problems    Diagnosis  POA   • Sepsis (CMS/HCC) [A41.9]  Yes   • CONG (acute kidney injury) (CMS/HCC) [N17.9]  Yes   • Lactic acidosis [E87.2]  Yes   • Metabolic acidosis [E87.2]  Yes   • Delirium, acute [R41.0]  Yes   • Lower extremity cellulitis [L03.119]  Yes   • CAD (coronary artery disease) [I25.10]  Yes   • Acute UTI (urinary tract infection) [N39.0]  Yes   • Non-pressure chronic ulcer right ankle, limited to breakdown skin (CMS/[facility]) [L97.311]  Yes   • Automatic implantable cardiac defibrillator in situ [Z95.810]  Yes   • Lymphedema [I89.0]  Unknown   • Gout [M10.9]  Yes   • Hypertension, benign [I10]  Yes   • Congestive heart failure (CMS/HCC) [I50.9]  Yes   • Atrial fibrillation (CMS/HCC) [I48.91]  Yes      Resolved Hospital Problems   No resolved problems to display.       MEDICAL DECISION MAKING COMPLEXITY BY PROBLEM:     Lower extremity edema-with findings of right sided cellulitis, multifactorial given underlying renal disease as well as cirrhosis and right heart failure  -Diuresis tolerated  -ID consulted managing cellulitis  -Nephrology managing volume status    Hyper ammonemia-most likely secondary to underlying acute hepatic failure uncertain source.  Patient's imaging consistent with signs of cirrhosis  -Lactulose and rifaximin  -GI consulted  -Infectious and serological work-up for hepatic failure    Hypotension-uncertain source may be secondary due to splanchnic sequestration or underlying systemic inflammatory response to infection, improving  -Was on  dopamine  -Midodrine  -Watch blood pressure closely  -Cosyntropin stim test normal  -Treat infection    Hypoglycemia-May be secondary to liver injury  -Encourage p.o. intake  -Patient started on dextrose infusion wean as tolerated  -Watch for improvement in liver function    Liver insufficiency-patient with imaging findings consistent with cirrhotic disease as well as acute injury unspecified source at this time  -GI consulted  -Liver enzymes trending down  -Cholelithiasis noted on ultrasound  -Ursodiol    Renal insufficiency-acute on chronic process, with mild improvement  -Nephrology consulted  -Electrolytes and volume status per nephrology    Thrombocytopenia-thought to be possibly secondary to bone marrow suppression from illness/infection versus drug mediated  -Oncology consulted  -HIT panel negative  -Treat infection  -Monitor daily    Candiduria-found on urine culture  -ID started Diflucan    Altered mental status-appears to have resolved.  Likely multifactorial given hypotension, hyperammonemia and underlying infection  -Treat factors as above    Coronary artery disease-patient denies any chest pain at this time  -Continue maintenance medication  -Telemetry  -Resume beta-blocker once blood pressure improving    Atrial fibrillation-patient with underlying pacemaker  -Newest echo showing left ventricular EF of 40% with mild aortic insufficiency  -Digoxin held given kidney failure    Right ventricular failure-uncertain source at this time may be secondary to pulmonary hypertension or undiagnosed sleep apnea  -We will need further work-up outpatient once stabilized    Hypertension/hyperlipidemia/chronic pain/gout/pressure ulcer-chronic in nature  -Wound care  -Resume home medication as clinically appropriate    Obesity-BMI's skewed likely given fluid retention  -Monitor closely    Given patient's prolonged hospital stay as well as multiple comorbidities consulting palliative care to discuss goals of care    VTE  Prophylaxis -   Mechanical Order History:     None      Pharmalogical Order History:      Ordered     Dose Route Frequency Stop    02/06/21 0500  rivaroxaban (XARELTO) tablet 10 mg  Status:  Discontinued     Question Answer Comment   Are you ordering rivaroxaban for the prevention of blood clots in an acutely ill medical patient? Yes    Select any exclusion criteria that may apply to patient Exclusion Criteria Does Not Apply to This Patient Alternative to Lovenox/heparin inpatient with thrombocytopenia unable to receive mechanical prophylaxis       10 mg PO Daily With Dinner 02/07/21 1651    02/06/21 0020  heparin (porcine) 5000 UNIT/ML injection 5,000 Units  Status:  Discontinued      5,000 Units SC Every 12 Hours Scheduled 02/06/21 0147                  Code Status -   Code Status and Medical Interventions:   Ordered at: 02/06/21 0258     Level Of Support Discussed With:    Patient     Code Status:    CPR     Medical Interventions (Level of Support Prior to Arrest):    Full       This patient has been examined wearing appropriate Personal Protective Equipment and discussed with hospital infection control department. 02/15/21        Discharge Planning  pending        Electronically signed by Estuardo Torres MD, 02/15/21, 12:10 EST.  [facility]ist [facility]

## 2021-02-15 NOTE — CONSULTS
Nutrition Services    Patient Name: Emperatriz Childs  YOB: 1945  MRN: [id number]  Admission date: 2/5/2021    Malnutrition (moderate, acute) related to acute on chronic illness with associated poor appetite; as evidenced by pt with less than 50% intake at meals for at least the last five days, with significant edema (3+), and physical exam revealing moderate muscle and fat loss.     *See MSA documentation at the bottom of this note.     Will liberalize diet to Soft-To-Chew -- current intake too poor to warrant additional restrictions. Pt with poor oral condition - may benefit from softer textures.     Will add Boost Plus TID, which will provide 1080 kcal and 42g protein if consumed.       PPE Documentation        PPE Worn By Provider mask and eye protection, gown, gloves    PPE Worn By Patient  None     CLINICAL NUTRITION ASSESSMENT      Reason for Assessment 2/15:  Physician consult     H&P:  The patient is a 75-year-old female with history atrial fibrillation s/p pacemaker placement, hyperlipidemia, hypertension, CAD, CKD stage III and  chronic lower extremity lymphedema.     Apparently the patient has been having several weeks of worsening lower extremity edema thus went to outside facility.     Laboratory work was significant for , potassium 5.3, BUN 63, creatinine 2.41, WBC 4.5, lactic acid 3.2 and UA with 25-50 WBCs.  The patient was transferred to [facility] for further care    Past Medical History:   Diagnosis Date   • CHF (congestive heart failure) (CMS/HCC)    • History of transfusion    • Hypertension    • Lymphedema of leg    • Myocardial infarction (CMS/HCC)        Past Surgical History:   Procedure Laterality Date   • CARDIAC ELECTROPHYSIOLOGY PROCEDURE N/A 1/31/2020    Procedure: ICD battery change;  Surgeon: Dionna Laird MD;  Location: [facility] INVASIVE LOCATION;  Service: Cardiovascular   • CARDIAC ELECTROPHYSIOLOGY PROCEDURE N/A 1/31/2020    Procedure: Temporary  Pacemaker;  Surgeon: Dionna Laird MD;  Location:  [facility] INVASIVE LOCATION;  Service: Cardiovascular   • CARDIAC ELECTROPHYSIOLOGY PROCEDURE N/A 1/31/2020    Procedure: Pocket Revision;  Surgeon: Dionna Laird MD;  Location:  [facility] INVASIVE LOCATION;  Service: Cardiovascular   • EYE SURGERY     • PACEMAKER IMPLANTATION            Current Problems:   Per hospitalist note:   Lower extremity edema-with findings of right sided cellulitis, multifactorial given underlying renal disease as well as cirrhosis and right heart failure  -Diuresis tolerated  -ID consulted managing cellulitis  -Nephrology managing volume status     Hyper ammonemia-most likely secondary to underlying acute hepatic failure uncertain source.  Patient's imaging consistent with signs of cirrhosis  -Lactulose and rifaximin  -GI consulted  -Infectious and serological work-up for hepatic failure     Hypotension-uncertain source may be secondary due to splanchnic sequestration or underlying systemic inflammatory response to infection, improving  -Was on dopamine  -Midodrine  -Watch blood pressure closely  -Cosyntropin stim test normal  -Treat infection     Hypoglycemia-May be secondary to liver injury  -Encourage p.o. intake  -Patient started on dextrose infusion wean as tolerated  -Watch for improvement in liver function     Liver insufficiency-patient with imaging findings consistent with cirrhotic disease as well as acute injury unspecified source at this time  -GI consulted  -Liver enzymes trending down  -Cholelithiasis noted on ultrasound  -Ursodiol     Renal insufficiency-acute on chronic process, with mild improvement  -Nephrology consulted  -Electrolytes and volume status per nephrology     Thrombocytopenia-thought to be possibly secondary to bone marrow suppression from illness/infection versus drug mediated  -Oncology consulted  -HIT panel negative  -Treat infection  -Monitor daily     Candiduria-found on urine culture  -ID  "started Diflucan     Altered mental status-appears to have resolved.  Likely multifactorial given hypotension, hyperammonemia and underlying infection  -Treat factors as above     Coronary artery disease-patient denies any chest pain at this time  -Continue maintenance medication  -Telemetry  -Resume beta-blocker once blood pressure improving     Atrial fibrillation-patient with underlying pacemaker  -Newest echo showing left ventricular EF of 40% with mild aortic insufficiency  -Digoxin held given kidney failure     Right ventricular failure-uncertain source at this time may be secondary to pulmonary hypertension or undiagnosed sleep apnea  -We will need further work-up outpatient once stabilized     Hypertension/hyperlipidemia/chronic pain/gout/pressure ulcer-chronic in nature  -Wound care  -Resume home medication as clinically appropriate     Obesity-BMI's skewed likely given fluid retention  -Monitor closely         Nutrition/Diet History         Narrative     2/15: Pt assessed due to consult for chronic poor intake. Upon visit to room, pt extremely somnolent and not fully alert for interview/conversation. Was able to do limited physical exam - pt with moderate temporal and orbital wasting, as well as moderately visible/palpable ribs an acromion process. Pt with marked edema to lower extremities (+3 to legs, knees, ankles, feet), making lower-body NFPE challenging/limited. PO intake has indeed been quite poor -- less than 50% at most meals over at least the last week. Of note, pt's feet are in poor condition -- R anterior foot with \"Cracked\" appearance above all toes (see image viewer for details) -- concern for wounds/skin breakdown.     Functional Status Requires assistance for ADLs     Food Allergies NKFA     Factors Affecting   Nutritional Intake Poor appetite; poor dentition (observed on exam today 2/15)     Anthropometrics        Current Height, Weight Height: 157.5 cm (62\")  Weight: 79 kg (174 lb 2.6 oz) " "(02/15/21 0545)        Admit Height, Weight -    Flowsheet Rows      First Filed Value   Admission Height  157.5 cm (62\") Documented at 02/05/2021 2359   Admission Weight  78 kg (172 lb) Documented at 02/05/2021 2359             Ideal Body Weight (IBW) 110 lb   % Ideal Body Weight 158%       Usual Body Weight UTD   % Usual Body Weight UTD   Wt Change Observation 2/15: Pt has lost about 10% body weight in the last year according to current weight, but loss may actually be greater than this -- current weight includes significant edema    Weight Hx    Wt Readings from Last 30 Encounters:   02/15/21 0545 79 kg (174 lb 2.6 oz)   02/14/21 0520 79.4 kg (175 lb 0.7 oz)   02/13/21 0545 83.2 kg (183 lb 6.8 oz)   02/12/21 0535 83.1 kg (183 lb 3.2 oz)   02/11/21 0503 82.5 kg (181 lb 14.1 oz)   02/10/21 0600 82.3 kg (181 lb 7 oz)   02/09/21 0517 80.3 kg (177 lb 0.5 oz)   02/08/21 0517 79.8 kg (175 lb 14.8 oz)   02/07/21 0209 81.5 kg (179 lb 10.8 oz)   02/06/21 0851 82.1 kg (181 lb)   02/06/21 0338 82.3 kg (181 lb 8 oz)   02/05/21 2359 78 kg (172 lb)   09/01/20 1331 70.3 kg (155 lb)   02/25/20 1408 86.2 kg (190 lb)   02/04/20 0344 87.1 kg (192 lb 0.3 oz)   02/03/20 0358 87.2 kg (192 lb 3.9 oz)   02/02/20 0407 83.7 kg (184 lb 8.4 oz)   01/30/20 0406 84.1 kg (185 lb 6.5 oz)   01/29/20 0412 77.8 kg (171 lb 8.3 oz)   01/28/20 0539 76 kg (167 lb 8.8 oz)   01/28/20 0037 64.4 kg (142 lb)   03/07/19 1154 82.9 kg (182 lb 12.8 oz)   02/07/19 1156 83.2 kg (183 lb 6.4 oz)   01/10/19 1041 76.7 kg (169 lb)   01/03/19 1432 76.7 kg (169 lb)   12/27/18 1410 73.5 kg (162 lb)   11/26/18 1113 80.1 kg (176 lb 9.6 oz)   08/27/18 1105 91.2 kg (201 lb)   05/31/18 1426 92.6 kg (204 lb 2.1 oz)   05/30/18 1636 96.6 kg (213 lb)   05/18/18 1031 96.7 kg (213 lb 4 oz)   05/07/18 0806 99.8 kg (220 lb)   03/28/17 1535 101 kg (223 lb)   09/13/16 1529 109 kg (241 lb)   03/08/16 1402 111 kg (245 lb)        BMI kg/m2 Body mass index is 31.85 kg/m².   "     Labs/Medications         Pertinent Labs -   Results from last 7 days   Lab Units 02/15/21  0530 02/14/21  0446 02/13/21  1722 02/13/21  0350   SODIUM mmol/L 142 140 140 140   POTASSIUM mmol/L 3.3* 3.6 3.7 3.9   CHLORIDE mmol/L 102 102 102 103   CO2 mmol/L 29.0 28.0 27.0 25.0   BUN mg/dL 96* 100* 99* 102*   CREATININE mg/dL 3.59* 3.84* 3.70* 3.73*   CALCIUM mg/dL 7.6* 7.6* 7.3* 7.7*   BILIRUBIN mg/dL 0.8 0.8  --  0.8   ALK PHOS U/L 945* 1,082*  --  994*   ALT (SGPT) U/L 40* 46*  --  45*   AST (SGOT) U/L 78* 101*  --  108*   GLUCOSE mg/dL 91 72 85 78     Results from last 7 days   Lab Units 02/15/21  0530 02/14/21  0446 02/13/21  0350   MAGNESIUM mg/dL 1.6 1.8 1.8   HEMOGLOBIN g/dL 8.6* 8.7* 8.5*   HEMATOCRIT % 26.9* 26.5* 26.3*     COVID19   Date Value Ref Range Status   02/06/2021 Not Detected Not Detected - Ref. Range Final     Lab Results   Component Value Date    HGBA1C 4.3 02/05/2020         Pertinent Medications Aspirin, bumex, lanoxin, folvite, neurontin, haldol, chronulac, xifaxan, acticall, B-12    D10 @ 20mL/hour     Physical Findings        Overall Physical   Appearance, [facility] 2/15: appears moderately malnourished on exam today   --  Edema  Moderately severe 3+ -- bilateral lower extremities, including legs,knees, ankles feet      Gastrointestinal Fecal incontinence documented 2/14     Tubes No feeding tube in place      Oral/Mouth Cavity Poor dentition      Skin Multiple areas of skin breakdown/chronic poor skin integrity -- see image viewer for details      --  Current Nutrition Orders & Evaluation of Intake       Oral Nutrition     Current PO Diet Diet Cardiac; 2gm Na+   Supplement None ordered   PO Evaluation     % PO Intake Ongoing poor intake -- generally less than 50% at meals    --  Clinical Course    Nutrition Course Details Pt has been on 2g sodium diet since admission     Nutritional Risk Screening        NRS-2002 Score   -       Nutrition Diagnosis         Nutrition Dx Problem 1 Malnutrition  (moderate, acute) related to acute on chronic illness with associated poor appetite; as evidenced by pt with less than 50% intake at meals for at least the last five days, with significant edema (3+), and physical exam revealing moderate muscle and fat loss.       Nutrition Dx Problem 2 -       Intervention Goal         Intervention Goal(s) Intake improving; pt accepting of oral nutrition supplements      Nutrition Intervention        RD/Tech Action Will start Boost Plus TID in addition to diet   Will liberalize diet to Soft-To-Chew     Nutrition Prescription          Diet Prescription Soft-To-Chew  Current intake is too poor to warrant additional restrictions.    Supplement Prescription Boost Plus TID   --  Monitor/Evaluation        Monitor PO intake, Supplement intake, Pertinent labs, Weight, Skin status, GI status, POC/GOC     Malnutrition Severity Assessment      Patient meets criteria for : Moderate (non-severe) Malnutrition     Malnutrition Type (last 8 hours)      Malnutrition Severity Assessment     Row Name 02/15/21 1414       Malnutrition Severity Assessment    Malnutrition Type  Acute Disease or Injury - Related Malnutrition    Row Name 02/15/21 1414       Insufficient Energy Intake     Insufficient Energy Intake Findings  Moderate    Insufficient Energy Intake   < or equal to 50% of est. energy requirement for > or equal to 5d)    Row Name 02/15/21 1414       Muscle Loss    Loss of Muscle Mass Findings  Moderate    [place]  Moderate - slight depression    Clavicle Bone Region  Moderate - some protrusion in females, visible in males    Acromion Bone Region  Moderate - acromion may slightly protrude    Row Name 02/15/21 1414       Fat Loss    Subcutaneous Fat Loss Findings  Moderate    Orbital Region   Moderate -  somewhat hollowness, slightly dark circles    Thoracic & Lumbar Region  Moderate - ribs visible with mild depressions, iliac crest somewhat prominent    Row Name 02/15/21 1414       Fluid  Accumulation (Edema)    Fluid Acumulation Findings  Severe    Fluid Accumulation   Severe equals 3+ or 4+ pitting edema    Row Name 02/15/21 1414       Criteria Met (Must meet criteria for severity in at least 2 of these categories: M Wasting, Fat Loss, Fluid, Secondary Signs, Wt. Status, Intake)    Patient meets criteria for   Moderate (non-severe) Malnutrition             Electronically signed by:  Janey Dumont RD  02/15/21 14:00 EST

## 2021-02-15 NOTE — PROGRESS NOTES
Referring Provider: Hospitalist    Reason for follow-up: Swelling of the feet and shortness of breath     Patient Care Team:  Rosa M Chavez APRN as PCP - General (Nurse Practitioner)    Subjective .  Lying in bed comfortably without any symptoms    Objective  Lying in bed comfortably     Review of Systems   Constitution: Negative for fever and malaise/fatigue.   HENT: Negative for ear pain and nosebleeds.    Eyes: Negative for blurred vision and double vision.   Cardiovascular: Negative for chest pain, dyspnea on exertion and palpitations.   Respiratory: Negative for cough and shortness of breath.    Skin: Negative for rash.   Musculoskeletal: Negative for joint pain.   Gastrointestinal: Negative for abdominal pain, nausea and vomiting.   Neurological: Negative for focal weakness and headaches.   Psychiatric/Behavioral: Negative for depression. The patient is not nervous/anxious.    All other systems reviewed and are negative.      Allopurinol, Clindamycin hcl, Codeine, Furosemide, Hydrochlorothiazide, Naproxen, and Sulfa antibiotics    Scheduled Meds:aspirin, 81 mg, Oral, Daily  bumetanide, 2 mg, Intravenous, Q6H  doxycycline, 100 mg, Oral, Q12H  febuxostat, 40 mg, Oral, BID  fluconazole, 100 mg, Oral, Q24H  folic acid, 1 mg, Oral, Daily  gabapentin, 100 mg, Oral, TID  haloperidol lactate, 5 mg, Intramuscular, Once  lactulose, 20 g, Oral, TID  lidocaine, 2 patch, Transdermal, Q24H  magic butt paste, , Topical, Daily  midodrine, 5 mg, Oral, TID AC  rifAXIMin, 550 mg, Oral, Q12H  sodium chloride, 10 mL, Intravenous, Q12H  ursodiol, 300 mg, Oral, BID  vitamin B-12, 500 mcg, Oral, Daily  Zinc Oxide, , Apply externally, BID      Continuous Infusions:dextrose, 20 mL/hr, Last Rate: 20 mL/hr (02/13/21 2042)      PRN Meds:.•  acetaminophen **OR** acetaminophen **OR** acetaminophen  •  dextrose  •  dextrose  •  glucagon (human recombinant)  •  HYDROcodone-acetaminophen  •  magnesium sulfate **OR** magnesium sulfate in  "D5W 1g/100mL (PREMIX) **OR** magnesium sulfate  •  melatonin  •  nitroglycerin  •  OLANZapine  •  ondansetron **OR** ondansetron  •  potassium chloride  •  potassium chloride  •  sodium chloride  •  tiZANidine        VITAL SIGNS  Vitals:    02/14/21 2223 02/15/21 0205 02/15/21 0545 02/15/21 0857   BP: 135/50 131/54 132/62 139/70   BP Location: Left arm Left arm Left arm    Patient Position: Lying Lying Lying    Pulse: 60 62 60 63   Resp: 16 18 16    Temp: 97.7 °F (36.5 °C) 97.7 °F (36.5 °C) 97.7 °F (36.5 °C)    TempSrc: Oral Oral Oral    SpO2: 97% 99% 100%    Weight:   79 kg (174 lb 2.6 oz)    Height:           Flowsheet Rows      First Filed Value   Admission Height  157.5 cm (62\") Documented at 02/05/2021 2359   Admission Weight  78 kg (172 lb) Documented at 02/05/2021 2359           TELEMETRY: Atrial fibrillation with controlled medical response    Physical Exam:  Constitutional:       Appearance: Well-developed.   Eyes:      General: No scleral icterus.     Conjunctiva/sclera: Conjunctivae normal.      Pupils: Pupils are equal, round, and reactive to light.   HENT:      Head: Normocephalic and atraumatic.   Neck:      Musculoskeletal: Normal range of motion and neck supple.      Vascular: No carotid bruit or JVD.   Pulmonary:      Effort: Pulmonary effort is normal.      Breath sounds: Normal breath sounds. No wheezing. No rales.   Cardiovascular:      Normal rate. Irregularly irregular rhythm.      Murmurs: There is a systolic murmur.   Pulses:     Intact distal pulses.   Abdominal:      General: Bowel sounds are normal.      Palpations: Abdomen is soft.   Musculoskeletal: Normal range of motion.   Skin:     General: Skin is warm and dry.      Findings: No rash.   Neurological:      Mental Status: Alert.      Comments: No focal deficits          Results Review:   I reviewed the patient's new clinical results.  Lab Results (last 24 hours)     Procedure Component Value Units Date/Time    LÁZAOR [[id number]]  (Normal) " Collected: 02/13/21 1722    Specimen: Blood Updated: 02/15/21 0923     Antinuclear Antibodies (LÁZARO) Negative    POC Glucose Once [[id number]]  (Abnormal) Collected: 02/15/21 0745    Specimen: Blood Updated: 02/15/21 0750     Glucose 168 mg/dL      Comment: Serial Number: [id number]:  [id number]       CBC & Differential [[id number]]  (Abnormal) Collected: 02/15/21 0530    Specimen: Blood Updated: 02/15/21 0626    Narrative:      The following orders were created for panel order CBC & Differential.  Procedure                               Abnormality         Status                     ---------                               -----------         ------                     Scan Slide[[id number]]                                       Final result               CBC Auto Differential[[id number]]        Abnormal            Final result                 Please view results for these tests on the individual orders.    CBC Auto Differential [[id number]]  (Abnormal) Collected: 02/15/21 0530    Specimen: Blood Updated: 02/15/21 0626     WBC 8.40 10*3/mm3      RBC 2.75 10*6/mm3      Hemoglobin 8.6 g/dL      Hematocrit 26.9 %      MCV 98.0 fL      MCH 31.4 pg      MCHC 32.0 g/dL      RDW 16.2 %      RDW-SD 55.6 fl      MPV 9.1 fL      Platelets 87 10*3/mm3     Narrative:      The previously reported component NRBC is no longer being reported. Previous result was 0.2 /100 WBC (Reference Range: 0.0-0.2 /100 WBC) on 2/15/2021 at 0544 EST.    Scan Slide [[id number]] Collected: 02/15/21 0530    Specimen: Blood Updated: 02/15/21 0626     Scan Slide --     Comment: See Manual Differential Results       Manual Differential [[id number]]  (Abnormal) Collected: 02/15/21 0530    Specimen: Blood Updated: 02/15/21 0626     Neutrophil % 87.0 %      Lymphocyte % 8.0 %      Monocyte % 5.0 %      Neutrophils Absolute 7.31 10*3/mm3      Lymphocytes Absolute 0.67 10*3/mm3      Monocytes Absolute 0.42 10*3/mm3      nRBC 1.0 /100 WBC      RBC Morphology  Normal     WBC Morphology Normal     Platelet Estimate Decreased    BNP [[id number]]  (Abnormal) Collected: 02/15/21 0530    Specimen: Blood Updated: 02/15/21 0613     proBNP 69,019.0 pg/mL     Narrative:      Among patients with dyspnea, NT-proBNP is highly sensitive for the detection of acute congestive heart failure. In addition NT-proBNP of <300 pg/ml effectively rules out acute congestive heart failure with 99% negative predictive value.    Results may be falsely decreased if patient taking Biotin.      Magnesium [[id number]]  (Normal) Collected: 02/15/21 0530    Specimen: Blood Updated: 02/15/21 0603     Magnesium 1.6 mg/dL     Comprehensive Metabolic Panel [[id number]]  (Abnormal) Collected: 02/15/21 0530    Specimen: Blood Updated: 02/15/21 0603     Glucose 91 mg/dL      BUN 96 mg/dL      Creatinine 3.59 mg/dL      Sodium 142 mmol/L      Potassium 3.3 mmol/L      Comment: Slight hemolysis detected by analyzer. Results may be affected.        Chloride 102 mmol/L      CO2 29.0 mmol/L      Calcium 7.6 mg/dL      Total Protein 5.2 g/dL      Albumin 2.40 g/dL      ALT (SGPT) 40 U/L      AST (SGOT) 78 U/L      Alkaline Phosphatase 945 U/L      Total Bilirubin 0.8 mg/dL      eGFR Non African Amer 12 mL/min/1.73      Comment: <15 Indicative of kidney failure.        eGFR   Amer --     Comment: <15 Indicative of kidney failure.        Globulin 2.8 gm/dL      A/G Ratio 0.9 g/dL      BUN/Creatinine Ratio 26.7     Anion Gap 11.0 mmol/L     Narrative:      GFR Normal >60  Chronic Kidney Disease <60  Kidney Failure <15      Protime-INR [[id number]]  (Abnormal) Collected: 02/15/21 0530    Specimen: Blood Updated: 02/15/21 0550     Protime 13.1 Seconds      INR 1.20    POC Glucose Once [[id number]]  (Abnormal) Collected: 02/14/21 2049    Specimen: Blood Updated: 02/14/21 2050     Glucose 160 mg/dL      Comment: Serial Number: [id number]:  [id number]       POC Glucose Once [[id number]]  (Abnormal) Collected: 02/14/21  1624    Specimen: Blood Updated: 02/14/21 1626     Glucose 107 mg/dL      Comment: Serial Number: [id number]:  [id number]       Alkaline Phosphatase, Isoenzymes [[id number]] Collected: 02/14/21 1241    Specimen: Blood Updated: 02/14/21 1243    Ceruloplasmin [[id number]]  (Normal) Collected: 02/12/21 0427    Specimen: Blood Updated: 02/14/21 1225     Ceruloplasmin 26 mg/dL           Imaging Results (Last 24 Hours)     Procedure Component Value Units Date/Time    XR Tibia Fibula 2 View Bilateral [[id number]] Collected: 02/14/21 1736     Updated: 02/14/21 1741    Narrative:         DATE OF EXAM:   2/14/2021 3:10 PM     PROCEDURE:   XR TIBIA FIBULA 2 VW BILATERAL-     INDICATIONS:   Worsening bilateral leg pain, elevated alkaline phosphatase levels.     COMPARISON:  No Comparisons Available     TECHNIQUE:   AP and lateral views were obtained of both the right and left  tibia/fibula.     FINDINGS:   Right side: There is no acute fracture or dislocation. The bony  structures are demineralized. There are degenerative changes of the the  right knee. There is joint space narrowing with spurring of all 3  compartments. The ankle joint appears intact. There are diffuse  scattered skin calcifications and vascular calcifications.     Left side: There is no definite acute fracture or dislocation. There are  degenerative changes of the knee joint spaces. There is joint space loss  with spurring in all 3 compartments. The left ankle joint is intact.  There are scattered skin and vascular calcifications. The bony  structures are also demineralized.       Impression:      IMPRESSION :      1. No acute findings.  2. Tricompartment osteoarthritis of both knees.  3. Bony demineralization.     Electronically Signed By-Fletcher Barros MD On:2/14/2021 5:39 PM  This report was finalized on [id number] by  Fletcher Barros MD.          EKG      I personally viewed and interpreted the patient's EKG/Telemetry data:    ECHOCARDIOGRAM:    STRESS  MYOVIEW:    CARDIAC CATHETERIZATION:    OTHER:         Assessment/Plan     Active Problems:    Congestive heart failure (CMS/HCC)    Atrial fibrillation (CMS/HCC)    Hypertension, benign    Lymphedema    Gout    Non-pressure chronic ulcer right ankle, limited to breakdown skin (CMS/HCC)    Automatic implantable cardiac defibrillator in situ    Acute UTI (urinary tract infection)    Lower extremity cellulitis    CAD (coronary artery disease)    Sepsis (CMS/HCC)    CONG (acute kidney injury) (CMS/[facility])    Lactic acidosis    Metabolic acidosis    Delirium, acute       Patient presented with shortness of breath and swelling of the feet and is ruled out for MI will get enzymes  Patient has history of congestive heart failure with LV dysfunction and is currently stable on medications  Patient also has lymphedema and renal insufficiency and is followed by the nephrologist  Patient has history of persistent atrial fibrillation and is currently on medical therapy including digoxin not beta-blockers have been held because of low blood pressure but we restarted  Patient is not on any anticoagulation in the past and even now because of life-threatening GI bleed and patient does not want to be on any anticoagulation at this time.    I discussed the patients findings and my recommendations with patient and nurse    Vinod Root MD  02/15/21  12:16 EST

## 2021-02-15 NOTE — PROGRESS NOTES
Continued Stay Note  [facility]     Patient Name: Emperatriz Childs  MRN: [id number]  Today's Date: 2/15/2021    Admit Date: 2/5/2021    Discharge Plan     Row Name 02/15/21 0952       Plan    Plan  Dc Plan: [facility]eneen accpeted. PASRR approved. Pre cert required. Eligible for floor to SNF  with 6 click score (see notes).        Discharge Codes    No documentation.         Rod CONROY   Cell: [phone]  Office: [phone]  Fax: [phone]

## 2021-02-15 NOTE — PROGRESS NOTES
Infectious Diseases Progress Note      LOS: 7 days   Patient Care Team:  Rosa M Chavez APRN as PCP - General (Nurse Practitioner)    Chief Complaint: Lower extremities edema    Subjective       The patient has been afebrile for the last 24 hours.  The patient is on 2 L of oxygen by nasal cannula, hemodynamically stable      Review of Systems:   Review of Systems   Constitutional: Negative.    HENT: Negative.    Eyes: Negative.    Respiratory: Negative.    Cardiovascular: Positive for leg swelling.   Gastrointestinal: Negative.    Genitourinary: Negative.    Musculoskeletal: Negative.         Bilateral leg pain   Skin: Negative.    Neurological: Negative.    Hematological: Negative.    Psychiatric/Behavioral: Negative.         Objective     Vital Signs  Temp:  [97.5 °F (36.4 °C)-98.1 °F (36.7 °C)] 97.6 °F (36.4 °C)  Heart Rate:  [60-63] 60  Resp:  [16-22] 22  BP: (131-147)/(50-70) 144/64    Physical Exam:  Physical Exam  Vitals signs and nursing note reviewed.   Constitutional:       Appearance: She is well-developed.   HENT:      Head: Normocephalic and atraumatic.   Eyes:      Pupils: Pupils are equal, round, and reactive to light.   Neck:      Musculoskeletal: Normal range of motion and neck supple.   Cardiovascular:      Rate and Rhythm: Normal rate and regular rhythm.      Heart sounds: Normal heart sounds.   Pulmonary:      Effort: Pulmonary effort is normal. No respiratory distress.      Breath sounds: Normal breath sounds. No wheezing or rales.   Abdominal:      General: Bowel sounds are normal. There is no distension.      Palpations: Abdomen is soft. There is no mass.      Tenderness: There is no abdominal tenderness. There is no guarding or rebound.   Musculoskeletal: Normal range of motion.         General: No deformity.      Right lower leg: Edema present.      Left lower leg: Edema present.      Comments: Superimposed erythema of both lower extremities more on the left than the right-erythema is  difficult to assess because of the Magic Butt paste on the legs however erythema does appear improved  -Erythema is significantly improved since we first saw the patient   Skin:     General: Skin is warm.      Findings: No erythema or rash.   Neurological:      Mental Status: She is alert and oriented to person, place, and time.      Cranial Nerves: No cranial nerve deficit.          Results Review:    I have reviewed all clinical data, test, lab, and imaging results.     Radiology  No Radiology Exams Resulted Within Past 24 Hours    Cardiology    Laboratory    Results from last 7 days   Lab Units 02/15/21  0530 02/14/21  0446 02/13/21  0350 02/12/21  0427 02/11/21  0027 02/10/21  0547 02/09/21  0524   WBC 10*3/mm3 8.40 8.30 11.00* 14.20* 12.00* 10.30 14.40*   HEMOGLOBIN g/dL 8.6* 8.7* 8.5* 9.0* 9.0* 9.0* 9.5*   HEMATOCRIT % 26.9* 26.5* 26.3* 28.3* 27.8* 27.2* 29.6*   PLATELETS 10*3/mm3 87* 72* 74* 63* 92* 35* 54*     Results from last 7 days   Lab Units 02/15/21  0530 02/14/21 0446 02/13/21  1722 02/13/21  0350 02/12/21  1759 02/12/21  1714 02/12/21  0427 02/11/21  1714 02/11/21  1227  02/10/21  0547   SODIUM mmol/L 142 140 140 140  --  138 136 137  --    < > 138   POTASSIUM mmol/L 3.3* 3.6 3.7 3.9  --  3.5 3.8 4.1  --    < > 3.7   CHLORIDE mmol/L 102 102 102 103  --  102 99 99  --    < > 104   CO2 mmol/L 29.0 28.0 27.0 25.0  --  25.0 24.0 24.0  --    < > 24.0   BUN mg/dL 96* 100* 99* 102*  --  98* 102* 100*  --    < > 95*   CREATININE mg/dL 3.59* 3.84* 3.70* 3.73*  --  3.57* 3.46* 3.58*  --    < > 2.98*   GLUCOSE mg/dL 91 72 85 78  --  174* 220* 142*  --    < > 92   ALBUMIN g/dL 2.40* 2.20*  --  2.40*  --   --  2.90*  --  2.0*  --  2.10*   BILIRUBIN mg/dL 0.8 0.8  --  0.8  --   --  1.2  --   --   --  1.4*   ALK PHOS U/L 945* 1,082*  --  994*  --   --  762*  --   --   --  396*   AST (SGOT) U/L 78* 101*  --  108*  --   --  72*  --   --   --  81*   ALT (SGPT) U/L 40* 46*  --  45*  --   --  35*  --   --   --  27    AMMONIA umol/L  --  72*  --   --  480*  --   --   --   --   --   --    CALCIUM mg/dL 7.6* 7.6* 7.3* 7.7*  --  7.2* 8.0* 7.8*  --    < > 7.0*    < > = values in this interval not displayed.                 Microbiology   Microbiology Results (last 10 days)     Procedure Component Value - Date/Time    Urine Culture - Urine, Urine, Random Void [[id number]]  (Abnormal) Collected: 02/10/21 1616    Lab Status: Final result Specimen: Urine, Random Void Updated: 02/11/21 1234     Urine Culture Yeast isolated     Comment: No further workup.       Urine Culture - Urine, Urine, Catheter [[id number]]  (Abnormal) Collected: 02/10/21 0306    Lab Status: Final result Specimen: Urine, Catheter Updated: 02/11/21 1234     Urine Culture Yeast isolated     Comment: No further workup.       Clostridium Difficile Toxin - Stool, Per Rectum [[id number]]  (Normal) Collected: 02/06/21 1952    Lab Status: Final result Specimen: Stool from Per Rectum Updated: 02/07/21 0725    Narrative:      The following orders were created for panel order Clostridium Difficile Toxin - Stool, Per Rectum.  Procedure                               Abnormality         Status                     ---------                               -----------         ------                     Clostridium Difficile EI...[[id number]]  Normal              Final result                 Please view results for these tests on the individual orders.    Clostridium Difficile EIA - Stool, Per Rectum [[id number]]  (Normal) Collected: 02/06/21 1952    Lab Status: Final result Specimen: Stool from Per Rectum Updated: 02/07/21 0725     C Diff GDH / Toxin Negative    Blood Culture - Blood, Arm, Left [[id number]] Collected: 02/06/21 1309    Lab Status: Final result Specimen: Blood from Arm, Left Updated: 02/11/21 1431     Blood Culture No growth at 5 days    Blood Culture - Blood, Arm, Right [[id number]] Collected: 02/06/21 1307    Lab Status: Final result Specimen: Blood from Arm, Right Updated:  02/11/21 1431     Blood Culture No growth at 5 days    MRSA Screen, PCR (Inpatient) - Swab, Nares [[id number]]  (Abnormal) Collected: 02/06/21 1114    Lab Status: Final result Specimen: Swab from Nares Updated: 02/06/21 1311     MRSA PCR MRSA Detected    COVID PRE-OP / PRE-PROCEDURE SCREENING ORDER (NO ISOLATION) - Swab, Nasopharynx [[id number]]  (Normal) Collected: 02/06/21 0153    Lab Status: Final result Specimen: Swab from Nasopharynx Updated: 02/06/21 1534    Narrative:      The following orders were created for panel order COVID PRE-OP / PRE-PROCEDURE SCREENING ORDER (NO ISOLATION) - Swab, Nasopharynx.  Procedure                               Abnormality         Status                     ---------                               -----------         ------                     COVID-19,APTIMA PANTHER,...[[id number]]  Normal              Final result                 Please view results for these tests on the individual orders.    COVID-19,APTIMA PANTHER,[facility] IN-HOUSE, NP/OP SWAB IN UTM/VTM/[facility] TRANSPORT MEDIA,24 HR TAT - Swab, Nasopharynx [[id number]]  (Normal) Collected: 02/06/21 0153    Lab Status: Final result Specimen: Swab from Nasopharynx Updated: 02/06/21 1534     COVID19 Not Detected    Narrative:      Fact sheet for providers: https://www.fda.gov/media/178548/download     Fact sheet for patients: https://www.fda.gov/media/787897/download    Test performed by RT PCR.          Medication Review:       Schedule Meds  aspirin, 81 mg, Oral, Daily  bumetanide, 2 mg, Intravenous, Q6H  ceftaroline, 200 mg, Intravenous, Q12H  digoxin, 125 mcg, Oral, Daily  febuxostat, 40 mg, Oral, BID  folic acid, 1 mg, Oral, Daily  gabapentin, 100 mg, Oral, TID  haloperidol lactate, 5 mg, Intramuscular, Once  lactulose, 20 g, Oral, TID  lidocaine, 2 patch, Transdermal, Q24H  magic butt paste, , Topical, Daily  midodrine, 5 mg, Oral, TID AC  rifAXIMin, 550 mg, Oral, Q12H  sodium chloride, 10 mL, Intravenous, Q12H  ursodiol, 300 mg,  Oral, BID  vitamin B-12, 500 mcg, Oral, Daily  Zinc Oxide, , Apply externally, BID        Infusion Meds  dextrose, 20 mL/hr, Last Rate: 20 mL/hr (02/13/21 2042)        PRN Meds  •  acetaminophen **OR** acetaminophen **OR** acetaminophen  •  dextrose  •  dextrose  •  glucagon (human recombinant)  •  HYDROcodone-acetaminophen  •  magnesium sulfate **OR** magnesium sulfate in D5W 1g/100mL (PREMIX) **OR** magnesium sulfate  •  melatonin  •  nitroglycerin  •  OLANZapine  •  ondansetron **OR** ondansetron  •  potassium chloride  •  potassium chloride  •  sodium chloride  •  tiZANidine        Assessment/Plan       Antimicrobial Therapy   1.  Doxycycline     day  2.  Diflucan      day  3.      Day  4.      Day  5.      Day    Assessment     Bilateral lower extremities lymphedema with erythema of the right leg consistent with cellulitis.  Erythema had improved     Acute kidney injury     Leukocytosis-likely related to severe cellulitis.  Significantly improved    Congestive heart failure     History of pacemaker implantation    Hypothermia-appears to have resolved    Candiduria.  Patient s/p replacement of Ayon catheter on February 10, 2021 and repeat urinalysis was positive for candiduria    Significant elevation of alkaline phosphatase.  GI service is concerned about medication induced such as doxycycline.  Fluconazole might be responsible option     Plan     Discontinue doxycycline and Diflucan  Start Teflaro 200 mg IV every 12 hours for 3 more days  A.m. labs including liver enzymes        Kurt Diehl MD  02/15/21  15:59 EST      Note is dictated utilizing voice recognition software/Dragon

## 2021-02-15 NOTE — PROGRESS NOTES
Hematology/Oncology Inpatient Progress Note    PATIENT NAME: Emperatriz Childs  : 1945  MRN: [id number]    CHIEF COMPLAINT: Lymphedema    HISTORY OF PRESENT ILLNESS:    Emperatriz Childs is a 75 y.o. female who presented to [facility] on 2021 with complaints of increasing leg swelling and pain. Patient reported chronic lymphedema with erythema that has progressively gotten worse over the past several weeks. She reported associated pain with weightbearing for an extended period of time, fever, and chills.  Labs in the ED were significant for , potassium 5.3, creatinine 2.41, BUN 63, EGFR 19, lactate 3.2, hemoglobin 11.3.  Urinalysis was positive for blood, leukocytes, and nitrites.  Chest x-ray revealed cardiomegaly.  Patient was admitted for further evaluation and management.  Since admission, nephrology has been consulted for acute kidney injury.  Patient was started on antibiotics for lower extremity cellulitis and UTI. Infectious disease was also consulted.     21  Hematology/Oncology was consulted for persistent thrombocytopenia.  Platelet count on admission were 86,000.  Platelets have continued to remain low throughout admission with lowest platelet count of 34,000 on 2021.  On review of patient's chart, patient has a history of thrombocytopenia. Patient was seen by Dr. Nathan outpatient on 2018 for anemia.  Initial work-up was performed and patient was to follow up in 2 weeks, however, patient was lost to follow-up.     She  has a past medical history of CHF (congestive heart failure) (CMS/[facility]), History of transfusion, Hypertension, Lymphedema of leg, and Myocardial infarction (CMS/[facility]).     PCP: Rosa M Chavez APRN    History of present illness was reviewed and is unchanged from the previous visit. 02/15/21     Subjective      Patient remains weak.  Lying in bed all the time.      ROS:  Review of Systems   Constitutional: Positive for fatigue. Negative for  chills and fever.   HENT: Negative for mouth sores and nosebleeds.    Eyes: Negative for photophobia, pain, redness and visual disturbance.   Respiratory: Negative for cough and shortness of breath.    Cardiovascular: Positive for leg swelling. Negative for chest pain.   Gastrointestinal: Negative for diarrhea, nausea and vomiting.   Endocrine: Negative for cold intolerance and heat intolerance.   Genitourinary: Negative for difficulty urinating and hematuria.   Musculoskeletal: Negative for arthralgias and myalgias.   Skin: Positive for color change.   Neurological: Negative for dizziness, speech difficulty, light-headedness and headaches.   Psychiatric/Behavioral: Negative for confusion and hallucinations.      MEDICATIONS:    Scheduled Meds:  aspirin, 81 mg, Oral, Daily  bumetanide, 2 mg, Intravenous, Q6H  doxycycline, 100 mg, Oral, Q12H  febuxostat, 40 mg, Oral, BID  fluconazole, 100 mg, Oral, Q24H  folic acid, 1 mg, Oral, Daily  gabapentin, 100 mg, Oral, TID  haloperidol lactate, 5 mg, Intramuscular, Once  lactulose, 20 g, Oral, TID  lidocaine, 2 patch, Transdermal, Q24H  magic butt paste, , Topical, Daily  midodrine, 5 mg, Oral, TID AC  rifAXIMin, 550 mg, Oral, Q12H  sodium chloride, 10 mL, Intravenous, Q12H  ursodiol, 300 mg, Oral, BID  vitamin B-12, 500 mcg, Oral, Daily  Zinc Oxide, , Apply externally, BID       Continuous Infusions:  dextrose, 20 mL/hr, Last Rate: 20 mL/hr (02/13/21 2042)       PRN Meds:  •  acetaminophen **OR** acetaminophen **OR** acetaminophen  •  dextrose  •  dextrose  •  glucagon (human recombinant)  •  HYDROcodone-acetaminophen  •  magnesium sulfate **OR** magnesium sulfate in D5W 1g/100mL (PREMIX) **OR** magnesium sulfate  •  melatonin  •  nitroglycerin  •  OLANZapine  •  ondansetron **OR** ondansetron  •  potassium chloride  •  potassium chloride  •  sodium chloride  •  tiZANidine     ALLERGIES:    Allergies   Allergen Reactions   • Allopurinol Unknown - High Severity   •  "Clindamycin Hcl Unknown - High Severity   • Codeine Unknown - High Severity   • Furosemide Unknown - High Severity   • Hydrochlorothiazide Unknown - High Severity   • Naproxen Unknown - High Severity   • Sulfa Antibiotics Unknown - High Severity       Objective    VITALS:   /70   Pulse 63   Temp 97.7 °F (36.5 °C) (Oral)   Resp 16   Ht 157.5 cm (62\")   Wt 79 kg (174 lb 2.6 oz)   SpO2 100%   BMI 31.85 kg/m²     PHYSICAL EXAM: (performed by MD)  Physical Exam  Vitals signs and nursing note reviewed.   Constitutional:       General: She is not in acute distress.     Appearance: She is obese. She is ill-appearing. She is not diaphoretic.   HENT:      Head: Normocephalic and atraumatic.   Eyes:      General: No scleral icterus.        Right eye: No discharge.         Left eye: No discharge.      Conjunctiva/sclera: Conjunctivae normal.   Neck:      Musculoskeletal: Normal range of motion and neck supple. No muscular tenderness.      Thyroid: No thyromegaly.      Vascular: No carotid bruit.   Cardiovascular:      Rate and Rhythm: Normal rate and regular rhythm.      Heart sounds: Normal heart sounds. No friction rub. No gallop.    Pulmonary:      Effort: Pulmonary effort is normal. No respiratory distress.      Breath sounds: No stridor. No wheezing.   Abdominal:      General: Bowel sounds are normal.      Palpations: Abdomen is soft. There is no mass.      Tenderness: There is no abdominal tenderness. There is no guarding or rebound.   Musculoskeletal: Normal range of motion.         General: Swelling present. No tenderness.      Right lower leg: Edema present.      Left lower leg: Edema present.      Comments: Both legs are wrapped in bandage   Lymphadenopathy:      Cervical: No cervical adenopathy.   Skin:     General: Skin is warm.      Coloration: Skin is not pale.      Findings: No bruising or rash.   Neurological:      General: No focal deficit present.      Mental Status: She is alert and oriented to " person, place, and time.      Cranial Nerves: No cranial nerve deficit.      Sensory: No sensory deficit.      Motor: Weakness present. No abnormal muscle tone.   Psychiatric:         Mood and Affect: Mood normal.         Behavior: Behavior normal.         Thought Content: Thought content normal.           RECENT LABS:  Lab Results (last 24 hours)     Procedure Component Value Units Date/Time    LÁZARO [[id number]]  (Normal) Collected: 02/13/21 1722    Specimen: Blood Updated: 02/15/21 0923     Antinuclear Antibodies (LÁZARO) Negative    POC Glucose Once [[id number]]  (Abnormal) Collected: 02/15/21 0745    Specimen: Blood Updated: 02/15/21 0750     Glucose 168 mg/dL      Comment: Serial Number: [id number]:  [id number]       CBC & Differential [[id number]]  (Abnormal) Collected: 02/15/21 0530    Specimen: Blood Updated: 02/15/21 0626    Narrative:      The following orders were created for panel order CBC & Differential.  Procedure                               Abnormality         Status                     ---------                               -----------         ------                     Scan Slide[[id number]]                                       Final result               CBC Auto Differential[[id number]]        Abnormal            Final result                 Please view results for these tests on the individual orders.    CBC Auto Differential [[id number]]  (Abnormal) Collected: 02/15/21 0530    Specimen: Blood Updated: 02/15/21 0626     WBC 8.40 10*3/mm3      RBC 2.75 10*6/mm3      Hemoglobin 8.6 g/dL      Hematocrit 26.9 %      MCV 98.0 fL      MCH 31.4 pg      MCHC 32.0 g/dL      RDW 16.2 %      RDW-SD 55.6 fl      MPV 9.1 fL      Platelets 87 10*3/mm3     Narrative:      The previously reported component NRBC is no longer being reported. Previous result was 0.2 /100 WBC (Reference Range: 0.0-0.2 /100 WBC) on 2/15/2021 at 0544 EST.    Scan Slide [[id number]] Collected: 02/15/21 0530    Specimen: Blood Updated:  02/15/21 0626     Scan Slide --     Comment: See Manual Differential Results       Manual Differential [[id number]]  (Abnormal) Collected: 02/15/21 0530    Specimen: Blood Updated: 02/15/21 0626     Neutrophil % 87.0 %      Lymphocyte % 8.0 %      Monocyte % 5.0 %      Neutrophils Absolute 7.31 10*3/mm3      Lymphocytes Absolute 0.67 10*3/mm3      Monocytes Absolute 0.42 10*3/mm3      nRBC 1.0 /100 WBC      RBC Morphology Normal     WBC Morphology Normal     Platelet Estimate Decreased    BNP [[id number]]  (Abnormal) Collected: 02/15/21 0530    Specimen: Blood Updated: 02/15/21 0613     proBNP 69,019.0 pg/mL     Narrative:      Among patients with dyspnea, NT-proBNP is highly sensitive for the detection of acute congestive heart failure. In addition NT-proBNP of <300 pg/ml effectively rules out acute congestive heart failure with 99% negative predictive value.    Results may be falsely decreased if patient taking Biotin.      Magnesium [[id number]]  (Normal) Collected: 02/15/21 0530    Specimen: Blood Updated: 02/15/21 0603     Magnesium 1.6 mg/dL     Comprehensive Metabolic Panel [[id number]]  (Abnormal) Collected: 02/15/21 0530    Specimen: Blood Updated: 02/15/21 0603     Glucose 91 mg/dL      BUN 96 mg/dL      Creatinine 3.59 mg/dL      Sodium 142 mmol/L      Potassium 3.3 mmol/L      Comment: Slight hemolysis detected by analyzer. Results may be affected.        Chloride 102 mmol/L      CO2 29.0 mmol/L      Calcium 7.6 mg/dL      Total Protein 5.2 g/dL      Albumin 2.40 g/dL      ALT (SGPT) 40 U/L      AST (SGOT) 78 U/L      Alkaline Phosphatase 945 U/L      Total Bilirubin 0.8 mg/dL      eGFR Non African Amer 12 mL/min/1.73      Comment: <15 Indicative of kidney failure.        eGFR   Amer --     Comment: <15 Indicative of kidney failure.        Globulin 2.8 gm/dL      A/G Ratio 0.9 g/dL      BUN/Creatinine Ratio 26.7     Anion Gap 11.0 mmol/L     Narrative:      GFR Normal >60  Chronic Kidney Disease  <60  Kidney Failure <15      Protime-INR [[id number]]  (Abnormal) Collected: 02/15/21 0530    Specimen: Blood Updated: 02/15/21 0550     Protime 13.1 Seconds      INR 1.20    POC Glucose Once [[id number]]  (Abnormal) Collected: 02/14/21 2049    Specimen: Blood Updated: 02/14/21 2050     Glucose 160 mg/dL      Comment: Serial Number: [id number]:  [id number]       POC Glucose Once [[id number]]  (Abnormal) Collected: 02/14/21 1624    Specimen: Blood Updated: 02/14/21 1626     Glucose 107 mg/dL      Comment: Serial Number: [id number]:  [id number]       Alkaline Phosphatase, Isoenzymes [[id number]] Collected: 02/14/21 1241    Specimen: Blood Updated: 02/14/21 1243    Ceruloplasmin [[id number]]  (Normal) Collected: 02/12/21 0427    Specimen: Blood Updated: 02/14/21 1225     Ceruloplasmin 26 mg/dL     AFP Tumor Marker [[id number]]  (Normal) Collected: 02/13/21 1722    Specimen: Blood Updated: 02/14/21 1141     ALPHA-FETOPROTEIN 2.30 ng/mL     Narrative:      Alpha Fetoprotein Tumor Marker Reference Range:    0.0-8.3 ng/mL    Note: Normal values apply only to males and nonpregnant females. These results are not interpretable for pregnant females.    Results may be falsely decreased if patient taking Biotin.      Alpha - 1 - Antitrypsin [[id number]]  (Abnormal) Collected: 02/13/21 0350    Specimen: Blood Updated: 02/14/21 1136     ALPHA -1 ANTITRYPSIN 218 mg/dL     POC Glucose Once [[id number]]  (Normal) Collected: 02/14/21 1119    Specimen: Blood Updated: 02/14/21 1120     Glucose 70 mg/dL      Comment: Serial Number: [id number]:  [id number]             PENDING RESULTS: Immunofixation    IMAGING REVIEWED:  Nm Lung Ventilation Perfusion Aerosol    Result Date: 2/14/2021   1. Abnormal appearance of the ventilation study which can be seen with obstructive pulmonary disease. 2. Low probability of acute pulmonary embolic disease.  Electronically Signed By-Fletcher Barros MD On:2/14/2021 11:29 AM This report was finalized  on [id number] by  Fletcher Barros MD.    Us Liver    Result Date: 2/14/2021   1. Abnormal appearance of liver felt to be secondary to hepatic cirrhosis. 2. Cholelithiasis. 3. Minimal ascites.  Electronically Signed By-Fletcher Barros MD On:2/14/2021 10:41 AM This report was finalized on [id number] by  Fletcher Barros MD.    Xr Chest Pa & Lateral    Result Date: 2/13/2021   1. Cardiomegaly. 2. Small to moderate left pleural effusion with compressive atelectasis and possible airspace disease from pneumonia.  Electronically Signed By-Fletcher Barros MD On:2/13/2021 5:43 PM This report was finalized on [id number] by  Fletcher Barros MD.    Xr Tibia Fibula 2 View Bilateral    Result Date: 2/14/2021  IMPRESSION :  1. No acute findings. 2. Tricompartment osteoarthritis of both knees. 3. Bony demineralization.  Electronically Signed By-Fletcher Barros MD On:2/14/2021 5:39 PM This report was finalized on [id number] by  Fletcher Barros MD.      Assessment/Plan     ASSESSMENT:  1. Thrombocytopenia: Very likely from bone marrow suppression due to acute illness.  Antibiotics may be contributing.. Currently on aspirin.  Heparin-induced platelet antibody normal.  Folate 10.90, vitamin B12 >2000, fibrinogen 554, PTT 34, PT/INR 13.3/1.22.  Peripheral smear showed leukocytosis, anemia, and thrombocytopenia. Platelets stable.   2. Anemia: Consider relation to anemia of chronic disease and CKD.  Anemia studies: ferritin 663.70, iron 50, iron saturation 38%, transferrin 89, TIBC 133, , haptoglobin 138,  reticulocytes 1.98%. Patient currently receiving p.o. vitamin B12 and folic acid.  Protein electrophoresis showed M spike of 0.1. Hemoglobin stable.   3. Leukocytosis: Reactive leukocytosis due to underlying cellulitis and UTI.    Now resolved  4. Elevated liver enzymes:  Elevated alkaline phosphatase.  Patient on fluconazole which can cause increased liver enzymes.  5. Cellulitis of bilateral lower extremities: ID consulted.  Blood cultures  negative. On IV antibiotics.   6. UTI: On IV antibiotics.   7. CONG/CKD: Nephrology consulted  8. Elevated LFT's and elevated ammonia level: Gastroenterology consulted. Receiving lactulose enemas for elevated ammonia level.  Hepatitis panel nonreactive.  Ultrasound of liver revealed abnormal appearance of liver felt to be secondary to hepatic cirrhosis.  Cholelithiasis.  9. CHF: proBNP 66,389. Cardiology following  10. CAD/atrial fibrillation/HTN/HLD/gout: Managed per primary team       PLAN:  1. CBC daily  2. Immunofixation ordered  3. Anemia and thrombocytopenia are multifactorial due to acute illness with the bone marrow suppression.  Continue to monitor.    4. Treat underlying infection per ID: Continue IV antibiotics   5. Continue supportive care    Electronically signed by CHRISTIANO Lomeli, 02/15/21, 11:16 AM EST.      I personally reviewed all pertinent labs, related documentation and the  imaging. ROS performed and physical exam done during face to face enounter with patient. I agree with  nurse practitioner's doumentation, assessment and plan.    Anemia and thrombocytopenia continue to improve.  Leukocytosis resolved.  Continues on antibiotics.  On doxycycline rifaximin.  Patient gabbie weak.    Electronically signed by Kevin Nathan MD, 02/15/21, 2:18 PM EST.

## 2021-02-16 LAB
ACTIN IGG SERPL-ACNC: 7 UNITS (ref 0–19)
ALBUMIN SERPL-MCNC: 2.3 G/DL (ref 3.5–5.2)
ALBUMIN/GLOB SERPL: 0.8 G/DL
ALP BONE CFR SERPL: 47 % (ref 14–68)
ALP INTEST CFR SERPL: 4 % (ref 0–18)
ALP LIVER CFR SERPL: 49 % (ref 18–85)
ALP SERPL-CCNC: 1088 IU/L (ref 39–117)
ALP SERPL-CCNC: 803 U/L (ref 39–117)
ALT SERPL W P-5'-P-CCNC: 30 U/L (ref 1–33)
AMMONIA BLD-SCNC: 30 UMOL/L (ref 11–51)
ANION GAP SERPL CALCULATED.3IONS-SCNC: 7 MMOL/L (ref 5–15)
ANISOCYTOSIS BLD QL: ABNORMAL
AST SERPL-CCNC: 48 U/L (ref 1–32)
BILIRUB SERPL-MCNC: 0.9 MG/DL (ref 0–1.2)
BUN SERPL-MCNC: 87 MG/DL (ref 8–23)
BUN/CREAT SERPL: 30.1 (ref 7–25)
CALCIUM SPEC-SCNC: 8.1 MG/DL (ref 8.6–10.5)
CHLORIDE SERPL-SCNC: 104 MMOL/L (ref 98–107)
CO2 SERPL-SCNC: 32 MMOL/L (ref 22–29)
CREAT SERPL-MCNC: 2.89 MG/DL (ref 0.57–1)
DEPRECATED RDW RBC AUTO: 56.9 FL (ref 37–54)
EOSINOPHIL # BLD MANUAL: 0.07 10*3/MM3 (ref 0–0.4)
EOSINOPHIL NFR BLD MANUAL: 1 % (ref 0.3–6.2)
ERYTHROCYTE [DISTWIDTH] IN BLOOD BY AUTOMATED COUNT: 16.3 % (ref 12.3–15.4)
GFR SERPL CREATININE-BSD FRML MDRD: 16 ML/MIN/1.73
GLOBULIN UR ELPH-MCNC: 2.8 GM/DL
GLUCOSE BLDC GLUCOMTR-MCNC: 116 MG/DL (ref 70–105)
GLUCOSE BLDC GLUCOMTR-MCNC: 146 MG/DL (ref 70–105)
GLUCOSE BLDC GLUCOMTR-MCNC: 163 MG/DL (ref 70–105)
GLUCOSE BLDC GLUCOMTR-MCNC: 83 MG/DL (ref 70–105)
GLUCOSE SERPL-MCNC: 166 MG/DL (ref 65–99)
HCT VFR BLD AUTO: 29.7 % (ref 34–46.6)
HGB BLD-MCNC: 9.8 G/DL (ref 12–15.9)
INR PPP: 1.16 (ref 0.93–1.1)
LKM-1 AB SER-ACNC: 1.1 UNITS (ref 0–20)
LYMPHOCYTES # BLD MANUAL: 0.52 10*3/MM3 (ref 0.7–3.1)
LYMPHOCYTES NFR BLD MANUAL: 5 % (ref 5–12)
LYMPHOCYTES NFR BLD MANUAL: 7 % (ref 19.6–45.3)
MAGNESIUM SERPL-MCNC: 2 MG/DL (ref 1.6–2.4)
MCH RBC QN AUTO: 32.7 PG (ref 26.6–33)
MCHC RBC AUTO-ENTMCNC: 33 G/DL (ref 31.5–35.7)
MCV RBC AUTO: 99.1 FL (ref 79–97)
MONOCYTES # BLD AUTO: 0.37 10*3/MM3 (ref 0.1–0.9)
NEUTROPHILS # BLD AUTO: 6.44 10*3/MM3 (ref 1.7–7)
NEUTROPHILS NFR BLD MANUAL: 75 % (ref 42.7–76)
NEUTS BAND NFR BLD MANUAL: 12 % (ref 0–5)
NT-PROBNP SERPL-MCNC: ABNORMAL PG/ML (ref 0–1800)
PLATELET # BLD AUTO: 77 10*3/MM3 (ref 140–450)
PMV BLD AUTO: 10 FL (ref 6–12)
POTASSIUM SERPL-SCNC: 3.9 MMOL/L (ref 3.5–5.2)
POTASSIUM SERPL-SCNC: 4.1 MMOL/L (ref 3.5–5.2)
PROT SERPL-MCNC: 5.1 G/DL (ref 6–8.5)
PROTHROMBIN TIME: 12.7 SECONDS (ref 9.6–11.7)
RBC # BLD AUTO: 2.99 10*6/MM3 (ref 3.77–5.28)
SCAN SLIDE: NORMAL
SMALL PLATELETS BLD QL SMEAR: ABNORMAL
SODIUM SERPL-SCNC: 143 MMOL/L (ref 136–145)
WBC # BLD AUTO: 7.4 10*3/MM3 (ref 3.4–10.8)
WBC MORPH BLD: NORMAL

## 2021-02-16 PROCEDURE — 99232 SBSQ HOSP IP/OBS MODERATE 35: CPT | Performed by: FAMILY MEDICINE

## 2021-02-16 PROCEDURE — 25010000002 CEFTAROLINE FOSAMIL PER 10 MG: Performed by: INTERNAL MEDICINE

## 2021-02-16 PROCEDURE — 99232 SBSQ HOSP IP/OBS MODERATE 35: CPT | Performed by: INTERNAL MEDICINE

## 2021-02-16 PROCEDURE — 82962 GLUCOSE BLOOD TEST: CPT

## 2021-02-16 PROCEDURE — 85025 COMPLETE CBC W/AUTO DIFF WBC: CPT | Performed by: PHYSICIAN ASSISTANT

## 2021-02-16 PROCEDURE — 80053 COMPREHEN METABOLIC PANEL: CPT | Performed by: INTERNAL MEDICINE

## 2021-02-16 PROCEDURE — 97530 THERAPEUTIC ACTIVITIES: CPT

## 2021-02-16 PROCEDURE — 83735 ASSAY OF MAGNESIUM: CPT | Performed by: PHYSICIAN ASSISTANT

## 2021-02-16 PROCEDURE — 84132 ASSAY OF SERUM POTASSIUM: CPT | Performed by: FAMILY MEDICINE

## 2021-02-16 PROCEDURE — 82140 ASSAY OF AMMONIA: CPT | Performed by: NURSE PRACTITIONER

## 2021-02-16 PROCEDURE — 85007 BL SMEAR W/DIFF WBC COUNT: CPT | Performed by: PHYSICIAN ASSISTANT

## 2021-02-16 PROCEDURE — 99232 SBSQ HOSP IP/OBS MODERATE 35: CPT | Performed by: NURSE PRACTITIONER

## 2021-02-16 PROCEDURE — 85610 PROTHROMBIN TIME: CPT | Performed by: INTERNAL MEDICINE

## 2021-02-16 PROCEDURE — 83880 ASSAY OF NATRIURETIC PEPTIDE: CPT | Performed by: INTERNAL MEDICINE

## 2021-02-16 RX ADMIN — ZINC OXIDE: 200 OINTMENT TOPICAL at 08:50

## 2021-02-16 RX ADMIN — SODIUM CHLORIDE 200 MG: 9 INJECTION, SOLUTION INTRAVENOUS at 06:42

## 2021-02-16 RX ADMIN — FEBUXOSTAT 40 MG: 40 TABLET, FILM COATED ORAL at 21:39

## 2021-02-16 RX ADMIN — MIDODRINE HYDROCHLORIDE 5 MG: 5 TABLET ORAL at 16:35

## 2021-02-16 RX ADMIN — BUMETANIDE 2 MG: 0.25 INJECTION, SOLUTION INTRAMUSCULAR; INTRAVENOUS at 06:42

## 2021-02-16 RX ADMIN — MIDODRINE HYDROCHLORIDE 5 MG: 5 TABLET ORAL at 11:50

## 2021-02-16 RX ADMIN — URSODIOL 300 MG: 300 CAPSULE ORAL at 21:39

## 2021-02-16 RX ADMIN — LIDOCAINE 2 PATCH: 50 PATCH TOPICAL at 21:42

## 2021-02-16 RX ADMIN — GABAPENTIN 100 MG: 100 CAPSULE ORAL at 16:35

## 2021-02-16 RX ADMIN — Medication 10 ML: at 08:49

## 2021-02-16 RX ADMIN — RIFAXIMIN 550 MG: 550 TABLET ORAL at 21:39

## 2021-02-16 RX ADMIN — Medication 10 ML: at 21:40

## 2021-02-16 RX ADMIN — SODIUM CHLORIDE 200 MG: 9 INJECTION, SOLUTION INTRAVENOUS at 17:03

## 2021-02-16 RX ADMIN — BUMETANIDE 2 MG: 0.25 INJECTION, SOLUTION INTRAMUSCULAR; INTRAVENOUS at 11:51

## 2021-02-16 RX ADMIN — DIGOXIN 125 MCG: 125 TABLET ORAL at 11:50

## 2021-02-16 RX ADMIN — GABAPENTIN 100 MG: 100 CAPSULE ORAL at 08:49

## 2021-02-16 RX ADMIN — CYANOCOBALAMIN TAB 250 MCG 500 MCG: 250 TAB at 08:48

## 2021-02-16 RX ADMIN — Medication: at 21:40

## 2021-02-16 RX ADMIN — FOLIC ACID 1 MG: 1 TABLET ORAL at 08:49

## 2021-02-16 RX ADMIN — FEBUXOSTAT 40 MG: 40 TABLET, FILM COATED ORAL at 08:48

## 2021-02-16 RX ADMIN — ASPIRIN 81 MG CHEWABLE TABLET 81 MG: 81 TABLET CHEWABLE at 08:48

## 2021-02-16 RX ADMIN — LACTULOSE 20 G: 20 SOLUTION ORAL at 21:39

## 2021-02-16 RX ADMIN — Medication: at 08:50

## 2021-02-16 RX ADMIN — BUMETANIDE 2 MG: 0.25 INJECTION, SOLUTION INTRAMUSCULAR; INTRAVENOUS at 17:03

## 2021-02-16 RX ADMIN — LACTULOSE 20 G: 20 SOLUTION ORAL at 08:49

## 2021-02-16 RX ADMIN — URSODIOL 300 MG: 300 CAPSULE ORAL at 08:48

## 2021-02-16 RX ADMIN — RIFAXIMIN 550 MG: 550 TABLET ORAL at 08:48

## 2021-02-16 RX ADMIN — ACETAMINOPHEN 650 MG: 325 TABLET, FILM COATED ORAL at 16:49

## 2021-02-16 RX ADMIN — LACTULOSE 20 G: 20 SOLUTION ORAL at 16:35

## 2021-02-16 RX ADMIN — ACETAMINOPHEN 650 MG: 325 TABLET, FILM COATED ORAL at 08:46

## 2021-02-16 RX ADMIN — GABAPENTIN 100 MG: 100 CAPSULE ORAL at 21:40

## 2021-02-16 NOTE — PROGRESS NOTES
PROGRESS NOTE      Patient Name: Emperatriz Childs  : 1945  MRN: [id number]  Primary Care Physician: Rosa M Chavez APRN  Date of admission: 2021    Patient Care Team:  Rosa M Chavez APRN as PCP - General (Nurse Practitioner)        Subjective   Subjective:     Acute kidney injury, patient is still swollen not feeling good, no significant shortness of breath  Review of systems:  All other review of system unremarkable      Allergies:    Allergies   Allergen Reactions   • Allopurinol Unknown - High Severity   • Clindamycin Hcl Unknown - High Severity   • Codeine Unknown - High Severity   • Furosemide Unknown - High Severity   • Hydrochlorothiazide Unknown - High Severity   • Naproxen Unknown - High Severity   • Sulfa Antibiotics Unknown - High Severity       Objective   Exam:     Vital Signs  Temp:  [97.1 °F (36.2 °C)-98.3 °F (36.8 °C)] 97.3 °F (36.3 °C)  Heart Rate:  [60] 60  Resp:  [14-22] 20  BP: (127-146)/(56-64) 127/56  SpO2:  [99 %-100 %] 99 %  on  Flow (L/min):  [1-2] 1;   Device (Oxygen Therapy): nasal cannula  Body mass index is 30.65 kg/m².    General: Elderly  female in no acute distress.    Head:      Normocephalic and atraumatic.    Eyes:      PERRL/EOM intact, conjunctiva and sclera clear with out nystagmus.    Neck:      No masses, thyromegaly,  trachea central with normal respiratory effort   Lungs:    Clear bilaterally to auscultation.    Heart:      Regular rate and rhythm, no murmur no gallop  Abd:        Soft, nontender, not distended, bowel sounds positive, no shifting dullness   Pulses:   Pulses palpable  Extr:        No cyanosis or clubbing--significant bilateral edema.    Neuro:    No focal deficits.   alert oriented x3  Skin:       Intact without lesions or rashes.    Psych:    Alert and cooperative; normal mood and affect; .      Results Review:  I have personally reviewed most recent Data :  CBC    Results from last 7 days   Lab Units 21  0640 02/15/21  5944  02/14/21 0446 02/13/21  0350 02/12/21  0427 02/11/21  0027 02/10/21  0547   WBC 10*3/mm3 7.40 8.40 8.30 11.00* 14.20* 12.00* 10.30   HEMOGLOBIN g/dL 9.8* 8.6* 8.7* 8.5* 9.0* 9.0* 9.0*   PLATELETS 10*3/mm3 77* 87* 72* 74* 63* 92* 35*     CMP   Results from last 7 days   Lab Units 02/16/21  0640 02/16/21  0011 02/15/21  1716 02/15/21  0530 02/14/21 0446 02/13/21  1722 02/13/21 0350 02/12/21  1759 02/12/21 1714 02/12/21 0427  02/11/21  1227  02/10/21  0547   SODIUM mmol/L 143  --   --  142 140 140 140  --  138 136   < >  --    < > 138   POTASSIUM mmol/L 3.9 4.1  --  3.3* 3.6 3.7 3.9  --  3.5 3.8   < >  --    < > 3.7   CHLORIDE mmol/L 104  --   --  102 102 102 103  --  102 99   < >  --    < > 104   CO2 mmol/L 32.0*  --   --  29.0 28.0 27.0 25.0  --  25.0 24.0   < >  --    < > 24.0   BUN mg/dL 87*  --   --  96* 100* 99* 102*  --  98* 102*   < >  --    < > 95*   CREATININE mg/dL 2.89*  --   --  3.59* 3.84* 3.70* 3.73*  --  3.57* 3.46*   < >  --    < > 2.98*   GLUCOSE mg/dL 166*  --   --  91 72 85 78  --  174* 220*   < >  --    < > 92   ALBUMIN g/dL 2.30*  --   --  2.40* 2.20*  --  2.40*  --   --  2.90*  --  2.0*  --  2.10*   BILIRUBIN mg/dL 0.9  --   --  0.8 0.8  --  0.8  --   --  1.2  --   --   --  1.4*   ALK PHOS U/L 803*  --   --  945* 1,082*  --  994*  --   --  762*  --   --   --  396*   AST (SGOT) U/L 48*  --   --  78* 101*  --  108*  --   --  72*  --   --   --  81*   ALT (SGPT) U/L 30  --   --  40* 46*  --  45*  --   --  35*  --   --   --  27   AMMONIA umol/L 30  --  43  --  72*  --   --  480*  --   --   --   --   --   --     < > = values in this interval not displayed.     ABG        Nm Lung Ventilation Perfusion Aerosol    Result Date: 2/14/2021   1. Abnormal appearance of the ventilation study which can be seen with obstructive pulmonary disease. 2. Low probability of acute pulmonary embolic disease.  Electronically Signed By-Fletcher Barros MD On:2/14/2021 11:29 AM This report was finalized on [id number] by   Fletcher Barros MD.    Us Liver    Result Date: 2/14/2021   1. Abnormal appearance of liver felt to be secondary to hepatic cirrhosis. 2. Cholelithiasis. 3. Minimal ascites.  Electronically Signed By-Fletcher Barros MD On:2/14/2021 10:41 AM This report was finalized on [id number] by  Fletcher Barros MD.    Xr Chest 1 View    Result Date: 2/11/2021  1. Cardiomegaly without acute pulmonary process. No change from prior exam.  Electronically Signed By-Dae Auguste MD On:2/11/2021 2:18 PM This report was finalized on [id number] by  Dae Auguste MD.    Xr Chest Pa & Lateral    Result Date: 2/13/2021   1. Cardiomegaly. 2. Small to moderate left pleural effusion with compressive atelectasis and possible airspace disease from pneumonia.  Electronically Signed By-Fletcher Barros MD On:2/13/2021 5:43 PM This report was finalized on [id number] by  Fletcher Barros MD.    Xr Tibia Fibula 2 View Bilateral    Result Date: 2/14/2021  IMPRESSION :  1. No acute findings. 2. Tricompartment osteoarthritis of both knees. 3. Bony demineralization.  Electronically Signed By-Fletcher Barros MD On:2/14/2021 5:39 PM This report was finalized on [id number] by  Fletcher Barros MD.      Results for orders placed during the hospital encounter of 02/05/21   Adult Transthoracic Echo Complete W/ Cont if Necessary Per Protocol    Narrative Normal LV size and contractility EF of 55%  Moderate right ventricular enlargement with severe right atrial   enlargement, catheter probably pacemaker lead seen.  Severe left atrial enlargement seen.  Aortic valve, mitral valve, tricuspid valve appears structurally normal,   mild MR, AR, seen.  There is moderate  tricuspid regurgitation seen.    Calculated RV systolic pressure is 24mmHg.  No pericardial effusion seen.  Proximal aorta appears normal in size.     Scheduled Meds:aspirin, 81 mg, Oral, Daily  bumetanide, 2 mg, Intravenous, Q6H  ceftaroline, 200 mg, Intravenous, Q12H  digoxin, 125 mcg, Oral,  Daily  febuxostat, 40 mg, Oral, BID  folic acid, 1 mg, Oral, Daily  gabapentin, 100 mg, Oral, TID  lactulose, 20 g, Oral, TID  lidocaine, 2 patch, Transdermal, Q24H  magic butt paste, , Topical, Daily  midodrine, 5 mg, Oral, TID AC  rifAXIMin, 550 mg, Oral, Q12H  sodium chloride, 10 mL, Intravenous, Q12H  ursodiol, 300 mg, Oral, BID  vitamin B-12, 500 mcg, Oral, Daily  Zinc Oxide, , Apply externally, BID      Continuous Infusions:dextrose, 20 mL/hr, Last Rate: 10 mL/hr (02/15/21 1317)      PRN Meds:•  acetaminophen **OR** acetaminophen **OR** acetaminophen  •  dextrose  •  dextrose  •  glucagon (human recombinant)  •  HYDROcodone-acetaminophen  •  magnesium sulfate **OR** magnesium sulfate in D5W 1g/100mL (PREMIX) **OR** magnesium sulfate  •  melatonin  •  nitroglycerin  •  OLANZapine  •  ondansetron **OR** ondansetron  •  potassium chloride  •  potassium chloride  •  sodium chloride  •  tiZANidine    Assessment/Plan   Assessment and Plan:         Congestive heart failure (CMS/HCC)    Atrial fibrillation (CMS/HCC)    Hypertension, benign    Lymphedema    Gout    Non-pressure chronic ulcer right ankle, limited to breakdown skin (CMS/HCC)    Automatic implantable cardiac defibrillator in situ    Acute UTI (urinary tract infection)    Lower extremity cellulitis    CAD (coronary artery disease)    Sepsis (CMS/HCC)    CONG (acute kidney injury) (CMS/HCC)    Lactic acidosis    Metabolic acidosis    Delirium, acute    ASSESSMENT:  · Acute kidney injury, oliguric, stage 2-3, evolving, ongoing hypotension, ? Sepsis, and prerenal azotemia component with ongoing diarrhea, diuretics use,   ·  might have some degree of progression of CKD too.   · CKD 4, with baseline creatinine around 1.7- 2.2, till last year,02/2020, never followed up in clinic. Work up then  ua negative RBC,WBC,no protein. UPC, was unremarkable in 02/2020 USG, right 8.9 cm, andleft 8.3 cm, medial renal disease.  · Metabolic acidosis, gap and non gap, more due  to GI bicarb loss, CKD and persistent lactic acidosis. patient apparently had large bowel movement, significant metabolic acidosis  · Hypotension, concern hypovolemia,   · Congestive heart failure with last ejection fraction around 40% from 2018,   · History of atrial fibrillation  · Significant metabolic acidosis  · Significant anemia  · Chronic lymphedema  · Cellulitis of lower extremities.   · Thrombocytopenia, also on 02/2020  · Anemia.      Plan:        · Patient baseline creatinine 1.8-2.  Acute increase in creatinine with acute increase in volume with infection and metabolic acidosis and had some  GI  bicarbonate loss which is better  · Urine output improved slightly better BUN at this time. and creatinine creatinine slightly better  · Patient BNP level slightly better than yesterday creatinine is also improving slowly.  · Symptomatically patient better more alert oriented ammonia level is improving.  · Patient still has significantly engorged neck veins with significant peripheral edema  · Repeat chest x-ray with mild pulmonary congestion  · To me patient seems to be still volume overloaded  · Follow-up with repeat lab  · Hemodynamics are so far acceptable  · Follow-up with a urine output and electrolytes  · Unwilling to continue same dose of diuretics for another 24-hour  · Follow-up with repeat labs  · Patient has cardiorenal syn will drome at this time  · Okay to DC Ayon catheter by tomorrow morning  · Fu sepsis work up follow-up with infectious disease  · Repeat labs,   · Decrease fluids in evening.        Note started  by Feliz Silva MD,   [facility] kidney consultant

## 2021-02-16 NOTE — PROGRESS NOTES
Referring Provider: Hospitalist    Reason for follow-up: Swelling of the feet and shortness of breath     Patient Care Team:  Rosa M Chavez APRN as PCP - General (Nurse Practitioner)    Subjective .  Lying in bed comfortably without any symptoms    Objective  Lying in bed comfortably     Review of Systems   Constitution: Negative for fever and malaise/fatigue.   HENT: Negative for ear pain and nosebleeds.    Eyes: Negative for blurred vision and double vision.   Cardiovascular: Negative for chest pain, dyspnea on exertion and palpitations.   Respiratory: Negative for cough and shortness of breath.    Skin: Negative for rash.   Musculoskeletal: Negative for joint pain.   Gastrointestinal: Negative for abdominal pain, nausea and vomiting.   Neurological: Negative for focal weakness and headaches.   Psychiatric/Behavioral: Negative for depression. The patient is not nervous/anxious.    All other systems reviewed and are negative.      Allopurinol, Clindamycin hcl, Codeine, Furosemide, Hydrochlorothiazide, Naproxen, and Sulfa antibiotics    Scheduled Meds:aspirin, 81 mg, Oral, Daily  bumetanide, 2 mg, Intravenous, Q6H  ceftaroline, 200 mg, Intravenous, Q12H  digoxin, 125 mcg, Oral, Daily  febuxostat, 40 mg, Oral, BID  folic acid, 1 mg, Oral, Daily  gabapentin, 100 mg, Oral, TID  lactulose, 20 g, Oral, TID  lidocaine, 2 patch, Transdermal, Q24H  magic butt paste, , Topical, Daily  midodrine, 5 mg, Oral, TID AC  rifAXIMin, 550 mg, Oral, Q12H  sodium chloride, 10 mL, Intravenous, Q12H  ursodiol, 300 mg, Oral, BID  vitamin B-12, 500 mcg, Oral, Daily  Zinc Oxide, , Apply externally, BID      Continuous Infusions:dextrose, 20 mL/hr, Last Rate: 10 mL/hr (02/15/21 1317)      PRN Meds:.•  acetaminophen **OR** acetaminophen **OR** acetaminophen  •  dextrose  •  dextrose  •  glucagon (human recombinant)  •  HYDROcodone-acetaminophen  •  magnesium sulfate **OR** magnesium sulfate in D5W 1g/100mL (PREMIX) **OR** magnesium  "sulfate  •  melatonin  •  nitroglycerin  •  OLANZapine  •  ondansetron **OR** ondansetron  •  potassium chloride  •  potassium chloride  •  sodium chloride  •  tiZANidine        VITAL SIGNS  Vitals:    02/15/21 2227 02/16/21 0344 02/16/21 0556 02/16/21 0843   BP: 146/63 143/60 143/60 133/60   BP Location: Left arm Left arm Left arm    Patient Position: Lying Lying Lying    Pulse: 60 60 60 60   Resp: 14 16 20    Temp: 97.3 °F (36.3 °C) 98.3 °F (36.8 °C) 97.1 °F (36.2 °C)    TempSrc: Oral Oral Oral    SpO2: 100% 100% 100%    Weight:   76 kg (167 lb 8.8 oz)    Height:           Flowsheet Rows      First Filed Value   Admission Height  157.5 cm (62\") Documented at 02/05/2021 2359   Admission Weight  78 kg (172 lb) Documented at 02/05/2021 2359           TELEMETRY: Atrial fibrillation with controlled medical response    Physical Exam:  Constitutional:       Appearance: Well-developed.   Eyes:      General: No scleral icterus.     Conjunctiva/sclera: Conjunctivae normal.      Pupils: Pupils are equal, round, and reactive to light.   HENT:      Head: Normocephalic and atraumatic.   Neck:      Musculoskeletal: Normal range of motion and neck supple.      Vascular: No carotid bruit or JVD.   Pulmonary:      Effort: Pulmonary effort is normal.      Breath sounds: Normal breath sounds. No wheezing. No rales.   Cardiovascular:      Normal rate. Irregularly irregular rhythm.      Murmurs: There is a systolic murmur.   Pulses:     Intact distal pulses.   Abdominal:      General: Bowel sounds are normal.      Palpations: Abdomen is soft.   Musculoskeletal: Normal range of motion.   Skin:     General: Skin is warm and dry.      Findings: No rash.   Neurological:      Mental Status: Alert.      Comments: No focal deficits          Results Review:   I reviewed the patient's new clinical results.  Lab Results (last 24 hours)     Procedure Component Value Units Date/Time    Manual Differential [[id number]]  (Abnormal) Collected: " 02/16/21 0640    Specimen: Blood Updated: 02/16/21 0750     Neutrophil % 75.0 %      Lymphocyte % 7.0 %      Monocyte % 5.0 %      Eosinophil % 1.0 %      Bands %  12.0 %      Neutrophils Absolute 6.44 10*3/mm3      Lymphocytes Absolute 0.52 10*3/mm3      Monocytes Absolute 0.37 10*3/mm3      Eosinophils Absolute 0.07 10*3/mm3      Anisocytosis Slight/1+     WBC Morphology Normal     Platelet Estimate Decreased    CBC & Differential [[id number]]  (Abnormal) Collected: 02/16/21 0640    Specimen: Blood Updated: 02/16/21 0750    Narrative:      The following orders were created for panel order CBC & Differential.  Procedure                               Abnormality         Status                     ---------                               -----------         ------                     Scan Slide[[id number]]                                       Final result               CBC Auto Differential[[id number]]        Abnormal            Final result                 Please view results for these tests on the individual orders.    CBC Auto Differential [[id number]]  (Abnormal) Collected: 02/16/21 0640    Specimen: Blood Updated: 02/16/21 0750     WBC 7.40 10*3/mm3      RBC 2.99 10*6/mm3      Hemoglobin 9.8 g/dL      Hematocrit 29.7 %      MCV 99.1 fL      MCH 32.7 pg      MCHC 33.0 g/dL      RDW 16.3 %      RDW-SD 56.9 fl      MPV 10.0 fL      Platelets 77 10*3/mm3     Narrative:      The previously reported component NRBC is no longer being reported. Previous result was 0.1 /100 WBC (Reference Range: 0.0-0.2 /100 WBC) on 2/16/2021 at 0706 EST.    Scan Slide [[id number]] Collected: 02/16/21 0640    Specimen: Blood Updated: 02/16/21 0750     Scan Slide --     Comment: See Manual Differential Results       POC Glucose Once [[id number]]  (Normal) Collected: 02/16/21 0730    Specimen: Blood Updated: 02/16/21 0732     Glucose 83 mg/dL      Comment: Serial Number: [id number]:  [id number]       BNP [[id number]]  (Abnormal) Collected:  02/16/21 0640    Specimen: Blood Updated: 02/16/21 0729     proBNP 40,937.0 pg/mL     Narrative:      Among patients with dyspnea, NT-proBNP is highly sensitive for the detection of acute congestive heart failure. In addition NT-proBNP of <300 pg/ml effectively rules out acute congestive heart failure with 99% negative predictive value.    Results may be falsely decreased if patient taking Biotin.      Magnesium [[id number]]  (Normal) Collected: 02/16/21 0640    Specimen: Blood Updated: 02/16/21 0720     Magnesium 2.0 mg/dL     Comprehensive Metabolic Panel [[id number]]  (Abnormal) Collected: 02/16/21 0640    Specimen: Blood Updated: 02/16/21 0719     Glucose 166 mg/dL      BUN 87 mg/dL      Creatinine 2.89 mg/dL      Sodium 143 mmol/L      Potassium 3.9 mmol/L      Chloride 104 mmol/L      CO2 32.0 mmol/L      Calcium 8.1 mg/dL      Total Protein 5.1 g/dL      Albumin 2.30 g/dL      ALT (SGPT) 30 U/L      AST (SGOT) 48 U/L      Alkaline Phosphatase 803 U/L      Total Bilirubin 0.9 mg/dL      eGFR Non African Amer 16 mL/min/1.73      Globulin 2.8 gm/dL      A/G Ratio 0.8 g/dL      BUN/Creatinine Ratio 30.1     Anion Gap 7.0 mmol/L     Narrative:      GFR Normal >60  Chronic Kidney Disease <60  Kidney Failure <15      Ammonia [[id number]]  (Normal) Collected: 02/16/21 0640    Specimen: Blood Updated: 02/16/21 0717     Ammonia 30 umol/L     Protime-INR [[id number]]  (Abnormal) Collected: 02/16/21 0640    Specimen: Blood Updated: 02/16/21 0706     Protime 12.7 Seconds      INR 1.16    Potassium [[id number]]  (Normal) Collected: 02/16/21 0011    Specimen: Blood Updated: 02/16/21 0033     Potassium 4.1 mmol/L     POC Glucose Once [[id number]]  (Abnormal) Collected: 02/15/21 2003    Specimen: Blood Updated: 02/15/21 2004     Glucose 122 mg/dL      Comment: Serial Number: [id number]:  [id number]       BNP [[id number]]  (Abnormal) Collected: 02/15/21 1716    Specimen: Blood Updated: 02/15/21 1752     proBNP 47,413.0 pg/mL      Narrative:      Among patients with dyspnea, NT-proBNP is highly sensitive for the detection of acute congestive heart failure. In addition NT-proBNP of <300 pg/ml effectively rules out acute congestive heart failure with 99% negative predictive value.    Results may be falsely decreased if patient taking Biotin.      Ammonia [[id number]]  (Normal) Collected: 02/15/21 1716    Specimen: Blood Updated: 02/15/21 1743     Ammonia 43 umol/L     Immunofixation, Serum [[id number]] Collected: 02/15/21 1716    Specimen: Blood Updated: 02/15/21 1723    POC Glucose Once [[id number]]  (Abnormal) Collected: 02/15/21 1640    Specimen: Blood Updated: 02/15/21 1641     Glucose 139 mg/dL      Comment: Serial Number: [id number]:  [id number]       POC Glucose Once [[id number]]  (Abnormal) Collected: 02/15/21 1212    Specimen: Blood Updated: 02/15/21 1218     Glucose 192 mg/dL      Comment: Serial Number: [id number]:  [id number]             Imaging Results (Last 24 Hours)     ** No results found for the last 24 hours. **          EKG      I personally viewed and interpreted the patient's EKG/Telemetry data:    ECHOCARDIOGRAM:    STRESS MYOVIEW:    CARDIAC CATHETERIZATION:    OTHER:         Assessment/Plan     Active Problems:    Congestive heart failure (CMS/HCC)    Atrial fibrillation (CMS/HCC)    Hypertension, benign    Lymphedema    Gout    Non-pressure chronic ulcer right ankle, limited to breakdown skin (CMS/HCC)    Automatic implantable cardiac defibrillator in situ    Acute UTI (urinary tract infection)    Lower extremity cellulitis    CAD (coronary artery disease)    Sepsis (CMS/HCC)    CONG (acute kidney injury) (CMS/HCC)    Lactic acidosis    Metabolic acidosis    Delirium, acute       Patient presented with shortness of breath and swelling of the feet and is ruled out for MI will get enzymes  Patient has history of congestive heart failure with LV dysfunction and is currently stable on medications  Patient also has  lymphedema and renal insufficiency and is followed by the nephrologist  Patient has history of persistent atrial fibrillation and is currently on medical therapy including digoxin not beta-blockers have been held because of low blood pressure   Since her blood pressure is stable now we will restart her on low-dose beta-blockers at home  Patient is not on any anticoagulation in the past and even now because of life-threatening GI bleed and patient does not want to be on any anticoagulation at this time.    I discussed the patients findings and my recommendations with patient and nurse    Vinod Root MD  02/16/21  10:57 EST

## 2021-02-16 NOTE — PLAN OF CARE
Goal Outcome Evaluation:        Outcome Summary: Pt tired and drowsy today, but mostly alert and oriented. Dr Silva requests FC to be left in to monitor urine output. Dr Torres requested weaning D10 and monitor blood glucose before DCing. Daughter RUKHSANA Lindsay stated pt received 1st Covid injection and will be due for second. Pharmacist stated hospital can give second dose if Pfizer, but not Moderna and it is ok if second dose is couple weeks late. Pt having moderate liquid BMs with lactulose.

## 2021-02-16 NOTE — PLAN OF CARE
Objective:  Bed mobility - Max-A and Assist x 2  Transfers - Mod-A, Max-A and Assist x 2  Ambulation - 0 feet N/A or Not attempted.    Assessment: Emperatriz Childs presents with functional mobility impairments which indicate the need for skilled intervention. Tolerating session today without incident. Pt requires significant physical assistance for bed mobility and transfers. Pt this date requires encouragement to sit EOB, and Ax2 to come to standing and transfer with RWx and increased time.Will continue to follow and progress as tolerated.

## 2021-02-16 NOTE — PLAN OF CARE
Goal Outcome Evaluation:  Plan of Care Reviewed With: patient  Progress: improving    Pt. Demonstrates improved functional mobility this date w/ mod A x 2 for SPS Transfer EOB to armchair, albeit progress limited secondary to continued global weakness w/ BLE edema impacting ADL participation. Recommend IP rehab at d/c to address aforementioned deficits.

## 2021-02-16 NOTE — CONSULTS
Full Code / Aggressive Measures    Palliative care  met with pt this morning to assess goals of care and complete psychosocial assessment.  I also met with pt late yesterday, but she was lethargic and confused at time of contact.  Yesterday pt insisted that she was not sick, and that she just needed to be at home.  Today, pt was more alert and reported that her appetite was better.  Pt reports living at home independently prior to hospitalization.  Pt reports having 3 children, with her daughter Amy being her DPOA.  Goals of care discussed and pt affirmed being a full code with continued aggressive treatment.  I noted that the pt has been a DNR during previous hospitalizations, but pt reports that she wishes to have resuscitation efforts if needed.    Pt's daughter, Amy, was called and discussed pt's baseline behavior.  Daughter reports that the pt is usually very noncompliant with physician's recommendations at home and does not adhere to diet and medication changes.  Daughter reports that the pt is just very stubborn in general and unwilling to acknowledge the severity of her illness.  Daughter affirmed that she was the DPOA and that the pt has a living will, which she faxed both to the hospital on 2/9.  Daughter reports she will support whatever her mom wants, but knows that she can be stubborn in the face of unreasonable expectations for recovery and care.  Emotional support provided.  Will continue to follow peripherally.  Thank you for the consult.    Palliative Care Assessment    PC Encounter Information  Palliative Care Patient?: yes  Referral Date: 02/15/21  Referral Time: 0937  Date of Initial Encounter with a Palliative Care Provider: 02/15/21  Time of initial encounter with a Palliative Care Provider: 1500  Time Required for Initial Referral: 45 mins  Additional Visit/Time Required to Achieve Results: 15 mins  Patient Unit at Time of Referral: PCU  Patient Unit Specialty at Time of  Referral: telemetry  Primary Diagnosis Leading to PC Consult: renal  Code Status at Time of Consult: full  Palliative Care Team Members Involved in Consultation:   Symptom Distress  Last Bowel Movement: 02/16/21  Pain Assessment  CPOT Facial Expression: 0-->relaxed, neutral  CPOT Body Movements: 0-->absence of movements  CPOT Muscle Tension: 0-->relaxed  Ventilator Compliance/Vocalization: 0-->talking in normal tone or no sound  CPOT Score: 0  Pain Location: mouth (nondental)  Pain Description: burning, sharp  Screening Status/Interventions Data  Psychosocial Needs: pos  Psychosocial Needs Intervened: yes  Spiritual Needs: unable  Goals of Care/ACP: pos  Goals of Care/ACP Intervened: yes  Health Care Directives/Treatment Preferences  POLST/MOST Initiated: no  Pre-existing AND/MOST/POLST Order: Yes, notify physician for order  Advance Directive Status: Patient has advance directive, copy in chart  Type of Advance Directive: Durable power of  for health care, Health care directive/Living will  Outcomes  Code Status After Consult: full  Interventions  Pain Management Interventions: see MAR  Oral Care: oral rinse provided  Family/Support System Care: support provided, self-care encouraged

## 2021-02-16 NOTE — PROGRESS NOTES
Continued Stay Note  [facility]     Patient Name: Emperatriz Childs  MRN: [id number]  Today's Date: 2/16/2021    Admit Date: 2/5/2021    Discharge Plan     Row Name 02/16/21 1431       Plan    Plan  DC Plan: [facility] Accepted. PASRR approved. Pre-cert required. Eligible for floor to SNF with 6 click score on day of DC.    Plan Comments  Needs updated 6 click score on day of dc to qualify for floor to SNF (score 8-15). DC barrier: Elevated BNP, Ayon to monitor I&O. Significant edema per Nephrology. Creatinine improved. Abx changed to Teflaro, Antifungal changed d/t hepatotoxicity concern.        Chart Review Only - No patient contact        Kenisha Nagy RN

## 2021-02-16 NOTE — PLAN OF CARE
Goal Outcome Evaluation:        Outcome Summary: Pt slept majority of shift but more alert and oriented x3. Able to sit in chair all of afternoon. Continuing dieuretics and lactulose. will continue to monitor

## 2021-02-16 NOTE — PROGRESS NOTES
Infectious Diseases Progress Note      LOS: 8 days   Patient Care Team:  Rosa M Chavez APRN as PCP - General (Nurse Practitioner)    Chief Complaint: Lower extremities edema    Subjective       The patient has been afebrile for the last 24 hours.  The patient is on 1 L of oxygen by nasal cannula, hemodynamically stable.  She is currently in the chair and has no new complaints      Review of Systems:   Review of Systems   Constitutional: Negative.    HENT: Negative.    Eyes: Negative.    Respiratory: Negative.    Cardiovascular: Positive for leg swelling.   Gastrointestinal: Negative.    Genitourinary: Negative.    Musculoskeletal: Negative.         Bilateral leg pain   Skin: Negative.    Neurological: Negative.    Hematological: Negative.    Psychiatric/Behavioral: Negative.         Objective     Vital Signs  Temp:  [97.1 °F (36.2 °C)-98.4 °F (36.9 °C)] 98.4 °F (36.9 °C)  Heart Rate:  [60] 60  Resp:  [14-22] 20  BP: (118-146)/(51-64) 118/51    Physical Exam:  Physical Exam  Vitals signs and nursing note reviewed.   Constitutional:       Appearance: She is well-developed.   HENT:      Head: Normocephalic and atraumatic.   Eyes:      Pupils: Pupils are equal, round, and reactive to light.   Neck:      Musculoskeletal: Normal range of motion and neck supple.   Cardiovascular:      Rate and Rhythm: Normal rate and regular rhythm.      Heart sounds: Normal heart sounds.   Pulmonary:      Effort: Pulmonary effort is normal. No respiratory distress.      Breath sounds: Normal breath sounds. No wheezing or rales.   Abdominal:      General: Bowel sounds are normal. There is no distension.      Palpations: Abdomen is soft. There is no mass.      Tenderness: There is no abdominal tenderness. There is no guarding or rebound.   Musculoskeletal: Normal range of motion.         General: No deformity.      Right lower leg: Edema present.      Left lower leg: Edema present.      Comments: Superimposed erythema of both lower  extremities more on the left than the right-erythema is difficult to assess because of the Magic Butt paste on the legs however erythema does appear improved  -Erythema is significantly improved since we first saw the patient   Skin:     General: Skin is warm.      Findings: No erythema or rash.   Neurological:      Mental Status: She is alert and oriented to person, place, and time.      Cranial Nerves: No cranial nerve deficit.          Results Review:    I have reviewed all clinical data, test, lab, and imaging results.     Radiology  No Radiology Exams Resulted Within Past 24 Hours    Cardiology    Laboratory    Results from last 7 days   Lab Units 02/16/21  0640 02/15/21  0530 02/14/21  0446 02/13/21  0350 02/12/21  0427 02/11/21  0027 02/10/21  0547   WBC 10*3/mm3 7.40 8.40 8.30 11.00* 14.20* 12.00* 10.30   HEMOGLOBIN g/dL 9.8* 8.6* 8.7* 8.5* 9.0* 9.0* 9.0*   HEMATOCRIT % 29.7* 26.9* 26.5* 26.3* 28.3* 27.8* 27.2*   PLATELETS 10*3/mm3 77* 87* 72* 74* 63* 92* 35*     Results from last 7 days   Lab Units 02/16/21  0640 02/16/21  0011 02/15/21  1716 02/15/21  0530 02/14/21 0446 02/13/21  1722 02/13/21  0350 02/12/21  1759 02/12/21  1714 02/12/21  0427  02/11/21  1227  02/10/21  0547   SODIUM mmol/L 143  --   --  142 140 140 140  --  138 136   < >  --    < > 138   POTASSIUM mmol/L 3.9 4.1  --  3.3* 3.6 3.7 3.9  --  3.5 3.8   < >  --    < > 3.7   CHLORIDE mmol/L 104  --   --  102 102 102 103  --  102 99   < >  --    < > 104   CO2 mmol/L 32.0*  --   --  29.0 28.0 27.0 25.0  --  25.0 24.0   < >  --    < > 24.0   BUN mg/dL 87*  --   --  96* 100* 99* 102*  --  98* 102*   < >  --    < > 95*   CREATININE mg/dL 2.89*  --   --  3.59* 3.84* 3.70* 3.73*  --  3.57* 3.46*   < >  --    < > 2.98*   GLUCOSE mg/dL 166*  --   --  91 72 85 78  --  174* 220*   < >  --    < > 92   ALBUMIN g/dL 2.30*  --   --  2.40* 2.20*  --  2.40*  --   --  2.90*  --  2.0*  --  2.10*   BILIRUBIN mg/dL 0.9  --   --  0.8 0.8  --  0.8  --   --  1.2  --    --   --  1.4*   ALK PHOS U/L 803*  --   --  945* 1,082*  --  994*  --   --  762*  --   --   --  396*   AST (SGOT) U/L 48*  --   --  78* 101*  --  108*  --   --  72*  --   --   --  81*   ALT (SGPT) U/L 30  --   --  40* 46*  --  45*  --   --  35*  --   --   --  27   AMMONIA umol/L 30  --  43  --  72*  --   --  480*  --   --   --   --   --   --    CALCIUM mg/dL 8.1*  --   --  7.6* 7.6* 7.3* 7.7*  --  7.2* 8.0*   < >  --    < > 7.0*    < > = values in this interval not displayed.                 Microbiology   Microbiology Results (last 10 days)     Procedure Component Value - Date/Time    Urine Culture - Urine, Urine, Random Void [[id number]]  (Abnormal) Collected: 02/10/21 1616    Lab Status: Final result Specimen: Urine, Random Void Updated: 02/11/21 1234     Urine Culture Yeast isolated     Comment: No further workup.       Urine Culture - Urine, Urine, Catheter [[id number]]  (Abnormal) Collected: 02/10/21 0306    Lab Status: Final result Specimen: Urine, Catheter Updated: 02/11/21 1234     Urine Culture Yeast isolated     Comment: No further workup.       Clostridium Difficile Toxin - Stool, Per Rectum [[id number]]  (Normal) Collected: 02/06/21 1952    Lab Status: Final result Specimen: Stool from Per Rectum Updated: 02/07/21 0725    Narrative:      The following orders were created for panel order Clostridium Difficile Toxin - Stool, Per Rectum.  Procedure                               Abnormality         Status                     ---------                               -----------         ------                     Clostridium Difficile EI...[[id number]]  Normal              Final result                 Please view results for these tests on the individual orders.    Clostridium Difficile EIA - Stool, Per Rectum [[id number]]  (Normal) Collected: 02/06/21 1952    Lab Status: Final result Specimen: Stool from Per Rectum Updated: 02/07/21 0725     C Diff GDH / Toxin Negative          Medication Review:       Schedule  Meds  aspirin, 81 mg, Oral, Daily  bumetanide, 2 mg, Intravenous, Q6H  ceftaroline, 200 mg, Intravenous, Q12H  digoxin, 125 mcg, Oral, Daily  febuxostat, 40 mg, Oral, BID  folic acid, 1 mg, Oral, Daily  gabapentin, 100 mg, Oral, TID  lactulose, 20 g, Oral, TID  lidocaine, 2 patch, Transdermal, Q24H  magic butt paste, , Topical, Daily  midodrine, 5 mg, Oral, TID AC  rifAXIMin, 550 mg, Oral, Q12H  sodium chloride, 10 mL, Intravenous, Q12H  ursodiol, 300 mg, Oral, BID  vitamin B-12, 500 mcg, Oral, Daily  Zinc Oxide, , Apply externally, BID        Infusion Meds  dextrose, 20 mL/hr, Last Rate: 10 mL/hr (02/15/21 1317)        PRN Meds  •  acetaminophen **OR** acetaminophen **OR** acetaminophen  •  dextrose  •  dextrose  •  glucagon (human recombinant)  •  HYDROcodone-acetaminophen  •  magnesium sulfate **OR** magnesium sulfate in D5W 1g/100mL (PREMIX) **OR** magnesium sulfate  •  melatonin  •  nitroglycerin  •  OLANZapine  •  ondansetron **OR** ondansetron  •  potassium chloride  •  potassium chloride  •  sodium chloride  •  tiZANidine        Assessment/Plan       Antimicrobial Therapy   1.  Teflaro     day  2.       day  3.      Day  4.      Day  5.      Day    Assessment     Bilateral lower extremities lymphedema with erythema of the right leg consistent with cellulitis.  Erythema had improved     Acute kidney injury     Leukocytosis-likely related to severe cellulitis.  Significantly improved    Congestive heart failure     History of pacemaker implantation    Hypothermia-appears to have resolved    Candiduria.  Patient s/p replacement of Ayon catheter on February 10, 2021 and repeat urinalysis was positive for candiduria    Significant elevation of alkaline phosphatase.  GI service is concerned about medication induced such as doxycycline.  Fluconazole might be responsible option  -Trending down     Plan     Continue Teflaro 200 mg IV every 12 hours for 1 more day  Continue supportive care  A.m. labs including liver  enzymes        Harper Darling, CHRISTIANO  02/16/21  14:56 EST      Note is dictated utilizing voice recognition software/Dragon

## 2021-02-16 NOTE — PROGRESS NOTES
Hematology/Oncology Inpatient Progress Note    PATIENT NAME: Emperatriz Childs  : 1945  MRN: [id number]    CHIEF COMPLAINT: Lymphedema    HISTORY OF PRESENT ILLNESS:    Emperatriz Childs is a 75 y.o. female who presented to [facility] on 2021 with complaints of increasing leg swelling and pain. Patient reported chronic lymphedema with erythema that has progressively gotten worse over the past several weeks. She reported associated pain with weightbearing for an extended period of time, fever, and chills.  Labs in the ED were significant for , potassium 5.3, creatinine 2.41, BUN 63, EGFR 19, lactate 3.2, hemoglobin 11.3.  Urinalysis was positive for blood, leukocytes, and nitrites.  Chest x-ray revealed cardiomegaly.  Patient was admitted for further evaluation and management.  Since admission, nephrology has been consulted for acute kidney injury.  Patient was started on antibiotics for lower extremity cellulitis and UTI. Infectious disease was also consulted.     21  Hematology/Oncology was consulted for persistent thrombocytopenia.  Platelet count on admission were 86,000.  Platelets have continued to remain low throughout admission with lowest platelet count of 34,000 on 2021.  On review of patient's chart, patient has a history of thrombocytopenia. Patient was seen by Dr. Nathan outpatient on 2018 for anemia.  Initial work-up was performed and patient was to follow up in 2 weeks, however, patient was lost to follow-up.     She  has a past medical history of CHF (congestive heart failure) (CMS/[facility]), History of transfusion, Hypertension, Lymphedema of leg, and Myocardial infarction (CMS/[facility]).     PCP: Rosa M Chavez APRN    History of present illness was reviewed and is unchanged from the previous visit. 21     Subjective      Patient remains weak.  Lying in bed all the time.      ROS:  Review of Systems   Constitutional: Positive for fatigue. Negative for  chills and fever.   HENT: Negative for mouth sores and nosebleeds.    Eyes: Negative for photophobia, pain, redness and visual disturbance.   Respiratory: Negative for cough and shortness of breath.    Cardiovascular: Negative for chest pain and leg swelling.   Gastrointestinal: Negative for diarrhea, nausea and vomiting.   Endocrine: Negative for cold intolerance, heat intolerance, polydipsia and polyphagia.   Genitourinary: Negative for difficulty urinating and hematuria.   Musculoskeletal: Negative for arthralgias and myalgias.   Skin: Positive for color change.   Neurological: Negative for dizziness, speech difficulty, light-headedness and headaches.   Psychiatric/Behavioral: Negative for confusion and hallucinations.      MEDICATIONS:    Scheduled Meds:  aspirin, 81 mg, Oral, Daily  bumetanide, 2 mg, Intravenous, Q6H  ceftaroline, 200 mg, Intravenous, Q12H  digoxin, 125 mcg, Oral, Daily  febuxostat, 40 mg, Oral, BID  folic acid, 1 mg, Oral, Daily  gabapentin, 100 mg, Oral, TID  lactulose, 20 g, Oral, TID  lidocaine, 2 patch, Transdermal, Q24H  magic butt paste, , Topical, Daily  midodrine, 5 mg, Oral, TID AC  rifAXIMin, 550 mg, Oral, Q12H  sodium chloride, 10 mL, Intravenous, Q12H  ursodiol, 300 mg, Oral, BID  vitamin B-12, 500 mcg, Oral, Daily  Zinc Oxide, , Apply externally, BID       Continuous Infusions:  dextrose, 20 mL/hr, Last Rate: 10 mL/hr (02/15/21 1317)       PRN Meds:  •  acetaminophen **OR** acetaminophen **OR** acetaminophen  •  dextrose  •  dextrose  •  glucagon (human recombinant)  •  HYDROcodone-acetaminophen  •  magnesium sulfate **OR** magnesium sulfate in D5W 1g/100mL (PREMIX) **OR** magnesium sulfate  •  melatonin  •  nitroglycerin  •  OLANZapine  •  ondansetron **OR** ondansetron  •  potassium chloride  •  potassium chloride  •  sodium chloride  •  tiZANidine     ALLERGIES:    Allergies   Allergen Reactions   • Allopurinol Unknown - High Severity   • Clindamycin Hcl Unknown - High Severity  "  • Codeine Unknown - High Severity   • Furosemide Unknown - High Severity   • Hydrochlorothiazide Unknown - High Severity   • Naproxen Unknown - High Severity   • Sulfa Antibiotics Unknown - High Severity       Objective    VITALS:   /60   Pulse 60   Temp 97.1 °F (36.2 °C) (Oral)   Resp 20   Ht 157.5 cm (62\")   Wt 76 kg (167 lb 8.8 oz)   SpO2 100%   BMI 30.65 kg/m²     PHYSICAL EXAM: (performed by MD)  Physical Exam  Vitals signs and nursing note reviewed.   Constitutional:       General: She is not in acute distress.     Appearance: She is obese. She is ill-appearing. She is not diaphoretic.   HENT:      Head: Normocephalic and atraumatic.   Eyes:      General: No scleral icterus.        Right eye: No discharge.         Left eye: No discharge.      Conjunctiva/sclera: Conjunctivae normal.   Neck:      Musculoskeletal: Normal range of motion and neck supple. No muscular tenderness.      Thyroid: No thyromegaly.      Vascular: No carotid bruit.   Cardiovascular:      Rate and Rhythm: Normal rate and regular rhythm.      Heart sounds: Normal heart sounds. No friction rub. No gallop.    Pulmonary:      Effort: Pulmonary effort is normal. No respiratory distress.      Breath sounds: No stridor. No wheezing.   Abdominal:      General: Bowel sounds are normal.      Palpations: Abdomen is soft. There is no mass.      Tenderness: There is no abdominal tenderness. There is no guarding or rebound.   Musculoskeletal: Normal range of motion.         General: Swelling present. No tenderness.      Right lower leg: Edema present.      Left lower leg: Edema present.      Comments: Both legs are wrapped in bandage   Lymphadenopathy:      Cervical: No cervical adenopathy.   Skin:     General: Skin is warm.      Coloration: Skin is not pale.      Findings: No bruising, erythema or rash.   Neurological:      General: No focal deficit present.      Mental Status: She is alert and oriented to person, place, and time.      " Cranial Nerves: No cranial nerve deficit.      Sensory: No sensory deficit.      Motor: Weakness present. No abnormal muscle tone.   Psychiatric:         Mood and Affect: Mood normal.         Behavior: Behavior normal.         Thought Content: Thought content normal.           RECENT LABS:  Lab Results (last 24 hours)     Procedure Component Value Units Date/Time    Manual Differential [[id number]]  (Abnormal) Collected: 02/16/21 0640    Specimen: Blood Updated: 02/16/21 0750     Neutrophil % 75.0 %      Lymphocyte % 7.0 %      Monocyte % 5.0 %      Eosinophil % 1.0 %      Bands %  12.0 %      Neutrophils Absolute 6.44 10*3/mm3      Lymphocytes Absolute 0.52 10*3/mm3      Monocytes Absolute 0.37 10*3/mm3      Eosinophils Absolute 0.07 10*3/mm3      Anisocytosis Slight/1+     WBC Morphology Normal     Platelet Estimate Decreased    CBC & Differential [[id number]]  (Abnormal) Collected: 02/16/21 0640    Specimen: Blood Updated: 02/16/21 0750    Narrative:      The following orders were created for panel order CBC & Differential.  Procedure                               Abnormality         Status                     ---------                               -----------         ------                     Scan Slide[[id number]]                                       Final result               CBC Auto Differential[[id number]]        Abnormal            Final result                 Please view results for these tests on the individual orders.    CBC Auto Differential [[id number]]  (Abnormal) Collected: 02/16/21 0640    Specimen: Blood Updated: 02/16/21 0750     WBC 7.40 10*3/mm3      RBC 2.99 10*6/mm3      Hemoglobin 9.8 g/dL      Hematocrit 29.7 %      MCV 99.1 fL      MCH 32.7 pg      MCHC 33.0 g/dL      RDW 16.3 %      RDW-SD 56.9 fl      MPV 10.0 fL      Platelets 77 10*3/mm3     Narrative:      The previously reported component NRBC is no longer being reported. Previous result was 0.1 /100 WBC (Reference Range: 0.0-0.2 /100  WBC) on 2/16/2021 at 0706 EST.    Scan Slide [[id number]] Collected: 02/16/21 0640    Specimen: Blood Updated: 02/16/21 0750     Scan Slide --     Comment: See Manual Differential Results       POC Glucose Once [[id number]]  (Normal) Collected: 02/16/21 0730    Specimen: Blood Updated: 02/16/21 0732     Glucose 83 mg/dL      Comment: Serial Number: [id number]:  [id number]       BNP [[id number]]  (Abnormal) Collected: 02/16/21 0640    Specimen: Blood Updated: 02/16/21 0729     proBNP 40,937.0 pg/mL     Narrative:      Among patients with dyspnea, NT-proBNP is highly sensitive for the detection of acute congestive heart failure. In addition NT-proBNP of <300 pg/ml effectively rules out acute congestive heart failure with 99% negative predictive value.    Results may be falsely decreased if patient taking Biotin.      Magnesium [[id number]]  (Normal) Collected: 02/16/21 0640    Specimen: Blood Updated: 02/16/21 0720     Magnesium 2.0 mg/dL     Comprehensive Metabolic Panel [[id number]]  (Abnormal) Collected: 02/16/21 0640    Specimen: Blood Updated: 02/16/21 0719     Glucose 166 mg/dL      BUN 87 mg/dL      Creatinine 2.89 mg/dL      Sodium 143 mmol/L      Potassium 3.9 mmol/L      Chloride 104 mmol/L      CO2 32.0 mmol/L      Calcium 8.1 mg/dL      Total Protein 5.1 g/dL      Albumin 2.30 g/dL      ALT (SGPT) 30 U/L      AST (SGOT) 48 U/L      Alkaline Phosphatase 803 U/L      Total Bilirubin 0.9 mg/dL      eGFR Non African Amer 16 mL/min/1.73      Globulin 2.8 gm/dL      A/G Ratio 0.8 g/dL      BUN/Creatinine Ratio 30.1     Anion Gap 7.0 mmol/L     Narrative:      GFR Normal >60  Chronic Kidney Disease <60  Kidney Failure <15      Ammonia [[id number]]  (Normal) Collected: 02/16/21 0640    Specimen: Blood Updated: 02/16/21 0717     Ammonia 30 umol/L     Protime-INR [[id number]]  (Abnormal) Collected: 02/16/21 0640    Specimen: Blood Updated: 02/16/21 0706     Protime 12.7 Seconds      INR 1.16    Potassium [[id number]]   (Normal) Collected: 02/16/21 0011    Specimen: Blood Updated: 02/16/21 0033     Potassium 4.1 mmol/L     POC Glucose Once [[id number]]  (Abnormal) Collected: 02/15/21 2003    Specimen: Blood Updated: 02/15/21 2004     Glucose 122 mg/dL      Comment: Serial Number: [id number]:  [id number]       BNP [[id number]]  (Abnormal) Collected: 02/15/21 1716    Specimen: Blood Updated: 02/15/21 1757     proBNP 47,413.0 pg/mL     Narrative:      Among patients with dyspnea, NT-proBNP is highly sensitive for the detection of acute congestive heart failure. In addition NT-proBNP of <300 pg/ml effectively rules out acute congestive heart failure with 99% negative predictive value.    Results may be falsely decreased if patient taking Biotin.      Ammonia [[id number]]  (Normal) Collected: 02/15/21 1716    Specimen: Blood Updated: 02/15/21 1743     Ammonia 43 umol/L     Immunofixation, Serum [[id number]] Collected: 02/15/21 1716    Specimen: Blood Updated: 02/15/21 1723    POC Glucose Once [[id number]]  (Abnormal) Collected: 02/15/21 1640    Specimen: Blood Updated: 02/15/21 1641     Glucose 139 mg/dL      Comment: Serial Number: [id number]:  [id number]       POC Glucose Once [[id number]]  (Abnormal) Collected: 02/15/21 1212    Specimen: Blood Updated: 02/15/21 1218     Glucose 192 mg/dL      Comment: Serial Number: [id number]:  [id number]             PENDING RESULTS: Immunoglobulin free light chain    IMAGING REVIEWED:  Xr Tibia Fibula 2 View Bilateral    Result Date: 2/14/2021  IMPRESSION :  1. No acute findings. 2. Tricompartment osteoarthritis of both knees. 3. Bony demineralization.  Electronically Signed By-Fletcher Barros MD On:2/14/2021 5:39 PM This report was finalized on [id number] by  Fletcher Barros MD.      Assessment/Plan     ASSESSMENT:  1. Thrombocytopenia: Very likely from bone marrow suppression due to acute illness.  Antibiotics may be contributing. Currently on aspirin.  Heparin-induced platelet antibody  normal.  Folate 10.90, vitamin B12 >2000, fibrinogen 554, PTT 34, PT/INR 13.3/1.22.  Peripheral smear showed leukocytosis, anemia, and thrombocytopenia. Platelets stable.   2. Anemia: Consider relation to anemia of chronic disease and CKD.  Anemia studies: ferritin 663.70, iron 50, iron saturation 38%, transferrin 89, TIBC 133, , haptoglobin 138,  reticulocytes 1.98%. Patient currently receiving p.o. vitamin B12 and folic acid.  Protein electrophoresis showed M spike of 0.1. 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  Hemoglobin stable.   3. Leukocytosis: Reactive leukocytosis due to underlying cellulitis and UTI.    Now resolved  4. Elevated liver enzymes and elevated ammonia level: Gastroenterology consulted. Receiving lactulose enemas for elevated ammonia level.  Hepatitis panel nonreactive.  Ultrasound of liver revealed abnormal appearance of liver felt to be secondary to hepatic cirrhosis.  Cholelithiasis. Elevated alkaline phosphatase- patient on fluconazole which can cause increased liver enzymes.  Doxycycline and Diflucan discontinued by infectious disease.  5. CHF: proBNP 66,389. Cardiology following  6. Cellulitis of bilateral lower extremities: ID consulted.  Blood cultures negative. On IV antibiotics.   7. UTI: On IV antibiotics.   8. CONG/CKD: Nephrology consulted  9. Elevated LFT's and CAD/atrial fibrillation/HTN/HLD/gout: Managed per primary team       PLAN:  1. CBC daily  2. Immuglobulin free light chain ordered.    3. Treat underlying infection per ID: Continue IV antibiotics   4. Continue supportive care    Electronically signed by CHRISTIANO Lomeli, 02/17/21, 5:28 PM EST.       I personally reviewed all pertinent labs, related documentation and the  imaging. ROS performed and physical exam done during face to face enounter with patient. I agree with  nurse practitioner's doumentation, assessment and plan.    Anemia and thrombocytopenia stable..  Free  light chains ordered.  Continue supportive care.    Electronically signed by Kevin Nathan MD, 02/18/21, 7:34 PM EST.

## 2021-02-16 NOTE — THERAPY TREATMENT NOTE
Subjective: Pt agreeable to therapeutic plan of care.  Cognition: oriented to Person, Place, Time and Situation    Objective:     Bed Mobility: Mod-A and Assist x 2  Functional Transfers: Mod-A and Assist x 2  Functional Ambulation: N/A or Not attempted.    Lower Body Dressing: Dependent  ADL Position: supine  ADL Comments:     Toileting: Max-A and Assist x 2  ADL Position: supported standing  ADL Comments:     Pain: 5 VAS  Education: Provided education on importance of mobility and skilled verbal / tactile cueing throughout intervention.     Assessment: Emperatriz Childs presents with ADL impairments below baseline abilities which indicate the need for continued skilled intervention while inpatient. Tolerating session today without incident. Will continue to follow and progress as tolerated.     Plan/Recommendations:   Pt would benefit from Inpatient Rehabilitation placement at discharge from facility.   Pt desires Inpatient Rehabilitation placement at discharge. Pt cooperative; agreeable to therapeutic recommendations and plan of care.     Modified [place]: N/A = No pre-op stroke/TIA    Post-Tx Position: Up in Chair  PPE: gloves, surgical mask, eyewear protection

## 2021-02-16 NOTE — THERAPY TREATMENT NOTE
Subjective: Pt agreeable to therapeutic plan of care.    Objective:     Bed mobility - Max-A and Assist x 2  Transfers - Mod-A, Max-A and Assist x 2  Ambulation - 0 feet N/A or Not attempted.    Pain: 6 VAS (BLE)  Education: Provided education on importance of mobility and skilled verbal / tactile cueing throughout intervention.     Assessment: Emperatriz Childs presents with functional mobility impairments which indicate the need for skilled intervention. Tolerating session today without incident. Pt requires significant physical assistance for bed mobility and transfers. Pt this date requires encouragement to sit EOB, and Ax2 to come to standing and transfer with RWx and increased time.Will continue to follow and progress as tolerated.     Plan/Recommendations:   Pt would benefit from Inpatient Rehabilitation placement at discharge from facility and requires no DME at discharge.   Pt desires Inpatient Rehabilitation placement at discharge. Pt cooperative; agreeable to therapeutic recommendations and plan of care.     Basic Mobility 6-click:  Rollin = Total, A lot = 2, A little = 3; 4 = None  Supine>Sit:   1 = Total, A lot = 2, A little = 3; 4 = None   Sit>Stand with arms:  1 = Total, A lot = 2, A little = 3; 4 = None  Bed>Chair:   1 = Total, A lot = 2, A little = 3; 4 = None  Ambulate in room:  1 = Total, A lot = 2, A little = 3; 4 = None  3-5 Steps with railin = Total, A lot = 2, A little = 3; 4 = None  Score: 10    Modified [place]: N/A = No pre-op stroke/TIA    Post-Tx Position: Up in Chair and Call light and personal items within reach  PPE: gloves, surgical mask, eyewear protection

## 2021-02-16 NOTE — PROGRESS NOTES
[facility] Medicine Services Daily Progress Note      Hospitalist Team  LOS 8 days      Patient Care Team:  Rosa M Chavez APRN as PCP - General (Nurse Practitioner)    Patient Location: 2120/1      Subjective   Subjective     Chief Complaint / Subjective  No chief complaint on file.    Patient much more alert and conversational today.  Patient reports her back pain is improved and she is feeling stronger.  No shortness of breath.  Alkaline phosphatase fractionation in process.  Patient continuing on lactulose.  Antibiotics switched to avoid further potential hepatotoxicity.    Brief Synopsis of Hospital Course/HPI  The patient is a 75-year-old female with history atrial fibrillation s/p pacemaker placement, hyperlipidemia, hypertension, CAD, CKD stage III and  chronic lower extremity lymphedema.     Apparently the patient has been having several weeks of worsening lower extremity edema thus went to outside facility.     Laboratory work was significant for , potassium 5.3, BUN 63, creatinine 2.41, WBC 4.5, lactic acid 3.2 and UA with 25-50 WBCs.  The patient was transferred to [facility] for further care        Date::    2/6/21: Poor historian.  Low BP thus gave bolus.  Started on dopamine and transferred to PCU.  Started on bicarb drip.  Confused in the evening.    2/7/21: PICC line placed.  Daughter at the bedside discussed care.  Daughter claims no history of dementia or sundowning.  Leukocytosis.  Added Flagyl.  Consulted ID.  Daughter claims patient off Xarelto.  C. difficile ruled out.     2/8  Has third spacing and bruising.  Patient denies any chest pain  On dopamine drip and being tapered off.  Started on midodrine.     2/9/2021  Outpatient recliner  She has Ace wrap in place  She had been complaining of some tight wraps on the right side and will need to be addressed.  White count better  Her platelet count is 54 slightly better than yesterday  Still on a small  amount of dopamine drip.  Renal function still elevated but probably stabilized.     2/10  Patient is now feeling better and her leg swelling is under control with compression dressing that has been changed to allow for some relief of her pain.  She had drop in her creatinine which suggests improvement with diuresis and possible cardiorenal etiology     She has been eating yesterday and her sugar dropped and started D10     B12 is above 2000  Haptoglobin is 100 within normal.  She is hypomagnesemic as well.  Folic acid 10.9  Still with significant thrombocytopenia.  We will ask hematologist for evaluation.     2/11/2021  Urine output decreased overnight and Dr. Pinedo wants to start dopamine drip again  Creatinine slightly up again today  Patient is asking when she is going to go home.  Since her right ventricle atrial or enlarged    2/12  Coaches in hips and arms hyun w ambulation  Bands 17%  platelet 63  Hallucinating; talking to her !  Check ammmonia     2/13  Feels sleepy most pf time  [id number]--   Initiated lactulose enema and oral lactulose  Consult GI  Worsening alkaline phosphatase and ALT elevation noted  Check hepatitis panel  On Bumex drip  Had good urine output overnight     2/14  Patient has been switched to IV Bumex  She had worsening elevation of alkaline phosphatase  Still has poor oral intake  No evident hallucinations on exam today  Complaining of pain bilateral legs.  X-ray ordered for evaluation.    2/15/2021: Patient reports having diffuse pain today but most prominent in her back.  No chest pain or shortness of breath.  Renal function appears to be improving mildly.  Palliative care consulted for discussing goals of care given patient's significant comorbidities.    Date::          Review of Systems   Constitution: Positive for malaise/fatigue. Negative for chills and fever.   HENT: Negative for hoarse voice and sore throat.    Eyes: Negative for double vision and photophobia.  "  Cardiovascular: Positive for leg swelling. Negative for chest pain.   Respiratory: Negative for cough and shortness of breath.    Musculoskeletal: Positive for back pain and joint pain. Negative for myalgias.   Gastrointestinal: Positive for bloating. Negative for nausea and vomiting.   Genitourinary: Negative for dysuria and flank pain.   Neurological: Negative for dizziness and weakness.   Psychiatric/Behavioral: Negative for memory loss. The patient is not nervous/anxious.          Objective   Objective      Vital Signs  Temp:  [97.1 °F (36.2 °C)-98.3 °F (36.8 °C)] 97.1 °F (36.2 °C)  Heart Rate:  [60] 60  Resp:  [14-22] 20  BP: (127-146)/(56-64) 127/56  Oxygen Therapy  SpO2: 100 %  Pulse Oximetry Type: Continuous  Device (Oxygen Therapy): nasal cannula  Flow (L/min): 1  Flowsheet Rows      First Filed Value   Admission Height  157.5 cm (62\") Documented at 02/05/2021 2359   Admission Weight  78 kg (172 lb) Documented at 02/05/2021 2359        Intake & Output (last 3 days)       02/13 0701 - 02/14 0700 02/14 0701 - 02/15 0700 02/15 0701 - 02/16 0700 02/16 0701 - 02/17 0700    P.O. 480 236 840     I.V. (mL/kg)   50 (0.7)     Other    220    Total Intake(mL/kg) 480 (6) 236 (3) 890 (11.7) 220 (2.9)    Urine (mL/kg/hr) 1875 (1) 1800 (0.9) 3740 (2.1) 685 (1.7)    Stool 0 0 0 0    Total Output 1875 1800 3740 685    Net -1395 -1564 -2850 -465            Stool Unmeasured Occurrence 1 x 3 x 2 x 1 x        Lines, Drains & Airways    Active LDAs     Name:   Placement date:   Placement time:   Site:   Days:    PICC Triple Lumen 02/07/21 Right Brachial   02/07/21    1024    Brachial   8    Urethral Catheter Non-latex;Silicone 16 Fr.   02/06/21    1826     8                  Physical Exam:    Physical Exam    General: Chronically ill appearing obese elderly female lying in bed breathing comfortably on 2 L via nasal cannula no acute distress   HEENT: NC/AT, EOMI, mucosa moist  Heart: Regular, rate controlled  Chest: Normal work " of breathing, moving air well no wheezing  Abdominal: Soft.  Mild distention, nontender  Musculoskeletal: Normal ROM.  No cyanosis. No calf tenderness.  Neurological: Awake and alert, no focal deficits  Skin: Skin is warm and dry. No rash  Psychiatric: Patient appearing calm.         Wounds (last 24 hours)      LDA Wound     Row Name 02/16/21 0815 02/16/21 0400 02/16/21 0000       Wound 02/06/21 0031 Right lower leg Other (comment)    Wound - Properties Group Placement Date: 02/06/21  -CS Placement Time: 0031  -CS Present on Hospital Admission: Y  -CS Side: Right  -CS Orientation: lower  -CS Location: leg  -CS Primary Wound Type: Other  -CS, chronic redness and swelling     Dressing Appearance  dried drainage  -BB  --  --    Closure  NELY  -BB  NELY  -SS  NELY  -SS    Base  red;moist  -BB  --  --    Periwound  blistered;excoriated  -BB  --  --    Periwound Temperature  warm  -BB  --  --    Drainage Amount  small  -BB  --  --    Care, Wound  cleansed with;sterile water  -BB  --  --    Dressing Care  dressing changed  -BB  --  --    Retired Wound - Properties Group Date first assessed: 02/06/21  -CS Time first assessed: 0031  -CS Present on Hospital Admission: Y  -CS Side: Right  -CS Location: leg  -CS Primary Wound Type: Other  -CS, chronic redness and swelling        Wound 02/06/21 0032 Right anterior foot Other (comment)    Wound - Properties Group Placement Date: 02/06/21  -CS Placement Time: 0032  -CS Present on Hospital Admission: Y  -CS Side: Right  -CS Orientation: anterior  -CS Location: foot  -CS Primary Wound Type: Other  -CS Additional Comments: chronic redness and swelling  -CS    Dressing Appearance  dry;intact  -BB  --  --    Closure  Other (Comment)  -BB  NELY  -SS  NELY  -SS    Base  dry;pink;red  -BB  --  --    Periwound  excoriated;redness  -BB  --  --    Periwound Temperature  warm  -BB  --  --    Periwound Skin Turgor  firm  -BB  --  --    Drainage Amount  small  -BB  --  --    Care, Wound  cleansed  with;sterile water;barrier applied  -BB  --  --    Dressing Care  dressing changed  -BB  --  --    Retired Wound - Properties Group Date first assessed: 02/06/21  -CS Time first assessed: 0032  -CS Present on Hospital Admission: Y  -CS Side: Right  -CS Location: foot  -CS Primary Wound Type: Other  -CS Additional Comments: chronic redness and swelling  -CS       Wound 02/06/21 0033 Left lower leg Other (comment)    Wound - Properties Group Placement Date: 02/06/21  -CS Placement Time: 0033  -CS Present on Hospital Admission: N  -CS Side: Left  -CS Orientation: lower  -CS Location: leg  -CS Primary Wound Type: Other  -CS Additional Comments: chronic redness and swelling  -CS    Dressing Appearance  dry;intact  -BB  --  --    Closure  NELY  -BB  NELY  -SS  NELY  -SS    Base  red;dry  -BB  --  --    Periwound  excoriated;redness  -BB  --  --    Periwound Temperature  warm  -BB  --  --    Periwound Skin Turgor  firm  -BB  --  --    Drainage Amount  small  -BB  --  --    Care, Wound  cleansed with;sterile water;barrier applied  -BB  --  --    Dressing Care  dressing changed  -BB  --  --    Retired Wound - Properties Group Date first assessed: 02/06/21  -CS Time first assessed: 0033  -CS Present on Hospital Admission: N  -CS Side: Left  -CS Location: leg  -CS Primary Wound Type: Other  -CS Additional Comments: chronic redness and swelling  -CS       Wound 02/06/21 0035 Left anterior ankle Other (comment)    Wound - Properties Group Placement Date: 02/06/21  -CS Placement Time: 0035  -CS Present on Hospital Admission: Y  -CS Side: Left  -CS Orientation: anterior  -CS Location: ankle  -CS Primary Wound Type: Other  -CS    Dressing Appearance  dry;intact  -BB  --  --    Closure  Other (Comment)  -BB  NELY  -SS  NELY  -SS    Base  red  -BB  --  --    Periwound  excoriated;redness  -BB  --  --    Periwound Temperature  warm  -BB  --  --    Periwound Skin Turgor  firm  -BB  --  --    Drainage Amount  scant  -BB  --  --    Care,  Wound  cleansed with;sterile water;barrier applied  -BB  --  --    Dressing Care  dressing changed  -BB  --  --    Retired Wound - Properties Group Date first assessed: 02/06/21  -CS Time first assessed: 0035  -CS Present on Hospital Admission: Y  -CS Side: Left  -CS Location: ankle  -CS Primary Wound Type: Other  -CS       Wound 02/06/21 0047 Right heel Other (comment)    Wound - Properties Group Placement Date: 02/06/21  -CS Placement Time: 0047  -CS Present on Hospital Admission: Y  -CS Side: Right  -CS Location: heel  -CS Primary Wound Type: Other  -CS, Chronic redness and swelling      Dressing Appearance  dry;intact  -BB  --  --    Closure  Other (Comment)  -BB  NELY  -SS  NELY  -SS    Base  red  -BB  --  --    Periwound  excoriated;redness  -BB  --  --    Periwound Temperature  warm  -BB  --  --    Periwound Skin Turgor  firm  -BB  --  --    Drainage Amount  small  -BB  --  --    Care, Wound  cleansed with;sterile water;barrier applied  -BB  --  --    Dressing Care  dressing changed  -BB  --  --    Retired Wound - Properties Group Date first assessed: 02/06/21  -CS Time first assessed: 0047  -CS Present on Hospital Admission: Y  -CS Side: Right  -CS Location: heel  -CS Primary Wound Type: Other  -CS, Chronic redness and swelling         Wound 02/06/21 0050 Left posterior thigh Other (comment)    Wound - Properties Group Placement Date: 02/06/21  -CS Placement Time: 0050  -CS Present on Hospital Admission: Y  -CS Side: Left  -CS Orientation: posterior  -CS Location: thigh  -CS Primary Wound Type: Other  -CS, Chronic redness and swelling      Dressing Appearance  dry;intact  -BB  --  --    Closure  None  -BB  NELY  -SS  NELY  -SS    Base  pink  -BB  --  --    Periwound  redness  -BB  --  --    Retired Wound - Properties Group Date first assessed: 02/06/21  -CS Time first assessed: 0050  -CS Present on Hospital Admission: Y  -CS Side: Left  -CS Location: thigh  -CS Primary Wound Type: Other  -CS, Chronic redness and  swelling         Wound 02/06/21 0051 coccyx Pressure Injury    Wound - Properties Group Placement Date: 02/06/21  -CS Placement Time: 0051  -CS Present on Hospital Admission: Y  -CS Location: coccyx  -CS Primary Wound Type: Pressure inj  -CS Stage, Pressure Injury : Stage 2  -CS    Dressing Appearance  open to air  -BB  --  --    Closure  Open to air  -BB  Open to air  -SS  Open to air  -SS    Base  red;moist;bleeding  -BB  non-blanchable;moist;pink;red  -SS  non-blanchable;moist;pink;red  -SS    Periwound  non-blanchable  -BB  --  --    Care, Wound  cleansed with;sterile water;barrier applied  -BB  barrier applied  -SS  --    Dressing Care  open to air  -BB  open to air  -SS  --    Periwound Care  barrier ointment applied  -BB  barrier ointment applied  -SS  --    Retired Wound - Properties Group Date first assessed: 02/06/21  -CS Time first assessed: 0051  -CS Present on Hospital Admission: Y  -CS Location: coccyx  -CS Primary Wound Type: Pressure inj  -CS    Row Name 02/15/21 2000 02/15/21 1615          Wound 02/06/21 0031 Right lower leg Other (comment)    Wound - Properties Group Placement Date: 02/06/21  -CS Placement Time: 0031  -CS Present on Hospital Admission: Y  -CS Side: Right  -CS Orientation: lower  -CS Location: leg  -CS Primary Wound Type: Other  -CS, chronic redness and swelling     Dressing Appearance  dry;intact  -SS  --     Closure  NELY  -SS  NELY  -SG     Retired Wound - Properties Group Date first assessed: 02/06/21  -CS Time first assessed: 0031  -CS Present on Hospital Admission: Y  -CS Side: Right  -CS Location: leg  -CS Primary Wound Type: Other  -CS, chronic redness and swelling        Wound 02/06/21 0032 Right anterior foot Other (comment)    Wound - Properties Group Placement Date: 02/06/21  -CS Placement Time: 0032  -CS Present on Hospital Admission: Y  -CS Side: Right  -CS Orientation: anterior  -CS Location: foot  -CS Primary Wound Type: Other  -CS Additional Comments: chronic redness  and swelling  -CS    Dressing Appearance  dry;intact  -SS  --     Closure  NELY  -SS  --     Retired Wound - Properties Group Date first assessed: 02/06/21  -CS Time first assessed: 0032  -CS Present on Hospital Admission: Y  -CS Side: Right  -CS Location: foot  -CS Primary Wound Type: Other  -CS Additional Comments: chronic redness and swelling  -CS       Wound 02/06/21 0033 Left lower leg Other (comment)    Wound - Properties Group Placement Date: 02/06/21  -CS Placement Time: 0033  -CS Present on Hospital Admission: N  -CS Side: Left  -CS Orientation: lower  -CS Location: leg  -CS Primary Wound Type: Other  -CS Additional Comments: chronic redness and swelling  -CS    Dressing Appearance  dry;intact  -SS  --     Closure  NELY  -SS  NELY  -SG     Base  --  red  -SG     Retired Wound - Properties Group Date first assessed: 02/06/21  -CS Time first assessed: 0033  -CS Present on Hospital Admission: N  -CS Side: Left  -CS Location: leg  -CS Primary Wound Type: Other  -CS Additional Comments: chronic redness and swelling  -CS       Wound 02/06/21 0035 Left anterior ankle Other (comment)    Wound - Properties Group Placement Date: 02/06/21  -CS Placement Time: 0035  -CS Present on Hospital Admission: Y  -CS Side: Left  -CS Orientation: anterior  -CS Location: ankle  -CS Primary Wound Type: Other  -CS    Dressing Appearance  dry;intact  -SS  --     Closure  NELY  -SS  NELY  -SG     Base  --  red  -SG     Retired Wound - Properties Group Date first assessed: 02/06/21  -CS Time first assessed: 0035  -CS Present on Hospital Admission: Y  -CS Side: Left  -CS Location: ankle  -CS Primary Wound Type: Other  -CS       Wound 02/06/21 0047 Right heel Other (comment)    Wound - Properties Group Placement Date: 02/06/21  -CS Placement Time: 0047  -CS Present on Hospital Admission: Y  -CS Side: Right  -CS Location: heel  -CS Primary Wound Type: Other  -CS, Chronic redness and swelling      Dressing Appearance  dry;intact  -SS  --      Closure  NELY  -SS  --     Retired Wound - Properties Group Date first assessed: 02/06/21  -CS Time first assessed: 0047  -CS Present on Hospital Admission: Y  -CS Side: Right  -CS Location: heel  -CS Primary Wound Type: Other  -CS, Chronic redness and swelling         Wound 02/06/21 0050 Left posterior thigh Other (comment)    Wound - Properties Group Placement Date: 02/06/21  -CS Placement Time: 0050  -CS Present on Hospital Admission: Y  -CS Side: Left  -CS Orientation: posterior  -CS Location: thigh  -CS Primary Wound Type: Other  -CS, Chronic redness and swelling      Dressing Appearance  dry;intact  -SS  --     Closure  NELY  -SS  --     Retired Wound - Properties Group Date first assessed: 02/06/21  -CS Time first assessed: 0050  -CS Present on Hospital Admission: Y  -CS Side: Left  -CS Location: thigh  -CS Primary Wound Type: Other  -CS, Chronic redness and swelling         Wound 02/06/21 0051 coccyx Pressure Injury    Wound - Properties Group Placement Date: 02/06/21  -CS Placement Time: 0051  -CS Present on Hospital Admission: Y  -CS Location: coccyx  -CS Primary Wound Type: Pressure inj  -CS Stage, Pressure Injury : Stage 2  -CS    Dressing Appearance  open to air  -SS  --     Closure  Open to air  -SS  --     Base  non-blanchable;moist;pink;red  -SS  --     Retired Wound - Properties Group Date first assessed: 02/06/21  -CS Time first assessed: 0051  -CS Present on Hospital Admission: Y  -CS Location: coccyx  -CS Primary Wound Type: Pressure inj  -CS      User Key  (r) = Recorded By, (t) = Taken By, (c) = Cosigned By    Initials Name Provider Type    CS Naeem Jiang RN Registered Nurse    Maria R Allen RN Registered Nurse    Bella Rodriguez, RN Registered Nurse    Skye Bear RN Registered Nurse          Procedures:              Results Review:     I reviewed the patient's new clinical results.      Lab Results (last 24 hours)     Procedure Component Value Units Date/Time     Anti-microsomal Antibody [[id number]] Collected: 02/13/21 1722    Specimen: Blood Updated: 02/16/21 1209     Liver Fraction: 1.1 Units      Comment:                                 Negative    0.0 - 20.0                                  Equivocal  20.1 - 24.9                                  Positive         >24.9  LKM type 1 antibodies are detected in patients with  autoimmune hepatitis type 2 and in up to 8% of  patients with chronic HCV infection.       Narrative:      Performed at:  [address] [place] [address]  [id number]  : Balta Tellez PhD, Phone:  [phone]    POC Glucose Once [[id number]]  (Abnormal) Collected: 02/16/21 1145    Specimen: Blood Updated: 02/16/21 1159     Glucose 116 mg/dL      Comment: Serial Number: [id number]:  [id number]       Manual Differential [[id number]]  (Abnormal) Collected: 02/16/21 0640    Specimen: Blood Updated: 02/16/21 0750     Neutrophil % 75.0 %      Lymphocyte % 7.0 %      Monocyte % 5.0 %      Eosinophil % 1.0 %      Bands %  12.0 %      Neutrophils Absolute 6.44 10*3/mm3      Lymphocytes Absolute 0.52 10*3/mm3      Monocytes Absolute 0.37 10*3/mm3      Eosinophils Absolute 0.07 10*3/mm3      Anisocytosis Slight/1+     WBC Morphology Normal     Platelet Estimate Decreased    CBC & Differential [[id number]]  (Abnormal) Collected: 02/16/21 0640    Specimen: Blood Updated: 02/16/21 0750    Narrative:      The following orders were created for panel order CBC & Differential.  Procedure                               Abnormality         Status                     ---------                               -----------         ------                     Scan Slide[[id number]]                                       Final result               CBC Auto Differential[[id number]]        Abnormal            Final result                 Please view results for these tests on the individual orders.    CBC Auto Differential [[id number]]  (Abnormal)  Collected: 02/16/21 0640    Specimen: Blood Updated: 02/16/21 0750     WBC 7.40 10*3/mm3      RBC 2.99 10*6/mm3      Hemoglobin 9.8 g/dL      Hematocrit 29.7 %      MCV 99.1 fL      MCH 32.7 pg      MCHC 33.0 g/dL      RDW 16.3 %      RDW-SD 56.9 fl      MPV 10.0 fL      Platelets 77 10*3/mm3     Narrative:      The previously reported component NRBC is no longer being reported. Previous result was 0.1 /100 WBC (Reference Range: 0.0-0.2 /100 WBC) on 2/16/2021 at 0706 EST.    Scan Slide [[id number]] Collected: 02/16/21 0640    Specimen: Blood Updated: 02/16/21 0750     Scan Slide --     Comment: See Manual Differential Results       POC Glucose Once [[id number]]  (Normal) Collected: 02/16/21 0730    Specimen: Blood Updated: 02/16/21 0732     Glucose 83 mg/dL      Comment: Serial Number: [id number]:  [id number]       BNP [[id number]]  (Abnormal) Collected: 02/16/21 0640    Specimen: Blood Updated: 02/16/21 0729     proBNP 40,937.0 pg/mL     Narrative:      Among patients with dyspnea, NT-proBNP is highly sensitive for the detection of acute congestive heart failure. In addition NT-proBNP of <300 pg/ml effectively rules out acute congestive heart failure with 99% negative predictive value.    Results may be falsely decreased if patient taking Biotin.      Magnesium [[id number]]  (Normal) Collected: 02/16/21 0640    Specimen: Blood Updated: 02/16/21 0720     Magnesium 2.0 mg/dL     Comprehensive Metabolic Panel [[id number]]  (Abnormal) Collected: 02/16/21 0640    Specimen: Blood Updated: 02/16/21 0719     Glucose 166 mg/dL      BUN 87 mg/dL      Creatinine 2.89 mg/dL      Sodium 143 mmol/L      Potassium 3.9 mmol/L      Chloride 104 mmol/L      CO2 32.0 mmol/L      Calcium 8.1 mg/dL      Total Protein 5.1 g/dL      Albumin 2.30 g/dL      ALT (SGPT) 30 U/L      AST (SGOT) 48 U/L      Alkaline Phosphatase 803 U/L      Total Bilirubin 0.9 mg/dL      eGFR Non African Amer 16 mL/min/1.73      Globulin 2.8 gm/dL      A/G  Ratio 0.8 g/dL      BUN/Creatinine Ratio 30.1     Anion Gap 7.0 mmol/L     Narrative:      GFR Normal >60  Chronic Kidney Disease <60  Kidney Failure <15      Ammonia [[id number]]  (Normal) Collected: 02/16/21 0640    Specimen: Blood Updated: 02/16/21 0717     Ammonia 30 umol/L     Protime-INR [[id number]]  (Abnormal) Collected: 02/16/21 0640    Specimen: Blood Updated: 02/16/21 0706     Protime 12.7 Seconds      INR 1.16    Potassium [[id number]]  (Normal) Collected: 02/16/21 0011    Specimen: Blood Updated: 02/16/21 0033     Potassium 4.1 mmol/L     POC Glucose Once [[id number]]  (Abnormal) Collected: 02/15/21 2003    Specimen: Blood Updated: 02/15/21 2004     Glucose 122 mg/dL      Comment: Serial Number: [id number]:  [id number]       BNP [[id number]]  (Abnormal) Collected: 02/15/21 1716    Specimen: Blood Updated: 02/15/21 1757     proBNP 47,413.0 pg/mL     Narrative:      Among patients with dyspnea, NT-proBNP is highly sensitive for the detection of acute congestive heart failure. In addition NT-proBNP of <300 pg/ml effectively rules out acute congestive heart failure with 99% negative predictive value.    Results may be falsely decreased if patient taking Biotin.      Ammonia [[id number]]  (Normal) Collected: 02/15/21 1716    Specimen: Blood Updated: 02/15/21 1743     Ammonia 43 umol/L     Immunofixation, Serum [[id number]] Collected: 02/15/21 1716    Specimen: Blood Updated: 02/15/21 1723    POC Glucose Once [[id number]]  (Abnormal) Collected: 02/15/21 1640    Specimen: Blood Updated: 02/15/21 1641     Glucose 139 mg/dL      Comment: Serial Number: [id number]:  [id number]           No results found for: HGBA1C  Results from last 7 days   Lab Units 02/16/21  0640 02/15/21  0530 02/14/21  0446   INR  1.16* 1.20* 1.19*           Lab Results   Component Value Date    LIPASE 42 01/13/2018     Lab Results   Component Value Date    CHOL 126 02/05/2020    TRIG 51 02/05/2020    HDL 66 (H) 02/05/2020    LDL 50  02/05/2020       Lab Results   Lab Value Date/Time    FINALDX  02/11/2021 1227     Leukocytosis  Anemia  Thrombocytopenia  No blasts identified         Microbiology Results (last 10 days)     Procedure Component Value - Date/Time    Urine Culture - Urine, Urine, Random Void [[id number]]  (Abnormal) Collected: 02/10/21 1616    Lab Status: Final result Specimen: Urine, Random Void Updated: 02/11/21 1234     Urine Culture Yeast isolated     Comment: No further workup.       Urine Culture - Urine, Urine, Catheter [[id number]]  (Abnormal) Collected: 02/10/21 0306    Lab Status: Final result Specimen: Urine, Catheter Updated: 02/11/21 1234     Urine Culture Yeast isolated     Comment: No further workup.       Clostridium Difficile Toxin - Stool, Per Rectum [[id number]]  (Normal) Collected: 02/06/21 1952    Lab Status: Final result Specimen: Stool from Per Rectum Updated: 02/07/21 0725    Narrative:      The following orders were created for panel order Clostridium Difficile Toxin - Stool, Per Rectum.  Procedure                               Abnormality         Status                     ---------                               -----------         ------                     Clostridium Difficile EI...[[id number]]  Normal              Final result                 Please view results for these tests on the individual orders.    Clostridium Difficile EIA - Stool, Per Rectum [[id number]]  (Normal) Collected: 02/06/21 1952    Lab Status: Final result Specimen: Stool from Per Rectum Updated: 02/07/21 0725     C Diff GDH / Toxin Negative    Blood Culture - Blood, Arm, Left [[id number]] Collected: 02/06/21 1309    Lab Status: Final result Specimen: Blood from Arm, Left Updated: 02/11/21 1431     Blood Culture No growth at 5 days    Blood Culture - Blood, Arm, Right [[id number]] Collected: 02/06/21 1307    Lab Status: Final result Specimen: Blood from Arm, Right Updated: 02/11/21 1431     Blood Culture No growth at 5 days          ECG/EMG  Results (most recent)     Procedure Component Value Units Date/Time    Adult Transthoracic Echo Complete W/ Cont if Necessary Per Protocol [[id number]] Collected: 02/06/21 0850     Updated: 02/06/21 1609     BSA 1.8 m^2      RVIDd 2.7 cm      IVSd 1.3 cm      LVIDd 3.9 cm      LVIDs 2.8 cm      LVPWd 1.2 cm      IVS/LVPW 1.0     FS 28.8 %      EDV(Teich) 67.0 ml      ESV(Teich) 29.4 ml      EF(Teich) 56.0 %      EDV(cubed) 60.5 ml      ESV(cubed) 21.9 ml      EF(cubed) 63.9 %      LV mass(C)d 170.0 grams      LV mass(C)dI 92.8 grams/m^2      SV(Teich) 37.5 ml      SI(Teich) 20.5 ml/m^2      SV(cubed) 38.7 ml      SI(cubed) 21.1 ml/m^2      Ao root diam 2.5 cm      Ao root area 5.1 cm^2      ACS 1.6 cm      asc Aorta Diam 3.2 cm      LVOT diam 1.8 cm      LVOT area 2.6 cm^2      RVOT diam 2.6 cm      RVOT area 5.5 cm^2      EDV(MOD-sp4) 59.5 ml      ESV(MOD-sp4) 26.6 ml      EF(MOD-sp4) 55.3 %      EDV(MOD-sp2) 53.3 ml      ESV(MOD-sp2) 20.0 ml      EF(MOD-sp2) 62.4 %      SV(MOD-sp4) 32.9 ml      SI(MOD-sp4) 18.0 ml/m^2      SV(MOD-sp2) 33.2 ml      SI(MOD-sp2) 18.1 ml/m^2      Ao root area (BSA corrected) 1.4     LV Olivier Vol (BSA corrected) 32.5 ml/m^2      LV Sys Vol (BSA corrected) 14.5 ml/m^2      Aortic R-R 1.0 sec      Aortic HR 59.0 BPM      MV V2 max 134.6 cm/sec      MV max PG 7.2 mmHg      MV V2 mean 51.6 cm/sec      MV mean PG 1.8 mmHg      MV V2 VTI 24.3 cm      MVA(VTI) 2.3 cm^2      Ao pk chastity 182.4 cm/sec      Ao max PG 13.3 mmHg      Ao max PG (full) 8.5 mmHg      Ao V2 mean 142.3 cm/sec      Ao mean PG 8.7 mmHg      Ao mean PG (full) 5.5 mmHg      Ao V2 VTI 37.9 cm      WILLIS(I,A) 1.5 cm^2      WILLIS(I,D) 1.5 cm^2      WILLIS(V,A) 1.5 cm^2      WILLIS(V,D) 1.5 cm^2      AI max chastity 264.5 cm/sec      AI max PG 28.0 mmHg      AI dec slope 171.9 cm/sec^2      AI dec time 1.5 sec      AI P1/2t 450.8 msec      LV V1 max PG 4.8 mmHg      LV V1 mean PG 3.2 mmHg      LV V1 max 110.0 cm/sec      LV V1 mean 85.9 cm/sec       LV V1 VTI 21.6 cm      CO(Ao) 11.4 l/min      CI(Ao) 6.2 l/min/m^2      SV(Ao) 192.9 ml      SI(Ao) 105.3 ml/m^2      CO(LVOT) 3.3 l/min      CI(LVOT) 1.8 l/min/m^2      SV(LVOT) 55.2 ml      SV(RVOT) 59.1 ml      SI(LVOT) 30.1 ml/m^2      PA V2 max 88.8 cm/sec      PA max PG 3.2 mmHg      PA max PG (full) 1.9 mmHg      PA V2 mean 63.8 cm/sec      PA mean PG 1.8 mmHg      PA mean PG (full) 1.0 mmHg      PA V2 VTI 17.2 cm      PVA(I,A) 3.4 cm^2       CV ECHO SAHIL - PVA(I,D) 3.4 cm^2       CV ECHO SAHIL - PVA(V,A) 3.4 cm^2       CV ECHO SAHIL - PVA(V,D) 3.4 cm^2      PA acc time 0.07 sec      RV V1 max PG 1.2 mmHg      RV V1 mean PG 0.77 mmHg      RV V1 max 55.1 cm/sec      RV V1 mean 41.7 cm/sec      RV V1 VTI 10.8 cm      TR max devante 237.7 cm/sec      RVSP(TR) 25.6 mmHg      RAP systole 3.0 mmHg      PA pr(Accel) 48.2 mmHg      Pulm Sys Devante 41.0 cm/sec      Pulm Olivier Devante 37.5 cm/sec      Pulm S/D 1.1     Qp/Qs 1.1      CV ECHO SAHIL - BZI_BMI 33.1 kilograms/m^2       CV ECHO SAHIL - BSA(HAYCOCK) 1.9 m^2       CV ECHO SAHIL - BZI_METRIC_WEIGHT 82.1 kg       CV ECHO SAHIL - BZI_METRIC_HEIGHT 157.5 cm      EF(MOD-bp) 60.0 %      LA dimension(2D) 4.2 cm     Narrative:      Normal LV size and contractility EF of 55%  Moderate right ventricular enlargement with severe right atrial   enlargement, catheter probably pacemaker lead seen.  Severe left atrial enlargement seen.  Aortic valve, mitral valve, tricuspid valve appears structurally normal,   mild MR, AR, seen.  There is moderate  tricuspid regurgitation seen.    Calculated RV systolic pressure is 24mmHg.  No pericardial effusion seen.  Proximal aorta appears normal in size.          Results for orders placed during the hospital encounter of 02/05/21   Duplex Venous Lower Extremity - Bilateral CAR    Narrative · Exam limited by patient intolerance to compression and body habitus.  · The distal left femoral and the right and left peroneal veins are not    imaged.  · All other veins appeared normal bilaterally.          Results for orders placed during the hospital encounter of 02/05/21   Adult Transthoracic Echo Complete W/ Cont if Necessary Per Protocol    Narrative Normal LV size and contractility EF of 55%  Moderate right ventricular enlargement with severe right atrial   enlargement, catheter probably pacemaker lead seen.  Severe left atrial enlargement seen.  Aortic valve, mitral valve, tricuspid valve appears structurally normal,   mild MR, AR, seen.  There is moderate  tricuspid regurgitation seen.    Calculated RV systolic pressure is 24mmHg.  No pericardial effusion seen.  Proximal aorta appears normal in size.       Nm Lung Ventilation Perfusion Aerosol    Result Date: 2/14/2021   1. Abnormal appearance of the ventilation study which can be seen with obstructive pulmonary disease. 2. Low probability of acute pulmonary embolic disease.  Electronically Signed By-Fletcher Barros MD On:2/14/2021 11:29 AM This report was finalized on [id number] by  Fletcher Barros MD.    Us Liver    Result Date: 2/14/2021   1. Abnormal appearance of liver felt to be secondary to hepatic cirrhosis. 2. Cholelithiasis. 3. Minimal ascites.  Electronically Signed By-Fletcher Barros MD On:2/14/2021 10:41 AM This report was finalized on [id number] by  Fletcher Barros MD.    Xr Chest 1 View    Result Date: 2/11/2021  1. Cardiomegaly without acute pulmonary process. No change from prior exam.  Electronically Signed By-Dae Auguste MD On:2/11/2021 2:18 PM This report was finalized on [id number] by  Dae Auguste MD.    Xr Chest Pa & Lateral    Result Date: 2/13/2021   1. Cardiomegaly. 2. Small to moderate left pleural effusion with compressive atelectasis and possible airspace disease from pneumonia.  Electronically Signed By-Fletcher Barros MD On:2/13/2021 5:43 PM This report was finalized on [id number] by  Fletcher Barros MD.    Xr Tibia Fibula 2 View Bilateral    Result Date:  2/14/2021  IMPRESSION :  1. No acute findings. 2. Tricompartment osteoarthritis of both knees. 3. Bony demineralization.  Electronically Signed By-Fletcher Barros MD On:2/14/2021 5:39 PM This report was finalized on [id number] by  Fletcher Barros MD.          Xrays, labs reviewed personally by physician.    Medication Review:   I have reviewed the patient's current medication list      Scheduled Meds  aspirin, 81 mg, Oral, Daily  bumetanide, 2 mg, Intravenous, Q6H  ceftaroline, 200 mg, Intravenous, Q12H  digoxin, 125 mcg, Oral, Daily  febuxostat, 40 mg, Oral, BID  folic acid, 1 mg, Oral, Daily  gabapentin, 100 mg, Oral, TID  lactulose, 20 g, Oral, TID  lidocaine, 2 patch, Transdermal, Q24H  magic butt paste, , Topical, Daily  midodrine, 5 mg, Oral, TID AC  rifAXIMin, 550 mg, Oral, Q12H  sodium chloride, 10 mL, Intravenous, Q12H  ursodiol, 300 mg, Oral, BID  vitamin B-12, 500 mcg, Oral, Daily  Zinc Oxide, , Apply externally, BID        Meds Infusions  dextrose, 20 mL/hr, Last Rate: 10 mL/hr (02/15/21 1317)        Meds PRN  •  acetaminophen **OR** acetaminophen **OR** acetaminophen  •  dextrose  •  dextrose  •  glucagon (human recombinant)  •  HYDROcodone-acetaminophen  •  magnesium sulfate **OR** magnesium sulfate in D5W 1g/100mL (PREMIX) **OR** magnesium sulfate  •  melatonin  •  nitroglycerin  •  OLANZapine  •  ondansetron **OR** ondansetron  •  potassium chloride  •  potassium chloride  •  sodium chloride  •  tiZANidine        Assessment/Plan   Assessment/Plan     Active Hospital Problems    Diagnosis  POA   • Sepsis (CMS/[facility]) [A41.9]  Yes   • CONG (acute kidney injury) (CMS/[facility]) [N17.9]  Yes   • Lactic acidosis [E87.2]  Yes   • Metabolic acidosis [E87.2]  Yes   • Delirium, acute [R41.0]  Yes   • Lower extremity cellulitis [L03.119]  Yes   • CAD (coronary artery disease) [I25.10]  Yes   • Acute UTI (urinary tract infection) [N39.0]  Yes   • Non-pressure chronic ulcer right ankle, limited to breakdown skin (CMS/HCC)  [L97.311]  Yes   • Automatic implantable cardiac defibrillator in situ [Z95.810]  Yes   • Lymphedema [I89.0]  Unknown   • Gout [M10.9]  Yes   • Hypertension, benign [I10]  Yes   • Congestive heart failure (CMS/HCC) [I50.9]  Yes   • Atrial fibrillation (CMS/HCC) [I48.91]  Yes      Resolved Hospital Problems   No resolved problems to display.       MEDICAL DECISION MAKING COMPLEXITY BY PROBLEM:     Lower extremity edema-with findings of right sided cellulitis, multifactorial given underlying renal disease as well as cirrhosis and right heart failure  -Diuresis tolerated  -ID consulted managing cellulitis, changed from doxycycline to Teflaro  -Nephrology managing volume status    Hyper ammonemia-most likely secondary to underlying acute hepatic failure uncertain source.  Patient's imaging consistent with signs of cirrhosis  -Lactulose and rifaximin  -GI consulted  -Infectious and serological work-up for hepatic failure    Hypotension-uncertain source may be secondary due to splanchnic sequestration or underlying systemic inflammatory response to infection, appears controlled at this time  -Was on dopamine  -Midodrine  -Watch blood pressure closely  -Cosyntropin stim test normal  -Treat infection    Hypoglycemia-May be secondary to liver injury  -Encourage p.o. intake  -Patient started on dextrose infusion wean as tolerated  -Watch for improvement in liver function    Liver insufficiency-patient with imaging findings consistent with cirrhotic disease as well as acute injury unspecified source at this time  -GI consulted  -Liver enzymes trending down  -Cholelithiasis noted on ultrasound  -Ursodiol  -Alkaline phosphatase trending down, fractionation in process    Renal insufficiency-acute on chronic process, with mild continued improvement  -Nephrology consulted  -Electrolytes and volume status per nephrology    Thrombocytopenia-thought to be possibly secondary to bone marrow suppression from illness/infection versus drug  mediated  -Oncology consulted  -HIT panel negative  -Treat infection  -Monitor daily    Candiduria-found on urine culture  -ID started Diflucan, changed due to concern of hepatotoxicity    Altered mental status-appears to have resolved.  Likely multifactorial given hypotension, hyperammonemia and underlying infection  -Treat factors as above    Coronary artery disease-patient denies any chest pain at this time  -Continue maintenance medication  -Telemetry  -Resume beta-blocker once blood pressure improving    Atrial fibrillation-patient with underlying pacemaker  -Newest echo showing left ventricular EF of 40% with mild aortic insufficiency  -Digoxin held given kidney failure    Right ventricular failure-uncertain source at this time may be secondary to pulmonary hypertension or undiagnosed sleep apnea  -We will need further work-up outpatient once stabilized    Hypertension/hyperlipidemia/chronic pain/gout/pressure ulcer-chronic in nature  -Wound care  -Resume home medication as clinically appropriate    Obesity-BMI's skewed likely given fluid retention  -Monitor closely    Given patient's prolonged hospital stay as well as multiple comorbidities consulting palliative care to discuss goals of care    VTE Prophylaxis -   Mechanical Order History:     None      Pharmalogical Order History:      Ordered     Dose Route Frequency Stop    02/06/21 0500  rivaroxaban (XARELTO) tablet 10 mg  Status:  Discontinued     Question Answer Comment   Are you ordering rivaroxaban for the prevention of blood clots in an acutely ill medical patient? Yes    Select any exclusion criteria that may apply to patient Exclusion Criteria Does Not Apply to This Patient Alternative to Lovenox/heparin inpatient with thrombocytopenia unable to receive mechanical prophylaxis       10 mg PO Daily With Dinner 02/07/21 1651    02/06/21 0020  heparin (porcine) 5000 UNIT/ML injection 5,000 Units  Status:  Discontinued      5,000 Units SC Every 12 Hours  Scheduled 02/06/21 0147                  Code Status -   Code Status and Medical Interventions:   Ordered at: 02/06/21 0258     Level Of Support Discussed With:    Patient     Code Status:    CPR     Medical Interventions (Level of Support Prior to Arrest):    Full       This patient has been examined wearing appropriate Personal Protective Equipment and discussed with hospital infection control department. 02/16/21        Discharge Planning  pending        Electronically signed by Estuardo Torres MD, 02/16/21, 12:23 EST.  [facility] Hospitalist Team

## 2021-02-17 LAB
ALBUMIN SERPL-MCNC: 2.4 G/DL (ref 3.5–5.2)
ALBUMIN/GLOB SERPL: 0.8 G/DL
ALP SERPL-CCNC: 719 U/L (ref 39–117)
ALT SERPL W P-5'-P-CCNC: 24 U/L (ref 1–33)
ANION GAP SERPL CALCULATED.3IONS-SCNC: 10 MMOL/L (ref 5–15)
AST SERPL-CCNC: 43 U/L (ref 1–32)
BASOPHILS # BLD AUTO: 0.1 10*3/MM3 (ref 0–0.2)
BASOPHILS NFR BLD AUTO: 0.7 % (ref 0–1.5)
BILIRUB SERPL-MCNC: 0.7 MG/DL (ref 0–1.2)
BUN SERPL-MCNC: 79 MG/DL (ref 8–23)
BUN/CREAT SERPL: 29.7 (ref 7–25)
CALCIUM SPEC-SCNC: 7.8 MG/DL (ref 8.6–10.5)
CHLORIDE SERPL-SCNC: 103 MMOL/L (ref 98–107)
CO2 SERPL-SCNC: 31 MMOL/L (ref 22–29)
CREAT SERPL-MCNC: 2.66 MG/DL (ref 0.57–1)
DEPRECATED RDW RBC AUTO: 61.7 FL (ref 37–54)
EOSINOPHIL # BLD AUTO: 0.1 10*3/MM3 (ref 0–0.4)
EOSINOPHIL NFR BLD AUTO: 1.6 % (ref 0.3–6.2)
ERYTHROCYTE [DISTWIDTH] IN BLOOD BY AUTOMATED COUNT: 17.4 % (ref 12.3–15.4)
GFR SERPL CREATININE-BSD FRML MDRD: 17 ML/MIN/1.73
GLOBULIN UR ELPH-MCNC: 3.1 GM/DL
GLUCOSE BLDC GLUCOMTR-MCNC: 108 MG/DL (ref 70–105)
GLUCOSE BLDC GLUCOMTR-MCNC: 159 MG/DL (ref 70–105)
GLUCOSE BLDC GLUCOMTR-MCNC: 88 MG/DL (ref 70–105)
GLUCOSE BLDC GLUCOMTR-MCNC: 89 MG/DL (ref 70–105)
GLUCOSE SERPL-MCNC: 110 MG/DL (ref 65–99)
HCT VFR BLD AUTO: 27.4 % (ref 34–46.6)
HGB BLD-MCNC: 8.7 G/DL (ref 12–15.9)
IGA SERPL-MCNC: 543 MG/DL (ref 64–422)
IGG SERPL-MCNC: 1144 MG/DL (ref 586–1602)
IGM SERPL-MCNC: 74 MG/DL (ref 26–217)
INR PPP: 1.15 (ref 0.93–1.1)
LYMPHOCYTES # BLD AUTO: 0.7 10*3/MM3 (ref 0.7–3.1)
LYMPHOCYTES NFR BLD AUTO: 8.7 % (ref 19.6–45.3)
MAGNESIUM SERPL-MCNC: 1.6 MG/DL (ref 1.6–2.4)
MCH RBC QN AUTO: 31.7 PG (ref 26.6–33)
MCHC RBC AUTO-ENTMCNC: 31.7 G/DL (ref 31.5–35.7)
MCV RBC AUTO: 100 FL (ref 79–97)
MONOCYTES # BLD AUTO: 0.4 10*3/MM3 (ref 0.1–0.9)
MONOCYTES NFR BLD AUTO: 5.4 % (ref 5–12)
NEUTROPHILS NFR BLD AUTO: 6.4 10*3/MM3 (ref 1.7–7)
NEUTROPHILS NFR BLD AUTO: 83.6 % (ref 42.7–76)
NRBC BLD AUTO-RTO: 0.1 /100 WBC (ref 0–0.2)
NT-PROBNP SERPL-MCNC: ABNORMAL PG/ML (ref 0–1800)
PLATELET # BLD AUTO: 67 10*3/MM3 (ref 140–450)
PMV BLD AUTO: 8.8 FL (ref 6–12)
POTASSIUM SERPL-SCNC: 3.4 MMOL/L (ref 3.5–5.2)
PROT PATTERN SERPL IFE-IMP: ABNORMAL
PROT SERPL-MCNC: 5.5 G/DL (ref 6–8.5)
PROTHROMBIN TIME: 12.6 SECONDS (ref 9.6–11.7)
RBC # BLD AUTO: 2.74 10*6/MM3 (ref 3.77–5.28)
SODIUM SERPL-SCNC: 144 MMOL/L (ref 136–145)
WBC # BLD AUTO: 7.6 10*3/MM3 (ref 3.4–10.8)

## 2021-02-17 PROCEDURE — 80053 COMPREHEN METABOLIC PANEL: CPT | Performed by: INTERNAL MEDICINE

## 2021-02-17 PROCEDURE — 83880 ASSAY OF NATRIURETIC PEPTIDE: CPT | Performed by: INTERNAL MEDICINE

## 2021-02-17 PROCEDURE — 99232 SBSQ HOSP IP/OBS MODERATE 35: CPT | Performed by: INTERNAL MEDICINE

## 2021-02-17 PROCEDURE — 83883 ASSAY NEPHELOMETRY NOT SPEC: CPT | Performed by: NURSE PRACTITIONER

## 2021-02-17 PROCEDURE — 25010000002 CEFTAROLINE FOSAMIL PER 10 MG: Performed by: INTERNAL MEDICINE

## 2021-02-17 PROCEDURE — 83735 ASSAY OF MAGNESIUM: CPT | Performed by: PHYSICIAN ASSISTANT

## 2021-02-17 PROCEDURE — 85025 COMPLETE CBC W/AUTO DIFF WBC: CPT | Performed by: PHYSICIAN ASSISTANT

## 2021-02-17 PROCEDURE — 99232 SBSQ HOSP IP/OBS MODERATE 35: CPT | Performed by: FAMILY MEDICINE

## 2021-02-17 PROCEDURE — 85610 PROTHROMBIN TIME: CPT | Performed by: INTERNAL MEDICINE

## 2021-02-17 PROCEDURE — 82962 GLUCOSE BLOOD TEST: CPT

## 2021-02-17 RX ORDER — BUMETANIDE 0.25 MG/ML
1 INJECTION INTRAMUSCULAR; INTRAVENOUS EVERY 6 HOURS SCHEDULED
Status: DISCONTINUED | OUTPATIENT
Start: 2021-02-17 | End: 2021-02-18

## 2021-02-17 RX ORDER — LACTULOSE 10 G/15ML
20 SOLUTION ORAL 2 TIMES DAILY
Status: DISCONTINUED | OUTPATIENT
Start: 2021-02-17 | End: 2021-02-18 | Stop reason: HOSPADM

## 2021-02-17 RX ADMIN — URSODIOL 300 MG: 300 CAPSULE ORAL at 20:33

## 2021-02-17 RX ADMIN — DIGOXIN 125 MCG: 125 TABLET ORAL at 12:28

## 2021-02-17 RX ADMIN — SODIUM CHLORIDE 200 MG: 9 INJECTION, SOLUTION INTRAVENOUS at 17:47

## 2021-02-17 RX ADMIN — GABAPENTIN 100 MG: 100 CAPSULE ORAL at 17:43

## 2021-02-17 RX ADMIN — BUMETANIDE 1 MG: 0.25 INJECTION, SOLUTION INTRAMUSCULAR; INTRAVENOUS at 17:43

## 2021-02-17 RX ADMIN — HYDROCODONE BITARTRATE AND ACETAMINOPHEN 1 TABLET: 5; 325 TABLET ORAL at 01:19

## 2021-02-17 RX ADMIN — GABAPENTIN 100 MG: 100 CAPSULE ORAL at 09:00

## 2021-02-17 RX ADMIN — BUMETANIDE 2 MG: 0.25 INJECTION, SOLUTION INTRAMUSCULAR; INTRAVENOUS at 05:52

## 2021-02-17 RX ADMIN — ACETAMINOPHEN 650 MG: 325 TABLET, FILM COATED ORAL at 20:32

## 2021-02-17 RX ADMIN — Medication: at 20:33

## 2021-02-17 RX ADMIN — RIFAXIMIN 550 MG: 550 TABLET ORAL at 09:00

## 2021-02-17 RX ADMIN — BUMETANIDE 1 MG: 0.25 INJECTION, SOLUTION INTRAMUSCULAR; INTRAVENOUS at 12:28

## 2021-02-17 RX ADMIN — FEBUXOSTAT 40 MG: 40 TABLET, FILM COATED ORAL at 20:33

## 2021-02-17 RX ADMIN — BUMETANIDE 1 MG: 0.25 INJECTION, SOLUTION INTRAMUSCULAR; INTRAVENOUS at 23:58

## 2021-02-17 RX ADMIN — RIFAXIMIN 550 MG: 550 TABLET ORAL at 20:33

## 2021-02-17 RX ADMIN — MIDODRINE HYDROCHLORIDE 5 MG: 5 TABLET ORAL at 17:43

## 2021-02-17 RX ADMIN — ZINC OXIDE: 200 OINTMENT TOPICAL at 09:01

## 2021-02-17 RX ADMIN — URSODIOL 300 MG: 300 CAPSULE ORAL at 09:00

## 2021-02-17 RX ADMIN — MIDODRINE HYDROCHLORIDE 5 MG: 5 TABLET ORAL at 09:00

## 2021-02-17 RX ADMIN — POTASSIUM CHLORIDE 40 MEQ: 1500 TABLET, EXTENDED RELEASE ORAL at 09:00

## 2021-02-17 RX ADMIN — LIDOCAINE 2 PATCH: 50 PATCH TOPICAL at 20:32

## 2021-02-17 RX ADMIN — CYANOCOBALAMIN TAB 250 MCG 500 MCG: 250 TAB at 09:00

## 2021-02-17 RX ADMIN — Medication 10 ML: at 09:00

## 2021-02-17 RX ADMIN — BUMETANIDE 2 MG: 0.25 INJECTION, SOLUTION INTRAMUSCULAR; INTRAVENOUS at 00:59

## 2021-02-17 RX ADMIN — HYDROCODONE BITARTRATE AND ACETAMINOPHEN 1 TABLET: 5; 325 TABLET ORAL at 07:29

## 2021-02-17 RX ADMIN — LACTULOSE 20 G: 20 SOLUTION ORAL at 09:00

## 2021-02-17 RX ADMIN — GABAPENTIN 100 MG: 100 CAPSULE ORAL at 20:33

## 2021-02-17 RX ADMIN — Medication: at 09:00

## 2021-02-17 RX ADMIN — MIDODRINE HYDROCHLORIDE 5 MG: 5 TABLET ORAL at 12:28

## 2021-02-17 RX ADMIN — ASPIRIN 81 MG CHEWABLE TABLET 81 MG: 81 TABLET CHEWABLE at 09:00

## 2021-02-17 RX ADMIN — FEBUXOSTAT 40 MG: 40 TABLET, FILM COATED ORAL at 09:00

## 2021-02-17 RX ADMIN — Medication 10 ML: at 20:33

## 2021-02-17 RX ADMIN — SODIUM CHLORIDE 200 MG: 9 INJECTION, SOLUTION INTRAVENOUS at 05:52

## 2021-02-17 NOTE — PROGRESS NOTES
Referring Provider: Hospitalist    Reason for follow-up: Swelling of the feet and shortness of breath     Patient Care Team:  Rosa M Chavez APRN as PCP - General (Nurse Practitioner)    Subjective .  Lying in bed comfortably without any symptoms    Objective  Lying in bed comfortably     Review of Systems   Constitution: Negative for fever and malaise/fatigue.   HENT: Negative for ear pain and nosebleeds.    Eyes: Negative for blurred vision and double vision.   Cardiovascular: Negative for chest pain, dyspnea on exertion and palpitations.   Respiratory: Negative for cough and shortness of breath.    Skin: Negative for rash.   Musculoskeletal: Negative for joint pain.   Gastrointestinal: Negative for abdominal pain, nausea and vomiting.   Neurological: Negative for focal weakness and headaches.   Psychiatric/Behavioral: Negative for depression. The patient is not nervous/anxious.    All other systems reviewed and are negative.      Allopurinol, Clindamycin hcl, Codeine, Furosemide, Hydrochlorothiazide, Naproxen, and Sulfa antibiotics    Scheduled Meds:aspirin, 81 mg, Oral, Daily  bumetanide, 1 mg, Intravenous, Q6H  ceftaroline, 200 mg, Intravenous, Q12H  digoxin, 125 mcg, Oral, Daily  febuxostat, 40 mg, Oral, BID  folic acid, 1 mg, Oral, Daily  gabapentin, 100 mg, Oral, TID  lactulose, 20 g, Oral, BID  lidocaine, 2 patch, Transdermal, Q24H  magic butt paste, , Topical, Daily  midodrine, 5 mg, Oral, TID AC  rifAXIMin, 550 mg, Oral, Q12H  sodium chloride, 10 mL, Intravenous, Q12H  ursodiol, 300 mg, Oral, BID  vitamin B-12, 500 mcg, Oral, Daily  Zinc Oxide, , Apply externally, BID      Continuous Infusions:   PRN Meds:.•  acetaminophen **OR** acetaminophen **OR** acetaminophen  •  dextrose  •  dextrose  •  glucagon (human recombinant)  •  HYDROcodone-acetaminophen  •  magnesium sulfate **OR** magnesium sulfate in D5W 1g/100mL (PREMIX) **OR** magnesium sulfate  •  melatonin  •  nitroglycerin  •  OLANZapine  •   "ondansetron **OR** ondansetron  •  potassium chloride  •  potassium chloride  •  sodium chloride  •  tiZANidine        VITAL SIGNS  Vitals:    02/17/21 0226 02/17/21 0442 02/17/21 0500 02/17/21 0900   BP: 134/56  145/52 115/57   BP Location: Left arm  Left arm    Patient Position: Lying  Lying    Pulse: 60   60   Resp: 18  18    Temp: 97.4 °F (36.3 °C)  98.1 °F (36.7 °C)    TempSrc: Oral  Oral    SpO2: 96%  95% 95%   Weight:  75.7 kg (166 lb 14.2 oz)     Height:           Flowsheet Rows      First Filed Value   Admission Height  157.5 cm (62\") Documented at 02/05/2021 2359   Admission Weight  78 kg (172 lb) Documented at 02/05/2021 2359           TELEMETRY: Atrial fibrillation with controlled medical response    Physical Exam:  Constitutional:       Appearance: Well-developed.   Eyes:      General: No scleral icterus.     Conjunctiva/sclera: Conjunctivae normal.      Pupils: Pupils are equal, round, and reactive to light.   HENT:      Head: Normocephalic and atraumatic.   Neck:      Musculoskeletal: Normal range of motion and neck supple.      Vascular: No carotid bruit or JVD.   Pulmonary:      Effort: Pulmonary effort is normal.      Breath sounds: Normal breath sounds. No wheezing. No rales.   Cardiovascular:      Normal rate. Irregularly irregular rhythm.      Murmurs: There is a systolic murmur.   Pulses:     Intact distal pulses.   Abdominal:      General: Bowel sounds are normal.      Palpations: Abdomen is soft.   Musculoskeletal: Normal range of motion.   Skin:     General: Skin is warm and dry.      Findings: No rash.   Neurological:      Mental Status: Alert.      Comments: No focal deficits          Results Review:   I reviewed the patient's new clinical results.  Lab Results (last 24 hours)     Procedure Component Value Units Date/Time    POC Glucose Once [[id number]]  (Normal) Collected: 02/17/21 1145    Specimen: Blood Updated: 02/17/21 1156     Glucose 89 mg/dL      Comment: Serial Number: " [id number]:  [id number]       POC Glucose Once [[id number]]  (Normal) Collected: 02/17/21 0713    Specimen: Blood Updated: 02/17/21 0714     Glucose 88 mg/dL      Comment: Serial Number: [id number]:  [id number]       Magnesium [[id number]]  (Normal) Collected: 02/17/21 0553    Specimen: Blood Updated: 02/17/21 0648     Magnesium 1.6 mg/dL     Comprehensive Metabolic Panel [[id number]]  (Abnormal) Collected: 02/17/21 0553    Specimen: Blood Updated: 02/17/21 0648     Glucose 110 mg/dL      BUN 79 mg/dL      Creatinine 2.66 mg/dL      Sodium 144 mmol/L      Potassium 3.4 mmol/L      Chloride 103 mmol/L      CO2 31.0 mmol/L      Calcium 7.8 mg/dL      Total Protein 5.5 g/dL      Albumin 2.40 g/dL      ALT (SGPT) 24 U/L      AST (SGOT) 43 U/L      Alkaline Phosphatase 719 U/L      Total Bilirubin 0.7 mg/dL      eGFR Non African Amer 17 mL/min/1.73      Globulin 3.1 gm/dL      A/G Ratio 0.8 g/dL      BUN/Creatinine Ratio 29.7     Anion Gap 10.0 mmol/L     Narrative:      GFR Normal >60  Chronic Kidney Disease <60  Kidney Failure <15      BNP [[id number]]  (Abnormal) Collected: 02/17/21 0553    Specimen: Blood Updated: 02/17/21 0644     proBNP 34,861.0 pg/mL     Narrative:      Among patients with dyspnea, NT-proBNP is highly sensitive for the detection of acute congestive heart failure. In addition NT-proBNP of <300 pg/ml effectively rules out acute congestive heart failure with 99% negative predictive value.    Results may be falsely decreased if patient taking Biotin.      Protime-INR [[id number]]  (Abnormal) Collected: 02/17/21 0553    Specimen: Blood Updated: 02/17/21 0626     Protime 12.6 Seconds      INR 1.15    CBC & Differential [[id number]]  (Abnormal) Collected: 02/17/21 0553    Specimen: Blood Updated: 02/17/21 0618    Narrative:      The following orders were created for panel order CBC & Differential.  Procedure                               Abnormality         Status                     ---------                                -----------         ------                     CBC Auto Differential[[id number]]        Abnormal            Final result                 Please view results for these tests on the individual orders.    CBC Auto Differential [[id number]]  (Abnormal) Collected: 02/17/21 0553    Specimen: Blood Updated: 02/17/21 0618     WBC 7.60 10*3/mm3      RBC 2.74 10*6/mm3      Hemoglobin 8.7 g/dL      Hematocrit 27.4 %      .0 fL      MCH 31.7 pg      MCHC 31.7 g/dL      RDW 17.4 %      RDW-SD 61.7 fl      MPV 8.8 fL      Platelets 67 10*3/mm3      Neutrophil % 83.6 %      Lymphocyte % 8.7 %      Monocyte % 5.4 %      Eosinophil % 1.6 %      Basophil % 0.7 %      Neutrophils, Absolute 6.40 10*3/mm3      Lymphocytes, Absolute 0.70 10*3/mm3      Monocytes, Absolute 0.40 10*3/mm3      Eosinophils, Absolute 0.10 10*3/mm3      Basophils, Absolute 0.10 10*3/mm3      nRBC 0.1 /100 WBC     POC Glucose Once [[id number]]  (Abnormal) Collected: 02/16/21 2003    Specimen: Blood Updated: 02/16/21 2005     Glucose 163 mg/dL      Comment: Serial Number: [id number]:  [id number]       POC Glucose Once [[id number]]  (Abnormal) Collected: 02/16/21 1622    Specimen: Blood Updated: 02/16/21 1623     Glucose 146 mg/dL      Comment: Serial Number: [id number]:  [id number]       Alkaline Phosphatase, Isoenzymes [[id number]]  (Abnormal) Collected: 02/14/21 1241    Specimen: Blood Updated: 02/16/21 1610     Alkaline Phosphatase 1088 IU/L      Liver Fraction: 49 %      Bone Fraction: 47 %      Intestinal Frac.: 4 %     Narrative:      Performed at:  [address] [place] [address]  [id number]  : Balta Tellez PhD, Phone:  [phone]    Anti-Smooth Muscle Antibody Titer [[id number]] Collected: 02/13/21 1722    Specimen: Blood Updated: 02/16/21 1410     Smooth Muscle Ab 7 Units      Comment:                  Negative                     0 - 19                   Weak positive                20 - 30                   Moderate to strong positive     >30   Actin Antibodies are found in 52-85% of patients with   autoimmune hepatitis or chronic active hepatitis and   in 22% of patients with primary biliary cirrhosis.       Narrative:      Performed at:  [address]  [id number]  : Balta Tellez PhD, Phone:  [phone]    Anti-microsomal Antibody [[id number]] Collected: 02/13/21 1722    Specimen: Blood Updated: 02/16/21 1209     Liver Fraction: 1.1 Units      Comment:                                 Negative    0.0 - 20.0                                  Equivocal  20.1 - 24.9                                  Positive         >24.9  LKM type 1 antibodies are detected in patients with  autoimmune hepatitis type 2 and in up to 8% of  patients with chronic HCV infection.       Narrative:      Performed at:  [address]  [id number]  : Balta Tellez PhD, Phone:  [phone]          Imaging Results (Last 24 Hours)     ** No results found for the last 24 hours. **          EKG      I personally viewed and interpreted the patient's EKG/Telemetry data:    ECHOCARDIOGRAM:    STRESS MYOVIEW:    CARDIAC CATHETERIZATION:    OTHER:         Assessment/Plan     Active Problems:    Congestive heart failure (CMS/HCC)    Atrial fibrillation (CMS/HCC)    Hypertension, benign    Lymphedema    Gout    Non-pressure chronic ulcer right ankle, limited to breakdown skin (CMS/HCC)    Automatic implantable cardiac defibrillator in situ    Acute UTI (urinary tract infection)    Lower extremity cellulitis    CAD (coronary artery disease)    Sepsis (CMS/HCC)    CONG (acute kidney injury) (CMS/HCC)    Lactic acidosis    Metabolic acidosis    Delirium, acute       Patient presented with shortness of breath and swelling of the feet and is ruled out for MI will get enzymes  Patient has history of congestive heart failure with LV dysfunction  and is currently stable on medications  Patient also has lymphedema and renal insufficiency and is followed by the nephrologist  Patient has history of persistent atrial fibrillation and is currently on medical therapy including digoxin not beta-blockers have been held because of low blood pressure   Since her blood pressure is stable now we will restart her on low-dose beta-blockers at home  Patient is not on any anticoagulation in the past and even now because of life-threatening GI bleed and patient does not want to be on any anticoagulation at this time.    I discussed the patients findings and my recommendations with patient and nurse    Vinod Root MD  02/17/21  12:02 EST

## 2021-02-17 NOTE — PROGRESS NOTES
Infectious Diseases Progress Note      LOS: 9 days   Patient Care Team:  Rosa M Chavez APRN as PCP - General (Nurse Practitioner)    Chief Complaint: Lower extremities edema    Subjective       The patient has been afebrile for the last 24 hours.  The patient is on 1 L of oxygen by nasal cannula, hemodynamically stable.  She is currently in the chair and has no new complaints      Review of Systems:   Review of Systems   Constitutional: Negative.    HENT: Negative.    Eyes: Negative.    Respiratory: Negative.    Cardiovascular: Positive for leg swelling.   Gastrointestinal: Negative.    Genitourinary: Negative.    Musculoskeletal: Negative.         Bilateral leg pain   Skin: Negative.    Neurological: Negative.    Hematological: Negative.    Psychiatric/Behavioral: Negative.         Objective     Vital Signs  Temp:  [97.4 °F (36.3 °C)-98.1 °F (36.7 °C)] 97.6 °F (36.4 °C)  Heart Rate:  [60] 60  Resp:  [14-20] 20  BP: (115-145)/(52-58) 129/58    Physical Exam:  Physical Exam  Vitals signs and nursing note reviewed.   Constitutional:       Appearance: She is well-developed.   HENT:      Head: Normocephalic and atraumatic.   Eyes:      Pupils: Pupils are equal, round, and reactive to light.   Neck:      Musculoskeletal: Normal range of motion and neck supple.   Cardiovascular:      Rate and Rhythm: Normal rate and regular rhythm.      Heart sounds: Normal heart sounds.   Pulmonary:      Effort: Pulmonary effort is normal. No respiratory distress.      Breath sounds: Normal breath sounds. No wheezing or rales.   Abdominal:      General: Bowel sounds are normal. There is no distension.      Palpations: Abdomen is soft. There is no mass.      Tenderness: There is no abdominal tenderness. There is no guarding or rebound.   Musculoskeletal: Normal range of motion.         General: No deformity.      Right lower leg: Edema present.      Left lower leg: Edema present.      Comments: Superimposed erythema of both lower  extremities more on the left than the right-erythema is difficult to assess because of the Magic Butt paste on the legs however erythema does appear improved  -Erythema is significantly improved since we first saw the patient   Skin:     General: Skin is warm.      Findings: No erythema or rash.   Neurological:      Mental Status: She is alert and oriented to person, place, and time.      Cranial Nerves: No cranial nerve deficit.          Results Review:    I have reviewed all clinical data, test, lab, and imaging results.     Radiology  No Radiology Exams Resulted Within Past 24 Hours    Cardiology    Laboratory    Results from last 7 days   Lab Units 02/17/21  0553 02/16/21  0640 02/15/21  0530 02/14/21  0446 02/13/21  0350 02/12/21  0427 02/11/21  0027   WBC 10*3/mm3 7.60 7.40 8.40 8.30 11.00* 14.20* 12.00*   HEMOGLOBIN g/dL 8.7* 9.8* 8.6* 8.7* 8.5* 9.0* 9.0*   HEMATOCRIT % 27.4* 29.7* 26.9* 26.5* 26.3* 28.3* 27.8*   PLATELETS 10*3/mm3 67* 77* 87* 72* 74* 63* 92*     Results from last 7 days   Lab Units 02/17/21  0553 02/16/21  0640 02/16/21  0011 02/15/21  1716 02/15/21  0530 02/14/21  1241 02/14/21  0446 02/13/21  1722 02/13/21  0350 02/12/21  1759 02/12/21  1714 02/12/21  0427  02/11/21  1227   SODIUM mmol/L 144 143  --   --  142  --  140 140 140  --  138 136   < >  --    POTASSIUM mmol/L 3.4* 3.9 4.1  --  3.3*  --  3.6 3.7 3.9  --  3.5 3.8   < >  --    CHLORIDE mmol/L 103 104  --   --  102  --  102 102 103  --  102 99   < >  --    CO2 mmol/L 31.0* 32.0*  --   --  29.0  --  28.0 27.0 25.0  --  25.0 24.0   < >  --    BUN mg/dL 79* 87*  --   --  96*  --  100* 99* 102*  --  98* 102*   < >  --    CREATININE mg/dL 2.66* 2.89*  --   --  3.59*  --  3.84* 3.70* 3.73*  --  3.57* 3.46*   < >  --    GLUCOSE mg/dL 110* 166*  --   --  91  --  72 85 78  --  174* 220*   < >  --    ALBUMIN g/dL 2.40* 2.30*  --   --  2.40*  --  2.20*  --  2.40*  --   --  2.90*  --  2.0*   BILIRUBIN mg/dL 0.7 0.9  --   --  0.8  --  0.8  --  0.8   --   --  1.2  --   --    ALK PHOS U/L 719* 803*  --   --  945* 1088* 1,082*  --  994*  --   --  762*  --   --    AST (SGOT) U/L 43* 48*  --   --  78*  --  101*  --  108*  --   --  72*  --   --    ALT (SGPT) U/L 24 30  --   --  40*  --  46*  --  45*  --   --  35*  --   --    AMMONIA umol/L  --  30  --  43  --   --  72*  --   --  480*  --   --   --   --    CALCIUM mg/dL 7.8* 8.1*  --   --  7.6*  --  7.6* 7.3* 7.7*  --  7.2* 8.0*   < >  --     < > = values in this interval not displayed.                 Microbiology   Microbiology Results (last 10 days)     Procedure Component Value - Date/Time    Urine Culture - Urine, Urine, Random Void [[id number]]  (Abnormal) Collected: 02/10/21 1616    Lab Status: Final result Specimen: Urine, Random Void Updated: 02/11/21 1234     Urine Culture Yeast isolated     Comment: No further workup.       Urine Culture - Urine, Urine, Catheter [[id number]]  (Abnormal) Collected: 02/10/21 0306    Lab Status: Final result Specimen: Urine, Catheter Updated: 02/11/21 1234     Urine Culture Yeast isolated     Comment: No further workup.             Medication Review:       Schedule Meds  aspirin, 81 mg, Oral, Daily  bumetanide, 1 mg, Intravenous, Q6H  ceftaroline, 200 mg, Intravenous, Q12H  digoxin, 125 mcg, Oral, Daily  febuxostat, 40 mg, Oral, BID  folic acid, 1 mg, Oral, Daily  gabapentin, 100 mg, Oral, TID  lactulose, 20 g, Oral, BID  lidocaine, 2 patch, Transdermal, Q24H  magic butt paste, , Topical, Daily  midodrine, 5 mg, Oral, TID AC  rifAXIMin, 550 mg, Oral, Q12H  sodium chloride, 10 mL, Intravenous, Q12H  ursodiol, 300 mg, Oral, BID  vitamin B-12, 500 mcg, Oral, Daily  Zinc Oxide, , Apply externally, BID        Infusion Meds       PRN Meds  •  acetaminophen **OR** acetaminophen **OR** acetaminophen  •  dextrose  •  dextrose  •  glucagon (human recombinant)  •  magnesium sulfate **OR** magnesium sulfate in D5W 1g/100mL (PREMIX) **OR** magnesium sulfate  •  melatonin  •   nitroglycerin  •  OLANZapine  •  ondansetron **OR** ondansetron  •  potassium chloride  •  potassium chloride  •  sodium chloride  •  tiZANidine        Assessment/Plan       Antimicrobial Therapy   1.  Teflaro     day  2.       day  3.      Day  4.      Day  5.      Day    Assessment     Bilateral lower extremities lymphedema with erythema of the right leg consistent with cellulitis.  Erythema had improved     Acute kidney injury     Leukocytosis-likely related to severe cellulitis.  Significantly improved    Congestive heart failure     History of pacemaker implantation    Hypothermia-appears to have resolved    Candiduria.  Patient s/p replacement of Ayon catheter on February 10, 2021 and repeat urinalysis was positive for candiduria    Significant elevation of alkaline phosphatase.  GI service is concerned about medication induced such as doxycycline.  Fluconazole might be responsible option  -Trending down     Plan     Continue Teflaro 200 mg IV every 12 hours.  We will discontinue after today's dose  Okay to go to rehab facility at any time        Kurt Diehl MD  02/17/21  14:38 EST      Note is dictated utilizing voice recognition software/Dragon

## 2021-02-17 NOTE — PLAN OF CARE
Goal Outcome Evaluation:  Pt doing much better with ambulating. Was able to get up to the chair with assistance. Pt requesting to speak with MD r/t f/c placement - reports burning, pain and would like it removed. Pt c/o headache and generalized body pains - given PRN Norco. Dressings on BLE removed and replaced. Pt seems much more alert and oriented w/decreasing ammonia levels. Vitals stable.

## 2021-02-17 NOTE — PROGRESS NOTES
"Nutrition Services    Patient Name: Emperatriz Childs  YOB: 1945  MRN: [id number]  Admission date: 2/5/2021    PPE Documentation        PPE Worn By Provider mask and eye protection   PPE Worn By Patient  NA - see encounter information     PROGRESS NOTE      Encounter Information: 2/17: Progress note to check on whether pt accepting nutritional supplements. Spoke with RN, who states pt is drinking them - had chocolate Boost this morning. Pt said it wasn't her \"favorite,\" but she did drink it. Will add Magic Cup as an additional option, to help support adequate intake.        PO Diet: Diet Regular, Texture; Soft to Chew   PO Supplements: Boost Plus TID   PO Intake:  Variable intake - averaging \"fair\" at recent meals        Nutrition support orders: -    Nutrition support review: -       Labs (reviewed below): BUN/Cr elevated - nephrologist is following   Hypokalemia - replaced today       GI Function:  BM 2/17       Nutrition Intervention: Continue Regular (Soft To Chew) diet as ordered, with Boost Plus TID; will also start Magic Cup BID as an additional supplement option.        Results from last 7 days   Lab Units 02/17/21  0553 02/16/21  0640 02/16/21  0011 02/15/21  0530   SODIUM mmol/L 144 143  --  142   POTASSIUM mmol/L 3.4* 3.9 4.1 3.3*   CHLORIDE mmol/L 103 104  --  102   CO2 mmol/L 31.0* 32.0*  --  29.0   BUN mg/dL 79* 87*  --  96*   CREATININE mg/dL 2.66* 2.89*  --  3.59*   CALCIUM mg/dL 7.8* 8.1*  --  7.6*   BILIRUBIN mg/dL 0.7 0.9  --  0.8   ALK PHOS U/L 719* 803*  --  945*   ALT (SGPT) U/L 24 30  --  40*   AST (SGOT) U/L 43* 48*  --  78*   GLUCOSE mg/dL 110* 166*  --  91     Results from last 7 days   Lab Units 02/17/21  0553 02/16/21  0640 02/15/21  0530   MAGNESIUM mg/dL 1.6 2.0 1.6   HEMOGLOBIN g/dL 8.7* 9.8* 8.6*   HEMATOCRIT % 27.4* 29.7* 26.9*     COVID19   Date Value Ref Range Status   02/06/2021 Not Detected Not Detected - Ref. Range Final     Lab Results   Component Value Date    " HGBA1C 4.3 02/05/2020       RD to follow up per protocol.    Electronically signed by:  Janey Dumont RD  02/17/21 15:46 EST

## 2021-02-17 NOTE — PROGRESS NOTES
PROGRESS NOTE      Patient Name: Emperatriz Childs  : 1945  MRN: [id number]  Primary Care Physician: Rosa M Chavez APRN  Date of admission: 2021    Patient Care Team:  Rosa M Chavez APRN as PCP - General (Nurse Practitioner)        Subjective   Subjective:     Acute kidney injury, patient is still swollen not feeling good, no significant shortness of breath  Review of systems:  All other review of system unremarkable      Allergies:    Allergies   Allergen Reactions   • Allopurinol Unknown - High Severity   • Clindamycin Hcl Unknown - High Severity   • Codeine Unknown - High Severity   • Furosemide Unknown - High Severity   • Hydrochlorothiazide Unknown - High Severity   • Naproxen Unknown - High Severity   • Sulfa Antibiotics Unknown - High Severity       Objective   Exam:     Vital Signs  Temp:  [97.4 °F (36.3 °C)-98.6 °F (37 °C)] 98.6 °F (37 °C)  Heart Rate:  [60] 60  Resp:  [14-20] 18  BP: (115-145)/(52-63) 124/63  SpO2:  [92 %-98 %] 96 %  on   ;   Device (Oxygen Therapy): room air  Body mass index is 30.52 kg/m².    General: Elderly  female in no acute distress.    Head:      Normocephalic and atraumatic.    Eyes:      PERRL/EOM intact, conjunctiva and sclera clear with out nystagmus.    Neck:      No masses, thyromegaly,  trachea central with normal respiratory effort   Lungs:    Clear bilaterally to auscultation.    Heart:      Regular rate and rhythm, no murmur no gallop  Abd:        Soft, nontender, not distended, bowel sounds positive, no shifting dullness   Pulses:   Pulses palpable  Extr:        No cyanosis or clubbing--significant bilateral edema.    Neuro:    No focal deficits.   alert oriented x3  Skin:       Intact without lesions or rashes.    Psych:    Alert and cooperative; normal mood and affect; .      Results Review:  I have personally reviewed most recent Data :  CBC    Results from last 7 days   Lab Units 21  0553 21  0640 02/15/21  0530 21  0446  02/13/21  0350 02/12/21 0427 02/11/21  0027   WBC 10*3/mm3 7.60 7.40 8.40 8.30 11.00* 14.20* 12.00*   HEMOGLOBIN g/dL 8.7* 9.8* 8.6* 8.7* 8.5* 9.0* 9.0*   PLATELETS 10*3/mm3 67* 77* 87* 72* 74* 63* 92*     CMP   Results from last 7 days   Lab Units 02/17/21  0553 02/16/21  0640 02/16/21  0011 02/15/21  1716 02/15/21  0530 02/14/21  1241 02/14/21  0446 02/13/21  1722 02/13/21 0350 02/12/21  1759 02/12/21 1714 02/12/21 0427 02/11/21  1227   SODIUM mmol/L 144 143  --   --  142  --  140 140 140  --  138 136   < >  --    POTASSIUM mmol/L 3.4* 3.9 4.1  --  3.3*  --  3.6 3.7 3.9  --  3.5 3.8   < >  --    CHLORIDE mmol/L 103 104  --   --  102  --  102 102 103  --  102 99   < >  --    CO2 mmol/L 31.0* 32.0*  --   --  29.0  --  28.0 27.0 25.0  --  25.0 24.0   < >  --    BUN mg/dL 79* 87*  --   --  96*  --  100* 99* 102*  --  98* 102*   < >  --    CREATININE mg/dL 2.66* 2.89*  --   --  3.59*  --  3.84* 3.70* 3.73*  --  3.57* 3.46*   < >  --    GLUCOSE mg/dL 110* 166*  --   --  91  --  72 85 78  --  174* 220*   < >  --    ALBUMIN g/dL 2.40* 2.30*  --   --  2.40*  --  2.20*  --  2.40*  --   --  2.90*  --  2.0*   BILIRUBIN mg/dL 0.7 0.9  --   --  0.8  --  0.8  --  0.8  --   --  1.2  --   --    ALK PHOS U/L 719* 803*  --   --  945* 1088* 1,082*  --  994*  --   --  762*  --   --    AST (SGOT) U/L 43* 48*  --   --  78*  --  101*  --  108*  --   --  72*  --   --    ALT (SGPT) U/L 24 30  --   --  40*  --  46*  --  45*  --   --  35*  --   --    AMMONIA umol/L  --  30  --  43  --   --  72*  --   --  480*  --   --   --   --     < > = values in this interval not displayed.     ABG        Nm Lung Ventilation Perfusion Aerosol    Result Date: 2/14/2021   1. Abnormal appearance of the ventilation study which can be seen with obstructive pulmonary disease. 2. Low probability of acute pulmonary embolic disease.  Electronically Signed By-Fletcher Barros MD On:2/14/2021 11:29 AM This report was finalized on [id number] by  Fletcher Barros,  MD.    Us Liver    Result Date: 2/14/2021   1. Abnormal appearance of liver felt to be secondary to hepatic cirrhosis. 2. Cholelithiasis. 3. Minimal ascites.  Electronically Signed By-Fletcher Barros MD On:2/14/2021 10:41 AM This report was finalized on [id number] by  Fletcher Barros MD.    Xr Chest 1 View    Result Date: 2/11/2021  1. Cardiomegaly without acute pulmonary process. No change from prior exam.  Electronically Signed By-Dae Auguste MD On:2/11/2021 2:18 PM This report was finalized on [id number] by  Dae Auguste MD.    Xr Chest Pa & Lateral    Result Date: 2/13/2021   1. Cardiomegaly. 2. Small to moderate left pleural effusion with compressive atelectasis and possible airspace disease from pneumonia.  Electronically Signed By-Fletcher Barros MD On:2/13/2021 5:43 PM This report was finalized on [id number] by  Fletcher Barros MD.    Xr Tibia Fibula 2 View Bilateral    Result Date: 2/14/2021  IMPRESSION :  1. No acute findings. 2. Tricompartment osteoarthritis of both knees. 3. Bony demineralization.  Electronically Signed By-Fletcher Barros MD On:2/14/2021 5:39 PM This report was finalized on [id number] by  Fletcher Barros MD.      Results for orders placed during the hospital encounter of 02/05/21   Adult Transthoracic Echo Complete W/ Cont if Necessary Per Protocol    Narrative Normal LV size and contractility EF of 55%  Moderate right ventricular enlargement with severe right atrial   enlargement, catheter probably pacemaker lead seen.  Severe left atrial enlargement seen.  Aortic valve, mitral valve, tricuspid valve appears structurally normal,   mild MR, AR, seen.  There is moderate  tricuspid regurgitation seen.    Calculated RV systolic pressure is 24mmHg.  No pericardial effusion seen.  Proximal aorta appears normal in size.     Scheduled Meds:aspirin, 81 mg, Oral, Daily  bumetanide, 1 mg, Intravenous, Q6H  ceftaroline, 200 mg, Intravenous, Q12H  digoxin, 125 mcg, Oral, Daily  febuxostat, 40 mg,  Oral, BID  folic acid, 1 mg, Oral, Daily  gabapentin, 100 mg, Oral, TID  lactulose, 20 g, Oral, BID  lidocaine, 2 patch, Transdermal, Q24H  magic butt paste, , Topical, Daily  midodrine, 5 mg, Oral, TID AC  rifAXIMin, 550 mg, Oral, Q12H  sodium chloride, 10 mL, Intravenous, Q12H  ursodiol, 300 mg, Oral, BID  vitamin B-12, 500 mcg, Oral, Daily  Zinc Oxide, , Apply externally, BID      Continuous Infusions:   PRN Meds:•  acetaminophen **OR** acetaminophen **OR** acetaminophen  •  dextrose  •  dextrose  •  glucagon (human recombinant)  •  magnesium sulfate **OR** magnesium sulfate in D5W 1g/100mL (PREMIX) **OR** magnesium sulfate  •  melatonin  •  nitroglycerin  •  OLANZapine  •  ondansetron **OR** ondansetron  •  potassium chloride  •  potassium chloride  •  sodium chloride  •  tiZANidine    Assessment/Plan   Assessment and Plan:         Congestive heart failure (CMS/HCC)    Atrial fibrillation (CMS/[facility])    Hypertension, benign    Lymphedema    Gout    Non-pressure chronic ulcer right ankle, limited to breakdown skin (CMS/HCC)    Automatic implantable cardiac defibrillator in situ    Acute UTI (urinary tract infection)    Lower extremity cellulitis    CAD (coronary artery disease)    Sepsis (CMS/HCC)    CONG (acute kidney injury) (CMS/[facility])    Lactic acidosis    Metabolic acidosis    Delirium, acute    ASSESSMENT:  · Acute kidney injury, oliguric, stage 2-3, evolving, ongoing hypotension, ? Sepsis, and prerenal azotemia component with ongoing diarrhea, diuretics use,   ·  might have some degree of progression of CKD too.   · CKD 4, with baseline creatinine around 1.7- 2.2, till last year,02/2020, never followed up in clinic. Work up then  ua negative RBC,WBC,no protein. UPC, was unremarkable in 02/2020 USG, right 8.9 cm, andleft 8.3 cm, medial renal disease.  · Metabolic acidosis, gap and non gap, more due to GI bicarb loss, CKD and persistent lactic acidosis. patient apparently had large bowel movement, significant  metabolic acidosis  · Hypotension, concern hypovolemia,   · Congestive heart failure with last ejection fraction around 40% from 2018,   · History of atrial fibrillation  · Significant metabolic acidosis  · Significant anemia  · Chronic lymphedema  · Cellulitis of lower extremities.   · Thrombocytopenia, also on 02/2020  · Anemia.      Plan:        · Patient baseline creatinine 1.8-2.  Acute increase in creatinine with acute increase in volume with infection and metabolic acidosis and had some  GI  bicarbonate loss which is better  · Patient potassium level is slightly on the low side now  · Follow-up with repeat labs in outpatient  · If stable okay to be discharged  · Switch to p.o. diuretics by tomorrow  · Repeat chest x-ray with mild pulmonary congestion  · Patient volume status and edema has improved significantly  · Hemodynamics are so far acceptable  · Follow-up with a urine output and electrolytes  · Unwilling to continue same dose of diuretics for another 24-hour  · Follow-up with repeat labs  · Patient has cardiorenal syn will drome at this time  · Okay to DC Ayon catheter by tomorrow morning  · Fu sepsis work up follow-up with infectious disease  · Repeat labs,   · Decrease fluids in evening.        Note started  by Feliz Silva MD,   [facility] kidney consultant

## 2021-02-17 NOTE — PROGRESS NOTES
[facility] Daily Progress Note      Hospitalist Team  LOS 9 days      Patient Care Team:  Rosa M Chavez APRN as PCP - General (Nurse Practitioner)    Patient Location: 2120/1      Subjective   Subjective     Chief Complaint / Subjective  No chief complaint on file.    Patient continues to feel better.  Breathing comfortably.  Strength improving.  Renal function improving.  Completing antibiotics.    Brief Synopsis of Hospital Course/HPI  The patient is a 75-year-old female with history atrial fibrillation s/p pacemaker placement, hyperlipidemia, hypertension, CAD, CKD stage III and  chronic lower extremity lymphedema.     Apparently the patient has been having several weeks of worsening lower extremity edema thus went to outside facility.     Laboratory work was significant for , potassium 5.3, BUN 63, creatinine 2.41, WBC 4.5, lactic acid 3.2 and UA with 25-50 WBCs.  The patient was transferred to [facility] for further care        Date::    2/6/21: Poor historian.  Low BP thus gave bolus.  Started on dopamine and transferred to PCU.  Started on bicarb drip.  Confused in the evening.    2/7/21: PICC line placed.  Daughter at the bedside discussed care.  Daughter claims no history of dementia or sundowning.  Leukocytosis.  Added Flagyl.  Consulted ID.  Daughter claims patient off Xarelto.  C. difficile ruled out.     2/8  Has third spacing and bruising.  Patient denies any chest pain  On dopamine drip and being tapered off.  Started on midodrine.     2/9/2021  Outpatient recliner  She has Ace wrap in place  She had been complaining of some tight wraps on the right side and will need to be addressed.  White count better  Her platelet count is 54 slightly better than yesterday  Still on a small amount of dopamine drip.  Renal function still elevated but probably stabilized.     2/10  Patient is now feeling better and her leg swelling is under control with  compression dressing that has been changed to allow for some relief of her pain.  She had drop in her creatinine which suggests improvement with diuresis and possible cardiorenal etiology     She has been eating yesterday and her sugar dropped and started D10     B12 is above 2000  Haptoglobin is 100 within normal.  She is hypomagnesemic as well.  Folic acid 10.9  Still with significant thrombocytopenia.  We will ask hematologist for evaluation.     2/11/2021  Urine output decreased overnight and Dr. Pinedo wants to start dopamine drip again  Creatinine slightly up again today  Patient is asking when she is going to go home.  Since her right ventricle atrial or enlarged    2/12  Coaches in hips and arms hyun w ambulation  Bands 17%  platelet 63  Hallucinating; talking to her !  Check ammmonia     2/13  Feels sleepy most pf time  [id number]--   Initiated lactulose enema and oral lactulose  Consult GI  Worsening alkaline phosphatase and ALT elevation noted  Check hepatitis panel  On Bumex drip  Had good urine output overnight     2/14  Patient has been switched to IV Bumex  She had worsening elevation of alkaline phosphatase  Still has poor oral intake  No evident hallucinations on exam today  Complaining of pain bilateral legs.  X-ray ordered for evaluation.    2/15/2021: Patient reports having diffuse pain today but most prominent in her back.  No chest pain or shortness of breath.  Renal function appears to be improving mildly.  Palliative care consulted for discussing goals of care given patient's significant comorbidities.    2/16/2021: Patient much more alert and conversational today.  Patient reports her back pain is improved and she is feeling stronger.  No shortness of breath.  Alkaline phosphatase fractionation in process.  Patient continuing on lactulose.  Antibiotics switched to avoid further potential hepatotoxicity.    Date::          Review of Systems   Constitution: Positive for malaise/fatigue.  "Negative for chills and fever.   HENT: Negative for hoarse voice and sore throat.    Eyes: Negative for double vision and photophobia.   Cardiovascular: Positive for leg swelling. Negative for chest pain.   Respiratory: Negative for cough and shortness of breath.    Musculoskeletal: Negative for back pain, joint pain and myalgias.   Gastrointestinal: Negative for bloating, nausea and vomiting.   Genitourinary: Negative for dysuria and flank pain.   Neurological: Negative for dizziness and weakness.   Psychiatric/Behavioral: Negative for memory loss. The patient is not nervous/anxious.          Objective   Objective      Vital Signs  Temp:  [97.4 °F (36.3 °C)-98.4 °F (36.9 °C)] 97.6 °F (36.4 °C)  Heart Rate:  [60] 60  Resp:  [14-20] 20  BP: (115-145)/(51-58) 129/58  Oxygen Therapy  SpO2: 92 %  Pulse Oximetry Type: Continuous  Device (Oxygen Therapy): room air  Flow (L/min): 1  Flowsheet Rows      First Filed Value   Admission Height  157.5 cm (62\") Documented at 02/05/2021 2359   Admission Weight  78 kg (172 lb) Documented at 02/05/2021 2359        Intake & Output (last 3 days)       02/14 0701 - 02/15 0700 02/15 0701 - 02/16 0700 02/16 0701 - 02/17 0700 02/17 0701 - 02/18 0700    P.O.  240    I.V. (mL/kg)  50 (0.7)      Other   460     Total Intake(mL/kg) 236 (3) 890 (11.7) 2620 (34.6) 240 (3.2)    Urine (mL/kg/hr) 1800 (0.9) 3740 (2.1) 1435 (0.8) 600 (1.4)    Stool 0 0 0     Total Output 1800 3740 1435 600    Net -1564 -2850 +1185 -360            Stool Unmeasured Occurrence 3 x 2 x 3 x         Lines, Drains & Airways    Active LDAs     Name:   Placement date:   Placement time:   Site:   Days:    PICC Triple Lumen 02/07/21 Right Brachial   02/07/21    1024    Brachial   8    Urethral Catheter Non-latex;Silicone 16 Fr.   02/06/21    1826     8                  Physical Exam:    Physical Exam    General: Chronically ill appearing obese elderly female lying in bed breathing comfortably on room air no acute " distress   HEENT: NC/AT, EOMI, mucosa moist  Heart: Regular, rate controlled  Chest: Normal work of breathing, moving air well no wheezing  Abdominal: Soft.  Mild distention, nontender  Musculoskeletal: Normal ROM.  No cyanosis. No calf tenderness.  Neurological: Awake and alert, no focal deficits  Skin: Skin is warm and dry. No rash  Psychiatric: Patient appearing calm.         Wounds (last 24 hours)      LDA Wound     Row Name 02/17/21 1200 02/17/21 0800 02/17/21 0400       Wound 02/06/21 0031 Right lower leg Other (comment)    Wound - Properties Group Placement Date: 02/06/21  -CS Placement Time: 0031  -CS Present on Hospital Admission: Y  -CS Side: Right  -CS Orientation: lower  -CS Location: leg  -CS Primary Wound Type: Other  -CS, chronic redness and swelling     Dressing Appearance  --  dry;intact  -BC  --    Closure  NELY  -BC  NELY  -BC  --    Base  dry  -BC  dry  -BC  dry  -SS    Retired Wound - Properties Group Date first assessed: 02/06/21  -CS Time first assessed: 0031  -CS Present on Hospital Admission: Y  -CS Side: Right  -CS Location: leg  -CS Primary Wound Type: Other  -CS, chronic redness and swelling        Wound 02/06/21 0032 Right anterior foot Other (comment)    Wound - Properties Group Placement Date: 02/06/21  -CS Placement Time: 0032  -CS Present on Hospital Admission: Y  -CS Side: Right  -CS Orientation: anterior  -CS Location: foot  -CS Primary Wound Type: Other  -CS Additional Comments: chronic redness and swelling  -CS    Dressing Appearance  --  dry;intact  -BC  --    Closure  NELY  -BC  NELY  -BC  --    Base  dry  -BC  dry  -BC  dry  -SS    Retired Wound - Properties Group Date first assessed: 02/06/21  -CS Time first assessed: 0032  -CS Present on Hospital Admission: Y  -CS Side: Right  -CS Location: foot  -CS Primary Wound Type: Other  -CS Additional Comments: chronic redness and swelling  -CS       Wound 02/06/21 0033 Left lower leg Other (comment)    Wound - Properties Group Placement  Date: 02/06/21  -CS Placement Time: 0033  -CS Present on Hospital Admission: N  -CS Side: Left  -CS Orientation: lower  -CS Location: leg  -CS Primary Wound Type: Other  -CS Additional Comments: chronic redness and swelling  -CS    Dressing Appearance  --  dry;intact  -BC  --    Closure  NELY  -BC  NELY  -BC  --    Base  dry  -BC  dry  -BC  dry  -SS    Retired Wound - Properties Group Date first assessed: 02/06/21  -CS Time first assessed: 0033  -CS Present on Hospital Admission: N  -CS Side: Left  -CS Location: leg  -CS Primary Wound Type: Other  -CS Additional Comments: chronic redness and swelling  -CS       Wound 02/06/21 0035 Left anterior ankle Other (comment)    Wound - Properties Group Placement Date: 02/06/21  -CS Placement Time: 0035  -CS Present on Hospital Admission: Y  -CS Side: Left  -CS Orientation: anterior  -CS Location: ankle  -CS Primary Wound Type: Other  -CS    Dressing Appearance  --  dry;intact  -BC  --    Closure  NELY  -BC  NELY  -BC  --    Base  dry  -BC  dry  -BC  dry  -SS    Retired Wound - Properties Group Date first assessed: 02/06/21  -CS Time first assessed: 0035  -CS Present on Hospital Admission: Y  -CS Side: Left  -CS Location: ankle  -CS Primary Wound Type: Other  -CS       Wound 02/06/21 0047 Right heel Other (comment)    Wound - Properties Group Placement Date: 02/06/21  -CS Placement Time: 0047  -CS Present on Hospital Admission: Y  -CS Side: Right  -CS Location: heel  -CS Primary Wound Type: Other  -CS, Chronic redness and swelling      Dressing Appearance  --  dry;intact  -BC  --    Closure  NELY  -BC  NLEY  -BC  --    Base  dry  -BC  dry  -BC  dry  -SS    Retired Wound - Properties Group Date first assessed: 02/06/21  -CS Time first assessed: 0047  -CS Present on Hospital Admission: Y  -CS Side: Right  -CS Location: heel  -CS Primary Wound Type: Other  -CS, Chronic redness and swelling         Wound 02/06/21 0050 Left posterior thigh Other (comment)    Wound - Properties Group  Placement Date: 02/06/21  -CS Placement Time: 0050  -CS Present on Hospital Admission: Y  -CS Side: Left  -CS Orientation: posterior  -CS Location: thigh  -CS Primary Wound Type: Other  -CS, Chronic redness and swelling      Dressing Appearance  --  intact;dry  -BC  --    Closure  None  -BC  None  -BC  --    Retired Wound - Properties Group Date first assessed: 02/06/21  -CS Time first assessed: 0050  -CS Present on Hospital Admission: Y  -CS Side: Left  -CS Location: thigh  -CS Primary Wound Type: Other  -CS, Chronic redness and swelling         Wound 02/06/21 0051 coccyx Pressure Injury    Wound - Properties Group Placement Date: 02/06/21  -CS Placement Time: 0051  -CS Present on Hospital Admission: Y  -CS Location: coccyx  -CS Primary Wound Type: Pressure inj  -CS Stage, Pressure Injury : Stage 2  -CS    Dressing Appearance  --  open to air  -BC  --    Closure  Open to air  -BC  Open to air  -BC  Open to air  -SS    Base  red;moist  -BC  red;moist  -BC  --    Periwound  dry;non-blanchable;redness  -BC  dry;non-blanchable;redness  -BC  dry;non-blanchable;redness  -SS    Care, Wound  --  barrier applied  -BC  --    Dressing Care  --  open to air  -BC  --    Periwound Care  --  barrier ointment applied  -BC  --    Retired Wound - Properties Group Date first assessed: 02/06/21  -CS Time first assessed: 0051  -CS Present on Hospital Admission: Y  -CS Location: coccyx  -CS Primary Wound Type: Pressure inj  -CS    Row Name 02/17/21 0000 02/16/21 2000 02/16/21 1615       Wound 02/06/21 0031 Right lower leg Other (comment)    Wound - Properties Group Placement Date: 02/06/21  -CS Placement Time: 0031  -CS Present on Hospital Admission: Y  -CS Side: Right  -CS Orientation: lower  -CS Location: leg  -CS Primary Wound Type: Other  -CS, chronic redness and swelling     Dressing Appearance  --  dry;intact  -SS  --    Closure  --  --  NELY  -BB    Base  dry  -SS  dry  -SS  red;moist  -BB    Periwound  --  --   blistered;excoriated  -BB    Periwound Temperature  --  --  warm  -BB    Drainage Amount  --  --  small  -BB    Care, Wound  --  barrier applied  -SS  --    Dressing Care  --  dressing removed;dressing applied  -SS  --    Retired Wound - Properties Group Date first assessed: 02/06/21  -CS Time first assessed: 0031  -CS Present on Hospital Admission: Y  -CS Side: Right  -CS Location: leg  -CS Primary Wound Type: Other  -CS, chronic redness and swelling        Wound 02/06/21 0032 Right anterior foot Other (comment)    Wound - Properties Group Placement Date: 02/06/21  -CS Placement Time: 0032  -CS Present on Hospital Admission: Y  -CS Side: Right  -CS Orientation: anterior  -CS Location: foot  -CS Primary Wound Type: Other  -CS Additional Comments: chronic redness and swelling  -CS    Dressing Appearance  --  dry;intact  -SS  --    Closure  --  --  Other (Comment)  -BB    Base  dry  -SS  dry  -SS  dry;pink;red  -BB    Periwound  --  --  excoriated;redness  -BB    Periwound Temperature  --  --  warm  -BB    Periwound Skin Turgor  --  --  firm  -BB    Drainage Amount  --  --  small  -BB    Care, Wound  --  barrier applied  -SS  --    Dressing Care  --  dressing removed;dressing applied  -SS  --    Retired Wound - Properties Group Date first assessed: 02/06/21  -CS Time first assessed: 0032  -CS Present on Hospital Admission: Y  -CS Side: Right  -CS Location: foot  -CS Primary Wound Type: Other  -CS Additional Comments: chronic redness and swelling  -CS       Wound 02/06/21 0033 Left lower leg Other (comment)    Wound - Properties Group Placement Date: 02/06/21  -CS Placement Time: 0033  -CS Present on Hospital Admission: N  -CS Side: Left  -CS Orientation: lower  -CS Location: leg  -CS Primary Wound Type: Other  -CS Additional Comments: chronic redness and swelling  -CS    Dressing Appearance  --  dry;intact  -SS  --    Closure  --  --  NELY  -BB    Base  dry  -SS  dry  -SS  red;dry  -BB    Periwound  --  --   excoriated;redness  -BB    Periwound Temperature  --  --  warm  -BB    Periwound Skin Turgor  --  --  firm  -BB    Drainage Amount  --  --  small  -BB    Care, Wound  --  barrier applied  -SS  --    Dressing Care  --  dressing removed;dressing applied  -SS  --    Retired Wound - Properties Group Date first assessed: 02/06/21  -CS Time first assessed: 0033  -CS Present on Hospital Admission: N  -CS Side: Left  -CS Location: leg  -CS Primary Wound Type: Other  -CS Additional Comments: chronic redness and swelling  -CS       Wound 02/06/21 0035 Left anterior ankle Other (comment)    Wound - Properties Group Placement Date: 02/06/21  -CS Placement Time: 0035  -CS Present on Hospital Admission: Y  -CS Side: Left  -CS Orientation: anterior  -CS Location: ankle  -CS Primary Wound Type: Other  -CS    Dressing Appearance  --  dry;intact  -SS  --    Closure  --  --  Other (Comment)  -BB    Base  dry  -SS  dry  -SS  red  -BB    Periwound  --  --  excoriated;redness  -BB    Periwound Temperature  --  --  warm  -BB    Periwound Skin Turgor  --  --  firm  -BB    Drainage Amount  --  --  scant  -BB    Care, Wound  --  barrier applied  -SS  --    Dressing Care  --  dressing removed;dressing applied  -SS  --    Retired Wound - Properties Group Date first assessed: 02/06/21  -CS Time first assessed: 0035  -CS Present on Hospital Admission: Y  -CS Side: Left  -CS Location: ankle  -CS Primary Wound Type: Other  -CS       Wound 02/06/21 0047 Right heel Other (comment)    Wound - Properties Group Placement Date: 02/06/21  -CS Placement Time: 0047  -CS Present on Hospital Admission: Y  -CS Side: Right  -CS Location: heel  -CS Primary Wound Type: Other  -CS, Chronic redness and swelling      Dressing Appearance  --  dry;intact  -SS  --    Closure  --  --  Other (Comment)  -BB    Base  dry  -SS  dry  -SS  red  -BB    Periwound  --  --  excoriated;redness  -BB    Periwound Temperature  --  --  warm  -BB    Periwound Skin Turgor  --  --  firm   -BB    Drainage Amount  --  --  small  -BB    Care, Wound  --  barrier applied  -SS  --    Dressing Care  --  dressing removed;dressing applied  -SS  --    Retired Wound - Properties Group Date first assessed: 02/06/21 -CS Time first assessed: 0047  -CS Present on Hospital Admission: Y  -CS Side: Right  -CS Location: heel  -CS Primary Wound Type: Other  -CS, Chronic redness and swelling         Wound 02/06/21 0050 Left posterior thigh Other (comment)    Wound - Properties Group Placement Date: 02/06/21  -CS Placement Time: 0050  -CS Present on Hospital Admission: Y  -CS Side: Left  -CS Orientation: posterior  -CS Location: thigh  -CS Primary Wound Type: Other  -CS, Chronic redness and swelling      Closure  --  --  None  -BB    Base  --  --  pink  -BB    Periwound  --  --  redness  -BB    Retired Wound - Properties Group Date first assessed: 02/06/21  -CS Time first assessed: 0050  -CS Present on Hospital Admission: Y  -CS Side: Left  -CS Location: thigh  -CS Primary Wound Type: Other  -CS, Chronic redness and swelling         Wound 02/06/21 0051 coccyx Pressure Injury    Wound - Properties Group Placement Date: 02/06/21  -CS Placement Time: 0051  -CS Present on Hospital Admission: Y  -CS Location: coccyx  -CS Primary Wound Type: Pressure inj  -CS Stage, Pressure Injury : Stage 2  -CS    Dressing Appearance  --  open to air  -SS  --    Closure  Open to air  -SS  Open to air  -SS  Open to air  -BB    Base  --  --  red;moist;bleeding  -BB    Periwound  dry;non-blanchable;redness  -SS  dry;non-blanchable;redness  -SS  non-blanchable  -BB    Care, Wound  --  barrier applied  -SS  --    Dressing Care  --  open to air  -SS  --    Retired Wound - Properties Group Date first assessed: 02/06/21  -CS Time first assessed: 0051  -CS Present on Hospital Admission: Y  -CS Location: coccyx  -CS Primary Wound Type: Pressure inj  -CS      User Key  (r) = Recorded By, (t) = Taken By, (c) = Cosigned By    Initials Name Provider Type     CS Naeem Jiang, RN Registered Nurse    Karen Saenz RN Registered Nurse    BB Bella Reid, RN Registered Nurse    SS Skye Schwartz RN Registered Nurse          Procedures:              Results Review:     I reviewed the patient's new clinical results.      Lab Results (last 24 hours)     Procedure Component Value Units Date/Time    POC Glucose Once [[id number]]  (Normal) Collected: 02/17/21 1145    Specimen: Blood Updated: 02/17/21 1156     Glucose 89 mg/dL      Comment: Serial Number: [id number]:  [id number]       POC Glucose Once [[id number]]  (Normal) Collected: 02/17/21 0713    Specimen: Blood Updated: 02/17/21 0714     Glucose 88 mg/dL      Comment: Serial Number: [id number]:  [id number]       Magnesium [[id number]]  (Normal) Collected: 02/17/21 0553    Specimen: Blood Updated: 02/17/21 0648     Magnesium 1.6 mg/dL     Comprehensive Metabolic Panel [[id number]]  (Abnormal) Collected: 02/17/21 0553    Specimen: Blood Updated: 02/17/21 0648     Glucose 110 mg/dL      BUN 79 mg/dL      Creatinine 2.66 mg/dL      Sodium 144 mmol/L      Potassium 3.4 mmol/L      Chloride 103 mmol/L      CO2 31.0 mmol/L      Calcium 7.8 mg/dL      Total Protein 5.5 g/dL      Albumin 2.40 g/dL      ALT (SGPT) 24 U/L      AST (SGOT) 43 U/L      Alkaline Phosphatase 719 U/L      Total Bilirubin 0.7 mg/dL      eGFR Non African Amer 17 mL/min/1.73      Globulin 3.1 gm/dL      A/G Ratio 0.8 g/dL      BUN/Creatinine Ratio 29.7     Anion Gap 10.0 mmol/L     Narrative:      GFR Normal >60  Chronic Kidney Disease <60  Kidney Failure <15      BNP [[id number]]  (Abnormal) Collected: 02/17/21 0553    Specimen: Blood Updated: 02/17/21 0644     proBNP 34,861.0 pg/mL     Narrative:      Among patients with dyspnea, NT-proBNP is highly sensitive for the detection of acute congestive heart failure. In addition NT-proBNP of <300 pg/ml effectively rules out acute congestive heart failure with 99% negative predictive  value.    Results may be falsely decreased if patient taking Biotin.      Protime-INR [[id number]]  (Abnormal) Collected: 02/17/21 0553    Specimen: Blood Updated: 02/17/21 0626     Protime 12.6 Seconds      INR 1.15    CBC & Differential [[id number]]  (Abnormal) Collected: 02/17/21 0553    Specimen: Blood Updated: 02/17/21 0618    Narrative:      The following orders were created for panel order CBC & Differential.  Procedure                               Abnormality         Status                     ---------                               -----------         ------                     CBC Auto Differential[[id number]]        Abnormal            Final result                 Please view results for these tests on the individual orders.    CBC Auto Differential [[id number]]  (Abnormal) Collected: 02/17/21 0553    Specimen: Blood Updated: 02/17/21 0618     WBC 7.60 10*3/mm3      RBC 2.74 10*6/mm3      Hemoglobin 8.7 g/dL      Hematocrit 27.4 %      .0 fL      MCH 31.7 pg      MCHC 31.7 g/dL      RDW 17.4 %      RDW-SD 61.7 fl      MPV 8.8 fL      Platelets 67 10*3/mm3      Neutrophil % 83.6 %      Lymphocyte % 8.7 %      Monocyte % 5.4 %      Eosinophil % 1.6 %      Basophil % 0.7 %      Neutrophils, Absolute 6.40 10*3/mm3      Lymphocytes, Absolute 0.70 10*3/mm3      Monocytes, Absolute 0.40 10*3/mm3      Eosinophils, Absolute 0.10 10*3/mm3      Basophils, Absolute 0.10 10*3/mm3      nRBC 0.1 /100 WBC     POC Glucose Once [[id number]]  (Abnormal) Collected: 02/16/21 2003    Specimen: Blood Updated: 02/16/21 2005     Glucose 163 mg/dL      Comment: Serial Number: [id number]:  [id number]       POC Glucose Once [[id number]]  (Abnormal) Collected: 02/16/21 1622    Specimen: Blood Updated: 02/16/21 1623     Glucose 146 mg/dL      Comment: Serial Number: [id number]:  [id number]       Alkaline Phosphatase, Isoenzymes [[id number]]  (Abnormal) Collected: 02/14/21 1241    Specimen: Blood Updated: 02/16/21 1610      Alkaline Phosphatase 1088 IU/L      Liver Fraction: 49 %      Bone Fraction: 47 %      Intestinal Frac.: 4 %     Narrative:      Performed at:  01 - [facility] [address]  [id number]  : Balta Tellez PhD, Phone:  [phone]    Anti-Smooth Muscle Antibody Titer [[id number]] Collected: 02/13/21 1722    Specimen: Blood Updated: 02/16/21 1410     Smooth Muscle Ab 7 Units      Comment:                  Negative                     0 - 19                   Weak positive               20 - 30                   Moderate to strong positive     >30   Actin Antibodies are found in 52-85% of patients with   autoimmune hepatitis or chronic active hepatitis and   in 22% of patients with primary biliary cirrhosis.       Narrative:      Performed at:  01 - [facility] [address]  [id number]  : Balta Tellez PhD, Phone:  [phone]        No results found for: HGBA1C  Results from last 7 days   Lab Units 02/17/21  0553 02/16/21  0640 02/15/21  0530   INR  1.15* 1.16* 1.20*           Lab Results   Component Value Date    LIPASE 42 01/13/2018     Lab Results   Component Value Date    CHOL 126 02/05/2020    TRIG 51 02/05/2020    HDL 66 (H) 02/05/2020    LDL 50 02/05/2020       Lab Results   Lab Value Date/Time    FINALDX  02/11/2021 1227     Leukocytosis  Anemia  Thrombocytopenia  No blasts identified         Microbiology Results (last 10 days)     Procedure Component Value - Date/Time    Urine Culture - Urine, Urine, Random Void [[id number]]  (Abnormal) Collected: 02/10/21 1616    Lab Status: Final result Specimen: Urine, Random Void Updated: 02/11/21 1234     Urine Culture Yeast isolated     Comment: No further workup.       Urine Culture - Urine, Urine, Catheter [[id number]]  (Abnormal) Collected: 02/10/21 0306    Lab Status: Final result Specimen: Urine, Catheter Updated: 02/11/21 1234     Urine Culture Yeast isolated     Comment: No further workup.              ECG/EMG Results (most recent)     Procedure Component Value Units Date/Time    Adult Transthoracic Echo Complete W/ Cont if Necessary Per Protocol [[id number]] Collected: 02/06/21 0850     Updated: 02/06/21 1609     BSA 1.8 m^2      RVIDd 2.7 cm      IVSd 1.3 cm      LVIDd 3.9 cm      LVIDs 2.8 cm      LVPWd 1.2 cm      IVS/LVPW 1.0     FS 28.8 %      EDV(Teich) 67.0 ml      ESV(Teich) 29.4 ml      EF(Teich) 56.0 %      EDV(cubed) 60.5 ml      ESV(cubed) 21.9 ml      EF(cubed) 63.9 %      LV mass(C)d 170.0 grams      LV mass(C)dI 92.8 grams/m^2      SV(Teich) 37.5 ml      SI(Teich) 20.5 ml/m^2      SV(cubed) 38.7 ml      SI(cubed) 21.1 ml/m^2      Ao root diam 2.5 cm      Ao root area 5.1 cm^2      ACS 1.6 cm      asc Aorta Diam 3.2 cm      LVOT diam 1.8 cm      LVOT area 2.6 cm^2      RVOT diam 2.6 cm      RVOT area 5.5 cm^2      EDV(MOD-sp4) 59.5 ml      ESV(MOD-sp4) 26.6 ml      EF(MOD-sp4) 55.3 %      EDV(MOD-sp2) 53.3 ml      ESV(MOD-sp2) 20.0 ml      EF(MOD-sp2) 62.4 %      SV(MOD-sp4) 32.9 ml      SI(MOD-sp4) 18.0 ml/m^2      SV(MOD-sp2) 33.2 ml      SI(MOD-sp2) 18.1 ml/m^2      Ao root area (BSA corrected) 1.4     LV Olivier Vol (BSA corrected) 32.5 ml/m^2      LV Sys Vol (BSA corrected) 14.5 ml/m^2      Aortic R-R 1.0 sec      Aortic HR 59.0 BPM      MV V2 max 134.6 cm/sec      MV max PG 7.2 mmHg      MV V2 mean 51.6 cm/sec      MV mean PG 1.8 mmHg      MV V2 VTI 24.3 cm      MVA(VTI) 2.3 cm^2      Ao pk chastity 182.4 cm/sec      Ao max PG 13.3 mmHg      Ao max PG (full) 8.5 mmHg      Ao V2 mean 142.3 cm/sec      Ao mean PG 8.7 mmHg      Ao mean PG (full) 5.5 mmHg      Ao V2 VTI 37.9 cm      WILLIS(I,A) 1.5 cm^2      WILLIS(I,D) 1.5 cm^2      WILLIS(V,A) 1.5 cm^2      WILLIS(V,D) 1.5 cm^2      AI max chastity 264.5 cm/sec      AI max PG 28.0 mmHg      AI dec slope 171.9 cm/sec^2      AI dec time 1.5 sec      AI P1/2t 450.8 msec      LV V1 max PG 4.8 mmHg      LV V1 mean PG 3.2 mmHg      LV V1 max 110.0 cm/sec      LV V1 mean  85.9 cm/sec      LV V1 VTI 21.6 cm      CO(Ao) 11.4 l/min      CI(Ao) 6.2 l/min/m^2      SV(Ao) 192.9 ml      SI(Ao) 105.3 ml/m^2      CO(LVOT) 3.3 l/min      CI(LVOT) 1.8 l/min/m^2      SV(LVOT) 55.2 ml      SV(RVOT) 59.1 ml      SI(LVOT) 30.1 ml/m^2      PA V2 max 88.8 cm/sec      PA max PG 3.2 mmHg      PA max PG (full) 1.9 mmHg      PA V2 mean 63.8 cm/sec      PA mean PG 1.8 mmHg      PA mean PG (full) 1.0 mmHg      PA V2 VTI 17.2 cm      PVA(I,A) 3.4 cm^2       CV ECHO SAHIL - PVA(I,D) 3.4 cm^2       CV ECHO SAHIL - PVA(V,A) 3.4 cm^2       CV ECHO SAHIL - PVA(V,D) 3.4 cm^2      PA acc time 0.07 sec      RV V1 max PG 1.2 mmHg      RV V1 mean PG 0.77 mmHg      RV V1 max 55.1 cm/sec      RV V1 mean 41.7 cm/sec      RV V1 VTI 10.8 cm      TR max devante 237.7 cm/sec      RVSP(TR) 25.6 mmHg      RAP systole 3.0 mmHg      PA pr(Accel) 48.2 mmHg      Pulm Sys Devante 41.0 cm/sec      Pulm Olivier Devante 37.5 cm/sec      Pulm S/D 1.1     Qp/Qs 1.1      CV ECHO SAHIL - BZI_BMI 33.1 kilograms/m^2       CV ECHO SAHIL - BSA(HAYCOCK) 1.9 m^2       CV ECHO SAHIL - BZI_METRIC_WEIGHT 82.1 kg       CV ECHO SAHIL - BZI_METRIC_HEIGHT 157.5 cm      EF(MOD-bp) 60.0 %      LA dimension(2D) 4.2 cm     Narrative:      Normal LV size and contractility EF of 55%  Moderate right ventricular enlargement with severe right atrial   enlargement, catheter probably pacemaker lead seen.  Severe left atrial enlargement seen.  Aortic valve, mitral valve, tricuspid valve appears structurally normal,   mild MR, AR, seen.  There is moderate  tricuspid regurgitation seen.    Calculated RV systolic pressure is 24mmHg.  No pericardial effusion seen.  Proximal aorta appears normal in size.          Results for orders placed during the hospital encounter of 02/05/21   Duplex Venous Lower Extremity - Bilateral CAR    Narrative · Exam limited by patient intolerance to compression and body habitus.  · The distal left femoral and the right and left peroneal veins  are not   imaged.  · All other veins appeared normal bilaterally.          Results for orders placed during the hospital encounter of 02/05/21   Adult Transthoracic Echo Complete W/ Cont if Necessary Per Protocol    Narrative Normal LV size and contractility EF of 55%  Moderate right ventricular enlargement with severe right atrial   enlargement, catheter probably pacemaker lead seen.  Severe left atrial enlargement seen.  Aortic valve, mitral valve, tricuspid valve appears structurally normal,   mild MR, AR, seen.  There is moderate  tricuspid regurgitation seen.    Calculated RV systolic pressure is 24mmHg.  No pericardial effusion seen.  Proximal aorta appears normal in size.       Nm Lung Ventilation Perfusion Aerosol    Result Date: 2/14/2021   1. Abnormal appearance of the ventilation study which can be seen with obstructive pulmonary disease. 2. Low probability of acute pulmonary embolic disease.  Electronically Signed By-Fletcher Barros MD On:2/14/2021 11:29 AM This report was finalized on [id number] by  Fletcher Barros MD.    Us Liver    Result Date: 2/14/2021   1. Abnormal appearance of liver felt to be secondary to hepatic cirrhosis. 2. Cholelithiasis. 3. Minimal ascites.  Electronically Signed By-Fletcher Barros MD On:2/14/2021 10:41 AM This report was finalized on [id number] by  Fletcher Barros MD.    Xr Chest 1 View    Result Date: 2/11/2021  1. Cardiomegaly without acute pulmonary process. No change from prior exam.  Electronically Signed By-Dae Auguste MD On:2/11/2021 2:18 PM This report was finalized on [id number] by  Dae Auguste MD.    Xr Chest Pa & Lateral    Result Date: 2/13/2021   1. Cardiomegaly. 2. Small to moderate left pleural effusion with compressive atelectasis and possible airspace disease from pneumonia.  Electronically Signed By-Fletcher Barros MD On:2/13/2021 5:43 PM This report was finalized on [id number] by  Fletcher Barros MD.    Xr Tibia Fibula 2 View Bilateral    Result Date:  2/14/2021  IMPRESSION :  1. No acute findings. 2. Tricompartment osteoarthritis of both knees. 3. Bony demineralization.  Electronically Signed By-Fletcher Barros MD On:2/14/2021 5:39 PM This report was finalized on [id number] by  Fletcher Barros MD.          Xrays, labs reviewed personally by physician.    Medication Review:   I have reviewed the patient's current medication list      Scheduled Meds  aspirin, 81 mg, Oral, Daily  bumetanide, 1 mg, Intravenous, Q6H  ceftaroline, 200 mg, Intravenous, Q12H  digoxin, 125 mcg, Oral, Daily  febuxostat, 40 mg, Oral, BID  folic acid, 1 mg, Oral, Daily  gabapentin, 100 mg, Oral, TID  lactulose, 20 g, Oral, BID  lidocaine, 2 patch, Transdermal, Q24H  magic butt paste, , Topical, Daily  midodrine, 5 mg, Oral, TID AC  rifAXIMin, 550 mg, Oral, Q12H  sodium chloride, 10 mL, Intravenous, Q12H  ursodiol, 300 mg, Oral, BID  vitamin B-12, 500 mcg, Oral, Daily  Zinc Oxide, , Apply externally, BID        Meds Infusions       Meds PRN  •  acetaminophen **OR** acetaminophen **OR** acetaminophen  •  dextrose  •  dextrose  •  glucagon (human recombinant)  •  HYDROcodone-acetaminophen  •  magnesium sulfate **OR** magnesium sulfate in D5W 1g/100mL (PREMIX) **OR** magnesium sulfate  •  melatonin  •  nitroglycerin  •  OLANZapine  •  ondansetron **OR** ondansetron  •  potassium chloride  •  potassium chloride  •  sodium chloride  •  tiZANidine        Assessment/Plan   Assessment/Plan     Active Hospital Problems    Diagnosis  POA   • Sepsis (CMS/[facility]) [A41.9]  Yes   • CONG (acute kidney injury) (CMS/[facility]) [N17.9]  Yes   • Lactic acidosis [E87.2]  Yes   • Metabolic acidosis [E87.2]  Yes   • Delirium, acute [R41.0]  Yes   • Lower extremity cellulitis [L03.119]  Yes   • CAD (coronary artery disease) [I25.10]  Yes   • Acute UTI (urinary tract infection) [N39.0]  Yes   • Non-pressure chronic ulcer right ankle, limited to breakdown skin (CMS/[facility]) [L97.311]  Yes   • Automatic implantable cardiac  defibrillator in situ [Z95.810]  Yes   • Lymphedema [I89.0]  Unknown   • Gout [M10.9]  Yes   • Hypertension, benign [I10]  Yes   • Congestive heart failure (CMS/HCC) [I50.9]  Yes   • Atrial fibrillation (CMS/HCC) [I48.91]  Yes      Resolved Hospital Problems   No resolved problems to display.       MEDICAL DECISION MAKING COMPLEXITY BY PROBLEM:     Lower extremity edema-with findings of right sided cellulitis, multifactorial given underlying renal disease as well as cirrhosis and right heart failure  -Diuresis tolerated  -ID consulted managing cellulitis, changed from doxycycline to Teflaro  -Nephrology managing volume status    Hyper ammonemia-most likely secondary to underlying acute hepatic failure uncertain source.  Patient's imaging consistent with signs of cirrhosis  -Lactulose and rifaximin  -GI consulted  -Infectious and serological work-up for hepatic failure    Hypotension-uncertain source may be secondary due to splanchnic sequestration or underlying systemic inflammatory response to infection, appears controlled at this time  -Was on dopamine  -Midodrine  -Watch blood pressure closely  -Cosyntropin stim test normal  -Treat infection    Hypoglycemia-May be secondary to liver injury  -Encourage p.o. intake  -Patient started on dextrose infusion wean as tolerated  -Watch for improvement in liver function    Liver insufficiency-patient with imaging findings consistent with cirrhotic disease as well as acute injury unspecified source at this time  -GI consulted  -Liver enzymes trending down  -Cholelithiasis noted on ultrasound  -Ursodiol  -Alkaline phosphatase trending down, fractionation in process    Renal insufficiency-acute on chronic process, with mild continued improvement  -Nephrology consulted  -Electrolytes and volume status per nephrology    Thrombocytopenia-thought to be possibly secondary to bone marrow suppression from illness/infection versus drug mediated  -Oncology consulted  -HIT panel  negative  -Treat infection  -Monitor daily    Candiduria-found on urine culture  -ID started Diflucan, changed due to concern of hepatotoxicity    Altered mental status-appears to have resolved.  Likely multifactorial given hypotension, hyperammonemia and underlying infection  -Treat factors as above    Coronary artery disease-patient denies any chest pain at this time  -Continue maintenance medication  -Telemetry  -Resume beta-blocker once blood pressure improving    Atrial fibrillation-patient with underlying pacemaker  -Newest echo showing left ventricular EF of 40% with mild aortic insufficiency  -Digoxin held given kidney failure    Right ventricular failure-uncertain source at this time may be secondary to pulmonary hypertension or undiagnosed sleep apnea  -We will need further work-up outpatient once stabilized    Hypertension/hyperlipidemia/chronic pain/gout/pressure ulcer-chronic in nature  -Wound care  -Resume home medication as clinically appropriate    Obesity-BMI's skewed likely given fluid retention  -Monitor closely    Given patient's prolonged hospital stay as well as multiple comorbidities consulting palliative care to discuss goals of care    VTE Prophylaxis -   Mechanical Order History:     None      Pharmalogical Order History:      Ordered     Dose Route Frequency Stop    02/06/21 0500  rivaroxaban (XARELTO) tablet 10 mg  Status:  Discontinued     Question Answer Comment   Are you ordering rivaroxaban for the prevention of blood clots in an acutely ill medical patient? Yes    Select any exclusion criteria that may apply to patient Exclusion Criteria Does Not Apply to This Patient Alternative to Lovenox/heparin inpatient with thrombocytopenia unable to receive mechanical prophylaxis       10 mg PO Daily With Dinner 02/07/21 1651    02/06/21 0020  heparin (porcine) 5000 UNIT/ML injection 5,000 Units  Status:  Discontinued      5,000 Units SC Every 12 Hours Scheduled 02/06/21 0147                   Code Status -   Code Status and Medical Interventions:   Ordered at: 02/06/21 0258     Level Of Support Discussed With:    Patient     Code Status:    CPR     Medical Interventions (Level of Support Prior to Arrest):    Full       This patient has been examined wearing appropriate Personal Protective Equipment and discussed with hospital infection control department. 02/17/21        Discharge Planning  Rehab        Electronically signed by Estuardo Torres MD, 02/17/21, 12:47 EST.  [facility] Hospitalist Team

## 2021-02-17 NOTE — PLAN OF CARE
Goal Outcome Evaluation:  Plan of Care Reviewed With: patient  Progress: improving  Outcome Summary: Patient getting last dose of antibiotic tonight anf then planing to discharge to rehab tomorrow. Will monitor. Ayon removed today and patient having good urine output.

## 2021-02-18 VITALS
DIASTOLIC BLOOD PRESSURE: 53 MMHG | WEIGHT: 162.26 LBS | TEMPERATURE: 97.5 F | HEART RATE: 60 BPM | OXYGEN SATURATION: 100 % | HEIGHT: 62 IN | BODY MASS INDEX: 29.86 KG/M2 | RESPIRATION RATE: 16 BRPM | SYSTOLIC BLOOD PRESSURE: 129 MMHG

## 2021-02-18 LAB
ALBUMIN SERPL-MCNC: 2.4 G/DL (ref 3.5–5.2)
ALBUMIN/GLOB SERPL: 0.7 G/DL
ALP SERPL-CCNC: 638 U/L (ref 39–117)
ALT SERPL W P-5'-P-CCNC: 19 U/L (ref 1–33)
ANION GAP SERPL CALCULATED.3IONS-SCNC: 10 MMOL/L (ref 5–15)
AST SERPL-CCNC: 32 U/L (ref 1–32)
BASOPHILS # BLD AUTO: 0.1 10*3/MM3 (ref 0–0.2)
BASOPHILS NFR BLD AUTO: 1.1 % (ref 0–1.5)
BILIRUB SERPL-MCNC: 1 MG/DL (ref 0–1.2)
BUN SERPL-MCNC: 72 MG/DL (ref 8–23)
BUN/CREAT SERPL: 36.4 (ref 7–25)
CALCIUM SPEC-SCNC: 7.8 MG/DL (ref 8.6–10.5)
CHLORIDE SERPL-SCNC: 101 MMOL/L (ref 98–107)
CO2 SERPL-SCNC: 30 MMOL/L (ref 22–29)
CREAT SERPL-MCNC: 1.98 MG/DL (ref 0.57–1)
DEPRECATED RDW RBC AUTO: 63 FL (ref 37–54)
EOSINOPHIL # BLD AUTO: 0.1 10*3/MM3 (ref 0–0.4)
EOSINOPHIL NFR BLD AUTO: 1.3 % (ref 0.3–6.2)
ERYTHROCYTE [DISTWIDTH] IN BLOOD BY AUTOMATED COUNT: 18 % (ref 12.3–15.4)
GFR SERPL CREATININE-BSD FRML MDRD: 25 ML/MIN/1.73
GLOBULIN UR ELPH-MCNC: 3.5 GM/DL
GLUCOSE BLDC GLUCOMTR-MCNC: 102 MG/DL (ref 70–105)
GLUCOSE BLDC GLUCOMTR-MCNC: 103 MG/DL (ref 70–105)
GLUCOSE BLDC GLUCOMTR-MCNC: 89 MG/DL (ref 70–105)
GLUCOSE SERPL-MCNC: 70 MG/DL (ref 65–99)
HCT VFR BLD AUTO: 28.1 % (ref 34–46.6)
HGB BLD-MCNC: 9.1 G/DL (ref 12–15.9)
INR PPP: 1.13 (ref 0.93–1.1)
KAPPA LC FREE SER-MCNC: 143.9 MG/L (ref 3.3–19.4)
KAPPA LC FREE/LAMBDA FREE SER: 1.15 {RATIO} (ref 0.26–1.65)
LAMBDA LC FREE SERPL-MCNC: 125.6 MG/L (ref 5.7–26.3)
LYMPHOCYTES # BLD AUTO: 0.8 10*3/MM3 (ref 0.7–3.1)
LYMPHOCYTES NFR BLD AUTO: 10.2 % (ref 19.6–45.3)
MAGNESIUM SERPL-MCNC: 1.4 MG/DL (ref 1.6–2.4)
MCH RBC QN AUTO: 32.2 PG (ref 26.6–33)
MCHC RBC AUTO-ENTMCNC: 32.2 G/DL (ref 31.5–35.7)
MCV RBC AUTO: 99.9 FL (ref 79–97)
MONOCYTES # BLD AUTO: 0.5 10*3/MM3 (ref 0.1–0.9)
MONOCYTES NFR BLD AUTO: 6.3 % (ref 5–12)
NEUTROPHILS NFR BLD AUTO: 6.7 10*3/MM3 (ref 1.7–7)
NEUTROPHILS NFR BLD AUTO: 81.1 % (ref 42.7–76)
NRBC BLD AUTO-RTO: 0.1 /100 WBC (ref 0–0.2)
NT-PROBNP SERPL-MCNC: ABNORMAL PG/ML (ref 0–1800)
PLATELET # BLD AUTO: 64 10*3/MM3 (ref 140–450)
PMV BLD AUTO: 8 FL (ref 6–12)
POTASSIUM SERPL-SCNC: 4 MMOL/L (ref 3.5–5.2)
PROT SERPL-MCNC: 5.9 G/DL (ref 6–8.5)
PROTHROMBIN TIME: 12.4 SECONDS (ref 9.6–11.7)
RBC # BLD AUTO: 2.82 10*6/MM3 (ref 3.77–5.28)
SODIUM SERPL-SCNC: 141 MMOL/L (ref 136–145)
WBC # BLD AUTO: 8.2 10*3/MM3 (ref 3.4–10.8)

## 2021-02-18 PROCEDURE — 82962 GLUCOSE BLOOD TEST: CPT

## 2021-02-18 PROCEDURE — 80053 COMPREHEN METABOLIC PANEL: CPT | Performed by: INTERNAL MEDICINE

## 2021-02-18 PROCEDURE — 85025 COMPLETE CBC W/AUTO DIFF WBC: CPT | Performed by: PHYSICIAN ASSISTANT

## 2021-02-18 PROCEDURE — 83880 ASSAY OF NATRIURETIC PEPTIDE: CPT | Performed by: INTERNAL MEDICINE

## 2021-02-18 PROCEDURE — 25010000002 ONDANSETRON PER 1 MG: Performed by: PHYSICIAN ASSISTANT

## 2021-02-18 PROCEDURE — 99239 HOSP IP/OBS DSCHRG MGMT >30: CPT | Performed by: FAMILY MEDICINE

## 2021-02-18 PROCEDURE — 85610 PROTHROMBIN TIME: CPT | Performed by: INTERNAL MEDICINE

## 2021-02-18 PROCEDURE — 25010000002 MAGNESIUM SULFATE 2 GM/50ML SOLUTION: Performed by: FAMILY MEDICINE

## 2021-02-18 PROCEDURE — 83735 ASSAY OF MAGNESIUM: CPT | Performed by: PHYSICIAN ASSISTANT

## 2021-02-18 PROCEDURE — 25010000002 CEFTAROLINE FOSAMIL PER 10 MG: Performed by: INTERNAL MEDICINE

## 2021-02-18 PROCEDURE — 99232 SBSQ HOSP IP/OBS MODERATE 35: CPT | Performed by: INTERNAL MEDICINE

## 2021-02-18 RX ORDER — LACTULOSE 10 G/15ML
20 SOLUTION ORAL 2 TIMES DAILY
Status: ON HOLD
Start: 2021-02-18 | End: 2021-04-29 | Stop reason: SDUPTHER

## 2021-02-18 RX ORDER — FOLIC ACID 1 MG/1
1 TABLET ORAL DAILY
Qty: 3 TABLET | Refills: 0
Start: 2021-02-19 | End: 2021-02-22

## 2021-02-18 RX ORDER — MAGNESIUM SULFATE HEPTAHYDRATE 40 MG/ML
2 INJECTION, SOLUTION INTRAVENOUS ONCE
Status: COMPLETED | OUTPATIENT
Start: 2021-02-18 | End: 2021-02-18

## 2021-02-18 RX ORDER — LIDOCAINE 50 MG/G
2 PATCH TOPICAL
Start: 2021-02-18 | End: 2021-03-16

## 2021-02-18 RX ORDER — BUMETANIDE 1 MG/1
1 TABLET ORAL 2 TIMES DAILY
Status: DISCONTINUED | OUTPATIENT
Start: 2021-02-18 | End: 2021-02-18 | Stop reason: HOSPADM

## 2021-02-18 RX ORDER — MIDODRINE HYDROCHLORIDE 5 MG/1
5 TABLET ORAL
Status: ON HOLD
Start: 2021-02-18 | End: 2021-04-29 | Stop reason: SDUPTHER

## 2021-02-18 RX ORDER — URSODIOL 300 MG/1
300 CAPSULE ORAL 2 TIMES DAILY
Status: ON HOLD
Start: 2021-02-18 | End: 2021-04-29 | Stop reason: SDUPTHER

## 2021-02-18 RX ADMIN — DIGOXIN 125 MCG: 125 TABLET ORAL at 11:31

## 2021-02-18 RX ADMIN — MIDODRINE HYDROCHLORIDE 5 MG: 5 TABLET ORAL at 17:41

## 2021-02-18 RX ADMIN — CYANOCOBALAMIN TAB 250 MCG 500 MCG: 250 TAB at 08:17

## 2021-02-18 RX ADMIN — FOLIC ACID 1 MG: 1 TABLET ORAL at 08:18

## 2021-02-18 RX ADMIN — URSODIOL 300 MG: 300 CAPSULE ORAL at 08:17

## 2021-02-18 RX ADMIN — ASPIRIN 81 MG CHEWABLE TABLET 81 MG: 81 TABLET CHEWABLE at 08:18

## 2021-02-18 RX ADMIN — RIFAXIMIN 550 MG: 550 TABLET ORAL at 08:18

## 2021-02-18 RX ADMIN — MIDODRINE HYDROCHLORIDE 5 MG: 5 TABLET ORAL at 11:31

## 2021-02-18 RX ADMIN — MAGNESIUM SULFATE HEPTAHYDRATE 2 G: 40 INJECTION, SOLUTION INTRAVENOUS at 11:31

## 2021-02-18 RX ADMIN — ONDANSETRON 4 MG: 2 INJECTION, SOLUTION INTRAMUSCULAR; INTRAVENOUS at 02:34

## 2021-02-18 RX ADMIN — ZINC OXIDE: 200 OINTMENT TOPICAL at 08:18

## 2021-02-18 RX ADMIN — BUMETANIDE 1 MG: 0.25 INJECTION, SOLUTION INTRAMUSCULAR; INTRAVENOUS at 05:39

## 2021-02-18 RX ADMIN — MIDODRINE HYDROCHLORIDE 5 MG: 5 TABLET ORAL at 08:18

## 2021-02-18 RX ADMIN — GABAPENTIN 100 MG: 100 CAPSULE ORAL at 17:41

## 2021-02-18 RX ADMIN — Medication: at 08:18

## 2021-02-18 RX ADMIN — GABAPENTIN 100 MG: 100 CAPSULE ORAL at 08:17

## 2021-02-18 RX ADMIN — FEBUXOSTAT 40 MG: 40 TABLET, FILM COATED ORAL at 08:18

## 2021-02-18 RX ADMIN — Medication 10 ML: at 08:18

## 2021-02-18 RX ADMIN — SODIUM CHLORIDE 200 MG: 9 INJECTION, SOLUTION INTRAVENOUS at 05:39

## 2021-02-18 NOTE — PROGRESS NOTES
Continued Stay Note  [facility]     Patient Name: Emperatriz Childs  MRN: [id number]  Today's Date: 2/18/2021    Admit Date: 2/5/2021    Discharge Plan     Row Name 02/18/21 1554       Plan    Patient/Family in Agreement with Plan  yes    Plan Comments  Per [facility] liaison, 6 click score was approved for patient to qualify for floor to SNF. CM updated RN. DC summary faxed via ad hocc comm to [facility] [phone]. CM contacted patient via room telephone to discuss dc IMM letter. Copy sent to RN to provide to patient in room.    Final Discharge Disposition Code  03 - skilled nursing facility (SNF)    Final Note  [facility]     Expected Discharge Date and Time     Expected Discharge Date Expected Discharge Time    Feb 18, 2021           Phone communication or documentation only - no physical contact with patient or family.    Agatha Dailey RN     Office Phone: [phone]  Office Cell: [phone]

## 2021-02-18 NOTE — PROGRESS NOTES
Referring Provider: Hospitalist    Reason for follow-up: Swelling of the feet and shortness of breath     Patient Care Team:  Rosa M Chavez APRN as PCP - General (Nurse Practitioner)    Subjective .  Lying in bed comfortably without any symptoms    Objective  Lying in bed comfortably     Review of Systems   Constitution: Negative for fever and malaise/fatigue.   HENT: Negative for ear pain and nosebleeds.    Eyes: Negative for blurred vision and double vision.   Cardiovascular: Negative for chest pain, dyspnea on exertion and palpitations.   Respiratory: Negative for cough and shortness of breath.    Skin: Negative for rash.   Musculoskeletal: Negative for joint pain.   Gastrointestinal: Negative for abdominal pain, nausea and vomiting.   Neurological: Negative for focal weakness and headaches.   Psychiatric/Behavioral: Negative for depression. The patient is not nervous/anxious.    All other systems reviewed and are negative.      Allopurinol, Clindamycin hcl, Codeine, Furosemide, Hydrochlorothiazide, Naproxen, and Sulfa antibiotics    Scheduled Meds:aspirin, 81 mg, Oral, Daily  bumetanide, 1 mg, Oral, BID  digoxin, 125 mcg, Oral, Daily  febuxostat, 40 mg, Oral, BID  folic acid, 1 mg, Oral, Daily  gabapentin, 100 mg, Oral, TID  lactulose, 20 g, Oral, BID  lidocaine, 2 patch, Transdermal, Q24H  magic butt paste, , Topical, Daily  midodrine, 5 mg, Oral, TID AC  rifAXIMin, 550 mg, Oral, Q12H  sodium chloride, 10 mL, Intravenous, Q12H  ursodiol, 300 mg, Oral, BID  vitamin B-12, 500 mcg, Oral, Daily  Zinc Oxide, , Apply externally, BID      Continuous Infusions:   PRN Meds:.•  acetaminophen **OR** acetaminophen **OR** acetaminophen  •  dextrose  •  dextrose  •  glucagon (human recombinant)  •  magnesium sulfate **OR** magnesium sulfate in D5W 1g/100mL (PREMIX) **OR** magnesium sulfate  •  melatonin  •  nitroglycerin  •  OLANZapine  •  ondansetron **OR** ondansetron  •  potassium chloride  •  potassium chloride  •  " sodium chloride  •  tiZANidine        VITAL SIGNS  Vitals:    02/17/21 2219 02/18/21 0228 02/18/21 0500 02/18/21 1126   BP: 158/62 132/59 147/60 110/56   BP Location: Left arm Left arm Left arm Left arm   Patient Position: Lying Lying Lying Lying   Pulse: 62   60   Resp: 18 20 18 16   Temp: 98.1 °F (36.7 °C) 98.1 °F (36.7 °C) 98.1 °F (36.7 °C) 95.3 °F (35.2 °C)   TempSrc: Oral Oral Oral Oral   SpO2: 95% 96% 97% 100%   Weight:   73.6 kg (162 lb 4.1 oz)    Height:           Flowsheet Rows      First Filed Value   Admission Height  157.5 cm (62\") Documented at 02/05/2021 2359   Admission Weight  78 kg (172 lb) Documented at 02/05/2021 2359           TELEMETRY: Atrial fibrillation with controlled medical response    Physical Exam:  Constitutional:       Appearance: Well-developed.   Eyes:      General: No scleral icterus.     Conjunctiva/sclera: Conjunctivae normal.      Pupils: Pupils are equal, round, and reactive to light.   HENT:      Head: Normocephalic and atraumatic.   Neck:      Musculoskeletal: Normal range of motion and neck supple.      Vascular: No carotid bruit or JVD.   Pulmonary:      Effort: Pulmonary effort is normal.      Breath sounds: Normal breath sounds. No wheezing. No rales.   Cardiovascular:      Normal rate. Irregularly irregular rhythm.      Murmurs: There is a systolic murmur.   Pulses:     Intact distal pulses.   Abdominal:      General: Bowel sounds are normal.      Palpations: Abdomen is soft.   Musculoskeletal: Normal range of motion.   Skin:     General: Skin is warm and dry.      Findings: No rash.   Neurological:      Mental Status: Alert.      Comments: No focal deficits          Results Review:   I reviewed the patient's new clinical results.  Lab Results (last 24 hours)     Procedure Component Value Units Date/Time    POC Glucose Once [[id number]]  (Normal) Collected: 02/18/21 1130    Specimen: Blood Updated: 02/18/21 1131     Glucose 103 mg/dL      Comment: Serial Number: " [id number]:  [id number]       BNP [[id number]]  (Abnormal) Collected: 02/18/21 0902    Specimen: Blood Updated: 02/18/21 1034     proBNP 28,835.0 pg/mL     Narrative:      Among patients with dyspnea, NT-proBNP is highly sensitive for the detection of acute congestive heart failure. In addition NT-proBNP of <300 pg/ml effectively rules out acute congestive heart failure with 99% negative predictive value.    Results may be falsely decreased if patient taking Biotin.      Comprehensive Metabolic Panel [[id number]]  (Abnormal) Collected: 02/18/21 0902    Specimen: Blood Updated: 02/18/21 1010     Glucose 70 mg/dL      BUN 72 mg/dL      Creatinine 1.98 mg/dL      Sodium 141 mmol/L      Potassium 4.0 mmol/L      Chloride 101 mmol/L      CO2 30.0 mmol/L      Calcium 7.8 mg/dL      Total Protein 5.9 g/dL      Albumin 2.40 g/dL      ALT (SGPT) 19 U/L      AST (SGOT) 32 U/L      Alkaline Phosphatase 638 U/L      Total Bilirubin 1.0 mg/dL      eGFR Non African Amer 25 mL/min/1.73      Globulin 3.5 gm/dL      A/G Ratio 0.7 g/dL      BUN/Creatinine Ratio 36.4     Anion Gap 10.0 mmol/L     Narrative:      GFR Normal >60  Chronic Kidney Disease <60  Kidney Failure <15      Magnesium [[id number]]  (Abnormal) Collected: 02/18/21 0902    Specimen: Blood Updated: 02/18/21 1006     Magnesium 1.4 mg/dL     Protime-INR [[id number]]  (Abnormal) Collected: 02/18/21 0902    Specimen: Blood Updated: 02/18/21 0945     Protime 12.4 Seconds      INR 1.13    CBC & Differential [[id number]]  (Abnormal) Collected: 02/18/21 0902    Specimen: Blood Updated: 02/18/21 0938    Narrative:      The following orders were created for panel order CBC & Differential.  Procedure                               Abnormality         Status                     ---------                               -----------         ------                     CBC Auto Differential[[id number]]        Abnormal            Final result                 Please view results for these  tests on the individual orders.    CBC Auto Differential [[id number]]  (Abnormal) Collected: 02/18/21 0902    Specimen: Blood Updated: 02/18/21 0938     WBC 8.20 10*3/mm3      RBC 2.82 10*6/mm3      Hemoglobin 9.1 g/dL      Hematocrit 28.1 %      MCV 99.9 fL      MCH 32.2 pg      MCHC 32.2 g/dL      RDW 18.0 %      RDW-SD 63.0 fl      MPV 8.0 fL      Platelets 64 10*3/mm3      Neutrophil % 81.1 %      Lymphocyte % 10.2 %      Monocyte % 6.3 %      Eosinophil % 1.3 %      Basophil % 1.1 %      Neutrophils, Absolute 6.70 10*3/mm3      Lymphocytes, Absolute 0.80 10*3/mm3      Monocytes, Absolute 0.50 10*3/mm3      Eosinophils, Absolute 0.10 10*3/mm3      Basophils, Absolute 0.10 10*3/mm3      nRBC 0.1 /100 WBC     POC Glucose Once [[id number]]  (Normal) Collected: 02/18/21 0732    Specimen: Blood Updated: 02/18/21 0735     Glucose 89 mg/dL      Comment: Serial Number: [id number]:  [id number]       POC Glucose Once [[id number]]  (Abnormal) Collected: 02/17/21 2008    Specimen: Blood Updated: 02/17/21 2010     Glucose 159 mg/dL      Comment: Serial Number: [id number]:  [id number]       Immunoglobulin Free LT Chains Blood [[id number]] Collected: 02/17/21 1740    Specimen: Blood Updated: 02/17/21 1800    POC Glucose Once [[id number]]  (Abnormal) Collected: 02/17/21 1706    Specimen: Blood Updated: 02/17/21 1707     Glucose 108 mg/dL      Comment: Serial Number: [id number]:  [id number]             Imaging Results (Last 24 Hours)     ** No results found for the last 24 hours. **          EKG      I personally viewed and interpreted the patient's EKG/Telemetry data:    ECHOCARDIOGRAM:    STRESS MYOVIEW:    CARDIAC CATHETERIZATION:    OTHER:         Assessment/Plan     Active Problems:    Congestive heart failure (CMS/HCC)    Atrial fibrillation (CMS/HCC)    Hypertension, benign    Lymphedema    Gout    Non-pressure chronic ulcer right ankle, limited to breakdown skin (CMS/HCC)    Automatic implantable cardiac  defibrillator in situ    Acute UTI (urinary tract infection)    Lower extremity cellulitis    CAD (coronary artery disease)    Sepsis (CMS/HCC)    CONG (acute kidney injury) (CMS/HCC)    Lactic acidosis    Metabolic acidosis    Delirium, acute       Patient presented with shortness of breath and swelling of the feet and is ruled out for MI will get enzymes  Patient has history of congestive heart failure with LV dysfunction and is currently stable on medications  Patient also has lymphedema and renal insufficiency and is followed by the nephrologist  Patient has history of persistent atrial fibrillation and is currently on medical therapy including digoxin not beta-blockers have been held because of low blood pressure   Since her blood pressure is stable now we will restart her on low-dose beta-blockers at home  Patient is not on any anticoagulation in the past and even now because of life-threatening GI bleed and patient does not want to be on any anticoagulation at this time.    I discussed the patients findings and my recommendations with patient and nurse    Vinod Root MD  02/18/21  13:19 EST

## 2021-02-18 NOTE — PROGRESS NOTES
Continued Stay Note  [facility]     Patient Name: Emperatriz Childs  MRN: [id number]  Today's Date: 2/18/2021    Admit Date: 2/5/2021    Discharge Plan     Row Name 02/18/21 1448       Plan    Plan  DCPlan: [facility]  accepted. PASRR approved. SNF with 6 click score ends 2/19/2021. Facility can accept 2/18/2021 RN and MD notified.        Discharge Codes    No documentation.       Expected Discharge Date and Time     Expected Discharge Date Expected Discharge Time    Feb 18, 2021         Rod BUTLER   Cell: [phone]  Office: [phone]  Fax: [phone]

## 2021-02-18 NOTE — PROGRESS NOTES
PROGRESS NOTE      Patient Name: Emperatriz Childs  : 1945  MRN: [id number]  Primary Care Physician: Rosa M Chavez APRN  Date of admission: 2021    Patient Care Team:  Rosa M Chavez APRN as PCP - General (Nurse Practitioner)        Subjective   Subjective:     Acute kidney injury, patient is still swollen not feeling good, no significant shortness of breath  Review of systems:  All other review of system unremarkable      Allergies:    Allergies   Allergen Reactions   • Allopurinol Unknown - High Severity   • Clindamycin Hcl Unknown - High Severity   • Codeine Unknown - High Severity   • Furosemide Unknown - High Severity   • Hydrochlorothiazide Unknown - High Severity   • Naproxen Unknown - High Severity   • Sulfa Antibiotics Unknown - High Severity       Objective   Exam:     Vital Signs  Temp:  [97.6 °F (36.4 °C)-98.6 °F (37 °C)] 98.1 °F (36.7 °C)  Heart Rate:  [60-62] 62  Resp:  [18-20] 18  BP: (124-158)/(45-63) 147/60  SpO2:  [92 %-98 %] 97 %  on   ;   Device (Oxygen Therapy): room air  Body mass index is 29.68 kg/m².    General: Elderly  female in no acute distress.    Head:      Normocephalic and atraumatic.    Eyes:      PERRL/EOM intact, conjunctiva and sclera clear with out nystagmus.    Neck:      No masses, thyromegaly,  trachea central with normal respiratory effort   Lungs:    Clear bilaterally to auscultation.    Heart:      Regular rate and rhythm, no murmur no gallop  Abd:        Soft, nontender, not distended, bowel sounds positive, no shifting dullness   Pulses:   Pulses palpable  Extr:        No cyanosis or clubbing--significant bilateral edema.    Neuro:    No focal deficits.   alert oriented x3  Skin:       Intact without lesions or rashes.    Psych:    Alert and cooperative; normal mood and affect; .      Results Review:  I have personally reviewed most recent Data :  CBC    Results from last 7 days   Lab Units 21  0902 21  0553 21  0609  02/15/21  0530 02/14/21  0446 02/13/21  0350 02/12/21  0427   WBC 10*3/mm3 8.20 7.60 7.40 8.40 8.30 11.00* 14.20*   HEMOGLOBIN g/dL 9.1* 8.7* 9.8* 8.6* 8.7* 8.5* 9.0*   PLATELETS 10*3/mm3 64* 67* 77* 87* 72* 74* 63*     CMP   Results from last 7 days   Lab Units 02/18/21  0902 02/17/21  0553 02/16/21  0640 02/16/21  0011 02/15/21  1716 02/15/21  0530 02/14/21  1241 02/14/21  0446 02/13/21  1722 02/13/21  0350 02/12/21  1759  02/12/21  0427   SODIUM mmol/L 141 144 143  --   --  142  --  140 140 140  --    < > 136   POTASSIUM mmol/L 4.0 3.4* 3.9 4.1  --  3.3*  --  3.6 3.7 3.9  --    < > 3.8   CHLORIDE mmol/L 101 103 104  --   --  102  --  102 102 103  --    < > 99   CO2 mmol/L 30.0* 31.0* 32.0*  --   --  29.0  --  28.0 27.0 25.0  --    < > 24.0   BUN mg/dL 72* 79* 87*  --   --  96*  --  100* 99* 102*  --    < > 102*   CREATININE mg/dL 1.98* 2.66* 2.89*  --   --  3.59*  --  3.84* 3.70* 3.73*  --    < > 3.46*   GLUCOSE mg/dL 70 110* 166*  --   --  91  --  72 85 78  --    < > 220*   ALBUMIN g/dL 2.40* 2.40* 2.30*  --   --  2.40*  --  2.20*  --  2.40*  --   --  2.90*   BILIRUBIN mg/dL 1.0 0.7 0.9  --   --  0.8  --  0.8  --  0.8  --   --  1.2   ALK PHOS U/L 638* 719* 803*  --   --  945* 1088* 1,082*  --  994*  --   --  762*   AST (SGOT) U/L 32 43* 48*  --   --  78*  --  101*  --  108*  --   --  72*   ALT (SGPT) U/L 19 24 30  --   --  40*  --  46*  --  45*  --   --  35*   AMMONIA umol/L  --   --  30  --  43  --   --  72*  --   --  480*  --   --     < > = values in this interval not displayed.     ABG        Nm Lung Ventilation Perfusion Aerosol    Result Date: 2/14/2021   1. Abnormal appearance of the ventilation study which can be seen with obstructive pulmonary disease. 2. Low probability of acute pulmonary embolic disease.  Electronically Signed By-Fletcher Barros MD On:2/14/2021 11:29 AM This report was finalized on [id number] by  Fletcher Barros MD.    Us Liver    Result Date: 2/14/2021   1. Abnormal appearance of liver  felt to be secondary to hepatic cirrhosis. 2. Cholelithiasis. 3. Minimal ascites.  Electronically Signed By-Fletcher Barros MD On:2/14/2021 10:41 AM This report was finalized on [id number] by  Fletcher Barros MD.    Xr Chest 1 View    Result Date: 2/11/2021  1. Cardiomegaly without acute pulmonary process. No change from prior exam.  Electronically Signed By-Dae Auguste MD On:2/11/2021 2:18 PM This report was finalized on [id number] by  Dae Auguste MD.    Xr Chest Pa & Lateral    Result Date: 2/13/2021   1. Cardiomegaly. 2. Small to moderate left pleural effusion with compressive atelectasis and possible airspace disease from pneumonia.  Electronically Signed By-Fletcher Barros MD On:2/13/2021 5:43 PM This report was finalized on [id number] by  Fletcher Barros MD.    Xr Tibia Fibula 2 View Bilateral    Result Date: 2/14/2021  IMPRESSION :  1. No acute findings. 2. Tricompartment osteoarthritis of both knees. 3. Bony demineralization.  Electronically Signed By-Fletcher Barros MD On:2/14/2021 5:39 PM This report was finalized on [id number] by  Fletcher Barros MD.      Results for orders placed during the hospital encounter of 02/05/21   Adult Transthoracic Echo Complete W/ Cont if Necessary Per Protocol    Narrative Normal LV size and contractility EF of 55%  Moderate right ventricular enlargement with severe right atrial   enlargement, catheter probably pacemaker lead seen.  Severe left atrial enlargement seen.  Aortic valve, mitral valve, tricuspid valve appears structurally normal,   mild MR, AR, seen.  There is moderate  tricuspid regurgitation seen.    Calculated RV systolic pressure is 24mmHg.  No pericardial effusion seen.  Proximal aorta appears normal in size.     Scheduled Meds:aspirin, 81 mg, Oral, Daily  bumetanide, 1 mg, Intravenous, Q6H  digoxin, 125 mcg, Oral, Daily  febuxostat, 40 mg, Oral, BID  folic acid, 1 mg, Oral, Daily  gabapentin, 100 mg, Oral, TID  lactulose, 20 g, Oral, BID  lidocaine, 2  patch, Transdermal, Q24H  magic butt paste, , Topical, Daily  magnesium sulfate, 2 g, Intravenous, Once  midodrine, 5 mg, Oral, TID AC  rifAXIMin, 550 mg, Oral, Q12H  sodium chloride, 10 mL, Intravenous, Q12H  ursodiol, 300 mg, Oral, BID  vitamin B-12, 500 mcg, Oral, Daily  Zinc Oxide, , Apply externally, BID      Continuous Infusions:   PRN Meds:•  acetaminophen **OR** acetaminophen **OR** acetaminophen  •  dextrose  •  dextrose  •  glucagon (human recombinant)  •  magnesium sulfate **OR** magnesium sulfate in D5W 1g/100mL (PREMIX) **OR** magnesium sulfate  •  melatonin  •  nitroglycerin  •  OLANZapine  •  ondansetron **OR** ondansetron  •  potassium chloride  •  potassium chloride  •  sodium chloride  •  tiZANidine    Assessment/Plan   Assessment and Plan:         Congestive heart failure (CMS/HCC)    Atrial fibrillation (CMS/[facility])    Hypertension, benign    Lymphedema    Gout    Non-pressure chronic ulcer right ankle, limited to breakdown skin (CMS/[facility])    Automatic implantable cardiac defibrillator in situ    Acute UTI (urinary tract infection)    Lower extremity cellulitis    CAD (coronary artery disease)    Sepsis (CMS/HCC)    CONG (acute kidney injury) (CMS/[facility])    Lactic acidosis    Metabolic acidosis    Delirium, acute    ASSESSMENT:  · Acute kidney injury, oliguric, stage 2-3, evolving, ongoing hypotension, ? Sepsis, and prerenal azotemia component with ongoing diarrhea, diuretics use,   ·  might have some degree of progression of CKD too.   · CKD 4, with baseline creatinine around 1.7- 2.2, till last year,02/2020, never followed up in clinic. Work up then  ua negative RBC,WBC,no protein. UPC, was unremarkable in 02/2020 USG, right 8.9 cm, andleft 8.3 cm, medial renal disease.  · Metabolic acidosis, gap and non gap, more due to GI bicarb loss, CKD and persistent lactic acidosis. patient apparently had large bowel movement, significant metabolic acidosis  · Hypotension, concern hypovolemia,   · Congestive  heart failure with last ejection fraction around 40% from 2018,   · History of atrial fibrillation  · Significant metabolic acidosis  · Significant anemia  · Chronic lymphedema  · Cellulitis of lower extremities.   · Thrombocytopenia, also on 02/2020  · Anemia.      Plan:        · Patient baseline creatinine 1.8-2.  Acute increase in creatinine with acute increase in volume with infection and metabolic acidosis and had some  GI  bicarbonate loss which is better  · Urine output improved slightly better BUN at this time. andcreatinine much better   · Patient BNP continue to improve at this   · Discussed with the hospitalist discussed with RN okay to be discharged   · Change to p.o. diuretics that is going to be Bumex at 1 mg twice a day   · But diuretics need to be adjusted in next 1 week  · Pulmonary edema, mental status has improved significantly  · Repeat chest x-ray with mild pulmonary congestion  · Volume status improved  · Follow-up with repeat lab  · Hemodynamics are so far acceptable  · Follow-up with a urine output and electrolytes  · Unwilling to continue same dose of diuretics for another 24-hour  · Follow-up with repeat labs  · Patient has cardiorenal syn will drome at this time  · Okay to DC Ayon catheter by tomorrow morning  · Fu sepsis work up follow-up with infectious disease  · Repeat labs,   · Decrease fluids in evening.        Note started  by Feliz Silva MD,   [facility] kidney consultant

## 2021-02-18 NOTE — DISCHARGE SUMMARY
Date of Admission: 2/5/2021    Date of Discharge:  2/18/2021    Length of stay:  LOS: 10 days     Discharge Diagnosis:     Hepatic encephalopathy    Presenting Problem List:    Lower extremity edema-with findings of right sided cellulitis, multifactorial given underlying renal disease as well as cirrhosis and right heart failure  -Diuresis tolerated  -ID consulted managing cellulitis, changed from doxycycline to Teflaro, finished  -Nephrology managing volume status, transition to oral diuretics cleared for discharge     Hyper ammonemia-most likely secondary to underlying acute hepatic failure uncertain source.  Patient's imaging consistent with signs of cirrhosis  -Lactulose and rifaximin  -GI consulted  -Infectious and serological work-up for hepatic failure     Hypotension-uncertain source may be secondary due to splanchnic sequestration or underlying systemic inflammatory response to infection, appears controlled at this time, stable  -Was on dopamine  -Midodrine  -Watch blood pressure closely  -Cosyntropin stim test normal  -Treat infection     Hypoglycemia-May be secondary to liver injury  -Encourage p.o. intake  -Patient started on dextrose infusion wean as tolerated  -Watch for improvement in liver function     Liver insufficiency-patient with imaging findings consistent with cirrhotic disease as well as acute injury unspecified source at this time  -GI consulted  -Liver enzymes trending down  -Cholelithiasis noted on ultrasound  -Ursodiol  -Alkaline phosphatase trending down, fractionation in process     Renal insufficiency-acute on chronic process, with mild continued improvement  -Nephrology consulted  -Electrolytes and volume status per nephrology  -Cleared for discharge by nephrology     Thrombocytopenia-thought to be possibly secondary to bone marrow suppression from illness/infection versus drug mediated  -Oncology consulted  -HIT panel negative  -Treat infection  -Monitor daily     Candiduria-found  on urine culture  -ID started Diflucan, changed due to concern of hepatotoxicity     Altered mental status-appears to have resolved.  Likely multifactorial given hypotension, hyperammonemia and underlying infection  -Treat factors as above     Coronary artery disease-patient denies any chest pain at this time  -Continue maintenance medication  -Telemetry  -Resume beta-blocker once blood pressure improving     Atrial fibrillation-patient with underlying pacemaker  -Newest echo showing left ventricular EF of 40% with mild aortic insufficiency  -Digoxin held given kidney failure     Right ventricular failure-uncertain source at this time may be secondary to pulmonary hypertension or undiagnosed sleep apnea  -We will need further work-up outpatient once stabilized     Hypertension/hyperlipidemia/chronic pain/gout/pressure ulcer-chronic in nature  -Wound care  -Resume home medication as clinically appropriate     Obesity-BMI's skewed likely given fluid retention  -Monitor closely    HPI/Hospital Course:    The patient is a 75-year-old female with history atrial fibrillation s/p pacemaker placement, hyperlipidemia, hypertension, CAD, CKD stage III and  chronic lower extremity lymphedema.     Apparently the patient has been having several weeks of worsening lower extremity edema thus went to outside facility.     Laboratory work was significant for , potassium 5.3, BUN 63, creatinine 2.41, WBC 4.5, lactic acid 3.2 and UA with 25-50 WBCs.  The patient was transferred to [facility] for further care        Date::    2/6/21: Poor historian.  Low BP thus gave bolus.  Started on dopamine and transferred to PCU.  Started on bicarb drip.  Confused in the evening.     2/7/21: PICC line placed.  Daughter at the bedside discussed care.  Daughter claims no history of dementia or sundowning.  Leukocytosis.  Added Flagyl.  Consulted ID.  Daughter claims patient off Xarelto.  C. difficile ruled out.     2/8  Has third spacing  and bruising.  Patient denies any chest pain  On dopamine drip and being tapered off.  Started on midodrine.     2/9/2021  Outpatient recliner  She has Ace wrap in place  She had been complaining of some tight wraps on the right side and will need to be addressed.  White count better  Her platelet count is 54 slightly better than yesterday  Still on a small amount of dopamine drip.  Renal function still elevated but probably stabilized.     2/10  Patient is now feeling better and her leg swelling is under control with compression dressing that has been changed to allow for some relief of her pain.  She had drop in her creatinine which suggests improvement with diuresis and possible cardiorenal etiology     She has been eating yesterday and her sugar dropped and started D10     B12 is above 2000  Haptoglobin is 100 within normal.  She is hypomagnesemic as well.  Folic acid 10.9  Still with significant thrombocytopenia.  We will ask hematologist for evaluation.     2/11/2021  Urine output decreased overnight and Dr. Pinedo wants to start dopamine drip again  Creatinine slightly up again today  Patient is asking when she is going to go home.  Since her right ventricle atrial or enlarged     2/12  Coaches in hips and arms hyun w ambulation  Bands 17%  platelet 63  Hallucinating; talking to her !  Check ammmonia     2/13  Feels sleepy most pf time  [id number]   Initiated lactulose enema and oral lactulose  Consult GI  Worsening alkaline phosphatase and ALT elevation noted  Check hepatitis panel  On Bumex drip  Had good urine output overnight     2/14  Patient has been switched to IV Bumex  She had worsening elevation of alkaline phosphatase  Still has poor oral intake  No evident hallucinations on exam today  Complaining of pain bilateral legs.  X-ray ordered for evaluation.     2/15/2021: Patient reports having diffuse pain today but most prominent in her back.  No chest pain or shortness of breath.  Renal  function appears to be improving mildly.  Palliative care consulted for discussing goals of care given patient's significant comorbidities.     2/16/2021: Patient much more alert and conversational today.  Patient reports her back pain is improved and she is feeling stronger.  No shortness of breath.  Alkaline phosphatase fractionation in process.  Patient continuing on lactulose.  Antibiotics switched to avoid further potential hepatotoxicity.     2/17/2021: Patient continues to feel better.  Breathing comfortably.  Strength improving.  Renal function improving.  Completing antibiotics.    2/18/2021: Patient doing well.  No new symptoms.  Patient cleared by all specialists for discharge.  Transition to oral diuretics.  Finish antibiotics.  Patient cleared for discharge.  Follow-up organized with primary care, cardiology, GI, nephrology, hematology.  Patient continue lactulose and rifaximin for hepatic encephalopathy.  Resumed diuretics 1 mg Bumex twice a day.  Patient continue with Uloric.  Off antibiotics.  Check labs weekly at rehab.  Patient able to be discharged to rehab in good condition with strict return precautions given.      Procedures Performed         Consults:   Consults     Date and Time Order Name Status Description    2/13/2021 1750 Inpatient Cardiology Consult Completed     2/13/2021 1322 Inpatient Gastroenterology Consult      2/10/2021 1518 Hematology & Oncology Inpatient Consult      2/8/2021 0032 Inpatient Infectious Diseases Consult Completed     2/6/2021 1150 Inpatient Nephrology Consult Completed           Pertinent Test Results:     Lab Results (last 24 hours)     Procedure Component Value Units Date/Time    Immunoglobulin Free LT Chains Blood [[id number]]  (Abnormal) Collected: 02/17/21 1740    Specimen: Blood Updated: 02/18/21 1412     Free Light Chain, Kappa 143.9 mg/L      Free Lambda Light Chains 125.6 mg/L      Kappa/Lambda Ratio 1.15    Narrative:      Performed at:  [facility]  [address]  [id number]  : Balta Tellez PhD, Phone:  [phone]    POC Glucose Once [[id number]]  (Normal) Collected: 02/18/21 1130    Specimen: Blood Updated: 02/18/21 1131     Glucose 103 mg/dL      Comment: Serial Number: [id number]:  [id number]       BNP [[id number]]  (Abnormal) Collected: 02/18/21 0902    Specimen: Blood Updated: 02/18/21 1034     proBNP 28,835.0 pg/mL     Narrative:      Among patients with dyspnea, NT-proBNP is highly sensitive for the detection of acute congestive heart failure. In addition NT-proBNP of <300 pg/ml effectively rules out acute congestive heart failure with 99% negative predictive value.    Results may be falsely decreased if patient taking Biotin.      Comprehensive Metabolic Panel [[id number]]  (Abnormal) Collected: 02/18/21 0902    Specimen: Blood Updated: 02/18/21 1010     Glucose 70 mg/dL      BUN 72 mg/dL      Creatinine 1.98 mg/dL      Sodium 141 mmol/L      Potassium 4.0 mmol/L      Chloride 101 mmol/L      CO2 30.0 mmol/L      Calcium 7.8 mg/dL      Total Protein 5.9 g/dL      Albumin 2.40 g/dL      ALT (SGPT) 19 U/L      AST (SGOT) 32 U/L      Alkaline Phosphatase 638 U/L      Total Bilirubin 1.0 mg/dL      eGFR Non African Amer 25 mL/min/1.73      Globulin 3.5 gm/dL      A/G Ratio 0.7 g/dL      BUN/Creatinine Ratio 36.4     Anion Gap 10.0 mmol/L     Narrative:      GFR Normal >60  Chronic Kidney Disease <60  Kidney Failure <15      Magnesium [[id number]]  (Abnormal) Collected: 02/18/21 0902    Specimen: Blood Updated: 02/18/21 1006     Magnesium 1.4 mg/dL     Protime-INR [[id number]]  (Abnormal) Collected: 02/18/21 0902    Specimen: Blood Updated: 02/18/21 0945     Protime 12.4 Seconds      INR 1.13    CBC & Differential [[id number]]  (Abnormal) Collected: 02/18/21 0902    Specimen: Blood Updated: 02/18/21 0938    Narrative:      The following orders were created for panel order CBC & Differential.  Procedure                                Abnormality         Status                     ---------                               -----------         ------                     CBC Auto Differential[[id number]]        Abnormal            Final result                 Please view results for these tests on the individual orders.    CBC Auto Differential [[id number]]  (Abnormal) Collected: 02/18/21 0902    Specimen: Blood Updated: 02/18/21 0938     WBC 8.20 10*3/mm3      RBC 2.82 10*6/mm3      Hemoglobin 9.1 g/dL      Hematocrit 28.1 %      MCV 99.9 fL      MCH 32.2 pg      MCHC 32.2 g/dL      RDW 18.0 %      RDW-SD 63.0 fl      MPV 8.0 fL      Platelets 64 10*3/mm3      Neutrophil % 81.1 %      Lymphocyte % 10.2 %      Monocyte % 6.3 %      Eosinophil % 1.3 %      Basophil % 1.1 %      Neutrophils, Absolute 6.70 10*3/mm3      Lymphocytes, Absolute 0.80 10*3/mm3      Monocytes, Absolute 0.50 10*3/mm3      Eosinophils, Absolute 0.10 10*3/mm3      Basophils, Absolute 0.10 10*3/mm3      nRBC 0.1 /100 WBC     POC Glucose Once [[id number]]  (Normal) Collected: 02/18/21 0732    Specimen: Blood Updated: 02/18/21 0735     Glucose 89 mg/dL      Comment: Serial Number: [id number]:  [id number]       POC Glucose Once [[id number]]  (Abnormal) Collected: 02/17/21 2008    Specimen: Blood Updated: 02/17/21 2010     Glucose 159 mg/dL      Comment: Serial Number: [id number]:  [id number]       POC Glucose Once [[id number]]  (Abnormal) Collected: 02/17/21 1706    Specimen: Blood Updated: 02/17/21 1707     Glucose 108 mg/dL      Comment: Serial Number: [id number]:  [id number]             Microbiology Results Abnormal     Procedure Component Value - Date/Time    Blood Culture - Blood, Arm, Right [[id number]] Collected: 02/06/21 1307    Lab Status: Final result Specimen: Blood from Arm, Right Updated: 02/11/21 1431     Blood Culture No growth at 5 days    Blood Culture - Blood, Arm, Left [[id number]] Collected: 02/06/21 1309    Lab Status: Final result  Specimen: Blood from Arm, Left Updated: 02/11/21 1431     Blood Culture No growth at 5 days    Urine Culture - Urine, Urine, Catheter [[id number]]  (Abnormal) Collected: 02/10/21 0306    Lab Status: Final result Specimen: Urine, Catheter Updated: 02/11/21 1234     Urine Culture Yeast isolated     Comment: No further workup.       Urine Culture - Urine, Urine, Random Void [[id number]]  (Abnormal) Collected: 02/10/21 1616    Lab Status: Final result Specimen: Urine, Random Void Updated: 02/11/21 1234     Urine Culture Yeast isolated     Comment: No further workup.       Clostridium Difficile Toxin - Stool, Per Rectum [[id number]]  (Normal) Collected: 02/06/21 1952    Lab Status: Final result Specimen: Stool from Per Rectum Updated: 02/07/21 0725    Narrative:      The following orders were created for panel order Clostridium Difficile Toxin - Stool, Per Rectum.  Procedure                               Abnormality         Status                     ---------                               -----------         ------                     Clostridium Difficile EI...[[id number]]  Normal              Final result                 Please view results for these tests on the individual orders.    Clostridium Difficile EIA - Stool, Per Rectum [[id number]]  (Normal) Collected: 02/06/21 1952    Lab Status: Final result Specimen: Stool from Per Rectum Updated: 02/07/21 0725     C Diff GDH / Toxin Negative    COVID PRE-OP / PRE-PROCEDURE SCREENING ORDER (NO ISOLATION) - Swab, Nasopharynx [[id number]]  (Normal) Collected: 02/06/21 0153    Lab Status: Final result Specimen: Swab from Nasopharynx Updated: 02/06/21 1534    Narrative:      The following orders were created for panel order COVID PRE-OP / PRE-PROCEDURE SCREENING ORDER (NO ISOLATION) - Swab, Nasopharynx.  Procedure                               Abnormality         Status                     ---------                               -----------         ------                      COVID-19,APTIMA PANTHER,...[[id number]]  Normal              Final result                 Please view results for these tests on the individual orders.    COVID-19,APTIMA PANTHER,[facility] IN-HOUSE, NP/OP SWAB IN UTM/VTM/[facility] TRANSPORT MEDIA,24 HR TAT - Swab, Nasopharynx [[id number]]  (Normal) Collected: 02/06/21 0153    Lab Status: Final result Specimen: Swab from Nasopharynx Updated: 02/06/21 1534     COVID19 Not Detected    Narrative:      Fact sheet for providers: https://www.fda.gov/media/592364/download     Fact sheet for patients: https://www.fda.gov/media/994634/download    Test performed by RT PCR.    MRSA Screen, PCR (Inpatient) - Swab, Nares [[id number]]  (Abnormal) Collected: 02/06/21 1114    Lab Status: Final result Specimen: Swab from Nares Updated: 02/06/21 1311     MRSA PCR MRSA Detected        No radiology results for the last day    Results for orders placed during the hospital encounter of 02/05/21   Adult Transthoracic Echo Complete W/ Cont if Necessary Per Protocol    Narrative Normal LV size and contractility EF of 55%  Moderate right ventricular enlargement with severe right atrial   enlargement, catheter probably pacemaker lead seen.  Severe left atrial enlargement seen.  Aortic valve, mitral valve, tricuspid valve appears structurally normal,   mild MR, AR, seen.  There is moderate  tricuspid regurgitation seen.    Calculated RV systolic pressure is 24mmHg.  No pericardial effusion seen.  Proximal aorta appears normal in size.         Condition on Discharge:  Good    Vital Signs  Temp:  [95.3 °F (35.2 °C)-98.6 °F (37 °C)] 95.3 °F (35.2 °C)  Heart Rate:  [60-62] 60  Resp:  [16-20] 16  BP: (110-158)/(45-63) 110/56    Physical Exam:    General: Chronically ill appearing obese elderly female lying in bed breathing comfortably on room air no acute distress   HEENT: NC/AT, EOMI, mucosa moist  Heart: Regular, rate controlled  Chest: Normal work of breathing, moving air well no wheezing  Abdominal: Soft.   Mild distention, nontender  Musculoskeletal: Normal ROM.  No cyanosis. No calf tenderness.  Neurological: Awake and alert, no focal deficits  Skin: Skin is warm and dry. No rash  Psychiatric: Patient appearing calm.    Discharge Disposition  Rehab Facility or Unit (DC - External) [62]    Discharge Medications     Discharge Medications      New Medications      Instructions Start Date   cyanocobalamin 500 MCG tablet  Commonly known as: VITAMIN B-12   500 mcg, Oral, Daily   Start Date: February 19, 2021     folic acid 1 MG tablet  Commonly known as: FOLVITE   1 mg, Oral, Daily   Start Date: February 19, 2021     lactulose 10 GM/15ML solution  Commonly known as: CHRONULAC   20 g, Oral, 2 Times Daily      lidocaine 5 %  Commonly known as: LIDODERM   2 patches, Transdermal, Every 24 Hours Scheduled, Remove & Discard patch within 12 hours or as directed by MD      midodrine 5 MG tablet  Commonly known as: PROAMATINE   5 mg, Oral, 3 Times Daily Before Meals      riFAXIMin 550 MG tablet  Commonly known as: XIFAXAN   550 mg, Oral, Every 12 Hours Scheduled      ursodiol 300 MG capsule  Commonly known as: ACTIGALL   300 mg, Oral, 2 Times Daily         Continue These Medications      Instructions Start Date   aspirin 81 MG chewable tablet   81 mg, Oral, Daily      bumetanide 1 MG tablet  Commonly known as: BUMEX   1 mg, Oral, 2 Times Daily, Morning and noon      cetirizine 10 MG tablet  Commonly known as: zyrTEC   10 mg, Oral, Daily PRN      digoxin 125 MCG tablet  Commonly known as: LANOXIN   125 mcg, Oral, Daily Digoxin      gabapentin 100 MG capsule  Commonly known as: NEURONTIN   100 mg, Oral, 3 Times Daily      Uloric 40 MG tablet  Generic drug: febuxostat   40 mg, Oral, 2 Times Daily      Zetia 10 MG tablet  Generic drug: ezetimibe   10 mg, Oral, Daily         Stop These Medications    colchicine 0.6 MG tablet     metoprolol tartrate 50 MG tablet  Commonly known as: LOPRESSOR     traMADol 50 MG tablet  Commonly known as:  ULTRAM            Discharge Diet:   Diet Instructions     Diet: Renal      Discharge Diet: Renal          Activity at Discharge:   Activity Instructions     Activity as Tolerated            Follow-up Appointments  Future Appointments   Date Time Provider Department Center   3/5/2021 11:00 AM [facility] None     Additional Instructions for the Follow-ups that You Need to Schedule     Discharge Follow-up with PCP   As directed       Currently Documented PCP:    Rosa M Chavez APRN    PCP Phone Number:    [phone]     Follow Up Details: Please follow-up primary care within a week         Discharge Follow-up with Specialty: Please follow-up with hematology in 2 to 4 weeks   As directed      Specialty: Please follow-up with hematology in 2 to 4 weeks         Discharge Follow-up with Specified Provider: Follow-up with cardiology in 2 to 4 weeks   As directed      To: Follow-up with cardiology in 2 to 4 weeks         Discharge Follow-up with Specified Provider: Follow-up with nephrology in 2 to 4 weeks   As directed      To: Follow-up with nephrology in 2 to 4 weeks         Discharge Follow-up with Specified Provider: Is follow-up with gastroenterology in 2 to 4 weeks   As directed      To: Is follow-up with gastroenterology in 2 to 4 weeks               Test Results Pending at Discharge       Risk for Readmission (LACE) Score: 10 (2/18/2021  6:00 AM)        Estuardo Torres MD  02/18/21  14:57 EST      Time: Discharge 35 min total time spent with face-to-face history/exam, writing all prescriptions, preparing medication reconciliation, and documenting discharge data including chart review of the full admission, care co-ordination with patient, family, , and , etc., and follow-up appointments with PCP and specialists as recommended.

## 2021-02-18 NOTE — PROGRESS NOTES
[facility] Medicine Services Daily Progress Note      Hospitalist Team  LOS 10 days      Patient Care Team:  Rosa M Chavez APRN as PCP - General (Nurse Practitioner)    Patient Location: 2120/1      Subjective   Subjective     Chief Complaint / Subjective  No chief complaint on file.    Patient doing well.  No new symptoms.  Patient cleared by all specialists for discharge.  Transition to oral diuretics.  Finish antibiotics.  Awaiting finalization of placement.    Brief Synopsis of Hospital Course/HPI  The patient is a 75-year-old female with history atrial fibrillation s/p pacemaker placement, hyperlipidemia, hypertension, CAD, CKD stage III and  chronic lower extremity lymphedema.     Apparently the patient has been having several weeks of worsening lower extremity edema thus went to outside facility.     Laboratory work was significant for , potassium 5.3, BUN 63, creatinine 2.41, WBC 4.5, lactic acid 3.2 and UA with 25-50 WBCs.  The patient was transferred to [facility] for further care        Date::    2/6/21: Poor historian.  Low BP thus gave bolus.  Started on dopamine and transferred to PCU.  Started on bicarb drip.  Confused in the evening.    2/7/21: PICC line placed.  Daughter at the bedside discussed care.  Daughter claims no history of dementia or sundowning.  Leukocytosis.  Added Flagyl.  Consulted ID.  Daughter claims patient off Xarelto.  C. difficile ruled out.     2/8  Has third spacing and bruising.  Patient denies any chest pain  On dopamine drip and being tapered off.  Started on midodrine.     2/9/2021  Outpatient recliner  She has Ace wrap in place  She had been complaining of some tight wraps on the right side and will need to be addressed.  White count better  Her platelet count is 54 slightly better than yesterday  Still on a small amount of dopamine drip.  Renal function still elevated but probably stabilized.     2/10  Patient is now feeling  better and her leg swelling is under control with compression dressing that has been changed to allow for some relief of her pain.  She had drop in her creatinine which suggests improvement with diuresis and possible cardiorenal etiology     She has been eating yesterday and her sugar dropped and started D10     B12 is above 2000  Haptoglobin is 100 within normal.  She is hypomagnesemic as well.  Folic acid 10.9  Still with significant thrombocytopenia.  We will ask hematologist for evaluation.     2/11/2021  Urine output decreased overnight and Dr. Pinedo wants to start dopamine drip again  Creatinine slightly up again today  Patient is asking when she is going to go home.  Since her right ventricle atrial or enlarged    2/12  Coaches in hips and arms hyun w ambulation  Bands 17%  platelet 63  Hallucinating; talking to her !  Check ammmonia     2/13  Feels sleepy most pf time  [id number]--   Initiated lactulose enema and oral lactulose  Consult GI  Worsening alkaline phosphatase and ALT elevation noted  Check hepatitis panel  On Bumex drip  Had good urine output overnight     2/14  Patient has been switched to IV Bumex  She had worsening elevation of alkaline phosphatase  Still has poor oral intake  No evident hallucinations on exam today  Complaining of pain bilateral legs.  X-ray ordered for evaluation.    2/15/2021: Patient reports having diffuse pain today but most prominent in her back.  No chest pain or shortness of breath.  Renal function appears to be improving mildly.  Palliative care consulted for discussing goals of care given patient's significant comorbidities.    2/16/2021: Patient much more alert and conversational today.  Patient reports her back pain is improved and she is feeling stronger.  No shortness of breath.  Alkaline phosphatase fractionation in process.  Patient continuing on lactulose.  Antibiotics switched to avoid further potential hepatotoxicity.    2/17/2021: Patient continues to  "feel better.  Breathing comfortably.  Strength improving.  Renal function improving.  Completing antibiotics.    Date::          Review of Systems   Constitution: Positive for malaise/fatigue. Negative for chills and fever.   HENT: Negative for hoarse voice and sore throat.    Eyes: Negative for double vision and photophobia.   Cardiovascular: Positive for leg swelling. Negative for chest pain.   Respiratory: Negative for cough and shortness of breath.    Musculoskeletal: Negative for back pain, joint pain and myalgias.   Gastrointestinal: Negative for bloating, nausea and vomiting.   Genitourinary: Negative for dysuria and flank pain.   Neurological: Negative for dizziness and weakness.   Psychiatric/Behavioral: Negative for memory loss. The patient is not nervous/anxious.          Objective   Objective      Vital Signs  Temp:  [95.3 °F (35.2 °C)-98.6 °F (37 °C)] 95.3 °F (35.2 °C)  Heart Rate:  [60-62] 60  Resp:  [16-20] 16  BP: (110-158)/(45-63) 110/56  Oxygen Therapy  SpO2: 100 %  Pulse Oximetry Type: Continuous  Device (Oxygen Therapy): room air  Flow (L/min): 1  Flowsheet Rows      First Filed Value   Admission Height  157.5 cm (62\") Documented at 02/05/2021 2359   Admission Weight  78 kg (172 lb) Documented at 02/05/2021 2359        Intake & Output (last 3 days)       02/15 0701 - 02/16 0700 02/16 0701 - 02/17 0700 02/17 0701 - 02/18 0700 02/18 0701 - 02/19 0700    P.O. 840 2160 1620     I.V. (mL/kg) 50 (0.7)       Other  460      IV Piggyback   250     Total Intake(mL/kg) 890 (11.7) 2620 (34.6) 1870 (25.4)     Urine (mL/kg/hr) 3740 (2.1) 1435 (0.8) 1450 (0.8)     Stool 0 0 0     Total Output 3740 1435 1450     Net -2850 +1185 +420             Urine Unmeasured Occurrence   5 x     Stool Unmeasured Occurrence 2 x 3 x 3 x         Lines, Drains & Airways    Active LDAs     Name:   Placement date:   Placement time:   Site:   Days:    PICC Triple Lumen 02/07/21 Right Brachial   02/07/21    1024    Brachial   8    " Urethral Catheter Non-latex;Silicone 16 Fr.   02/06/21    1826     8                  Physical Exam:    Physical Exam    General: Chronically ill appearing obese elderly female lying in bed breathing comfortably on room air no acute distress   HEENT: NC/AT, EOMI, mucosa moist  Heart: Regular, rate controlled  Chest: Normal work of breathing, moving air well no wheezing  Abdominal: Soft.  Mild distention, nontender  Musculoskeletal: Normal ROM.  No cyanosis. No calf tenderness.  Neurological: Awake and alert, no focal deficits  Skin: Skin is warm and dry. No rash  Psychiatric: Patient appearing calm.         Wounds (last 24 hours)      LDA Wound     Row Name 02/18/21 0900 02/18/21 0820 02/18/21 0400       Wound 02/06/21 0031 Right lower leg Other (comment)    Wound - Properties Group Placement Date: 02/06/21  -CS Placement Time: 0031  -CS Present on Hospital Admission: Y  -CS Side: Right  -CS Orientation: lower  -CS Location: leg  -CS Primary Wound Type: Other  -CS, chronic redness and swelling     Dressing Appearance  --  dry;intact  -AK  --    Closure  --  NELY  -AK  NELY  -SS    Base  moist;pink;red;bleeding  -AK  dressing in place, unable to visualize  -AK  --    Care, Wound  barrier applied  -AK  --  --    Dressing Care  dressing applied;dressing changed;dressing removed maxorb, abd and kerlix  -AK  --  --    Retired Wound - Properties Group Date first assessed: 02/06/21  -CS Time first assessed: 0031  -CS Present on Hospital Admission: Y  -CS Side: Right  -CS Location: leg  -CS Primary Wound Type: Other  -CS, chronic redness and swelling        Wound 02/06/21 0032 Right anterior foot Other (comment)    Wound - Properties Group Placement Date: 02/06/21  -CS Placement Time: 0032  -CS Present on Hospital Admission: Y  -CS Side: Right  -CS Orientation: anterior  -CS Location: foot  -CS Primary Wound Type: Other  -CS Additional Comments: chronic redness and swelling  -CS    Dressing Appearance  --  dry;intact  -AK  --     Closure  --  NELY  -AK  NELY  -SS    Base  --  dressing in place, unable to visualize  -AK  --    Care, Wound  barrier applied  -AK  --  --    Dressing Care  dressing applied;dressing changed;dressing removed maxorb, abd and kerlix  -AK  --  --    Retired Wound - Properties Group Date first assessed: 02/06/21  -CS Time first assessed: 0032  -CS Present on Hospital Admission: Y  -CS Side: Right  -CS Location: foot  -CS Primary Wound Type: Other  -CS Additional Comments: chronic redness and swelling  -CS       Wound 02/06/21 0033 Left lower leg Other (comment)    Wound - Properties Group Placement Date: 02/06/21  -CS Placement Time: 0033  -CS Present on Hospital Admission: N  -CS Side: Left  -CS Orientation: lower  -CS Location: leg  -CS Primary Wound Type: Other  -CS Additional Comments: chronic redness and swelling  -CS    Dressing Appearance  --  dry;intact  -AK  --    Closure  --  NELY  -AK  NELY  -SS    Base  --  dressing in place, unable to visualize  -AK  --    Care, Wound  barrier applied  -AK  --  --    Dressing Care  dressing applied;dressing changed;dressing removed Maxorb, Abd and kerlix  -AK  --  --    Retired Wound - Properties Group Date first assessed: 02/06/21  -CS Time first assessed: 0033  -CS Present on Hospital Admission: N  -CS Side: Left  -CS Location: leg  -CS Primary Wound Type: Other  -CS Additional Comments: chronic redness and swelling  -CS       Wound 02/06/21 0035 Left anterior ankle Other (comment)    Wound - Properties Group Placement Date: 02/06/21  -CS Placement Time: 0035  -CS Present on Hospital Admission: Y  -CS Side: Left  -CS Orientation: anterior  -CS Location: ankle  -CS Primary Wound Type: Other  -CS    Dressing Appearance  --  dry;intact  -AK  --    Closure  --  NELY  -AK  NELY  -SS    Base  red  -AK  dressing in place, unable to visualize  -AK  --    Care, Wound  barrier applied  -AK  --  --    Dressing Care  dressing applied;dressing removed;dressing changed maxorb, abd and  kerlix  -AK  --  --    Retired Wound - Properties Group Date first assessed: 02/06/21  -CS Time first assessed: 0035  -CS Present on Hospital Admission: Y  -CS Side: Left  -CS Location: ankle  -CS Primary Wound Type: Other  -CS       Wound 02/06/21 0047 Right heel Other (comment)    Wound - Properties Group Placement Date: 02/06/21  -CS Placement Time: 0047  -CS Present on Hospital Admission: Y  -CS Side: Right  -CS Location: heel  -CS Primary Wound Type: Other  -CS, Chronic redness and swelling      Dressing Appearance  --  dry;intact  -AK  --    Closure  --  NELY  -AK  NELY  -SS    Base  red;yellow  -AK  dressing in place, unable to visualize  -AK  --    Care, Wound  barrier applied  -AK  --  --    Dressing Care  dressing changed;dressing removed;dressing applied maxorb, abd pad, kerlix  -AK  --  --    Retired Wound - Properties Group Date first assessed: 02/06/21  -CS Time first assessed: 0047  -CS Present on Hospital Admission: Y  -CS Side: Right  -CS Location: heel  -CS Primary Wound Type: Other  -CS, Chronic redness and swelling         Wound 02/06/21 0050 Left posterior thigh Other (comment)    Wound - Properties Group Placement Date: 02/06/21  -CS Placement Time: 0050  -CS Present on Hospital Admission: Y  -CS Side: Left  -CS Orientation: posterior  -CS Location: thigh  -CS Primary Wound Type: Other  -CS, Chronic redness and swelling      Dressing Appearance  --  open to air  -AK  --    Closure  --  Open to air  -AK  NELY  -SS    Base  --  pink  -AK  --    Retired Wound - Properties Group Date first assessed: 02/06/21  -CS Time first assessed: 0050  -CS Present on Hospital Admission: Y  -CS Side: Left  -CS Location: thigh  -CS Primary Wound Type: Other  -CS, Chronic redness and swelling         Wound 02/06/21 0051 coccyx Pressure Injury    Wound - Properties Group Placement Date: 02/06/21  -CS Placement Time: 0051  -CS Present on Hospital Admission: Y  -CS Location: coccyx  -CS Primary Wound Type: Pressure inj   -CS Stage, Pressure Injury : Stage 2  -CS    Dressing Appearance  --  open to air  -AK  --    Closure  --  Open to air  -AK  Open to air  -SS    Base  --  non-blanchable;dry;red  -AK  non-blanchable;dry;red  -SS    Care, Wound  barrier applied  -AK  --  --    Dressing Care  open to air  -AK  --  --    Periwound Care  barrier ointment applied  -AK  --  --    Retired Wound - Properties Group Date first assessed: 02/06/21  -CS Time first assessed: 0051  -CS Present on Hospital Admission: Y  -CS Location: coccyx  -CS Primary Wound Type: Pressure inj  -CS    Row Name 02/18/21 0000 02/17/21 2000 02/17/21 1600       Wound 02/06/21 0031 Right lower leg Other (comment)    Wound - Properties Group Placement Date: 02/06/21  -CS Placement Time: 0031  -CS Present on Hospital Admission: Y  -CS Side: Right  -CS Orientation: lower  -CS Location: leg  -CS Primary Wound Type: Other  -CS, chronic redness and swelling     Dressing Appearance  --  dry;intact  -SS  --    Closure  NELY  -SS  NELY  -SS  NELY  -BC    Base  --  --  dry  -BC    Retired Wound - Properties Group Date first assessed: 02/06/21  -CS Time first assessed: 0031  -CS Present on Hospital Admission: Y  -CS Side: Right  -CS Location: leg  -CS Primary Wound Type: Other  -CS, chronic redness and swelling        Wound 02/06/21 0032 Right anterior foot Other (comment)    Wound - Properties Group Placement Date: 02/06/21  -CS Placement Time: 0032  -CS Present on Hospital Admission: Y  -CS Side: Right  -CS Orientation: anterior  -CS Location: foot  -CS Primary Wound Type: Other  -CS Additional Comments: chronic redness and swelling  -CS    Dressing Appearance  --  dry;intact  -SS  --    Closure  NELY  -SS  NELY  -SS  NELY  -BC    Base  --  --  dry  -BC    Retired Wound - Properties Group Date first assessed: 02/06/21  -CS Time first assessed: 0032  -CS Present on Hospital Admission: Y  -CS Side: Right  -CS Location: foot  -CS Primary Wound Type: Other  -CS Additional Comments:  chronic redness and swelling  -CS       Wound 02/06/21 0033 Left lower leg Other (comment)    Wound - Properties Group Placement Date: 02/06/21  -CS Placement Time: 0033  -CS Present on Hospital Admission: N  -CS Side: Left  -CS Orientation: lower  -CS Location: leg  -CS Primary Wound Type: Other  -CS Additional Comments: chronic redness and swelling  -CS    Dressing Appearance  --  dry;intact  -SS  --    Closure  NELY  -SS  NELY  -SS  NELY  -BC    Base  --  --  dry  -BC    Retired Wound - Properties Group Date first assessed: 02/06/21  -CS Time first assessed: 0033  -CS Present on Hospital Admission: N  -CS Side: Left  -CS Location: leg  -CS Primary Wound Type: Other  -CS Additional Comments: chronic redness and swelling  -CS       Wound 02/06/21 0035 Left anterior ankle Other (comment)    Wound - Properties Group Placement Date: 02/06/21  -CS Placement Time: 0035  -CS Present on Hospital Admission: Y  -CS Side: Left  -CS Orientation: anterior  -CS Location: ankle  -CS Primary Wound Type: Other  -CS    Dressing Appearance  --  dry;intact  -SS  --    Closure  NELY  -SS  NELY  -SS  NELY  -BC    Base  --  --  dry  -BC    Retired Wound - Properties Group Date first assessed: 02/06/21  -CS Time first assessed: 0035  -CS Present on Hospital Admission: Y  -CS Side: Left  -CS Location: ankle  -CS Primary Wound Type: Other  -CS       Wound 02/06/21 0047 Right heel Other (comment)    Wound - Properties Group Placement Date: 02/06/21  -CS Placement Time: 0047  -CS Present on Hospital Admission: Y  -CS Side: Right  -CS Location: heel  -CS Primary Wound Type: Other  -CS, Chronic redness and swelling      Dressing Appearance  --  dry;intact  -SS  --    Closure  NELY  -SS  NELY  -SS  NELY  -BC    Base  --  --  dry  -BC    Retired Wound - Properties Group Date first assessed: 02/06/21  -CS Time first assessed: 0047  -CS Present on Hospital Admission: Y  -CS Side: Right  -CS Location: heel  -CS Primary Wound Type: Other  -CS, Chronic  redness and swelling         Wound 02/06/21 0050 Left posterior thigh Other (comment)    Wound - Properties Group Placement Date: 02/06/21  -CS Placement Time: 0050  -CS Present on Hospital Admission: Y  -CS Side: Left  -CS Orientation: posterior  -CS Location: thigh  -CS Primary Wound Type: Other  -CS, Chronic redness and swelling      Dressing Appearance  --  dry;intact  -SS  --    Closure  NELY  -SS  NELY  -SS  None  -BC    Retired Wound - Properties Group Date first assessed: 02/06/21  -CS Time first assessed: 0050  -CS Present on Hospital Admission: Y  -CS Side: Left  -CS Location: thigh  -CS Primary Wound Type: Other  -CS, Chronic redness and swelling         Wound 02/06/21 0051 coccyx Pressure Injury    Wound - Properties Group Placement Date: 02/06/21  -CS Placement Time: 0051  -CS Present on Hospital Admission: Y  -CS Location: coccyx  -CS Primary Wound Type: Pressure inj  -CS Stage, Pressure Injury : Stage 2  -CS    Dressing Appearance  --  open to air  -SS  --    Closure  Open to air  -SS  Open to air  -SS  Open to air  -BC    Base  non-blanchable;dry;red  -SS  non-blanchable;dry;red  -SS  red;moist  -BC    Periwound  --  --  dry;non-blanchable;redness  -BC    Retired Wound - Properties Group Date first assessed: 02/06/21 -CS Time first assessed: 0051  -CS Present on Hospital Admission: Y  -CS Location: coccyx  -CS Primary Wound Type: Pressure inj  -CS      User Key  (r) = Recorded By, (t) = Taken By, (c) = Cosigned By    Initials Name Provider Type    CS Naeem Jiang RN Registered Nurse    Karen Saenz RN Registered Nurse    Vandana Posada RN Registered Nurse    Skye Bear RN Registered Nurse          Procedures:              Results Review:     I reviewed the patient's new clinical results.      Lab Results (last 24 hours)     Procedure Component Value Units Date/Time    Immunoglobulin Free LT Chains Blood [[id number]]  (Abnormal) Collected: 02/17/21 1740    Specimen: Blood  Updated: 02/18/21 1412     Free Light Chain, Kappa 143.9 mg/L      Free Lambda Light Chains 125.6 mg/L      Kappa/Lambda Ratio 1.15    Narrative:      Performed at:  [address] [place] [address]  [id number]  : Balta Tellez PhD, Phone:  [phone]    POC Glucose Once [[id number]]  (Normal) Collected: 02/18/21 1130    Specimen: Blood Updated: 02/18/21 1131     Glucose 103 mg/dL      Comment: Serial Number: [id number]:  [id number]       BNP [[id number]]  (Abnormal) Collected: 02/18/21 0902    Specimen: Blood Updated: 02/18/21 1034     proBNP 28,835.0 pg/mL     Narrative:      Among patients with dyspnea, NT-proBNP is highly sensitive for the detection of acute congestive heart failure. In addition NT-proBNP of <300 pg/ml effectively rules out acute congestive heart failure with 99% negative predictive value.    Results may be falsely decreased if patient taking Biotin.      Comprehensive Metabolic Panel [[id number]]  (Abnormal) Collected: 02/18/21 0902    Specimen: Blood Updated: 02/18/21 1010     Glucose 70 mg/dL      BUN 72 mg/dL      Creatinine 1.98 mg/dL      Sodium 141 mmol/L      Potassium 4.0 mmol/L      Chloride 101 mmol/L      CO2 30.0 mmol/L      Calcium 7.8 mg/dL      Total Protein 5.9 g/dL      Albumin 2.40 g/dL      ALT (SGPT) 19 U/L      AST (SGOT) 32 U/L      Alkaline Phosphatase 638 U/L      Total Bilirubin 1.0 mg/dL      eGFR Non African Amer 25 mL/min/1.73      Globulin 3.5 gm/dL      A/G Ratio 0.7 g/dL      BUN/Creatinine Ratio 36.4     Anion Gap 10.0 mmol/L     Narrative:      GFR Normal >60  Chronic Kidney Disease <60  Kidney Failure <15      Magnesium [[id number]]  (Abnormal) Collected: 02/18/21 0902    Specimen: Blood Updated: 02/18/21 1006     Magnesium 1.4 mg/dL     Protime-INR [[id number]]  (Abnormal) Collected: 02/18/21 0902    Specimen: Blood Updated: 02/18/21 0945     Protime 12.4 Seconds      INR 1.13    CBC & Differential [[id number]]  (Abnormal)  Collected: 02/18/21 0902    Specimen: Blood Updated: 02/18/21 0938    Narrative:      The following orders were created for panel order CBC & Differential.  Procedure                               Abnormality         Status                     ---------                               -----------         ------                     CBC Auto Differential[[id number]]        Abnormal            Final result                 Please view results for these tests on the individual orders.    CBC Auto Differential [[id number]]  (Abnormal) Collected: 02/18/21 0902    Specimen: Blood Updated: 02/18/21 0938     WBC 8.20 10*3/mm3      RBC 2.82 10*6/mm3      Hemoglobin 9.1 g/dL      Hematocrit 28.1 %      MCV 99.9 fL      MCH 32.2 pg      MCHC 32.2 g/dL      RDW 18.0 %      RDW-SD 63.0 fl      MPV 8.0 fL      Platelets 64 10*3/mm3      Neutrophil % 81.1 %      Lymphocyte % 10.2 %      Monocyte % 6.3 %      Eosinophil % 1.3 %      Basophil % 1.1 %      Neutrophils, Absolute 6.70 10*3/mm3      Lymphocytes, Absolute 0.80 10*3/mm3      Monocytes, Absolute 0.50 10*3/mm3      Eosinophils, Absolute 0.10 10*3/mm3      Basophils, Absolute 0.10 10*3/mm3      nRBC 0.1 /100 WBC     POC Glucose Once [[id number]]  (Normal) Collected: 02/18/21 0732    Specimen: Blood Updated: 02/18/21 0735     Glucose 89 mg/dL      Comment: Serial Number: [id number]:  [id number]       POC Glucose Once [[id number]]  (Abnormal) Collected: 02/17/21 2008    Specimen: Blood Updated: 02/17/21 2010     Glucose 159 mg/dL      Comment: Serial Number: [id number]:  [id number]       POC Glucose Once [[id number]]  (Abnormal) Collected: 02/17/21 1706    Specimen: Blood Updated: 02/17/21 1707     Glucose 108 mg/dL      Comment: Serial Number: [id number]:  [id number]           No results found for: HGBA1C  Results from last 7 days   Lab Units 02/18/21  0902 02/17/21  0553 02/16/21  0640   INR  1.13* 1.15* 1.16*           Lab Results   Component Value Date    LIPASE  42 01/13/2018     Lab Results   Component Value Date    CHOL 126 02/05/2020    TRIG 51 02/05/2020    HDL 66 (H) 02/05/2020    LDL 50 02/05/2020       Lab Results   Lab Value Date/Time    FINALDX  02/11/2021 1227     Leukocytosis  Anemia  Thrombocytopenia  No blasts identified         Microbiology Results (last 10 days)     Procedure Component Value - Date/Time    Urine Culture - Urine, Urine, Random Void [[id number]]  (Abnormal) Collected: 02/10/21 1616    Lab Status: Final result Specimen: Urine, Random Void Updated: 02/11/21 1234     Urine Culture Yeast isolated     Comment: No further workup.       Urine Culture - Urine, Urine, Catheter [[id number]]  (Abnormal) Collected: 02/10/21 0306    Lab Status: Final result Specimen: Urine, Catheter Updated: 02/11/21 1234     Urine Culture Yeast isolated     Comment: No further workup.             ECG/EMG Results (most recent)     Procedure Component Value Units Date/Time    Adult Transthoracic Echo Complete W/ Cont if Necessary Per Protocol [[id number]] Collected: 02/06/21 0850     Updated: 02/06/21 1609     BSA 1.8 m^2      RVIDd 2.7 cm      IVSd 1.3 cm      LVIDd 3.9 cm      LVIDs 2.8 cm      LVPWd 1.2 cm      IVS/LVPW 1.0     FS 28.8 %      EDV(Teich) 67.0 ml      ESV(Teich) 29.4 ml      EF(Teich) 56.0 %      EDV(cubed) 60.5 ml      ESV(cubed) 21.9 ml      EF(cubed) 63.9 %      LV mass(C)d 170.0 grams      LV mass(C)dI 92.8 grams/m^2      SV(Teich) 37.5 ml      SI(Teich) 20.5 ml/m^2      SV(cubed) 38.7 ml      SI(cubed) 21.1 ml/m^2      Ao root diam 2.5 cm      Ao root area 5.1 cm^2      ACS 1.6 cm      asc Aorta Diam 3.2 cm      LVOT diam 1.8 cm      LVOT area 2.6 cm^2      RVOT diam 2.6 cm      RVOT area 5.5 cm^2      EDV(MOD-sp4) 59.5 ml      ESV(MOD-sp4) 26.6 ml      EF(MOD-sp4) 55.3 %      EDV(MOD-sp2) 53.3 ml      ESV(MOD-sp2) 20.0 ml      EF(MOD-sp2) 62.4 %      SV(MOD-sp4) 32.9 ml      SI(MOD-sp4) 18.0 ml/m^2      SV(MOD-sp2) 33.2 ml      SI(MOD-sp2) 18.1 ml/m^2       Ao root area (BSA corrected) 1.4     LV Olivier Vol (BSA corrected) 32.5 ml/m^2      LV Sys Vol (BSA corrected) 14.5 ml/m^2      Aortic R-R 1.0 sec      Aortic HR 59.0 BPM      MV V2 max 134.6 cm/sec      MV max PG 7.2 mmHg      MV V2 mean 51.6 cm/sec      MV mean PG 1.8 mmHg      MV V2 VTI 24.3 cm      MVA(VTI) 2.3 cm^2      Ao pk devante 182.4 cm/sec      Ao max PG 13.3 mmHg      Ao max PG (full) 8.5 mmHg      Ao V2 mean 142.3 cm/sec      Ao mean PG 8.7 mmHg      Ao mean PG (full) 5.5 mmHg      Ao V2 VTI 37.9 cm      WILLIS(I,A) 1.5 cm^2      WILLIS(I,D) 1.5 cm^2      WILLIS(V,A) 1.5 cm^2      WILLIS(V,D) 1.5 cm^2      AI max devante 264.5 cm/sec      AI max PG 28.0 mmHg      AI dec slope 171.9 cm/sec^2      AI dec time 1.5 sec      AI P1/2t 450.8 msec      LV V1 max PG 4.8 mmHg      LV V1 mean PG 3.2 mmHg      LV V1 max 110.0 cm/sec      LV V1 mean 85.9 cm/sec      LV V1 VTI 21.6 cm      CO(Ao) 11.4 l/min      CI(Ao) 6.2 l/min/m^2      SV(Ao) 192.9 ml      SI(Ao) 105.3 ml/m^2      CO(LVOT) 3.3 l/min      CI(LVOT) 1.8 l/min/m^2      SV(LVOT) 55.2 ml      SV(RVOT) 59.1 ml      SI(LVOT) 30.1 ml/m^2      PA V2 max 88.8 cm/sec      PA max PG 3.2 mmHg      PA max PG (full) 1.9 mmHg      PA V2 mean 63.8 cm/sec      PA mean PG 1.8 mmHg      PA mean PG (full) 1.0 mmHg      PA V2 VTI 17.2 cm      PVA(I,A) 3.4 cm^2      BH CV ECHO SAHIL - PVA(I,D) 3.4 cm^2      BH CV ECHO SAHIL - PVA(V,A) 3.4 cm^2       CV ECHO SAHIL - PVA(V,D) 3.4 cm^2      PA acc time 0.07 sec      RV V1 max PG 1.2 mmHg      RV V1 mean PG 0.77 mmHg      RV V1 max 55.1 cm/sec      RV V1 mean 41.7 cm/sec      RV V1 VTI 10.8 cm      TR max devante 237.7 cm/sec      RVSP(TR) 25.6 mmHg      RAP systole 3.0 mmHg      PA pr(Accel) 48.2 mmHg      Pulm Sys Devante 41.0 cm/sec      Pulm Olivier Devante 37.5 cm/sec      Pulm S/D 1.1     Qp/Qs 1.1      CV ECHO SAHIL - BZI_BMI 33.1 kilograms/m^2       CV ECHO SAHIL - BSA(HAYCOCK) 1.9 m^2       CV ECHO SAHIL - BZI_METRIC_WEIGHT 82.1 kg       CV  ECHO SAHIL - BZI_METRIC_HEIGHT 157.5 cm      EF(MOD-bp) 60.0 %      LA dimension(2D) 4.2 cm     Narrative:      Normal LV size and contractility EF of 55%  Moderate right ventricular enlargement with severe right atrial   enlargement, catheter probably pacemaker lead seen.  Severe left atrial enlargement seen.  Aortic valve, mitral valve, tricuspid valve appears structurally normal,   mild MR, AR, seen.  There is moderate  tricuspid regurgitation seen.    Calculated RV systolic pressure is 24mmHg.  No pericardial effusion seen.  Proximal aorta appears normal in size.          Results for orders placed during the hospital encounter of 02/05/21   Duplex Venous Lower Extremity - Bilateral CAR    Narrative · Exam limited by patient intolerance to compression and body habitus.  · The distal left femoral and the right and left peroneal veins are not   imaged.  · All other veins appeared normal bilaterally.          Results for orders placed during the hospital encounter of 02/05/21   Adult Transthoracic Echo Complete W/ Cont if Necessary Per Protocol    Narrative Normal LV size and contractility EF of 55%  Moderate right ventricular enlargement with severe right atrial   enlargement, catheter probably pacemaker lead seen.  Severe left atrial enlargement seen.  Aortic valve, mitral valve, tricuspid valve appears structurally normal,   mild MR, AR, seen.  There is moderate  tricuspid regurgitation seen.    Calculated RV systolic pressure is 24mmHg.  No pericardial effusion seen.  Proximal aorta appears normal in size.       Nm Lung Ventilation Perfusion Aerosol    Result Date: 2/14/2021   1. Abnormal appearance of the ventilation study which can be seen with obstructive pulmonary disease. 2. Low probability of acute pulmonary embolic disease.  Electronically Signed By-Fletcher Barros MD On:2/14/2021 11:29 AM This report was finalized on [id number] by  Fletcher Barros MD.    Us Liver    Result Date: 2/14/2021   1. Abnormal  appearance of liver felt to be secondary to hepatic cirrhosis. 2. Cholelithiasis. 3. Minimal ascites.  Electronically Signed By-Fletcher Barros MD On:2/14/2021 10:41 AM This report was finalized on [id number] by  Fletcher Barros MD.    Xr Chest 1 View    Result Date: 2/11/2021  1. Cardiomegaly without acute pulmonary process. No change from prior exam.  Electronically Signed By-Dae Auguste MD On:2/11/2021 2:18 PM This report was finalized on [id number] by  Dae Auguste MD.    Xr Chest Pa & Lateral    Result Date: 2/13/2021   1. Cardiomegaly. 2. Small to moderate left pleural effusion with compressive atelectasis and possible airspace disease from pneumonia.  Electronically Signed By-Fletcher Barros MD On:2/13/2021 5:43 PM This report was finalized on [id number] by  Fletcher Barros MD.    Xr Tibia Fibula 2 View Bilateral    Result Date: 2/14/2021  IMPRESSION :  1. No acute findings. 2. Tricompartment osteoarthritis of both knees. 3. Bony demineralization.  Electronically Signed By-Fletcher Barros MD On:2/14/2021 5:39 PM This report was finalized on [id number] by  Fletcher Barros MD.          Xrays, labs reviewed personally by physician.    Medication Review:   I have reviewed the patient's current medication list      Scheduled Meds  aspirin, 81 mg, Oral, Daily  bumetanide, 1 mg, Oral, BID  digoxin, 125 mcg, Oral, Daily  febuxostat, 40 mg, Oral, BID  folic acid, 1 mg, Oral, Daily  gabapentin, 100 mg, Oral, TID  lactulose, 20 g, Oral, BID  lidocaine, 2 patch, Transdermal, Q24H  magic butt paste, , Topical, Daily  midodrine, 5 mg, Oral, TID AC  rifAXIMin, 550 mg, Oral, Q12H  sodium chloride, 10 mL, Intravenous, Q12H  ursodiol, 300 mg, Oral, BID  vitamin B-12, 500 mcg, Oral, Daily  Zinc Oxide, , Apply externally, BID        Meds Infusions       Meds PRN  •  acetaminophen **OR** acetaminophen **OR** acetaminophen  •  dextrose  •  dextrose  •  glucagon (human recombinant)  •  magnesium sulfate **OR** magnesium sulfate in  D5W 1g/100mL (PREMIX) **OR** magnesium sulfate  •  melatonin  •  nitroglycerin  •  OLANZapine  •  ondansetron **OR** ondansetron  •  potassium chloride  •  potassium chloride  •  sodium chloride  •  tiZANidine        Assessment/Plan   Assessment/Plan     Active Hospital Problems    Diagnosis  POA   • Sepsis (CMS/HCC) [A41.9]  Yes   • CONG (acute kidney injury) (CMS/[facility]) [N17.9]  Yes   • Lactic acidosis [E87.2]  Yes   • Metabolic acidosis [E87.2]  Yes   • Delirium, acute [R41.0]  Yes   • Lower extremity cellulitis [L03.119]  Yes   • CAD (coronary artery disease) [I25.10]  Yes   • Acute UTI (urinary tract infection) [N39.0]  Yes   • Non-pressure chronic ulcer right ankle, limited to breakdown skin (CMS/[facility]) [L97.311]  Yes   • Automatic implantable cardiac defibrillator in situ [Z95.810]  Yes   • Lymphedema [I89.0]  Unknown   • Gout [M10.9]  Yes   • Hypertension, benign [I10]  Yes   • Congestive heart failure (CMS/HCC) [I50.9]  Yes   • Atrial fibrillation (CMS/[facility]) [I48.91]  Yes      Resolved Hospital Problems   No resolved problems to display.       MEDICAL DECISION MAKING COMPLEXITY BY PROBLEM:     Lower extremity edema-with findings of right sided cellulitis, multifactorial given underlying renal disease as well as cirrhosis and right heart failure  -Diuresis tolerated  -ID consulted managing cellulitis, changed from doxycycline to Teflaro, finished  -Nephrology managing volume status, transition to oral diuretics cleared for discharge    Hyper ammonemia-most likely secondary to underlying acute hepatic failure uncertain source.  Patient's imaging consistent with signs of cirrhosis  -Lactulose and rifaximin  -GI consulted  -Infectious and serological work-up for hepatic failure    Hypotension-uncertain source may be secondary due to splanchnic sequestration or underlying systemic inflammatory response to infection, appears controlled at this time, stable  -Was on dopamine  -Midodrine  -Watch blood pressure  closely  -Cosyntropin stim test normal  -Treat infection    Hypoglycemia-May be secondary to liver injury  -Encourage p.o. intake  -Patient started on dextrose infusion wean as tolerated  -Watch for improvement in liver function    Liver insufficiency-patient with imaging findings consistent with cirrhotic disease as well as acute injury unspecified source at this time  -GI consulted  -Liver enzymes trending down  -Cholelithiasis noted on ultrasound  -Ursodiol  -Alkaline phosphatase trending down, fractionation in process    Renal insufficiency-acute on chronic process, with mild continued improvement  -Nephrology consulted  -Electrolytes and volume status per nephrology  -Cleared for discharge by nephrology    Thrombocytopenia-thought to be possibly secondary to bone marrow suppression from illness/infection versus drug mediated  -Oncology consulted  -HIT panel negative  -Treat infection  -Monitor daily    Candiduria-found on urine culture  -ID started Diflucan, changed due to concern of hepatotoxicity    Altered mental status-appears to have resolved.  Likely multifactorial given hypotension, hyperammonemia and underlying infection  -Treat factors as above    Coronary artery disease-patient denies any chest pain at this time  -Continue maintenance medication  -Telemetry  -Resume beta-blocker once blood pressure improving    Atrial fibrillation-patient with underlying pacemaker  -Newest echo showing left ventricular EF of 40% with mild aortic insufficiency  -Digoxin held given kidney failure    Right ventricular failure-uncertain source at this time may be secondary to pulmonary hypertension or undiagnosed sleep apnea  -We will need further work-up outpatient once stabilized    Hypertension/hyperlipidemia/chronic pain/gout/pressure ulcer-chronic in nature  -Wound care  -Resume home medication as clinically appropriate    Obesity-BMI's skewed likely given fluid retention  -Monitor closely      VTE Prophylaxis -    Mechanical Order History:     None      Pharmalogical Order History:      Ordered     Dose Route Frequency Stop    02/06/21 0500  rivaroxaban (XARELTO) tablet 10 mg  Status:  Discontinued     Question Answer Comment   Are you ordering rivaroxaban for the prevention of blood clots in an acutely ill medical patient? Yes    Select any exclusion criteria that may apply to patient Exclusion Criteria Does Not Apply to This Patient Alternative to Lovenox/heparin inpatient with thrombocytopenia unable to receive mechanical prophylaxis       10 mg PO Daily With Dinner 02/07/21 1651    02/06/21 0020  heparin (porcine) 5000 UNIT/ML injection 5,000 Units  Status:  Discontinued      5,000 Units SC Every 12 Hours Scheduled 02/06/21 0147                  Code Status -   Code Status and Medical Interventions:   Ordered at: 02/06/21 0258     Level Of Support Discussed With:    Patient     Code Status:    CPR     Medical Interventions (Level of Support Prior to Arrest):    Full       This patient has been examined wearing appropriate Personal Protective Equipment and discussed with hospital infection control department. 02/18/21        Discharge Planning  Rehab awaiting finalization of placement        Electronically signed by Estuardo Torres MD, 02/18/21, 14:13 EST.  [facility]

## 2021-02-18 NOTE — PLAN OF CARE
Goal Outcome Evaluation:  Pt still exhibiting s/sx of confusion, although neuro checks have pt alert and oriented x 4. Still making strange remarks (ie: asking to go to the party/talking about people who are not present). Pt tolerating bliss d/c well - had one episode of urinary incontinence but otherwise able to request the bed pan when needing to void. Vitals stable. Will continue to monitor.

## 2021-02-18 NOTE — PROGRESS NOTES
Hematology/Oncology Inpatient Progress Note    PATIENT NAME: Emperatriz Childs  : 1945  MRN: [id number]    CHIEF COMPLAINT: Lymphedema    HISTORY OF PRESENT ILLNESS:    Emperatriz Childs is a 75 y.o. female who presented to [facility] on 2021 with complaints of increasing leg swelling and pain. Patient reported chronic lymphedema with erythema that has progressively gotten worse over the past several weeks. She reported associated pain with weightbearing for an extended period of time, fever, and chills.  Labs in the ED were significant for , potassium 5.3, creatinine 2.41, BUN 63, EGFR 19, lactate 3.2, hemoglobin 11.3.  Urinalysis was positive for blood, leukocytes, and nitrites.  Chest x-ray revealed cardiomegaly.  Patient was admitted for further evaluation and management.  Since admission, nephrology has been consulted for acute kidney injury.  Patient was started on antibiotics for lower extremity cellulitis and UTI. Infectious disease was also consulted.     21  Hematology/Oncology was consulted for persistent thrombocytopenia.  Platelet count on admission were 86,000.  Platelets have continued to remain low throughout admission with lowest platelet count of 34,000 on 2021.  On review of patient's chart, patient has a history of thrombocytopenia. Patient was seen by Dr. Nathan outpatient on 2018 for anemia.  Initial work-up was performed and patient was to follow up in 2 weeks, however, patient was lost to follow-up.     She  has a past medical history of CHF (congestive heart failure) (CMS/[facility]), History of transfusion, Hypertension, Lymphedema of leg, and Myocardial infarction (CMS/[facility]).     PCP: Rosa M Chavez APRN    History of present illness was reviewed and is unchanged from the previous visit. 21      Subjective      Patient remains weak.  Lying in bed all the time.      ROS:  Review of Systems   Constitutional: Positive for fatigue. Negative for  chills and fever.   HENT: Negative for mouth sores and nosebleeds.    Eyes: Negative for photophobia, pain, redness and visual disturbance.   Respiratory: Negative for cough and shortness of breath.    Cardiovascular: Positive for leg swelling. Negative for chest pain.   Gastrointestinal: Negative for diarrhea, nausea and vomiting.   Endocrine: Negative for cold intolerance and heat intolerance.   Genitourinary: Negative for difficulty urinating and hematuria.   Musculoskeletal: Negative for arthralgias and myalgias.   Skin: Positive for color change.   Neurological: Negative for dizziness, speech difficulty, light-headedness and headaches.   Psychiatric/Behavioral: Negative for confusion and hallucinations.      MEDICATIONS:    Scheduled Meds:  aspirin, 81 mg, Oral, Daily  bumetanide, 1 mg, Oral, BID  digoxin, 125 mcg, Oral, Daily  febuxostat, 40 mg, Oral, BID  folic acid, 1 mg, Oral, Daily  gabapentin, 100 mg, Oral, TID  lactulose, 20 g, Oral, BID  lidocaine, 2 patch, Transdermal, Q24H  magic butt paste, , Topical, Daily  midodrine, 5 mg, Oral, TID AC  rifAXIMin, 550 mg, Oral, Q12H  sodium chloride, 10 mL, Intravenous, Q12H  ursodiol, 300 mg, Oral, BID  vitamin B-12, 500 mcg, Oral, Daily  Zinc Oxide, , Apply externally, BID       Continuous Infusions:      PRN Meds:  •  acetaminophen **OR** acetaminophen **OR** acetaminophen  •  dextrose  •  dextrose  •  glucagon (human recombinant)  •  magnesium sulfate **OR** magnesium sulfate in D5W 1g/100mL (PREMIX) **OR** magnesium sulfate  •  melatonin  •  nitroglycerin  •  OLANZapine  •  ondansetron **OR** ondansetron  •  potassium chloride  •  potassium chloride  •  sodium chloride  •  tiZANidine     ALLERGIES:    Allergies   Allergen Reactions   • Allopurinol Unknown - High Severity   • Clindamycin Hcl Unknown - High Severity   • Codeine Unknown - High Severity   • Furosemide Unknown - High Severity   • Hydrochlorothiazide Unknown - High Severity   • Naproxen Unknown -  "High Severity   • Sulfa Antibiotics Unknown - High Severity       Objective    VITALS:   /56 (BP Location: Left arm, Patient Position: Lying)   Pulse 60   Temp 95.3 °F (35.2 °C) (Oral)   Resp 16   Ht 157.5 cm (62\")   Wt 73.6 kg (162 lb 4.1 oz)   SpO2 100%   BMI 29.68 kg/m²     PHYSICAL EXAM: (performed by MD)  Physical Exam  Vitals signs and nursing note reviewed.   Constitutional:       General: She is not in acute distress.     Appearance: She is obese. She is ill-appearing. She is not diaphoretic.   HENT:      Head: Normocephalic and atraumatic.   Eyes:      General: No scleral icterus.        Right eye: No discharge.         Left eye: No discharge.      Conjunctiva/sclera: Conjunctivae normal.   Neck:      Musculoskeletal: Normal range of motion and neck supple. No muscular tenderness.      Thyroid: No thyromegaly.      Vascular: No carotid bruit.   Cardiovascular:      Rate and Rhythm: Normal rate and regular rhythm.      Heart sounds: Normal heart sounds. No friction rub. No gallop.    Pulmonary:      Effort: Pulmonary effort is normal. No respiratory distress.      Breath sounds: No stridor. No wheezing.   Abdominal:      General: Bowel sounds are normal.      Palpations: Abdomen is soft. There is no mass.      Tenderness: There is no abdominal tenderness. There is no guarding or rebound.   Musculoskeletal: Normal range of motion.         General: Swelling present. No tenderness.      Right lower leg: Edema present.      Left lower leg: Edema present.      Comments: Both legs are wrapped in bandage   Lymphadenopathy:      Cervical: No cervical adenopathy.   Skin:     General: Skin is warm.      Coloration: Skin is not pale.      Findings: No bruising or rash.   Neurological:      General: No focal deficit present.      Mental Status: She is alert and oriented to person, place, and time.      Cranial Nerves: No cranial nerve deficit.      Sensory: No sensory deficit.      Motor: Weakness present. " No abnormal muscle tone.   Psychiatric:         Mood and Affect: Mood normal.         Behavior: Behavior normal.         Thought Content: Thought content normal.           RECENT LABS:  Lab Results (last 24 hours)     Procedure Component Value Units Date/Time    POC Glucose Once [[id number]]  (Normal) Collected: 02/18/21 1130    Specimen: Blood Updated: 02/18/21 1131     Glucose 103 mg/dL      Comment: Serial Number: [id number]:  [id number]       BNP [[id number]]  (Abnormal) Collected: 02/18/21 0902    Specimen: Blood Updated: 02/18/21 1034     proBNP 28,835.0 pg/mL     Narrative:      Among patients with dyspnea, NT-proBNP is highly sensitive for the detection of acute congestive heart failure. In addition NT-proBNP of <300 pg/ml effectively rules out acute congestive heart failure with 99% negative predictive value.    Results may be falsely decreased if patient taking Biotin.      Comprehensive Metabolic Panel [[id number]]  (Abnormal) Collected: 02/18/21 0902    Specimen: Blood Updated: 02/18/21 1010     Glucose 70 mg/dL      BUN 72 mg/dL      Creatinine 1.98 mg/dL      Sodium 141 mmol/L      Potassium 4.0 mmol/L      Chloride 101 mmol/L      CO2 30.0 mmol/L      Calcium 7.8 mg/dL      Total Protein 5.9 g/dL      Albumin 2.40 g/dL      ALT (SGPT) 19 U/L      AST (SGOT) 32 U/L      Alkaline Phosphatase 638 U/L      Total Bilirubin 1.0 mg/dL      eGFR Non African Amer 25 mL/min/1.73      Globulin 3.5 gm/dL      A/G Ratio 0.7 g/dL      BUN/Creatinine Ratio 36.4     Anion Gap 10.0 mmol/L     Narrative:      GFR Normal >60  Chronic Kidney Disease <60  Kidney Failure <15      Magnesium [[id number]]  (Abnormal) Collected: 02/18/21 0902    Specimen: Blood Updated: 02/18/21 1006     Magnesium 1.4 mg/dL     Protime-INR [[id number]]  (Abnormal) Collected: 02/18/21 0902    Specimen: Blood Updated: 02/18/21 0945     Protime 12.4 Seconds      INR 1.13    CBC & Differential [[id number]]  (Abnormal) Collected: 02/18/21 0902     Specimen: Blood Updated: 02/18/21 0938    Narrative:      The following orders were created for panel order CBC & Differential.  Procedure                               Abnormality         Status                     ---------                               -----------         ------                     CBC Auto Differential[[id number]]        Abnormal            Final result                 Please view results for these tests on the individual orders.    CBC Auto Differential [[id number]]  (Abnormal) Collected: 02/18/21 0902    Specimen: Blood Updated: 02/18/21 0938     WBC 8.20 10*3/mm3      RBC 2.82 10*6/mm3      Hemoglobin 9.1 g/dL      Hematocrit 28.1 %      MCV 99.9 fL      MCH 32.2 pg      MCHC 32.2 g/dL      RDW 18.0 %      RDW-SD 63.0 fl      MPV 8.0 fL      Platelets 64 10*3/mm3      Neutrophil % 81.1 %      Lymphocyte % 10.2 %      Monocyte % 6.3 %      Eosinophil % 1.3 %      Basophil % 1.1 %      Neutrophils, Absolute 6.70 10*3/mm3      Lymphocytes, Absolute 0.80 10*3/mm3      Monocytes, Absolute 0.50 10*3/mm3      Eosinophils, Absolute 0.10 10*3/mm3      Basophils, Absolute 0.10 10*3/mm3      nRBC 0.1 /100 WBC     POC Glucose Once [[id number]]  (Normal) Collected: 02/18/21 0732    Specimen: Blood Updated: 02/18/21 0735     Glucose 89 mg/dL      Comment: Serial Number: [id number]:  [id number]       POC Glucose Once [[id number]]  (Abnormal) Collected: 02/17/21 2008    Specimen: Blood Updated: 02/17/21 2010     Glucose 159 mg/dL      Comment: Serial Number: [id number]:  [id number]       Immunoglobulin Free LT Chains Blood [[id number]] Collected: 02/17/21 1740    Specimen: Blood Updated: 02/17/21 1800    POC Glucose Once [[id number]]  (Abnormal) Collected: 02/17/21 1706    Specimen: Blood Updated: 02/17/21 1707     Glucose 108 mg/dL      Comment: Serial Number: [id number]:  [id number]             PENDING RESULTS: Immunoglobulin free light chain    IMAGING REVIEWED:  No radiology results for the  last day    Assessment/Plan     ASSESSMENT:  1. Thrombocytopenia: Very likely from bone marrow suppression due to acute illness.  Antibiotics may be contributing. Currently on aspirin.  Heparin-induced platelet antibody normal.  Folate 10.90, vitamin B12 >2000, fibrinogen 554, PTT 34, PT/INR 13.3/1.22.  Peripheral smear showed leukocytosis, anemia, and thrombocytopenia. Platelets stable.   2. Anemia: Consider relation to anemia of chronic disease and CKD.  Anemia studies: ferritin 663.70, iron 50, iron saturation 38%, transferrin 89, TIBC 133, , haptoglobin 138,  reticulocytes 1.98%. Patient currently receiving p.o. vitamin B12 and folic acid.  Protein electrophoresis showed M spike of 0.1. 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  Immunoglobulin free light chain resulted elevated kappa free light chain 143.9 and elevated lambda free light chain  125.6 and normal kappa/lambda ratio of 1.15.  Hemoglobin stable.   3. Leukocytosis: Reactive leukocytosis due to underlying cellulitis and UTI.    Now resolved  4. Elevated liver enzymes and elevated ammonia level: Gastroenterology consulted. Receiving lactulose enemas for elevated ammonia level.  Hepatitis panel nonreactive.  Ultrasound of liver revealed abnormal appearance of liver felt to be secondary to hepatic cirrhosis.  Cholelithiasis. Elevated alkaline phosphatase- patient on fluconazole which can cause increased liver enzymes.  Doxycycline and Diflucan discontinued by infectious disease.  5. CHF: proBNP 66,389. Cardiology following  6. Cellulitis of bilateral lower extremities: ID consulted.  Blood cultures negative. On IV antibiotics.   7. UTI: On IV antibiotics.   8. CONG/CKD: Nephrology consulted  9. Elevated LFT's and CAD/atrial fibrillation/HTN/HLD/gout: Managed per primary team       PLAN:  1. CBC daily   2. Treat underlying infection per ID: Continue IV antibiotics   3. Continue supportive care  4. Discharge  plan: [facility]    Electronically signed by CHRISTIANO Lomeli, 02/18/21, 2:16 PM EST.      I personally reviewed all pertinent labs, related documentation and the  imaging. ROS performed and physical exam done during face to face enounter with patient. I agree with  nurse practitioner's doumentation, assessment and plan.  Electronically signed by Kevin Nathan MD, 02/18/21, 7:29 PM EST.

## 2021-02-18 NOTE — PLAN OF CARE
Goal Outcome Evaluation:             Patient is still on IV diuretics. [facility] on discharge, waiting for nephrology to clear patient for discharge. Possible discharge today.

## 2021-02-18 NOTE — DISCHARGE PLACEMENT REQUEST
"Jodi Watson (75 y.o. Female)     Date of Birth Social Security Number Address Home Phone MRN    1945  [address] [facility] [address] [phone] [id number]    [employer]          [employer]        Admission Date Admission Type Admitting Provider Attending Provider Department, Room/Bed    2/5/21 Urgent Estuardo Torres MD Farley, Timothy Michael, MD [facility], 2120/1    Discharge Date Discharge Disposition Discharge Destination         Rehab Facility or Unit (DC - External)              Attending Provider: Estuardo Torres MD    Allergies: Allopurinol, Clindamycin Hcl, Codeine, Furosemide, Hydrochlorothiazide, Naproxen, Sulfa Antibiotics    Isolation: Contact   Infection: MRSA (02/06/21)   Code Status: CPR    Ht: 157.5 cm (62\")   Wt: 73.6 kg (162 lb 4.1 oz)    Admission Cmt: None   Principal Problem: None                Active Insurance as of 2/5/2021     Primary Coverage     Payor Plan Insurance Group Employer/Plan Group    ANTHEM MEDICARE REPLACEMENT ANTHEM MEDICARE ADVANTAGE [id number]     Payor Plan Address Payor Plan Phone Number Payor Plan Fax Number Effective Dates    [address] [phone]  7/1/2019 - None Entered    [facility] [id number]       Subscriber Name Subscriber Birth Date Member ID       JODI WATSON 1945 [id number]                 Emergency Contacts      (Rel.) Home Phone Work Phone Mobile Phone    JADA WATSON (Daughter) [phone] -- [phone]    ANUSHKA WATSON (Grandchild) -- -- [phone]    Stacey Watson (Daughter) [phone] -- --                 Discharge Summary      Estuardo Torres MD at 02/18/21 1457            Date of Admission: 2/5/2021    Date of Discharge:  2/18/2021    Length of stay:  LOS: 10 days     Discharge Diagnosis:     Hepatic encephalopathy    Presenting Problem List:    Lower extremity edema-with findings of right sided " cellulitis, multifactorial given underlying renal disease as well as cirrhosis and right heart failure  -Diuresis tolerated  -ID consulted managing cellulitis, changed from doxycycline to Teflaro, finished  -Nephrology managing volume status, transition to oral diuretics cleared for discharge     Hyper ammonemia-most likely secondary to underlying acute hepatic failure uncertain source.  Patient's imaging consistent with signs of cirrhosis  -Lactulose and rifaximin  -GI consulted  -Infectious and serological work-up for hepatic failure     Hypotension-uncertain source may be secondary due to splanchnic sequestration or underlying systemic inflammatory response to infection, appears controlled at this time, stable  -Was on dopamine  -Midodrine  -Watch blood pressure closely  -Cosyntropin stim test normal  -Treat infection     Hypoglycemia-May be secondary to liver injury  -Encourage p.o. intake  -Patient started on dextrose infusion wean as tolerated  -Watch for improvement in liver function     Liver insufficiency-patient with imaging findings consistent with cirrhotic disease as well as acute injury unspecified source at this time  -GI consulted  -Liver enzymes trending down  -Cholelithiasis noted on ultrasound  -Ursodiol  -Alkaline phosphatase trending down, fractionation in process     Renal insufficiency-acute on chronic process, with mild continued improvement  -Nephrology consulted  -Electrolytes and volume status per nephrology  -Cleared for discharge by nephrology     Thrombocytopenia-thought to be possibly secondary to bone marrow suppression from illness/infection versus drug mediated  -Oncology consulted  -HIT panel negative  -Treat infection  -Monitor daily     Candiduria-found on urine culture  -ID started Diflucan, changed due to concern of hepatotoxicity     Altered mental status-appears to have resolved.  Likely multifactorial given hypotension, hyperammonemia and underlying infection  -Treat factors  as above     Coronary artery disease-patient denies any chest pain at this time  -Continue maintenance medication  -Telemetry  -Resume beta-blocker once blood pressure improving     Atrial fibrillation-patient with underlying pacemaker  -Newest echo showing left ventricular EF of 40% with mild aortic insufficiency  -Digoxin held given kidney failure     Right ventricular failure-uncertain source at this time may be secondary to pulmonary hypertension or undiagnosed sleep apnea  -We will need further work-up outpatient once stabilized     Hypertension/hyperlipidemia/chronic pain/gout/pressure ulcer-chronic in nature  -Wound care  -Resume home medication as clinically appropriate     Obesity-BMI's skewed likely given fluid retention  -Monitor closely    HPI/Hospital Course:    The patient is a 75-year-old female with history atrial fibrillation s/p pacemaker placement, hyperlipidemia, hypertension, CAD, CKD stage III and  chronic lower extremity lymphedema.     Apparently the patient has been having several weeks of worsening lower extremity edema thus went to outside facility.     Laboratory work was significant for , potassium 5.3, BUN 63, creatinine 2.41, WBC 4.5, lactic acid 3.2 and UA with 25-50 WBCs.  The patient was transferred to [facility] for further care        Date::    2/6/21: Poor historian.  Low BP thus gave bolus.  Started on dopamine and transferred to PCU.  Started on bicarb drip.  Confused in the evening.     2/7/21: PICC line placed.  Daughter at the bedside discussed care.  Daughter claims no history of dementia or sundowning.  Leukocytosis.  Added Flagyl.  Consulted ID.  Daughter claims patient off Xarelto.  C. difficile ruled out.     2/8  Has third spacing and bruising.  Patient denies any chest pain  On dopamine drip and being tapered off.  Started on midodrine.     2/9/2021  Outpatient recliner  She has Ace wrap in place  She had been complaining of some tight wraps on the right  side and will need to be addressed.  White count better  Her platelet count is 54 slightly better than yesterday  Still on a small amount of dopamine drip.  Renal function still elevated but probably stabilized.     2/10  Patient is now feeling better and her leg swelling is under control with compression dressing that has been changed to allow for some relief of her pain.  She had drop in her creatinine which suggests improvement with diuresis and possible cardiorenal etiology     She has been eating yesterday and her sugar dropped and started D10     B12 is above 2000  Haptoglobin is 100 within normal.  She is hypomagnesemic as well.  Folic acid 10.9  Still with significant thrombocytopenia.  We will ask hematologist for evaluation.     2/11/2021  Urine output decreased overnight and Dr. Pinedo wants to start dopamine drip again  Creatinine slightly up again today  Patient is asking when she is going to go home.  Since her right ventricle atrial or enlarged     2/12  Coaches in hips and arms hyun w ambulation  Bands 17%  platelet 63  Hallucinating; talking to her !  Check ammmonia     2/13  Feels sleepy most pf time  [id number]   Initiated lactulose enema and oral lactulose  Consult GI  Worsening alkaline phosphatase and ALT elevation noted  Check hepatitis panel  On Bumex drip  Had good urine output overnight     2/14  Patient has been switched to IV Bumex  She had worsening elevation of alkaline phosphatase  Still has poor oral intake  No evident hallucinations on exam today  Complaining of pain bilateral legs.  X-ray ordered for evaluation.     2/15/2021: Patient reports having diffuse pain today but most prominent in her back.  No chest pain or shortness of breath.  Renal function appears to be improving mildly.  Palliative care consulted for discussing goals of care given patient's significant comorbidities.     2/16/2021: Patient much more alert and conversational today.  Patient reports her back pain  is improved and she is feeling stronger.  No shortness of breath.  Alkaline phosphatase fractionation in process.  Patient continuing on lactulose.  Antibiotics switched to avoid further potential hepatotoxicity.     2/17/2021: Patient continues to feel better.  Breathing comfortably.  Strength improving.  Renal function improving.  Completing antibiotics.    2/18/2021: Patient doing well.  No new symptoms.  Patient cleared by all specialists for discharge.  Transition to oral diuretics.  Finish antibiotics.  Patient cleared for discharge.  Follow-up organized with primary care, cardiology, GI, nephrology, hematology.  Patient continue lactulose and rifaximin for hepatic encephalopathy.  Resumed diuretics 1 mg Bumex twice a day.  Patient continue with Uloric.  Off antibiotics.  Check labs weekly at rehab.  Patient able to be discharged to rehab in good condition with strict return precautions given.      Procedures Performed         Consults:   Consults     Date and Time Order Name Status Description    2/13/2021 1750 Inpatient Cardiology Consult Completed     2/13/2021 1322 Inpatient Gastroenterology Consult      2/10/2021 1518 Hematology & Oncology Inpatient Consult      2/8/2021 0032 Inpatient Infectious Diseases Consult Completed     2/6/2021 1150 Inpatient Nephrology Consult Completed           Pertinent Test Results:     Lab Results (last 24 hours)     Procedure Component Value Units Date/Time    Immunoglobulin Free LT Chains Blood [[id number]]  (Abnormal) Collected: 02/17/21 1740    Specimen: Blood Updated: 02/18/21 1412     Free Light Chain, Kappa 143.9 mg/L      Free Lambda Light Chains 125.6 mg/L      Kappa/Lambda Ratio 1.15    Narrative:      Performed at:  [address] [place] [address]  [id number]  : Balta Tellez PhD, Phone:  [phone]    POC Glucose Once [[id number]]  (Normal) Collected: 02/18/21 1130    Specimen: Blood Updated: 02/18/21 1131     Glucose 103 mg/dL       Comment: Serial Number: [id number]:  [id number]       BNP [[id number]]  (Abnormal) Collected: 02/18/21 0902    Specimen: Blood Updated: 02/18/21 1034     proBNP 28,835.0 pg/mL     Narrative:      Among patients with dyspnea, NT-proBNP is highly sensitive for the detection of acute congestive heart failure. In addition NT-proBNP of <300 pg/ml effectively rules out acute congestive heart failure with 99% negative predictive value.    Results may be falsely decreased if patient taking Biotin.      Comprehensive Metabolic Panel [[id number]]  (Abnormal) Collected: 02/18/21 0902    Specimen: Blood Updated: 02/18/21 1010     Glucose 70 mg/dL      BUN 72 mg/dL      Creatinine 1.98 mg/dL      Sodium 141 mmol/L      Potassium 4.0 mmol/L      Chloride 101 mmol/L      CO2 30.0 mmol/L      Calcium 7.8 mg/dL      Total Protein 5.9 g/dL      Albumin 2.40 g/dL      ALT (SGPT) 19 U/L      AST (SGOT) 32 U/L      Alkaline Phosphatase 638 U/L      Total Bilirubin 1.0 mg/dL      eGFR Non African Amer 25 mL/min/1.73      Globulin 3.5 gm/dL      A/G Ratio 0.7 g/dL      BUN/Creatinine Ratio 36.4     Anion Gap 10.0 mmol/L     Narrative:      GFR Normal >60  Chronic Kidney Disease <60  Kidney Failure <15      Magnesium [[id number]]  (Abnormal) Collected: 02/18/21 0902    Specimen: Blood Updated: 02/18/21 1006     Magnesium 1.4 mg/dL     Protime-INR [[id number]]  (Abnormal) Collected: 02/18/21 0902    Specimen: Blood Updated: 02/18/21 0945     Protime 12.4 Seconds      INR 1.13    CBC & Differential [[id number]]  (Abnormal) Collected: 02/18/21 0902    Specimen: Blood Updated: 02/18/21 0938    Narrative:      The following orders were created for panel order CBC & Differential.  Procedure                               Abnormality         Status                     ---------                               -----------         ------                     CBC Auto Differential[[id number]]        Abnormal            Final result                      Please view results for these tests on the individual orders.    CBC Auto Differential [[id number]]  (Abnormal) Collected: 02/18/21 0902    Specimen: Blood Updated: 02/18/21 0938     WBC 8.20 10*3/mm3      RBC 2.82 10*6/mm3      Hemoglobin 9.1 g/dL      Hematocrit 28.1 %      MCV 99.9 fL      MCH 32.2 pg      MCHC 32.2 g/dL      RDW 18.0 %      RDW-SD 63.0 fl      MPV 8.0 fL      Platelets 64 10*3/mm3      Neutrophil % 81.1 %      Lymphocyte % 10.2 %      Monocyte % 6.3 %      Eosinophil % 1.3 %      Basophil % 1.1 %      Neutrophils, Absolute 6.70 10*3/mm3      Lymphocytes, Absolute 0.80 10*3/mm3      Monocytes, Absolute 0.50 10*3/mm3      Eosinophils, Absolute 0.10 10*3/mm3      Basophils, Absolute 0.10 10*3/mm3      nRBC 0.1 /100 WBC     POC Glucose Once [[id number]]  (Normal) Collected: 02/18/21 0732    Specimen: Blood Updated: 02/18/21 0735     Glucose 89 mg/dL      Comment: Serial Number: [id number]:  [id number]       POC Glucose Once [[id number]]  (Abnormal) Collected: 02/17/21 2008    Specimen: Blood Updated: 02/17/21 2010     Glucose 159 mg/dL      Comment: Serial Number: [id number]:  [id number]       POC Glucose Once [[id number]]  (Abnormal) Collected: 02/17/21 1706    Specimen: Blood Updated: 02/17/21 1707     Glucose 108 mg/dL      Comment: Serial Number: [id number]:  [id number]             Microbiology Results Abnormal     Procedure Component Value - Date/Time    Blood Culture - Blood, Arm, Right [[id number]] Collected: 02/06/21 1307    Lab Status: Final result Specimen: Blood from Arm, Right Updated: 02/11/21 1431     Blood Culture No growth at 5 days    Blood Culture - Blood, Arm, Left [[id number]] Collected: 02/06/21 1309    Lab Status: Final result Specimen: Blood from Arm, Left Updated: 02/11/21 1431     Blood Culture No growth at 5 days    Urine Culture - Urine, Urine, Catheter [[id number]]  (Abnormal) Collected: 02/10/21 0306    Lab Status: Final result Specimen: Urine, Catheter  Updated: 02/11/21 1234     Urine Culture Yeast isolated     Comment: No further workup.       Urine Culture - Urine, Urine, Random Void [[id number]]  (Abnormal) Collected: 02/10/21 1616    Lab Status: Final result Specimen: Urine, Random Void Updated: 02/11/21 1234     Urine Culture Yeast isolated     Comment: No further workup.       Clostridium Difficile Toxin - Stool, Per Rectum [[id number]]  (Normal) Collected: 02/06/21 1952    Lab Status: Final result Specimen: Stool from Per Rectum Updated: 02/07/21 0725    Narrative:      The following orders were created for panel order Clostridium Difficile Toxin - Stool, Per Rectum.  Procedure                               Abnormality         Status                     ---------                               -----------         ------                     Clostridium Difficile EI...[[id number]]  Normal              Final result                 Please view results for these tests on the individual orders.    Clostridium Difficile EIA - Stool, Per Rectum [[id number]]  (Normal) Collected: 02/06/21 1952    Lab Status: Final result Specimen: Stool from Per Rectum Updated: 02/07/21 0725     C Diff GDH / Toxin Negative    COVID PRE-OP / PRE-PROCEDURE SCREENING ORDER (NO ISOLATION) - Swab, Nasopharynx [[id number]]  (Normal) Collected: 02/06/21 0153    Lab Status: Final result Specimen: Swab from Nasopharynx Updated: 02/06/21 1534    Narrative:      The following orders were created for panel order COVID PRE-OP / PRE-PROCEDURE SCREENING ORDER (NO ISOLATION) - Swab, Nasopharynx.  Procedure                               Abnormality         Status                     ---------                               -----------         ------                     COVID-19,APTIMA PANTHER,...[[id number]]  Normal              Final result                 Please view results for these tests on the individual orders.    COVID-19,APTIMA CHILO ROSS IN-HOUSE, NP/OP SWAB IN UTM/VTM/SALINE TRANSPORT MEDIA,24 HR  TAT - Swab, Nasopharynx [[id number]]  (Normal) Collected: 02/06/21 0153    Lab Status: Final result Specimen: Swab from Nasopharynx Updated: 02/06/21 1534     COVID19 Not Detected    Narrative:      Fact sheet for providers: https://www.fda.gov/media/572111/download     Fact sheet for patients: https://www.fda.gov/media/281355/download    Test performed by RT PCR.    MRSA Screen, PCR (Inpatient) - Swab, Nares [[id number]]  (Abnormal) Collected: 02/06/21 1114    Lab Status: Final result Specimen: Swab from Nares Updated: 02/06/21 1311     MRSA PCR MRSA Detected        No radiology results for the last day    Results for orders placed during the hospital encounter of 02/05/21   Adult Transthoracic Echo Complete W/ Cont if Necessary Per Protocol    Narrative Normal LV size and contractility EF of 55%  Moderate right ventricular enlargement with severe right atrial   enlargement, catheter probably pacemaker lead seen.  Severe left atrial enlargement seen.  Aortic valve, mitral valve, tricuspid valve appears structurally normal,   mild MR, AR, seen.  There is moderate  tricuspid regurgitation seen.    Calculated RV systolic pressure is 24mmHg.  No pericardial effusion seen.  Proximal aorta appears normal in size.         Condition on Discharge:  Good    Vital Signs  Temp:  [95.3 °F (35.2 °C)-98.6 °F (37 °C)] 95.3 °F (35.2 °C)  Heart Rate:  [60-62] 60  Resp:  [16-20] 16  BP: (110-158)/(45-63) 110/56    Physical Exam:    General: Chronically ill appearing obese elderly female lying in bed breathing comfortably on room air no acute distress   HEENT: NC/AT, EOMI, mucosa moist  Heart: Regular, rate controlled  Chest: Normal work of breathing, moving air well no wheezing  Abdominal: Soft.  Mild distention, nontender  Musculoskeletal: Normal ROM.  No cyanosis. No calf tenderness.  Neurological: Awake and alert, no focal deficits  Skin: Skin is warm and dry. No rash  Psychiatric: Patient appearing calm.    Discharge  Disposition  Rehab Facility or Unit (DC - External) [62]    Discharge Medications     Discharge Medications      New Medications      Instructions Start Date   cyanocobalamin 500 MCG tablet  Commonly known as: VITAMIN B-12   500 mcg, Oral, Daily   Start Date: February 19, 2021     folic acid 1 MG tablet  Commonly known as: FOLVITE   1 mg, Oral, Daily   Start Date: February 19, 2021     lactulose 10 GM/15ML solution  Commonly known as: CHRONULAC   20 g, Oral, 2 Times Daily      lidocaine 5 %  Commonly known as: LIDODERM   2 patches, Transdermal, Every 24 Hours Scheduled, Remove & Discard patch within 12 hours or as directed by MD      midodrine 5 MG tablet  Commonly known as: PROAMATINE   5 mg, Oral, 3 Times Daily Before Meals      riFAXIMin 550 MG tablet  Commonly known as: XIFAXAN   550 mg, Oral, Every 12 Hours Scheduled      ursodiol 300 MG capsule  Commonly known as: ACTIGALL   300 mg, Oral, 2 Times Daily         Continue These Medications      Instructions Start Date   aspirin 81 MG chewable tablet   81 mg, Oral, Daily      bumetanide 1 MG tablet  Commonly known as: BUMEX   1 mg, Oral, 2 Times Daily, Morning and noon      cetirizine 10 MG tablet  Commonly known as: zyrTEC   10 mg, Oral, Daily PRN      digoxin 125 MCG tablet  Commonly known as: LANOXIN   125 mcg, Oral, Daily Digoxin      gabapentin 100 MG capsule  Commonly known as: NEURONTIN   100 mg, Oral, 3 Times Daily      Uloric 40 MG tablet  Generic drug: febuxostat   40 mg, Oral, 2 Times Daily      Zetia 10 MG tablet  Generic drug: ezetimibe   10 mg, Oral, Daily         Stop These Medications    colchicine 0.6 MG tablet     metoprolol tartrate 50 MG tablet  Commonly known as: LOPRESSOR     traMADol 50 MG tablet  Commonly known as: ULTRAM            Discharge Diet:   Diet Instructions     Diet: Renal      Discharge Diet: Renal          Activity at Discharge:   Activity Instructions     Activity as Tolerated            Follow-up Appointments  Future  Appointments   Date Time Provider Department Center   3/5/2021 11:00 AM [facility] None     Additional Instructions for the Follow-ups that You Need to Schedule     Discharge Follow-up with PCP   As directed       Currently Documented PCP:    Rosa M Chavez APRN    PCP Phone Number:    [phone]     Follow Up Details: Please follow-up primary care within a week         Discharge Follow-up with Specialty: Please follow-up with hematology in 2 to 4 weeks   As directed      Specialty: Please follow-up with hematology in 2 to 4 weeks         Discharge Follow-up with Specified Provider: Follow-up with cardiology in 2 to 4 weeks   As directed      To: Follow-up with cardiology in 2 to 4 weeks         Discharge Follow-up with Specified Provider: Follow-up with nephrology in 2 to 4 weeks   As directed      To: Follow-up with nephrology in 2 to 4 weeks         Discharge Follow-up with Specified Provider: Is follow-up with gastroenterology in 2 to 4 weeks   As directed      To: Is follow-up with gastroenterology in 2 to 4 weeks               Test Results Pending at Discharge       Risk for Readmission (LACE) Score: 10 (2/18/2021  6:00 AM)        Estuardo Torres MD  02/18/21  14:57 EST      Time: Discharge 35 min total time spent with face-to-face history/exam, writing all prescriptions, preparing medication reconciliation, and documenting discharge data including chart review of the full admission, care co-ordination with patient, family, , and , etc., and follow-up appointments with PCP and specialists as recommended.       Electronically signed by Estuardo Torres MD at 02/18/21 1500       Discharge Order (From admission, onward)     Start     Ordered    02/18/21 1451  Discharge patient  Once     Expected Discharge Date: 02/18/21    Discharge Disposition: Rehab Facility or Unit (DC - External)    Physician of Record for Attribution -  Please select from Treatment Team: LEXX BRADY [[id number]]    Review needed by CMO to determine Physician of Record: No       Question Answer Comment   Physician of Record for Attribution - Please select from Treatment Team LEXX BRADY    Review needed by CMO to determine Physician of Record No        02/18/21 145

## 2021-02-18 NOTE — NURSING NOTE
Pt reports having two little girls in her room with her that she wanted to share her dinner with. After probing further to check neuro status, pt found to be alert/oriented x 4. Pt confused about specific details but overall expressing normal mood/behavior and interacting WNL. Ammonia levels WNL and O2 saturation at 93%. C/o nausea - provided emesis bag. Vitals stable. Will continue to monitor.

## 2021-02-18 NOTE — THERAPY TREATMENT NOTE
Basic Mobility 6-click:  Rollin = Total, A lot = 2, A little = 3; 4 = None  Supine>Sit:   1 = Total, A lot = 2, A little = 3; 4 = None   Sit>Stand with arms:  1 = Total, A lot = 2, A little = 3; 4 = None  Bed>Chair:   1 = Total, A lot = 2, A little = 3; 4 = None  Ambulate in room:  1 = Total, A lot = 2, A little = 3; 4 = None  3-5 Steps with railin = Total, A lot = 2, A little = 3; 4 = None  Score: 10

## 2021-02-19 NOTE — PROGRESS NOTES
Infectious Diseases Progress Note      LOS: 10 days   Patient Care Team:  Rosa M Chavez APRN as PCP - General (Nurse Practitioner)    Chief Complaint: Lower extremities edema    Subjective       The patient has been afebrile for the last 24 hours.  The patient is on room air and hemodynamically stable.  She is currently in the chair and has no new complaints      Review of Systems:   Review of Systems   Constitutional: Negative.    HENT: Negative.    Eyes: Negative.    Respiratory: Negative.    Cardiovascular: Positive for leg swelling.   Gastrointestinal: Negative.    Genitourinary: Negative.    Musculoskeletal: Negative.         Bilateral leg pain   Skin: Negative.    Neurological: Negative.    Hematological: Negative.    Psychiatric/Behavioral: Negative.         Objective     Vital Signs  Temp:  [95.3 °F (35.2 °C)-98.1 °F (36.7 °C)] 97.5 °F (36.4 °C)  Heart Rate:  [60-62] 60  Resp:  [16-20] 16  BP: (110-158)/(53-62) 129/53    Physical Exam:  Physical Exam  Vitals signs and nursing note reviewed.   Constitutional:       Appearance: She is well-developed.   HENT:      Head: Normocephalic and atraumatic.   Eyes:      Pupils: Pupils are equal, round, and reactive to light.   Neck:      Musculoskeletal: Normal range of motion and neck supple.   Cardiovascular:      Rate and Rhythm: Normal rate and regular rhythm.      Heart sounds: Normal heart sounds.   Pulmonary:      Effort: Pulmonary effort is normal. No respiratory distress.      Breath sounds: Normal breath sounds. No wheezing or rales.   Abdominal:      General: Bowel sounds are normal. There is no distension.      Palpations: Abdomen is soft. There is no mass.      Tenderness: There is no abdominal tenderness. There is no guarding or rebound.   Musculoskeletal: Normal range of motion.         General: No deformity.      Right lower leg: Edema present.      Left lower leg: Edema present.      Comments: Superimposed erythema of both lower extremities more  on the left than the right-erythema is difficult to assess because of the Magic Butt paste on the legs however erythema does appear improved  -Erythema is significantly improved since we first saw the patient   Skin:     General: Skin is warm.      Findings: No erythema or rash.   Neurological:      Mental Status: She is alert and oriented to person, place, and time.      Cranial Nerves: No cranial nerve deficit.          Results Review:    I have reviewed all clinical data, test, lab, and imaging results.     Radiology  No Radiology Exams Resulted Within Past 24 Hours    Cardiology    Laboratory    Results from last 7 days   Lab Units 02/18/21  0902 02/17/21  0553 02/16/21  0640 02/15/21  0530 02/14/21  0446 02/13/21  0350 02/12/21  0427   WBC 10*3/mm3 8.20 7.60 7.40 8.40 8.30 11.00* 14.20*   HEMOGLOBIN g/dL 9.1* 8.7* 9.8* 8.6* 8.7* 8.5* 9.0*   HEMATOCRIT % 28.1* 27.4* 29.7* 26.9* 26.5* 26.3* 28.3*   PLATELETS 10*3/mm3 64* 67* 77* 87* 72* 74* 63*     Results from last 7 days   Lab Units 02/18/21  0902 02/17/21  0553 02/16/21  0640 02/16/21  0011 02/15/21  1716 02/15/21  0530 02/14/21  1241 02/14/21  0446 02/13/21  1722 02/13/21  0350 02/12/21  1759  02/12/21  0427   SODIUM mmol/L 141 144 143  --   --  142  --  140 140 140  --    < > 136   POTASSIUM mmol/L 4.0 3.4* 3.9 4.1  --  3.3*  --  3.6 3.7 3.9  --    < > 3.8   CHLORIDE mmol/L 101 103 104  --   --  102  --  102 102 103  --    < > 99   CO2 mmol/L 30.0* 31.0* 32.0*  --   --  29.0  --  28.0 27.0 25.0  --    < > 24.0   BUN mg/dL 72* 79* 87*  --   --  96*  --  100* 99* 102*  --    < > 102*   CREATININE mg/dL 1.98* 2.66* 2.89*  --   --  3.59*  --  3.84* 3.70* 3.73*  --    < > 3.46*   GLUCOSE mg/dL 70 110* 166*  --   --  91  --  72 85 78  --    < > 220*   ALBUMIN g/dL 2.40* 2.40* 2.30*  --   --  2.40*  --  2.20*  --  2.40*  --   --  2.90*   BILIRUBIN mg/dL 1.0 0.7 0.9  --   --  0.8  --  0.8  --  0.8  --   --  1.2   ALK PHOS U/L 638* 719* 803*  --   --  945* 1088*  1,082*  --  994*  --   --  762*   AST (SGOT) U/L 32 43* 48*  --   --  78*  --  101*  --  108*  --   --  72*   ALT (SGPT) U/L 19 24 30  --   --  40*  --  46*  --  45*  --   --  35*   AMMONIA umol/L  --   --  30  --  43  --   --  72*  --   --  480*  --   --    CALCIUM mg/dL 7.8* 7.8* 8.1*  --   --  7.6*  --  7.6* 7.3* 7.7*  --    < > 8.0*    < > = values in this interval not displayed.                 Microbiology   Microbiology Results (last 10 days)     Procedure Component Value - Date/Time    Urine Culture - Urine, Urine, Random Void [[id number]]  (Abnormal) Collected: 02/10/21 1616    Lab Status: Final result Specimen: Urine, Random Void Updated: 02/11/21 1234     Urine Culture Yeast isolated     Comment: No further workup.       Urine Culture - Urine, Urine, Catheter [[id number]]  (Abnormal) Collected: 02/10/21 0306    Lab Status: Final result Specimen: Urine, Catheter Updated: 02/11/21 1234     Urine Culture Yeast isolated     Comment: No further workup.             Medication Review:       Schedule Meds      Infusion Meds  No current facility-administered medications for this encounter.       PRN Meds          Assessment/Plan       Antimicrobial Therapy   1.  Teflaro     day  2.       day  3.      Day  4.      Day  5.      Day    Assessment     Bilateral lower extremities lymphedema with erythema of the right leg consistent with cellulitis.  Erythema had improved     Acute kidney injury     Leukocytosis-likely related to severe cellulitis.  Significantly improved    Congestive heart failure     History of pacemaker implantation    Hypothermia-appears to have resolved    Candiduria.  Patient s/p replacement of Ayon catheter on February 10, 2021 and repeat urinalysis was positive for candiduria    Significant elevation of alkaline phosphatase.  GI service is concerned about medication induced such as doxycycline.  Fluconazole might be responsible option  -Trending down     Plan     Continue to monitor off  antimicrobial therapy  Continue supportive care  Okay to go to rehab facility at any time        Harper Darling, CHRISTIANO  02/18/21  21:35 EST      Note is dictated utilizing voice recognition software/Dragon

## 2021-02-22 ENCOUNTER — TELEPHONE (OUTPATIENT)
Dept: ONCOLOGY | Facility: CLINIC | Age: 76
End: 2021-02-22

## 2021-02-22 NOTE — TELEPHONE ENCOUNTER
----- Message from CHRISTIANO Rosales sent at 2/19/2021  4:00 PM EST -----  Regarding: Hospital Follow-up  Patient was discharged from the hospital 2/18/2021.  Please schedule hospital follow-up appointment with Dr. Nathan in 1 to 2 weeks.  Thank you.    Electronically signed by CHRISTIANO Lomeli, 02/19/21, 4:01 PM EST.

## 2021-02-24 ENCOUNTER — TELEPHONE (OUTPATIENT)
Dept: ONCOLOGY | Facility: CLINIC | Age: 76
End: 2021-02-24

## 2021-03-16 ENCOUNTER — OFFICE VISIT (OUTPATIENT)
Dept: CARDIOLOGY | Facility: CLINIC | Age: 76
End: 2021-03-16

## 2021-03-16 VITALS
HEIGHT: 62 IN | BODY MASS INDEX: 29.81 KG/M2 | HEART RATE: 63 BPM | OXYGEN SATURATION: 100 % | SYSTOLIC BLOOD PRESSURE: 144 MMHG | WEIGHT: 162 LBS | DIASTOLIC BLOOD PRESSURE: 76 MMHG

## 2021-03-16 DIAGNOSIS — I50.22 CHRONIC SYSTOLIC CONGESTIVE HEART FAILURE (HCC): Primary | ICD-10-CM

## 2021-03-16 DIAGNOSIS — Z95.810 AUTOMATIC IMPLANTABLE CARDIAC DEFIBRILLATOR IN SITU: ICD-10-CM

## 2021-03-16 DIAGNOSIS — I10 HYPERTENSION, BENIGN: ICD-10-CM

## 2021-03-16 DIAGNOSIS — I48.19 OTHER PERSISTENT ATRIAL FIBRILLATION (HCC): ICD-10-CM

## 2021-03-16 DIAGNOSIS — I42.0 DILATED CARDIOMYOPATHY (HCC): ICD-10-CM

## 2021-03-16 PROCEDURE — 99214 OFFICE O/P EST MOD 30 MIN: CPT | Performed by: INTERNAL MEDICINE

## 2021-03-16 RX ORDER — TRAMADOL HYDROCHLORIDE 50 MG/1
50 TABLET ORAL EVERY 6 HOURS PRN
Status: ON HOLD | COMMUNITY
End: 2021-04-29 | Stop reason: SDUPTHER

## 2021-03-16 NOTE — PROGRESS NOTES
"    Subjective:     Encounter Date:03/16/2021      Patient ID: Emperatriz Childs is a 75 y.o. female.    Chief Complaint:  History of Present Illness seven 5-year-old white female with history of congestive heart failure history of ICD placement history of non-ischemic cardiomyopathy atrial fibrillation hypertension to my office for follow-up.  Patient is currently stable without any symptoms of chest pain but has some shortness of breath with exertion.  No complaints any PND orthopnea.  No palpitation dizziness syncope.  She has significant swelling of the feet because of her lymphedema.  She is taking her medicines regularly.    The following portions of the patient's history were reviewed and updated as appropriate: allergies, current medications, past family history, past medical history, past social history, past surgical history and problem list.  Past Medical History:   Diagnosis Date   • CHF (congestive heart failure) (CMS/[facility])    • History of transfusion    • Hypertension    • Lymphedema of leg    • Myocardial infarction (CMS/[facility])      Past Surgical History:   Procedure Laterality Date   • CARDIAC ELECTROPHYSIOLOGY PROCEDURE N/A 1/31/2020    Procedure: ICD battery change;  Surgeon: Dionna Laird MD;  Location: [facility] CATH INVASIVE LOCATION;  Service: Cardiovascular   • CARDIAC ELECTROPHYSIOLOGY PROCEDURE N/A 1/31/2020    Procedure: Temporary Pacemaker;  Surgeon: Dionna Laird MD;  Location: [facility] CATH INVASIVE LOCATION;  Service: Cardiovascular   • CARDIAC ELECTROPHYSIOLOGY PROCEDURE N/A 1/31/2020    Procedure: Pocket Revision;  Surgeon: Dionna Laird MD;  Location: [facility] CATH INVASIVE LOCATION;  Service: Cardiovascular   • EYE SURGERY     • PACEMAKER IMPLANTATION       /76 (BP Location: Left arm, Patient Position: Sitting)   Pulse 63   Ht 157.5 cm (62\")   Wt 73.5 kg (162 lb)   SpO2 100%   BMI 29.63 kg/m²   Family History   Family history unknown: Yes   Current outpatient and discharge " medications have been reconciled for the patient.  Reviewed by: Vinod Root MD      Current Outpatient Medications:   •  aspirin 81 MG chewable tablet, Chew 81 mg Daily., Disp: , Rfl:   •  bumetanide (BUMEX) 1 MG tablet, Take 1 mg by mouth 2 (Two) Times a Day. Morning and noon, Disp: , Rfl: 1  •  cetirizine (zyrTEC) 10 MG tablet, Take 10 mg by mouth Daily As Needed for Allergies (at bedtime for itching)., Disp: , Rfl:   •  digoxin (LANOXIN) 125 MCG tablet, Take 125 mcg by mouth Daily., Disp: , Rfl:   •  ezetimibe (ZETIA) 10 MG tablet, Take 10 mg by mouth Daily., Disp: , Rfl:   •  febuxostat (ULORIC) 40 MG tablet, Take 40 mg by mouth 2 (Two) Times a Day., Disp: , Rfl:   •  gabapentin (NEURONTIN) 100 MG capsule, Take 100 mg by mouth 3 (Three) Times a Day., Disp: , Rfl:   •  lactulose (CHRONULAC) 10 GM/15ML solution, Take 30 mL by mouth 2 (Two) Times a Day., Disp:  , Rfl:   •  midodrine (PROAMATINE) 5 MG tablet, Take 1 tablet by mouth 3 (Three) Times a Day Before Meals., Disp:  , Rfl:   •  traMADol (ULTRAM) 50 MG tablet, Take 50 mg by mouth Every 6 (Six) Hours As Needed for Moderate Pain ., Disp: , Rfl:   •  ursodiol (ACTIGALL) 300 MG capsule, Take 1 capsule by mouth 2 (Two) Times a Day., Disp:  , Rfl:   Allergies   Allergen Reactions   • Allopurinol Unknown - High Severity   • Clindamycin Hcl Unknown - High Severity   • Codeine Unknown - High Severity   • Furosemide Unknown - High Severity   • Hydrochlorothiazide Unknown - High Severity   • Naproxen Unknown - High Severity   • Sulfa Antibiotics Unknown - High Severity     Social History     Socioeconomic History   • Marital status:      Spouse name: Not on file   • Number of children: Not on file   • Years of education: Not on file   • Highest education level: Not on file   Tobacco Use   • Smoking status: Never Smoker   • Smokeless tobacco: Never Used   Vaping Use   • Vaping Use: Never assessed   Substance and Sexual Activity   • Alcohol use: Never   • Drug  use: Never   • Sexual activity: Defer     Review of Systems   Constitutional: Positive for malaise/fatigue. Negative for fever.   Cardiovascular: Positive for leg swelling. Negative for chest pain, dyspnea on exertion and palpitations.   Respiratory: Positive for shortness of breath. Negative for cough.    Skin: Negative for rash.   Gastrointestinal: Negative for abdominal pain, nausea and vomiting.   Neurological: Positive for dizziness. Negative for focal weakness and headaches.   All other systems reviewed and are negative.             Objective:     Constitutional:       Appearance: Well-developed.   Eyes:      General: No scleral icterus.     Conjunctiva/sclera: Conjunctivae normal.   HENT:      Head: Normocephalic and atraumatic.   Neck:      Vascular: No carotid bruit or JVD.   Pulmonary:      Effort: Pulmonary effort is normal.      Breath sounds: Normal breath sounds. No wheezing. No rales.   Cardiovascular:      Normal rate. Regular rhythm.   Pulses:     Intact distal pulses.   Edema:     Peripheral edema present.  Abdominal:      General: Bowel sounds are normal.      Palpations: Abdomen is soft.   Musculoskeletal:      Cervical back: Normal range of motion and neck supple. Skin:     General: Skin is warm and dry.      Findings: No rash.   Neurological:      Mental Status: Alert.       Procedures    Lab Review:         MDM  1.  History of heart failure  Patient has LV systolic dysfunction is currently stable on medications  2.  Nonischemic cardiomyopathy stress post ICD placement  Patient is currently stable on medications and ICD is working very well without any discharges  3.  Atrial fibrillation  Patient has atrial fibrillation is on warfarin keep his INR between 2.0-3.0  4.  Hypertension  Patient blood pressures currently stable on medications

## 2021-04-16 ENCOUNTER — HOSPITAL ENCOUNTER (INPATIENT)
Facility: HOSPITAL | Age: 76
LOS: 13 days | Discharge: NURSING, REHAB OR LONG-TERM CARE | End: 2021-04-29
Attending: INTERNAL MEDICINE | Admitting: INTERNAL MEDICINE

## 2021-04-16 DIAGNOSIS — M54.50 CHRONIC BILATERAL LOW BACK PAIN, UNSPECIFIED WHETHER SCIATICA PRESENT: ICD-10-CM

## 2021-04-16 DIAGNOSIS — G89.29 CHRONIC BILATERAL LOW BACK PAIN, UNSPECIFIED WHETHER SCIATICA PRESENT: ICD-10-CM

## 2021-04-16 DIAGNOSIS — N17.9 AKI (ACUTE KIDNEY INJURY) (HCC): Primary | ICD-10-CM

## 2021-04-16 LAB
ALBUMIN SERPL-MCNC: 2.7 G/DL (ref 3.5–5.2)
ALBUMIN/GLOB SERPL: 0.7 G/DL
ALP SERPL-CCNC: 177 U/L (ref 39–117)
ALT SERPL W P-5'-P-CCNC: 6 U/L (ref 1–33)
ANION GAP SERPL CALCULATED.3IONS-SCNC: 20 MMOL/L (ref 5–15)
AST SERPL-CCNC: 22 U/L (ref 1–32)
BILIRUB SERPL-MCNC: 1 MG/DL (ref 0–1.2)
BUN SERPL-MCNC: 106 MG/DL (ref 8–23)
BUN/CREAT SERPL: 24.3 (ref 7–25)
CALCIUM SPEC-SCNC: 7.6 MG/DL (ref 8.6–10.5)
CHLORIDE SERPL-SCNC: 94 MMOL/L (ref 98–107)
CO2 SERPL-SCNC: 15 MMOL/L (ref 22–29)
CREAT SERPL-MCNC: 4.37 MG/DL (ref 0.57–1)
DEPRECATED RDW RBC AUTO: 54.7 FL (ref 37–54)
ERYTHROCYTE [DISTWIDTH] IN BLOOD BY AUTOMATED COUNT: 16 % (ref 12.3–15.4)
GFR SERPL CREATININE-BSD FRML MDRD: 10 ML/MIN/1.73
GFR SERPL CREATININE-BSD FRML MDRD: ABNORMAL ML/MIN/{1.73_M2}
GLOBULIN UR ELPH-MCNC: 4.1 GM/DL
GLUCOSE SERPL-MCNC: 66 MG/DL (ref 65–99)
HCT VFR BLD AUTO: 31.8 % (ref 34–46.6)
HGB BLD-MCNC: 10.3 G/DL (ref 12–15.9)
MCH RBC QN AUTO: 31.7 PG (ref 26.6–33)
MCHC RBC AUTO-ENTMCNC: 32.4 G/DL (ref 31.5–35.7)
MCV RBC AUTO: 97.6 FL (ref 79–97)
NT-PROBNP SERPL-MCNC: ABNORMAL PG/ML (ref 0–1800)
PLATELET # BLD AUTO: 165 10*3/MM3 (ref 140–450)
PMV BLD AUTO: 8.2 FL (ref 6–12)
POTASSIUM SERPL-SCNC: 4.3 MMOL/L (ref 3.5–5.2)
PROT SERPL-MCNC: 6.8 G/DL (ref 6–8.5)
RBC # BLD AUTO: 3.26 10*6/MM3 (ref 3.77–5.28)
SODIUM SERPL-SCNC: 129 MMOL/L (ref 136–145)
WBC # BLD AUTO: 21.6 10*3/MM3 (ref 3.4–10.8)

## 2021-04-16 PROCEDURE — 25010000002 HEPARIN (PORCINE) PER 1000 UNITS: Performed by: INTERNAL MEDICINE

## 2021-04-16 PROCEDURE — 99222 1ST HOSP IP/OBS MODERATE 55: CPT | Performed by: INTERNAL MEDICINE

## 2021-04-16 PROCEDURE — 83880 ASSAY OF NATRIURETIC PEPTIDE: CPT | Performed by: INTERNAL MEDICINE

## 2021-04-16 PROCEDURE — 80053 COMPREHEN METABOLIC PANEL: CPT | Performed by: INTERNAL MEDICINE

## 2021-04-16 PROCEDURE — 85027 COMPLETE CBC AUTOMATED: CPT | Performed by: INTERNAL MEDICINE

## 2021-04-16 RX ORDER — BUMETANIDE 1 MG/1
1 TABLET ORAL 2 TIMES DAILY
Status: DISCONTINUED | OUTPATIENT
Start: 2021-04-16 | End: 2021-04-16

## 2021-04-16 RX ORDER — ONDANSETRON 2 MG/ML
4 INJECTION INTRAMUSCULAR; INTRAVENOUS EVERY 6 HOURS PRN
Status: DISCONTINUED | OUTPATIENT
Start: 2021-04-16 | End: 2021-04-29 | Stop reason: HOSPADM

## 2021-04-16 RX ORDER — NICOTINE 21 MG/24HR
1 PATCH, TRANSDERMAL 24 HOURS TRANSDERMAL EVERY 24 HOURS
Status: DISCONTINUED | OUTPATIENT
Start: 2021-04-16 | End: 2021-04-18

## 2021-04-16 RX ORDER — BISACODYL 10 MG
10 SUPPOSITORY, RECTAL RECTAL DAILY PRN
Status: DISCONTINUED | OUTPATIENT
Start: 2021-04-16 | End: 2021-04-29 | Stop reason: HOSPADM

## 2021-04-16 RX ORDER — POLYETHYLENE GLYCOL 3350 17 G/17G
17 POWDER, FOR SOLUTION ORAL DAILY PRN
Status: DISCONTINUED | OUTPATIENT
Start: 2021-04-16 | End: 2021-04-29 | Stop reason: HOSPADM

## 2021-04-16 RX ORDER — CETIRIZINE HYDROCHLORIDE 10 MG/1
10 TABLET ORAL DAILY PRN
Status: DISCONTINUED | OUTPATIENT
Start: 2021-04-16 | End: 2021-04-29 | Stop reason: HOSPADM

## 2021-04-16 RX ORDER — ACETAMINOPHEN 325 MG/1
325 TABLET ORAL EVERY 4 HOURS PRN
Status: DISCONTINUED | OUTPATIENT
Start: 2021-04-16 | End: 2021-04-16

## 2021-04-16 RX ORDER — MIDODRINE HYDROCHLORIDE 5 MG/1
5 TABLET ORAL
Status: DISCONTINUED | OUTPATIENT
Start: 2021-04-16 | End: 2021-04-18

## 2021-04-16 RX ORDER — AMOXICILLIN 250 MG
2 CAPSULE ORAL 2 TIMES DAILY
Status: DISCONTINUED | OUTPATIENT
Start: 2021-04-16 | End: 2021-04-29 | Stop reason: HOSPADM

## 2021-04-16 RX ORDER — ASPIRIN 81 MG/1
81 TABLET, CHEWABLE ORAL DAILY
Status: DISCONTINUED | OUTPATIENT
Start: 2021-04-16 | End: 2021-04-29 | Stop reason: HOSPADM

## 2021-04-16 RX ORDER — COLCHICINE 0.6 MG/1
0.6 TABLET ORAL DAILY
COMMUNITY
End: 2021-04-29 | Stop reason: HOSPADM

## 2021-04-16 RX ORDER — CHOLECALCIFEROL (VITAMIN D3) 125 MCG
5 CAPSULE ORAL NIGHTLY PRN
Status: DISCONTINUED | OUTPATIENT
Start: 2021-04-16 | End: 2021-04-29 | Stop reason: HOSPADM

## 2021-04-16 RX ORDER — SODIUM CHLORIDE 0.9 % (FLUSH) 0.9 %
10 SYRINGE (ML) INJECTION AS NEEDED
Status: DISCONTINUED | OUTPATIENT
Start: 2021-04-16 | End: 2021-04-29 | Stop reason: HOSPADM

## 2021-04-16 RX ORDER — SODIUM CHLORIDE 0.9 % (FLUSH) 0.9 %
10 SYRINGE (ML) INJECTION EVERY 12 HOURS SCHEDULED
Status: DISCONTINUED | OUTPATIENT
Start: 2021-04-16 | End: 2021-04-29 | Stop reason: HOSPADM

## 2021-04-16 RX ORDER — BISACODYL 5 MG/1
5 TABLET, DELAYED RELEASE ORAL DAILY PRN
Status: DISCONTINUED | OUTPATIENT
Start: 2021-04-16 | End: 2021-04-29 | Stop reason: HOSPADM

## 2021-04-16 RX ORDER — LACTULOSE 10 G/15ML
20 SOLUTION ORAL 2 TIMES DAILY
Status: DISCONTINUED | OUTPATIENT
Start: 2021-04-16 | End: 2021-04-29 | Stop reason: HOSPADM

## 2021-04-16 RX ORDER — TRAMADOL HYDROCHLORIDE 50 MG/1
50 TABLET ORAL EVERY 6 HOURS PRN
Status: DISCONTINUED | OUTPATIENT
Start: 2021-04-16 | End: 2021-04-29 | Stop reason: HOSPADM

## 2021-04-16 RX ORDER — NITROGLYCERIN 0.4 MG/1
0.4 TABLET SUBLINGUAL
Status: DISCONTINUED | OUTPATIENT
Start: 2021-04-16 | End: 2021-04-29 | Stop reason: HOSPADM

## 2021-04-16 RX ORDER — HEPARIN SODIUM 5000 [USP'U]/ML
5000 INJECTION, SOLUTION INTRAVENOUS; SUBCUTANEOUS EVERY 12 HOURS SCHEDULED
Status: DISCONTINUED | OUTPATIENT
Start: 2021-04-16 | End: 2021-04-22

## 2021-04-16 RX ORDER — ACETAMINOPHEN 650 MG/1
650 SUPPOSITORY RECTAL EVERY 4 HOURS PRN
Status: DISCONTINUED | OUTPATIENT
Start: 2021-04-16 | End: 2021-04-16

## 2021-04-16 RX ORDER — FEBUXOSTAT 40 MG/1
40 TABLET, FILM COATED ORAL 2 TIMES DAILY
Status: DISCONTINUED | OUTPATIENT
Start: 2021-04-16 | End: 2021-04-29 | Stop reason: HOSPADM

## 2021-04-16 RX ORDER — ACETAMINOPHEN 160 MG/5ML
325 SOLUTION ORAL EVERY 4 HOURS PRN
Status: DISCONTINUED | OUTPATIENT
Start: 2021-04-16 | End: 2021-04-16

## 2021-04-16 RX ORDER — SODIUM CHLORIDE 9 MG/ML
50 INJECTION, SOLUTION INTRAVENOUS CONTINUOUS
Status: DISCONTINUED | OUTPATIENT
Start: 2021-04-16 | End: 2021-04-17

## 2021-04-16 RX ORDER — GABAPENTIN 100 MG/1
100 CAPSULE ORAL 3 TIMES DAILY
Status: DISCONTINUED | OUTPATIENT
Start: 2021-04-16 | End: 2021-04-18

## 2021-04-16 RX ORDER — FAMOTIDINE 20 MG/1
40 TABLET, FILM COATED ORAL DAILY
Status: DISCONTINUED | OUTPATIENT
Start: 2021-04-17 | End: 2021-04-18

## 2021-04-16 RX ORDER — DIGOXIN 125 MCG
125 TABLET ORAL
Status: DISCONTINUED | OUTPATIENT
Start: 2021-04-17 | End: 2021-04-29 | Stop reason: HOSPADM

## 2021-04-16 RX ORDER — URSODIOL 300 MG/1
300 CAPSULE ORAL 2 TIMES DAILY
Status: DISCONTINUED | OUTPATIENT
Start: 2021-04-16 | End: 2021-04-29 | Stop reason: HOSPADM

## 2021-04-16 RX ORDER — ONDANSETRON 4 MG/1
4 TABLET, FILM COATED ORAL EVERY 6 HOURS PRN
Status: DISCONTINUED | OUTPATIENT
Start: 2021-04-16 | End: 2021-04-29 | Stop reason: HOSPADM

## 2021-04-16 RX ADMIN — FEBUXOSTAT 40 MG: 40 TABLET ORAL at 21:00

## 2021-04-16 RX ADMIN — LACTULOSE 20 G: 20 SOLUTION ORAL at 21:00

## 2021-04-16 RX ADMIN — GABAPENTIN 100 MG: 100 CAPSULE ORAL at 21:00

## 2021-04-16 RX ADMIN — HEPARIN SODIUM 5000 UNITS: 5000 INJECTION INTRAVENOUS; SUBCUTANEOUS at 21:01

## 2021-04-16 RX ADMIN — ASPIRIN 81 MG CHEWABLE TABLET 81 MG: 81 TABLET CHEWABLE at 18:35

## 2021-04-16 RX ADMIN — DOCUSATE SODIUM 50 MG AND SENNOSIDES 8.6 MG 2 TABLET: 8.6; 5 TABLET, FILM COATED ORAL at 21:00

## 2021-04-16 RX ADMIN — URSODIOL 300 MG: 300 CAPSULE ORAL at 21:00

## 2021-04-16 RX ADMIN — SODIUM CHLORIDE 50 ML/HR: 9 INJECTION, SOLUTION INTRAVENOUS at 21:59

## 2021-04-16 RX ADMIN — MIDODRINE HYDROCHLORIDE 5 MG: 5 TABLET ORAL at 18:35

## 2021-04-17 ENCOUNTER — APPOINTMENT (OUTPATIENT)
Dept: ULTRASOUND IMAGING | Facility: HOSPITAL | Age: 76
End: 2021-04-17

## 2021-04-17 ENCOUNTER — APPOINTMENT (OUTPATIENT)
Dept: GENERAL RADIOLOGY | Facility: HOSPITAL | Age: 76
End: 2021-04-17

## 2021-04-17 ENCOUNTER — APPOINTMENT (OUTPATIENT)
Dept: CT IMAGING | Facility: HOSPITAL | Age: 76
End: 2021-04-17

## 2021-04-17 LAB
ALBUMIN SERPL-MCNC: 2.2 G/DL (ref 3.5–5.2)
ALBUMIN/GLOB SERPL: 0.6 G/DL
ALP SERPL-CCNC: 115 U/L (ref 39–117)
ALT SERPL W P-5'-P-CCNC: 5 U/L (ref 1–33)
ANION GAP SERPL CALCULATED.3IONS-SCNC: 19 MMOL/L (ref 5–15)
ANION GAP SERPL CALCULATED.3IONS-SCNC: 19 MMOL/L (ref 5–15)
ANISOCYTOSIS BLD QL: ABNORMAL
APTT PPP: 39.4 SECONDS (ref 24–31)
AST SERPL-CCNC: 19 U/L (ref 1–32)
BILIRUB SERPL-MCNC: 0.8 MG/DL (ref 0–1.2)
BUN SERPL-MCNC: 112 MG/DL (ref 8–23)
BUN SERPL-MCNC: 124 MG/DL (ref 8–23)
BUN/CREAT SERPL: 23.2 (ref 7–25)
BUN/CREAT SERPL: 23.9 (ref 7–25)
CALCIUM SPEC-SCNC: 6.6 MG/DL (ref 8.6–10.5)
CALCIUM SPEC-SCNC: 6.9 MG/DL (ref 8.6–10.5)
CHLORIDE SERPL-SCNC: 96 MMOL/L (ref 98–107)
CHLORIDE SERPL-SCNC: 99 MMOL/L (ref 98–107)
CLUMPED PLATELETS: PRESENT
CO2 SERPL-SCNC: 13 MMOL/L (ref 22–29)
CO2 SERPL-SCNC: 15 MMOL/L (ref 22–29)
CREAT SERPL-MCNC: 4.82 MG/DL (ref 0.57–1)
CREAT SERPL-MCNC: 5.18 MG/DL (ref 0.57–1)
CRP SERPL-MCNC: 28.91 MG/DL (ref 0–0.5)
D-LACTATE SERPL-SCNC: 2.3 MMOL/L (ref 0.5–2)
D-LACTATE SERPL-SCNC: 2.8 MMOL/L (ref 0.5–2)
D-LACTATE SERPL-SCNC: 2.9 MMOL/L (ref 0.5–2)
DEPRECATED RDW RBC AUTO: 51.6 FL (ref 37–54)
ERYTHROCYTE [DISTWIDTH] IN BLOOD BY AUTOMATED COUNT: 15.5 % (ref 12.3–15.4)
GFR SERPL CREATININE-BSD FRML MDRD: 8 ML/MIN/1.73
GFR SERPL CREATININE-BSD FRML MDRD: 9 ML/MIN/1.73
GFR SERPL CREATININE-BSD FRML MDRD: ABNORMAL ML/MIN/{1.73_M2}
GFR SERPL CREATININE-BSD FRML MDRD: ABNORMAL ML/MIN/{1.73_M2}
GLOBULIN UR ELPH-MCNC: 3.7 GM/DL
GLUCOSE BLDC GLUCOMTR-MCNC: 118 MG/DL (ref 70–105)
GLUCOSE BLDC GLUCOMTR-MCNC: 85 MG/DL (ref 70–105)
GLUCOSE SERPL-MCNC: 63 MG/DL (ref 65–99)
GLUCOSE SERPL-MCNC: 94 MG/DL (ref 65–99)
HCT VFR BLD AUTO: 27 % (ref 34–46.6)
HGB BLD-MCNC: 8.9 G/DL (ref 12–15.9)
INR PPP: 1.21 (ref 0.93–1.1)
LYMPHOCYTES # BLD MANUAL: 0 10*3/MM3 (ref 0.7–3.1)
LYMPHOCYTES NFR BLD MANUAL: 0 % (ref 19.6–45.3)
MCH RBC QN AUTO: 31 PG (ref 26.6–33)
MCHC RBC AUTO-ENTMCNC: 33 G/DL (ref 31.5–35.7)
MCV RBC AUTO: 94 FL (ref 79–97)
METAMYELOCYTES NFR BLD MANUAL: 3 % (ref 0–0)
NEUTROPHILS # BLD AUTO: 18.04 10*3/MM3 (ref 1.7–7)
NEUTROPHILS NFR BLD MANUAL: 71 % (ref 42.7–76)
NEUTS BAND NFR BLD MANUAL: 26 % (ref 0–5)
PLATELET # BLD AUTO: 132 10*3/MM3 (ref 140–450)
PMV BLD AUTO: 8.7 FL (ref 6–12)
POIKILOCYTOSIS BLD QL SMEAR: ABNORMAL
POTASSIUM SERPL-SCNC: 4.6 MMOL/L (ref 3.5–5.2)
POTASSIUM SERPL-SCNC: 4.9 MMOL/L (ref 3.5–5.2)
PROT SERPL-MCNC: 5.9 G/DL (ref 6–8.5)
PROTHROMBIN TIME: 13.2 SECONDS (ref 9.6–11.7)
RBC # BLD AUTO: 2.88 10*6/MM3 (ref 3.77–5.28)
SARS-COV-2 ORF1AB RESP QL NAA+PROBE: NOT DETECTED
SCAN SLIDE: NORMAL
SODIUM SERPL-SCNC: 130 MMOL/L (ref 136–145)
SODIUM SERPL-SCNC: 131 MMOL/L (ref 136–145)
TOXIC GRANULATION: ABNORMAL
WBC # BLD AUTO: 18.6 10*3/MM3 (ref 3.4–10.8)

## 2021-04-17 PROCEDURE — 82784 ASSAY IGA/IGD/IGG/IGM EACH: CPT | Performed by: INTERNAL MEDICINE

## 2021-04-17 PROCEDURE — 71045 X-RAY EXAM CHEST 1 VIEW: CPT

## 2021-04-17 PROCEDURE — U0005 INFEC AGEN DETEC AMPLI PROBE: HCPCS | Performed by: NURSE PRACTITIONER

## 2021-04-17 PROCEDURE — 86140 C-REACTIVE PROTEIN: CPT | Performed by: NURSE PRACTITIONER

## 2021-04-17 PROCEDURE — 85730 THROMBOPLASTIN TIME PARTIAL: CPT | Performed by: NURSE PRACTITIONER

## 2021-04-17 PROCEDURE — 85610 PROTHROMBIN TIME: CPT | Performed by: NURSE PRACTITIONER

## 2021-04-17 PROCEDURE — 85025 COMPLETE CBC W/AUTO DIFF WBC: CPT | Performed by: INTERNAL MEDICINE

## 2021-04-17 PROCEDURE — 83605 ASSAY OF LACTIC ACID: CPT | Performed by: INTERNAL MEDICINE

## 2021-04-17 PROCEDURE — 97162 PT EVAL MOD COMPLEX 30 MIN: CPT

## 2021-04-17 PROCEDURE — 99232 SBSQ HOSP IP/OBS MODERATE 35: CPT | Performed by: INTERNAL MEDICINE

## 2021-04-17 PROCEDURE — 87040 BLOOD CULTURE FOR BACTERIA: CPT | Performed by: INTERNAL MEDICINE

## 2021-04-17 PROCEDURE — 84145 PROCALCITONIN (PCT): CPT | Performed by: NURSE PRACTITIONER

## 2021-04-17 PROCEDURE — 25010000002 HEPARIN (PORCINE) PER 1000 UNITS: Performed by: INTERNAL MEDICINE

## 2021-04-17 PROCEDURE — 80053 COMPREHEN METABOLIC PANEL: CPT | Performed by: INTERNAL MEDICINE

## 2021-04-17 PROCEDURE — 74176 CT ABD & PELVIS W/O CONTRAST: CPT

## 2021-04-17 PROCEDURE — 84165 PROTEIN E-PHORESIS SERUM: CPT | Performed by: INTERNAL MEDICINE

## 2021-04-17 PROCEDURE — 76775 US EXAM ABDO BACK WALL LIM: CPT

## 2021-04-17 PROCEDURE — 85007 BL SMEAR W/DIFF WBC COUNT: CPT | Performed by: INTERNAL MEDICINE

## 2021-04-17 PROCEDURE — U0004 COV-19 TEST NON-CDC HGH THRU: HCPCS | Performed by: NURSE PRACTITIONER

## 2021-04-17 PROCEDURE — 83605 ASSAY OF LACTIC ACID: CPT | Performed by: NURSE PRACTITIONER

## 2021-04-17 PROCEDURE — 25010000002 VANCOMYCIN 10 G RECONSTITUTED SOLUTION: Performed by: INTERNAL MEDICINE

## 2021-04-17 PROCEDURE — 87150 DNA/RNA AMPLIFIED PROBE: CPT | Performed by: INTERNAL MEDICINE

## 2021-04-17 PROCEDURE — 87077 CULTURE AEROBIC IDENTIFY: CPT | Performed by: INTERNAL MEDICINE

## 2021-04-17 PROCEDURE — 82962 GLUCOSE BLOOD TEST: CPT

## 2021-04-17 PROCEDURE — 87186 SC STD MICRODIL/AGAR DIL: CPT | Performed by: INTERNAL MEDICINE

## 2021-04-17 PROCEDURE — 25010000002 CEFEPIME PER 500 MG: Performed by: INTERNAL MEDICINE

## 2021-04-17 PROCEDURE — 86334 IMMUNOFIX E-PHORESIS SERUM: CPT | Performed by: INTERNAL MEDICINE

## 2021-04-17 RX ORDER — DEXTROSE MONOHYDRATE 25 G/50ML
25 INJECTION, SOLUTION INTRAVENOUS
Status: DISCONTINUED | OUTPATIENT
Start: 2021-04-17 | End: 2021-04-29 | Stop reason: HOSPADM

## 2021-04-17 RX ORDER — SODIUM CHLORIDE 450 MG/100ML
100 INJECTION, SOLUTION INTRAVENOUS CONTINUOUS
Status: DISCONTINUED | OUTPATIENT
Start: 2021-04-17 | End: 2021-04-17

## 2021-04-17 RX ORDER — NICOTINE POLACRILEX 4 MG
15 LOZENGE BUCCAL
Status: DISCONTINUED | OUTPATIENT
Start: 2021-04-17 | End: 2021-04-29 | Stop reason: HOSPADM

## 2021-04-17 RX ORDER — DEXTROSE MONOHYDRATE 25 G/50ML
INJECTION, SOLUTION INTRAVENOUS
Status: COMPLETED
Start: 2021-04-17 | End: 2021-04-17

## 2021-04-17 RX ADMIN — FEBUXOSTAT 40 MG: 40 TABLET ORAL at 08:47

## 2021-04-17 RX ADMIN — HEPARIN SODIUM 5000 UNITS: 5000 INJECTION INTRAVENOUS; SUBCUTANEOUS at 19:48

## 2021-04-17 RX ADMIN — DEXTROSE MONOHYDRATE 50 ML: 25 INJECTION, SOLUTION INTRAVENOUS at 06:19

## 2021-04-17 RX ADMIN — URSODIOL 300 MG: 300 CAPSULE ORAL at 20:02

## 2021-04-17 RX ADMIN — HEPARIN SODIUM 5000 UNITS: 5000 INJECTION INTRAVENOUS; SUBCUTANEOUS at 08:47

## 2021-04-17 RX ADMIN — LACTULOSE 20 G: 20 SOLUTION ORAL at 20:02

## 2021-04-17 RX ADMIN — HEPARIN SODIUM 5000 UNITS: 5000 INJECTION INTRAVENOUS; SUBCUTANEOUS at 22:12

## 2021-04-17 RX ADMIN — SODIUM BICARBONATE 75 MEQ: 84 INJECTION, SOLUTION INTRAVENOUS at 17:29

## 2021-04-17 RX ADMIN — DOCUSATE SODIUM 50 MG AND SENNOSIDES 8.6 MG 2 TABLET: 8.6; 5 TABLET, FILM COATED ORAL at 08:47

## 2021-04-17 RX ADMIN — MIDODRINE HYDROCHLORIDE 5 MG: 5 TABLET ORAL at 12:49

## 2021-04-17 RX ADMIN — DOCUSATE SODIUM 50 MG AND SENNOSIDES 8.6 MG 2 TABLET: 8.6; 5 TABLET, FILM COATED ORAL at 20:02

## 2021-04-17 RX ADMIN — FAMOTIDINE 40 MG: 20 TABLET, FILM COATED ORAL at 08:47

## 2021-04-17 RX ADMIN — ASPIRIN 81 MG CHEWABLE TABLET 81 MG: 81 TABLET CHEWABLE at 08:47

## 2021-04-17 RX ADMIN — CEFEPIME HYDROCHLORIDE 2 G: 2 INJECTION, POWDER, FOR SOLUTION INTRAVENOUS at 12:49

## 2021-04-17 RX ADMIN — FEBUXOSTAT 40 MG: 40 TABLET ORAL at 20:02

## 2021-04-17 RX ADMIN — VANCOMYCIN HYDROCHLORIDE 1250 MG: 10 INJECTION, POWDER, LYOPHILIZED, FOR SOLUTION INTRAVENOUS at 17:55

## 2021-04-17 RX ADMIN — GABAPENTIN 100 MG: 100 CAPSULE ORAL at 08:47

## 2021-04-17 RX ADMIN — GABAPENTIN 100 MG: 100 CAPSULE ORAL at 17:55

## 2021-04-17 RX ADMIN — URSODIOL 300 MG: 300 CAPSULE ORAL at 08:47

## 2021-04-17 RX ADMIN — MIDODRINE HYDROCHLORIDE 5 MG: 5 TABLET ORAL at 08:47

## 2021-04-17 RX ADMIN — Medication 10 ML: at 20:02

## 2021-04-17 RX ADMIN — MIDODRINE HYDROCHLORIDE 5 MG: 5 TABLET ORAL at 17:55

## 2021-04-17 RX ADMIN — GABAPENTIN 100 MG: 100 CAPSULE ORAL at 20:02

## 2021-04-17 RX ADMIN — LACTULOSE 20 G: 20 SOLUTION ORAL at 08:48

## 2021-04-17 RX ADMIN — DEXTROSE MONOHYDRATE 50 ML: 500 INJECTION PARENTERAL at 06:19

## 2021-04-17 RX ADMIN — Medication 10 ML: at 08:47

## 2021-04-18 ENCOUNTER — APPOINTMENT (OUTPATIENT)
Dept: GENERAL RADIOLOGY | Facility: HOSPITAL | Age: 76
End: 2021-04-18

## 2021-04-18 ENCOUNTER — APPOINTMENT (OUTPATIENT)
Dept: CARDIOLOGY | Facility: HOSPITAL | Age: 76
End: 2021-04-18

## 2021-04-18 PROBLEM — R65.20 SEVERE SEPSIS (HCC): Status: ACTIVE | Noted: 2021-04-18

## 2021-04-18 PROBLEM — N20.0 LEFT RENAL STONE: Status: ACTIVE | Noted: 2021-04-18

## 2021-04-18 PROBLEM — J90 PLEURAL EFFUSION, BILATERAL: Status: ACTIVE | Noted: 2021-04-18

## 2021-04-18 PROBLEM — A41.9 SEVERE SEPSIS (HCC): Status: ACTIVE | Noted: 2021-04-18

## 2021-04-18 PROBLEM — G93.40 ACUTE ENCEPHALOPATHY: Status: ACTIVE | Noted: 2021-04-18

## 2021-04-18 PROBLEM — N18.9 ACUTE KIDNEY INJURY SUPERIMPOSED ON CHRONIC KIDNEY DISEASE (HCC): Status: ACTIVE | Noted: 2021-04-16

## 2021-04-18 LAB
ALBUMIN SERPL-MCNC: 1.9 G/DL (ref 3.5–5.2)
ALBUMIN/GLOB SERPL: 0.5 G/DL
ALP SERPL-CCNC: 129 U/L (ref 39–117)
ALT SERPL W P-5'-P-CCNC: 7 U/L (ref 1–33)
ANION GAP SERPL CALCULATED.3IONS-SCNC: 21 MMOL/L (ref 5–15)
ANISOCYTOSIS BLD QL: ABNORMAL
AST SERPL-CCNC: 20 U/L (ref 1–32)
BACTERIA BLD CULT: ABNORMAL
BACTERIA UR QL AUTO: ABNORMAL /HPF
BH CV ECHO MEAS - ACS: 1.9 CM
BH CV ECHO MEAS - AI DEC SLOPE: 182.5 CM/SEC^2
BH CV ECHO MEAS - AI DEC TIME: 1.9 SEC
BH CV ECHO MEAS - AI MAX PG: 47.5 MMHG
BH CV ECHO MEAS - AI MAX VEL: 344.8 CM/SEC
BH CV ECHO MEAS - AI P1/2T: 553.3 MSEC
BH CV ECHO MEAS - AO MAX PG (FULL): 11.6 MMHG
BH CV ECHO MEAS - AO MAX PG: 15.4 MMHG
BH CV ECHO MEAS - AO MEAN PG (FULL): 4.2 MMHG
BH CV ECHO MEAS - AO MEAN PG: 5.5 MMHG
BH CV ECHO MEAS - AO ROOT AREA: 6.9 CM^2
BH CV ECHO MEAS - AO ROOT DIAM: 3 CM
BH CV ECHO MEAS - AO V2 MAX: 196.3 CM/SEC
BH CV ECHO MEAS - AO V2 MEAN: 104.2 CM/SEC
BH CV ECHO MEAS - AO V2 VTI: 32.9 CM
BH CV ECHO MEAS - AORTIC HR: 59.3 BPM
BH CV ECHO MEAS - AORTIC R-R: 1 SEC
BH CV ECHO MEAS - ASC AORTA: 2.8 CM
BH CV ECHO MEAS - AVA(I,A): 1.8 CM^2
BH CV ECHO MEAS - AVA(I,D): 1.8 CM^2
BH CV ECHO MEAS - AVA(V,A): 1.7 CM^2
BH CV ECHO MEAS - AVA(V,D): 1.7 CM^2
BH CV ECHO MEAS - CO(AO): 13.4 L/MIN
BH CV ECHO MEAS - CO(LVOT): 3.5 L/MIN
BH CV ECHO MEAS - EDV(CUBED): 120.2 ML
BH CV ECHO MEAS - EDV(MOD-SP4): 63.2 ML
BH CV ECHO MEAS - EDV(TEICH): 114.7 ML
BH CV ECHO MEAS - EF(CUBED): 60.8 %
BH CV ECHO MEAS - EF(MOD-SP4): 49.2 %
BH CV ECHO MEAS - EF(TEICH): 52.2 %
BH CV ECHO MEAS - ESV(CUBED): 47.1 ML
BH CV ECHO MEAS - ESV(MOD-SP4): 32.1 ML
BH CV ECHO MEAS - ESV(TEICH): 54.9 ML
BH CV ECHO MEAS - FS: 26.8 %
BH CV ECHO MEAS - IVS/LVPW: 1.2
BH CV ECHO MEAS - IVSD: 1.1 CM
BH CV ECHO MEAS - LA DIMENSION(2D): 4.9 CM
BH CV ECHO MEAS - LA DIMENSION: 6.1 CM
BH CV ECHO MEAS - LA/AO: 2.1
BH CV ECHO MEAS - LV MASS(C)D: 194.2 GRAMS
BH CV ECHO MEAS - LV MAX PG: 3.8 MMHG
BH CV ECHO MEAS - LV MEAN PG: 1.4 MMHG
BH CV ECHO MEAS - LV V1 MAX: 97.3 CM/SEC
BH CV ECHO MEAS - LV V1 MEAN: 51.3 CM/SEC
BH CV ECHO MEAS - LV V1 VTI: 17.4 CM
BH CV ECHO MEAS - LVIDD: 4.9 CM
BH CV ECHO MEAS - LVIDS: 3.6 CM
BH CV ECHO MEAS - LVOT AREA: 3.4 CM^2
BH CV ECHO MEAS - LVOT DIAM: 2.1 CM
BH CV ECHO MEAS - LVPWD: 0.98 CM
BH CV ECHO MEAS - MR MAX PG: 53.7 MMHG
BH CV ECHO MEAS - MR MAX VEL: 366.5 CM/SEC
BH CV ECHO MEAS - MV A MAX VEL: 40.5 CM/SEC
BH CV ECHO MEAS - MV DEC SLOPE: 898.5 CM/SEC^2
BH CV ECHO MEAS - MV DEC TIME: 0.13 SEC
BH CV ECHO MEAS - MV E MAX VEL: 116.3 CM/SEC
BH CV ECHO MEAS - MV E/A: 2.9
BH CV ECHO MEAS - MV MAX PG: 7.9 MMHG
BH CV ECHO MEAS - MV MEAN PG: 1.7 MMHG
BH CV ECHO MEAS - MV V2 MAX: 140.3 CM/SEC
BH CV ECHO MEAS - MV V2 MEAN: 49.9 CM/SEC
BH CV ECHO MEAS - MV V2 VTI: 26.8 CM
BH CV ECHO MEAS - MVA(VTI): 2.2 CM^2
BH CV ECHO MEAS - PA ACC TIME: 0.08 SEC
BH CV ECHO MEAS - PA MAX PG (FULL): 4.6 MMHG
BH CV ECHO MEAS - PA MAX PG: 6 MMHG
BH CV ECHO MEAS - PA PR(ACCEL): 41.3 MMHG
BH CV ECHO MEAS - PA V2 MAX: 122.9 CM/SEC
BH CV ECHO MEAS - PVA(V,A): 2.3 CM^2
BH CV ECHO MEAS - PVA(V,D): 2.3 CM^2
BH CV ECHO MEAS - QP/QS: 0.92
BH CV ECHO MEAS - RAP SYSTOLE: 3 MMHG
BH CV ECHO MEAS - RV MAX PG: 1.4 MMHG
BH CV ECHO MEAS - RV MEAN PG: 0.62 MMHG
BH CV ECHO MEAS - RV V1 MAX: 59.9 CM/SEC
BH CV ECHO MEAS - RV V1 MEAN: 35.9 CM/SEC
BH CV ECHO MEAS - RV V1 VTI: 11.3 CM
BH CV ECHO MEAS - RVDD: 3.9 CM
BH CV ECHO MEAS - RVOT AREA: 4.8 CM^2
BH CV ECHO MEAS - RVOT DIAM: 2.5 CM
BH CV ECHO MEAS - RVSP: 48.9 MMHG
BH CV ECHO MEAS - SV(AO): 226.2 ML
BH CV ECHO MEAS - SV(CUBED): 73 ML
BH CV ECHO MEAS - SV(LVOT): 58.6 ML
BH CV ECHO MEAS - SV(MOD-SP4): 31.1 ML
BH CV ECHO MEAS - SV(RVOT): 53.8 ML
BH CV ECHO MEAS - SV(TEICH): 59.8 ML
BH CV ECHO MEAS - TR MAX VEL: 338.5 CM/SEC
BILIRUB SERPL-MCNC: 0.7 MG/DL (ref 0–1.2)
BILIRUB UR QL STRIP: NEGATIVE
BOTTLE TYPE: ABNORMAL
BUN SERPL-MCNC: 123 MG/DL (ref 8–23)
BUN/CREAT SERPL: 23.9 (ref 7–25)
CALCIUM SPEC-SCNC: 6.3 MG/DL (ref 8.6–10.5)
CHLORIDE SERPL-SCNC: 97 MMOL/L (ref 98–107)
CHOLEST SERPL-MCNC: 86 MG/DL (ref 0–200)
CK SERPL-CCNC: 92 U/L (ref 20–180)
CLARITY UR: ABNORMAL
CLUMPED PLATELETS: PRESENT
CO2 SERPL-SCNC: 15 MMOL/L (ref 22–29)
COLOR UR: YELLOW
CREAT SERPL-MCNC: 5.14 MG/DL (ref 0.57–1)
DEPRECATED RDW RBC AUTO: 55.1 FL (ref 37–54)
ERYTHROCYTE [DISTWIDTH] IN BLOOD BY AUTOMATED COUNT: 16.3 % (ref 12.3–15.4)
GFR SERPL CREATININE-BSD FRML MDRD: 8 ML/MIN/1.73
GFR SERPL CREATININE-BSD FRML MDRD: ABNORMAL ML/MIN/{1.73_M2}
GLOBULIN UR ELPH-MCNC: 3.7 GM/DL
GLUCOSE SERPL-MCNC: 71 MG/DL (ref 65–99)
GLUCOSE UR STRIP-MCNC: NEGATIVE MG/DL
HBV SURFACE AG SERPL QL IA: NORMAL
HCT VFR BLD AUTO: 29.8 % (ref 34–46.6)
HDLC SERPL-MCNC: 28 MG/DL (ref 40–60)
HGB BLD-MCNC: 9.6 G/DL (ref 12–15.9)
HGB UR QL STRIP.AUTO: ABNORMAL
HYALINE CASTS UR QL AUTO: ABNORMAL /LPF
KETONES UR QL STRIP: NEGATIVE
LDLC SERPL CALC-MCNC: 34 MG/DL (ref 0–100)
LDLC/HDLC SERPL: 1.09 {RATIO}
LEUKOCYTE ESTERASE UR QL STRIP.AUTO: ABNORMAL
LYMPHOCYTES # BLD MANUAL: 0 10*3/MM3 (ref 0.7–3.1)
LYMPHOCYTES NFR BLD MANUAL: 0 % (ref 19.6–45.3)
MAGNESIUM SERPL-MCNC: 1.7 MG/DL (ref 1.6–2.4)
MCH RBC QN AUTO: 30.8 PG (ref 26.6–33)
MCHC RBC AUTO-ENTMCNC: 32.4 G/DL (ref 31.5–35.7)
MCV RBC AUTO: 95.1 FL (ref 79–97)
METAMYELOCYTES NFR BLD MANUAL: 1 % (ref 0–0)
NEUTROPHILS # BLD AUTO: 31.68 10*3/MM3 (ref 1.7–7)
NEUTROPHILS NFR BLD MANUAL: 72 % (ref 42.7–76)
NEUTS BAND NFR BLD MANUAL: 27 % (ref 0–5)
NITRITE UR QL STRIP: POSITIVE
NT-PROBNP SERPL-MCNC: ABNORMAL PG/ML (ref 0–1800)
PH UR STRIP.AUTO: 7.5 [PH] (ref 5–8)
PHOSPHATE SERPL-MCNC: 6.2 MG/DL (ref 2.5–4.5)
PLATELET # BLD AUTO: 127 10*3/MM3 (ref 140–450)
PMV BLD AUTO: 9.6 FL (ref 6–12)
POTASSIUM SERPL-SCNC: 4.3 MMOL/L (ref 3.5–5.2)
PROCALCITONIN SERPL-MCNC: 162.31 NG/ML (ref 0–0.25)
PROT SERPL-MCNC: 5.6 G/DL (ref 6–8.5)
PROT UR QL STRIP: ABNORMAL
RBC # BLD AUTO: 3.13 10*6/MM3 (ref 3.77–5.28)
RBC # UR: ABNORMAL /HPF
REF LAB TEST METHOD: ABNORMAL
RENAL EPI CELLS #/AREA URNS HPF: ABNORMAL /HPF
SCAN SLIDE: NORMAL
SODIUM SERPL-SCNC: 133 MMOL/L (ref 136–145)
SP GR UR STRIP: 1.01 (ref 1–1.03)
SQUAMOUS #/AREA URNS HPF: ABNORMAL /HPF
TOXIC GRANULATION: ABNORMAL
TRIGL SERPL-MCNC: 137 MG/DL (ref 0–150)
UROBILINOGEN UR QL STRIP: ABNORMAL
VANCOMYCIN SERPL-MCNC: 12.4 MCG/ML (ref 5–40)
VLDLC SERPL-MCNC: 24 MG/DL (ref 5–40)
WBC # BLD AUTO: 32 10*3/MM3 (ref 3.4–10.8)
WBC UR QL AUTO: ABNORMAL /HPF

## 2021-04-18 PROCEDURE — 81001 URINALYSIS AUTO W/SCOPE: CPT | Performed by: NURSE PRACTITIONER

## 2021-04-18 PROCEDURE — 85007 BL SMEAR W/DIFF WBC COUNT: CPT | Performed by: NURSE PRACTITIONER

## 2021-04-18 PROCEDURE — 87086 URINE CULTURE/COLONY COUNT: CPT | Performed by: NURSE PRACTITIONER

## 2021-04-18 PROCEDURE — 05HM33Z INSERTION OF INFUSION DEVICE INTO RIGHT INTERNAL JUGULAR VEIN, PERCUTANEOUS APPROACH: ICD-10-PCS | Performed by: INTERNAL MEDICINE

## 2021-04-18 PROCEDURE — 99233 SBSQ HOSP IP/OBS HIGH 50: CPT | Performed by: INTERNAL MEDICINE

## 2021-04-18 PROCEDURE — 25010000002 VANCOMYCIN 1 G RECONSTITUTED SOLUTION 1 EACH VIAL: Performed by: INTERNAL MEDICINE

## 2021-04-18 PROCEDURE — 87340 HEPATITIS B SURFACE AG IA: CPT | Performed by: INTERNAL MEDICINE

## 2021-04-18 PROCEDURE — 82550 ASSAY OF CK (CPK): CPT | Performed by: INTERNAL MEDICINE

## 2021-04-18 PROCEDURE — 93306 TTE W/DOPPLER COMPLETE: CPT

## 2021-04-18 PROCEDURE — 71045 X-RAY EXAM CHEST 1 VIEW: CPT

## 2021-04-18 PROCEDURE — 80061 LIPID PANEL: CPT | Performed by: NURSE PRACTITIONER

## 2021-04-18 PROCEDURE — 25010000002 CEFTRIAXONE PER 250 MG: Performed by: INTERNAL MEDICINE

## 2021-04-18 PROCEDURE — 83735 ASSAY OF MAGNESIUM: CPT | Performed by: NURSE PRACTITIONER

## 2021-04-18 PROCEDURE — 25010000002 ALBUMIN HUMAN 25% PER 50 ML: Performed by: INTERNAL MEDICINE

## 2021-04-18 PROCEDURE — 80202 ASSAY OF VANCOMYCIN: CPT | Performed by: INTERNAL MEDICINE

## 2021-04-18 PROCEDURE — 25010000002 HEPARIN (PORCINE) PER 1000 UNITS: Performed by: INTERNAL MEDICINE

## 2021-04-18 PROCEDURE — 80053 COMPREHEN METABOLIC PANEL: CPT | Performed by: NURSE PRACTITIONER

## 2021-04-18 PROCEDURE — P9047 ALBUMIN (HUMAN), 25%, 50ML: HCPCS | Performed by: INTERNAL MEDICINE

## 2021-04-18 PROCEDURE — 84100 ASSAY OF PHOSPHORUS: CPT | Performed by: INTERNAL MEDICINE

## 2021-04-18 PROCEDURE — 93306 TTE W/DOPPLER COMPLETE: CPT | Performed by: INTERNAL MEDICINE

## 2021-04-18 PROCEDURE — 83880 ASSAY OF NATRIURETIC PEPTIDE: CPT | Performed by: INTERNAL MEDICINE

## 2021-04-18 PROCEDURE — 85025 COMPLETE CBC W/AUTO DIFF WBC: CPT | Performed by: NURSE PRACTITIONER

## 2021-04-18 RX ORDER — MIDODRINE HYDROCHLORIDE 5 MG/1
5 TABLET ORAL EVERY 8 HOURS SCHEDULED
Status: DISCONTINUED | OUTPATIENT
Start: 2021-04-18 | End: 2021-04-29 | Stop reason: HOSPADM

## 2021-04-18 RX ORDER — ALBUMIN (HUMAN) 12.5 G/50ML
12.5 SOLUTION INTRAVENOUS AS NEEDED
Status: DISPENSED | OUTPATIENT
Start: 2021-04-18 | End: 2021-04-19

## 2021-04-18 RX ORDER — ACETAMINOPHEN 325 MG/1
650 TABLET ORAL EVERY 4 HOURS PRN
Status: DISCONTINUED | OUTPATIENT
Start: 2021-04-18 | End: 2021-04-29 | Stop reason: HOSPADM

## 2021-04-18 RX ORDER — IPRATROPIUM BROMIDE AND ALBUTEROL SULFATE 2.5; .5 MG/3ML; MG/3ML
3 SOLUTION RESPIRATORY (INHALATION)
Status: DISCONTINUED | OUTPATIENT
Start: 2021-04-18 | End: 2021-04-29 | Stop reason: HOSPADM

## 2021-04-18 RX ORDER — SODIUM CHLORIDE 0.9 % (FLUSH) 0.9 %
10 SYRINGE (ML) INJECTION EVERY 12 HOURS SCHEDULED
Status: DISCONTINUED | OUTPATIENT
Start: 2021-04-18 | End: 2021-04-29 | Stop reason: HOSPADM

## 2021-04-18 RX ORDER — ACETAMINOPHEN 650 MG/1
650 SUPPOSITORY RECTAL EVERY 4 HOURS PRN
Status: DISCONTINUED | OUTPATIENT
Start: 2021-04-18 | End: 2021-04-29 | Stop reason: HOSPADM

## 2021-04-18 RX ORDER — SODIUM CHLORIDE 0.9 % (FLUSH) 0.9 %
10 SYRINGE (ML) INJECTION AS NEEDED
Status: DISCONTINUED | OUTPATIENT
Start: 2021-04-18 | End: 2021-04-29 | Stop reason: HOSPADM

## 2021-04-18 RX ORDER — NOREPINEPHRINE BIT/0.9 % NACL 8 MG/250ML
.02-.5 INFUSION BOTTLE (ML) INTRAVENOUS
Status: DISCONTINUED | OUTPATIENT
Start: 2021-04-18 | End: 2021-04-20

## 2021-04-18 RX ORDER — FAMOTIDINE 20 MG/1
20 TABLET, FILM COATED ORAL DAILY
Status: DISCONTINUED | OUTPATIENT
Start: 2021-04-18 | End: 2021-04-26

## 2021-04-18 RX ORDER — BUMETANIDE 0.25 MG/ML
1 INJECTION INTRAMUSCULAR; INTRAVENOUS ONCE
Status: COMPLETED | OUTPATIENT
Start: 2021-04-18 | End: 2021-04-18

## 2021-04-18 RX ORDER — HEPARIN SODIUM 1000 [USP'U]/ML
5000 INJECTION, SOLUTION INTRAVENOUS; SUBCUTANEOUS ONCE
Status: DISCONTINUED | OUTPATIENT
Start: 2021-04-18 | End: 2021-04-29 | Stop reason: HOSPADM

## 2021-04-18 RX ORDER — ALUMINA, MAGNESIA, AND SIMETHICONE 2400; 2400; 240 MG/30ML; MG/30ML; MG/30ML
15 SUSPENSION ORAL EVERY 6 HOURS PRN
Status: DISCONTINUED | OUTPATIENT
Start: 2021-04-18 | End: 2021-04-29 | Stop reason: HOSPADM

## 2021-04-18 RX ORDER — LIDOCAINE HYDROCHLORIDE 10 MG/ML
10 INJECTION, SOLUTION INFILTRATION; PERINEURAL ONCE
Status: DISCONTINUED | OUTPATIENT
Start: 2021-04-18 | End: 2021-04-29 | Stop reason: HOSPADM

## 2021-04-18 RX ADMIN — BUMETANIDE 0.5 MG/HR: 0.25 INJECTION INTRAMUSCULAR; INTRAVENOUS at 02:30

## 2021-04-18 RX ADMIN — Medication 10 ML: at 10:31

## 2021-04-18 RX ADMIN — HEPARIN SODIUM 5000 UNITS: 5000 INJECTION INTRAVENOUS; SUBCUTANEOUS at 20:34

## 2021-04-18 RX ADMIN — CEFTRIAXONE SODIUM 2 G: 2 INJECTION, POWDER, FOR SOLUTION INTRAMUSCULAR; INTRAVENOUS at 10:30

## 2021-04-18 RX ADMIN — LACTULOSE 20 G: 20 SOLUTION ORAL at 20:35

## 2021-04-18 RX ADMIN — ALBUMIN HUMAN 12.5 G: 0.25 SOLUTION INTRAVENOUS at 11:00

## 2021-04-18 RX ADMIN — TRAMADOL HYDROCHLORIDE 50 MG: 50 TABLET, FILM COATED ORAL at 20:36

## 2021-04-18 RX ADMIN — FEBUXOSTAT 40 MG: 40 TABLET ORAL at 20:34

## 2021-04-18 RX ADMIN — DIGOXIN 125 MCG: 125 TABLET ORAL at 12:48

## 2021-04-18 RX ADMIN — FEBUXOSTAT 40 MG: 40 TABLET ORAL at 10:29

## 2021-04-18 RX ADMIN — BUMETANIDE 1 MG: 0.25 INJECTION INTRAMUSCULAR; INTRAVENOUS at 02:30

## 2021-04-18 RX ADMIN — HEPARIN SODIUM 5000 UNITS: 5000 INJECTION INTRAVENOUS; SUBCUTANEOUS at 10:29

## 2021-04-18 RX ADMIN — SODIUM BICARBONATE 75 MEQ: 84 INJECTION, SOLUTION INTRAVENOUS at 05:16

## 2021-04-18 RX ADMIN — Medication 0.02 MCG/KG/MIN: at 03:22

## 2021-04-18 RX ADMIN — LACTULOSE 20 G: 20 SOLUTION ORAL at 10:31

## 2021-04-18 RX ADMIN — Medication 10 ML: at 20:35

## 2021-04-18 RX ADMIN — SODIUM CHLORIDE 1000 MG: 900 INJECTION, SOLUTION INTRAVENOUS at 07:57

## 2021-04-18 RX ADMIN — DOCUSATE SODIUM 50 MG AND SENNOSIDES 8.6 MG 2 TABLET: 8.6; 5 TABLET, FILM COATED ORAL at 10:29

## 2021-04-18 RX ADMIN — MIDODRINE HYDROCHLORIDE 5 MG: 5 TABLET ORAL at 13:56

## 2021-04-18 RX ADMIN — URSODIOL 300 MG: 300 CAPSULE ORAL at 10:29

## 2021-04-18 RX ADMIN — ASPIRIN 81 MG CHEWABLE TABLET 81 MG: 81 TABLET CHEWABLE at 10:29

## 2021-04-18 RX ADMIN — URSODIOL 300 MG: 300 CAPSULE ORAL at 20:34

## 2021-04-18 RX ADMIN — MIDODRINE HYDROCHLORIDE 5 MG: 5 TABLET ORAL at 20:35

## 2021-04-19 LAB
ALBUMIN SERPL ELPH-MCNC: 2.2 G/DL (ref 2.9–4.4)
ALBUMIN SERPL-MCNC: 2.1 G/DL (ref 3.5–5.2)
ALBUMIN/GLOB SERPL: 0.6 G/DL
ALBUMIN/GLOB SERPL: 0.7 {RATIO} (ref 0.7–1.7)
ALP SERPL-CCNC: 185 U/L (ref 39–117)
ALPHA1 GLOB SERPL ELPH-MCNC: 0.5 G/DL (ref 0–0.4)
ALPHA2 GLOB SERPL ELPH-MCNC: 0.8 G/DL (ref 0.4–1)
ALT SERPL W P-5'-P-CCNC: 8 U/L (ref 1–33)
ANION GAP SERPL CALCULATED.3IONS-SCNC: 19 MMOL/L (ref 5–15)
AST SERPL-CCNC: 20 U/L (ref 1–32)
B-GLOBULIN SERPL ELPH-MCNC: 0.7 G/DL (ref 0.7–1.3)
BACTERIA SPEC AEROBE CULT: NORMAL
BASOPHILS # BLD AUTO: 0.2 10*3/MM3 (ref 0–0.2)
BASOPHILS NFR BLD AUTO: 0.9 % (ref 0–1.5)
BILIRUB SERPL-MCNC: 0.7 MG/DL (ref 0–1.2)
BUN SERPL-MCNC: 91 MG/DL (ref 8–23)
BUN/CREAT SERPL: 21.3 (ref 7–25)
CALCIUM SPEC-SCNC: 7.9 MG/DL (ref 8.6–10.5)
CHLORIDE SERPL-SCNC: 99 MMOL/L (ref 98–107)
CO2 SERPL-SCNC: 18 MMOL/L (ref 22–29)
CREAT SERPL-MCNC: 4.27 MG/DL (ref 0.57–1)
DEPRECATED RDW RBC AUTO: 52.9 FL (ref 37–54)
EOSINOPHIL # BLD AUTO: 0.1 10*3/MM3 (ref 0–0.4)
EOSINOPHIL NFR BLD AUTO: 0.6 % (ref 0.3–6.2)
ERYTHROCYTE [DISTWIDTH] IN BLOOD BY AUTOMATED COUNT: 15.9 % (ref 12.3–15.4)
GAMMA GLOB SERPL ELPH-MCNC: 1.3 G/DL (ref 0.4–1.8)
GFR SERPL CREATININE-BSD FRML MDRD: 10 ML/MIN/1.73
GFR SERPL CREATININE-BSD FRML MDRD: ABNORMAL ML/MIN/{1.73_M2}
GLOBULIN SER-MCNC: 3.4 G/DL (ref 2.2–3.9)
GLOBULIN UR ELPH-MCNC: 3.6 GM/DL
GLUCOSE BLDC GLUCOMTR-MCNC: 105 MG/DL (ref 70–105)
GLUCOSE BLDC GLUCOMTR-MCNC: 105 MG/DL (ref 70–105)
GLUCOSE BLDC GLUCOMTR-MCNC: 49 MG/DL (ref 70–105)
GLUCOSE BLDC GLUCOMTR-MCNC: 49 MG/DL (ref 70–105)
GLUCOSE BLDC GLUCOMTR-MCNC: 86 MG/DL (ref 70–105)
GLUCOSE SERPL-MCNC: 65 MG/DL (ref 65–99)
HCT VFR BLD AUTO: 29 % (ref 34–46.6)
HGB BLD-MCNC: 9.5 G/DL (ref 12–15.9)
IGA SERPL-MCNC: 364 MG/DL (ref 64–422)
IGG SERPL-MCNC: 1247 MG/DL (ref 586–1602)
IGM SERPL-MCNC: 70 MG/DL (ref 26–217)
INTERPRETATION SERPL IEP-IMP: ABNORMAL
LABORATORY COMMENT REPORT: ABNORMAL
LYMPHOCYTES # BLD AUTO: 0.2 10*3/MM3 (ref 0.7–3.1)
LYMPHOCYTES NFR BLD AUTO: 0.8 % (ref 19.6–45.3)
M PROTEIN SERPL ELPH-MCNC: ABNORMAL G/DL
MAGNESIUM SERPL-MCNC: 1.8 MG/DL (ref 1.6–2.4)
MCH RBC QN AUTO: 30.7 PG (ref 26.6–33)
MCHC RBC AUTO-ENTMCNC: 33 G/DL (ref 31.5–35.7)
MCV RBC AUTO: 93.3 FL (ref 79–97)
MONOCYTES # BLD AUTO: 0.4 10*3/MM3 (ref 0.1–0.9)
MONOCYTES NFR BLD AUTO: 1.5 % (ref 5–12)
NEUTROPHILS NFR BLD AUTO: 23.8 10*3/MM3 (ref 1.7–7)
NEUTROPHILS NFR BLD AUTO: 96.2 % (ref 42.7–76)
NRBC BLD AUTO-RTO: 0.1 /100 WBC (ref 0–0.2)
NT-PROBNP SERPL-MCNC: ABNORMAL PG/ML (ref 0–1800)
PHOSPHATE SERPL-MCNC: 5.1 MG/DL (ref 2.5–4.5)
PLATELET # BLD AUTO: 120 10*3/MM3 (ref 140–450)
PMV BLD AUTO: 8.7 FL (ref 6–12)
POTASSIUM SERPL-SCNC: 4.2 MMOL/L (ref 3.5–5.2)
PROT SERPL-MCNC: 5.6 G/DL (ref 6–8.5)
PROT SERPL-MCNC: 5.7 G/DL (ref 6–8.5)
RBC # BLD AUTO: 3.1 10*6/MM3 (ref 3.77–5.28)
SODIUM SERPL-SCNC: 136 MMOL/L (ref 136–145)
VANCOMYCIN SERPL-MCNC: 20.3 MCG/ML (ref 5–40)
WBC # BLD AUTO: 24.7 10*3/MM3 (ref 3.4–10.8)

## 2021-04-19 PROCEDURE — 25010000002 HEPARIN (PORCINE) PER 1000 UNITS: Performed by: INTERNAL MEDICINE

## 2021-04-19 PROCEDURE — 82962 GLUCOSE BLOOD TEST: CPT

## 2021-04-19 PROCEDURE — 83735 ASSAY OF MAGNESIUM: CPT | Performed by: NURSE PRACTITIONER

## 2021-04-19 PROCEDURE — 99233 SBSQ HOSP IP/OBS HIGH 50: CPT | Performed by: INTERNAL MEDICINE

## 2021-04-19 PROCEDURE — 85025 COMPLETE CBC W/AUTO DIFF WBC: CPT | Performed by: NURSE PRACTITIONER

## 2021-04-19 PROCEDURE — 84100 ASSAY OF PHOSPHORUS: CPT | Performed by: INTERNAL MEDICINE

## 2021-04-19 PROCEDURE — 25010000002 VANCOMYCIN 1 G RECONSTITUTED SOLUTION 1 EACH VIAL: Performed by: INTERNAL MEDICINE

## 2021-04-19 PROCEDURE — 80202 ASSAY OF VANCOMYCIN: CPT | Performed by: INTERNAL MEDICINE

## 2021-04-19 PROCEDURE — 83880 ASSAY OF NATRIURETIC PEPTIDE: CPT | Performed by: INTERNAL MEDICINE

## 2021-04-19 PROCEDURE — 80053 COMPREHEN METABOLIC PANEL: CPT | Performed by: NURSE PRACTITIONER

## 2021-04-19 PROCEDURE — 25010000002 CEFTRIAXONE PER 250 MG: Performed by: INTERNAL MEDICINE

## 2021-04-19 RX ORDER — HEPARIN SODIUM 1000 [USP'U]/ML
4000 INJECTION, SOLUTION INTRAVENOUS; SUBCUTANEOUS AS NEEDED
Status: DISCONTINUED | OUTPATIENT
Start: 2021-04-19 | End: 2021-04-29 | Stop reason: HOSPADM

## 2021-04-19 RX ADMIN — TRAMADOL HYDROCHLORIDE 50 MG: 50 TABLET, FILM COATED ORAL at 08:03

## 2021-04-19 RX ADMIN — Medication 10 ML: at 08:04

## 2021-04-19 RX ADMIN — FEBUXOSTAT 40 MG: 40 TABLET ORAL at 21:50

## 2021-04-19 RX ADMIN — DEXTROSE MONOHYDRATE 25 G: 500 INJECTION PARENTERAL at 11:22

## 2021-04-19 RX ADMIN — HEPARIN SODIUM 5000 UNITS: 5000 INJECTION INTRAVENOUS; SUBCUTANEOUS at 21:50

## 2021-04-19 RX ADMIN — DEXTROSE MONOHYDRATE 25 G: 500 INJECTION PARENTERAL at 06:37

## 2021-04-19 RX ADMIN — LACTULOSE 20 G: 20 SOLUTION ORAL at 21:50

## 2021-04-19 RX ADMIN — DIGOXIN 125 MCG: 125 TABLET ORAL at 11:21

## 2021-04-19 RX ADMIN — Medication 10 ML: at 21:50

## 2021-04-19 RX ADMIN — CEFTRIAXONE SODIUM 2 G: 2 INJECTION, POWDER, FOR SOLUTION INTRAMUSCULAR; INTRAVENOUS at 11:22

## 2021-04-19 RX ADMIN — LACTULOSE 20 G: 20 SOLUTION ORAL at 08:04

## 2021-04-19 RX ADMIN — HEPARIN SODIUM 5000 UNITS: 5000 INJECTION INTRAVENOUS; SUBCUTANEOUS at 08:03

## 2021-04-19 RX ADMIN — URSODIOL 300 MG: 300 CAPSULE ORAL at 21:50

## 2021-04-19 RX ADMIN — MIDODRINE HYDROCHLORIDE 5 MG: 5 TABLET ORAL at 22:00

## 2021-04-19 RX ADMIN — COLLAGENASE SANTYL: 250 OINTMENT TOPICAL at 14:55

## 2021-04-19 RX ADMIN — HEPARIN SODIUM 4000 UNITS: 1000 INJECTION INTRAVENOUS; SUBCUTANEOUS at 17:11

## 2021-04-19 RX ADMIN — FEBUXOSTAT 40 MG: 40 TABLET ORAL at 08:04

## 2021-04-19 RX ADMIN — FAMOTIDINE 20 MG: 20 TABLET, FILM COATED ORAL at 08:04

## 2021-04-19 RX ADMIN — ASPIRIN 81 MG CHEWABLE TABLET 81 MG: 81 TABLET CHEWABLE at 08:04

## 2021-04-19 RX ADMIN — URSODIOL 300 MG: 300 CAPSULE ORAL at 08:04

## 2021-04-19 RX ADMIN — TRAMADOL HYDROCHLORIDE 50 MG: 50 TABLET, FILM COATED ORAL at 21:50

## 2021-04-19 RX ADMIN — MIDODRINE HYDROCHLORIDE 5 MG: 5 TABLET ORAL at 05:44

## 2021-04-19 RX ADMIN — MIDODRINE HYDROCHLORIDE 5 MG: 5 TABLET ORAL at 14:57

## 2021-04-19 RX ADMIN — SODIUM CHLORIDE 1000 MG: 900 INJECTION, SOLUTION INTRAVENOUS at 17:15

## 2021-04-20 ENCOUNTER — APPOINTMENT (OUTPATIENT)
Dept: CT IMAGING | Facility: HOSPITAL | Age: 76
End: 2021-04-20

## 2021-04-20 LAB
ALBUMIN SERPL-MCNC: 2.1 G/DL (ref 3.5–5.2)
ALBUMIN/GLOB SERPL: 0.6 G/DL
ALP SERPL-CCNC: 217 U/L (ref 39–117)
ALT SERPL W P-5'-P-CCNC: 8 U/L (ref 1–33)
ANION GAP SERPL CALCULATED.3IONS-SCNC: 16 MMOL/L (ref 5–15)
AST SERPL-CCNC: 24 U/L (ref 1–32)
BACTERIA SPEC AEROBE CULT: ABNORMAL
BACTERIA SPEC AEROBE CULT: ABNORMAL
BILIRUB SERPL-MCNC: 0.7 MG/DL (ref 0–1.2)
BUN SERPL-MCNC: 64 MG/DL (ref 8–23)
BUN/CREAT SERPL: 18.4 (ref 7–25)
CALCIUM SPEC-SCNC: 8.1 MG/DL (ref 8.6–10.5)
CHLORIDE SERPL-SCNC: 98 MMOL/L (ref 98–107)
CK SERPL-CCNC: 33 U/L (ref 20–180)
CO2 SERPL-SCNC: 23 MMOL/L (ref 22–29)
CREAT SERPL-MCNC: 3.47 MG/DL (ref 0.57–1)
D-LACTATE SERPL-SCNC: 2.1 MMOL/L (ref 0.5–2)
DEPRECATED RDW RBC AUTO: 52.5 FL (ref 37–54)
ERYTHROCYTE [DISTWIDTH] IN BLOOD BY AUTOMATED COUNT: 15.8 % (ref 12.3–15.4)
GFR SERPL CREATININE-BSD FRML MDRD: 13 ML/MIN/1.73
GFR SERPL CREATININE-BSD FRML MDRD: ABNORMAL ML/MIN/{1.73_M2}
GLOBULIN UR ELPH-MCNC: 3.7 GM/DL
GLUCOSE BLDC GLUCOMTR-MCNC: 101 MG/DL (ref 70–105)
GLUCOSE BLDC GLUCOMTR-MCNC: 65 MG/DL (ref 70–105)
GLUCOSE BLDC GLUCOMTR-MCNC: 74 MG/DL (ref 70–105)
GLUCOSE BLDC GLUCOMTR-MCNC: 82 MG/DL (ref 70–105)
GLUCOSE BLDC GLUCOMTR-MCNC: 84 MG/DL (ref 70–105)
GLUCOSE SERPL-MCNC: 82 MG/DL (ref 65–99)
GRAM STN SPEC: ABNORMAL
HCT VFR BLD AUTO: 29.4 % (ref 34–46.6)
HGB BLD-MCNC: 9.6 G/DL (ref 12–15.9)
ISOLATED FROM: ABNORMAL
LYMPHOCYTES # BLD MANUAL: 0.46 10*3/MM3 (ref 0.7–3.1)
LYMPHOCYTES NFR BLD MANUAL: 2 % (ref 19.6–45.3)
LYMPHOCYTES NFR BLD MANUAL: 3 % (ref 5–12)
MAGNESIUM SERPL-MCNC: 1.9 MG/DL (ref 1.6–2.4)
MCH RBC QN AUTO: 30.7 PG (ref 26.6–33)
MCHC RBC AUTO-ENTMCNC: 32.7 G/DL (ref 31.5–35.7)
MCV RBC AUTO: 93.7 FL (ref 79–97)
MONOCYTES # BLD AUTO: 0.68 10*3/MM3 (ref 0.1–0.9)
NEUTROPHILS # BLD AUTO: 21.66 10*3/MM3 (ref 1.7–7)
NEUTROPHILS NFR BLD MANUAL: 91 % (ref 42.7–76)
NEUTS BAND NFR BLD MANUAL: 4 % (ref 0–5)
NT-PROBNP SERPL-MCNC: ABNORMAL PG/ML (ref 0–1800)
PHOSPHATE SERPL-MCNC: 4.1 MG/DL (ref 2.5–4.5)
PLATELET # BLD AUTO: 83 10*3/MM3 (ref 140–450)
PMV BLD AUTO: 8.8 FL (ref 6–12)
POTASSIUM SERPL-SCNC: 4 MMOL/L (ref 3.5–5.2)
PROT SERPL-MCNC: 5.8 G/DL (ref 6–8.5)
RBC # BLD AUTO: 3.14 10*6/MM3 (ref 3.77–5.28)
RBC MORPH BLD: NORMAL
SCAN SLIDE: NORMAL
SMALL PLATELETS BLD QL SMEAR: ABNORMAL
SODIUM SERPL-SCNC: 137 MMOL/L (ref 136–145)
WBC # BLD AUTO: 22.8 10*3/MM3 (ref 3.4–10.8)
WBC MORPH BLD: NORMAL

## 2021-04-20 PROCEDURE — 99233 SBSQ HOSP IP/OBS HIGH 50: CPT | Performed by: INTERNAL MEDICINE

## 2021-04-20 PROCEDURE — 83605 ASSAY OF LACTIC ACID: CPT | Performed by: SURGERY

## 2021-04-20 PROCEDURE — 97530 THERAPEUTIC ACTIVITIES: CPT

## 2021-04-20 PROCEDURE — 74176 CT ABD & PELVIS W/O CONTRAST: CPT

## 2021-04-20 PROCEDURE — 82550 ASSAY OF CK (CPK): CPT | Performed by: NURSE PRACTITIONER

## 2021-04-20 PROCEDURE — 85007 BL SMEAR W/DIFF WBC COUNT: CPT | Performed by: NURSE PRACTITIONER

## 2021-04-20 PROCEDURE — 97166 OT EVAL MOD COMPLEX 45 MIN: CPT

## 2021-04-20 PROCEDURE — 80053 COMPREHEN METABOLIC PANEL: CPT | Performed by: NURSE PRACTITIONER

## 2021-04-20 PROCEDURE — 83735 ASSAY OF MAGNESIUM: CPT | Performed by: NURSE PRACTITIONER

## 2021-04-20 PROCEDURE — 99222 1ST HOSP IP/OBS MODERATE 55: CPT | Performed by: SURGERY

## 2021-04-20 PROCEDURE — 85025 COMPLETE CBC W/AUTO DIFF WBC: CPT | Performed by: NURSE PRACTITIONER

## 2021-04-20 PROCEDURE — 84100 ASSAY OF PHOSPHORUS: CPT | Performed by: INTERNAL MEDICINE

## 2021-04-20 PROCEDURE — 25010000002 HEPARIN (PORCINE) PER 1000 UNITS: Performed by: INTERNAL MEDICINE

## 2021-04-20 PROCEDURE — 83880 ASSAY OF NATRIURETIC PEPTIDE: CPT | Performed by: INTERNAL MEDICINE

## 2021-04-20 PROCEDURE — 82962 GLUCOSE BLOOD TEST: CPT

## 2021-04-20 PROCEDURE — 25010000002 CEFTRIAXONE PER 250 MG: Performed by: INTERNAL MEDICINE

## 2021-04-20 PROCEDURE — 92610 EVALUATE SWALLOWING FUNCTION: CPT

## 2021-04-20 RX ADMIN — TRAMADOL HYDROCHLORIDE 50 MG: 50 TABLET, FILM COATED ORAL at 05:04

## 2021-04-20 RX ADMIN — Medication 10 ML: at 09:14

## 2021-04-20 RX ADMIN — METRONIDAZOLE 500 MG: 500 INJECTION, SOLUTION INTRAVENOUS at 23:21

## 2021-04-20 RX ADMIN — Medication 10 ML: at 21:53

## 2021-04-20 RX ADMIN — Medication: at 21:53

## 2021-04-20 RX ADMIN — FAMOTIDINE 20 MG: 20 TABLET, FILM COATED ORAL at 09:14

## 2021-04-20 RX ADMIN — METRONIDAZOLE 500 MG: 500 INJECTION, SOLUTION INTRAVENOUS at 15:06

## 2021-04-20 RX ADMIN — DEXTROSE MONOHYDRATE 25 G: 500 INJECTION PARENTERAL at 23:21

## 2021-04-20 RX ADMIN — Medication: at 09:14

## 2021-04-20 RX ADMIN — FEBUXOSTAT 40 MG: 40 TABLET ORAL at 09:14

## 2021-04-20 RX ADMIN — HEPARIN SODIUM 5000 UNITS: 5000 INJECTION INTRAVENOUS; SUBCUTANEOUS at 09:14

## 2021-04-20 RX ADMIN — URSODIOL 300 MG: 300 CAPSULE ORAL at 09:14

## 2021-04-20 RX ADMIN — CEFTRIAXONE SODIUM 2 G: 2 INJECTION, POWDER, FOR SOLUTION INTRAMUSCULAR; INTRAVENOUS at 11:11

## 2021-04-20 RX ADMIN — ASPIRIN 81 MG CHEWABLE TABLET 81 MG: 81 TABLET CHEWABLE at 09:14

## 2021-04-20 RX ADMIN — Medication: at 05:11

## 2021-04-20 RX ADMIN — COLLAGENASE SANTYL: 250 OINTMENT TOPICAL at 09:14

## 2021-04-20 RX ADMIN — DIGOXIN 125 MCG: 125 TABLET ORAL at 11:11

## 2021-04-20 RX ADMIN — MIDODRINE HYDROCHLORIDE 5 MG: 5 TABLET ORAL at 05:04

## 2021-04-21 ENCOUNTER — INPATIENT HOSPITAL (OUTPATIENT)
Dept: URBAN - METROPOLITAN AREA HOSPITAL 84 | Facility: HOSPITAL | Age: 76
End: 2021-04-21

## 2021-04-21 DIAGNOSIS — R11.2 NAUSEA WITH VOMITING, UNSPECIFIED: ICD-10-CM

## 2021-04-21 DIAGNOSIS — R93.3 ABNORMAL FINDINGS ON DIAGNOSTIC IMAGING OF OTHER PARTS OF DI: ICD-10-CM

## 2021-04-21 DIAGNOSIS — A41.9 SEPSIS, UNSPECIFIED ORGANISM: ICD-10-CM

## 2021-04-21 DIAGNOSIS — D72.829 ELEVATED WHITE BLOOD CELL COUNT, UNSPECIFIED: ICD-10-CM

## 2021-04-21 DIAGNOSIS — R10.31 RIGHT LOWER QUADRANT PAIN: ICD-10-CM

## 2021-04-21 LAB
ALBUMIN SERPL-MCNC: 2 G/DL (ref 3.5–5.2)
ALBUMIN/GLOB SERPL: 0.6 G/DL
ALP SERPL-CCNC: 230 U/L (ref 39–117)
ALT SERPL W P-5'-P-CCNC: 8 U/L (ref 1–33)
ANION GAP SERPL CALCULATED.3IONS-SCNC: 15 MMOL/L (ref 5–15)
ANISOCYTOSIS BLD QL: ABNORMAL
AST SERPL-CCNC: 21 U/L (ref 1–32)
BILIRUB SERPL-MCNC: 0.5 MG/DL (ref 0–1.2)
BUN SERPL-MCNC: 75 MG/DL (ref 8–23)
BUN/CREAT SERPL: 20 (ref 7–25)
BURR CELLS BLD QL SMEAR: ABNORMAL
CALCIUM SPEC-SCNC: 7.7 MG/DL (ref 8.6–10.5)
CHLORIDE SERPL-SCNC: 100 MMOL/L (ref 98–107)
CO2 SERPL-SCNC: 23 MMOL/L (ref 22–29)
CREAT SERPL-MCNC: 3.75 MG/DL (ref 0.57–1)
DEPRECATED RDW RBC AUTO: 55.6 FL (ref 37–54)
ERYTHROCYTE [DISTWIDTH] IN BLOOD BY AUTOMATED COUNT: 16.5 % (ref 12.3–15.4)
GFR SERPL CREATININE-BSD FRML MDRD: 12 ML/MIN/1.73
GFR SERPL CREATININE-BSD FRML MDRD: ABNORMAL ML/MIN/{1.73_M2}
GLOBULIN UR ELPH-MCNC: 3.6 GM/DL
GLUCOSE BLDC GLUCOMTR-MCNC: 153 MG/DL (ref 70–105)
GLUCOSE BLDC GLUCOMTR-MCNC: 43 MG/DL (ref 70–105)
GLUCOSE BLDC GLUCOMTR-MCNC: 65 MG/DL (ref 70–105)
GLUCOSE BLDC GLUCOMTR-MCNC: 75 MG/DL (ref 70–105)
GLUCOSE SERPL-MCNC: 97 MG/DL (ref 65–99)
HCT VFR BLD AUTO: 28.7 % (ref 34–46.6)
HGB BLD-MCNC: 9.3 G/DL (ref 12–15.9)
LARGE PLATELETS: ABNORMAL
LYMPHOCYTES # BLD MANUAL: 0.19 10*3/MM3 (ref 0.7–3.1)
LYMPHOCYTES NFR BLD MANUAL: 1 % (ref 19.6–45.3)
MAGNESIUM SERPL-MCNC: 2 MG/DL (ref 1.6–2.4)
MCH RBC QN AUTO: 30.6 PG (ref 26.6–33)
MCHC RBC AUTO-ENTMCNC: 32.3 G/DL (ref 31.5–35.7)
MCV RBC AUTO: 94.9 FL (ref 79–97)
METAMYELOCYTES NFR BLD MANUAL: 1 % (ref 0–0)
MYELOCYTES NFR BLD MANUAL: 1 % (ref 0–0)
NEUTROPHILS # BLD AUTO: 18.72 10*3/MM3 (ref 1.7–7)
NEUTROPHILS NFR BLD MANUAL: 97 % (ref 42.7–76)
NT-PROBNP SERPL-MCNC: ABNORMAL PG/ML (ref 0–1800)
PHOSPHATE SERPL-MCNC: 5.2 MG/DL (ref 2.5–4.5)
PLATELET # BLD AUTO: 85 10*3/MM3 (ref 140–450)
PMV BLD AUTO: 8.5 FL (ref 6–12)
POIKILOCYTOSIS BLD QL SMEAR: ABNORMAL
POTASSIUM SERPL-SCNC: 3.7 MMOL/L (ref 3.5–5.2)
PROT SERPL-MCNC: 5.6 G/DL (ref 6–8.5)
RBC # BLD AUTO: 3.03 10*6/MM3 (ref 3.77–5.28)
SCAN SLIDE: NORMAL
SMALL PLATELETS BLD QL SMEAR: ABNORMAL
SODIUM SERPL-SCNC: 138 MMOL/L (ref 136–145)
WBC # BLD AUTO: 19.3 10*3/MM3 (ref 3.4–10.8)
WBC MORPH BLD: NORMAL

## 2021-04-21 PROCEDURE — 25010000002 HEPARIN (PORCINE) PER 1000 UNITS: Performed by: INTERNAL MEDICINE

## 2021-04-21 PROCEDURE — 99222 1ST HOSP IP/OBS MODERATE 55: CPT | Performed by: NURSE PRACTITIONER

## 2021-04-21 PROCEDURE — 85025 COMPLETE CBC W/AUTO DIFF WBC: CPT | Performed by: NURSE PRACTITIONER

## 2021-04-21 PROCEDURE — 84100 ASSAY OF PHOSPHORUS: CPT | Performed by: INTERNAL MEDICINE

## 2021-04-21 PROCEDURE — 25010000002 CEFTRIAXONE PER 250 MG: Performed by: INTERNAL MEDICINE

## 2021-04-21 PROCEDURE — 83735 ASSAY OF MAGNESIUM: CPT | Performed by: NURSE PRACTITIONER

## 2021-04-21 PROCEDURE — 99233 SBSQ HOSP IP/OBS HIGH 50: CPT | Performed by: HOSPITALIST

## 2021-04-21 PROCEDURE — 83880 ASSAY OF NATRIURETIC PEPTIDE: CPT | Performed by: INTERNAL MEDICINE

## 2021-04-21 PROCEDURE — 82962 GLUCOSE BLOOD TEST: CPT

## 2021-04-21 PROCEDURE — 80053 COMPREHEN METABOLIC PANEL: CPT | Performed by: NURSE PRACTITIONER

## 2021-04-21 PROCEDURE — 99233 SBSQ HOSP IP/OBS HIGH 50: CPT | Performed by: INTERNAL MEDICINE

## 2021-04-21 PROCEDURE — 85007 BL SMEAR W/DIFF WBC COUNT: CPT | Performed by: NURSE PRACTITIONER

## 2021-04-21 PROCEDURE — 99233 SBSQ HOSP IP/OBS HIGH 50: CPT | Performed by: NURSE PRACTITIONER

## 2021-04-21 RX ORDER — DEXTROSE MONOHYDRATE 50 MG/ML
50 INJECTION, SOLUTION INTRAVENOUS CONTINUOUS
Status: DISCONTINUED | OUTPATIENT
Start: 2021-04-21 | End: 2021-04-29 | Stop reason: HOSPADM

## 2021-04-21 RX ADMIN — DOCUSATE SODIUM 50 MG AND SENNOSIDES 8.6 MG 2 TABLET: 8.6; 5 TABLET, FILM COATED ORAL at 21:26

## 2021-04-21 RX ADMIN — MIDODRINE HYDROCHLORIDE 5 MG: 5 TABLET ORAL at 21:26

## 2021-04-21 RX ADMIN — FEBUXOSTAT 40 MG: 40 TABLET ORAL at 21:26

## 2021-04-21 RX ADMIN — Medication 10 ML: at 21:26

## 2021-04-21 RX ADMIN — HEPARIN SODIUM 5000 UNITS: 5000 INJECTION INTRAVENOUS; SUBCUTANEOUS at 21:25

## 2021-04-21 RX ADMIN — Medication: at 21:27

## 2021-04-21 RX ADMIN — DEXTROSE MONOHYDRATE 25 G: 500 INJECTION PARENTERAL at 18:17

## 2021-04-21 RX ADMIN — COLLAGENASE SANTYL: 250 OINTMENT TOPICAL at 08:58

## 2021-04-21 RX ADMIN — METRONIDAZOLE 500 MG: 500 INJECTION, SOLUTION INTRAVENOUS at 16:09

## 2021-04-21 RX ADMIN — METRONIDAZOLE 500 MG: 500 INJECTION, SOLUTION INTRAVENOUS at 08:59

## 2021-04-21 RX ADMIN — URSODIOL 300 MG: 300 CAPSULE ORAL at 21:26

## 2021-04-21 RX ADMIN — CEFTRIAXONE SODIUM 2 G: 2 INJECTION, POWDER, FOR SOLUTION INTRAMUSCULAR; INTRAVENOUS at 11:17

## 2021-04-21 RX ADMIN — Medication 10 ML: at 08:58

## 2021-04-21 RX ADMIN — HEPARIN SODIUM 3000 UNITS: 1000 INJECTION INTRAVENOUS; SUBCUTANEOUS at 15:44

## 2021-04-21 RX ADMIN — LACTULOSE 20 G: 20 SOLUTION ORAL at 21:26

## 2021-04-21 RX ADMIN — Medication 1 APPLICATION: at 08:57

## 2021-04-21 RX ADMIN — DEXTROSE MONOHYDRATE 50 ML/HR: 50 INJECTION, SOLUTION INTRAVENOUS at 11:20

## 2021-04-21 RX ADMIN — DEXTROSE MONOHYDRATE 25 G: 500 INJECTION PARENTERAL at 17:49

## 2021-04-22 LAB
ALBUMIN SERPL-MCNC: 1.9 G/DL (ref 3.5–5.2)
ALBUMIN/GLOB SERPL: 0.6 G/DL
ALP SERPL-CCNC: 271 U/L (ref 39–117)
ALT SERPL W P-5'-P-CCNC: 9 U/L (ref 1–33)
ANION GAP SERPL CALCULATED.3IONS-SCNC: 10 MMOL/L (ref 5–15)
ANISOCYTOSIS BLD QL: ABNORMAL
AST SERPL-CCNC: 19 U/L (ref 1–32)
BILIRUB SERPL-MCNC: 0.5 MG/DL (ref 0–1.2)
BUN SERPL-MCNC: 42 MG/DL (ref 8–23)
BUN/CREAT SERPL: 16.5 (ref 7–25)
CALCIUM SPEC-SCNC: 7.9 MG/DL (ref 8.6–10.5)
CHLORIDE SERPL-SCNC: 98 MMOL/L (ref 98–107)
CO2 SERPL-SCNC: 26 MMOL/L (ref 22–29)
CREAT SERPL-MCNC: 2.54 MG/DL (ref 0.57–1)
DEPRECATED RDW RBC AUTO: 53.8 FL (ref 37–54)
ERYTHROCYTE [DISTWIDTH] IN BLOOD BY AUTOMATED COUNT: 16.3 % (ref 12.3–15.4)
GFR SERPL CREATININE-BSD FRML MDRD: 18 ML/MIN/1.73
GLOBULIN UR ELPH-MCNC: 3.4 GM/DL
GLUCOSE BLDC GLUCOMTR-MCNC: 118 MG/DL (ref 70–105)
GLUCOSE BLDC GLUCOMTR-MCNC: 96 MG/DL (ref 70–105)
GLUCOSE SERPL-MCNC: 119 MG/DL (ref 65–99)
HCT VFR BLD AUTO: 29.5 % (ref 34–46.6)
HGB BLD-MCNC: 9.5 G/DL (ref 12–15.9)
LARGE PLATELETS: ABNORMAL
LYMPHOCYTES # BLD MANUAL: 0.3 10*3/MM3 (ref 0.7–3.1)
LYMPHOCYTES NFR BLD MANUAL: 2 % (ref 19.6–45.3)
LYMPHOCYTES NFR BLD MANUAL: 3 % (ref 5–12)
MAGNESIUM SERPL-MCNC: 1.5 MG/DL (ref 1.6–2.4)
MCH RBC QN AUTO: 30.3 PG (ref 26.6–33)
MCHC RBC AUTO-ENTMCNC: 32.1 G/DL (ref 31.5–35.7)
MCV RBC AUTO: 94.4 FL (ref 79–97)
METAMYELOCYTES NFR BLD MANUAL: 2 % (ref 0–0)
MONOCYTES # BLD AUTO: 0.45 10*3/MM3 (ref 0.1–0.9)
NEUTROPHILS # BLD AUTO: 13.86 10*3/MM3 (ref 1.7–7)
NEUTROPHILS NFR BLD MANUAL: 91 % (ref 42.7–76)
NEUTS BAND NFR BLD MANUAL: 2 % (ref 0–5)
NT-PROBNP SERPL-MCNC: ABNORMAL PG/ML (ref 0–1800)
PHOSPHATE SERPL-MCNC: 3 MG/DL (ref 2.5–4.5)
PLATELET # BLD AUTO: 52 10*3/MM3 (ref 140–450)
PMV BLD AUTO: 8.2 FL (ref 6–12)
POTASSIUM SERPL-SCNC: 3.4 MMOL/L (ref 3.5–5.2)
PROT SERPL-MCNC: 5.3 G/DL (ref 6–8.5)
RBC # BLD AUTO: 3.12 10*6/MM3 (ref 3.77–5.28)
SCAN SLIDE: NORMAL
SMALL PLATELETS BLD QL SMEAR: ABNORMAL
SODIUM SERPL-SCNC: 134 MMOL/L (ref 136–145)
WBC # BLD AUTO: 14.9 10*3/MM3 (ref 3.4–10.8)
WBC MORPH BLD: NORMAL

## 2021-04-22 PROCEDURE — 82962 GLUCOSE BLOOD TEST: CPT

## 2021-04-22 PROCEDURE — 97535 SELF CARE MNGMENT TRAINING: CPT

## 2021-04-22 PROCEDURE — 83735 ASSAY OF MAGNESIUM: CPT | Performed by: NURSE PRACTITIONER

## 2021-04-22 PROCEDURE — 80053 COMPREHEN METABOLIC PANEL: CPT | Performed by: NURSE PRACTITIONER

## 2021-04-22 PROCEDURE — 99232 SBSQ HOSP IP/OBS MODERATE 35: CPT | Performed by: NURSE PRACTITIONER

## 2021-04-22 PROCEDURE — 84100 ASSAY OF PHOSPHORUS: CPT | Performed by: INTERNAL MEDICINE

## 2021-04-22 PROCEDURE — 85025 COMPLETE CBC W/AUTO DIFF WBC: CPT | Performed by: NURSE PRACTITIONER

## 2021-04-22 PROCEDURE — 86022 PLATELET ANTIBODIES: CPT | Performed by: NURSE PRACTITIONER

## 2021-04-22 PROCEDURE — 99233 SBSQ HOSP IP/OBS HIGH 50: CPT | Performed by: HOSPITALIST

## 2021-04-22 PROCEDURE — 25010000002 HEPARIN (PORCINE) PER 1000 UNITS: Performed by: INTERNAL MEDICINE

## 2021-04-22 PROCEDURE — 85007 BL SMEAR W/DIFF WBC COUNT: CPT | Performed by: NURSE PRACTITIONER

## 2021-04-22 PROCEDURE — 99233 SBSQ HOSP IP/OBS HIGH 50: CPT | Performed by: INTERNAL MEDICINE

## 2021-04-22 PROCEDURE — 92526 ORAL FUNCTION THERAPY: CPT

## 2021-04-22 PROCEDURE — 97530 THERAPEUTIC ACTIVITIES: CPT

## 2021-04-22 PROCEDURE — 99233 SBSQ HOSP IP/OBS HIGH 50: CPT | Performed by: SURGERY

## 2021-04-22 PROCEDURE — 25010000002 MAGNESIUM SULFATE IN D5W 1G/100ML (PREMIX) 1-5 GM/100ML-% SOLUTION: Performed by: NURSE PRACTITIONER

## 2021-04-22 PROCEDURE — 83880 ASSAY OF NATRIURETIC PEPTIDE: CPT | Performed by: INTERNAL MEDICINE

## 2021-04-22 PROCEDURE — 25010000002 CEFTRIAXONE PER 250 MG: Performed by: INTERNAL MEDICINE

## 2021-04-22 RX ORDER — POTASSIUM CHLORIDE 1.5 G/1.77G
40 POWDER, FOR SOLUTION ORAL ONCE
Status: COMPLETED | OUTPATIENT
Start: 2021-04-22 | End: 2021-04-22

## 2021-04-22 RX ORDER — MAGNESIUM SULFATE 1 G/100ML
1 INJECTION INTRAVENOUS ONCE
Status: COMPLETED | OUTPATIENT
Start: 2021-04-22 | End: 2021-04-22

## 2021-04-22 RX ADMIN — FEBUXOSTAT 40 MG: 40 TABLET ORAL at 21:12

## 2021-04-22 RX ADMIN — METRONIDAZOLE 500 MG: 500 INJECTION, SOLUTION INTRAVENOUS at 16:23

## 2021-04-22 RX ADMIN — MIDODRINE HYDROCHLORIDE 5 MG: 5 TABLET ORAL at 16:23

## 2021-04-22 RX ADMIN — METRONIDAZOLE 500 MG: 500 INJECTION, SOLUTION INTRAVENOUS at 09:02

## 2021-04-22 RX ADMIN — Medication 10 ML: at 09:03

## 2021-04-22 RX ADMIN — FEBUXOSTAT 40 MG: 40 TABLET ORAL at 09:03

## 2021-04-22 RX ADMIN — Medication 10 ML: at 21:13

## 2021-04-22 RX ADMIN — DIGOXIN 125 MCG: 125 TABLET ORAL at 16:23

## 2021-04-22 RX ADMIN — CEFTRIAXONE SODIUM 2 G: 2 INJECTION, POWDER, FOR SOLUTION INTRAMUSCULAR; INTRAVENOUS at 11:45

## 2021-04-22 RX ADMIN — Medication 10 ML: at 21:12

## 2021-04-22 RX ADMIN — COLLAGENASE SANTYL: 250 OINTMENT TOPICAL at 09:04

## 2021-04-22 RX ADMIN — ASPIRIN 81 MG CHEWABLE TABLET 81 MG: 81 TABLET CHEWABLE at 09:03

## 2021-04-22 RX ADMIN — FAMOTIDINE 20 MG: 20 TABLET, FILM COATED ORAL at 09:02

## 2021-04-22 RX ADMIN — Medication 10 ML: at 09:04

## 2021-04-22 RX ADMIN — METRONIDAZOLE 500 MG: 500 INJECTION, SOLUTION INTRAVENOUS at 00:57

## 2021-04-22 RX ADMIN — Medication: at 21:11

## 2021-04-22 RX ADMIN — DOCUSATE SODIUM 50 MG AND SENNOSIDES 8.6 MG 2 TABLET: 8.6; 5 TABLET, FILM COATED ORAL at 09:02

## 2021-04-22 RX ADMIN — Medication: at 09:04

## 2021-04-22 RX ADMIN — MAGNESIUM SULFATE HEPTAHYDRATE 1 G: 1 INJECTION, SOLUTION INTRAVENOUS at 09:02

## 2021-04-22 RX ADMIN — MIDODRINE HYDROCHLORIDE 5 MG: 5 TABLET ORAL at 06:27

## 2021-04-22 RX ADMIN — POTASSIUM CHLORIDE 40 MEQ: 1.5 POWDER, FOR SOLUTION ORAL at 09:03

## 2021-04-22 RX ADMIN — HEPARIN SODIUM 5000 UNITS: 5000 INJECTION INTRAVENOUS; SUBCUTANEOUS at 09:03

## 2021-04-22 RX ADMIN — URSODIOL 300 MG: 300 CAPSULE ORAL at 09:02

## 2021-04-22 RX ADMIN — MIDODRINE HYDROCHLORIDE 5 MG: 5 TABLET ORAL at 21:12

## 2021-04-22 RX ADMIN — URSODIOL 300 MG: 300 CAPSULE ORAL at 21:12

## 2021-04-22 RX ADMIN — DEXTROSE MONOHYDRATE 50 ML/HR: 50 INJECTION, SOLUTION INTRAVENOUS at 09:03

## 2021-04-23 LAB
ALBUMIN SERPL-MCNC: 1.9 G/DL (ref 3.5–5.2)
ALBUMIN/GLOB SERPL: 0.5 G/DL
ALP SERPL-CCNC: 224 U/L (ref 39–117)
ALT SERPL W P-5'-P-CCNC: 6 U/L (ref 1–33)
ANION GAP SERPL CALCULATED.3IONS-SCNC: 10 MMOL/L (ref 5–15)
ANISOCYTOSIS BLD QL: ABNORMAL
AST SERPL-CCNC: 15 U/L (ref 1–32)
BILIRUB SERPL-MCNC: 0.5 MG/DL (ref 0–1.2)
BUN SERPL-MCNC: 49 MG/DL (ref 8–23)
BUN/CREAT SERPL: 17.6 (ref 7–25)
CALCIUM SPEC-SCNC: 7.7 MG/DL (ref 8.6–10.5)
CHLORIDE SERPL-SCNC: 98 MMOL/L (ref 98–107)
CO2 SERPL-SCNC: 25 MMOL/L (ref 22–29)
CREAT SERPL-MCNC: 2.79 MG/DL (ref 0.57–1)
DEPRECATED RDW RBC AUTO: 54.7 FL (ref 37–54)
EOSINOPHIL # BLD MANUAL: 0.13 10*3/MM3 (ref 0–0.4)
EOSINOPHIL NFR BLD MANUAL: 1 % (ref 0.3–6.2)
ERYTHROCYTE [DISTWIDTH] IN BLOOD BY AUTOMATED COUNT: 16.4 % (ref 12.3–15.4)
GFR SERPL CREATININE-BSD FRML MDRD: 16 ML/MIN/1.73
GLOBULIN UR ELPH-MCNC: 3.6 GM/DL
GLUCOSE BLDC GLUCOMTR-MCNC: 110 MG/DL (ref 70–105)
GLUCOSE BLDC GLUCOMTR-MCNC: 83 MG/DL (ref 70–105)
GLUCOSE SERPL-MCNC: 88 MG/DL (ref 65–99)
HCT VFR BLD AUTO: 28.8 % (ref 34–46.6)
HGB BLD-MCNC: 9.3 G/DL (ref 12–15.9)
LYMPHOCYTES # BLD MANUAL: 0.13 10*3/MM3 (ref 0.7–3.1)
LYMPHOCYTES NFR BLD MANUAL: 1 % (ref 19.6–45.3)
LYMPHOCYTES NFR BLD MANUAL: 4 % (ref 5–12)
MAGNESIUM SERPL-MCNC: 1.7 MG/DL (ref 1.6–2.4)
MCH RBC QN AUTO: 30.2 PG (ref 26.6–33)
MCHC RBC AUTO-ENTMCNC: 32.2 G/DL (ref 31.5–35.7)
MCV RBC AUTO: 93.7 FL (ref 79–97)
MONOCYTES # BLD AUTO: 0.51 10*3/MM3 (ref 0.1–0.9)
NEUTROPHILS # BLD AUTO: 12.03 10*3/MM3 (ref 1.7–7)
NEUTROPHILS NFR BLD MANUAL: 92 % (ref 42.7–76)
NEUTS BAND NFR BLD MANUAL: 2 % (ref 0–5)
NRBC SPEC MANUAL: 1 /100 WBC (ref 0–0.2)
NT-PROBNP SERPL-MCNC: ABNORMAL PG/ML (ref 0–1800)
PHOSPHATE SERPL-MCNC: 3.6 MG/DL (ref 2.5–4.5)
PLATELET # BLD AUTO: 60 10*3/MM3 (ref 140–450)
PMV BLD AUTO: 7.8 FL (ref 6–12)
POTASSIUM SERPL-SCNC: 3.2 MMOL/L (ref 3.5–5.2)
PROT SERPL-MCNC: 5.5 G/DL (ref 6–8.5)
RBC # BLD AUTO: 3.08 10*6/MM3 (ref 3.77–5.28)
SCAN SLIDE: NORMAL
SMALL PLATELETS BLD QL SMEAR: ABNORMAL
SODIUM SERPL-SCNC: 133 MMOL/L (ref 136–145)
TOXIC GRANULATION: ABNORMAL
WBC # BLD AUTO: 12.8 10*3/MM3 (ref 3.4–10.8)

## 2021-04-23 PROCEDURE — 83735 ASSAY OF MAGNESIUM: CPT | Performed by: NURSE PRACTITIONER

## 2021-04-23 PROCEDURE — 83880 ASSAY OF NATRIURETIC PEPTIDE: CPT | Performed by: INTERNAL MEDICINE

## 2021-04-23 PROCEDURE — 97535 SELF CARE MNGMENT TRAINING: CPT

## 2021-04-23 PROCEDURE — 99233 SBSQ HOSP IP/OBS HIGH 50: CPT | Performed by: SURGERY

## 2021-04-23 PROCEDURE — 85007 BL SMEAR W/DIFF WBC COUNT: CPT | Performed by: NURSE PRACTITIONER

## 2021-04-23 PROCEDURE — 84100 ASSAY OF PHOSPHORUS: CPT | Performed by: INTERNAL MEDICINE

## 2021-04-23 PROCEDURE — 80053 COMPREHEN METABOLIC PANEL: CPT | Performed by: NURSE PRACTITIONER

## 2021-04-23 PROCEDURE — 97530 THERAPEUTIC ACTIVITIES: CPT

## 2021-04-23 PROCEDURE — 85025 COMPLETE CBC W/AUTO DIFF WBC: CPT | Performed by: NURSE PRACTITIONER

## 2021-04-23 PROCEDURE — 82962 GLUCOSE BLOOD TEST: CPT

## 2021-04-23 PROCEDURE — 99233 SBSQ HOSP IP/OBS HIGH 50: CPT | Performed by: HOSPITALIST

## 2021-04-23 PROCEDURE — 25010000002 CEFTRIAXONE PER 250 MG: Performed by: INTERNAL MEDICINE

## 2021-04-23 RX ORDER — ALBUMIN (HUMAN) 12.5 G/50ML
12.5 SOLUTION INTRAVENOUS AS NEEDED
Status: DISPENSED | OUTPATIENT
Start: 2021-04-23 | End: 2021-04-24

## 2021-04-23 RX ADMIN — Medication: at 10:58

## 2021-04-23 RX ADMIN — FEBUXOSTAT 40 MG: 40 TABLET ORAL at 23:02

## 2021-04-23 RX ADMIN — MIDODRINE HYDROCHLORIDE 5 MG: 5 TABLET ORAL at 23:03

## 2021-04-23 RX ADMIN — DOCUSATE SODIUM 50 MG AND SENNOSIDES 8.6 MG 2 TABLET: 8.6; 5 TABLET, FILM COATED ORAL at 23:02

## 2021-04-23 RX ADMIN — FAMOTIDINE 20 MG: 20 TABLET, FILM COATED ORAL at 10:55

## 2021-04-23 RX ADMIN — METRONIDAZOLE 500 MG: 500 INJECTION, SOLUTION INTRAVENOUS at 17:44

## 2021-04-23 RX ADMIN — Medication: at 23:04

## 2021-04-23 RX ADMIN — COLLAGENASE SANTYL: 250 OINTMENT TOPICAL at 10:57

## 2021-04-23 RX ADMIN — METRONIDAZOLE 500 MG: 500 INJECTION, SOLUTION INTRAVENOUS at 00:58

## 2021-04-23 RX ADMIN — MIDODRINE HYDROCHLORIDE 5 MG: 5 TABLET ORAL at 17:44

## 2021-04-23 RX ADMIN — METRONIDAZOLE 500 MG: 500 INJECTION, SOLUTION INTRAVENOUS at 10:56

## 2021-04-23 RX ADMIN — TRAMADOL HYDROCHLORIDE 50 MG: 50 TABLET, FILM COATED ORAL at 01:14

## 2021-04-23 RX ADMIN — URSODIOL 300 MG: 300 CAPSULE ORAL at 10:55

## 2021-04-23 RX ADMIN — Medication 10 ML: at 10:56

## 2021-04-23 RX ADMIN — DOCUSATE SODIUM 50 MG AND SENNOSIDES 8.6 MG 2 TABLET: 8.6; 5 TABLET, FILM COATED ORAL at 10:55

## 2021-04-23 RX ADMIN — FEBUXOSTAT 40 MG: 40 TABLET ORAL at 10:55

## 2021-04-23 RX ADMIN — Medication 10 ML: at 10:58

## 2021-04-23 RX ADMIN — MIDODRINE HYDROCHLORIDE 5 MG: 5 TABLET ORAL at 06:57

## 2021-04-23 RX ADMIN — METRONIDAZOLE 500 MG: 500 INJECTION, SOLUTION INTRAVENOUS at 23:02

## 2021-04-23 RX ADMIN — ACETAMINOPHEN 650 MG: 325 TABLET, FILM COATED ORAL at 23:06

## 2021-04-23 RX ADMIN — DIGOXIN 125 MCG: 125 TABLET ORAL at 10:55

## 2021-04-23 RX ADMIN — CEFTRIAXONE SODIUM 2 G: 2 INJECTION, POWDER, FOR SOLUTION INTRAMUSCULAR; INTRAVENOUS at 10:55

## 2021-04-23 RX ADMIN — URSODIOL 300 MG: 300 CAPSULE ORAL at 23:02

## 2021-04-23 RX ADMIN — ASPIRIN 81 MG CHEWABLE TABLET 81 MG: 81 TABLET CHEWABLE at 10:55

## 2021-04-23 RX ADMIN — Medication 10 ML: at 23:03

## 2021-04-23 RX ADMIN — Medication 10 ML: at 23:02

## 2021-04-24 LAB
ALBUMIN SERPL-MCNC: 1.8 G/DL (ref 3.5–5.2)
ALBUMIN/GLOB SERPL: 0.5 G/DL
ALP SERPL-CCNC: 219 U/L (ref 39–117)
ALT SERPL W P-5'-P-CCNC: 6 U/L (ref 1–33)
ANION GAP SERPL CALCULATED.3IONS-SCNC: 11 MMOL/L (ref 5–15)
ANISOCYTOSIS BLD QL: ABNORMAL
AST SERPL-CCNC: 14 U/L (ref 1–32)
BILIRUB SERPL-MCNC: 0.5 MG/DL (ref 0–1.2)
BUN SERPL-MCNC: 54 MG/DL (ref 8–23)
BUN/CREAT SERPL: 18.8 (ref 7–25)
CALCIUM SPEC-SCNC: 7.7 MG/DL (ref 8.6–10.5)
CHLORIDE SERPL-SCNC: 97 MMOL/L (ref 98–107)
CO2 SERPL-SCNC: 23 MMOL/L (ref 22–29)
CREAT SERPL-MCNC: 2.88 MG/DL (ref 0.57–1)
DEPRECATED RDW RBC AUTO: 53.8 FL (ref 37–54)
ERYTHROCYTE [DISTWIDTH] IN BLOOD BY AUTOMATED COUNT: 16.3 % (ref 12.3–15.4)
GFR SERPL CREATININE-BSD FRML MDRD: 16 ML/MIN/1.73
GLOBULIN UR ELPH-MCNC: 3.8 GM/DL
GLUCOSE SERPL-MCNC: 80 MG/DL (ref 65–99)
HCT VFR BLD AUTO: 30.3 % (ref 34–46.6)
HGB BLD-MCNC: 10.1 G/DL (ref 12–15.9)
LARGE PLATELETS: ABNORMAL
LYMPHOCYTES # BLD MANUAL: 0 10*3/MM3 (ref 0.7–3.1)
LYMPHOCYTES NFR BLD MANUAL: 0 % (ref 19.6–45.3)
LYMPHOCYTES NFR BLD MANUAL: 3 % (ref 5–12)
MAGNESIUM SERPL-MCNC: 1.7 MG/DL (ref 1.6–2.4)
MCH RBC QN AUTO: 31 PG (ref 26.6–33)
MCHC RBC AUTO-ENTMCNC: 33.3 G/DL (ref 31.5–35.7)
MCV RBC AUTO: 93 FL (ref 79–97)
MONOCYTES # BLD AUTO: 0.37 10*3/MM3 (ref 0.1–0.9)
MYELOCYTES NFR BLD MANUAL: 2 % (ref 0–0)
NEUTROPHILS # BLD AUTO: 11.78 10*3/MM3 (ref 1.7–7)
NEUTROPHILS NFR BLD MANUAL: 82 % (ref 42.7–76)
NEUTS BAND NFR BLD MANUAL: 13 % (ref 0–5)
NRBC SPEC MANUAL: 1 /100 WBC (ref 0–0.2)
NT-PROBNP SERPL-MCNC: ABNORMAL PG/ML (ref 0–1800)
PF4 HEPARIN CMPLX IGG SERPL IA: 0.07 OD (ref 0–0.4)
PHOSPHATE SERPL-MCNC: 4.1 MG/DL (ref 2.5–4.5)
PLATELET # BLD AUTO: 65 10*3/MM3 (ref 140–450)
PMV BLD AUTO: 8 FL (ref 6–12)
POTASSIUM SERPL-SCNC: 3.2 MMOL/L (ref 3.5–5.2)
PROT SERPL-MCNC: 5.6 G/DL (ref 6–8.5)
RBC # BLD AUTO: 3.26 10*6/MM3 (ref 3.77–5.28)
SCAN SLIDE: NORMAL
SMALL PLATELETS BLD QL SMEAR: ABNORMAL
SODIUM SERPL-SCNC: 131 MMOL/L (ref 136–145)
TOXIC GRANULATION: ABNORMAL
WBC # BLD AUTO: 12.4 10*3/MM3 (ref 3.4–10.8)

## 2021-04-24 PROCEDURE — 84100 ASSAY OF PHOSPHORUS: CPT | Performed by: INTERNAL MEDICINE

## 2021-04-24 PROCEDURE — 25010000002 CEFTRIAXONE PER 250 MG: Performed by: INTERNAL MEDICINE

## 2021-04-24 PROCEDURE — 83880 ASSAY OF NATRIURETIC PEPTIDE: CPT | Performed by: INTERNAL MEDICINE

## 2021-04-24 PROCEDURE — 25010000002 HEPARIN (PORCINE) PER 1000 UNITS: Performed by: INTERNAL MEDICINE

## 2021-04-24 PROCEDURE — 25010000002 ONDANSETRON PER 1 MG: Performed by: INTERNAL MEDICINE

## 2021-04-24 PROCEDURE — 85007 BL SMEAR W/DIFF WBC COUNT: CPT | Performed by: NURSE PRACTITIONER

## 2021-04-24 PROCEDURE — 80053 COMPREHEN METABOLIC PANEL: CPT | Performed by: NURSE PRACTITIONER

## 2021-04-24 PROCEDURE — 85025 COMPLETE CBC W/AUTO DIFF WBC: CPT | Performed by: NURSE PRACTITIONER

## 2021-04-24 PROCEDURE — 83735 ASSAY OF MAGNESIUM: CPT | Performed by: NURSE PRACTITIONER

## 2021-04-24 PROCEDURE — 99233 SBSQ HOSP IP/OBS HIGH 50: CPT | Performed by: HOSPITALIST

## 2021-04-24 RX ORDER — HEPARIN SODIUM (PORCINE) LOCK FLUSH IV SOLN 100 UNIT/ML 100 UNIT/ML
5000 SOLUTION INTRAVENOUS AS NEEDED
Status: DISCONTINUED | OUTPATIENT
Start: 2021-04-24 | End: 2021-04-24

## 2021-04-24 RX ADMIN — Medication: at 21:50

## 2021-04-24 RX ADMIN — METRONIDAZOLE 500 MG: 500 INJECTION, SOLUTION INTRAVENOUS at 16:20

## 2021-04-24 RX ADMIN — MIDODRINE HYDROCHLORIDE 5 MG: 5 TABLET ORAL at 21:44

## 2021-04-24 RX ADMIN — LACTULOSE 20 G: 20 SOLUTION ORAL at 12:59

## 2021-04-24 RX ADMIN — URSODIOL 300 MG: 300 CAPSULE ORAL at 21:44

## 2021-04-24 RX ADMIN — URSODIOL 300 MG: 300 CAPSULE ORAL at 13:00

## 2021-04-24 RX ADMIN — METRONIDAZOLE 500 MG: 500 INJECTION, SOLUTION INTRAVENOUS at 13:02

## 2021-04-24 RX ADMIN — DOCUSATE SODIUM 50 MG AND SENNOSIDES 8.6 MG 2 TABLET: 8.6; 5 TABLET, FILM COATED ORAL at 21:44

## 2021-04-24 RX ADMIN — Medication 10 ML: at 13:02

## 2021-04-24 RX ADMIN — FAMOTIDINE 20 MG: 20 TABLET, FILM COATED ORAL at 13:00

## 2021-04-24 RX ADMIN — DIGOXIN 125 MCG: 125 TABLET ORAL at 12:59

## 2021-04-24 RX ADMIN — Medication 10 ML: at 21:49

## 2021-04-24 RX ADMIN — DEXTROSE MONOHYDRATE 50 ML/HR: 50 INJECTION, SOLUTION INTRAVENOUS at 21:45

## 2021-04-24 RX ADMIN — HEPARIN SODIUM 3000 UNITS: 1000 INJECTION INTRAVENOUS; SUBCUTANEOUS at 11:24

## 2021-04-24 RX ADMIN — DOCUSATE SODIUM 50 MG AND SENNOSIDES 8.6 MG 2 TABLET: 8.6; 5 TABLET, FILM COATED ORAL at 13:00

## 2021-04-24 RX ADMIN — Medication: at 16:21

## 2021-04-24 RX ADMIN — COLLAGENASE SANTYL: 250 OINTMENT TOPICAL at 16:19

## 2021-04-24 RX ADMIN — ONDANSETRON 4 MG: 2 INJECTION INTRAMUSCULAR; INTRAVENOUS at 21:44

## 2021-04-24 RX ADMIN — FEBUXOSTAT 40 MG: 40 TABLET ORAL at 21:44

## 2021-04-24 RX ADMIN — Medication 10 ML: at 21:44

## 2021-04-24 RX ADMIN — ASPIRIN 81 MG CHEWABLE TABLET 81 MG: 81 TABLET CHEWABLE at 12:59

## 2021-04-24 RX ADMIN — MIDODRINE HYDROCHLORIDE 5 MG: 5 TABLET ORAL at 05:57

## 2021-04-24 RX ADMIN — CEFTRIAXONE SODIUM 2 G: 2 INJECTION, POWDER, FOR SOLUTION INTRAMUSCULAR; INTRAVENOUS at 14:14

## 2021-04-24 RX ADMIN — FEBUXOSTAT 40 MG: 40 TABLET ORAL at 12:59

## 2021-04-25 LAB
ALBUMIN SERPL-MCNC: 1.9 G/DL (ref 3.5–5.2)
ALBUMIN/GLOB SERPL: 0.5 G/DL
ALP SERPL-CCNC: 211 U/L (ref 39–117)
ALT SERPL W P-5'-P-CCNC: 5 U/L (ref 1–33)
ANION GAP SERPL CALCULATED.3IONS-SCNC: 12 MMOL/L (ref 5–15)
ANISOCYTOSIS BLD QL: ABNORMAL
AST SERPL-CCNC: 15 U/L (ref 1–32)
BILIRUB SERPL-MCNC: 0.5 MG/DL (ref 0–1.2)
BUN SERPL-MCNC: 27 MG/DL (ref 8–23)
BUN/CREAT SERPL: 13.6 (ref 7–25)
CALCIUM SPEC-SCNC: 7.7 MG/DL (ref 8.6–10.5)
CHLORIDE SERPL-SCNC: 100 MMOL/L (ref 98–107)
CLUMPED PLATELETS: ABNORMAL
CO2 SERPL-SCNC: 22 MMOL/L (ref 22–29)
CREAT SERPL-MCNC: 1.99 MG/DL (ref 0.57–1)
DEPRECATED RDW RBC AUTO: 52.5 FL (ref 37–54)
ERYTHROCYTE [DISTWIDTH] IN BLOOD BY AUTOMATED COUNT: 15.8 % (ref 12.3–15.4)
GFR SERPL CREATININE-BSD FRML MDRD: 24 ML/MIN/1.73
GLOBULIN UR ELPH-MCNC: 3.9 GM/DL
GLUCOSE SERPL-MCNC: 87 MG/DL (ref 65–99)
HCT VFR BLD AUTO: 29.5 % (ref 34–46.6)
HGB BLD-MCNC: 9.7 G/DL (ref 12–15.9)
LARGE PLATELETS: ABNORMAL
LYMPHOCYTES # BLD MANUAL: 0 10*3/MM3 (ref 0.7–3.1)
LYMPHOCYTES NFR BLD MANUAL: 0 % (ref 19.6–45.3)
LYMPHOCYTES NFR BLD MANUAL: 4 % (ref 5–12)
MAGNESIUM SERPL-MCNC: 1.4 MG/DL (ref 1.6–2.4)
MCH RBC QN AUTO: 30.6 PG (ref 26.6–33)
MCHC RBC AUTO-ENTMCNC: 32.7 G/DL (ref 31.5–35.7)
MCV RBC AUTO: 93.5 FL (ref 79–97)
METAMYELOCYTES NFR BLD MANUAL: 3 % (ref 0–0)
MONOCYTES # BLD AUTO: 0.48 10*3/MM3 (ref 0.1–0.9)
NEUTROPHILS # BLD AUTO: 11.25 10*3/MM3 (ref 1.7–7)
NEUTROPHILS NFR BLD MANUAL: 81 % (ref 42.7–76)
NEUTS BAND NFR BLD MANUAL: 12 % (ref 0–5)
NT-PROBNP SERPL-MCNC: ABNORMAL PG/ML (ref 0–1800)
PHOSPHATE SERPL-MCNC: 3.7 MG/DL (ref 2.5–4.5)
PLATELET # BLD AUTO: 43 10*3/MM3 (ref 140–450)
PMV BLD AUTO: 8.4 FL (ref 6–12)
POTASSIUM SERPL-SCNC: 3.3 MMOL/L (ref 3.5–5.2)
PROT SERPL-MCNC: 5.8 G/DL (ref 6–8.5)
RBC # BLD AUTO: 3.15 10*6/MM3 (ref 3.77–5.28)
SCAN SLIDE: NORMAL
SMALL PLATELETS BLD QL SMEAR: ABNORMAL
SODIUM SERPL-SCNC: 134 MMOL/L (ref 136–145)
TOXIC GRANULATION: ABNORMAL
WBC # BLD AUTO: 12.1 10*3/MM3 (ref 3.4–10.8)

## 2021-04-25 PROCEDURE — 99233 SBSQ HOSP IP/OBS HIGH 50: CPT | Performed by: HOSPITALIST

## 2021-04-25 PROCEDURE — 84100 ASSAY OF PHOSPHORUS: CPT | Performed by: INTERNAL MEDICINE

## 2021-04-25 PROCEDURE — 97530 THERAPEUTIC ACTIVITIES: CPT

## 2021-04-25 PROCEDURE — 83735 ASSAY OF MAGNESIUM: CPT | Performed by: NURSE PRACTITIONER

## 2021-04-25 PROCEDURE — 97112 NEUROMUSCULAR REEDUCATION: CPT

## 2021-04-25 PROCEDURE — 80053 COMPREHEN METABOLIC PANEL: CPT | Performed by: NURSE PRACTITIONER

## 2021-04-25 PROCEDURE — 85007 BL SMEAR W/DIFF WBC COUNT: CPT | Performed by: NURSE PRACTITIONER

## 2021-04-25 PROCEDURE — 25010000002 CEFTRIAXONE PER 250 MG: Performed by: INTERNAL MEDICINE

## 2021-04-25 PROCEDURE — 85025 COMPLETE CBC W/AUTO DIFF WBC: CPT | Performed by: NURSE PRACTITIONER

## 2021-04-25 PROCEDURE — 83880 ASSAY OF NATRIURETIC PEPTIDE: CPT | Performed by: INTERNAL MEDICINE

## 2021-04-25 RX ORDER — METRONIDAZOLE 500 MG/1
500 TABLET ORAL EVERY 8 HOURS SCHEDULED
Status: DISPENSED | OUTPATIENT
Start: 2021-04-25 | End: 2021-04-27

## 2021-04-25 RX ADMIN — FEBUXOSTAT 40 MG: 40 TABLET ORAL at 21:36

## 2021-04-25 RX ADMIN — CEFTRIAXONE SODIUM 2 G: 2 INJECTION, POWDER, FOR SOLUTION INTRAMUSCULAR; INTRAVENOUS at 12:12

## 2021-04-25 RX ADMIN — METRONIDAZOLE 500 MG: 500 TABLET ORAL at 21:36

## 2021-04-25 RX ADMIN — MIDODRINE HYDROCHLORIDE 5 MG: 5 TABLET ORAL at 14:06

## 2021-04-25 RX ADMIN — ASPIRIN 81 MG CHEWABLE TABLET 81 MG: 81 TABLET CHEWABLE at 09:25

## 2021-04-25 RX ADMIN — DOCUSATE SODIUM 50 MG AND SENNOSIDES 8.6 MG 2 TABLET: 8.6; 5 TABLET, FILM COATED ORAL at 21:37

## 2021-04-25 RX ADMIN — MIDODRINE HYDROCHLORIDE 5 MG: 5 TABLET ORAL at 21:36

## 2021-04-25 RX ADMIN — METRONIDAZOLE 500 MG: 500 INJECTION, SOLUTION INTRAVENOUS at 09:25

## 2021-04-25 RX ADMIN — FEBUXOSTAT 40 MG: 40 TABLET ORAL at 09:25

## 2021-04-25 RX ADMIN — URSODIOL 300 MG: 300 CAPSULE ORAL at 21:36

## 2021-04-25 RX ADMIN — Medication: at 21:38

## 2021-04-25 RX ADMIN — Medication 10 ML: at 21:37

## 2021-04-25 RX ADMIN — MIDODRINE HYDROCHLORIDE 5 MG: 5 TABLET ORAL at 05:45

## 2021-04-25 RX ADMIN — FAMOTIDINE 20 MG: 20 TABLET, FILM COATED ORAL at 09:26

## 2021-04-25 RX ADMIN — COLLAGENASE SANTYL: 250 OINTMENT TOPICAL at 12:12

## 2021-04-25 RX ADMIN — METRONIDAZOLE 500 MG: 500 INJECTION, SOLUTION INTRAVENOUS at 00:27

## 2021-04-25 RX ADMIN — URSODIOL 300 MG: 300 CAPSULE ORAL at 09:26

## 2021-04-25 RX ADMIN — Medication 10 ML: at 21:36

## 2021-04-25 RX ADMIN — METRONIDAZOLE 500 MG: 500 TABLET ORAL at 17:10

## 2021-04-25 RX ADMIN — Medication: at 12:13

## 2021-04-25 RX ADMIN — DOCUSATE SODIUM 50 MG AND SENNOSIDES 8.6 MG 2 TABLET: 8.6; 5 TABLET, FILM COATED ORAL at 09:25

## 2021-04-26 LAB
ALBUMIN SERPL-MCNC: 1.8 G/DL (ref 3.5–5.2)
ALBUMIN/GLOB SERPL: 0.5 G/DL
ALP SERPL-CCNC: 185 U/L (ref 39–117)
ALT SERPL W P-5'-P-CCNC: <5 U/L (ref 1–33)
ANION GAP SERPL CALCULATED.3IONS-SCNC: 12 MMOL/L (ref 5–15)
AST SERPL-CCNC: 14 U/L (ref 1–32)
BASOPHILS # BLD AUTO: 0 10*3/MM3 (ref 0–0.2)
BASOPHILS NFR BLD AUTO: 0.4 % (ref 0–1.5)
BILIRUB SERPL-MCNC: 0.4 MG/DL (ref 0–1.2)
BUN SERPL-MCNC: 36 MG/DL (ref 8–23)
BUN/CREAT SERPL: 14.4 (ref 7–25)
CALCIUM SPEC-SCNC: 7.3 MG/DL (ref 8.6–10.5)
CHLORIDE SERPL-SCNC: 96 MMOL/L (ref 98–107)
CLUMPED PLATELETS: PRESENT
CO2 SERPL-SCNC: 22 MMOL/L (ref 22–29)
CREAT SERPL-MCNC: 2.5 MG/DL (ref 0.57–1)
DEPRECATED RDW RBC AUTO: 52.9 FL (ref 37–54)
EOSINOPHIL # BLD AUTO: 0.1 10*3/MM3 (ref 0–0.4)
EOSINOPHIL NFR BLD AUTO: 1.2 % (ref 0.3–6.2)
ERYTHROCYTE [DISTWIDTH] IN BLOOD BY AUTOMATED COUNT: 16.1 % (ref 12.3–15.4)
GFR SERPL CREATININE-BSD FRML MDRD: 19 ML/MIN/1.73
GLOBULIN UR ELPH-MCNC: 3.9 GM/DL
GLUCOSE SERPL-MCNC: 82 MG/DL (ref 65–99)
HCT VFR BLD AUTO: 27.5 % (ref 34–46.6)
HGB BLD-MCNC: 9.2 G/DL (ref 12–15.9)
LYMPHOCYTES # BLD AUTO: 0.7 10*3/MM3 (ref 0.7–3.1)
LYMPHOCYTES NFR BLD AUTO: 6.1 % (ref 19.6–45.3)
MAGNESIUM SERPL-MCNC: 1.4 MG/DL (ref 1.6–2.4)
MCH RBC QN AUTO: 31.2 PG (ref 26.6–33)
MCHC RBC AUTO-ENTMCNC: 33.6 G/DL (ref 31.5–35.7)
MCV RBC AUTO: 93.1 FL (ref 79–97)
MONOCYTES # BLD AUTO: 0.9 10*3/MM3 (ref 0.1–0.9)
MONOCYTES NFR BLD AUTO: 7.8 % (ref 5–12)
NEUTROPHILS NFR BLD AUTO: 84.5 % (ref 42.7–76)
NEUTROPHILS NFR BLD AUTO: 9.3 10*3/MM3 (ref 1.7–7)
NRBC BLD AUTO-RTO: 0.1 /100 WBC (ref 0–0.2)
NT-PROBNP SERPL-MCNC: ABNORMAL PG/ML (ref 0–1800)
PHOSPHATE SERPL-MCNC: 4.4 MG/DL (ref 2.5–4.5)
PLATELET # BLD AUTO: 43 10*3/MM3 (ref 140–450)
PMV BLD AUTO: 7.6 FL (ref 6–12)
POTASSIUM SERPL-SCNC: 3.4 MMOL/L (ref 3.5–5.2)
PROT SERPL-MCNC: 5.7 G/DL (ref 6–8.5)
RBC # BLD AUTO: 2.95 10*6/MM3 (ref 3.77–5.28)
RBC MORPH BLD: NORMAL
SMALL PLATELETS BLD QL SMEAR: NORMAL
SODIUM SERPL-SCNC: 130 MMOL/L (ref 136–145)
WBC # BLD AUTO: 10.9 10*3/MM3 (ref 3.4–10.8)
WBC MORPH BLD: NORMAL

## 2021-04-26 PROCEDURE — 83735 ASSAY OF MAGNESIUM: CPT | Performed by: NURSE PRACTITIONER

## 2021-04-26 PROCEDURE — 82140 ASSAY OF AMMONIA: CPT | Performed by: NURSE PRACTITIONER

## 2021-04-26 PROCEDURE — 84100 ASSAY OF PHOSPHORUS: CPT | Performed by: INTERNAL MEDICINE

## 2021-04-26 PROCEDURE — 93005 ELECTROCARDIOGRAM TRACING: CPT | Performed by: INTERNAL MEDICINE

## 2021-04-26 PROCEDURE — 85025 COMPLETE CBC W/AUTO DIFF WBC: CPT | Performed by: NURSE PRACTITIONER

## 2021-04-26 PROCEDURE — 93010 ELECTROCARDIOGRAM REPORT: CPT | Performed by: INTERNAL MEDICINE

## 2021-04-26 PROCEDURE — 85007 BL SMEAR W/DIFF WBC COUNT: CPT | Performed by: NURSE PRACTITIONER

## 2021-04-26 PROCEDURE — 80053 COMPREHEN METABOLIC PANEL: CPT | Performed by: NURSE PRACTITIONER

## 2021-04-26 PROCEDURE — 25010000002 CEFTRIAXONE PER 250 MG: Performed by: INTERNAL MEDICINE

## 2021-04-26 PROCEDURE — 83880 ASSAY OF NATRIURETIC PEPTIDE: CPT | Performed by: INTERNAL MEDICINE

## 2021-04-26 PROCEDURE — 99233 SBSQ HOSP IP/OBS HIGH 50: CPT | Performed by: INTERNAL MEDICINE

## 2021-04-26 RX ORDER — LORAZEPAM 2 MG/ML
1 INJECTION INTRAMUSCULAR ONCE
Status: COMPLETED | OUTPATIENT
Start: 2021-04-27 | End: 2021-04-26

## 2021-04-26 RX ADMIN — URSODIOL 300 MG: 300 CAPSULE ORAL at 09:36

## 2021-04-26 RX ADMIN — DIGOXIN 125 MCG: 125 TABLET ORAL at 12:56

## 2021-04-26 RX ADMIN — ASPIRIN 81 MG CHEWABLE TABLET 81 MG: 81 TABLET CHEWABLE at 09:37

## 2021-04-26 RX ADMIN — Medication 10 ML: at 22:34

## 2021-04-26 RX ADMIN — FAMOTIDINE 20 MG: 20 TABLET, FILM COATED ORAL at 09:36

## 2021-04-26 RX ADMIN — ASPIRIN 81 MG CHEWABLE TABLET 81 MG: 81 TABLET CHEWABLE at 09:36

## 2021-04-26 RX ADMIN — LORAZEPAM 1 MG: 2 INJECTION INTRAMUSCULAR; INTRAVENOUS at 23:32

## 2021-04-26 RX ADMIN — LACTULOSE 20 G: 20 SOLUTION ORAL at 09:37

## 2021-04-26 RX ADMIN — METRONIDAZOLE 500 MG: 500 TABLET ORAL at 15:00

## 2021-04-26 RX ADMIN — Medication 10 ML: at 09:38

## 2021-04-26 RX ADMIN — DEXTROSE MONOHYDRATE 50 ML/HR: 50 INJECTION, SOLUTION INTRAVENOUS at 19:45

## 2021-04-26 RX ADMIN — COLLAGENASE SANTYL: 250 OINTMENT TOPICAL at 09:38

## 2021-04-26 RX ADMIN — Medication 10 ML: at 09:37

## 2021-04-26 RX ADMIN — DOCUSATE SODIUM 50 MG AND SENNOSIDES 8.6 MG 2 TABLET: 8.6; 5 TABLET, FILM COATED ORAL at 09:36

## 2021-04-26 RX ADMIN — CEFTRIAXONE SODIUM 2 G: 2 INJECTION, POWDER, FOR SOLUTION INTRAMUSCULAR; INTRAVENOUS at 12:56

## 2021-04-26 RX ADMIN — METRONIDAZOLE 500 MG: 500 TABLET ORAL at 05:32

## 2021-04-26 RX ADMIN — Medication: at 09:38

## 2021-04-26 RX ADMIN — MIDODRINE HYDROCHLORIDE 5 MG: 5 TABLET ORAL at 05:32

## 2021-04-26 RX ADMIN — FEBUXOSTAT 40 MG: 40 TABLET ORAL at 09:36

## 2021-04-26 RX ADMIN — MIDODRINE HYDROCHLORIDE 5 MG: 5 TABLET ORAL at 15:00

## 2021-04-26 RX ADMIN — Medication 10 ML: at 22:35

## 2021-04-27 ENCOUNTER — APPOINTMENT (OUTPATIENT)
Dept: INTERVENTIONAL RADIOLOGY/VASCULAR | Facility: HOSPITAL | Age: 76
End: 2021-04-27

## 2021-04-27 LAB
ALBUMIN SERPL-MCNC: 1.7 G/DL (ref 3.5–5.2)
ALBUMIN/GLOB SERPL: 0.4 G/DL
ALP SERPL-CCNC: 179 U/L (ref 39–117)
ALT SERPL W P-5'-P-CCNC: 5 U/L (ref 1–33)
AMMONIA BLD-SCNC: 29 UMOL/L (ref 11–51)
ANION GAP SERPL CALCULATED.3IONS-SCNC: 12 MMOL/L (ref 5–15)
APTT PPP: 34.7 SECONDS (ref 24–31)
AST SERPL-CCNC: 13 U/L (ref 1–32)
BASOPHILS # BLD AUTO: 0.1 10*3/MM3 (ref 0–0.2)
BASOPHILS NFR BLD AUTO: 0.7 % (ref 0–1.5)
BILIRUB SERPL-MCNC: 0.4 MG/DL (ref 0–1.2)
BUN SERPL-MCNC: 42 MG/DL (ref 8–23)
BUN/CREAT SERPL: 14.3 (ref 7–25)
CALCIUM SPEC-SCNC: 7 MG/DL (ref 8.6–10.5)
CHLORIDE SERPL-SCNC: 95 MMOL/L (ref 98–107)
CO2 SERPL-SCNC: 20 MMOL/L (ref 22–29)
CREAT SERPL-MCNC: 2.93 MG/DL (ref 0.57–1)
DEPRECATED RDW RBC AUTO: 54.7 FL (ref 37–54)
EOSINOPHIL # BLD AUTO: 0 10*3/MM3 (ref 0–0.4)
EOSINOPHIL NFR BLD AUTO: 0.3 % (ref 0.3–6.2)
ERYTHROCYTE [DISTWIDTH] IN BLOOD BY AUTOMATED COUNT: 16.6 % (ref 12.3–15.4)
GFR SERPL CREATININE-BSD FRML MDRD: 16 ML/MIN/1.73
GLOBULIN UR ELPH-MCNC: 3.9 GM/DL
GLUCOSE SERPL-MCNC: 82 MG/DL (ref 65–99)
HCT VFR BLD AUTO: 26.3 % (ref 34–46.6)
HGB BLD-MCNC: 8.7 G/DL (ref 12–15.9)
INR PPP: 1.26 (ref 0.93–1.1)
LYMPHOCYTES # BLD AUTO: 0.6 10*3/MM3 (ref 0.7–3.1)
LYMPHOCYTES NFR BLD AUTO: 5.6 % (ref 19.6–45.3)
MAGNESIUM SERPL-MCNC: 1.5 MG/DL (ref 1.6–2.4)
MCH RBC QN AUTO: 30.5 PG (ref 26.6–33)
MCHC RBC AUTO-ENTMCNC: 33.2 G/DL (ref 31.5–35.7)
MCV RBC AUTO: 91.9 FL (ref 79–97)
MONOCYTES # BLD AUTO: 0.8 10*3/MM3 (ref 0.1–0.9)
MONOCYTES NFR BLD AUTO: 7.4 % (ref 5–12)
NEUTROPHILS NFR BLD AUTO: 86 % (ref 42.7–76)
NEUTROPHILS NFR BLD AUTO: 9.9 10*3/MM3 (ref 1.7–7)
NRBC BLD AUTO-RTO: 0.1 /100 WBC (ref 0–0.2)
NT-PROBNP SERPL-MCNC: ABNORMAL PG/ML (ref 0–1800)
PHOSPHATE SERPL-MCNC: 4.3 MG/DL (ref 2.5–4.5)
PLATELET # BLD AUTO: 50 10*3/MM3 (ref 140–450)
PMV BLD AUTO: 7.9 FL (ref 6–12)
POTASSIUM SERPL-SCNC: 3.3 MMOL/L (ref 3.5–5.2)
PROT SERPL-MCNC: 5.6 G/DL (ref 6–8.5)
PROTHROMBIN TIME: 13.7 SECONDS (ref 9.6–11.7)
RBC # BLD AUTO: 2.86 10*6/MM3 (ref 3.77–5.28)
SODIUM SERPL-SCNC: 127 MMOL/L (ref 136–145)
WBC # BLD AUTO: 11.5 10*3/MM3 (ref 3.4–10.8)

## 2021-04-27 PROCEDURE — 97530 THERAPEUTIC ACTIVITIES: CPT

## 2021-04-27 PROCEDURE — 85025 COMPLETE CBC W/AUTO DIFF WBC: CPT | Performed by: NURSE PRACTITIONER

## 2021-04-27 PROCEDURE — 25010000002 CEFTRIAXONE PER 250 MG: Performed by: INTERNAL MEDICINE

## 2021-04-27 PROCEDURE — 85610 PROTHROMBIN TIME: CPT | Performed by: RADIOLOGY

## 2021-04-27 PROCEDURE — C1769 GUIDE WIRE: HCPCS

## 2021-04-27 PROCEDURE — 25010000002 ONDANSETRON PER 1 MG: Performed by: RADIOLOGY

## 2021-04-27 PROCEDURE — 05HM33Z INSERTION OF INFUSION DEVICE INTO RIGHT INTERNAL JUGULAR VEIN, PERCUTANEOUS APPROACH: ICD-10-PCS | Performed by: INTERNAL MEDICINE

## 2021-04-27 PROCEDURE — 25010000002 FENTANYL CITRATE (PF) 100 MCG/2ML SOLUTION: Performed by: RADIOLOGY

## 2021-04-27 PROCEDURE — 25010000002 MIDAZOLAM PER 1 MG: Performed by: RADIOLOGY

## 2021-04-27 PROCEDURE — 25010000003 LIDOCAINE 1 % SOLUTION: Performed by: RADIOLOGY

## 2021-04-27 PROCEDURE — 99232 SBSQ HOSP IP/OBS MODERATE 35: CPT | Performed by: INTERNAL MEDICINE

## 2021-04-27 PROCEDURE — 0JH63XZ INSERTION OF TUNNELED VASCULAR ACCESS DEVICE INTO CHEST SUBCUTANEOUS TISSUE AND FASCIA, PERCUTANEOUS APPROACH: ICD-10-PCS | Performed by: INTERNAL MEDICINE

## 2021-04-27 PROCEDURE — C1892 INTRO/SHEATH,FIXED,PEEL-AWAY: HCPCS

## 2021-04-27 PROCEDURE — 25010000002 HEPARIN (PORCINE) PER 1000 UNITS: Performed by: RADIOLOGY

## 2021-04-27 PROCEDURE — 83880 ASSAY OF NATRIURETIC PEPTIDE: CPT | Performed by: INTERNAL MEDICINE

## 2021-04-27 PROCEDURE — 77001 FLUOROGUIDE FOR VEIN DEVICE: CPT

## 2021-04-27 PROCEDURE — 76937 US GUIDE VASCULAR ACCESS: CPT

## 2021-04-27 PROCEDURE — 99152 MOD SED SAME PHYS/QHP 5/>YRS: CPT

## 2021-04-27 PROCEDURE — 99153 MOD SED SAME PHYS/QHP EA: CPT

## 2021-04-27 PROCEDURE — 36558 INSERT TUNNELED CV CATH: CPT

## 2021-04-27 PROCEDURE — C1894 INTRO/SHEATH, NON-LASER: HCPCS

## 2021-04-27 PROCEDURE — C1750 CATH, HEMODIALYSIS,LONG-TERM: HCPCS

## 2021-04-27 PROCEDURE — 85730 THROMBOPLASTIN TIME PARTIAL: CPT | Performed by: RADIOLOGY

## 2021-04-27 PROCEDURE — 25010000002 LORAZEPAM PER 2 MG: Performed by: NURSE PRACTITIONER

## 2021-04-27 PROCEDURE — 83735 ASSAY OF MAGNESIUM: CPT | Performed by: NURSE PRACTITIONER

## 2021-04-27 PROCEDURE — 80053 COMPREHEN METABOLIC PANEL: CPT | Performed by: NURSE PRACTITIONER

## 2021-04-27 PROCEDURE — 84100 ASSAY OF PHOSPHORUS: CPT | Performed by: INTERNAL MEDICINE

## 2021-04-27 RX ORDER — MIDAZOLAM HYDROCHLORIDE 1 MG/ML
INJECTION INTRAMUSCULAR; INTRAVENOUS
Status: DISCONTINUED | OUTPATIENT
Start: 2021-04-27 | End: 2021-04-29 | Stop reason: HOSPADM

## 2021-04-27 RX ORDER — ONDANSETRON 2 MG/ML
INJECTION INTRAMUSCULAR; INTRAVENOUS
Status: DISCONTINUED | OUTPATIENT
Start: 2021-04-27 | End: 2021-04-29 | Stop reason: HOSPADM

## 2021-04-27 RX ORDER — FENTANYL CITRATE 50 UG/ML
INJECTION, SOLUTION INTRAMUSCULAR; INTRAVENOUS
Status: DISCONTINUED | OUTPATIENT
Start: 2021-04-27 | End: 2021-04-29 | Stop reason: HOSPADM

## 2021-04-27 RX ORDER — LIDOCAINE HYDROCHLORIDE AND EPINEPHRINE 10; 10 MG/ML; UG/ML
INJECTION, SOLUTION INFILTRATION; PERINEURAL
Status: DISCONTINUED | OUTPATIENT
Start: 2021-04-27 | End: 2021-04-29 | Stop reason: HOSPADM

## 2021-04-27 RX ORDER — LIDOCAINE HYDROCHLORIDE 10 MG/ML
INJECTION, SOLUTION INFILTRATION; PERINEURAL
Status: DISCONTINUED | OUTPATIENT
Start: 2021-04-27 | End: 2021-04-29 | Stop reason: HOSPADM

## 2021-04-27 RX ORDER — HYDROXYZINE HYDROCHLORIDE 25 MG/1
50 TABLET, FILM COATED ORAL ONCE
Status: COMPLETED | OUTPATIENT
Start: 2021-04-27 | End: 2021-04-27

## 2021-04-27 RX ORDER — HEPARIN SODIUM 1000 [USP'U]/ML
INJECTION, SOLUTION INTRAVENOUS; SUBCUTANEOUS
Status: DISCONTINUED | OUTPATIENT
Start: 2021-04-27 | End: 2021-04-29 | Stop reason: HOSPADM

## 2021-04-27 RX ADMIN — CEFTRIAXONE SODIUM 2 G: 2 INJECTION, POWDER, FOR SOLUTION INTRAMUSCULAR; INTRAVENOUS at 12:24

## 2021-04-27 RX ADMIN — URSODIOL 300 MG: 300 CAPSULE ORAL at 21:29

## 2021-04-27 RX ADMIN — MIDODRINE HYDROCHLORIDE 5 MG: 5 TABLET ORAL at 15:21

## 2021-04-27 RX ADMIN — MIDAZOLAM 1 MG: 1 INJECTION INTRAMUSCULAR; INTRAVENOUS at 10:52

## 2021-04-27 RX ADMIN — ONDANSETRON 4 MG: 2 INJECTION INTRAMUSCULAR; INTRAVENOUS at 10:50

## 2021-04-27 RX ADMIN — DEXTROSE MONOHYDRATE 50 ML/HR: 50 INJECTION, SOLUTION INTRAVENOUS at 21:34

## 2021-04-27 RX ADMIN — LIDOCAINE HYDROCHLORIDE,EPINEPHRINE BITARTRATE 3 ML: 10; .01 INJECTION, SOLUTION INFILTRATION; PERINEURAL at 11:11

## 2021-04-27 RX ADMIN — FEBUXOSTAT 40 MG: 40 TABLET ORAL at 21:29

## 2021-04-27 RX ADMIN — Medication 10 ML: at 21:28

## 2021-04-27 RX ADMIN — DOCUSATE SODIUM 50 MG AND SENNOSIDES 8.6 MG 2 TABLET: 8.6; 5 TABLET, FILM COATED ORAL at 21:29

## 2021-04-27 RX ADMIN — MIDODRINE HYDROCHLORIDE 5 MG: 5 TABLET ORAL at 21:29

## 2021-04-27 RX ADMIN — Medication 10 ML: at 15:00

## 2021-04-27 RX ADMIN — Medication 10 ML: at 21:29

## 2021-04-27 RX ADMIN — FENTANYL CITRATE 100 MCG: 50 INJECTION, SOLUTION INTRAMUSCULAR; INTRAVENOUS at 10:52

## 2021-04-27 RX ADMIN — HYDROXYZINE HYDROCHLORIDE 50 MG: 25 TABLET, FILM COATED ORAL at 21:49

## 2021-04-27 RX ADMIN — Medication: at 21:29

## 2021-04-27 RX ADMIN — Medication 5 MG: at 21:29

## 2021-04-27 RX ADMIN — DIGOXIN 125 MCG: 125 TABLET ORAL at 12:27

## 2021-04-27 RX ADMIN — LIDOCAINE HYDROCHLORIDE 6 ML: 10 INJECTION, SOLUTION INFILTRATION; PERINEURAL at 11:02

## 2021-04-27 RX ADMIN — COLLAGENASE SANTYL: 250 OINTMENT TOPICAL at 15:00

## 2021-04-27 RX ADMIN — HEPARIN SODIUM 4300 UNITS: 1000 INJECTION, SOLUTION INTRAVENOUS; SUBCUTANEOUS at 11:16

## 2021-04-28 ENCOUNTER — APPOINTMENT (OUTPATIENT)
Dept: CT IMAGING | Facility: HOSPITAL | Age: 76
End: 2021-04-28

## 2021-04-28 LAB
ALBUMIN SERPL-MCNC: 1.8 G/DL (ref 3.5–5.2)
ALBUMIN/GLOB SERPL: 0.4 G/DL
ALP SERPL-CCNC: 170 U/L (ref 39–117)
ALT SERPL W P-5'-P-CCNC: <5 U/L (ref 1–33)
ANION GAP SERPL CALCULATED.3IONS-SCNC: 11 MMOL/L (ref 5–15)
AST SERPL-CCNC: 14 U/L (ref 1–32)
BASOPHILS # BLD AUTO: 0.1 10*3/MM3 (ref 0–0.2)
BASOPHILS NFR BLD AUTO: 0.5 % (ref 0–1.5)
BILIRUB SERPL-MCNC: 0.4 MG/DL (ref 0–1.2)
BUN SERPL-MCNC: 21 MG/DL (ref 8–23)
BUN/CREAT SERPL: 10.5 (ref 7–25)
CALCIUM SPEC-SCNC: 7.2 MG/DL (ref 8.6–10.5)
CHLORIDE SERPL-SCNC: 98 MMOL/L (ref 98–107)
CO2 SERPL-SCNC: 20 MMOL/L (ref 22–29)
CREAT SERPL-MCNC: 2 MG/DL (ref 0.57–1)
DEPRECATED RDW RBC AUTO: 52.9 FL (ref 37–54)
EOSINOPHIL # BLD AUTO: 0 10*3/MM3 (ref 0–0.4)
EOSINOPHIL NFR BLD AUTO: 0.4 % (ref 0.3–6.2)
ERYTHROCYTE [DISTWIDTH] IN BLOOD BY AUTOMATED COUNT: 16.2 % (ref 12.3–15.4)
GFR SERPL CREATININE-BSD FRML MDRD: 24 ML/MIN/1.73
GLOBULIN UR ELPH-MCNC: 4.1 GM/DL
GLUCOSE BLDC GLUCOMTR-MCNC: 106 MG/DL (ref 70–105)
GLUCOSE BLDC GLUCOMTR-MCNC: 126 MG/DL (ref 70–105)
GLUCOSE BLDC GLUCOMTR-MCNC: 38 MG/DL (ref 70–105)
GLUCOSE BLDC GLUCOMTR-MCNC: 62 MG/DL (ref 70–105)
GLUCOSE BLDC GLUCOMTR-MCNC: 79 MG/DL (ref 70–105)
GLUCOSE BLDC GLUCOMTR-MCNC: 85 MG/DL (ref 70–105)
GLUCOSE BLDC GLUCOMTR-MCNC: 96 MG/DL (ref 70–105)
GLUCOSE BLDC GLUCOMTR-MCNC: 96 MG/DL (ref 70–105)
GLUCOSE SERPL-MCNC: 135 MG/DL (ref 65–99)
HCT VFR BLD AUTO: 26.3 % (ref 34–46.6)
HGB BLD-MCNC: 8.8 G/DL (ref 12–15.9)
LYMPHOCYTES # BLD AUTO: 0.5 10*3/MM3 (ref 0.7–3.1)
LYMPHOCYTES NFR BLD AUTO: 4.8 % (ref 19.6–45.3)
MAGNESIUM SERPL-MCNC: 1.5 MG/DL (ref 1.6–2.4)
MCH RBC QN AUTO: 31.3 PG (ref 26.6–33)
MCHC RBC AUTO-ENTMCNC: 33.6 G/DL (ref 31.5–35.7)
MCV RBC AUTO: 93.2 FL (ref 79–97)
MONOCYTES # BLD AUTO: 1 10*3/MM3 (ref 0.1–0.9)
MONOCYTES NFR BLD AUTO: 9.2 % (ref 5–12)
NEUTROPHILS NFR BLD AUTO: 85.1 % (ref 42.7–76)
NEUTROPHILS NFR BLD AUTO: 9.6 10*3/MM3 (ref 1.7–7)
NRBC BLD AUTO-RTO: 0.1 /100 WBC (ref 0–0.2)
NT-PROBNP SERPL-MCNC: ABNORMAL PG/ML (ref 0–1800)
PHOSPHATE SERPL-MCNC: 3.2 MG/DL (ref 2.5–4.5)
PLATELET # BLD AUTO: 62 10*3/MM3 (ref 140–450)
PMV BLD AUTO: 7.5 FL (ref 6–12)
POTASSIUM SERPL-SCNC: 4.1 MMOL/L (ref 3.5–5.2)
PROT SERPL-MCNC: 5.9 G/DL (ref 6–8.5)
RBC # BLD AUTO: 2.82 10*6/MM3 (ref 3.77–5.28)
SODIUM SERPL-SCNC: 129 MMOL/L (ref 136–145)
WBC # BLD AUTO: 11.3 10*3/MM3 (ref 3.4–10.8)

## 2021-04-28 PROCEDURE — 25010000002 CEFTRIAXONE PER 250 MG: Performed by: INTERNAL MEDICINE

## 2021-04-28 PROCEDURE — 80053 COMPREHEN METABOLIC PANEL: CPT | Performed by: NURSE PRACTITIONER

## 2021-04-28 PROCEDURE — 85025 COMPLETE CBC W/AUTO DIFF WBC: CPT | Performed by: NURSE PRACTITIONER

## 2021-04-28 PROCEDURE — 97530 THERAPEUTIC ACTIVITIES: CPT

## 2021-04-28 PROCEDURE — 82962 GLUCOSE BLOOD TEST: CPT

## 2021-04-28 PROCEDURE — 83735 ASSAY OF MAGNESIUM: CPT | Performed by: NURSE PRACTITIONER

## 2021-04-28 PROCEDURE — 74174 CTA ABD&PLVS W/CONTRAST: CPT

## 2021-04-28 PROCEDURE — 84100 ASSAY OF PHOSPHORUS: CPT | Performed by: INTERNAL MEDICINE

## 2021-04-28 PROCEDURE — 99232 SBSQ HOSP IP/OBS MODERATE 35: CPT | Performed by: INTERNAL MEDICINE

## 2021-04-28 PROCEDURE — 83880 ASSAY OF NATRIURETIC PEPTIDE: CPT | Performed by: INTERNAL MEDICINE

## 2021-04-28 PROCEDURE — 92526 ORAL FUNCTION THERAPY: CPT

## 2021-04-28 PROCEDURE — 0 IOPAMIDOL PER 1 ML: Performed by: INTERNAL MEDICINE

## 2021-04-28 RX ADMIN — TRAMADOL HYDROCHLORIDE 50 MG: 50 TABLET, FILM COATED ORAL at 20:29

## 2021-04-28 RX ADMIN — LACTULOSE 20 G: 20 SOLUTION ORAL at 09:03

## 2021-04-28 RX ADMIN — Medication 10 ML: at 09:03

## 2021-04-28 RX ADMIN — FEBUXOSTAT 40 MG: 40 TABLET ORAL at 20:30

## 2021-04-28 RX ADMIN — MIDODRINE HYDROCHLORIDE 5 MG: 5 TABLET ORAL at 20:29

## 2021-04-28 RX ADMIN — DEXTROSE MONOHYDRATE 25 G: 500 INJECTION PARENTERAL at 01:15

## 2021-04-28 RX ADMIN — Medication 5 MG: at 20:29

## 2021-04-28 RX ADMIN — Medication: at 09:13

## 2021-04-28 RX ADMIN — MIDODRINE HYDROCHLORIDE 5 MG: 5 TABLET ORAL at 05:47

## 2021-04-28 RX ADMIN — IOPAMIDOL 100 ML: 755 INJECTION, SOLUTION INTRAVENOUS at 16:46

## 2021-04-28 RX ADMIN — Medication 10 ML: at 20:30

## 2021-04-28 RX ADMIN — DEXTROSE 15 G: 15 GEL ORAL at 00:58

## 2021-04-28 RX ADMIN — ASPIRIN 81 MG CHEWABLE TABLET 81 MG: 81 TABLET CHEWABLE at 09:03

## 2021-04-28 RX ADMIN — Medication: at 20:31

## 2021-04-28 RX ADMIN — URSODIOL 300 MG: 300 CAPSULE ORAL at 20:29

## 2021-04-28 RX ADMIN — URSODIOL 300 MG: 300 CAPSULE ORAL at 09:03

## 2021-04-28 RX ADMIN — DOCUSATE SODIUM 50 MG AND SENNOSIDES 8.6 MG 2 TABLET: 8.6; 5 TABLET, FILM COATED ORAL at 09:03

## 2021-04-28 RX ADMIN — CEFTRIAXONE SODIUM 2 G: 2 INJECTION, POWDER, FOR SOLUTION INTRAMUSCULAR; INTRAVENOUS at 12:00

## 2021-04-28 RX ADMIN — COLLAGENASE SANTYL: 250 OINTMENT TOPICAL at 09:13

## 2021-04-28 RX ADMIN — FEBUXOSTAT 40 MG: 40 TABLET ORAL at 09:03

## 2021-04-28 RX ADMIN — DIGOXIN 125 MCG: 125 TABLET ORAL at 12:18

## 2021-04-28 RX ADMIN — DOCUSATE SODIUM 50 MG AND SENNOSIDES 8.6 MG 2 TABLET: 8.6; 5 TABLET, FILM COATED ORAL at 20:29

## 2021-04-29 VITALS
OXYGEN SATURATION: 97 % | SYSTOLIC BLOOD PRESSURE: 135 MMHG | BODY MASS INDEX: 33.06 KG/M2 | TEMPERATURE: 97.8 F | RESPIRATION RATE: 16 BRPM | WEIGHT: 179.68 LBS | DIASTOLIC BLOOD PRESSURE: 59 MMHG | HEART RATE: 64 BPM | HEIGHT: 62 IN

## 2021-04-29 LAB
ALBUMIN SERPL-MCNC: 1.6 G/DL (ref 3.5–5.2)
ALBUMIN/GLOB SERPL: 0.4 G/DL
ALP SERPL-CCNC: 185 U/L (ref 39–117)
ALT SERPL W P-5'-P-CCNC: <5 U/L (ref 1–33)
ANION GAP SERPL CALCULATED.3IONS-SCNC: 10 MMOL/L (ref 5–15)
AST SERPL-CCNC: 12 U/L (ref 1–32)
BASOPHILS # BLD AUTO: 0.1 10*3/MM3 (ref 0–0.2)
BASOPHILS NFR BLD AUTO: 0.6 % (ref 0–1.5)
BILIRUB SERPL-MCNC: 0.5 MG/DL (ref 0–1.2)
BUN SERPL-MCNC: 27 MG/DL (ref 8–23)
BUN/CREAT SERPL: 11.4 (ref 7–25)
CALCIUM SPEC-SCNC: 7.2 MG/DL (ref 8.6–10.5)
CHLORIDE SERPL-SCNC: 95 MMOL/L (ref 98–107)
CO2 SERPL-SCNC: 21 MMOL/L (ref 22–29)
CREAT SERPL-MCNC: 2.36 MG/DL (ref 0.57–1)
DEPRECATED RDW RBC AUTO: 55.1 FL (ref 37–54)
EOSINOPHIL # BLD AUTO: 0.1 10*3/MM3 (ref 0–0.4)
EOSINOPHIL NFR BLD AUTO: 0.4 % (ref 0.3–6.2)
ERYTHROCYTE [DISTWIDTH] IN BLOOD BY AUTOMATED COUNT: 16.7 % (ref 12.3–15.4)
GFR SERPL CREATININE-BSD FRML MDRD: 20 ML/MIN/1.73
GLOBULIN UR ELPH-MCNC: 4 GM/DL
GLUCOSE BLDC GLUCOMTR-MCNC: 82 MG/DL (ref 70–105)
GLUCOSE BLDC GLUCOMTR-MCNC: 98 MG/DL (ref 70–105)
GLUCOSE SERPL-MCNC: 64 MG/DL (ref 65–99)
HCT VFR BLD AUTO: 23.6 % (ref 34–46.6)
HGB BLD-MCNC: 7.7 G/DL (ref 12–15.9)
LYMPHOCYTES # BLD AUTO: 0.7 10*3/MM3 (ref 0.7–3.1)
LYMPHOCYTES NFR BLD AUTO: 5.5 % (ref 19.6–45.3)
MAGNESIUM SERPL-MCNC: 1.5 MG/DL (ref 1.6–2.4)
MCH RBC QN AUTO: 30.6 PG (ref 26.6–33)
MCHC RBC AUTO-ENTMCNC: 32.7 G/DL (ref 31.5–35.7)
MCV RBC AUTO: 93.6 FL (ref 79–97)
MONOCYTES # BLD AUTO: 0.7 10*3/MM3 (ref 0.1–0.9)
MONOCYTES NFR BLD AUTO: 5.7 % (ref 5–12)
NEUTROPHILS NFR BLD AUTO: 11.3 10*3/MM3 (ref 1.7–7)
NEUTROPHILS NFR BLD AUTO: 87.8 % (ref 42.7–76)
NRBC BLD AUTO-RTO: 0 /100 WBC (ref 0–0.2)
NT-PROBNP SERPL-MCNC: ABNORMAL PG/ML (ref 0–1800)
PHOSPHATE SERPL-MCNC: 4.1 MG/DL (ref 2.5–4.5)
PLATELET # BLD AUTO: 50 10*3/MM3 (ref 140–450)
PMV BLD AUTO: 8.2 FL (ref 6–12)
POTASSIUM SERPL-SCNC: 4.2 MMOL/L (ref 3.5–5.2)
PROT SERPL-MCNC: 5.6 G/DL (ref 6–8.5)
RBC # BLD AUTO: 2.52 10*6/MM3 (ref 3.77–5.28)
SODIUM SERPL-SCNC: 126 MMOL/L (ref 136–145)
WBC # BLD AUTO: 12.9 10*3/MM3 (ref 3.4–10.8)
WHOLE BLOOD HOLD SPECIMEN: NORMAL

## 2021-04-29 PROCEDURE — 84100 ASSAY OF PHOSPHORUS: CPT | Performed by: INTERNAL MEDICINE

## 2021-04-29 PROCEDURE — 83735 ASSAY OF MAGNESIUM: CPT | Performed by: NURSE PRACTITIONER

## 2021-04-29 PROCEDURE — 82962 GLUCOSE BLOOD TEST: CPT

## 2021-04-29 PROCEDURE — 99232 SBSQ HOSP IP/OBS MODERATE 35: CPT | Performed by: INTERNAL MEDICINE

## 2021-04-29 PROCEDURE — 99239 HOSP IP/OBS DSCHRG MGMT >30: CPT | Performed by: INTERNAL MEDICINE

## 2021-04-29 PROCEDURE — 25010000002 CEFTRIAXONE PER 250 MG: Performed by: INTERNAL MEDICINE

## 2021-04-29 PROCEDURE — 80053 COMPREHEN METABOLIC PANEL: CPT | Performed by: NURSE PRACTITIONER

## 2021-04-29 PROCEDURE — 25010000002 HEPARIN (PORCINE) PER 1000 UNITS: Performed by: INTERNAL MEDICINE

## 2021-04-29 PROCEDURE — 85025 COMPLETE CBC W/AUTO DIFF WBC: CPT | Performed by: NURSE PRACTITIONER

## 2021-04-29 PROCEDURE — 83880 ASSAY OF NATRIURETIC PEPTIDE: CPT | Performed by: INTERNAL MEDICINE

## 2021-04-29 RX ORDER — FEBUXOSTAT 40 MG/1
40 TABLET, FILM COATED ORAL 2 TIMES DAILY
Qty: 30 TABLET | Refills: 0 | Status: SHIPPED | OUTPATIENT
Start: 2021-04-29

## 2021-04-29 RX ORDER — MIDODRINE HYDROCHLORIDE 5 MG/1
5 TABLET ORAL
Qty: 90 TABLET | Refills: 0 | Status: SHIPPED | OUTPATIENT
Start: 2021-04-29

## 2021-04-29 RX ORDER — URSODIOL 300 MG/1
300 CAPSULE ORAL 2 TIMES DAILY
Qty: 60 CAPSULE | Refills: 0
Start: 2021-04-29

## 2021-04-29 RX ORDER — LACTULOSE 10 G/15ML
20 SOLUTION ORAL 2 TIMES DAILY
Start: 2021-04-29

## 2021-04-29 RX ORDER — ONDANSETRON 4 MG/1
4 TABLET, FILM COATED ORAL EVERY 6 HOURS PRN
Qty: 20 TABLET | Refills: 0 | Status: SHIPPED | OUTPATIENT
Start: 2021-04-29

## 2021-04-29 RX ORDER — DIGOXIN 125 MCG
125 TABLET ORAL
Qty: 30 TABLET | Refills: 0 | Status: SHIPPED | OUTPATIENT
Start: 2021-04-29

## 2021-04-29 RX ORDER — TRAMADOL HYDROCHLORIDE 50 MG/1
50 TABLET ORAL EVERY 6 HOURS PRN
Qty: 12 TABLET | Refills: 0 | Status: SHIPPED | OUTPATIENT
Start: 2021-04-29

## 2021-04-29 RX ADMIN — DOCUSATE SODIUM 50 MG AND SENNOSIDES 8.6 MG 2 TABLET: 8.6; 5 TABLET, FILM COATED ORAL at 12:20

## 2021-04-29 RX ADMIN — ASPIRIN 81 MG CHEWABLE TABLET 81 MG: 81 TABLET CHEWABLE at 12:20

## 2021-04-29 RX ADMIN — DIGOXIN 125 MCG: 125 TABLET ORAL at 12:20

## 2021-04-29 RX ADMIN — LACTULOSE 20 G: 20 SOLUTION ORAL at 12:20

## 2021-04-29 RX ADMIN — HEPARIN SODIUM 4000 UNITS: 1000 INJECTION INTRAVENOUS; SUBCUTANEOUS at 11:00

## 2021-04-29 RX ADMIN — Medication 10 ML: at 12:20

## 2021-04-29 RX ADMIN — MIDODRINE HYDROCHLORIDE 5 MG: 5 TABLET ORAL at 13:30

## 2021-04-29 RX ADMIN — MIDODRINE HYDROCHLORIDE 5 MG: 5 TABLET ORAL at 12:20

## 2021-04-29 RX ADMIN — Medication: at 20:30

## 2021-04-29 RX ADMIN — COLLAGENASE SANTYL: 250 OINTMENT TOPICAL at 12:23

## 2021-04-29 RX ADMIN — FEBUXOSTAT 40 MG: 40 TABLET ORAL at 12:20

## 2021-04-29 RX ADMIN — DEXTROSE MONOHYDRATE 25 G: 500 INJECTION PARENTERAL at 05:55

## 2021-04-29 RX ADMIN — CEFTRIAXONE SODIUM 2 G: 2 INJECTION, POWDER, FOR SOLUTION INTRAMUSCULAR; INTRAVENOUS at 12:26

## 2021-04-29 RX ADMIN — ACETAMINOPHEN 650 MG: 325 TABLET, FILM COATED ORAL at 12:26

## 2021-04-29 RX ADMIN — URSODIOL 300 MG: 300 CAPSULE ORAL at 12:20

## 2021-05-02 LAB — QT INTERVAL: 529 MS

## 2021-05-03 LAB
GLUCOSE BLDC GLUCOMTR-MCNC: 143 MG/DL (ref 70–105)
GLUCOSE BLDC GLUCOMTR-MCNC: 63 MG/DL (ref 70–105)

## 2021-05-07 ENCOUNTER — TELEPHONE (OUTPATIENT)
Dept: CARDIOLOGY | Facility: CLINIC | Age: 76
End: 2021-05-07

## 2021-06-24 ENCOUNTER — APPOINTMENT (OUTPATIENT)
Dept: VASCULAR SURGERY | Facility: HOSPITAL | Age: 76
End: 2021-06-24

## 2021-06-29 ENCOUNTER — APPOINTMENT (OUTPATIENT)
Dept: VASCULAR SURGERY | Facility: HOSPITAL | Age: 76
End: 2021-06-29

## 2021-06-29 PROCEDURE — G0463 HOSPITAL OUTPT CLINIC VISIT: HCPCS

## 2021-07-01 ENCOUNTER — APPOINTMENT (OUTPATIENT)
Dept: VASCULAR SURGERY | Facility: HOSPITAL | Age: 76
End: 2021-07-01

## 2021-07-20 ENCOUNTER — TELEPHONE (OUTPATIENT)
Dept: ONCOLOGY | Facility: CLINIC | Age: 76
End: 2021-07-20

## 2021-07-20 NOTE — TELEPHONE ENCOUNTER
Caller: Nina Lemus    Relationship to patient: Emergency Contact    Best call back number: [phone]    Chief complaint: CANC./ADRIANA.    Type of visit: LAB/NEW PATIENT    If rescheduling, when is the original appointment: 7/23/2021    Additional notes: NINA STATES SHE IS TRYING TO STIVEN. DIALYSIS FIRST & THEN THIS APPT.

## 2021-07-20 NOTE — TELEPHONE ENCOUNTER
DAUGHTER NINA STATED FOR ALISA TO CALL AND SPEAK WITH THE PATIENT DIRECTLY -[phone]. NINA IS AT WORK AND CANNOT TALK AT THIS TIME

## 2021-11-03 NOTE — NURSING NOTE
Call for consult with ID.  Spoke with GALINDO.   Call from [facility] pt  Had bld cx that came back with gram + cocci in chains.   Patient cleared for discharge. All discharge medications, prescriptions, and discharge medication list instructions given to patient. prabhakar to deliver medications to bedside. Patient verbalized and demonstrated understanding. Vss. Patient in no distress. Iv discontinued. epd to be ordered once patient's daughter is at the main entrance to take him home in private vehicle.

## 2022-02-24 ENCOUNTER — TRANSCRIBE ORDERS (OUTPATIENT)
Dept: ADMINISTRATIVE | Facility: HOSPITAL | Age: 77
End: 2022-02-24

## 2022-02-24 DIAGNOSIS — N18.6 END STAGE RENAL DISEASE: Primary | ICD-10-CM

## 2022-03-08 ENCOUNTER — APPOINTMENT (OUTPATIENT)
Dept: CARDIOLOGY | Facility: HOSPITAL | Age: 77
End: 2022-03-08

## 2022-03-08 ENCOUNTER — APPOINTMENT (OUTPATIENT)
Dept: VASCULAR SURGERY | Facility: HOSPITAL | Age: 77
End: 2022-03-08

## 2024-10-22 NOTE — PLAN OF CARE
----- Message from Carmen Coleman MD sent at 10/22/2024  1:57 PM CDT -----  Mikayla,   Could you please reschedule this patient with Dr. Mcclendon. Looks like he'll be back 11/4. I just don't know how to do this type of botox for her. Dr. Mcclendon is the specialist at this. Thanks.   Goal Outcome Evaluation:  Plan of Care Reviewed With: patient     Outcome Summary: pt confused and uncooperative with bath and linen change. f/c complete

## 2024-11-18 NOTE — PROGRESS NOTES
LOS: 8 days   Patient Care Team:  Rosa M Chavez APRN as PCP - General (Nurse Practitioner)      Subjective     Interval History:     Subjective: Patient seems more alert today.  Denies any abdominal pain.  No overt GI bleeding.  Denies nausea/vomiting.  Reports multiple bowel movements overnight.      ROS:   No chest pain, shortness of breath, or cough.        Medication Review:     Current Facility-Administered Medications:   •  acetaminophen (TYLENOL) tablet 650 mg, 650 mg, Oral, Q4H PRN, 650 mg at 02/16/21 0846 **OR** acetaminophen (TYLENOL) 160 MG/5ML solution 650 mg, 650 mg, Oral, Q4H PRN **OR** acetaminophen (TYLENOL) suppository 650 mg, 650 mg, Rectal, Q4H PRN, Bert Bateman PA-C  •  aspirin chewable tablet 81 mg, 81 mg, Oral, Daily, Bert Bateman PA-C, 81 mg at 02/16/21 0848  •  bumetanide (BUMEX) injection 2 mg, 2 mg, Intravenous, Q6H, Feliz Silva MD, 2 mg at 02/16/21 0642  •  ceftaroline (TEFLARO) 200 mg in sodium chloride 0.9 % 250 mL IVPB, 200 mg, Intravenous, Q12H, Kurt Diehl MD, Last Rate: 250 mL/hr at 02/15/21 1759, 200 mg at 02/16/21 0642  •  dextrose (D50W) 25 g/ 50mL Intravenous Solution 25 g, 25 g, Intravenous, Q15 Min PRN, Alexandro Huitron MD  •  dextrose (GLUTOSE) oral gel 15 g, 15 g, Oral, Q15 Min PRN, Alexandro Huitron MD  •  dextrose 10 % infusion, 20 mL/hr, Intravenous, Continuous, Alexandro Huitron MD, Last Rate: 10 mL/hr at 02/15/21 1317, 10 mL/hr at 02/15/21 1317  •  digoxin (LANOXIN) tablet 125 mcg, 125 mcg, Oral, Daily, Vinod Root MD, 125 mcg at 02/15/21 1451  •  febuxostat (ULORIC) tablet 40 mg, 40 mg, Oral, BID, Bert Bateman PA-C, 40 mg at 02/16/21 0848  •  folic acid (FOLVITE) tablet 1 mg, 1 mg, Oral, Daily, Alexandro Huitron MD, 1 mg at 02/16/21 0849  •  gabapentin (NEURONTIN) capsule 100 mg, 100 mg, Oral, TID, Bert Bateman PA-C, 100 mg at 02/16/21 0849  •  glucagon  (human recombinant) (GLUCAGEN DIAGNOSTIC) injection 1 mg, 1 mg, Subcutaneous, Q15 Min PRN, Alexandro Huitron MD  •  HYDROcodone-acetaminophen (NORCO) 5-325 MG per tablet 1 tablet, 1 tablet, Oral, Q8H PRN, Alexandro Huitron MD, 1 tablet at 02/13/21 1220  •  lactulose (CHRONULAC) 10 GM/15ML solution 20 g, 20 g, Oral, TID, Alexandro Huitron MD, 20 g at 02/16/21 0849  •  lidocaine (LIDODERM) 5 % 2 patch, 2 patch, Transdermal, Q24H, Estuardo Torres MD, 2 patch at 02/16/21 0849  •  magic butt paste, , Topical, Daily, Goldy-Egziabher, Jake I, DO, Given at 02/16/21 0850  •  magnesium sulfate 4 gram infusion - Mg less than or equal to 1mg/dL, 4 g, Intravenous, PRN **OR** magnesium sulfate 3 gram infusion (1gm x 3) - Mg 1.1 - 1.5 mg/dL, 1 g, Intravenous, PRN, Last Rate: 100 mL/hr at 02/10/21 1153, 1 g at 02/10/21 1153 **OR** Magnesium Sulfate 2 gram infusion- Mg 1.6 - 1.9 mg/dL, 2 g, Intravenous, PRN, Goldy-Egziabher, Jake I, DO, Last Rate: 25 mL/hr at 02/13/21 0526, 2 g at 02/13/21 0526  •  melatonin tablet 5 mg, 5 mg, Oral, Nightly PRN, Bert Bateman PA-C, 5 mg at 02/08/21 1955  •  midodrine (PROAMATINE) tablet 5 mg, 5 mg, Oral, TID AC, Estuardo Torres MD, 5 mg at 02/15/21 1757  •  nitroglycerin (NITROSTAT) SL tablet 0.4 mg, 0.4 mg, Sublingual, Q5 Min PRN, Bert Bateman PA-C  •  OLANZapine (zyPREXA) injection 2.5 mg, 2.5 mg, Intramuscular, Q8H PRN, Goldy-Egziabher, Jake I, DO  •  ondansetron (ZOFRAN) tablet 4 mg, 4 mg, Oral, Q6H PRN **OR** ondansetron (ZOFRAN) injection 4 mg, 4 mg, Intravenous, Q6H PRN, Bert Bateman PA-C, 4 mg at 02/11/21 1400  •  potassium chloride (K-DUR,KLOR-CON) CR tablet 40 mEq, 40 mEq, Oral, PRN, Goldy-Egziabher, Jake I, DO, 40 mEq at 02/15/21 1452  •  potassium chloride (KLOR-CON) packet 40 mEq, 40 mEq, Oral, PRN, Goldy-Egziabher, Jake I, DO  •  riFAXIMin (XIFAXAN) tablet 550 mg, 550 mg, Oral, Q12H, Alexandro Huitron  Niki Bains MD, 550 mg at 02/16/21 0848  •  sodium chloride 0.9 % flush 10 mL, 10 mL, Intravenous, Q12H, Bert Bateman PA-C, 10 mL at 02/16/21 0849  •  sodium chloride 0.9 % flush 10 mL, 10 mL, Intravenous, PRN, Bert Bateman PA-C  •  tiZANidine (ZANAFLEX) tablet 2 mg, 2 mg, Oral, Q12H PRN, Estuardo Torres MD  •  ursodiol (ACTIGALL) capsule 300 mg, 300 mg, Oral, BID, Joyce Alcala APRN, 300 mg at 02/16/21 0848  •  vitamin B-12 (CYANOCOBALAMIN) tablet 500 mcg, 500 mcg, Oral, Daily, Alexandro Huitron MD, 500 mcg at 02/16/21 0848  •  Zinc Oxide 16 % ointment, , Apply externally, BID, Jake Marx DO, Given at 02/16/21 0850      Objective     Vital Signs  Vitals:    02/15/21 2227 02/16/21 0344 02/16/21 0556 02/16/21 0843   BP: 146/63 143/60 143/60 133/60   BP Location: Left arm Left arm Left arm    Patient Position: Lying Lying Lying    Pulse: 60 60 60 60   Resp: 14 16 20    Temp: 97.3 °F (36.3 °C) 98.3 °F (36.8 °C) 97.1 °F (36.2 °C)    TempSrc: Oral Oral Oral    SpO2: 100% 100% 100%    Weight:   76 kg (167 lb 8.8 oz)    Height:           Physical Exam:     General Appearance:    Awake and alert, in no acute distress   Head:    Normocephalic, without obvious abnormality   Eyes:          Conjunctivae normal, anicteric sclera   Throat:   No oral lesions, no thrush, oral mucosa moist   Neck:   No adenopathy, supple, no JVD   Lungs:     respirations regular, even and unlabored   Abdomen:     Soft, mild generalized tenderness, no rebound or guarding, non-distended   Rectal:     Deferred   Extremities:   No edema, no cyanosis   Skin:   No bruising or rash, no jaundice        Results Review:    CBC    Results from last 7 days   Lab Units 02/16/21  0640 02/15/21  0530 02/14/21  0446 02/13/21  0350 02/12/21  0427 02/11/21  0027 02/10/21  0547   WBC 10*3/mm3 7.40 8.40 8.30 11.00* 14.20* 12.00* 10.30   HEMOGLOBIN g/dL 9.8* 8.6* 8.7* 8.5* 9.0* 9.0* 9.0*   PLATELETS  10*3/mm3 77* 87* 72* 74* 63* 92* 35*     CMP   Results from last 7 days   Lab Units 02/16/21  0640 02/16/21  0011 02/15/21  1716 02/15/21  0530 02/14/21  0446 02/13/21  1722 02/13/21  0350 02/12/21  1759 02/12/21  1714 02/12/21  0427  02/11/21  1227 02/11/21  0027 02/10/21  0547   SODIUM mmol/L 143  --   --  142 140 140 140  --  138 136   < >  --  135* 138   POTASSIUM mmol/L 3.9 4.1  --  3.3* 3.6 3.7 3.9  --  3.5 3.8   < >  --  3.7 3.7   CHLORIDE mmol/L 104  --   --  102 102 102 103  --  102 99   < >  --  100 104   CO2 mmol/L 32.0*  --   --  29.0 28.0 27.0 25.0  --  25.0 24.0   < >  --  23.0 24.0   BUN mg/dL 87*  --   --  96* 100* 99* 102*  --  98* 102*   < >  --  95* 95*   CREATININE mg/dL 2.89*  --   --  3.59* 3.84* 3.70* 3.73*  --  3.57* 3.46*   < >  --  3.26* 2.98*   GLUCOSE mg/dL 166*  --   --  91 72 85 78  --  174* 220*   < >  --  314* 92   ALBUMIN g/dL 2.30*  --   --  2.40* 2.20*  --  2.40*  --   --  2.90*  --  2.0*  --  2.10*   BILIRUBIN mg/dL 0.9  --   --  0.8 0.8  --  0.8  --   --  1.2  --   --   --  1.4*   ALK PHOS U/L 803*  --   --  945* 1,082*  --  994*  --   --  762*  --   --   --  396*   AST (SGOT) U/L 48*  --   --  78* 101*  --  108*  --   --  72*  --   --   --  81*   ALT (SGPT) U/L 30  --   --  40* 46*  --  45*  --   --  35*  --   --   --  27   MAGNESIUM mg/dL 2.0  --   --  1.6 1.8  --  1.8  --   --  2.1  --   --  2.0 1.4*   AMMONIA umol/L 30  --  43  --  72*  --   --  480*  --   --   --   --   --   --     < > = values in this interval not displayed.     Cr Clearance Estimated Creatinine Clearance: 15.8 mL/min (A) (by C-G formula based on SCr of 2.89 mg/dL (H)).  Coag   Results from last 7 days   Lab Units 02/16/21  0640 02/15/21  0530 02/14/21  0446 02/13/21  1722 02/11/21  1227   INR  1.16* 1.20* 1.19* 1.17* 1.22*   APTT seconds  --   --   --   --  34.0*     HbA1C   Lab Results   Component Value Date    HGBA1C 4.3 02/05/2020    HGBA1C 4.3 01/28/2020     Blood Glucose   Glucose   Date/Time Value  Ref Range Status   02/16/2021 0730 83 70 - 105 mg/dL Final     Comment:     Serial Number: [id number]:  [id number]   02/15/2021 2003 122 (H) 70 - 105 mg/dL Final     Comment:     Serial Number: [id number]:  [id number]   02/15/2021 1640 139 (H) 70 - 105 mg/dL Final     Comment:     Serial Number: [id number]:  [id number]   02/15/2021 1212 192 (H) 70 - 105 mg/dL Final     Comment:     Serial Number: [id number]:  [id number]   02/15/2021 0745 168 (H) 70 - 105 mg/dL Final     Comment:     Serial Number: [id number]:  [id number]   02/14/2021 2049 160 (H) 70 - 105 mg/dL Final     Comment:     Serial Number: [id number]:  [id number]   02/14/2021 1624 107 (H) 70 - 105 mg/dL Final     Comment:     Serial Number: [id number]:  [id number]   02/14/2021 1119 70 70 - 105 mg/dL Final     Comment:     Serial Number: [id number]:  [id number]     Infection   Results from last 7 days   Lab Units 02/10/21  1616 02/10/21  0306   URINECX  Yeast isolated* Yeast isolated*     UA    Results from last 7 days   Lab Units 02/10/21  1616   NITRITE UA  Negative   WBC UA /HPF 6-12*   BACTERIA UA /HPF Trace*   SQUAM EPITHEL UA /HPF 3-6*   URINECX  Yeast isolated*     Radiology(recent) Nm Lung Ventilation Perfusion Aerosol    Result Date: 2/14/2021   1. Abnormal appearance of the ventilation study which can be seen with obstructive pulmonary disease. 2. Low probability of acute pulmonary embolic disease.  Electronically Signed By-Fletcher Barros MD On:2/14/2021 11:29 AM This report was finalized on [id number] by  Fletcher Barros MD.    Xr Tibia Fibula 2 View Bilateral    Result Date: 2/14/2021  IMPRESSION :  1. No acute findings. 2. Tricompartment osteoarthritis of both knees. 3. Bony demineralization.  Electronically Signed By-Fletcher Barros MD On:2/14/2021 5:39 PM This report was finalized on [id number] by  Fletcher Barros MD.         Assessment/Plan     ASSESSMENT:  -Encephalopathy with hyperammoniemia -  resolved   -Elevated LFT's -consider congestive hepatopathy superimposed on cirrhosis vs DILI vs other   -Abnormal US showing cirrhosis & cholelithiasis   -UTI-candiduria  -Acute kidney injury on chronic kidney disease  -Lymphedema leading to cellulitis   -Anemia of chronic kidney disease  -Thrombocytopenia consider related to liver vs infection related vs ITP  -CHF  -CAD/MI/Atrial fib/pacemaker   -HTN     PLAN:   Patient is more alert today.  Denies abdominal pain.  No nausea/vomiting.  Reports multiple bowel movements overnight.  Ammonia level is now normal.  We will continue lactulose.  Alkaline phosphatase continues to trend down.  803 today from 945 yesterday.  Doxycycline and fluconazole were both discontinued yesterday by infectious disease.  Differential diagnosis for elevated alkaline phosphatase includes drug-induced liver injury vs congestive hepatopathy superimposed on underlying cirrhosis.  Could also consider alk phos elevation due to bone injury/inflammation or osteomyelitis due to her cellulitis vs liver injury (medication induced).  Fractionation of alkaline phosphatase is pending.  Await results.  Liver serologies thus far have been unremarkable.  Await further results.  Acute hepatitis panel negative.  RUQ US does show cirrhosis and cholelithiasis.  MRCP unable to be performed due to the patient's pacemaker.  Continue Melisa.   INR trending down.  1.16 today from 1.2 yesterday.  Not much else to add from a GI standpoint as an inpatient at this time.  The patient can follow-up in our office in 2 weeks.  Continue to avoid hepatotoxic medications.  We will see as inpatient as needed.      Sandra Brown, CHRISTIANO  02/16/21  10:44 EST         Winlevi Counseling:  I discussed with the patient the risks of topical clascoterone including but not limited to erythema, scaling, itching, and stinging. Patient voiced their understanding. Soolantra Counseling: I discussed with the patients the risks of topial Soolantra. This is a medicine which decreases the number of mites and inflammation in the skin. You experience burning, stinging, eye irritation or allergic reactions.  Please call our office if you develop any problems from using this medication. Cellcept Pregnancy And Lactation Text: This medication is Pregnancy Category D and isn't considered safe during pregnancy. It is unknown if this medication is excreted in breast milk. Sarecycline Counseling: Patient advised regarding possible photosensitivity and discoloration of the teeth, skin, lips, tongue and gums.  Patient instructed to avoid sunlight, if possible.  When exposed to sunlight, patients should wear protective clothing, sunglasses, and sunscreen.  The patient was instructed to call the office immediately if the following severe adverse effects occur:  hearing changes, easy bruising/bleeding, severe headache, or vision changes.  The patient verbalized understanding of the proper use and possible adverse effects of sarecycline.  All of the patient's questions and concerns were addressed. Cibinqo Counseling: I discussed with the patient the risks of Cibinqo therapy including but not limited to common cold, nausea, headache, cold sores, increased blood CPK levels, dizziness, UTIs, fatigue, acne, and vomitting. Live vaccines should be avoided.  This medication has been linked to serious infections; higher rate of mortality; malignancy and lymphoproliferative disorders; major adverse cardiovascular events; thrombosis; thrombocytopenia and lymphopenia; lipid elevations; and retinal detachment. Bactrim Counseling:  I discussed with the patient the risks of sulfa antibiotics including but not limited to GI upset, allergic reaction, drug rash, diarrhea, dizziness, photosensitivity, and yeast infections.  Rarely, more serious reactions can occur including but not limited to aplastic anemia, agranulocytosis, methemoglobinemia, blood dyscrasias, liver or kidney failure, lung infiltrates or desquamative/blistering drug rashes. Cosentyx Pregnancy And Lactation Text: This medication is Pregnancy Category B and is considered safe during pregnancy. It is unknown if this medication is excreted in breast milk. Gabapentin Counseling: I discussed with the patient the risks of gabapentin including but not limited to dizziness, somnolence, fatigue and ataxia. Elidel Pregnancy And Lactation Text: This medication is Pregnancy Category C. It is unknown if this medication is excreted in breast milk. Opioid Pregnancy And Lactation Text: These medications can lead to premature delivery and should be avoided during pregnancy. These medications are also present in breast milk in small amounts. Cyclophosphamide Counseling:  I discussed with the patient the risks of cyclophosphamide including but not limited to hair loss, hormonal abnormalities, decreased fertility, abdominal pain, diarrhea, nausea and vomiting, bone marrow suppression and infection. The patient understands that monitoring is required while taking this medication. Ivermectin Pregnancy And Lactation Text: This medication is Pregnancy Category C and it isn't known if it is safe during pregnancy. It is also excreted in breast milk. Siliq Counseling:  I discussed with the patient the risks of Siliq including but not limited to new or worsening depression, suicidal thoughts and behavior, immunosuppression, malignancy, posterior leukoencephalopathy syndrome, and serious infections.  The patient understands that monitoring is required including a PPD at baseline and must alert us or the primary physician if symptoms of infection or other concerning signs are noted. There is also a special program designed to monitor depression which is required with Siliq. Use Enhanced Medication Counseling?: No Cibinqo Pregnancy And Lactation Text: It is unknown if this medication will adversely affect pregnancy or breast feeding.  You should not take this medication if you are currently pregnant or planning a pregnancy or while breastfeeding. Bactrim Pregnancy And Lactation Text: This medication is Pregnancy Category D and is known to cause fetal risk.  It is also excreted in breast milk. Olanzapine Counseling- I discussed with the patient the common side effects of olanzapine including but are not limited to: lack of energy, dry mouth, increased appetite, sleepiness, tremor, constipation, dizziness, changes in behavior, or restlessness.  Explained that teenagers are more likely to experience headaches, abdominal pain, pain in the arms or legs, tiredness, and sleepiness.  Serious side effects include but are not limited: increased risk of death in elderly patients who are confused, have memory loss, or dementia-related psychosis; hyperglycemia; increased cholesterol and triglycerides; and weight gain. Dupixent Counseling: I discussed with the patient the risks of dupilumab including but not limited to eye infection and irritation, cold sores, injection site reactions, worsening of asthma, allergic reactions and increased risk of parasitic infection.  Live vaccines should be avoided while taking dupilumab. Dupilumab will also interact with certain medications such as warfarin and cyclosporine. The patient understands that monitoring is required and they must alert us or the primary physician if symptoms of infection or other concerning signs are noted. Eucrisa Counseling: Patient may experience a mild burning sensation during topical application. Eucrisa is not approved in children less than 2 years of age. Winlevi Pregnancy And Lactation Text: This medication is considered safe during pregnancy and breastfeeding. Soolantra Pregnancy And Lactation Text: This medication is Pregnancy Category C. This medication is considered safe during breast feeding. Sarecycline Pregnancy And Lactation Text: This medication is Pregnancy Category D and not consider safe during pregnancy. It is also excreted in breast milk. Dupixent Pregnancy And Lactation Text: This medication likely crosses the placenta but the risk for the fetus is uncertain. This medication is excreted in breast milk. VTAMA Counseling: I discussed with the patient that VTAMA is not for use in the eyes, mouth or mouth. They should call the office if they develop any signs of allergic reactions to VTAMA. The patient verbalized understanding of the proper use and possible adverse effects of VTAMA.  All of the patient's questions and concerns were addressed. Birth Control Pills Counseling: Birth Control Pill Counseling: I discussed with the patient the potential side effects of OCPs including but not limited to increased risk of stroke, heart attack, thrombophlebitis, deep venous thrombosis, hepatic adenomas, breast changes, GI upset, headaches, and depression.  The patient verbalized understanding of the proper use and possible adverse effects of OCPs. All of the patient's questions and concerns were addressed. Siliq Pregnancy And Lactation Text: The risk during pregnancy and breastfeeding is uncertain with this medication. Gabapentin Pregnancy And Lactation Text: This medication is Pregnancy Category C and isn't considered safe during pregnancy. It is excreted in breast milk. Cyclophosphamide Pregnancy And Lactation Text: This medication is Pregnancy Category D and it isn't considered safe during pregnancy. This medication is excreted in breast milk. Simlandi Counseling:  I discussed with the patient the risks of adalimumab including but not limited to myelosuppression, immunosuppression, autoimmune hepatitis, demyelinating diseases, lymphoma, and serious infections.  The patient understands that monitoring is required including a PPD at baseline and must alert us or the primary physician if symptoms of infection or other concerning signs are noted. Glycopyrrolate Counseling:  I discussed with the patient the risks of glycopyrrolate including but not limited to skin rash, drowsiness, dry mouth, difficulty urinating, and blurred vision. Olanzapine Pregnancy And Lactation Text: This medication is pregnancy category C.   There are no adequate and well controlled trials with olanzapine in pregnant females.  Olanzapine should be used during pregnancy only if the potential benefit justifies the potential risk to the fetus.   In a study in lactating healthy women, olanzapine was excreted in breast milk.  It is recommended that women taking olanzapine should not breast feed. Cephalexin Counseling: I counseled the patient regarding use of cephalexin as an antibiotic for prophylactic and/or therapeutic purposes. Cephalexin (commonly prescribed under brand name Keflex) is a cephalosporin antibiotic which is active against numerous classes of bacteria, including most skin bacteria. Side effects may include nausea, diarrhea, gastrointestinal upset, rash, hives, yeast infections, and in rare cases, hepatitis, kidney disease, seizures, fever, confusion, neurologic symptoms, and others. Patients with severe allergies to penicillin medications are cautioned that there is about a 10% incidence of cross-reactivity with cephalosporins. When possible, patients with penicillin allergies should use alternatives to cephalosporins for antibiotic therapy. Tetracycline Counseling: Patient counseled regarding possible photosensitivity and increased risk for sunburn.  Patient instructed to avoid sunlight, if possible.  When exposed to sunlight, patients should wear protective clothing, sunglasses, and sunscreen.  The patient was instructed to call the office immediately if the following severe adverse effects occur:  hearing changes, easy bruising/bleeding, severe headache, or vision changes.  The patient verbalized understanding of the proper use and possible adverse effects of tetracycline.  All of the patient's questions and concerns were addressed. Patient understands to avoid pregnancy while on therapy due to potential birth defects. Eucrisa Pregnancy And Lactation Text: This medication has not been assigned a Pregnancy Risk Category but animal studies failed to show danger with the topical medication. It is unknown if the medication is excreted in breast milk. Litfulo Counseling: I discussed with the patient the risks of Litfulo therapy including but not limited to upper respiratory tract infections, shingles, cold sores, and nausea. Live vaccines should be avoided.  This medication has been linked to serious infections; higher rate of mortality; malignancy and lymphoproliferative disorders; major adverse cardiovascular events; thrombosis; gastrointestinal perforations; neutropenia; lymphopenia; anemia; liver enzyme elevations; and lipid elevations. Topical Retinoid counseling:  Patient advised to apply a pea-sized amount only at bedtime and wait 30 minutes after washing their face before applying.  If too drying, patient may add a non-comedogenic moisturizer. The patient verbalized understanding of the proper use and possible adverse effects of retinoids.  All of the patient's questions and concerns were addressed. Birth Control Pills Pregnancy And Lactation Text: This medication should be avoided if pregnant and for the first 30 days post-partum. Aklief counseling:  Patient advised to apply a pea-sized amount only at bedtime and wait 30 minutes after washing their face before applying.  If too drying, patient may add a non-comedogenic moisturizer.  The most commonly reported side effects including irritation, redness, scaling, dryness, stinging, burning, itching, and increased risk of sunburn.  The patient verbalized understanding of the proper use and possible adverse effects of retinoids.  All of the patient's questions and concerns were addressed. Oral Minoxidil Counseling- I discussed with the patient the risks of oral minoxidil including but not limited to shortness of breath, swelling of the feet or ankles, dizziness, lightheadedness, unwanted hair growth and allergic reaction.  The patient verbalized understanding of the proper use and possible adverse effects of oral minoxidil.  All of the patient's questions and concerns were addressed. Cyclosporine Counseling:  I discussed with the patient the risks of cyclosporine including but not limited to hypertension, gingival hyperplasia,myelosuppression, immunosuppression, liver damage, kidney damage, neurotoxicity, lymphoma, and serious infections. The patient understands that monitoring is required including baseline blood pressure, CBC, CMP, lipid panel and uric acid, and then 1-2 times monthly CMP and blood pressure. Ebglyss Counseling: I discussed with the patient the risks of lebrikizumab including but not limited to eye inflammation and irritation, cold sores, injection site reactions, allergic reactions and increased risk of parasitic infection. The patient understands that monitoring is required and they must alert us or the primary physician if symptoms of infection or other concerning signs are noted. Vtama Pregnancy And Lactation Text: It is unknown if this medication can cause problems during pregnancy and breastfeeding. Glycopyrrolate Pregnancy And Lactation Text: This medication is Pregnancy Category B and is considered safe during pregnancy. It is unknown if it is excreted breast milk. Litfulo Pregnancy And Lactation Text: Based on animal studies, Lifulo may cause embryo-fetal harm when administered to pregnant women.  The medication should not be used in pregnancy.  Breastfeeding is not recommended during treatment. Cephalexin Pregnancy And Lactation Text: This medication is Pregnancy Category B and considered safe during pregnancy.  It is also excreted in breast milk but can be used safely for shorter doses. Hydroquinone Counseling:  Patient advised that medication may result in skin irritation, lightening (hypopigmentation), dryness, and burning.  In the event of skin irritation, the patient was advised to reduce the amount of the drug applied or use it less frequently.  Rarely, spots that are treated with hydroquinone can become darker (pseudoochronosis).  Should this occur, patient instructed to stop medication and call the office. The patient verbalized understanding of the proper use and possible adverse effects of hydroquinone.  All of the patient's questions and concerns were addressed. Spironolactone Counseling: Patient advised regarding risks of diarrhea, abdominal pain, hyperkalemia, birth defects (for female patients), liver toxicity and renal toxicity. The patient may need blood work to monitor liver and kidney function and potassium levels while on therapy. The patient verbalized understanding of the proper use and possible adverse effects of spironolactone.  All of the patient's questions and concerns were addressed. Acitretin Counseling:  I discussed with the patient the risks of acitretin including but not limited to hair loss, dry lips/skin/eyes, liver damage, hyperlipidemia, depression/suicidal ideation, photosensitivity.  Serious rare side effects can include but are not limited to pancreatitis, pseudotumor cerebri, bony changes, clot formation/stroke/heart attack.  Patient understands that alcohol is contraindicated since it can result in liver toxicity and significantly prolong the elimination of the drug by many years. Zoryve Counseling:  I discussed with the patient that Zoryve is not for use in the eyes, mouth or vagina. The most commonly reported side effects include diarrhea, headache, insomnia, application site pain, upper respiratory tract infections, and urinary tract infections.  All of the patient's questions and concerns were addressed. Cyclosporine Pregnancy And Lactation Text: This medication is Pregnancy Category C and it isn't know if it is safe during pregnancy. This medication is excreted in breast milk. Aklief Pregnancy And Lactation Text: It is unknown if this medication is safe to use during pregnancy.  It is unknown if this medication is excreted in breast milk.  Breastfeeding women should use the topical cream on the smallest area of the skin for the shortest time needed while breastfeeding.  Do not apply to nipple and areola. Tazorac Counseling:  Patient advised that medication is irritating and drying.  Patient may need to apply sparingly and wash off after an hour before eventually leaving it on overnight.  The patient verbalized understanding of the proper use and possible adverse effects of tazorac.  All of the patient's questions and concerns were addressed. Olumiant Counseling: I discussed with the patient the risks of Olumiant therapy including but not limited to upper respiratory tract infections, shingles, cold sores, and nausea. Live vaccines should be avoided.  This medication has been linked to serious infections; higher rate of mortality; malignancy and lymphoproliferative disorders; major adverse cardiovascular events; thrombosis; gastrointestinal perforations; neutropenia; lymphopenia; anemia; liver enzyme elevations; and lipid elevations. Hydroxyzine Counseling: Patient advised that the medication is sedating and not to drive a car after taking this medication.  Patient informed of potential adverse effects including but not limited to dry mouth, urinary retention, and blurry vision.  The patient verbalized understanding of the proper use and possible adverse effects of hydroxyzine.  All of the patient's questions and concerns were addressed. Cantharidin Pregnancy And Lactation Text: This medication has not been proven safe during pregnancy. It is unknown if this medication is excreted in breast milk. Clindamycin Counseling: I counseled the patient regarding use of clindamycin as an antibiotic for prophylactic and/or therapeutic purposes. Clindamycin is active against numerous classes of bacteria, including skin bacteria. Side effects may include nausea, diarrhea, gastrointestinal upset, rash, hives, yeast infections, and in rare cases, colitis. Ebglyss Pregnancy And Lactation Text: This medication likely crosses the placenta but the risk for the fetus is uncertain. It is unknown if this medication is excreted in breast milk. Oral Minoxidil Pregnancy And Lactation Text: This medication should only be used when clearly needed if you are pregnant, attempting to become pregnant or breast feeding. Odomzo Counseling- I discussed with the patient the risks of Odomzo including but not limited to nausea, vomiting, diarrhea, constipation, weight loss, changes in the sense of taste, decreased appetite, muscle spasms, and hair loss.  The patient verbalized understanding of the proper use and possible adverse effects of Odomzo.  All of the patient's questions and concerns were addressed. Qbrexza Pregnancy And Lactation Text: There is no available data on Qbrexza use in pregnant women.  There is no available data on Qbrexza use in lactation. Thalidomide Pregnancy And Lactation Text: This medication is Pregnancy Category X and is absolutely contraindicated during pregnancy. It is unknown if it is excreted in breast milk. 5-Fu Counseling: 5-Fluorouracil Counseling:  I discussed with the patient the risks of 5-fluorouracil including but not limited to erythema, scaling, itching, weeping, crusting, and pain. Rhofade Counseling: Rhofade is a topical medication which can decrease superficial blood flow where applied. Side effects are uncommon and include stinging, redness and allergic reactions. Tremfya Counseling: I discussed with the patient the risks of guselkumab including but not limited to immunosuppression, serious infections, and drug reactions.  The patient understands that monitoring is required including a PPD at baseline and must alert us or the primary physician if symptoms of infection or other concerning signs are noted. Quinolones Counseling:  I discussed with the patient the risks of fluoroquinolones including but not limited to GI upset, allergic reaction, drug rash, diarrhea, dizziness, photosensitivity, yeast infections, liver function test abnormalities, tendonitis/tendon rupture. Terbinafine Pregnancy And Lactation Text: This medication is Pregnancy Category B and is considered safe during pregnancy. It is also excreted in breast milk and breast feeding isn't recommended. Bimzelx Pregnancy And Lactation Text: This medication crosses the placenta and the safety is uncertain during pregnancy. It is unknown if this medication is present in breast milk. Topical Sulfur Applications Counseling: Topical Sulfur Counseling: Patient counseled that this medication may cause skin irritation or allergic reactions.  In the event of skin irritation, the patient was advised to reduce the amount of the drug applied or use it less frequently.   The patient verbalized understanding of the proper use and possible adverse effects of topical sulfur application.  All of the patient's questions and concerns were addressed. Colchicine Counseling:  Patient counseled regarding adverse effects including but not limited to stomach upset (nausea, vomiting, stomach pain, or diarrhea).  Patient instructed to limit alcohol consumption while taking this medication.  Colchicine may reduce blood counts especially with prolonged use.  The patient understands that monitoring of kidney function and blood counts may be required, especially at baseline. The patient verbalized understanding of the proper use and possible adverse effects of colchicine.  All of the patient's questions and concerns were addressed. 5-Fu Pregnancy And Lactation Text: This medication is Pregnancy Category X and contraindicated in pregnancy and in women who may become pregnant. It is unknown if this medication is excreted in breast milk. Azathioprine Counseling:  I discussed with the patient the risks of azathioprine including but not limited to myelosuppression, immunosuppression, hepatotoxicity, lymphoma, and infections.  The patient understands that monitoring is required including baseline LFTs, Creatinine, possible TPMP genotyping and weekly CBCs for the first month and then every 2 weeks thereafter.  The patient verbalized understanding of the proper use and possible adverse effects of azathioprine.  All of the patient's questions and concerns were addressed. Nemluvio Counseling: I discussed with the patient the risks of nemolizumab including but not limited to headache, gastrointestinal complaints, nasopharyngitis, musculoskeletal complaints, injection site reactions, and allergic reactions. The patient understands that monitoring is required and they must alert us or the primary physician if any side effects are noted. Tranexamic Acid Counseling:  Patient advised of the small risk of bleeding problems with tranexamic acid. They were also instructed to call if they developed any nausea, vomiting or diarrhea. All of the patient's questions and concerns were addressed. Albendazole Counseling:  I discussed with the patient the risks of albendazole including but not limited to cytopenia, kidney damage, nausea/vomiting and severe allergy.  The patient understands that this medication is being used in an off-label manner. Cimzia Counseling:  I discussed with the patient the risks of Cimzia including but not limited to immunosuppression, allergic reactions and infections.  The patient understands that monitoring is required including a PPD at baseline and must alert us or the primary physician if symptoms of infection or other concerning signs are noted. Drysol Counseling:  I discussed with the patient the risks of drysol/aluminum chloride including but not limited to skin rash, itching, irritation, burning. Topical Sulfur Applications Pregnancy And Lactation Text: This medication is Pregnancy Category C and has an unknown safety profile during pregnancy. It is unknown if this topical medication is excreted in breast milk. Rhofade Pregnancy And Lactation Text: This medication has not been assigned a Pregnancy Risk Category. It is unknown if the medication is excreted in breast milk. Quinolones Pregnancy And Lactation Text: This medication is Pregnancy Category C and it isn't know if it is safe during pregnancy. It is also excreted in breast milk. Cimzia Pregnancy And Lactation Text: This medication crosses the placenta but can be considered safe in certain situations. Cimzia may be excreted in breast milk. Wartpeel Counseling:  I discussed with the patient the risks of Wartpeel including but not limited to erythema, scaling, itching, weeping, crusting, and pain. Tranexamic Acid Pregnancy And Lactation Text: It is unknown if this medication is safe during pregnancy or breast feeding. Nemluvio Pregnancy And Lactation Text: It is not known if Nemluvio causes fetal harm or is present in breast milk. Please proceed with caution if patients who are pregnant or breastfeeding. Rituxan Counseling:  I discussed with the patient the risks of Rituxan infusions. Side effects can include infusion reactions, severe drug rashes including mucocutaneous reactions, reactivation of latent hepatitis and other infections and rarely progressive multifocal leukoencephalopathy.  All of the patient's questions and concerns were addressed. Dapsone Counseling: I discussed with the patient the risks of dapsone including but not limited to hemolytic anemia, agranulocytosis, rashes, methemoglobinemia, kidney failure, peripheral neuropathy, headaches, GI upset, and liver toxicity.  Patients who start dapsone require monitoring including baseline LFTs and weekly CBCs for the first month, then every month thereafter.  The patient verbalized understanding of the proper use and possible adverse effects of dapsone.  All of the patient's questions and concerns were addressed. Rifampin Counseling: I discussed with the patient the risks of rifampin including but not limited to liver damage, kidney damage, red-orange body fluids, nausea/vomiting and severe allergy. Azithromycin Counseling:  I discussed with the patient the risks of azithromycin including but not limited to GI upset, allergic reaction, drug rash, diarrhea, and yeast infections. Xolair Counseling:  Patient informed of potential adverse effects including but not limited to fever, muscle aches, rash and allergic reactions.  The patient verbalized understanding of the proper use and possible adverse effects of Xolair.  All of the patient's questions and concerns were addressed. Valtrex Counseling: I discussed with the patient the risks of valacyclovir including but not limited to kidney damage, nausea, vomiting and severe allergy.  The patient understands that if the infection seems to be worsening or is not improving, they are to call. Solaraze Counseling:  I discussed with the patient the risks of Solaraze including but not limited to erythema, scaling, itching, weeping, crusting, and pain. Drysol Pregnancy And Lactation Text: This medication is considered safe during pregnancy and breast feeding. Cosentyx Counseling:  I discussed with the patient the risks of Cosentyx including but not limited to worsening of Crohn's disease, immunosuppression, allergic reactions and infections.  The patient understands that monitoring is required including a PPD at baseline and must alert us or the primary physician if symptoms of infection or other concerning signs are noted. Xolair Pregnancy And Lactation Text: This medication is Pregnancy Category B and is considered safe during pregnancy. This medication is excreted in breast milk. Cellcept Counseling:  I discussed with the patient the risks of mycophenolate mofetil including but not limited to infection/immunosuppression, GI upset, hypokalemia, hypercholesterolemia, bone marrow suppression, lymphoproliferative disorders, malignancy, GI ulceration/bleed/perforation, colitis, interstitial lung disease, kidney failure, progressive multifocal leukoencephalopathy, and birth defects.  The patient understands that monitoring is required including a baseline creatinine and regular CBC testing. In addition, patient must alert us immediately if symptoms of infection or other concerning signs are noted. Azithromycin Pregnancy And Lactation Text: This medication is considered safe during pregnancy and is also secreted in breast milk. Rifampin Pregnancy And Lactation Text: This medication is Pregnancy Category C and it isn't know if it is safe during pregnancy. It is also excreted in breast milk and should not be used if you are breast feeding. Dapsone Pregnancy And Lactation Text: This medication is Pregnancy Category C and is not considered safe during pregnancy or breast feeding. Valtrex Pregnancy And Lactation Text: this medication is Pregnancy Category B and is considered safe during pregnancy. This medication is not directly found in breast milk but it's metabolite acyclovir is present. Solaraze Pregnancy And Lactation Text: This medication is Pregnancy Category B and is considered safe. There is some data to suggest avoiding during the third trimester. It is unknown if this medication is excreted in breast milk. Opioid Counseling: I discussed with the patient the potential side effects of opioids including but not limited to addiction, altered mental status, and depression. I stressed avoiding alcohol, benzodiazepines, muscle relaxants and sleep aids unless specifically okayed by a physician. The patient verbalized understanding of the proper use and possible adverse effects of opioids. All of the patient's questions and concerns were addressed. They were instructed to flush the remaining pills down the toilet if they did not need them for pain. Elidel Counseling: Patient may experience a mild burning sensation during topical application. Elidel is not approved in children less than 2 years of age. There have been case reports of hematologic and skin malignancies in patients using topical calcineurin inhibitors although causality is questionable. Rituxan Pregnancy And Lactation Text: This medication is Pregnancy Category C and it isn't know if it is safe during pregnancy. It is unknown if this medication is excreted in breast milk but similar antibodies are known to be excreted. Ivermectin Counseling:  Patient instructed to take medication on an empty stomach with a full glass of water.  Patient informed of potential adverse effects including but not limited to nausea, diarrhea, dizziness, itching, and swelling of the extremities or lymph nodes.  The patient verbalized understanding of the proper use and possible adverse effects of ivermectin.  All of the patient's questions and concerns were addressed. Propranolol Counseling:  I discussed with the patient the risks of propranolol including but not limited to low heart rate, low blood pressure, low blood sugar, restlessness and increased cold sensitivity. They should call the office if they experience any of these side effects. Topical Ketoconazole Pregnancy And Lactation Text: This medication is Pregnancy Category B and is considered safe during pregnancy. It is unknown if it is excreted in breast milk. Minoxidil Counseling: Minoxidil is a topical medication which can increase blood flow where it is applied. It is uncertain how this medication increases hair growth. Side effects are uncommon and include stinging and allergic reactions. Itraconazole Counseling:  I discussed with the patient the risks of itraconazole including but not limited to liver damage, nausea/vomiting, neuropathy, and severe allergy.  The patient understands that this medication is best absorbed when taken with acidic beverages such as non-diet cola or ginger ale.  The patient understands that monitoring is required including baseline LFTs and repeat LFTs at intervals.  The patient understands that they are to contact us or the primary physician if concerning signs are noted. Hyrimoz Counseling:  I discussed with the patient the risks of adalimumab including but not limited to myelosuppression, immunosuppression, autoimmune hepatitis, demyelinating diseases, lymphoma, and serious infections.  The patient understands that monitoring is required including a PPD at baseline and must alert us or the primary physician if symptoms of infection or other concerning signs are noted. Erythromycin Pregnancy And Lactation Text: This medication is Pregnancy Category B and is considered safe during pregnancy. It is also excreted in breast milk. Dutasteride Pregnancy And Lactation Text: This medication is absolutely contraindicated in women, especially during pregnancy and breast feeding. Feminization of male fetuses is possible if taking while pregnant. Sotyktu Pregnancy And Lactation Text: There is insufficient data to evaluate whether or not Sotyktu is safe to use during pregnancy.? ?It is not known if Sotyktu passes into breast milk and whether or not it is safe to use when breastfeeding.?? Erivedge Counseling- I discussed with the patient the risks of Erivedge including but not limited to nausea, vomiting, diarrhea, constipation, weight loss, changes in the sense of taste, decreased appetite, muscle spasms, and hair loss.  The patient verbalized understanding of the proper use and possible adverse effects of Erivedge.  All of the patient's questions and concerns were addressed. High Dose Vitamin A Counseling: Side effects reviewed, pt to contact office should one occur. Spevigo Pregnancy And Lactation Text: The risk during pregnancy and breastfeeding is uncertain with this medication. This medication does cross the placenta. It is unknown if this medication is found in breast milk. Propranolol Pregnancy And Lactation Text: This medication is Pregnancy Category C and it isn't known if it is safe during pregnancy. It is excreted in breast milk. Niacinamide Pregnancy And Lactation Text: These medications are considered safe during pregnancy. Doxepin Pregnancy And Lactation Text: This medication is Pregnancy Category C and it isn't known if it is safe during pregnancy. It is also excreted in breast milk and breast feeding isn't recommended. Metronidazole Counseling:  I discussed with the patient the risks of metronidazole including but not limited to seizures, nausea/vomiting, a metallic taste in the mouth, nausea/vomiting and severe allergy. Protopic Counseling: Patient may experience a mild burning sensation during topical application. Protopic is not approved in children less than 2 years of age. There have been case reports of hematologic and skin malignancies in patients using topical calcineurin inhibitors although causality is questionable. Stelara Counseling:  I discussed with the patient the risks of ustekinumab including but not limited to immunosuppression, malignancy, posterior leukoencephalopathy syndrome, and serious infections.  The patient understands that monitoring is required including a PPD at baseline and must alert us or the primary physician if symptoms of infection or other concerning signs are noted. Xeljanz Counseling: I discussed with the patient the risks of Xeljanz therapy including increased risk of infection, liver issues, headache, diarrhea, or cold symptoms. Live vaccines should be avoided. They were instructed to call if they have any problems. Nsaids Counseling: NSAID Counseling: I discussed with the patient that NSAIDs should be taken with food. Prolonged use of NSAIDs can result in the development of stomach ulcers.  Patient advised to stop taking NSAIDs if abdominal pain occurs.  The patient verbalized understanding of the proper use and possible adverse effects of NSAIDs.  All of the patient's questions and concerns were addressed. Finasteride Male Counseling: Finasteride Counseling:  I discussed with the patient the risks of use of finasteride including but not limited to decreased libido, decreased ejaculate volume, gynecomastia, and depression. Women should not handle medication.  All of the patient's questions and concerns were addressed. High Dose Vitamin A Pregnancy And Lactation Text: High dose vitamin A therapy is contraindicated during pregnancy and breast feeding. Topical Metronidazole Counseling: Metronidazole is a topical antibiotic medication. You may experience burning, stinging, redness, or allergic reactions.  Please call our office if you develop any problems from using this medication. Arava Counseling:  Patient counseled regarding adverse effects of Arava including but not limited to nausea, vomiting, abnormalities in liver function tests. Patients may develop mouth sores, rash, diarrhea, and abnormalities in blood counts. The patient understands that monitoring is required including LFTs and blood counts.  There is a rare possibility of scarring of the liver and lung problems that can occur when taking methotrexate. Persistent nausea, loss of appetite, pale stools, dark urine, cough, and shortness of breath should be reported immediately. Patient advised to discontinue Arava treatment and consult with a physician prior to attempting conception. The patient will have to undergo a treatment to eliminate Arava from the body prior to conception. Calcipotriene Counseling:  I discussed with the patient the risks of calcipotriene including but not limited to erythema, scaling, itching, and irritation. Finasteride Female Counseling: Finasteride Counseling:  I discussed with the patient the risks of use of finasteride including but not limited to decreased libido and sexual dysfunction. Explained the teratogenic nature of the medication and stressed the importance of not getting pregnant during treatment. All of the patient's questions and concerns were addressed. Xellauraz Pregnancy And Lactation Text: This medication is Pregnancy Category D and is not considered safe during pregnancy.  The risk during breast feeding is also uncertain. SSKI Counseling:  I discussed with the patient the risks of SSKI including but not limited to thyroid abnormalities, metallic taste, GI upset, fever, headache, acne, arthralgias, paraesthesias, lymphadenopathy, easy bleeding, arrhythmias, and allergic reaction. Ilumya Counseling: I discussed with the patient the risks of tildrakizumab including but not limited to immunosuppression, malignancy, posterior leukoencephalopathy syndrome, and serious infections.  The patient understands that monitoring is required including a PPD at baseline and must alert us or the primary physician if symptoms of infection or other concerning signs are noted. Metronidazole Pregnancy And Lactation Text: This medication is Pregnancy Category B and considered safe during pregnancy.  It is also excreted in breast milk. Nsaids Pregnancy And Lactation Text: These medications are considered safe up to 30 weeks gestation. It is excreted in breast milk. Ketoconazole Counseling:   Patient counseled regarding improving absorption with orange juice.  Adverse effects include but are not limited to breast enlargement, headache, diarrhea, nausea, upset stomach, liver function test abnormalities, taste disturbance, and stomach pain.  There is a rare possibility of liver failure that can occur when taking ketoconazole. The patient understands that monitoring of LFTs may be required, especially at baseline. The patient verbalized understanding of the proper use and possible adverse effects of ketoconazole.  All of the patient's questions and concerns were addressed. Protopic Pregnancy And Lactation Text: This medication is Pregnancy Category C. It is unknown if this medication is excreted in breast milk when applied topically. Libtayo Counseling- I discussed with the patient the risks of Libtayo including but not limited to nausea, vomiting, diarrhea, and bone or muscle pain.  The patient verbalized understanding of the proper use and possible adverse effects of Libtayo.  All of the patient's questions and concerns were addressed. Adbry Counseling: I discussed with the patient the risks of tralokinumab including but not limited to eye infection and irritation, cold sores, injection site reactions, worsening of asthma, allergic reactions and increased risk of parasitic infection.  Live vaccines should be avoided while taking tralokinumab. The patient understands that monitoring is required and they must alert us or the primary physician if symptoms of infection or other concerning signs are noted. Calcipotriene Pregnancy And Lactation Text: The use of this medication during pregnancy or lactation is not recommended as there is insufficient data. Topical Metronidazole Pregnancy And Lactation Text: This medication is Pregnancy Category B and considered safe during pregnancy.  It is also considered safe to use while breastfeeding. Adbry Pregnancy And Lactation Text: It is unknown if this medication will adversely affect pregnancy or breast feeding. Topical Steroids Counseling: I discussed with the patient that prolonged use of topical steroids can result in the increased appearance of superficial blood vessels (telangiectasias), lightening (hypopigmentation) and thinning of the skin (atrophy).  Patient understands to avoid using high potency steroids in skin folds, the groin or the face.  The patient verbalized understanding of the proper use and possible adverse effects of topical steroids.  All of the patient's questions and concerns were addressed. Sski Pregnancy And Lactation Text: This medication is Pregnancy Category D and isn't considered safe during pregnancy. It is excreted in breast milk. Detail Level: Simple Finasteride Pregnancy And Lactation Text: This medication is absolutely contraindicated during pregnancy. It is unknown if it is excreted in breast milk. Libtayo Pregnancy And Lactation Text: This medication is contraindicated in pregnancy and when breast feeding. Ketoconazole Pregnancy And Lactation Text: This medication is Pregnancy Category C and it isn't know if it is safe during pregnancy. It is also excreted in breast milk and breast feeding isn't recommended. Infliximab Counseling:  I discussed with the patient the risks of infliximab including but not limited to myelosuppression, immunosuppression, autoimmune hepatitis, demyelinating diseases, lymphoma, and serious infections.  The patient understands that monitoring is required including a PPD at baseline and must alert us or the primary physician if symptoms of infection or other concerning signs are noted. Minocycline Counseling: Patient advised regarding possible photosensitivity and discoloration of the teeth, skin, lips, tongue and gums.  Patient instructed to avoid sunlight, if possible.  When exposed to sunlight, patients should wear protective clothing, sunglasses, and sunscreen.  The patient was instructed to call the office immediately if the following severe adverse effects occur:  hearing changes, easy bruising/bleeding, severe headache, or vision changes.  The patient verbalized understanding of the proper use and possible adverse effects of minocycline.  All of the patient's questions and concerns were addressed. Taltz Counseling: I discussed with the patient the risks of ixekizumab including but not limited to immunosuppression, serious infections, worsening of inflammatory bowel disease and drug reactions.  The patient understands that monitoring is required including a PPD at baseline and must alert us or the primary physician if symptoms of infection or other concerning signs are noted. Qbrexza Counseling:  I discussed with the patient the risks of Qbrexza including but not limited to headache, mydriasis, blurred vision, dry eyes, nasal dryness, dry mouth, dry throat, dry skin, urinary hesitation, and constipation.  Local skin reactions including erythema, burning, stinging, and itching can also occur. Cantharidin Counseling:  I discussed with the patient the risks of Cantharidin including but not limited to pain, redness, burning, itching, and blistering. Thalidomide Counseling: I discussed with the patient the risks of thalidomide including but not limited to birth defects, anxiety, weakness, chest pain, dizziness, cough and severe allergy. Topical Steroids Applications Pregnancy And Lactation Text: Most topical steroids are considered safe to use during pregnancy and lactation.  Any topical steroid applied to the breast or nipple should be washed off before breastfeeding. Terbinafine Counseling: Patient counseling regarding adverse effects of terbinafine including but not limited to headache, diarrhea, rash, upset stomach, liver function test abnormalities, itching, taste/smell disturbance, nausea, abdominal pain, and flatulence.  There is a rare possibility of liver failure that can occur when taking terbinafine.  The patient understands that a baseline LFT and kidney function test may be required. The patient verbalized understanding of the proper use and possible adverse effects of terbinafine.  All of the patient's questions and concerns were addressed. Clofazimine Counseling:  I discussed with the patient the risks of clofazimine including but not limited to skin and eye pigmentation, liver damage, nausea/vomiting, gastrointestinal bleeding and allergy. Bimzelx Counseling:  I discussed with the patient the risks of Bimzelx including but not limited to depression, immunosuppression, allergic reactions and infections.  The patient understands that monitoring is required including a PPD at baseline and must alert us or the primary physician if symptoms of infection or other concerning signs are noted. Spironolactone Pregnancy And Lactation Text: This medication can cause feminization of the male fetus and should be avoided during pregnancy. The active metabolite is also found in breast milk. Hydroxychloroquine Counseling:  I discussed with the patient that a baseline ophthalmologic exam is needed at the start of therapy and every year thereafter while on therapy. A CBC may also be warranted for monitoring.  The side effects of this medication were discussed with the patient, including but not limited to agranulocytosis, aplastic anemia, seizures, rashes, retinopathy, and liver toxicity. Patient instructed to call the office should any adverse effect occur.  The patient verbalized understanding of the proper use and possible adverse effects of Plaquenil.  All the patient's questions and concerns were addressed. Acitretin Pregnancy And Lactation Text: This medication is Pregnancy Category X and should not be given to women who are pregnant or may become pregnant in the future. This medication is excreted in breast milk. Methotrexate Counseling:  Patient counseled regarding adverse effects of methotrexate including but not limited to nausea, vomiting, abnormalities in liver function tests. Patients may develop mouth sores, rash, diarrhea, and abnormalities in blood counts. The patient understands that monitoring is required including LFT's and blood counts.  There is a rare possibility of scarring of the liver and lung problems that can occur when taking methotrexate. Persistent nausea, loss of appetite, pale stools, dark urine, cough, and shortness of breath should be reported immediately. Patient advised to discontinue methotrexate treatment at least three months before attempting to become pregnant.  I discussed the need for folate supplements while taking methotrexate.  These supplements can decrease side effects during methotrexate treatment. The patient verbalized understanding of the proper use and possible adverse effects of methotrexate.  All of the patient's questions and concerns were addressed. Azelaic Acid Counseling: Patient counseled that medicine may cause skin irritation and to avoid applying near the eyes.  In the event of skin irritation, the patient was advised to reduce the amount of the drug applied or use it less frequently.   The patient verbalized understanding of the proper use and possible adverse effects of azelaic acid.  All of the patient's questions and concerns were addressed. Simponi Counseling:  I discussed with the patient the risks of golimumab including but not limited to myelosuppression, immunosuppression, autoimmune hepatitis, demyelinating diseases, lymphoma, and serious infections.  The patient understands that monitoring is required including a PPD at baseline and must alert us or the primary physician if symptoms of infection or other concerning signs are noted. Mirvaso Counseling: Mirvaso is a topical medication which can decrease superficial blood flow where applied. Side effects are uncommon and include stinging, redness and allergic reactions. Tazorac Pregnancy And Lactation Text: This medication is not safe during pregnancy. It is unknown if this medication is excreted in breast milk. Otezla Counseling: The side effects of Otezla were discussed with the patient, including but not limited to worsening or new depression, weight loss, diarrhea, nausea, upper respiratory tract infection, and headache. Patient instructed to call the office should any adverse effect occur.  The patient verbalized understanding of the proper use and possible adverse effects of Otezla.  All the patient's questions and concerns were addressed. Imiquimod Counseling:  I discussed with the patient the risks of imiquimod including but not limited to erythema, scaling, itching, weeping, crusting, and pain.  Patient understands that the inflammatory response to imiquimod is variable from person to person and was educated regarded proper titration schedule.  If flu-like symptoms develop, patient knows to discontinue the medication and contact us. Fluconazole Counseling:  Patient counseled regarding adverse effects of fluconazole including but not limited to headache, diarrhea, nausea, upset stomach, liver function test abnormalities, taste disturbance, and stomach pain.  There is a rare possibility of liver failure that can occur when taking fluconazole.  The patient understands that monitoring of LFTs and kidney function test may be required, especially at baseline. The patient verbalized understanding of the proper use and possible adverse effects of fluconazole.  All of the patient's questions and concerns were addressed. Enbrel Counseling:  I discussed with the patient the risks of etanercept including but not limited to myelosuppression, immunosuppression, autoimmune hepatitis, demyelinating diseases, lymphoma, and infections.  The patient understands that monitoring is required including a PPD at baseline and must alert us or the primary physician if symptoms of infection or other concerning signs are noted. Clindamycin Pregnancy And Lactation Text: This medication can be used in pregnancy if certain situations. Clindamycin is also present in breast milk. Hydroxyzine Pregnancy And Lactation Text: This medication is not safe during pregnancy and should not be taken. It is also excreted in breast milk and breast feeding isn't recommended. Olumiant Pregnancy And Lactation Text: Based on animal studies, Olumiant may cause embryo-fetal harm when administered to pregnant women.  The medication should not be used in pregnancy.  Breastfeeding is not recommended during treatment. Zyclara Counseling:  I discussed with the patient the risks of imiquimod including but not limited to erythema, scaling, itching, weeping, crusting, and pain.  Patient understands that the inflammatory response to imiquimod is variable from person to person and was educated regarded proper titration schedule.  If flu-like symptoms develop, patient knows to discontinue the medication and contact us. Bexarotene Counseling:  I discussed with the patient the risks of bexarotene including but not limited to hair loss, dry lips/skin/eyes, liver abnormalities, hyperlipidemia, pancreatitis, depression/suicidal ideation, photosensitivity, drug rash/allergic reactions, hypothyroidism, anemia, leukopenia, infection, cataracts, and teratogenicity.  Patient understands that they will need regular blood tests to check lipid profile, liver function tests, white blood cell count, thyroid function tests and pregnancy test if applicable. Hydroxychloroquine Pregnancy And Lactation Text: This medication has been shown to cause fetal harm but it isn't assigned a Pregnancy Risk Category. There are small amounts excreted in breast milk. Otezla Pregnancy And Lactation Text: This medication is Pregnancy Category C and it isn't known if it is safe during pregnancy. It is unknown if it is excreted in breast milk. Methotrexate Pregnancy And Lactation Text: This medication is Pregnancy Category X and is known to cause fetal harm. This medication is excreted in breast milk. Doxycycline Counseling:  Patient counseled regarding possible photosensitivity and increased risk for sunburn.  Patient instructed to avoid sunlight, if possible.  When exposed to sunlight, patients should wear protective clothing, sunglasses, and sunscreen.  The patient was instructed to call the office immediately if the following severe adverse effects occur:  hearing changes, easy bruising/bleeding, severe headache, or vision changes.  The patient verbalized understanding of the proper use and possible adverse effects of doxycycline.  All of the patient's questions and concerns were addressed. Opzelura Counseling:  I discussed with the patient the risks of Opzelura including but not limited to nasopharngitis, bronchitis, ear infection, eosinophila, hives, diarrhea, folliculitis, tonsillitis, and rhinorrhea.  Taken orally, this medication has been linked to serious infections; higher rate of mortality; malignancy and lymphoproliferative disorders; major adverse cardiovascular events; thrombosis; thrombocytopenia, anemia, and neutropenia; and lipid elevations. Low Dose Naltrexone Counseling- I discussed with the patient the potential risks and side effects of low dose naltrexone including but not limited to: more vivid dreams, headaches, nausea, vomiting, abdominal pain, fatigue, dizziness, and anxiety. Skyrizi Counseling: I discussed with the patient the risks of risankizumab-rzaa including but not limited to immunosuppression, and serious infections.  The patient understands that monitoring is required including a PPD at baseline and must alert us or the primary physician if symptoms of infection or other concerning signs are noted. Rinvoq Counseling: I discussed with the patient the risks of Rinvoq therapy including but not limited to upper respiratory tract infections, shingles, cold sores, bronchitis, nausea, cough, fever, acne, and headache. Live vaccines should be avoided.  This medication has been linked to serious infections; higher rate of mortality; malignancy and lymphoproliferative disorders; major adverse cardiovascular events; thrombosis; thrombocytopenia, anemia, and neutropenia; lipid elevations; liver enzyme elevations; and gastrointestinal perforations. Bexarotene Pregnancy And Lactation Text: This medication is Pregnancy Category X and should not be given to women who are pregnant or may become pregnant. This medication should not be used if you are breast feeding. Topical Clindamycin Counseling: Patient counseled that this medication may cause skin irritation or allergic reactions.  In the event of skin irritation, the patient was advised to reduce the amount of the drug applied or use it less frequently.   The patient verbalized understanding of the proper use and possible adverse effects of clindamycin.  All of the patient's questions and concerns were addressed. Benzoyl Peroxide Counseling: Patient counseled that medicine may cause skin irritation and bleach clothing.  In the event of skin irritation, the patient was advised to reduce the amount of the drug applied or use it less frequently.   The patient verbalized understanding of the proper use and possible adverse effects of benzoyl peroxide.  All of the patient's questions and concerns were addressed. Dutasteride Male Counseling: Dustasteride Counseling:  I discussed with the patient the risks of use of dutasteride including but not limited to decreased libido, decreased ejaculate volume, and gynecomastia. Women who can become pregnant should not handle medication.  All of the patient's questions and concerns were addressed. Oxybutynin Counseling:  I discussed with the patient the risks of oxybutynin including but not limited to skin rash, drowsiness, dry mouth, difficulty urinating, and blurred vision. Cimetidine Counseling:  I discussed with the patient the risks of Cimetidine including but not limited to gynecomastia, headache, diarrhea, nausea, drowsiness, arrhythmias, pancreatitis, skin rashes, psychosis, bone marrow suppression and kidney toxicity. Prednisone Counseling:  I discussed with the patient the risks of prolonged use of prednisone including but not limited to weight gain, insomnia, osteoporosis, mood changes, diabetes, susceptibility to infection, glaucoma and high blood pressure.  In cases where prednisone use is prolonged, patients should be monitored with blood pressure checks, serum glucose levels and an eye exam.  Additionally, the patient may need to be placed on GI prophylaxis, PCP prophylaxis, and calcium and vitamin D supplementation and/or a bisphosphonate.  The patient verbalized understanding of the proper use and the possible adverse effects of prednisone.  All of the patient's questions and concerns were addressed. Humira Counseling:  I discussed with the patient the risks of adalimumab including but not limited to myelosuppression, immunosuppression, autoimmune hepatitis, demyelinating diseases, lymphoma, and serious infections.  The patient understands that monitoring is required including a PPD at baseline and must alert us or the primary physician if symptoms of infection or other concerning signs are noted. Low Dose Naltrexone Pregnancy And Lactation Text: Naltrexone is pregnancy category C.  There have been no adequate and well-controlled studies in pregnant women.  It should be used in pregnancy only if the potential benefit justifies the potential risk to the fetus.   Limited data indicates that naltrexone is minimally excreted into breastmilk. Opzelura Pregnancy And Lactation Text: There is insufficient data to evaluate drug-associated risk for major birth defects, miscarriage, or other adverse maternal or fetal outcomes.  There is a pregnancy registry that monitors pregnancy outcomes in pregnant persons exposed to the medication during pregnancy.  It is unknown if this medication is excreted in breast milk.  Do not breastfeed during treatment and for about 4 weeks after the last dose. Griseofulvin Counseling:  I discussed with the patient the risks of griseofulvin including but not limited to photosensitivity, cytopenia, liver damage, nausea/vomiting and severe allergy.  The patient understands that this medication is best absorbed when taken with a fatty meal (e.g., ice cream or french fries). Rinvoq Pregnancy And Lactation Text: Based on animal studies, Rinvoq may cause embryo-fetal harm when administered to pregnant women.  The medication should not be used in pregnancy.  Breastfeeding is not recommended during treatment and for 6 days after the last dose. Doxycycline Pregnancy And Lactation Text: This medication is Pregnancy Category D and not consider safe during pregnancy. It is also excreted in breast milk but is considered safe for shorter treatment courses. Benzoyl Peroxide Pregnancy And Lactation Text: This medication is Pregnancy Category C. It is unknown if benzoyl peroxide is excreted in breast milk. Klisyri Counseling:  I discussed with the patient the risks of Klisyri including but not limited to erythema, scaling, itching, weeping, crusting, and pain. Isotretinoin Counseling: Patient should get monthly blood tests, not donate blood, not drive at night if vision affected, not share medication, and not undergo elective surgery for 6 months after tx completed. Side effects reviewed, pt to contact office should one occur. Klisyri Pregnancy And Lactation Text: It is unknown if this medication can harm a developing fetus or if it is excreted in breast milk. Sotyktu Counseling:  I discussed the most common side effects of Sotyktu including: common cold, sore throat, sinus infections, cold sores, canker sores, folliculitis, and acne.? I also discussed more serious side effects of Sotyktu including but not limited to: serious allergic reactions; increased risk for infections such as TB; cancers such as lymphomas; rhabdomyolysis and elevated CPK; and elevated triglycerides and liver enzymes.? Dutasteride Female Counseling: Dutasteride Counseling:  I discussed with the patient the risks of use of dutasteride including but not limited to decreased libido and sexual dysfunction. Explained the teratogenic nature of the medication and stressed the importance of not getting pregnant during treatment. All of the patient's questions and concerns were addressed. Niacinamide Counseling: I recommended taking niacin or niacinamide, also know as vitamin B3, twice daily. Recent evidence suggests that taking vitamin B3 (500 mg twice daily) can reduce the risk of actinic keratoses and non-melanoma skin cancers. Side effects of vitamin B3 include flushing and headache. Isotretinoin Pregnancy And Lactation Text: This medication is Pregnancy Category X and is considered extremely dangerous during pregnancy. It is unknown if it is excreted in breast milk. Griseofulvin Pregnancy And Lactation Text: This medication is Pregnancy Category X and is known to cause serious birth defects. It is unknown if this medication is excreted in breast milk but breast feeding should be avoided. Doxepin Counseling:  Patient advised that the medication is sedating and not to drive a car after taking this medication. Patient informed of potential adverse effects including but not limited to dry mouth, urinary retention, and blurry vision.  The patient verbalized understanding of the proper use and possible adverse effects of doxepin.  All of the patient's questions and concerns were addressed. Erythromycin Counseling:  I discussed with the patient the risks of erythromycin including but not limited to GI upset, allergic reaction, drug rash, diarrhea, increase in liver enzymes, and yeast infections. Spevigo Counseling: I discussed with the patient the risks of Spevigo including but not limited to fatigue, nasuea, vomiting, headache, pruritus, urinary tract infection, an infusion related reactions.  The patient understands that monitoring is required including screening for tuberculosis at baseline and yearly screening thereafter while continuing Spevigo therapy. They should contact us if symptoms of infection or other concerning signs are noted. Carac Counseling:  I discussed with the patient the risks of Carac including but not limited to erythema, scaling, itching, weeping, crusting, and pain. Topical Ketoconazole Counseling: Patient counseled that this medication may cause skin irritation or allergic reactions.  In the event of skin irritation, the patient was advised to reduce the amount of the drug applied or use it less frequently.   The patient verbalized understanding of the proper use and possible adverse effects of ketoconazole.  All of the patient's questions and concerns were addressed. Picato Counseling:  I discussed with the patient the risks of Picato including but not limited to erythema, scaling, itching, weeping, crusting, and pain.

## (undated) DEVICE — SKIN AFFIX SURG ADHESIVE 72/CS 0.55ML: Brand: MEDLINE

## (undated) DEVICE — BOWL PLSTC MD 16OZ BLU STRL

## (undated) DEVICE — SWAN-GANZ BIPOLAR PACING CATHETER: Brand: SWAN-GANZ

## (undated) DEVICE — DISPOSABLE ADAPTER

## (undated) DEVICE — UNDYED BRAIDED (POLYGLACTIN 910), SYNTHETIC ABSORBABLE SUTURE: Brand: COATED VICRYL

## (undated) DEVICE — PINNACLE INTRODUCER SHEATH: Brand: PINNACLE

## (undated) DEVICE — PLASMABLADE PS200-040 4.0: Brand: PLASMABLADE™

## (undated) DEVICE — VIOLET BRAIDED (POLYGLACTIN 910), SYNTHETIC ABSORBABLE SUTURE: Brand: COATED VICRYL

## (undated) DEVICE — PACEMAKER CDS: Brand: MEDLINE INDUSTRIES, INC.

## (undated) DEVICE — 3M™ PATIENT PLATE, CORDED, SPLIT, LARGE, 40 PER CASE, 1179: Brand: 3M™

## (undated) DEVICE — NDL PERC 1PRT THNWALL W/BASEPLT 18G 7CM

## (undated) DEVICE — 3M™ STERI-STRIP™ REINFORCED ADHESIVE SKIN CLOSURES, R1547, 1/2 IN X 4 IN (12 MM X 100 MM), 6 STRIPS/ENVELOPE: Brand: 3M™ STERI-STRIP™

## (undated) DEVICE — CABL BIPOL W/ALLGTR CLIP/SM 12FT

## (undated) DEVICE — ELECTRD DEFIB M/FUNC PROPADZ RADIOL 2PK